# Patient Record
Sex: MALE | Race: WHITE | Employment: UNEMPLOYED | URBAN - NONMETROPOLITAN AREA
[De-identification: names, ages, dates, MRNs, and addresses within clinical notes are randomized per-mention and may not be internally consistent; named-entity substitution may affect disease eponyms.]

---

## 2020-09-15 ENCOUNTER — APPOINTMENT (OUTPATIENT)
Dept: CT IMAGING | Age: 25
End: 2020-09-15
Attending: EMERGENCY MEDICINE
Payer: MEDICAID

## 2020-09-15 ENCOUNTER — HOSPITAL ENCOUNTER (EMERGENCY)
Age: 25
Discharge: HOME | End: 2020-09-15
Attending: EMERGENCY MEDICINE | Admitting: EMERGENCY MEDICINE
Payer: MEDICAID

## 2020-09-15 VITALS
HEART RATE: 74 BPM | OXYGEN SATURATION: 97 % | WEIGHT: 187.1 LBS | RESPIRATION RATE: 22 BRPM | DIASTOLIC BLOOD PRESSURE: 89 MMHG | BODY MASS INDEX: 26.79 KG/M2 | HEIGHT: 70 IN | TEMPERATURE: 97.7 F | SYSTOLIC BLOOD PRESSURE: 133 MMHG

## 2020-09-15 DIAGNOSIS — K04.7 DENTAL INFECTION: Primary | ICD-10-CM

## 2020-09-15 LAB
ANION GAP SERPL CALC-SCNC: 10 MMOL/L (ref 5–15)
BASOPHILS # BLD: 0 K/UL (ref 0–0.2)
BASOPHILS NFR BLD: 0 % (ref 0–2.5)
BUN SERPL-MCNC: 10 MG/DL (ref 6–20)
BUN/CREAT SERPL: 11 (ref 12–20)
CA-I BLD-MCNC: 9.3 MG/DL (ref 8.5–10.1)
CHLORIDE SERPL-SCNC: 100 MMOL/L (ref 97–108)
CO2 SERPL-SCNC: 26 MMOL/L (ref 21–32)
CREAT SERPL-MCNC: 0.89 MG/DL (ref 0.7–1.3)
EOSINOPHIL # BLD: 0.2 K/UL (ref 0–0.7)
EOSINOPHIL NFR BLD: 2 % (ref 0.9–2.9)
ERYTHROCYTE [DISTWIDTH] IN BLOOD BY AUTOMATED COUNT: 15.4 % (ref 11.5–14.5)
GLUCOSE SERPL-MCNC: 87 MG/DL (ref 65–100)
HCT VFR BLD AUTO: 40 % (ref 41–53)
HGB BLD-MCNC: 13.7 % (ref 13.5–17.5)
LYMPHOCYTES # BLD: 1.9 K/UL (ref 1–4.8)
LYMPHOCYTES NFR BLD: 20 % (ref 20.5–51.1)
MCH RBC QN AUTO: 29.1 PG (ref 31–34)
MCHC RBC AUTO-ENTMCNC: 34.2 G/DL (ref 31–36)
MCV RBC AUTO: 85.2 FL (ref 80–100)
MONOCYTES # BLD: 1.3 K/UL (ref 0.2–2.4)
MONOCYTES NFR BLD: 13 % (ref 1.7–9.3)
NEUTS SEG # BLD: 6.2 K/UL (ref 1.8–7.7)
NEUTS SEG NFR BLD: 65 % (ref 42–75)
NRBC # BLD: 0 K/UL
NRBC BLD-RTO: 0 PER 100 WBC
PLATELET # BLD AUTO: 290 K/UL
PMV BLD AUTO: 7.3 FL (ref 6.5–11.5)
POTASSIUM SERPL-SCNC: 3.6 MMOL/L (ref 3.5–5.1)
RBC # BLD AUTO: 4.69 M/UL (ref 4.5–5.9)
SODIUM SERPL-SCNC: 136 MMOL/L (ref 136–145)
WBC # BLD AUTO: 9.6 K/UL (ref 4.4–11.3)

## 2020-09-15 PROCEDURE — 99283 EMERGENCY DEPT VISIT LOW MDM: CPT

## 2020-09-15 PROCEDURE — 85025 COMPLETE CBC W/AUTO DIFF WBC: CPT

## 2020-09-15 PROCEDURE — 70487 CT MAXILLOFACIAL W/DYE: CPT

## 2020-09-15 PROCEDURE — 96375 TX/PRO/DX INJ NEW DRUG ADDON: CPT

## 2020-09-15 PROCEDURE — 80048 BASIC METABOLIC PNL TOTAL CA: CPT

## 2020-09-15 PROCEDURE — 74011250636 HC RX REV CODE- 250/636: Performed by: EMERGENCY MEDICINE

## 2020-09-15 PROCEDURE — 74011000258 HC RX REV CODE- 258: Performed by: EMERGENCY MEDICINE

## 2020-09-15 PROCEDURE — 74011000636 HC RX REV CODE- 636: Performed by: EMERGENCY MEDICINE

## 2020-09-15 PROCEDURE — 36415 COLL VENOUS BLD VENIPUNCTURE: CPT

## 2020-09-15 PROCEDURE — 74011000258 HC RX REV CODE- 258

## 2020-09-15 PROCEDURE — 96374 THER/PROPH/DIAG INJ IV PUSH: CPT

## 2020-09-15 RX ORDER — SODIUM CHLORIDE 900 MG/100ML
INJECTION INTRAVENOUS
Status: COMPLETED
Start: 2020-09-15 | End: 2020-09-15

## 2020-09-15 RX ORDER — HYDROXYZINE 25 MG/1
25 TABLET, FILM COATED ORAL 2 TIMES DAILY
COMMUNITY
End: 2020-11-16

## 2020-09-15 RX ORDER — AMOXICILLIN AND CLAVULANATE POTASSIUM 500; 125 MG/1; MG/1
1 TABLET, FILM COATED ORAL 3 TIMES DAILY
Qty: 30 TAB | Refills: 0 | Status: SHIPPED | OUTPATIENT
Start: 2020-09-15 | End: 2020-09-25

## 2020-09-15 RX ORDER — HYDROMORPHONE HYDROCHLORIDE 1 MG/ML
1 INJECTION, SOLUTION INTRAMUSCULAR; INTRAVENOUS; SUBCUTANEOUS
Status: COMPLETED | OUTPATIENT
Start: 2020-09-15 | End: 2020-09-15

## 2020-09-15 RX ORDER — OXYCODONE AND ACETAMINOPHEN 5; 325 MG/1; MG/1
1 TABLET ORAL
Qty: 12 TAB | Refills: 0 | Status: SHIPPED | OUTPATIENT
Start: 2020-09-15 | End: 2020-09-18

## 2020-09-15 RX ORDER — FLUOXETINE HYDROCHLORIDE 20 MG/1
20 CAPSULE ORAL DAILY
Status: ON HOLD | COMMUNITY
End: 2020-11-16 | Stop reason: SDUPTHER

## 2020-09-15 RX ORDER — ONDANSETRON 2 MG/ML
4 INJECTION INTRAMUSCULAR; INTRAVENOUS
Status: COMPLETED | OUTPATIENT
Start: 2020-09-15 | End: 2020-09-15

## 2020-09-15 RX ORDER — AMOXICILLIN 500 MG/1
500 CAPSULE ORAL 3 TIMES DAILY
COMMUNITY
End: 2020-09-15

## 2020-09-15 RX ADMIN — SODIUM CHLORIDE 3 G: 0.9 INJECTION INTRAVENOUS at 11:57

## 2020-09-15 RX ADMIN — HYDROMORPHONE HYDROCHLORIDE 1 MG: 1 INJECTION, SOLUTION INTRAMUSCULAR; INTRAVENOUS; SUBCUTANEOUS at 11:57

## 2020-09-15 RX ADMIN — IOPAMIDOL 100 ML: 755 INJECTION, SOLUTION INTRAVENOUS at 12:27

## 2020-09-15 RX ADMIN — SODIUM CHLORIDE: 900 INJECTION INTRAVENOUS at 13:09

## 2020-09-15 RX ADMIN — ONDANSETRON 4 MG: 2 INJECTION INTRAMUSCULAR; INTRAVENOUS at 13:25

## 2020-09-15 NOTE — ED TRIAGE NOTES
pt report having broken tooth; pt reports being seen yesterday for same and prescribed Amoxicillin 500mg PO daily; pt reports starting today; pt c/o pain; note edema to R cheek, R area below eye; no resp.  distress noted; pt ambulatory to ER9; AOx4

## 2020-09-15 NOTE — ED PROVIDER NOTES
HPI   Patient reports a toothache to a right upper incisor for the past 3 days. Patient the pain as dull aching and stabbing. He reports that he went to a local emergency department yesterday and was prescribed amoxicillin. He reports that over the last 24 hours the pain is become severe and he has had swelling to the entire right side of his face. He denies trauma, fevers, constitutional symptoms, loss of taste, difficulty handling secretions or coughing or breathing. Patient has experienced dental pain like this in the past, but it is never become this severe. Past Medical History:   Diagnosis Date    Anxiety     Depression        Past Surgical History:   Procedure Laterality Date    HX ORTHOPAEDIC           No family history on file.     Social History     Socioeconomic History    Marital status: Not on file     Spouse name: Not on file    Number of children: Not on file    Years of education: Not on file    Highest education level: Not on file   Occupational History    Not on file   Social Needs    Financial resource strain: Not on file    Food insecurity     Worry: Not on file     Inability: Not on file    Transportation needs     Medical: Not on file     Non-medical: Not on file   Tobacco Use    Smoking status: Current Every Day Smoker     Packs/day: 0.50    Smokeless tobacco: Former User   Substance and Sexual Activity    Alcohol use: Not Currently    Drug use: Not on file    Sexual activity: Not Currently   Lifestyle    Physical activity     Days per week: Not on file     Minutes per session: Not on file    Stress: Not on file   Relationships    Social connections     Talks on phone: Not on file     Gets together: Not on file     Attends [employer] service: Not on file     Active member of club or organization: Not on file     Attends meetings of clubs or organizations: Not on file     Relationship status: Not on file    Intimate partner violence     Fear of current or ex partner: Not on file     Emotionally abused: Not on file     Physically abused: Not on file     Forced sexual activity: Not on file   Other Topics Concern    Not on file   Social History Narrative    Not on file         ALLERGIES: Tramadol    Review of Systems   Constitutional: Negative. HENT:        As in HPI   Eyes: Negative. Respiratory: Negative. Cardiovascular: Negative. Endocrine: Negative. Genitourinary: Negative. Skin: Negative. Allergic/Immunologic: Negative. Neurological: Negative. Hematological: Negative. Vitals:    09/15/20 1114   BP: 134/82   Pulse: 91   Resp: 22   Temp: 97.7 °F (36.5 °C)   SpO2: 98%   Weight: 84.9 kg (187 lb 1.6 oz)   Height: 5' 10\" (1.778 m)            Physical Exam  Vitals signs and nursing note reviewed. Constitutional:       General: He is in acute distress. Appearance: Normal appearance. HENT:      Head: Normocephalic and atraumatic. Comments: Moderate edema to the entire right side of the face with periorbital edema resulting in eye closure. Tooth in question without obvious fracture, caries, or abscess. It is tender to palpation. Nose: Nose normal.      Mouth/Throat:      Mouth: Mucous membranes are moist.   Eyes:      Extraocular Movements: Extraocular movements intact. Pupils: Pupils are equal, round, and reactive to light. Neck:      Musculoskeletal: Normal range of motion and neck supple. Pulmonary:      Effort: Pulmonary effort is normal. No respiratory distress. Musculoskeletal: Normal range of motion. General: No swelling or tenderness. Lymphadenopathy:      Cervical: No cervical adenopathy. Skin:     General: Skin is dry. Neurological:      Mental Status: He is alert and oriented to person, place, and time. Mental status is at baseline. MDM  Number of Diagnoses or Management Options  Diagnosis management comments: History and physical are concerning with a dental abscess that is expanding. Patient's face is involved. He is afebrile and not septic. There are no airway issues and patient is able to handle secretions. However, given exam will need labs, CT, and likely admission for IV antibiotics versus transfer for abscess. Reviewed CT scan and labs with patient. Symptoms are improved. Specifically edema has improved and patient reports less pain. He remains afebrile and labs do not reveal leukocytosis. CT scan does not show discrete abscess. Offered patient admission for IV antibiotics and pain control and he declined. Given the above, it is reasonable to attempt outpatient management.     Procedures

## 2020-09-15 NOTE — DISCHARGE INSTRUCTIONS
Patient Education        Abscessed Tooth: Care Instructions  Your Care Instructions     An abscessed tooth is a tooth that has a pocket of pus in the tissues around it. Pus forms when the body tries to fight an infection caused by bacteria. If the pus cannot drain, it forms an abscess. An abscessed tooth can cause red, swollen gums and throbbing pain, especially when you chew. You may have a bad taste in your mouth and a fever, and your jaw may swell. Damage to the tooth, untreated tooth decay, or gum disease can cause an abscessed tooth. An abscessed tooth needs to be treated by a dental professional right away. If it is not treated, the infection could spread to other parts of your body. Your dentist will give you antibiotics to stop the infection. He or she may make a hole in the tooth or cut open (jo ann) the abscess inside your mouth so that the infection can drain, which should relieve your pain. You may need to have a root canal treatment, which tries to save your tooth by taking out the infected pulp and replacing it with a healing medicine and/or a filling. If these treatments do not work, your tooth may have to be removed. Follow-up care is a key part of your treatment and safety. Be sure to make and go to all appointments, and call your doctor if you are having problems. It's also a good idea to know your test results and keep a list of the medicines you take. How can you care for yourself at home? · Reduce pain and swelling in your face and jaw by putting ice or a cold pack on the outside of your cheek. Do this for 10 to 20 minutes at a time. Put a thin cloth between the ice and your skin. · Take pain medicines exactly as directed. ? If the doctor gave you a prescription medicine for pain, take it as prescribed. ? If you are not taking a prescription pain medicine, ask your doctor if you can take an over-the-counter medicine. · Take antibiotics as directed.  Do not stop taking them just because you feel better. You need to take the full course of antibiotics. To prevent tooth abscess  · Brush and floss every day. Have regular dental checkups. · Eat a healthy diet. Avoid sugary foods and drinks. · Do not smoke or vape with nicotine. And don't use spit tobacco. Tobacco and nicotine slow your ability to heal. They increase your risk for gum disease and cancer of the mouth and throat. If you need help quitting, talk to your doctor about stop-smoking programs and medicines. These can increase your chances of quitting for good. When should you call for help? Call 911 anytime you think you may need emergency care. For example, call if:    · You have trouble breathing. Call your doctor now or seek immediate medical care if:    · You have new or worse symptoms of infection, such as:  ? Increased pain, swelling, warmth, or redness. ? Red streaks leading from the area. ? Pus draining from the area. ? A fever. Watch closely for changes in your health, and be sure to contact your doctor if:    · You do not get better as expected. Where can you learn more? Go to http://adelina-leslie.info/  Enter L466 in the search box to learn more about \"Abscessed Tooth: Care Instructions. \"  Current as of: March 25, 2020               Content Version: 12.6  © 6322-0454 [employer]. Care instructions adapted under license by [employer] (which disclaims liability or warranty for this information). If you have questions about a medical condition or this instruction, always ask your healthcare professional. [place] [address] any warranty or liability for your use of this information.

## 2020-10-17 ENCOUNTER — APPOINTMENT (OUTPATIENT)
Dept: GENERAL RADIOLOGY | Age: 25
End: 2020-10-17
Attending: EMERGENCY MEDICINE
Payer: MEDICAID

## 2020-10-17 ENCOUNTER — HOSPITAL ENCOUNTER (EMERGENCY)
Age: 25
Discharge: HOME | End: 2020-10-18
Attending: EMERGENCY MEDICINE
Payer: MEDICAID

## 2020-10-17 DIAGNOSIS — T40.601A OPIATE OVERDOSE, ACCIDENTAL OR UNINTENTIONAL, INITIAL ENCOUNTER (HCC): Primary | ICD-10-CM

## 2020-10-17 LAB
ALBUMIN SERPL-MCNC: 4.1 G/DL (ref 3.5–5)
ALBUMIN/GLOB SERPL: 1.2 {RATIO} (ref 1.1–2.2)
ALP SERPL-CCNC: 76 U/L (ref 45–117)
ALT SERPL-CCNC: 29 U/L (ref 12–78)
ANION GAP SERPL CALC-SCNC: 14 MMOL/L (ref 5–15)
AST SERPL W P-5'-P-CCNC: 40 U/L (ref 15–37)
BASOPHILS # BLD: 0 K/UL (ref 0–0.2)
BASOPHILS NFR BLD: 0 % (ref 0–2.5)
BILIRUB SERPL-MCNC: 0.3 MG/DL (ref 0.2–1)
BUN SERPL-MCNC: 11 MG/DL (ref 6–20)
BUN/CREAT SERPL: 9 (ref 12–20)
CA-I BLD-MCNC: 9.6 MG/DL (ref 8.5–10.1)
CHLORIDE SERPL-SCNC: 99 MMOL/L (ref 97–108)
CO2 SERPL-SCNC: 25 MMOL/L (ref 21–32)
CREAT SERPL-MCNC: 1.16 MG/DL (ref 0.7–1.3)
DEPRECATED S PYO AG THROAT QL EIA: NEGATIVE
EOSINOPHIL # BLD: 0.1 K/UL (ref 0–0.7)
EOSINOPHIL NFR BLD: 1 % (ref 0.9–2.9)
ERYTHROCYTE [DISTWIDTH] IN BLOOD BY AUTOMATED COUNT: 15.7 % (ref 11.5–14.5)
ETHANOL SERPL-MCNC: <4 MG/DL
FLUAV AG NPH QL IA: NEGATIVE
FLUBV AG NOSE QL IA: NEGATIVE
GLOBULIN SER CALC-MCNC: 3.3 G/DL (ref 2–4)
GLUCOSE SERPL-MCNC: 185 MG/DL (ref 65–100)
HCT VFR BLD AUTO: 39.7 % (ref 41–53)
HGB BLD-MCNC: 13.3 G/DL (ref 13.5–17.5)
LYMPHOCYTES # BLD: 2.1 K/UL (ref 1–4.8)
LYMPHOCYTES NFR BLD: 19 % (ref 20.5–51.1)
MCH RBC QN AUTO: 29.1 PG (ref 31–34)
MCHC RBC AUTO-ENTMCNC: 33.5 G/DL (ref 31–36)
MCV RBC AUTO: 86.9 FL (ref 80–100)
MONOCYTES # BLD: 1.1 K/UL (ref 0.2–2.4)
MONOCYTES NFR BLD: 10 % (ref 1.7–9.3)
NEUTS SEG # BLD: 7.5 K/UL (ref 1.8–7.7)
NEUTS SEG NFR BLD: 70 % (ref 42–75)
NRBC # BLD: 0.01 K/UL
NRBC BLD-RTO: 0 PER 100 WBC
PLATELET # BLD AUTO: 269 K/UL
PMV BLD AUTO: 7.8 FL (ref 6.5–11.5)
POTASSIUM SERPL-SCNC: 4.4 MMOL/L (ref 3.5–5.1)
PROT SERPL-MCNC: 7.4 G/DL (ref 6.4–8.2)
RBC # BLD AUTO: 4.57 M/UL (ref 4.5–5.9)
SODIUM SERPL-SCNC: 138 MMOL/L (ref 136–145)
TROPONIN I SERPL-MCNC: <0.05 NG/ML
WBC # BLD AUTO: 10.9 K/UL (ref 4.4–11.3)

## 2020-10-17 PROCEDURE — 99283 EMERGENCY DEPT VISIT LOW MDM: CPT

## 2020-10-17 PROCEDURE — 87070 CULTURE OTHR SPECIMN AEROBIC: CPT

## 2020-10-17 PROCEDURE — 84484 ASSAY OF TROPONIN QUANT: CPT

## 2020-10-17 PROCEDURE — 71045 X-RAY EXAM CHEST 1 VIEW: CPT

## 2020-10-17 PROCEDURE — 36415 COLL VENOUS BLD VENIPUNCTURE: CPT

## 2020-10-17 PROCEDURE — 80053 COMPREHEN METABOLIC PANEL: CPT

## 2020-10-17 PROCEDURE — 80307 DRUG TEST PRSMV CHEM ANLYZR: CPT

## 2020-10-17 PROCEDURE — 87147 CULTURE TYPE IMMUNOLOGIC: CPT

## 2020-10-17 PROCEDURE — 87804 INFLUENZA ASSAY W/OPTIC: CPT

## 2020-10-17 PROCEDURE — 85025 COMPLETE CBC W/AUTO DIFF WBC: CPT

## 2020-10-17 PROCEDURE — 87880 STREP A ASSAY W/OPTIC: CPT

## 2020-10-17 PROCEDURE — 93005 ELECTROCARDIOGRAM TRACING: CPT

## 2020-10-17 RX ORDER — NALOXONE HYDROCHLORIDE 0.4 MG/ML
INJECTION, SOLUTION INTRAMUSCULAR; INTRAVENOUS; SUBCUTANEOUS
Status: DISCONTINUED
Start: 2020-10-17 | End: 2020-10-18 | Stop reason: HOSPADM

## 2020-10-17 RX ORDER — SODIUM CHLORIDE 0.9 % (FLUSH) 0.9 %
5-40 SYRINGE (ML) INJECTION EVERY 8 HOURS
Status: DISCONTINUED | OUTPATIENT
Start: 2020-10-17 | End: 2020-10-18 | Stop reason: HOSPADM

## 2020-10-17 RX ORDER — SODIUM CHLORIDE 0.9 % (FLUSH) 0.9 %
5-40 SYRINGE (ML) INJECTION AS NEEDED
Status: DISCONTINUED | OUTPATIENT
Start: 2020-10-17 | End: 2020-10-18 | Stop reason: HOSPADM

## 2020-10-17 NOTE — ED TRIAGE NOTES
Pt was brought to the ED by his \"friends\" .  Male friend states that pt was dropped off at there house, after being on a binge  for 4 days on \"opiates\" pt admits to fentanyl abusive

## 2020-10-18 VITALS
OXYGEN SATURATION: 98 % | SYSTOLIC BLOOD PRESSURE: 118 MMHG | RESPIRATION RATE: 18 BRPM | DIASTOLIC BLOOD PRESSURE: 98 MMHG | HEART RATE: 92 BPM | TEMPERATURE: 98.8 F

## 2020-10-18 RX ORDER — NALOXONE HYDROCHLORIDE 4 MG/.1ML
SPRAY NASAL
Qty: 1 EACH | Refills: 0 | Status: ON HOLD | OUTPATIENT
Start: 2020-10-18 | End: 2020-11-16 | Stop reason: SDUPTHER

## 2020-10-18 NOTE — ED PROVIDER NOTES
EMERGENCY DEPARTMENT HISTORY AND PHYSICAL EXAM  ?    Date: 10/17/2020  Patient Name: Naya Shine    History of Presenting Illness    Patient presents with:  Altered mental status      History Provided By: Patient    HPI: Naya Shine, 26 y.o. male with a past medical history significant anxiety and substance abuse presents to the ED unresponsive, brought by his friends, who reports that he became unresponsive while using drugs, possibly fentanyl. He became more responsive after Narcan was administered. Patient is alert now and admits to using drugs. He will not admit specifically narcotics. He is not suicidal and says he is not interested in detox. He does report that recently he has not felt right, reports fever, sore throat, congestion and coughing. There are no other complaints, changes, or physical findings at this time. PCP: Mamta Oneill MD    Current Facility-Administered Medications:  naloxone (NARCAN) 0.4 mg/mL injection, , , ,   sodium chloride (NS) flush 5-40 mL, 5-40 mL, IntraVENous, Q8H, Immanuel Momin MD  sodium chloride (NS) flush 5-40 mL, 5-40 mL, IntraVENous, PRN, Immanuel Momin MD    Current Outpatient Medications:  FLUoxetine (PROzac) 20 mg capsule, Take 20 mg by mouth daily. , Disp: , Rfl:   hydrOXYzine HCL (ATARAX) 25 mg tablet, Take 25 mg by mouth two (2) times a day., Disp: , Rfl:         Past History    Past Medical History:  Past Medical History:  No date: Anxiety  No date: Depression    Past Surgical History:  Past Surgical History:  No date: HX ORTHOPAEDIC    Family History:  No family history on file.       Social History:  Social History   Tobacco Use     Smoking status: Current Every Day Smoker       Packs/day: 0.50     Smokeless tobacco: Former User   Alcohol use: Not Currently   Drug use: Not on file      Allergies:  -- Tramadol -- Nausea and Vomiting      Review of Systems  [unfilled]    Physical Exam  [unfilled]    Diagnostic Study Results    Labs -  Recent Results (from the past 12 hour(s))  -METABOLIC PANEL, COMPREHENSIVE  Collection Time: 10/17/20  7:23 PM      Result                      Value             Ref Range          Sodium                      138               136 - 145 mm*      Potassium                   4.4               3.5 - 5.1 mm*      Chloride                    99                97 - 108 mmo*      CO2                         25                21 - 32 mmol*      Anion gap                   14                5 - 15 mmol/L      Glucose                     185 (H)           65 - 100 mg/*      BUN                         11                6 - 20 mg/dL       Creatinine                  1.16              0.70 - 1.30 *      BUN/Creatinine ratio        9 (L)             12 - 20            GFR est AA                  >60               >60 ml/min/1*      GFR est non-AA              >60               >60 ml/min/1*      Calcium                     9.6               8.5 - 10.1 m*      Bilirubin, total            0.3               0.2 - 1.0 mg*      AST (SGOT)                  40 (H)            15 - 37 U/L        ALT (SGPT)                  29                12 - 78 U/L        Alk.  phosphatase            76                45 - 117 U/L       Protein, total              7.4               6.4 - 8.2 g/*      Albumin                     4.1               3.5 - 5.0 g/*      Globulin                    3.3               2.0 - 4.0 g/*      A-G Ratio                   1.2               1.1 - 2.2      -CBC WITH AUTOMATED DIFF  Collection Time: 10/17/20  7:23 PM      Result                      Value             Ref Range          WBC                         10.9              4.4 - 11.3 K*      RBC                         4.57              4.50 - 5.90 *      HGB                         13.3 (L)          13.5 - 17.5 *      HCT                         39.7 (L)          41 - 53 %          MCV                         86.9              80 - 100 FL        MCH 29.1 (L)          31 - 34 PG         MCHC                        33.5              31.0 - 36.0 *      RDW                         15.7 (H)          11.5 - 14.5 %      PLATELET                    269               K/uL               MPV                         7.8               6.5 - 11.5 FL      NRBC                        0.0                WBC        ABSOLUTE NRBC               0.01              K/uL               NEUTROPHILS                 70                42 - 75 %          LYMPHOCYTES                 19 (L)            20.5 - 51.1 %      MONOCYTES                   10 (H)            1.7 - 9.3 %        EOSINOPHILS                 1                 0.9 - 2.9 %        BASOPHILS                   0                 0.0 - 2.5 %        ABS. NEUTROPHILS            7.5               1.8 - 7.7 K/*      ABS. LYMPHOCYTES            2.1               1.0 - 4.8 K/*      ABS. MONOCYTES              1.1               0.2 - 2.4 K/*      ABS. EOSINOPHILS            0.1               0.0 - 0.7 K/*      ABS. BASOPHILS              0.0               0.0 - 0.2 K/*  -TROPONIN I  Collection Time: 10/17/20  7:23 PM      Result                      Value             Ref Range          Troponin-I, Qt.             <0.05 <0.05 ng/mL   -ETHYL ALCOHOL  Collection Time: 10/17/20  7:23 PM      Result                      Value             Ref Range          ALCOHOL(ETHYL),SERUM        <4 <10 mg/dL     Radiologic Studies -   XR CHEST PORT  Final Result   IMPRESSION: Negative. CT Results  (Last 48 hours)   None     CXR Results  (Last 48 hours)             10/17/20 1851  XR CHEST PORT Final result   Impression:  IMPRESSION: Negative. Narrative: Indication: Altered mental status. AP portable chest radiograph 17 October 2020 1848 hours. Comparison 21 July 2020. Clear lungs. Patient rotated to the left. Normal heart size exaggerated by     patient rotation. No pneumothorax or pleural effusion.    Normal bones.            Medical Decision Making and ED Course  I am the first provider for this patient. I reviewed the vital signs, available nursing notes, past medical history, past surgical history, family history and social history. Vital Signs-Reviewed the patient's vital signs. Empty flowsheet group. Provider Notes (Medical Decision Making):   Patient presents with accidental opiate overdose. His condition is improved and stable. He does not desire detox. The patient presents with opiate overdose, alcohol use disorder, per glycemia, seizure. ED Course:   Initial assessment performed. The patients presenting problems have been discussed, and they are in agreement with the care plan formulated and outlined with them. I have encouraged them to ask questions as they arise throughout their visit. CRITICAL CARE NOTE :  8:14 PM  Amount of Critical Care Time: ___30_(minutes)__    IMPENDING DETERIORATION -Airway  ASSOCIATED RISK FACTORS - Hypotension  MANAGEMENT- Bedside Assessment    INTERVENTIONS - hemodynamic mngmt    TREATMENT RESPONSE -Improved and Stable  PERFORMED BY - Self    NOTES   :  I have spent critical care time involved in lab review, consultations with specialist, family decision- making, bedside attention and documentation. This time excludes time spent in any separate billed procedures. During this entire length of time I was immediately available to the patient . Jay Green MD        Disposition    Discharged        DISCHARGE PLAN:  1. Current Discharge Medication List    CONTINUE these medications which have NOT CHANGED    FLUoxetine (PROzac) 20 mg capsule  Take 20 mg by mouth daily. hydrOXYzine HCL (ATARAX) 25 mg tablet  Take 25 mg by mouth two (2) times a day. 2. Follow-up Information    None    3. Return to ED if worse     Diagnosis    Clinical Impression: No diagnosis found.     Attestations:    Jay Green MD    Please note that this dictation was completed with Dragon, the [employer] voice recognition software. Quite often unanticipated grammatical, syntax, homophones, and other interpretive errors are inadvertently transcribed by the computer software. Please disregard these errors. Please excuse any errors that have escaped final proofreading. Thank you.    ? Past Medical History:   Diagnosis Date    Anxiety     Depression        Past Surgical History:   Procedure Laterality Date    HX ORTHOPAEDIC           No family history on file.     Social History     Socioeconomic History    Marital status: UNKNOWN     Spouse name: Not on file    Number of children: Not on file    Years of education: Not on file    Highest education level: Not on file   Occupational History    Not on file   Social Needs    Financial resource strain: Not on file    Food insecurity     Worry: Not on file     Inability: Not on file    Transportation needs     Medical: Not on file     Non-medical: Not on file   Tobacco Use    Smoking status: Current Every Day Smoker     Packs/day: 0.50    Smokeless tobacco: Former User   Substance and Sexual Activity    Alcohol use: Not Currently    Drug use: Not on file    Sexual activity: Not Currently   Lifestyle    Physical activity     Days per week: Not on file     Minutes per session: Not on file    Stress: Not on file   Relationships    Social connections     Talks on phone: Not on file     Gets together: Not on file     Attends Hinduism service: Not on file     Active member of club or organization: Not on file     Attends meetings of clubs or organizations: Not on file     Relationship status: Not on file    Intimate partner violence     Fear of current or ex partner: Not on file     Emotionally abused: Not on file     Physically abused: Not on file     Forced sexual activity: Not on file   Other Topics Concern    Not on file   Social History Narrative    Not on file         ALLERGIES: Tramadol    Review of Systems   Constitutional: Positive for fever. HENT: Positive for sore throat and voice change. Eyes: Negative. Respiratory: Positive for cough. Cardiovascular: Negative. Gastrointestinal: Negative. Endocrine: Negative. Genitourinary: Negative. Musculoskeletal: Positive for arthralgias and myalgias. Allergic/Immunologic: Negative. Neurological: Negative. Hematological: Negative. Psychiatric/Behavioral: Negative. Vitals:    10/17/20 1756 10/17/20 1809   BP: (!) 146/114 (!) 121/100   Pulse: (!) 111 (!) 123   Resp: 18 18   Temp:  98.8 °F (37.1 °C)            Physical Exam  Vitals signs and nursing note reviewed. Constitutional:       Appearance: Normal appearance. He is well-developed and normal weight. HENT:      Head: Normocephalic and atraumatic. Right Ear: Tympanic membrane, ear canal and external ear normal.      Left Ear: Tympanic membrane, ear canal and external ear normal.      Nose: Nose normal.      Mouth/Throat:      Mouth: Mucous membranes are moist.      Pharynx: Oropharynx is clear. Eyes:      Extraocular Movements: Extraocular movements intact. Conjunctiva/sclera: Conjunctivae normal.      Pupils: Pupils are equal, round, and reactive to light. Neck:      Musculoskeletal: Normal range of motion and neck supple. Cardiovascular:      Rate and Rhythm: Regular rhythm. Tachycardia present. Pulses: Normal pulses. Heart sounds: Normal heart sounds. Pulmonary:      Effort: Pulmonary effort is normal.      Breath sounds: Normal breath sounds. Abdominal:      General: Abdomen is flat. Bowel sounds are normal.      Palpations: Abdomen is soft. Musculoskeletal: Normal range of motion. Skin:     General: Skin is warm and dry. Neurological:      General: No focal deficit present. Mental Status: He is alert. He is confused. Psychiatric:         Mood and Affect: Mood is depressed.          Behavior: Behavior normal. MDM  Number of Diagnoses or Management Options  Opiate overdose, accidental or unintentional, initial encounter [facility]): established and improving     Amount and/or Complexity of Data Reviewed  Clinical lab tests: ordered and reviewed    Risk of Complications, Morbidity, and/or Mortality  Presenting problems: high  Diagnostic procedures: high  Management options: high    Patient Progress  Patient progress: improved         Procedures

## 2020-10-19 LAB
ATRIAL RATE: 114 BPM
CALCULATED P AXIS, ECG09: 64 DEGREES
CALCULATED R AXIS, ECG10: 88 DEGREES
CALCULATED T AXIS, ECG11: 53 DEGREES
DIAGNOSIS, 93000: NORMAL
P-R INTERVAL, ECG05: 143 MS
Q-T INTERVAL, ECG07: 327 MS
QRS DURATION, ECG06: 94 MS
QTC CALCULATION (BEZET), ECG08: 449 MS
VENTRICULAR RATE, ECG03: 113 BPM

## 2020-10-20 LAB
BACTERIA SPEC CULT: NORMAL
BACTERIA SPEC CULT: NORMAL
SPECIAL REQUESTS,SREQ: NORMAL

## 2020-10-21 ENCOUNTER — APPOINTMENT (OUTPATIENT)
Dept: CT IMAGING | Age: 25
End: 2020-10-21
Attending: EMERGENCY MEDICINE
Payer: MEDICAID

## 2020-10-21 ENCOUNTER — HOSPITAL ENCOUNTER (INPATIENT)
Age: 25
LOS: 20 days | Discharge: PSYCHIATRIC OR SUBSTANCE USE FACILITY | DRG: 812 | End: 2020-11-11
Attending: INTERNAL MEDICINE | Admitting: INTERNAL MEDICINE
Payer: MEDICAID

## 2020-10-21 ENCOUNTER — APPOINTMENT (OUTPATIENT)
Dept: GENERAL RADIOLOGY | Age: 25
End: 2020-10-21
Attending: EMERGENCY MEDICINE
Payer: MEDICAID

## 2020-10-21 ENCOUNTER — HOSPITAL ENCOUNTER (EMERGENCY)
Age: 25
Discharge: ANOTHER HOSPITAL | End: 2020-10-21
Attending: EMERGENCY MEDICINE
Payer: MEDICAID

## 2020-10-21 VITALS
TEMPERATURE: 100 F | DIASTOLIC BLOOD PRESSURE: 92 MMHG | HEART RATE: 114 BPM | HEIGHT: 71 IN | WEIGHT: 185 LBS | BODY MASS INDEX: 25.9 KG/M2 | RESPIRATION RATE: 44 BRPM | OXYGEN SATURATION: 100 % | SYSTOLIC BLOOD PRESSURE: 153 MMHG

## 2020-10-21 DIAGNOSIS — K72.01 ACUTE LIVER FAILURE WITH HEPATIC COMA (HCC): ICD-10-CM

## 2020-10-21 DIAGNOSIS — R77.8 TROPONIN LEVEL ELEVATED: ICD-10-CM

## 2020-10-21 DIAGNOSIS — R40.1 OBTUNDED: ICD-10-CM

## 2020-10-21 DIAGNOSIS — N17.0 ACUTE RENAL FAILURE WITH TUBULAR NECROSIS (HCC): ICD-10-CM

## 2020-10-21 DIAGNOSIS — N17.9 SEPSIS WITH ACUTE RENAL FAILURE WITHOUT SEPTIC SHOCK, DUE TO UNSPECIFIED ORGANISM, UNSPECIFIED ACUTE RENAL FAILURE TYPE (HCC): Primary | ICD-10-CM

## 2020-10-21 DIAGNOSIS — Q04.8 ABNORMALITY OF BASAL GANGLIA (HCC): ICD-10-CM

## 2020-10-21 DIAGNOSIS — G92.8 TOXIC METABOLIC ENCEPHALOPATHY: ICD-10-CM

## 2020-10-21 DIAGNOSIS — A41.9 SEPSIS WITH ACUTE RENAL FAILURE WITHOUT SEPTIC SHOCK, DUE TO UNSPECIFIED ORGANISM, UNSPECIFIED ACUTE RENAL FAILURE TYPE (HCC): Primary | ICD-10-CM

## 2020-10-21 DIAGNOSIS — I10 ESSENTIAL HYPERTENSION: ICD-10-CM

## 2020-10-21 DIAGNOSIS — F19.10 DRUG ABUSE (HCC): ICD-10-CM

## 2020-10-21 DIAGNOSIS — I42.0 DILATED CARDIOMYOPATHY (HCC): ICD-10-CM

## 2020-10-21 DIAGNOSIS — R65.20 SEPSIS WITH ACUTE RENAL FAILURE WITHOUT SEPTIC SHOCK, DUE TO UNSPECIFIED ORGANISM, UNSPECIFIED ACUTE RENAL FAILURE TYPE (HCC): Primary | ICD-10-CM

## 2020-10-21 LAB
ALBUMIN SERPL-MCNC: 3.1 G/DL (ref 3.5–5)
ALBUMIN/GLOB SERPL: 0.9 {RATIO} (ref 1.1–2.2)
ALP SERPL-CCNC: 96 U/L (ref 45–117)
ALT SERPL-CCNC: >3500 U/L (ref 12–78)
AMMONIA PLAS-SCNC: 14 UMOL/L
AMPHET UR QL SCN: POSITIVE
ANION GAP SERPL CALC-SCNC: 19 MMOL/L (ref 5–15)
APAP SERPL-MCNC: 10 UG/ML (ref 10–30)
APPEARANCE UR: CLEAR
ARTERIAL PATENCY WRIST A: ABNORMAL
AST SERPL W P-5'-P-CCNC: >2000 U/L (ref 15–37)
BACTERIA URNS QL MICRO: NEGATIVE /HPF
BARBITURATES UR QL SCN: NEGATIVE
BASE DEFICIT BLDA-SCNC: 5.2 MMOL/L (ref 0–2)
BASOPHILS # BLD: 0 K/UL (ref 0–0.2)
BASOPHILS NFR BLD: 0 % (ref 0–2.5)
BDY SITE: ABNORMAL
BENZODIAZ UR QL: POSITIVE
BILIRUB SERPL-MCNC: 0.7 MG/DL (ref 0.2–1)
BILIRUB UR QL CFM: NEGATIVE
BILIRUB UR QL CFM: NEGATIVE
BREATHS.SPONTANEOUS ON VENT: 28
BUN SERPL-MCNC: 49 MG/DL (ref 6–20)
BUN/CREAT SERPL: 9 (ref 12–20)
CA-I BLD-MCNC: 8.4 MG/DL (ref 8.5–10.1)
CANNABINOIDS UR QL SCN: POSITIVE
CHLORIDE SERPL-SCNC: 95 MMOL/L (ref 97–108)
CK SERPL-CCNC: ABNORMAL U/L (ref 39–308)
CO2 SERPL-SCNC: 20 MMOL/L (ref 21–32)
COCAINE UR QL SCN: POSITIVE
COHGB MFR BLD: 0.2 % (ref 0–5)
COLOR UR: ABNORMAL
CREAT SERPL-MCNC: 5.29 MG/DL (ref 0.7–1.3)
DATE LAST DOSE: NO GROWTH
DATE LAST DOSE: NO GROWTH
DRUG SCRN COMMENT,DRGCM: ABNORMAL
ECSTASY, ECST: POSITIVE
EOSINOPHIL # BLD: 0 K/UL (ref 0–0.7)
EOSINOPHIL NFR BLD: 0 % (ref 0.9–2.9)
ERYTHROCYTE [DISTWIDTH] IN BLOOD BY AUTOMATED COUNT: 17 % (ref 11.5–14.5)
ETHANOL SERPL-MCNC: <4 MG/DL
FIO2 ON VENT: 28 %
GAS FLOW.O2 O2 DELIVERY SYS: 2 L/MIN
GLOBULIN SER CALC-MCNC: 3.6 G/DL (ref 2–4)
GLUCOSE SERPL-MCNC: 140 MG/DL (ref 65–100)
GLUCOSE UR STRIP.AUTO-MCNC: NEGATIVE MG/DL
HCO3 BLDA-SCNC: 18 MMOL/L (ref 22–26)
HCT VFR BLD AUTO: 44.9 % (ref 41–53)
HGB BLD OXIMETRY-MCNC: 13.9 G/DL (ref 13.5–17.5)
HGB BLD-MCNC: 15.2 G/DL (ref 13.5–17.5)
HGB UR QL STRIP: ABNORMAL
KETONES UR QL STRIP.AUTO: NEGATIVE MG/DL
LACTATE SERPL-SCNC: 7.5 MMOL/L (ref 0.4–2)
LEUKOCYTE ESTERASE UR QL STRIP.AUTO: NEGATIVE
LYMPHOCYTES # BLD: 0.7 K/UL (ref 1–4.8)
LYMPHOCYTES NFR BLD: 5 % (ref 20.5–51.1)
MCH RBC QN AUTO: 29.1 PG (ref 31–34)
MCHC RBC AUTO-ENTMCNC: 33.9 G/DL (ref 31–36)
MCV RBC AUTO: 85.9 FL (ref 80–100)
METHADONE UR QL: NEGATIVE
METHGB MFR BLD: 0.3 % (ref 0–5)
MONOCYTES # BLD: 0.6 K/UL (ref 0.2–2.4)
MONOCYTES NFR BLD: 5 % (ref 1.7–9.3)
NEUTS SEG # BLD: 12.2 K/UL (ref 1.8–7.7)
NEUTS SEG NFR BLD: 90 % (ref 42–75)
NITRITE UR QL STRIP.AUTO: NEGATIVE
NRBC # BLD: 0.01 K/UL
NRBC BLD-RTO: 0 PER 100 WBC
OPIATES UR QL: NEGATIVE
OXYHGB MFR BLD: 96 % (ref 95–100)
PCO2 BLDA: 28 MMHG (ref 35–45)
PCP UR QL: NEGATIVE
PH BLDA: 7.42 [PH] (ref 7.35–7.45)
PH UR STRIP: 5.5 [PH] (ref 5–8)
PLATELET # BLD AUTO: 268 K/UL
PMV BLD AUTO: 7.7 FL (ref 6.5–11.5)
PO2 BLDA: 95 MMHG (ref 70–100)
POTASSIUM SERPL-SCNC: 4 MMOL/L (ref 3.5–5.1)
PROT SERPL-MCNC: 6.7 G/DL (ref 6.4–8.2)
PROT UR STRIP-MCNC: 30 MG/DL
RBC # BLD AUTO: 5.22 M/UL (ref 4.5–5.9)
RBC #/AREA URNS HPF: NORMAL /HPF (ref 0–3)
REPORTED DOSE,DOSE: NO GROWTH UNITS
REPORTED DOSE,DOSE: NO GROWTH UNITS
SALICYLATES SERPL-MCNC: 4.4 MG/DL (ref 2.8–20)
SAO2% DEVICE SAO2% SENSOR NAME: ABNORMAL
SODIUM SERPL-SCNC: 134 MMOL/L (ref 136–145)
SP GR UR REFRACTOMETRY: 1.02 (ref 1–1.03)
SPECIMEN SITE: ABNORMAL
TROPONIN I SERPL-MCNC: 55.89 NG/ML
UROBILINOGEN UR QL STRIP.AUTO: 1 EU/DL (ref 0.2–1)
WBC # BLD AUTO: 13.5 K/UL (ref 4.4–11.3)
WBC URNS QL MICRO: NORMAL /HPF (ref 0–5)

## 2020-10-21 PROCEDURE — 99285 EMERGENCY DEPT VISIT HI MDM: CPT

## 2020-10-21 PROCEDURE — 84484 ASSAY OF TROPONIN QUANT: CPT

## 2020-10-21 PROCEDURE — 51702 INSERT TEMP BLADDER CATH: CPT

## 2020-10-21 PROCEDURE — 82550 ASSAY OF CK (CPK): CPT

## 2020-10-21 PROCEDURE — 71250 CT THORAX DX C-: CPT

## 2020-10-21 PROCEDURE — 93005 ELECTROCARDIOGRAM TRACING: CPT

## 2020-10-21 PROCEDURE — 82009 KETONE BODYS QUAL: CPT

## 2020-10-21 PROCEDURE — 80053 COMPREHEN METABOLIC PANEL: CPT

## 2020-10-21 PROCEDURE — 80307 DRUG TEST PRSMV CHEM ANLYZR: CPT

## 2020-10-21 PROCEDURE — 87077 CULTURE AEROBIC IDENTIFY: CPT

## 2020-10-21 PROCEDURE — 83605 ASSAY OF LACTIC ACID: CPT

## 2020-10-21 PROCEDURE — 74011250636 HC RX REV CODE- 250/636: Performed by: EMERGENCY MEDICINE

## 2020-10-21 PROCEDURE — 82140 ASSAY OF AMMONIA: CPT

## 2020-10-21 PROCEDURE — 87040 BLOOD CULTURE FOR BACTERIA: CPT

## 2020-10-21 PROCEDURE — 96375 TX/PRO/DX INJ NEW DRUG ADDON: CPT

## 2020-10-21 PROCEDURE — 70450 CT HEAD/BRAIN W/O DYE: CPT

## 2020-10-21 PROCEDURE — 82803 BLOOD GASES ANY COMBINATION: CPT

## 2020-10-21 PROCEDURE — 74011000258 HC RX REV CODE- 258: Performed by: EMERGENCY MEDICINE

## 2020-10-21 PROCEDURE — 36600 WITHDRAWAL OF ARTERIAL BLOOD: CPT

## 2020-10-21 PROCEDURE — 87186 SC STD MICRODIL/AGAR DIL: CPT

## 2020-10-21 PROCEDURE — 85025 COMPLETE CBC W/AUTO DIFF WBC: CPT

## 2020-10-21 PROCEDURE — 81001 URINALYSIS AUTO W/SCOPE: CPT

## 2020-10-21 PROCEDURE — 96365 THER/PROPH/DIAG IV INF INIT: CPT

## 2020-10-21 RX ORDER — NALOXONE HYDROCHLORIDE 1 MG/ML
1 INJECTION INTRAMUSCULAR; INTRAVENOUS; SUBCUTANEOUS ONCE
Status: COMPLETED | OUTPATIENT
Start: 2020-10-21 | End: 2020-10-21

## 2020-10-21 RX ORDER — LEVOFLOXACIN 5 MG/ML
750 INJECTION, SOLUTION INTRAVENOUS
Status: COMPLETED | OUTPATIENT
Start: 2020-10-21 | End: 2020-10-21

## 2020-10-21 RX ADMIN — SODIUM CHLORIDE 1000 ML: 9 INJECTION, SOLUTION INTRAVENOUS at 20:14

## 2020-10-21 RX ADMIN — PIPERACILLIN AND TAZOBACTAM 3.38 G: 3; .375 INJECTION, POWDER, LYOPHILIZED, FOR SOLUTION INTRAVENOUS at 22:15

## 2020-10-21 RX ADMIN — NALOXONE HYDROCHLORIDE 1 MG: 1 INJECTION PARENTERAL at 18:35

## 2020-10-21 RX ADMIN — SODIUM CHLORIDE 1000 ML: 9 INJECTION, SOLUTION INTRAVENOUS at 18:37

## 2020-10-21 RX ADMIN — SODIUM CHLORIDE 1000 ML: 9 INJECTION, SOLUTION INTRAVENOUS at 21:29

## 2020-10-21 RX ADMIN — LEVOFLOXACIN 750 MG: 5 INJECTION, SOLUTION INTRAVENOUS at 20:17

## 2020-10-21 NOTE — ED PROVIDER NOTES
EMERGENCY DEPARTMENT HISTORY AND PHYSICAL EXAM      Date: 10/21/2020  Patient Name: Sallie Ortega    History of Presenting Illness     Chief Complaint   Patient presents with    Drug Overdose     pt took OD of fentanyl per friend; per friend pt given Narcan intranasally and Narcan IM approx. 30 min to 1 hr PTA; pt here for same recently; pt having trouble keeping eyes open in triage       History Provided By: Patient's Brother    HPI: Sallie Ortega, 26 y.o. male with a past medical history significant Opiate abuse presents to the ED with cc of family member states patient has been asleep since yesterday evening    There are no other complaints, changes, or physical findings at this time. PCP: Lex Hunter MD    No current facility-administered medications on file prior to encounter. Current Outpatient Medications on File Prior to Encounter   Medication Sig Dispense Refill    naloxone (Narcan) 4 mg/actuation nasal spray Use 1 spray intranasally, then discard. Repeat with new spray every 2 min as needed for opioid overdose symptoms, alternating nostrils. 1 Each 0    FLUoxetine (PROzac) 20 mg capsule Take 20 mg by mouth daily.  hydrOXYzine HCL (ATARAX) 25 mg tablet Take 25 mg by mouth two (2) times a day. Past History     Past Medical History:  Past Medical History:   Diagnosis Date    Anxiety     Depression        Past Surgical History:  Past Surgical History:   Procedure Laterality Date    HX ORTHOPAEDIC         Family History:  History reviewed. No pertinent family history. Social History:  Social History     Tobacco Use    Smoking status: Current Every Day Smoker     Packs/day: 0.50    Smokeless tobacco: Former User   Substance Use Topics    Alcohol use: Not Currently    Drug use: Yes     Comment: Fentanyl       Allergies:   Allergies   Allergen Reactions    Tramadol Nausea and Vomiting         Review of Systems     Review of Systems   Constitutional: Positive for activity change. Negative for fever. HENT: Negative for rhinorrhea and sore throat. Eyes: Negative for pain and visual disturbance. Respiratory: Negative for chest tightness and shortness of breath. Cardiovascular: Negative for chest pain and leg swelling. Gastrointestinal: Negative for abdominal pain and nausea. Endocrine: Negative for polydipsia and polyuria. Genitourinary: Negative for dysuria and urgency. Musculoskeletal: Negative for back pain and neck pain. Neurological: Negative for weakness and numbness. Physical Exam     Physical Exam  Vitals signs and nursing note reviewed. Constitutional:       General: He is in acute distress. Appearance: He is ill-appearing. HENT:      Head: Normocephalic and atraumatic. Mouth/Throat:      Mouth: Mucous membranes are dry. Eyes:      Extraocular Movements: Extraocular movements intact. Conjunctiva/sclera: Conjunctivae normal.      Pupils: Pupils are equal, round, and reactive to light. Comments: Pupils 4mm   Neck:      Musculoskeletal: Normal range of motion and neck supple. Cardiovascular:      Rate and Rhythm: Normal rate and regular rhythm. Pulses: Normal pulses. Heart sounds: Normal heart sounds. Pulmonary:      Effort: Pulmonary effort is normal.      Breath sounds: Normal breath sounds. Abdominal:      General: Bowel sounds are normal.      Palpations: Abdomen is soft. Skin:     General: Skin is warm. Capillary Refill: Capillary refill takes less than 2 seconds. Findings: Abrasion, erythema, signs of injury and lesion present. Comments: ERYTHEMA TO SACRUM, POSTERIOR EXTREMITIES; SOLE OF FEET WITH IMPRESSIONS IN THE SKIN FROM SOCKS         Diagnostic Study Results     Labs -   No results found for this or any previous visit (from the past 12 hour(s)).     Radiologic Studies -   @lastxrresult@  CT Results  (Last 48 hours)    None        CXR Results  (Last 48 hours)    None Medical Decision Making   I am the first provider for this patient. I reviewed the vital signs, available nursing notes, past medical history, past surgical history, family history and social history. Vital Signs-Reviewed the patient's vital signs. Patient Vitals for the past 12 hrs:   Temp Pulse Resp SpO2   10/21/20 1723 97.3 °F (36.3 °C) (!) 123 30 92 %       Records Reviewed: Nursing Notes    The patient presents with altered mental status with a differential diagnosis of  ETOH intoxication, cerebral hemorrhage, hypoxia and opiate overdose      Provider Notes (Medical Decision Making):     [facility]         ED Course:   Initial assessment performed. The patients presenting problems have been discussed, and they are in agreement with the care plan formulated and outlined with them. I have encouraged them to ask questions as they arise throughout their visit. ED Course as of Oct 22 1011   Wed Oct 21, 2020   2641 Call being placed to transfer center    [SB]   1936 Patient report given transfer center awaiting callback from transfer center    [SB]   1950 Received call back from transfer center of [facility] is not available for ICU beds and [facility] is not available for ICU you beds;  Apparently [facility] that we will try [address] [place] [address]    [SB]   2017 Transfer center called back no bed availability at [facility] we will seek transfer to [address] [place] [address] for ICU    [SB]   2024 EKG sinus tach rate 112 [place] interval 133 QT interval 470    [SB]   2025 Awaiting callback from transfer center to discuss case with [address] [place] [address] ICU    [SB]   2034 Family reported patient was asleep for more than 24 hours hard to arouse; no response to Narcan given here in ED;    [SB]   2045 [address] [place] [address] not able to take patient; intensivist sTATED patient also needs a hepatologist which THEY do not have so we will now try [facility] ED to ED transfer and consult with intensivist as needed    [SB] 2055 [facility] has no beds available no ICU beds available    [SB]   2107 CRITICAL CARE TIME: 70MINS    [SB]   2119 BICARBONATE(!): 18 [SB]   2119 Patient accepted by Dr. Yesica Teran ICU but we will do an ED ED transfer    [SB]   2124 Patient accepted by Dr. Khurram Whitaker in the ED and ICU bed will be signed on arrival    [SB]      ED Course User Index  [SB] Mike Llanes MD         PROCEDURES  Procedures         PLAN:  1. Current Discharge Medication List        2. Follow-up Information    None       Return to ED if worse     Diagnosis     Clinical Impression: No diagnosis found.

## 2020-10-22 ENCOUNTER — APPOINTMENT (OUTPATIENT)
Dept: VASCULAR SURGERY | Age: 25
DRG: 812 | End: 2020-10-22
Attending: NURSE PRACTITIONER
Payer: MEDICAID

## 2020-10-22 ENCOUNTER — APPOINTMENT (OUTPATIENT)
Dept: NON INVASIVE DIAGNOSTICS | Age: 25
DRG: 812 | End: 2020-10-22
Attending: PHYSICIAN ASSISTANT
Payer: MEDICAID

## 2020-10-22 ENCOUNTER — APPOINTMENT (OUTPATIENT)
Dept: ULTRASOUND IMAGING | Age: 25
DRG: 812 | End: 2020-10-22
Attending: INTERNAL MEDICINE
Payer: MEDICAID

## 2020-10-22 PROBLEM — D72.820 LYMPHOCYTOSIS: Status: ACTIVE | Noted: 2020-10-22

## 2020-10-22 PROBLEM — E87.20 METABOLIC ACIDOSIS: Status: ACTIVE | Noted: 2020-10-22

## 2020-10-22 PROBLEM — N17.0 ACUTE RENAL FAILURE WITH TUBULAR NECROSIS (HCC): Status: ACTIVE | Noted: 2020-10-22

## 2020-10-22 PROBLEM — E87.8 ELECTROLYTE ABNORMALITY: Status: ACTIVE | Noted: 2020-10-22

## 2020-10-22 PROBLEM — F19.10 DRUG ABUSE (HCC): Status: ACTIVE | Noted: 2020-10-22

## 2020-10-22 PROBLEM — R77.8 TROPONIN LEVEL ELEVATED: Status: ACTIVE | Noted: 2020-10-22

## 2020-10-22 PROBLEM — J18.9 COMMUNITY ACQUIRED PNEUMONIA OF LEFT LOWER LOBE OF LUNG: Status: ACTIVE | Noted: 2020-10-22

## 2020-10-22 PROBLEM — A41.9 SEPSIS (HCC): Status: ACTIVE | Noted: 2020-10-22

## 2020-10-22 PROBLEM — N17.9 ARF (ACUTE RENAL FAILURE) (HCC): Status: ACTIVE | Noted: 2020-10-22

## 2020-10-22 LAB
ALBUMIN SERPL-MCNC: 2.5 G/DL (ref 3.5–5)
ALBUMIN/GLOB SERPL: 0.9 {RATIO} (ref 1.1–2.2)
ALP SERPL-CCNC: 77 U/L (ref 45–117)
ALT SERPL-CCNC: 3496 U/L (ref 12–78)
AMYLASE SERPL-CCNC: 29 U/L (ref 25–115)
ANION GAP SERPL CALC-SCNC: 10 MMOL/L (ref 5–15)
ANION GAP SERPL CALC-SCNC: 12 MMOL/L (ref 5–15)
ANION GAP SERPL CALC-SCNC: 13 MMOL/L (ref 5–15)
ANION GAP SERPL CALC-SCNC: 7 MMOL/L (ref 5–15)
ARTERIAL PATENCY WRIST A: ABNORMAL
AST SERPL-CCNC: >2000 U/L (ref 15–37)
ATRIAL RATE: 100 BPM
ATRIAL RATE: 112 BPM
BASE DEFICIT BLD-SCNC: 7 MMOL/L
BASOPHILS # BLD: 0 K/UL (ref 0–0.1)
BASOPHILS # BLD: 0 K/UL (ref 0–0.1)
BASOPHILS NFR BLD: 0 % (ref 0–1)
BASOPHILS NFR BLD: 0 % (ref 0–1)
BDY SITE: ABNORMAL
BILIRUB SERPL-MCNC: 0.7 MG/DL (ref 0.2–1)
BNP SERPL-MCNC: 3484 PG/ML
BUN SERPL-MCNC: 26 MG/DL (ref 6–20)
BUN SERPL-MCNC: 53 MG/DL (ref 6–20)
BUN SERPL-MCNC: 54 MG/DL (ref 6–20)
BUN SERPL-MCNC: 56 MG/DL (ref 6–20)
BUN/CREAT SERPL: 11 (ref 12–20)
BUN/CREAT SERPL: 8 (ref 12–20)
CA-I BLD-SCNC: 1.05 MMOL/L (ref 1.12–1.32)
CALCIUM SERPL-MCNC: 7 MG/DL (ref 8.5–10.1)
CALCIUM SERPL-MCNC: 7.3 MG/DL (ref 8.5–10.1)
CALCIUM SERPL-MCNC: 7.3 MG/DL (ref 8.5–10.1)
CALCIUM SERPL-MCNC: 7.5 MG/DL (ref 8.5–10.1)
CALCULATED P AXIS, ECG09: 37 DEGREES
CALCULATED P AXIS, ECG09: 57 DEGREES
CALCULATED R AXIS, ECG10: 22 DEGREES
CALCULATED R AXIS, ECG10: 69 DEGREES
CALCULATED T AXIS, ECG11: 21 DEGREES
CALCULATED T AXIS, ECG11: 40 DEGREES
CHLORIDE SERPL-SCNC: 101 MMOL/L (ref 97–108)
CHLORIDE SERPL-SCNC: 104 MMOL/L (ref 97–108)
CHLORIDE SERPL-SCNC: 105 MMOL/L (ref 97–108)
CHLORIDE SERPL-SCNC: 105 MMOL/L (ref 97–108)
CHLORIDE UR-SCNC: 21 MMOL/L
CK MB CFR SERPL CALC: 0.2 % (ref 0–2.5)
CK MB SERPL-MCNC: 117.2 NG/ML (ref 5–25)
CK MB SERPL-MCNC: 132.8 NG/ML (ref 5–25)
CK MB SERPL-MCNC: 68.5 NG/ML (ref 5–25)
CK SERPL-CCNC: ABNORMAL U/L (ref 39–308)
CO2 SERPL-SCNC: 18 MMOL/L (ref 21–32)
CO2 SERPL-SCNC: 20 MMOL/L (ref 21–32)
CO2 SERPL-SCNC: 24 MMOL/L (ref 21–32)
CO2 SERPL-SCNC: 27 MMOL/L (ref 21–32)
COMMENT, HOLDF: NORMAL
CREAT SERPL-MCNC: 3.23 MG/DL (ref 0.7–1.3)
CREAT SERPL-MCNC: 4.91 MG/DL (ref 0.7–1.3)
CREAT SERPL-MCNC: 4.93 MG/DL (ref 0.7–1.3)
CREAT SERPL-MCNC: 5.18 MG/DL (ref 0.7–1.3)
CREAT UR-MCNC: 251 MG/DL
D DIMER PPP FEU-MCNC: 28.76 MG/L FEU (ref 0–0.65)
DIAGNOSIS, 93000: NORMAL
DIAGNOSIS, 93000: NORMAL
DIFFERENTIAL METHOD BLD: ABNORMAL
DIFFERENTIAL METHOD BLD: ABNORMAL
ECHO AO ROOT DIAM: 3.5 CM
ECHO AV AREA PEAK VELOCITY: 3.63 CM2
ECHO AV AREA/BSA PEAK VELOCITY: 1.8 CM2/M2
ECHO AV PEAK GRADIENT: 3.51 MMHG
ECHO AV PEAK VELOCITY: 93.63 CM/S
ECHO EST RA PRESSURE: 10 MMHG
ECHO LA AREA 4C: 20.62 CM2
ECHO LA MAJOR AXIS: 3.13 CM
ECHO LA MINOR AXIS: 1.52 CM
ECHO LA VOL 2C: 68.17 ML (ref 18–58)
ECHO LA VOL 4C: 50.84 ML (ref 18–58)
ECHO LA VOL BP: 68.28 ML (ref 18–58)
ECHO LA VOL/BSA BIPLANE: 33.07 ML/M2 (ref 16–28)
ECHO LA VOLUME INDEX A2C: 33.01 ML/M2 (ref 16–28)
ECHO LA VOLUME INDEX A4C: 24.62 ML/M2 (ref 16–28)
ECHO LV E' LATERAL VELOCITY: 3.96 CM/S
ECHO LV E' SEPTAL VELOCITY: 4.58 CM/S
ECHO LV EDV A2C: 211.22 ML
ECHO LV EDV A4C: 184.56 ML
ECHO LV EDV BP: 198.79 ML (ref 67–155)
ECHO LV EDV INDEX A4C: 89.4 ML/M2
ECHO LV EDV INDEX BP: 96.3 ML/M2
ECHO LV EDV NDEX A2C: 102.3 ML/M2
ECHO LV EJECTION FRACTION A2C: 27 PERCENT
ECHO LV EJECTION FRACTION A4C: 25 PERCENT
ECHO LV EJECTION FRACTION BIPLANE: 23.8 PERCENT (ref 55–100)
ECHO LV ESV A2C: 153.43 ML
ECHO LV ESV A4C: 139.18 ML
ECHO LV ESV BP: 151.49 ML (ref 22–58)
ECHO LV ESV INDEX A2C: 74.3 ML/M2
ECHO LV ESV INDEX A4C: 67.4 ML/M2
ECHO LV ESV INDEX BP: 73.4 ML/M2
ECHO LV INTERNAL DIMENSION DIASTOLIC: 5.39 CM (ref 4.2–5.9)
ECHO LV INTERNAL DIMENSION DIASTOLIC: 5.8 CM (ref 4.2–5.9)
ECHO LV INTERNAL DIMENSION SYSTOLIC: 4.9 CM
ECHO LV INTERNAL DIMENSION SYSTOLIC: 5.48 CM
ECHO LV IVSD: 0.88 CM (ref 0.6–1)
ECHO LV POSTERIOR WALL DIASTOLIC: 0.99 CM (ref 0.6–1)
ECHO LVOT DIAM: 2.55 CM
ECHO LVOT PEAK GRADIENT: 1.78 MMHG
ECHO LVOT PEAK VELOCITY: 66.76 CM/S
ECHO MV A VELOCITY: 0.29 CM/S
ECHO MV AREA PHT: 7.41 CM2
ECHO MV E DECELERATION TIME (DT): 102.42 MS
ECHO MV E VELOCITY: 87.53 CM/S
ECHO MV E/A RATIO: 301.83
ECHO MV E/E' LATERAL: 22.1
ECHO MV E/E' RATIO (AVERAGED): 20.61
ECHO MV E/E' SEPTAL: 19.11
ECHO MV PRESSURE HALF TIME (PHT): 29.7 MS
ECHO PV MAX VELOCITY: 78 CM/S
ECHO PV PEAK INSTANTANEOUS GRADIENT SYSTOLIC: 2.43 MMHG
ECHO RIGHT VENTRICULAR SYSTOLIC PRESSURE (RVSP): 29.91 MMHG
ECHO RV INTERNAL DIMENSION: 4.92 CM
ECHO RV TAPSE: 1.91 CM (ref 1.5–2)
ECHO TV REGURGITANT MAX VELOCITY: 146.86 CM/S
ECHO TV REGURGITANT MAX VELOCITY: 223.08 CM/S
ECHO TV REGURGITANT PEAK GRADIENT: 19.91 MMHG
EOSINOPHIL # BLD: 0 K/UL (ref 0–0.4)
EOSINOPHIL # BLD: 0 K/UL (ref 0–0.4)
EOSINOPHIL NFR BLD: 0 % (ref 0–7)
EOSINOPHIL NFR BLD: 0 % (ref 0–7)
ERYTHROCYTE [DISTWIDTH] IN BLOOD BY AUTOMATED COUNT: 15.7 % (ref 11.5–14.5)
ERYTHROCYTE [DISTWIDTH] IN BLOOD BY AUTOMATED COUNT: 15.9 % (ref 11.5–14.5)
ERYTHROCYTE [SEDIMENTATION RATE] IN BLOOD: 30 MM/HR (ref 0–15)
GAS FLOW.O2 O2 DELIVERY SYS: ABNORMAL L/MIN
GAS FLOW.O2 SETTING OXYMISER: 3 L/M
GLOBULIN SER CALC-MCNC: 2.8 G/DL (ref 2–4)
GLUCOSE SERPL-MCNC: 108 MG/DL (ref 65–100)
GLUCOSE SERPL-MCNC: 122 MG/DL (ref 65–100)
GLUCOSE SERPL-MCNC: 145 MG/DL (ref 65–100)
GLUCOSE SERPL-MCNC: 147 MG/DL (ref 65–100)
HBV SURFACE AB SER QL: NONREACTIVE
HBV SURFACE AB SER-ACNC: <3.1 MIU/ML
HBV SURFACE AG SER QL: <0.1 INDEX
HBV SURFACE AG SER QL: NEGATIVE
HCO3 BLD-SCNC: 19.7 MMOL/L (ref 22–26)
HCT VFR BLD AUTO: 33.1 % (ref 36.6–50.3)
HCT VFR BLD AUTO: 36 % (ref 36.6–50.3)
HGB BLD-MCNC: 11.4 G/DL (ref 12.1–17)
HGB BLD-MCNC: 12.3 G/DL (ref 12.1–17)
IMM GRANULOCYTES # BLD AUTO: 0 K/UL
IMM GRANULOCYTES # BLD AUTO: 0.1 K/UL (ref 0–0.04)
IMM GRANULOCYTES NFR BLD AUTO: 0 %
IMM GRANULOCYTES NFR BLD AUTO: 1 % (ref 0–0.5)
INR PPP: 1.4 (ref 0.9–1.1)
INR PPP: 1.5 (ref 0.9–1.1)
KETONES SERPL QL: NEGATIVE
LACTATE SERPL-SCNC: 1.7 MMOL/L (ref 0.4–2)
LIPASE SERPL-CCNC: 46 U/L (ref 73–393)
LYMPHOCYTES # BLD: 1.1 K/UL (ref 0.8–3.5)
LYMPHOCYTES # BLD: 1.6 K/UL (ref 0.8–3.5)
LYMPHOCYTES NFR BLD: 12 % (ref 12–49)
LYMPHOCYTES NFR BLD: 7 % (ref 12–49)
M PNEUMO IGM SER IA-ACNC: REACTIVE
MAGNESIUM SERPL-MCNC: 1.7 MG/DL (ref 1.6–2.4)
MAGNESIUM SERPL-MCNC: 1.8 MG/DL (ref 1.6–2.4)
MAGNESIUM SERPL-MCNC: 1.9 MG/DL (ref 1.6–2.4)
MAGNESIUM SERPL-MCNC: 2.4 MG/DL (ref 1.6–2.4)
MCH RBC QN AUTO: 28.5 PG (ref 26–34)
MCH RBC QN AUTO: 28.8 PG (ref 26–34)
MCHC RBC AUTO-ENTMCNC: 34.2 G/DL (ref 30–36.5)
MCHC RBC AUTO-ENTMCNC: 34.4 G/DL (ref 30–36.5)
MCV RBC AUTO: 83.5 FL (ref 80–99)
MCV RBC AUTO: 83.6 FL (ref 80–99)
MONOCYTES # BLD: 0.8 K/UL (ref 0–1)
MONOCYTES # BLD: 1 K/UL (ref 0–1)
MONOCYTES NFR BLD: 6 % (ref 5–13)
MONOCYTES NFR BLD: 7 % (ref 5–13)
NEUTS SEG # BLD: 11 K/UL (ref 1.8–8)
NEUTS SEG # BLD: 13.6 K/UL (ref 1.8–8)
NEUTS SEG NFR BLD: 82 % (ref 32–75)
NEUTS SEG NFR BLD: 85 % (ref 32–75)
NRBC # BLD: 0 K/UL (ref 0–0.01)
NRBC # BLD: 0 K/UL (ref 0–0.01)
NRBC BLD-RTO: 0 PER 100 WBC
NRBC BLD-RTO: 0 PER 100 WBC
OSMOLALITY UR: 332 MOSM/KG H2O
P-R INTERVAL, ECG05: 133 MS
P-R INTERVAL, ECG05: 144 MS
PCO2 BLD: 38.9 MMHG (ref 35–45)
PH BLD: 7.31 [PH] (ref 7.35–7.45)
PHOSPHATE SERPL-MCNC: 2.6 MG/DL (ref 2.6–4.7)
PHOSPHATE SERPL-MCNC: 3.3 MG/DL (ref 2.6–4.7)
PHOSPHATE SERPL-MCNC: 3.5 MG/DL (ref 2.6–4.7)
PHOSPHATE SERPL-MCNC: 3.7 MG/DL (ref 2.6–4.7)
PLATELET # BLD AUTO: 201 K/UL (ref 150–400)
PLATELET # BLD AUTO: 232 K/UL (ref 150–400)
PMV BLD AUTO: 9.8 FL (ref 8.9–12.9)
PMV BLD AUTO: 9.9 FL (ref 8.9–12.9)
PO2 BLD: 47 MMHG (ref 80–100)
POTASSIUM SERPL-SCNC: 3.8 MMOL/L (ref 3.5–5.1)
POTASSIUM SERPL-SCNC: 4.1 MMOL/L (ref 3.5–5.1)
POTASSIUM SERPL-SCNC: 4.2 MMOL/L (ref 3.5–5.1)
POTASSIUM SERPL-SCNC: 4.4 MMOL/L (ref 3.5–5.1)
POTASSIUM UR-SCNC: 78 MMOL/L
PROCALCITONIN SERPL-MCNC: 28.36 NG/ML
PROT SERPL-MCNC: 5.3 G/DL (ref 6.4–8.2)
PROTHROMBIN TIME: 14.9 SEC (ref 9–11.1)
PROTHROMBIN TIME: 15.8 SEC (ref 9–11.1)
Q-T INTERVAL, ECG07: 344 MS
Q-T INTERVAL, ECG07: 386 MS
QRS DURATION, ECG06: 87 MS
QRS DURATION, ECG06: 92 MS
QTC CALCULATION (BEZET), ECG08: 470 MS
QTC CALCULATION (BEZET), ECG08: 497 MS
RBC # BLD AUTO: 3.96 M/UL (ref 4.1–5.7)
RBC # BLD AUTO: 4.31 M/UL (ref 4.1–5.7)
RBC MORPH BLD: ABNORMAL
RBC MORPH BLD: ABNORMAL
SAMPLES BEING HELD,HOLD: NORMAL
SAO2 % BLD: 79 % (ref 92–97)
SODIUM SERPL-SCNC: 135 MMOL/L (ref 136–145)
SODIUM SERPL-SCNC: 136 MMOL/L (ref 136–145)
SODIUM SERPL-SCNC: 137 MMOL/L (ref 136–145)
SODIUM SERPL-SCNC: 138 MMOL/L (ref 136–145)
SODIUM UR-SCNC: 22 MMOL/L
SPECIMEN TYPE: ABNORMAL
TOTAL RESP. RATE, ITRR: 31
TROPONIN I SERPL-MCNC: 28.6 NG/ML
TROPONIN I SERPL-MCNC: 38.1 NG/ML
TROPONIN I SERPL-MCNC: 55.3 NG/ML
TROPONIN I SERPL-MCNC: 57.9 NG/ML
VENTRICULAR RATE, ECG03: 100 BPM
VENTRICULAR RATE, ECG03: 112 BPM
WBC # BLD AUTO: 13.4 K/UL (ref 4.1–11.1)
WBC # BLD AUTO: 15.8 K/UL (ref 4.1–11.1)

## 2020-10-22 PROCEDURE — 82436 ASSAY OF URINE CHLORIDE: CPT

## 2020-10-22 PROCEDURE — 36415 COLL VENOUS BLD VENIPUNCTURE: CPT

## 2020-10-22 PROCEDURE — 80053 COMPREHEN METABOLIC PANEL: CPT

## 2020-10-22 PROCEDURE — C1751 CATH, INF, PER/CENT/MIDLINE: HCPCS

## 2020-10-22 PROCEDURE — 85610 PROTHROMBIN TIME: CPT

## 2020-10-22 PROCEDURE — 83735 ASSAY OF MAGNESIUM: CPT

## 2020-10-22 PROCEDURE — 93005 ELECTROCARDIOGRAM TRACING: CPT

## 2020-10-22 PROCEDURE — 83690 ASSAY OF LIPASE: CPT

## 2020-10-22 PROCEDURE — 74011250636 HC RX REV CODE- 250/636: Performed by: NURSE PRACTITIONER

## 2020-10-22 PROCEDURE — 87449 NOS EACH ORGANISM AG IA: CPT

## 2020-10-22 PROCEDURE — 93306 TTE W/DOPPLER COMPLETE: CPT | Performed by: SPECIALIST

## 2020-10-22 PROCEDURE — 85652 RBC SED RATE AUTOMATED: CPT

## 2020-10-22 PROCEDURE — 82150 ASSAY OF AMYLASE: CPT

## 2020-10-22 PROCEDURE — 93970 EXTREMITY STUDY: CPT

## 2020-10-22 PROCEDURE — 82803 BLOOD GASES ANY COMBINATION: CPT

## 2020-10-22 PROCEDURE — 83935 ASSAY OF URINE OSMOLALITY: CPT

## 2020-10-22 PROCEDURE — 84484 ASSAY OF TROPONIN QUANT: CPT

## 2020-10-22 PROCEDURE — 74011250636 HC RX REV CODE- 250/636: Performed by: EMERGENCY MEDICINE

## 2020-10-22 PROCEDURE — 93306 TTE W/DOPPLER COMPLETE: CPT

## 2020-10-22 PROCEDURE — 83605 ASSAY OF LACTIC ACID: CPT

## 2020-10-22 PROCEDURE — 74011000258 HC RX REV CODE- 258: Performed by: EMERGENCY MEDICINE

## 2020-10-22 PROCEDURE — 74011000258 HC RX REV CODE- 258: Performed by: NURSE PRACTITIONER

## 2020-10-22 PROCEDURE — 84100 ASSAY OF PHOSPHORUS: CPT

## 2020-10-22 PROCEDURE — 02HV33Z INSERTION OF INFUSION DEVICE INTO SUPERIOR VENA CAVA, PERCUTANEOUS APPROACH: ICD-10-PCS | Performed by: NURSE PRACTITIONER

## 2020-10-22 PROCEDURE — 36556 INSERT NON-TUNNEL CV CATH: CPT

## 2020-10-22 PROCEDURE — 74011000250 HC RX REV CODE- 250: Performed by: NURSE PRACTITIONER

## 2020-10-22 PROCEDURE — 90935 HEMODIALYSIS ONE EVALUATION: CPT

## 2020-10-22 PROCEDURE — 76700 US EXAM ABDOM COMPLETE: CPT

## 2020-10-22 PROCEDURE — 74011250637 HC RX REV CODE- 250/637: Performed by: NURSE PRACTITIONER

## 2020-10-22 PROCEDURE — 84300 ASSAY OF URINE SODIUM: CPT

## 2020-10-22 PROCEDURE — 99223 1ST HOSP IP/OBS HIGH 75: CPT | Performed by: PSYCHIATRY & NEUROLOGY

## 2020-10-22 PROCEDURE — 74011250636 HC RX REV CODE- 250/636: Performed by: INTERNAL MEDICINE

## 2020-10-22 PROCEDURE — 99222 1ST HOSP IP/OBS MODERATE 55: CPT | Performed by: SPECIALIST

## 2020-10-22 PROCEDURE — 86706 HEP B SURFACE ANTIBODY: CPT

## 2020-10-22 PROCEDURE — 84145 PROCALCITONIN (PCT): CPT

## 2020-10-22 PROCEDURE — 85379 FIBRIN DEGRADATION QUANT: CPT

## 2020-10-22 PROCEDURE — 65610000006 HC RM INTENSIVE CARE

## 2020-10-22 PROCEDURE — 87040 BLOOD CULTURE FOR BACTERIA: CPT

## 2020-10-22 PROCEDURE — 80048 BASIC METABOLIC PNL TOTAL CA: CPT

## 2020-10-22 PROCEDURE — 85025 COMPLETE CBC W/AUTO DIFF WBC: CPT

## 2020-10-22 PROCEDURE — 83880 ASSAY OF NATRIURETIC PEPTIDE: CPT

## 2020-10-22 PROCEDURE — 5A1D90Z PERFORMANCE OF URINARY FILTRATION, CONTINUOUS, GREATER THAN 18 HOURS PER DAY: ICD-10-PCS | Performed by: INTERNAL MEDICINE

## 2020-10-22 PROCEDURE — 82570 ASSAY OF URINE CREATININE: CPT

## 2020-10-22 PROCEDURE — 86738 MYCOPLASMA ANTIBODY: CPT

## 2020-10-22 PROCEDURE — 82550 ASSAY OF CK (CPK): CPT

## 2020-10-22 PROCEDURE — 87340 HEPATITIS B SURFACE AG IA: CPT

## 2020-10-22 PROCEDURE — 84133 ASSAY OF URINE POTASSIUM: CPT

## 2020-10-22 RX ORDER — HEPARIN SODIUM 5000 [USP'U]/ML
5000 INJECTION, SOLUTION INTRAVENOUS; SUBCUTANEOUS EVERY 8 HOURS
Status: DISCONTINUED | OUTPATIENT
Start: 2020-10-22 | End: 2020-10-23

## 2020-10-22 RX ORDER — FAMOTIDINE 20 MG/1
20 TABLET, FILM COATED ORAL DAILY
Status: DISCONTINUED | OUTPATIENT
Start: 2020-10-22 | End: 2020-10-26

## 2020-10-22 RX ORDER — POLYETHYLENE GLYCOL 3350 17 G/17G
17 POWDER, FOR SOLUTION ORAL DAILY PRN
Status: DISCONTINUED | OUTPATIENT
Start: 2020-10-22 | End: 2020-11-11 | Stop reason: HOSPADM

## 2020-10-22 RX ORDER — MAGNESIUM SULFATE HEPTAHYDRATE 40 MG/ML
2 INJECTION, SOLUTION INTRAVENOUS ONCE
Status: COMPLETED | OUTPATIENT
Start: 2020-10-22 | End: 2020-10-22

## 2020-10-22 RX ORDER — SODIUM CHLORIDE 0.9 % (FLUSH) 0.9 %
5-40 SYRINGE (ML) INJECTION AS NEEDED
Status: DISCONTINUED | OUTPATIENT
Start: 2020-10-22 | End: 2020-11-11 | Stop reason: HOSPADM

## 2020-10-22 RX ORDER — SODIUM BICARBONATE 84 MG/ML
200 INJECTION, SOLUTION INTRAVENOUS
Status: COMPLETED | OUTPATIENT
Start: 2020-10-22 | End: 2020-10-22

## 2020-10-22 RX ORDER — FUROSEMIDE 10 MG/ML
100 INJECTION INTRAMUSCULAR; INTRAVENOUS ONCE
Status: COMPLETED | OUTPATIENT
Start: 2020-10-22 | End: 2020-10-22

## 2020-10-22 RX ORDER — FENTANYL CITRATE 50 UG/ML
100 INJECTION, SOLUTION INTRAMUSCULAR; INTRAVENOUS ONCE
Status: COMPLETED | OUTPATIENT
Start: 2020-10-22 | End: 2020-10-22

## 2020-10-22 RX ORDER — MORPHINE SULFATE 2 MG/ML
2 INJECTION, SOLUTION INTRAMUSCULAR; INTRAVENOUS
Status: DISCONTINUED | OUTPATIENT
Start: 2020-10-22 | End: 2020-10-23

## 2020-10-22 RX ORDER — IPRATROPIUM BROMIDE AND ALBUTEROL SULFATE 2.5; .5 MG/3ML; MG/3ML
3 SOLUTION RESPIRATORY (INHALATION)
Status: DISCONTINUED | OUTPATIENT
Start: 2020-10-22 | End: 2020-11-05 | Stop reason: SDUPTHER

## 2020-10-22 RX ORDER — DOCUSATE SODIUM 100 MG/1
100 CAPSULE, LIQUID FILLED ORAL DAILY
Status: DISCONTINUED | OUTPATIENT
Start: 2020-10-22 | End: 2020-11-11 | Stop reason: HOSPADM

## 2020-10-22 RX ORDER — SODIUM BICARBONATE 84 MG/ML
100 INJECTION, SOLUTION INTRAVENOUS
Status: COMPLETED | OUTPATIENT
Start: 2020-10-22 | End: 2020-10-22

## 2020-10-22 RX ORDER — ONDANSETRON 2 MG/ML
4 INJECTION INTRAMUSCULAR; INTRAVENOUS
Status: DISCONTINUED | OUTPATIENT
Start: 2020-10-22 | End: 2020-11-10

## 2020-10-22 RX ORDER — ACETAMINOPHEN 325 MG/1
650 TABLET ORAL
Status: DISCONTINUED | OUTPATIENT
Start: 2020-10-22 | End: 2020-11-10

## 2020-10-22 RX ORDER — SODIUM CHLORIDE 9 MG/ML
150 INJECTION, SOLUTION INTRAVENOUS CONTINUOUS
Status: DISCONTINUED | OUTPATIENT
Start: 2020-10-22 | End: 2020-10-22

## 2020-10-22 RX ORDER — VANCOMYCIN 2 GRAM/500 ML IN 0.9 % SODIUM CHLORIDE INTRAVENOUS
2000 ONCE
Status: COMPLETED | OUTPATIENT
Start: 2020-10-22 | End: 2020-10-22

## 2020-10-22 RX ORDER — LABETALOL HYDROCHLORIDE 5 MG/ML
20 INJECTION, SOLUTION INTRAVENOUS
Status: DISCONTINUED | OUTPATIENT
Start: 2020-10-22 | End: 2020-11-11 | Stop reason: HOSPADM

## 2020-10-22 RX ORDER — SODIUM CHLORIDE 0.9 % (FLUSH) 0.9 %
5-40 SYRINGE (ML) INJECTION EVERY 8 HOURS
Status: DISCONTINUED | OUTPATIENT
Start: 2020-10-22 | End: 2020-11-11 | Stop reason: HOSPADM

## 2020-10-22 RX ORDER — SODIUM CHLORIDE 9 MG/ML
50 INJECTION, SOLUTION INTRAVENOUS CONTINUOUS
Status: DISCONTINUED | OUTPATIENT
Start: 2020-10-22 | End: 2020-10-27

## 2020-10-22 RX ORDER — MIDAZOLAM HYDROCHLORIDE 1 MG/ML
5 INJECTION, SOLUTION INTRAMUSCULAR; INTRAVENOUS ONCE
Status: COMPLETED | OUTPATIENT
Start: 2020-10-22 | End: 2020-10-22

## 2020-10-22 RX ORDER — ACETAMINOPHEN 650 MG/1
650 SUPPOSITORY RECTAL
Status: DISCONTINUED | OUTPATIENT
Start: 2020-10-22 | End: 2020-11-10

## 2020-10-22 RX ORDER — SODIUM BICARBONATE 84 MG/ML
INJECTION, SOLUTION INTRAVENOUS
Status: DISPENSED
Start: 2020-10-22 | End: 2020-10-22

## 2020-10-22 RX ORDER — SODIUM CHLORIDE, SODIUM LACTATE, POTASSIUM CHLORIDE, CALCIUM CHLORIDE 600; 310; 30; 20 MG/100ML; MG/100ML; MG/100ML; MG/100ML
200 INJECTION, SOLUTION INTRAVENOUS CONTINUOUS
Status: DISCONTINUED | OUTPATIENT
Start: 2020-10-22 | End: 2020-10-22

## 2020-10-22 RX ORDER — VANCOMYCIN HYDROCHLORIDE
1250 EVERY 24 HOURS
Status: DISCONTINUED | OUTPATIENT
Start: 2020-10-23 | End: 2020-10-22

## 2020-10-22 RX ADMIN — PIPERACILLIN AND TAZOBACTAM 3.38 G: 3; .375 INJECTION, POWDER, LYOPHILIZED, FOR SOLUTION INTRAVENOUS at 17:45

## 2020-10-22 RX ADMIN — SODIUM CHLORIDE 200 ML/HR: 900 INJECTION, SOLUTION INTRAVENOUS at 20:54

## 2020-10-22 RX ADMIN — HEPARIN SODIUM 1400 UNITS: 1000 INJECTION INTRAVENOUS; SUBCUTANEOUS at 17:06

## 2020-10-22 RX ADMIN — VANCOMYCIN HYDROCHLORIDE 2000 MG: 10 INJECTION, POWDER, LYOPHILIZED, FOR SOLUTION INTRAVENOUS at 02:32

## 2020-10-22 RX ADMIN — MORPHINE SULFATE 2 MG: 2 INJECTION, SOLUTION INTRAMUSCULAR; INTRAVENOUS at 18:35

## 2020-10-22 RX ADMIN — MIDAZOLAM 5 MG: 1 INJECTION INTRAMUSCULAR; INTRAVENOUS at 00:40

## 2020-10-22 RX ADMIN — MORPHINE SULFATE 2 MG: 2 INJECTION, SOLUTION INTRAMUSCULAR; INTRAVENOUS at 13:50

## 2020-10-22 RX ADMIN — CALCIUM GLUCONATE 1 G: 98 INJECTION, SOLUTION INTRAVENOUS at 02:32

## 2020-10-22 RX ADMIN — CALCIUM GLUCONATE 1 G: 94 INJECTION, SOLUTION INTRAVENOUS at 04:44

## 2020-10-22 RX ADMIN — SODIUM CHLORIDE, SODIUM LACTATE, POTASSIUM CHLORIDE, AND CALCIUM CHLORIDE 200 ML/HR: 600; 310; 30; 20 INJECTION, SOLUTION INTRAVENOUS at 03:35

## 2020-10-22 RX ADMIN — FAMOTIDINE 20 MG: 20 TABLET ORAL at 08:28

## 2020-10-22 RX ADMIN — SODIUM CHLORIDE 1000 ML: 900 INJECTION, SOLUTION INTRAVENOUS at 01:10

## 2020-10-22 RX ADMIN — HEPARIN SODIUM 5000 UNITS: 5000 INJECTION INTRAVENOUS; SUBCUTANEOUS at 13:32

## 2020-10-22 RX ADMIN — SODIUM BICARBONATE 150 MEQ: 84 INJECTION, SOLUTION INTRAVENOUS at 01:07

## 2020-10-22 RX ADMIN — SODIUM CHLORIDE 200 ML/HR: 900 INJECTION, SOLUTION INTRAVENOUS at 10:52

## 2020-10-22 RX ADMIN — SODIUM CHLORIDE 1000 ML: 900 INJECTION, SOLUTION INTRAVENOUS at 01:09

## 2020-10-22 RX ADMIN — MAGNESIUM SULFATE IN WATER 2 G: 40 INJECTION, SOLUTION INTRAVENOUS at 03:44

## 2020-10-22 RX ADMIN — SODIUM BICARBONATE 100 MEQ: 84 INJECTION, SOLUTION INTRAVENOUS at 05:02

## 2020-10-22 RX ADMIN — FENTANYL CITRATE 100 MCG: 50 INJECTION, SOLUTION INTRAMUSCULAR; INTRAVENOUS at 00:43

## 2020-10-22 RX ADMIN — PIPERACILLIN AND TAZOBACTAM 3.38 G: 3; .375 INJECTION, POWDER, LYOPHILIZED, FOR SOLUTION INTRAVENOUS at 05:01

## 2020-10-22 RX ADMIN — DOXYCYCLINE 100 MG: 100 INJECTION, POWDER, LYOPHILIZED, FOR SOLUTION INTRAVENOUS at 10:49

## 2020-10-22 RX ADMIN — HEPARIN SODIUM 5000 UNITS: 5000 INJECTION INTRAVENOUS; SUBCUTANEOUS at 20:54

## 2020-10-22 RX ADMIN — HEPARIN SODIUM 5000 UNITS: 5000 INJECTION INTRAVENOUS; SUBCUTANEOUS at 04:58

## 2020-10-22 RX ADMIN — HEPARIN SODIUM 1100 UNITS: 1000 INJECTION INTRAVENOUS; SUBCUTANEOUS at 17:05

## 2020-10-22 RX ADMIN — Medication 10 ML: at 05:01

## 2020-10-22 RX ADMIN — SODIUM CHLORIDE 200 ML/HR: 900 INJECTION, SOLUTION INTRAVENOUS at 04:59

## 2020-10-22 RX ADMIN — FUROSEMIDE 100 MG: 10 INJECTION, SOLUTION INTRAMUSCULAR; INTRAVENOUS at 03:44

## 2020-10-22 RX ADMIN — DOXYCYCLINE 100 MG: 100 INJECTION, POWDER, LYOPHILIZED, FOR SOLUTION INTRAVENOUS at 22:06

## 2020-10-22 RX ADMIN — MORPHINE SULFATE 2 MG: 2 INJECTION, SOLUTION INTRAMUSCULAR; INTRAVENOUS at 22:06

## 2020-10-22 RX ADMIN — MORPHINE SULFATE 2 MG: 2 INJECTION, SOLUTION INTRAMUSCULAR; INTRAVENOUS at 06:07

## 2020-10-22 RX ADMIN — DOCUSATE SODIUM 100 MG: 100 CAPSULE, LIQUID FILLED ORAL at 08:28

## 2020-10-22 NOTE — CONSULTS
Cardiology Consult Note      Patient Name: Georgette Luna  : 1995 MRN: [id number]  Date: 10/22/2020  Time: 8:46 AM    Admit Diagnosis: Sepsis ([facility] [A41.9]  ARF (acute renal failure) ([facility] [N17.9]  Drug abuse ([facility] [F19.10]    Primary Cardiologist: none    Consulting Cardiologist: Emanuel Michaels MD    Reason for Consult: Troponin elevation    Requesting MD: Kb Butcher MD    HPI:  Georgette Luna is a 26 y.o. male admitted on 10/21/2020  for Sepsis ([facility] [A41.9]  ARF (acute renal failure) ([facility] [N17.9]  Drug abuse ([facility] [F19.10]. has a past medical history of Anxiety and Depression. This is a 44-year-old male who was transferred from [facility] with possible drug overdose. The patient currently is not intubated however he is very lethargic and can provide no useful history. Chart reviewed and discussed with his nurse, apparently the patient's family found him down possibly over 24 hours. Down in [address] [place] [address] he received 3 rounds of Narcan was started on Levaquin, Zosyn and given a total of 2 L of fluid. Transferred to [facility] for higher level of care. Toxicology screen is positive for cocaine, THC, ecstasy, amphetamines, and benzos. Lab studies are significantly skewed showing shock liver as well as acute kidney injury and elevated troponin, CK, and CK-MB. Troponin was noted to be elevated to 55 and flat. It is reported that the patient has no complaints for shortness of breath or chest pain. Subjective:  Patient does arouse. Falls asleep quickly. Mumbles little when does speak. VSS. Tachycardia on telemetry. In NAD      Assessment and Plan     1. Troponin elevation   - 55.89 --> 55.30 --> 57.9   - Related to Rhabdo, most likely. CK 69,000+, CKMB 132   - Echo ordered  2.  AMS   - Metabolic encephalopathy   - UDS + for cocaine, THC, Amphetamines, Ectasy, benzos   - Head CT with indication for toxic edema in basal ganglia   - Neuro consulted  3. Septic shock   - L sided PNA   - IV Abx   - IVF  4. CAP   - Left sided   - IV Abx  5. Transaminitis   - significantly elevated LFTs - Acute liver injury   - INR elevation to 1.5   - consider GI consult  6. JEAN PIERRE   - Nephrology following   - Cr 4.9   - Plan for dialysis today  7. Poly substance abuse   - [facility] protocol    Trop elevation as related to Rhabdo and myocardial injury. Continue troponin trend, serial EKGs per CC team.  Echo ordered and will follow up results. IF any ischemic work up to be considered, for now is placed on back burner. Patient seen on the day of progress note and examined  and agree with Advance Practice Provider (GREGG, NP,PA)  assessment and plans    Proceed with echocardiogram as above    Rhabdomyolysis probably the cause of his troponin elevation but given the use of cocaine as well myocardial injury directly by coronary vasospasm cannot be entirely ruled out, CPK-MB be index on the other hand was relatively low. We will try to avoid beta-blocker for now. Consider calcium channel blocker for hypertension and tachycardia but hold off until echocardiogram available. Patient Active Problem List   Diagnosis Code    Drug abuse ([facility] F19.10    Acute renal failure with tubular necrosis (HCC) N17.0    Sepsis ([facility] A41.9    Community acquired pneumonia of left lower lobe of lung R85.1    Metabolic acidosis X25.4    Lymphocytosis D72.820    Electrolyte abnormality E87.8    Troponin level elevated R77.8     No specialty comments available. Review of Systems:    [x] Patient unable to provide secondary to condition    [] All systems negative, except as checked below.   Constitutional:    []Weight Change  []Fever   []Chills   []Night Sweats  []Fatigue  []Malaise  []____  ENT/Mouth:     []Hearing Changes  []Ear Pain  []Nasal Congestion   []Sinus Pain  []Hoarseness   []Sore throat  []Rhinorrhea  []Swallowing Difficulty  []____  Eyes:    []Eye Pain  []Swelling  []Redness  []Foreign Body  []Discharge  []Vision Changes  []____  Cardiovascular:    []Chest Pain  []SOB  []PND  []BOYER  []Orthopnea  []Claudication  []Edema   []Palpitations  []____  Respiratory:    []Cough  []Sputum  []Wheezing,  []SOB  []Hemoptysis  []____  Gastrointestinal:    []Nausea  []Vomiting  []Diarrhea  []Constipation  []Pain  []Heartburn  []Anorexia  []Dysphagia  []Hematochezia  []Melena,  []Jaundice  []____  Genitourinary:    []Dysuria  []Urinary Frequency  []Hematuria  []Urinary Incontinence  []Urgency  []Flank Pain  []Hesitancy  []____  Musculoskeletal:    []Arthralgias  []Myalgias  []Joint Swelling  []Joint Stiffness  []Back Pain  []Neck Pain  []____  Skin:    []Skin Lesions  []Pruritis  []Hair Changes  []Skin rashes  []____  Neuro:    []Weakness  []Numbness  []Paresthesias  []Loss of Consciousness  []Syncope   []Dizziness  []Headache  []Coordination Changes  []Recent Falls  []____  Psych:    []Anxiety/Depression  []Insomnia  []Memory Changes  []Violence/Abuse Hx.  []____  Heme/Lymph:    []Bruising  []Bleeding  []Lymphadenopathy  []____  Endocrine:    []Polyuria  []Polydipsia  []Temperature Intolerance  []____         Previous treatment/evaluation includes   none  Cardiac risk factors:   male gender.     Past Medical History:   Diagnosis Date    Anxiety     Depression      Past Surgical History:   Procedure Laterality Date    HX ORTHOPAEDIC       Current Facility-Administered Medications   Medication Dose Route Frequency    sodium chloride (NS) flush 5-40 mL  5-40 mL IntraVENous Q8H    sodium chloride (NS) flush 5-40 mL  5-40 mL IntraVENous PRN    acetaminophen (TYLENOL) tablet 650 mg  650 mg Oral Q6H PRN    Or    acetaminophen (TYLENOL) suppository 650 mg  650 mg Rectal Q6H PRN    polyethylene glycol (MIRALAX) packet 17 g  17 g Oral DAILY PRN    ondansetron (ZOFRAN) injection 4 mg  4 mg IntraVENous Q6H PRN    famotidine (PEPCID) tablet 20 mg  20 mg Oral DAILY    albuterol-ipratropium (DUO-NEB) 2.5 MG-0.5 MG/3 ML  3 mL Nebulization Q4H PRN    morphine injection 2 mg  2 mg IntraVENous Q4H PRN    docusate sodium (COLACE) capsule 100 mg  100 mg Oral DAILY    piperacillin-tazobactam (ZOSYN) 3.375 g in 0.9% sodium chloride (MBP/ADV) 100 mL  3.375 g IntraVENous Q8H    labetaloL (NORMODYNE;TRANDATE) injection 20 mg  20 mg IntraVENous Q4H PRN    sodium bicarbonate (8.4%) 1 mEq/mL (8.4 %) injection        Vancomycin Pharmacy to dose   Other Rx Dosing/Monitoring    [START ON 10/23/2020] vancomycin (VANCOCIN) 1250 mg in  ml infusion  1,250 mg IntraVENous Q24H    heparin (porcine) injection 5,000 Units  5,000 Units IntraVENous Q8H    0.9% sodium chloride infusion  200 mL/hr IntraVENous CONTINUOUS       Allergies   Allergen Reactions    Tramadol Nausea and Vomiting      No family history on file. Social History     Socioeconomic History    Marital status: UNKNOWN     Spouse name: Not on file    Number of children: Not on file    Years of education: Not on file    Highest education level: Not on file   Tobacco Use    Smoking status: Current Every Day Smoker     Packs/day: 0.50    Smokeless tobacco: Former User   Substance and Sexual Activity    Alcohol use: Not Currently    Drug use: Yes     Comment: Fentanyl    Sexual activity: Not Currently       Objective:    Physical Exam    Vitals:   Vitals:    10/22/20 0500 10/22/20 0600 10/22/20 0700 10/22/20 0800   BP: (!) 141/90 (!) 148/103 (!) 157/91 (!) 158/81   Pulse: (!) 107 (!) 107 (!) 105 (!) 109   Resp: (!) 36 30 (!) 37 29   Temp:       SpO2: 93% 94% 91% 95%   Weight:       Height:           General:    Lethargic, minimally responsive, no distress, appears stated age. Neck:   Supple,  no JVD. Back:     Not assessed   Lungs:     Clear anteriorly to auscultation bilaterally. Heart[de-identified]    Regular rate and rhythm, S1, S2 normal, no murmur, click, rub or gallop.  tachy   Abdomen:     Soft, non-tender. Bowel sounds hypoactive. Extremities:   Extremities normal, atraumatic, no cyanosis. + pedal edema. Vascular:   Pulses - 2+ radials   Skin:   Skin color normal. No rashes or lesions   Neurologic:   Unable to assess. Patient does not follow commands       Telemetry: normal sinus rhythm    ECG:   EKG Results     Procedure 720 Value Units Date/Time    EKG, 12 LEAD, INITIAL [[id number]] Collected:  10/22/20 0121    Order Status:  Completed Updated:  10/22/20 0739     Ventricular Rate 100 BPM      Atrial Rate 100 BPM      P-R Interval 144 ms      QRS Duration 92 ms      Q-T Interval 386 ms      QTC Calculation (Bezet) 497 ms      Calculated P Axis 37 degrees      Calculated R Axis 22 degrees      Calculated T Axis 21 degrees      Diagnosis --     Normal sinus rhythm  T wave abnormality, consider anterior ischemia    No previous ECGs available  Confirmed by Yonathan Nation M.D., Rina Moreno ([id number]) on 10/22/2020 7:39:01 AM              Data Review:     Radiology:   XR Results (most recent):  Results from [place] encounter on 10/17/20   XR CHEST PORT    Narrative Indication: Altered mental status. AP portable chest radiograph 17 October 2020 1848 hours. Comparison 21 July 2020. Clear lungs. Patient rotated to the left. Normal heart size exaggerated by  patient rotation. No pneumothorax or pleural effusion. Normal bones. Impression IMPRESSION: Negative.          Recent Labs     10/22/20  0405 10/22/20  0055 10/21/20  2010 10/21/20  1805   CPK PENDING  --   --  95,400*   TROIQ 57.90* 55.30* 55.89*  --      Recent Labs     10/22/20  0402 10/22/20  0055    136   K 4.2 4.4    105   CO2 20* 18*   BUN 54* 53*   CREA 4.91* 4.93*   * 122*   PHOS 3.7 3.3   CA 7.0* 7.3*     Recent Labs     10/22/20  0405 10/22/20  0055   WBC 13.4* 15.8*   HGB 11.4* 12.3   HCT 33.1* 36.0*    232     Recent Labs     10/22/20  0405 10/22/20  0056 10/22/20  0055 10/21/20  1805   PTP 14.9* 15.8* --   --    INR 1.4* 1.5*  --   --    AP  --   --  77 96     No results for input(s): CHOL, LDLC in the last 72 hours. No lab exists for component: TGL, HDLC,  HBA1C  No results for input(s): CRP, TSH, TSHEXT in the last 72 hours. No lab exists for component: ESR    Sebastienven Yared.  Shmuel Celeste MD         [facility] [address] [place] [address],[address]     [facility]     [phone]    Tabitha Callejas MD

## 2020-10-22 NOTE — H&P
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, large blood on UA but very minimal RBCs, will check urine myoglobin, follow-up nephrology recommendations-RRT to be started today White count better, CT chest showed possible pneumonia, continue vancomycin and Zosyn, add doxycycline to the regimen, follow-up cultures, send Legionella (urine) and mycoplasma IgM, impressively elevated pro calcitonin level but in the setting of acute kidney injury probably falsely elevated to some extent Impressively elevated LFTs, both AST and ALT-some of it likely related to rhabdomyolysis but will also work-up for other causes, at risk of going into fulminant hepatic failure, will trend LFTs, ammonia and coags Impressively elevated troponin-in the 50s with some T wave changes on EKG, some of it related to rhabdomyolysis but with numbers in the 50s one must consider ACS versus myocarditis, follow-up cardiology recommendations, to get an echocardiogram 
 
Impressive UDS-positive for cocaine, ecstasy, benzodiazepines, amphetamines and THC-at risk for withdrawal-we will monitor CT showed possible facial cellulitis and basal ganglia edema-serial neuro checks Additional critical care time-30 minutes Patient Vitals for the past 24 hrs: 
 Temp Pulse Resp BP SpO2  
10/22/20 1114    (!) 148/92   
10/22/20 1100  (!) 108 (!) 36 (!) 148/92 98 % 10/22/20 1058    (!) 148/103   
10/22/20 1000  (!) 107 30 (!) 152/96 96 % 10/22/20 0900  (!) 110 (!) 31 (!) 157/104 96 % 10/22/20 0800 98.6 °F (37 °C) (!) 109 29 (!) 158/81 95 % 10/22/20 0700  (!) 105 (!) 37 (!) 157/91 91 % 10/22/20 0600  (!) 107 30 (!) 148/103 94 % 10/22/20 0500  (!) 107 (!) 36 (!) 141/90 93 % 10/22/20 0400  (!) 105 (!) 37 (!) 138/93 95 % 10/22/20 0333 98.7 °F (37.1 °C) (!) 105 (!) 34 (!) 138/106 97 % 10/22/20 0245    (!) 155/94   
10/22/20 0232  (!) 101 (!) 34 (!) 160/94 98 % 10/22/20 0030  (!) 103 (!) 35 (!) 134/92 98 % 10/22/20 0015 98.3 °F (36.8 °C) (!) 106 29 (!) 155/107 96 % Recent Results (from the past 24 hour(s)) DRUG SCREEN, URINE Collection Time: 10/21/20  6:05 PM  
Result Value Ref Range AMPHETAMINES Positive (A) Negative BARBITURATES Negative Negative BENZODIAZEPINES Positive (A) Negative COCAINE Positive (A) Negative ECSTASY, MDMA Positive (A) Negative METHADONE Negative Negative OPIATES Negative Negative PCP(PHENCYCLIDINE) Negative Negative THC (TH-CANNABINOL) Positive (A) Negative Drug screen comment This test is a screen for drugs of abuse in a medical setting only (i.e., they are unconfirmed results and as such must not be used for non-medical purposes, e.g.,employment testing, legal testing). Due to its inherent nature, false positive (FP) and false negative (FN) results may be obtained. Therefore, if necessary for medical care, recommend confirmation of positive findings by GC/MS. METABOLIC PANEL, COMPREHENSIVE Collection Time: 10/21/20  6:05 PM  
Result Value Ref Range Sodium 134 (L) 136 - 145 mmol/L Potassium 4.0 3.5 - 5.1 mmol/L Chloride 95 (L) 97 - 108 mmol/L  
 CO2 20 (L) 21 - 32 mmol/L Anion gap 19 (H) 5 - 15 mmol/L Glucose 140 (H) 65 - 100 mg/dL BUN 49 (H) 6 - 20 mg/dL Creatinine 5.29 (H) 0.70 - 1.30 mg/dL BUN/Creatinine ratio 9 (L) 12 - 20 GFR est AA 16 (L) >60 ml/min/1.73m2 GFR est non-AA 13 (L) >60 ml/min/1.73m2 Calcium 8.4 (L) 8.5 - 10.1 mg/dL Bilirubin, total 0.7 0.2 - 1.0 mg/dL AST (SGOT) >2,000 (H) 15 - 37 U/L  
 ALT (SGPT) >3,500 (H) 12 - 78 U/L Alk. phosphatase 96 45 - 117 U/L Protein, total 6.7 6.4 - 8.2 g/dL Albumin 3.1 (L) 3.5 - 5.0 g/dL Globulin 3.6 2.0 - 4.0 g/dL A-G Ratio 0.9 (L) 1.1 - 2.2    
CBC WITH AUTOMATED DIFF Collection Time: 10/21/20  6:05 PM  
Result Value Ref Range WBC 13.5 (H) 4.4 - 11.3 K/uL RBC 5.22 4.50 - 5.90 M/uL  
 HGB 15.2 13.5 - 17.5 g/dL HCT 44.9 41 - 53 % MCV 85.9 80 - 100 FL  
 MCH 29.1 (L) 31 - 34 PG  
 MCHC 33.9 31.0 - 36.0 g/dL  
 RDW 17.0 (H) 11.5 - 14.5 % PLATELET 853 K/uL MPV 7.7 6.5 - 11.5 FL  
 NRBC 0.0  WBC ABSOLUTE NRBC 0.01 K/uL NEUTROPHILS 90 (H) 42 - 75 % LYMPHOCYTES 5 (L) 20.5 - 51.1 % MONOCYTES 5 1.7 - 9.3 % EOSINOPHILS 0 (L) 0.9 - 2.9 % BASOPHILS 0 0.0 - 2.5 % ABS. NEUTROPHILS 12.2 (H) 1.8 - 7.7 K/UL  
 ABS. LYMPHOCYTES 0.7 (L) 1.0 - 4.8 K/UL  
 ABS. MONOCYTES 0.6 0.2 - 2.4 K/UL  
 ABS. EOSINOPHILS 0.0 0.0 - 0.7 K/UL  
 ABS. BASOPHILS 0.0 0.0 - 0.2 K/UL  
ETHYL ALCOHOL Collection Time: 10/21/20  6:05 PM  
Result Value Ref Range ALCOHOL(ETHYL),SERUM <4 <10 mg/dL LACTIC ACID Collection Time: 10/21/20  6:05 PM  
Result Value Ref Range Lactic acid 7.5 (HH) 0.4 - 2.0 mmol/L  
URINALYSIS W/ RFLX MICROSCOPIC Collection Time: 10/21/20  6:05 PM  
Result Value Ref Range Color Yellow/Straw Appearance Clear Clear Specific gravity 1.025 1.003 - 1.030    
 pH (UA) 5.5 5.0 - 8.0 Protein 30 (A) Negative mg/dL Glucose Negative Negative mg/dL Ketone Negative Negative mg/dL Blood Large (A) Negative Urobilinogen 1.0 0.2 - 1.0 EU/dL Nitrites Negative Negative Leukocyte Esterase Negative Negative Bilirubin UA, confirm Negative Negative SALICYLATE Collection Time: 10/21/20  6:05 PM  
Result Value Ref Range Salicylate level 4.4 2.8 - 20.0 mg/dL Reported dose date No growth Reported dose: No growth Units ACETAMINOPHEN Collection Time: 10/21/20  6:05 PM  
Result Value Ref Range Acetaminophen level 10 10 - 30 ug/mL Reported dose date No growth Reported dose: No growth Units CK Collection Time: 10/21/20  6:05 PM  
Result Value Ref Range CK 69,719 (HH) 39 - 308 U/L  
BILIRUBIN, CONFIRM Collection Time: 10/21/20  6:05 PM  
Result Value Ref Range Bilirubin UA, confirm Negative Negative URINE MICROSCOPIC Collection Time: 10/21/20  6:05 PM  
Result Value Ref Range WBC 0-4 0 - 5 /hpf  
 RBC 0-5 0 - 3 /hpf Bacteria Negative Negative /hpf  
EKG, 12 LEAD, INITIAL Collection Time: 10/21/20  7:43 PM  
Result Value Ref Range Ventricular Rate 112 BPM  
 Atrial Rate 112 BPM  
 P-R Interval 133 ms QRS Duration 87 ms  
 Q-T Interval 344 ms QTC Calculation (Bezet) 470 ms Calculated P Axis 57 degrees Calculated R Axis 69 degrees Calculated T Axis 40 degrees Diagnosis Sinus tachycardia Abnormal T, consider ischemia, anterior leads Confirmed by Sapna Oliver [address] [phone]) on 10/22/2020 11:12:18 AM 
  
TROPONIN I Collection Time: 10/21/20  8:10 PM  
Result Value Ref Range Troponin-I, Qt. 55.89 (H) <0.05 ng/mL AMMONIA Collection Time: 10/21/20  8:10 PM  
Result Value Ref Range Ammonia 14 <32 umol/L  
ACETONE/KETONE, QL Collection Time: 10/21/20  8:10 PM  
Result Value Ref Range Acetone/Ketone serum, QL. Negative BLOOD GAS, ARTERIAL Collection Time: 10/21/20  8:30 PM  
Result Value Ref Range pH 7.421 7.35 - 7.45    
 PCO2 28 (L) 35 - 45 mmHg PO2 95 70 - 100 mmHg BICARBONATE 18 (L) 22 - 26 mmol/L  
 BASE DEFICIT 5.2 (H) 0 - 2 mmol/L  
 O2 METHOD Nasal Cannula O2 FLOW RATE 2 L/min FIO2 28.0 % SPONTANEOUS RATE 28 Sample source Arterial    
 SITE Left Radial    
 MARGARET'S TEST PASS Carboxy-Hgb 0.2 0 - 5 % Methemoglobin 0.3 0 - 5 %  
 tHb 13.9 13.5 - 17.5 g/dL Oxyhemoglobin 96.0 95 - 100 % NT-PRO BNP Collection Time: 10/22/20 12:55 AM  
Result Value Ref Range NT pro-BNP 3,484 (H) <125 PG/ML  
AMYLASE Collection Time: 10/22/20 12:55 AM  
Result Value Ref Range Amylase 29 25 - 115 U/L  
LIPASE Collection Time: 10/22/20 12:55 AM  
Result Value Ref Range Lipase 46 (L) 73 - 393 U/L MAGNESIUM Collection Time: 10/22/20 12:55 AM  
Result Value Ref Range Magnesium 1.8 1.6 - 2.4 mg/dL CBC WITH AUTOMATED DIFF Collection Time: 10/22/20 12:55 AM  
Result Value Ref Range WBC 15.8 (H) 4.1 - 11.1 K/uL  
 RBC 4.31 4.10 - 5.70 M/uL  
 HGB 12.3 12.1 - 17.0 g/dL HCT 36.0 (L) 36.6 - 50.3 % MCV 83.5 80.0 - 99.0 FL  
 MCH 28.5 26.0 - 34.0 PG  
 MCHC 34.2 30.0 - 36.5 g/dL  
 RDW 15.9 (H) 11.5 - 14.5 % PLATELET 755 036 - 148 K/uL MPV 9.8 8.9 - 12.9 FL  
 NRBC 0.0 0  WBC ABSOLUTE NRBC 0.00 0.00 - 0.01 K/uL NEUTROPHILS 85 (H) 32 - 75 % LYMPHOCYTES 7 (L) 12 - 49 % MONOCYTES 7 5 - 13 % EOSINOPHILS 0 0 - 7 % BASOPHILS 0 0 - 1 % IMMATURE GRANULOCYTES 1 (H) 0.0 - 0.5 % ABS. NEUTROPHILS 13.6 (H) 1.8 - 8.0 K/UL  
 ABS. LYMPHOCYTES 1.1 0.8 - 3.5 K/UL  
 ABS. MONOCYTES 1.0 0.0 - 1.0 K/UL  
 ABS. EOSINOPHILS 0.0 0.0 - 0.4 K/UL  
 ABS. BASOPHILS 0.0 0.0 - 0.1 K/UL  
 ABS. IMM. GRANS. 0.1 (H) 0.00 - 0.04 K/UL  
 DF AUTOMATED    
SAMPLES BEING HELD Collection Time: 10/22/20 12:55 AM  
Result Value Ref Range SAMPLES BEING HELD  1 RED COMMENT Add-on orders for these samples will be processed based on acceptable specimen integrity and analyte stability, which may vary by analyte. D DIMER Collection Time: 10/22/20 12:55 AM  
Result Value Ref Range D-dimer 28.76 (H) 0.00 - 0.65 mg/L FEU METABOLIC PANEL, COMPREHENSIVE Collection Time: 10/22/20 12:55 AM  
Result Value Ref Range Sodium 136 136 - 145 mmol/L Potassium 4.4 3.5 - 5.1 mmol/L Chloride 105 97 - 108 mmol/L  
 CO2 18 (L) 21 - 32 mmol/L Anion gap 13 5 - 15 mmol/L Glucose 122 (H) 65 - 100 mg/dL BUN 53 (H) 6 - 20 MG/DL Creatinine 4.93 (H) 0.70 - 1.30 MG/DL  
 BUN/Creatinine ratio 11 (L) 12 - 20 GFR est AA 18 (L) >60 ml/min/1.73m2 GFR est non-AA 15 (L) >60 ml/min/1.73m2 Calcium 7.3 (L) 8.5 - 10.1 MG/DL  Bilirubin, total 0.7 0.2 - 1.0 MG/DL  
 ALT (SGPT) 3,496 (H) 12 - 78 U/L  
 AST (SGOT) >2,000 (H) 15 - 37 U/L  
 Alk. phosphatase 77 45 - 117 U/L Protein, total 5.3 (L) 6.4 - 8.2 g/dL Albumin 2.5 (L) 3.5 - 5.0 g/dL Globulin 2.8 2.0 - 4.0 g/dL A-G Ratio 0.9 (L) 1.1 - 2.2 PHOSPHORUS Collection Time: 10/22/20 12:55 AM  
Result Value Ref Range Phosphorus 3.3 2.6 - 4.7 MG/DL PROCALCITONIN Collection Time: 10/22/20 12:55 AM  
Result Value Ref Range Procalcitonin 28.36 ng/mL TROPONIN I Collection Time: 10/22/20 12:55 AM  
Result Value Ref Range Troponin-I, Qt. 55.30 (H) <0.05 ng/mL PROTHROMBIN TIME + INR Collection Time: 10/22/20 12:56 AM  
Result Value Ref Range INR 1.5 (H) 0.9 - 1.1 Prothrombin time 15.8 (H) 9.0 - 11.1 sec LACTIC ACID Collection Time: 10/22/20 12:59 AM  
Result Value Ref Range Lactic acid 1.7 0.4 - 2.0 MMOL/L  
POC EG7 Collection Time: 10/22/20  1:06 AM  
Result Value Ref Range Calcium, ionized (POC) 1.05 (L) 1.12 - 1.32 mmol/L  
 pH (POC) 7.31 (L) 7.35 - 7.45    
 pCO2 (POC) 38.9 35.0 - 45.0 MMHG  
 pO2 (POC) 47 (LL) 80 - 100 MMHG  
 HCO3 (POC) 19.7 (L) 22 - 26 MMOL/L Base deficit (POC) 7 mmol/L  
 sO2 (POC) 79 (L) 92 - 97 % Site OTHER Device: NASAL CANNULA Flow rate (POC) 3 L/M Allens test (POC) N/A Specimen type (POC) MIXED VENOUS Total resp. rate 31 EKG, 12 LEAD, INITIAL Collection Time: 10/22/20  1:21 AM  
Result Value Ref Range Ventricular Rate 100 BPM  
 Atrial Rate 100 BPM  
 P-R Interval 144 ms QRS Duration 92 ms Q-T Interval 386 ms QTC Calculation (Bezet) 497 ms Calculated P Axis 37 degrees Calculated R Axis 22 degrees Calculated T Axis 21 degrees Diagnosis Normal sinus rhythm T wave abnormality, consider anterior ischemia No previous ECGs available Confirmed by Yoandy Thomas M.D., [address] [place] [address] ([id number]) on 10/22/2020 7:39:01 AM 
  
CULTURE, BLOOD, PAIRED Collection Time: 10/22/20  2:37 AM  
 Specimen: Blood Result Value Ref Range Special Requests: NO SPECIAL REQUESTS Culture result: NO GROWTH AFTER 1 HOUR    
METABOLIC PANEL, BASIC Collection Time: 10/22/20  4:02 AM  
Result Value Ref Range Sodium 137 136 - 145 mmol/L Potassium 4.2 3.5 - 5.1 mmol/L Chloride 105 97 - 108 mmol/L  
 CO2 20 (L) 21 - 32 mmol/L Anion gap 12 5 - 15 mmol/L Glucose 147 (H) 65 - 100 mg/dL BUN 54 (H) 6 - 20 MG/DL Creatinine 4.91 (H) 0.70 - 1.30 MG/DL  
 BUN/Creatinine ratio 11 (L) 12 - 20 GFR est AA 18 (L) >60 ml/min/1.73m2 GFR est non-AA 15 (L) >60 ml/min/1.73m2 Calcium 7.0 (L) 8.5 - 10.1 MG/DL MAGNESIUM Collection Time: 10/22/20  4:02 AM  
Result Value Ref Range Magnesium 1.7 1.6 - 2.4 mg/dL PHOSPHORUS Collection Time: 10/22/20  4:02 AM  
Result Value Ref Range Phosphorus 3.7 2.6 - 4.7 MG/DL  
SODIUM, UR, RANDOM Collection Time: 10/22/20  4:05 AM  
Result Value Ref Range Sodium,urine random 22 MMOL/L  
POTASSIUM, UR, RANDOM Collection Time: 10/22/20  4:05 AM  
Result Value Ref Range Potassium urine, random 78 MMOL/L  
CHLORIDE, URINE RANDOM Collection Time: 10/22/20  4:05 AM  
Result Value Ref Range Chloride,urine random 21 MMOL/L  
CREATININE, UR, RANDOM Collection Time: 10/22/20  4:05 AM  
Result Value Ref Range Creatinine, urine 251.00 mg/dL PROTHROMBIN TIME + INR Collection Time: 10/22/20  4:05 AM  
Result Value Ref Range INR 1.4 (H) 0.9 - 1.1 Prothrombin time 14.9 (H) 9.0 - 11.1 sec SED RATE (ESR) Collection Time: 10/22/20  4:05 AM  
Result Value Ref Range Sed rate, automated 30 (H) 0 - 15 mm/hr CBC WITH AUTOMATED DIFF Collection Time: 10/22/20  4:05 AM  
Result Value Ref Range WBC 13.4 (H) 4.1 - 11.1 K/uL  
 RBC 3.96 (L) 4.10 - 5.70 M/uL  
 HGB 11.4 (L) 12.1 - 17.0 g/dL HCT 33.1 (L) 36.6 - 50.3 % MCV 83.6 80.0 - 99.0 FL  
 MCH 28.8 26.0 - 34.0 PG  
 MCHC 34.4 30.0 - 36.5 g/dL  
 RDW 15.7 (H) 11.5 - 14.5 % PLATELET 359 684 - 357 K/uL MPV 9.9 8.9 - 12.9 FL  
 NRBC 0.0 0  WBC ABSOLUTE NRBC 0.00 0.00 - 0.01 K/uL NEUTROPHILS 82 (H) 32 - 75 % LYMPHOCYTES 12 12 - 49 % MONOCYTES 6 5 - 13 % EOSINOPHILS 0 0 - 7 % BASOPHILS 0 0 - 1 % IMMATURE GRANULOCYTES 0 %  
 ABS. NEUTROPHILS 11.0 (H) 1.8 - 8.0 K/UL  
 ABS. LYMPHOCYTES 1.6 0.8 - 3.5 K/UL  
 ABS. MONOCYTES 0.8 0.0 - 1.0 K/UL  
 ABS. EOSINOPHILS 0.0 0.0 - 0.4 K/UL  
 ABS. BASOPHILS 0.0 0.0 - 0.1 K/UL  
 ABS. IMM. GRANS. 0.0 K/UL  
 DF MANUAL    
 RBC COMMENTS ANISOCYTOSIS 1+ 
    
 RBC COMMENTS MICROCYTOSIS 
1+ 
    
CK W/ CKMB & INDEX Collection Time: 10/22/20  4:05 AM  
Result Value Ref Range CK - .8 (H) <3.6 NG/ML  
 CK-MB Index 0.2 0.0 - 2.5 CK 74,522 (HH) 39 - 308 U/L  
TROPONIN I Collection Time: 10/22/20  4:05 AM  
Result Value Ref Range Troponin-I, Qt. 57.90 (H) <0.05 ng/mL CULTURE, BLOOD Collection Time: 10/22/20  6:19 AM  
 Specimen: Blood Result Value Ref Range Special Requests: NO SPECIAL REQUESTS Culture result: NO GROWTH AFTER 1 HOUR    
CK W/ CKMB & INDEX Collection Time: 10/22/20  8:22 AM  
Result Value Ref Range CK - .2 (H) <3.6 NG/ML  
 CK-MB Index 0.2 0.0 - 2.5 CK 74,103 (HH) 39 - 756 U/L  
METABOLIC PANEL, BASIC Collection Time: 10/22/20  8:22 AM  
Result Value Ref Range Sodium 138 136 - 145 mmol/L Potassium 4.1 3.5 - 5.1 mmol/L Chloride 104 97 - 108 mmol/L  
 CO2 24 21 - 32 mmol/L Anion gap 10 5 - 15 mmol/L Glucose 145 (H) 65 - 100 mg/dL BUN 56 (H) 6 - 20 MG/DL Creatinine 5.18 (H) 0.70 - 1.30 MG/DL  
 BUN/Creatinine ratio 11 (L) 12 - 20 GFR est AA 17 (L) >60 ml/min/1.73m2 GFR est non-AA 14 (L) >60 ml/min/1.73m2 Calcium 7.3 (L) 8.5 - 10.1 MG/DL MAGNESIUM Collection Time: 10/22/20  8:22 AM  
Result Value Ref Range Magnesium 2.4 1.6 - 2.4 mg/dL PHOSPHORUS Collection Time: 10/22/20  8:22 AM  
Result Value Ref Range  Phosphorus 3.5 2.6 - 4.7 MG/DL

## 2020-10-22 NOTE — ED NOTES
TRANSFER - OUT REPORT:    Verbal report given to [facility], RN (name) on Hola Espitia  being transferred to 7S (unit) for routine progression of care       Report consisted of patients Situation, Background, Assessment and   Recommendations(SBAR). Information from the following report(s) SBAR, ED Summary, MAR and Med Rec Status was reviewed with the receiving nurse. Lines:   Quad Lumen 10/22/20 (Active)        Opportunity for questions and clarification was provided.       Patient transported with:   Monitor  Registered Nurse

## 2020-10-22 NOTE — PROGRESS NOTES
Pharmacy consult note: Vancomycin dosing  Day #2 of Vancomycin  Indication:  PNA s/p drug OD  -Toxic metabolic encephalopathy in patient positive for cocaine, marijuana, amphetamines, ecstasy and benzodiazepines.    - acute liver injury  - Severe ATN from Rhabdomyolysis, begin emergent HD   - transfer from [facility]   Current regimen:  1250 mg IV q24h  Abx regimen:  Vanc, Zosyn  ID Following ?: NO  Inpatient dialysis schedule:  qd    Recent Labs     10/22/20  0405 10/22/20  0402 10/22/20  0055 10/21/20  1805   WBC 13.4*  --  15.8* 13.5*   CREA  --  4.91* 4.93* 5.29*   BUN  --  54* 53* 49*     Est CrCl: ARF on HD starting 10/22/20  Temp (24hrs), Av.6 °F (37 °C), Min:97.3 °F (36.3 °C), Max:100 °F (37.8 °C)    Cultures:   10/21 blood x2: pend  10/22 blood x2: pend    Goal trough = 20 - 25 mcg/mL for therapeutic goal of 15 - 20 mcg/mL (assuming ~35% removal by dialysis)    Date                Dialysis (Yes/No)       Pre-HD Level              Dose  10/22  Y   --   2g (0232) then 750mg post HD     Reminder staff message entered (if needed): YES    Plan: Change to 750 mg IV post hemodialysis

## 2020-10-22 NOTE — CONSULTS
[facility]   [address] [facility], [address] [place] [address]  Phone: [phone]   [phone] NOTE     Patient: Lynn Rosario MRN: [id number]  PCP: Alcira Cortes MD   :     1995  Age:   26 y.o. Sex:  male      Referring physician: Domitila Palma MD  Reason for consultation: 26 y.o. male with Sepsis ([facility] [address] [A41.9]  ARF (acute renal failure) ([facility] [address] [N17.9]  Drug abuse ([facility] [address] [J82.64] complicated by JEAN PIERRE   Admission Date: 10/21/2020 11:53 PM  LOS: 0 days      ASSESSMENT and PLAN :   JEAN PIERRE/ Severe Rhabdo :  - Severe ATN from Rhabdomyolysis   - Cr slightly better after IVF boluses   - he has acute Liver injury   - Anuric despite multiple fluid boluses   - needs emergent dialysis. D/w pt and he consented. Will inform sister when she is available   - Place Adebayo and initiated daily HD  - continue NS at 173 cc/ hr. Metabolic acidosis will be tackled with HD  - close monitoring of LFTs for fulminant Liver failure     Left Lung PNA w/ sepsis   Non Gap Metabolic acidosis   Transaminitis    Elevated Troponins   Polysubstance abuse   Shock     Care Plan discussed with:  ICU team         Thank you for consulting [facility] in the care of your patient. Subjective:   HPI: Lynn Rosario is a 26 y.o.  male with PMH significant for Polysubstance abuse, recurrent ER visits after drug over dose presented to ER yesterday with AMS. He was brought in EMS from [facility] after he became unresponsive and hypoxic at home. He was hypotensive on arrival and received fluid boluses. He was noted to be in severe Rhabdomyolysis with CPK ~ 70,000 with ARF. He had UA that showed large Blood and No RBC. He has transaminitis and has been getting NS at 200 cc/ hr with no UOP. Creatinine remains elevated at 4.9. his troponin was > 55.    His UDS was +ve for amphetamines, Benzos, Cocaine, Ecstasy, THC  He has an ER visit 4 days earlier with AMS when his Creatinine was 1.17   Prior to that he had normal renal function   He is lethargic but answering questions appropriately   He has skin rash on upper back, neck, both legs that concerning for endocarditis   He has blistering of rash   He denies IV DRUG use   His imaging showed Left sided PNA    Past Medical Hx:   Past Medical History:   Diagnosis Date    Anxiety     Depression         Past Surgical Hx:     Past Surgical History:   Procedure Laterality Date    HX ORTHOPAEDIC           Allergies   Allergen Reactions    Tramadol Nausea and Vomiting       Social Hx:  reports that he has been smoking. He has been smoking about 0.50 packs per day. He has quit using smokeless tobacco. He reports previous alcohol use. He reports current drug use. No family history on file. Review of Systems:  A thorough twelve point review of system was performed today. Pertinent positives and negatives are mentioned in the HPI. The reminder of the ROS is negative and noncontributory. Objective:    Vitals:    Vitals:    10/22/20 0114 10/22/20 0232 10/22/20 0245 10/22/20 0333   BP:  (!) 160/94 (!) 155/94 (!) 138/106   Pulse:  (!) 101  (!) 105   Resp:  (!) 34  (!) 34   Temp:    98.7 °F (37.1 °C)   SpO2:  98%  97%   Weight: 90.2 kg (198 lb 13.7 oz)   88.8 kg (195 lb 12.3 oz)   Height: 5' 10\" (1.778 m)        I&O's:  10/21 0701 - 10/22 0700  In: -   Out: 50 [Urine:50]  Visit Vitals  BP (!) 138/106 (BP 1 Location: Left arm, BP Patient Position: At rest)   Pulse (!) 105   Temp 98.7 °F (37.1 °C)   Resp (!) 34   Ht 5' 10\" (1.778 m)   Wt 88.8 kg (195 lb 12.3 oz)   SpO2 97%   BMI 28.09 kg/m²       Physical Exam:  General:  SOB at rest   HEENT: PERRL,  No Pallor , No Icterus  Neck: Supple,no mass palpable  Lungs :diminished Left side   CVS: RRR, S1 S2 normal, No murmur   Abdomen: Soft, NT, BS +  Extremities: no Edema  Skin: +ve macular rash on uppper back, neck and Legs.  Blistering +  MS: No joint swelling, erythema, warmth  Neurologic: non focal, oriented x 2 when woken up, lethargic   Psych:  Unable to assess    Laboratory Results:    Recent Labs     10/22/20  0405 10/22/20  0056 10/22/20  0055 10/21/20  1805   NA  --   --  136 134*   K  --   --  4.4 4.0   CL  --   --  105 95*   CO2  --   --  18* 20*   GLU  --   --  122* 140*   BUN  --   --  53* 49*   CREA  --   --  4.93* 5.29*   CA  --   --  7.3* 8.4*   MG  --   --  1.8  --    PHOS  --   --  3.3  --    ALB  --   --  2.5* 3.1*   ALT  --   --  3,496* >3,500*   INR 1.4* 1.5*  --   --      Recent Labs     10/22/20  0405 10/22/20  0055 10/21/20  1805   WBC 13.4* 15.8* 13.5*   HGB 11.4* 12.3 15.2   HCT 33.1* 36.0* 44.9    232 268     No results found for: SDES  Lab Results   Component Value Date/Time    Culture result:  10/17/2020 08:21 PM     Scant Streptococci, beta hemolyticgroup F not generally considered a pathogen    Culture result: Heavy  Normal respiratory sam   10/17/2020 08:21 PM     Recent Results (from the past 24 hour(s))   DRUG SCREEN, URINE    Collection Time: 10/21/20  6:05 PM   Result Value Ref Range    AMPHETAMINES Positive (A) Negative      BARBITURATES Negative Negative      BENZODIAZEPINES Positive (A) Negative      COCAINE Positive (A) Negative      ECSTASY, MDMA Positive (A) Negative      METHADONE Negative Negative      OPIATES Negative Negative      PCP(PHENCYCLIDINE) Negative Negative      THC (TH-CANNABINOL) Positive (A) Negative      Drug screen comment        This test is a screen for drugs of abuse in a medical setting only (i.e., they are unconfirmed results and as such must not be used for non-medical purposes, e.g.,employment testing, legal testing). Due to its inherent nature, false positive (FP) and false negative (FN) results may be obtained. Therefore, if necessary for medical care, recommend confirmation of positive findings by GC/MS.    METABOLIC PANEL, COMPREHENSIVE    Collection Time: 10/21/20  6:05 PM   Result Value Ref Range    Sodium 134 (L) 136 - 145 mmol/L    Potassium 4.0 3.5 - 5.1 mmol/L    Chloride 95 (L) 97 - 108 mmol/L    CO2 20 (L) 21 - 32 mmol/L    Anion gap 19 (H) 5 - 15 mmol/L    Glucose 140 (H) 65 - 100 mg/dL    BUN 49 (H) 6 - 20 mg/dL    Creatinine 5.29 (H) 0.70 - 1.30 mg/dL    BUN/Creatinine ratio 9 (L) 12 - 20      GFR est AA 16 (L) >60 ml/min/1.73m2    GFR est non-AA 13 (L) >60 ml/min/1.73m2    Calcium 8.4 (L) 8.5 - 10.1 mg/dL    Bilirubin, total 0.7 0.2 - 1.0 mg/dL    AST (SGOT) >2,000 (H) 15 - 37 U/L    ALT (SGPT) >3,500 (H) 12 - 78 U/L    Alk. phosphatase 96 45 - 117 U/L    Protein, total 6.7 6.4 - 8.2 g/dL    Albumin 3.1 (L) 3.5 - 5.0 g/dL    Globulin 3.6 2.0 - 4.0 g/dL    A-G Ratio 0.9 (L) 1.1 - 2.2     CBC WITH AUTOMATED DIFF    Collection Time: 10/21/20  6:05 PM   Result Value Ref Range    WBC 13.5 (H) 4.4 - 11.3 K/uL    RBC 5.22 4.50 - 5.90 M/uL    HGB 15.2 13.5 - 17.5 g/dL    HCT 44.9 41 - 53 %    MCV 85.9 80 - 100 FL    MCH 29.1 (L) 31 - 34 PG    MCHC 33.9 31.0 - 36.0 g/dL    RDW 17.0 (H) 11.5 - 14.5 %    PLATELET 399 K/uL    MPV 7.7 6.5 - 11.5 FL    NRBC 0.0  WBC    ABSOLUTE NRBC 0.01 K/uL    NEUTROPHILS 90 (H) 42 - 75 %    LYMPHOCYTES 5 (L) 20.5 - 51.1 %    MONOCYTES 5 1.7 - 9.3 %    EOSINOPHILS 0 (L) 0.9 - 2.9 %    BASOPHILS 0 0.0 - 2.5 %    ABS. NEUTROPHILS 12.2 (H) 1.8 - 7.7 K/UL    ABS. LYMPHOCYTES 0.7 (L) 1.0 - 4.8 K/UL    ABS. MONOCYTES 0.6 0.2 - 2.4 K/UL    ABS. EOSINOPHILS 0.0 0.0 - 0.7 K/UL    ABS.  BASOPHILS 0.0 0.0 - 0.2 K/UL   ETHYL ALCOHOL    Collection Time: 10/21/20  6:05 PM   Result Value Ref Range    ALCOHOL(ETHYL),SERUM <4 <10 mg/dL   LACTIC ACID    Collection Time: 10/21/20  6:05 PM   Result Value Ref Range    Lactic acid 7.5 (HH) 0.4 - 2.0 mmol/L   URINALYSIS W/ RFLX MICROSCOPIC    Collection Time: 10/21/20  6:05 PM   Result Value Ref Range    Color Yellow/Straw      Appearance Clear Clear      Specific gravity 1.025 1.003 - 1.030      pH (UA) 5.5 5.0 - 8.0      Protein 30 (A) Negative mg/dL    Glucose Negative Negative mg/dL    Ketone Negative Negative mg/dL    Blood Large (A) Negative      Urobilinogen 1.0 0.2 - 1.0 EU/dL    Nitrites Negative Negative      Leukocyte Esterase Negative Negative      Bilirubin UA, confirm Negative Negative     SALICYLATE    Collection Time: 10/21/20  6:05 PM   Result Value Ref Range    Salicylate level 4.4 2.8 - 20.0 mg/dL    Reported dose date No growth      Reported dose: No growth Units   ACETAMINOPHEN    Collection Time: 10/21/20  6:05 PM   Result Value Ref Range    Acetaminophen level 10 10 - 30 ug/mL    Reported dose date No growth      Reported dose: No growth Units   CK    Collection Time: 10/21/20  6:05 PM   Result Value Ref Range    CK 69,719 (HH) 39 - 308 U/L   BILIRUBIN, CONFIRM    Collection Time: 10/21/20  6:05 PM   Result Value Ref Range    Bilirubin UA, confirm Negative Negative     URINE MICROSCOPIC    Collection Time: 10/21/20  6:05 PM   Result Value Ref Range    WBC 0-4 0 - 5 /hpf    RBC 0-5 0 - 3 /hpf    Bacteria Negative Negative /hpf   TROPONIN I    Collection Time: 10/21/20  8:10 PM   Result Value Ref Range    Troponin-I, Qt. 55.89 (H) <0.05 ng/mL   AMMONIA    Collection Time: 10/21/20  8:10 PM   Result Value Ref Range    Ammonia 14 <32 umol/L   ACETONE/KETONE, QL    Collection Time: 10/21/20  8:10 PM   Result Value Ref Range    Acetone/Ketone serum, QL.  Negative     BLOOD GAS, ARTERIAL    Collection Time: 10/21/20  8:30 PM   Result Value Ref Range    pH 7.421 7.35 - 7.45      PCO2 28 (L) 35 - 45 mmHg    PO2 95 70 - 100 mmHg    BICARBONATE 18 (L) 22 - 26 mmol/L    BASE DEFICIT 5.2 (H) 0 - 2 mmol/L    O2 METHOD Nasal Cannula      O2 FLOW RATE 2 L/min    FIO2 28.0 %    SPONTANEOUS RATE 28      Sample source Arterial      SITE Left Radial      MARGARET'S TEST PASS      Carboxy-Hgb 0.2 0 - 5 %    Methemoglobin 0.3 0 - 5 %    tHb 13.9 13.5 - 17.5 g/dL    Oxyhemoglobin 96.0 95 - 100 %   NT-PRO BNP    Collection Time: 10/22/20 12:55 AM   Result Value Ref Range    NT pro-BNP 3,484 (H) <125 PG/ML   AMYLASE    Collection Time: 10/22/20 12:55 AM   Result Value Ref Range    Amylase 29 25 - 115 U/L   LIPASE    Collection Time: 10/22/20 12:55 AM   Result Value Ref Range    Lipase 46 (L) 73 - 393 U/L   MAGNESIUM    Collection Time: 10/22/20 12:55 AM   Result Value Ref Range    Magnesium 1.8 1.6 - 2.4 mg/dL   CBC WITH AUTOMATED DIFF    Collection Time: 10/22/20 12:55 AM   Result Value Ref Range    WBC 15.8 (H) 4.1 - 11.1 K/uL    RBC 4.31 4.10 - 5.70 M/uL    HGB 12.3 12.1 - 17.0 g/dL    HCT 36.0 (L) 36.6 - 50.3 %    MCV 83.5 80.0 - 99.0 FL    MCH 28.5 26.0 - 34.0 PG    MCHC 34.2 30.0 - 36.5 g/dL    RDW 15.9 (H) 11.5 - 14.5 %    PLATELET 361 281 - 146 K/uL    MPV 9.8 8.9 - 12.9 FL    NRBC 0.0 0  WBC    ABSOLUTE NRBC 0.00 0.00 - 0.01 K/uL    NEUTROPHILS 85 (H) 32 - 75 %    LYMPHOCYTES 7 (L) 12 - 49 %    MONOCYTES 7 5 - 13 %    EOSINOPHILS 0 0 - 7 %    BASOPHILS 0 0 - 1 %    IMMATURE GRANULOCYTES 1 (H) 0.0 - 0.5 %    ABS. NEUTROPHILS 13.6 (H) 1.8 - 8.0 K/UL    ABS. LYMPHOCYTES 1.1 0.8 - 3.5 K/UL    ABS. MONOCYTES 1.0 0.0 - 1.0 K/UL    ABS. EOSINOPHILS 0.0 0.0 - 0.4 K/UL    ABS. BASOPHILS 0.0 0.0 - 0.1 K/UL    ABS. IMM. GRANS. 0.1 (H) 0.00 - 0.04 K/UL    DF AUTOMATED     SAMPLES BEING HELD    Collection Time: 10/22/20 12:55 AM   Result Value Ref Range    SAMPLES BEING HELD  1 RED     COMMENT        Add-on orders for these samples will be processed based on acceptable specimen integrity and analyte stability, which may vary by analyte.    D DIMER    Collection Time: 10/22/20 12:55 AM   Result Value Ref Range    D-dimer 28.76 (H) 0.00 - 0.65 mg/L [facility]   METABOLIC PANEL, COMPREHENSIVE    Collection Time: 10/22/20 12:55 AM   Result Value Ref Range    Sodium 136 136 - 145 mmol/L    Potassium 4.4 3.5 - 5.1 mmol/L    Chloride 105 97 - 108 mmol/L    CO2 18 (L) 21 - 32 mmol/L    Anion gap 13 5 - 15 mmol/L    Glucose 122 (H) 65 - 100 mg/dL    BUN 53 (H) 6 - 20 MG/DL    Creatinine 4.93 (H) 0.70 - 1.30 MG/DL    BUN/Creatinine ratio 11 (L) 12 - 20      GFR est AA 18 (L) >60 ml/min/1.73m2    GFR est non-AA 15 (L) >60 ml/min/1.73m2    Calcium 7.3 (L) 8.5 - 10.1 MG/DL    Bilirubin, total 0.7 0.2 - 1.0 MG/DL    ALT (SGPT) 3,496 (H) 12 - 78 U/L    AST (SGOT) >2,000 (H) 15 - 37 U/L    Alk. phosphatase 77 45 - 117 U/L    Protein, total 5.3 (L) 6.4 - 8.2 g/dL    Albumin 2.5 (L) 3.5 - 5.0 g/dL    Globulin 2.8 2.0 - 4.0 g/dL    A-G Ratio 0.9 (L) 1.1 - 2.2     PHOSPHORUS    Collection Time: 10/22/20 12:55 AM   Result Value Ref Range    Phosphorus 3.3 2.6 - 4.7 MG/DL   PROCALCITONIN    Collection Time: 10/22/20 12:55 AM   Result Value Ref Range    Procalcitonin 28.36 ng/mL   TROPONIN I    Collection Time: 10/22/20 12:55 AM   Result Value Ref Range    Troponin-I, Qt. 55.30 (H) <0.05 ng/mL   PROTHROMBIN TIME + INR    Collection Time: 10/22/20 12:56 AM   Result Value Ref Range    INR 1.5 (H) 0.9 - 1.1      Prothrombin time 15.8 (H) 9.0 - 11.1 sec   LACTIC ACID    Collection Time: 10/22/20 12:59 AM   Result Value Ref Range    Lactic acid 1.7 0.4 - 2.0 MMOL/L   POC EG7    Collection Time: 10/22/20  1:06 AM   Result Value Ref Range    Calcium, ionized (POC) 1.05 (L) 1.12 - 1.32 mmol/L    pH (POC) 7.31 (L) 7.35 - 7.45      pCO2 (POC) 38.9 35.0 - 45.0 MMHG    pO2 (POC) 47 (LL) 80 - 100 MMHG    HCO3 (POC) 19.7 (L) 22 - 26 MMOL/L    Base deficit (POC) 7 mmol/L    sO2 (POC) 79 (L) 92 - 97 %    Site OTHER      Device: NASAL CANNULA      Flow rate (POC) 3 L/M    Allens test (POC) N/A      Specimen type (POC) MIXED VENOUS      Total resp.  rate 31     EKG, 12 LEAD, INITIAL    Collection Time: 10/22/20  1:21 AM   Result Value Ref Range    Ventricular Rate 100 BPM    Atrial Rate 100 BPM    P-R Interval 144 ms    QRS Duration 92 ms    Q-T Interval 386 ms    QTC Calculation (Bezet) 497 ms    Calculated P Axis 37 degrees    Calculated R Axis 22 degrees    Calculated T Axis 21 degrees    Diagnosis       Normal sinus rhythm  T wave abnormality, consider anterior ischemia  Prolonged QT  No previous ECGs available     PROTHROMBIN TIME + INR    Collection Time: 10/22/20  4:05 AM   Result Value Ref Range    INR 1.4 (H) 0.9 - 1.1      Prothrombin time 14.9 (H) 9.0 - 11.1 sec   CBC WITH AUTOMATED DIFF    Collection Time: 10/22/20  4:05 AM   Result Value Ref Range    WBC 13.4 (H) 4.1 - 11.1 K/uL    RBC 3.96 (L) 4.10 - 5.70 M/uL    HGB 11.4 (L) 12.1 - 17.0 g/dL    HCT 33.1 (L) 36.6 - 50.3 %    MCV 83.6 80.0 - 99.0 FL    MCH 28.8 26.0 - 34.0 PG    MCHC 34.4 30.0 - 36.5 g/dL    RDW 15.7 (H) 11.5 - 14.5 %    PLATELET 280 229 - 163 K/uL    MPV 9.9 8.9 - 12.9 FL    NRBC 0.0 0  WBC    ABSOLUTE NRBC 0.00 0.00 - 0.01 K/uL    NEUTROPHILS PENDING %    LYMPHOCYTES PENDING %    MONOCYTES PENDING %    EOSINOPHILS PENDING %    BASOPHILS PENDING %    IMMATURE GRANULOCYTES PENDING %    ABS. NEUTROPHILS PENDING K/UL    ABS. LYMPHOCYTES PENDING K/UL    ABS. MONOCYTES PENDING K/UL    ABS. EOSINOPHILS PENDING K/UL    ABS. BASOPHILS PENDING K/UL    ABS. IMM. GRANS. PENDING K/UL    DF PENDING          Urine dipstick:   Lab Results   Component Value Date/Time    Color Yellow/Straw 10/21/2020 06:05 PM    Appearance Clear 10/21/2020 06:05 PM    Specific gravity 1.025 10/21/2020 06:05 PM    pH (UA) 5.5 10/21/2020 06:05 PM    Protein 30 (A) 10/21/2020 06:05 PM    Glucose Negative 10/21/2020 06:05 PM    Ketone Negative 10/21/2020 06:05 PM    Urobilinogen 1.0 10/21/2020 06:05 PM    Nitrites Negative 10/21/2020 06:05 PM    Leukocyte Esterase Negative 10/21/2020 06:05 PM    Bacteria Negative 10/21/2020 06:05 PM    WBC 0-4 10/21/2020 06:05 PM    RBC 0-5 10/21/2020 06:05 PM       I have reviewed the following: All pertinent labs, microbiology data, radiology imaging for my assessment     Medications list Personally Reviewed   [x]      Yes     []               No       Medications:  Prior to Admission medications    Medication Sig Start Date End Date Taking?  Authorizing Provider   naloxone (Narcan) 4 mg/actuation nasal spray Use 1 spray intranasally, then discard. Repeat with new spray every 2 min as needed for opioid overdose symptoms, alternating nostrils. 10/18/20   Alexi Tate MD   FLUoxetine (PROzac) 20 mg capsule Take 20 mg by mouth daily. Kaleb Moncada MD   hydrOXYzine HCL (ATARAX) 25 mg tablet Take 25 mg by mouth two (2) times a day. OtherKaleb MD        Thank you for allowing us to participate in the care of this patient. We will follow patient. Please dont hesitate to call with any questions    Valentina Haney MD  [facility] Nephrology [facility]ney [facility]   [address] [facility] [place] Manuel [address] [place] [address]  Phone - [phone]   Fax - [phone]  [email or web address]

## 2020-10-22 NOTE — H&P
[facility]    ICU Intensivist- Critical Care Progress Note    Name: Laure Berrios   : 1995   MRN: [id number]   Admit: 10/21/2020 11:53 PM      Diagnosis:     Principal Problem:    Sepsis ([facility]    Problem List:  2020-10: Drug abuse ([facility] [address]  2020-10: AR[facility] (acute [facility] failure) [facility]  2020-10: Sepsis ([facility]        ICU Comprehensive Plan of Care:     Plans for this Shift:   1. Neurological -     Altered mental status  Metabolic encephalopathy    -Patient GCS currently 15, will answer appropriately however patient is lethargic  -Patient has metabolic encephalopathy secondary to renal failure  -Continue as needed morphine for pain  -Patient has urine drug screen positive for cocaine, marijuana, amphetamines, ecstasy, benzodiazepine  -Patient was found to be less responsive and sleeping all day according to his parents. Patient was brought in by EMS due to unresponsiveness  -CT scan shows hypoxic or toxic edema in the basal ganglia centered on the globus Pallidus left slightly larger than right. No acute intracranial pathology  -Neurology consult appreciated  -Continue neurochecks    2. Cardiovascular -    Septic shock  Elevated troponin  Metabolic acidosis    -Cardiology consult appreciated for elevated troponin. Troponin I level 55. Patient has no complaints chest pain. No nausea or vomiting. EKG shows sinus tachycardia. Cardiology notified. No intervention at this time. Spoke with Dr. Dhaval Shrestha. No heparin drip indicated. We will continue to trend troponin. Patient also has acute renal failure. -EKG in a.m.  -Echocardiogram ordered  -DVT prophylaxis subcutaneous heparin  -Doppler ultrasound ordered for upper extremity and lower extremity due to multiple skin wounds and extremity edema  -Continue to monitor vital signs hourly  -Patient had life-threatening hypotension with systolic blood pressure in the 70s. Patient was given 4 L IV fluid bolus.   Patient currently on maintenance fluids normal saline at 200 cc/h.  -IV central line placed left subclavian no complications.  -We will start IV pressors if needed. 3. Pulmonary -     Community acquired pneumonia    -CT scan shows left-sided pneumonia  -Chest x-ray shows small right-sided infiltrate otherwise lungs clear  -Patient is tachypneic secondary to metabolic acidosis  -Currently on nasal cannula  -Initial ABG shows pH 7.42, CO2 28, PO2 95, bicarbonate 18    4. GI -    Transaminitis    -Patient started on clear liquid diet  -LFTs elevated we will continue to monitor  -We will consult GI if needed  -Continue PPI  -Continue IV Zofran for any nausea  -Continue bowel regimen  -AST greater than 2000, ALT 83,496, alkaline phosphatase 77    5.  -    Acute renal failure  Elevated CPK  Electrolyte imbalance    -Patient has acute renal failure and is oliguric  -More than likely secondary to severe dehydration  -Continue every 4 hours BMP/CPK  -Continue to monitor electrolytes and replace as needed  -Patient calcium 7.3, magnesium 1.8, IV replacement given  -Creatinine 5.29, BUN 49  -Patient given Lasix 100 mg x 1 IV  -Nephrology consult appreciated  -Maintain Iniguez catheter  -Continue to monitor intake and output  -Currently on normal saline at 200 cc/h    6.  Endocrine -    -Unknown if patient is diabetic  -Continue to monitor for hypoglycemia  -Initial blood glucose 185      7. Hematology/oncology -     -Hemoglobin 15.2/hematocrit 44.9  -Currently no signs of bleeding  -Platelets 690    8. ID -     Septic shock  Leukocytosis    -Patient had life-threatening septic shock requiring multiple liters of IV fluid bolus. Patient has leukocytosis WBCs 15.8. Patient started on Zosyn and vancomycin for IV antibiotic therapy.  -Blood cultures are pending  -Procalcitonin 28.36  -Currently afebrile  -CBC in a.m.     Subjective:     Progress Note:   10/22/2020   1:31 AM     Reason for ICU Admission:   Sepsis ([address] with associated hypotension  Acute renal failure  Elevated troponin  Leukocytosis  Metabolic acidosis  Transaminitis  History of drug abuse  Electrolyte abnormality    HPI: Patient Melany Michel is a 36-year-old male seen and examined today by critical care services due to initial complaints of altered mental status. According to ER physician the patient's parents stated that he had been sleeping for the past 24 hours and had not woken up. EMS was called and patient was found to be unresponsive and hypoxic. Patient was hypotensive when arriving to the emergency room and received 2 L IV fluid bolus. When examined patient received an additional 2 L IV fluid crystalloids and started on normal saline at 200 cc/h. Patient's LFTs elevated. Patient has life-threatening acute renal failure which is oliguric. Nephrology consult in place. Neurology consult in place for metabolic encephalopathy and abnormal head CT with no acute intracranial pathology. Patient was stated to have hypoxic or toxic edema in the basal ganglia. Chest CT showed left-sided pneumonia. Patient started on IV antibiotic therapy for septic shock with WBCs 15.8. Patient currently on Zosyn and vancomycin for IV antibiotic therapy. Patient has multiple skin wounds. Extremity edema noted. Has Dopplers ordered for upper and lower extremity. Echocardiogram ordered in a.m. for elevated troponin. Cardiology was notified Dr. Isa Gould for troponin 55.8 9. He stated that the patient did not need an acute intervention at this time. No heparin drip. EKG shows sinus tachycardia. No depression/elevation. Patient has elevated LFTs, ALT 3496, AST 2000, alkaline phosphatase 77. Patient had life-threatening metabolic acidosis initial lactic acid 7.5. Procalcitonin 28.36. Patient had electrolyte abnormality which IV replacement was given. Plan is to continue every 4 hours BMP and continue to monitor CPK. Continue neurochecks. Patient given 100 mg IV Lasix for diuresis.   Wound care consult in place. Continue ICU support. Past medical history: Anxiety and depression    Past surgical history: Orthopedic    Past family history: Noncontributory    Past social history: Unknown if patient uses alcohol, unknown if patient is a smoker, patient has history of substance abuse  Past Medical History:      has a past medical history of Anxiety and Depression. Past Surgical History:      has a past surgical history that includes hx orthopaedic.     Current Medications:     Current Facility-Administered Medications   Medication Dose Route Frequency    sodium bicarbonate (8.4%) 1 mEq/mL (8.4 %) injection        sodium chloride (NS) flush 5-40 mL  5-40 mL IntraVENous Q8H    sodium chloride (NS) flush 5-40 mL  5-40 mL IntraVENous PRN    acetaminophen (TYLENOL) tablet 650 mg  650 mg Oral Q6H PRN    Or    acetaminophen (TYLENOL) suppository 650 mg  650 mg Rectal Q6H PRN    polyethylene glycol (MIRALAX) packet 17 g  17 g Oral DAILY PRN    0.9% sodium chloride infusion  150 mL/hr IntraVENous CONTINUOUS    ondansetron (ZOFRAN) injection 4 mg  4 mg IntraVENous Q6H PRN    famotidine (PEPCID) tablet 20 mg  20 mg Oral DAILY    albuterol-ipratropium (DUO-NEB) 2.5 MG-0.5 MG/3 ML  3 mL Nebulization Q4H PRN    morphine injection 2 mg  2 mg IntraVENous Q4H PRN    docusate sodium (COLACE) capsule 100 mg  100 mg Oral DAILY    sodium chloride 0.9 % bolus infusion 1,000 mL  1,000 mL IntraVENous ONCE    sodium chloride 0.9 % bolus infusion 1,000 mL  1,000 mL IntraVENous ONCE    vancomycin (VANCOCIN) 2000 mg in  ml infusion  2,000 mg IntraVENous ONCE    piperacillin-tazobactam (ZOSYN) 3.375 g in 0.9% sodium chloride (MBP/ADV) 100 mL  3.375 g IntraVENous Q8H    labetaloL (NORMODYNE;TRANDATE) injection 20 mg  20 mg IntraVENous Q4H PRN    lactated Ringers infusion  150 mL/hr IntraVENous CONTINUOUS    calcium gluconate 1 g in 0.9% sodium chloride 100 mL IVPB  1 g IntraVENous ONCE    calcium gluconate 1 g in 0.9% sodium chloride 100 mL IVPB  1 g IntraVENous ONCE    VANCOMYCIN INFORMATION NOTE   Other Rx Dosing/Monitoring     Current Outpatient Medications   Medication Sig    naloxone (Narcan) 4 mg/actuation nasal spray Use 1 spray intranasally, then discard. Repeat with new spray every 2 min as needed for opioid overdose symptoms, alternating nostrils.  FLUoxetine (PROzac) 20 mg capsule Take 20 mg by mouth daily.  hydrOXYzine HCL (ATARAX) 25 mg tablet Take 25 mg by mouth two (2) times a day. Allergies/Social/Family History: Allergies   Allergen Reactions    Tramadol Nausea and Vomiting      Social History     Tobacco Use    Smoking status: Current Every Day Smoker     Packs/day: 0.50    Smokeless tobacco: Former User   Substance Use Topics    Alcohol use: Not Currently      No family history on file. Review of Systems:     Review of systems not obtained due to patient factors. Patient lethargic    Objective:   Vital Signs:  Visit Vitals  BP (!) 134/92   Pulse (!) 103   Temp 98.3 °F (36.8 °C)   Resp (!) 35   Ht 5' 10\" (1.778 m)   Wt 90.2 kg (198 lb 13.7 oz)   SpO2 98%   BMI 28.53 kg/m²    O2 Flow Rate (L/min): 3 l/min O2 Device: Nasal cannula Temp (24hrs), Av.5 °F (36.9 °C), Min:97.3 °F (36.3 °C), Max:100 °F (37.8 °C)           Intake/Output:   No intake or output data in the 24 hours ending 10/22/20 0131    Physical Exam:    General:   Not alert, cooperative, severe distress, does not appear stated age. Eyes:   Conjunctivae/corneas clear. PERRL, EOMs intact. Ears:   Normal external ear canals bilaterally. Nose:   No drainage or sinus tenderness. Mouth/Throat:  Dry mucous membranes. Poor dentition. Neck:  Symmetrical, trachea midline, no JVD or carotid bruit. Back:    No CVA tenderness to percussion. Lungs:    Clear to auscultation bilaterally. Diminished bases   Heart:   Regular rate and rhythm. S1, S2 normal, without murmur, click, rub or gallop.    Abdomen:    Normoactive bowel sounds. Soft, non-tender, no masses or organomegaly. Extremities:  Atraumatic, no cyanosis or edema. Vascular:  Pulses 2+ and symmetric UE/LE bilat   Skin:  Normal color, non-diaphoretic, positive capillary refill (less than 4 seconds). No rashes or lesions. Lymph nodes:  No Lymphadenopathy. Neurologic:  CN II-XII intact. Normal strength.         LABS AND  DATA: Personally reviewed  Recent Labs     10/22/20  0055 10/21/20  1805   WBC 15.8* 13.5*   HGB 12.3 15.2   HCT 36.0* 44.9    268     Recent Labs     10/21/20  1805   *   K 4.0   CL 95*   CO2 20*   BUN 49*   CREA 5.29*   *   CA 8.4*     Recent Labs     10/21/20  1805   AP 96   TP 6.7   ALB 3.1*   GLOB 3.6     Recent Labs     10/22/20  0056   INR 1.5*   PTP 15.8*      Recent Labs     10/22/20  0106   PHI 7.31*   PCO2I 38.9   PO2I 47*     Recent Labs     10/21/20  2010 10/21/20  1805   CPK  --  69,719*   TROIQ 55.89*  --          Ventilator Settings:  Mode Rate Tidal Volume Pressure FiO2 PEEP                    Peak airway pressure:      Minute ventilation:          MEDS: Reviewed    RADIOLOGY:  Ct Head Wo Cont    Result Date: 10/21/2020  IMPRESSION: Findings suggesting hypoxic or toxic edema in the basal ganglia    Ct Chest Wo Cont    Result Date: 10/21/2020  IMPRESSION: Moderate severity pneumonia throughout the left lung         Assessment:     Hospital Problems  Never Reviewed          Codes Class Noted POA    Drug abuse ([facility] [address] ICD-10-CM: F19.10  ICD-9-CM: 305.90  10/22/2020 Unknown        ARF (acute renal failure) ([facility] [address] ICD-10-CM: N17.9  ICD-9-CM: 584.9  10/22/2020 Unknown        Sepsis ([facility] [address] ICD-10-CM: A41.9  ICD-9-CM: 038.9, 995.91  10/22/2020 Unknown                  Multidisciplinary Rounds Completed:  N/A    ABCDEF Bundle/Checklist  Pain Medications: None  Target RASS: N/A  Sedation Medications: None  CAM-ICU:  Negative  Mobility: Bedrest  PT/OT: N/AA   Restraints: None needed at this time  Discussed Plan of Care (goals of care): Yes  Addressed Code Status: Full Code    CARDIOVASCULAR  Cardiac Gtts: None  SBP Goal of: > 90 mmHg  MAP Goal of: > 65 mmHg  Transfusion Trigger (Hgb): <7 g/dL    RESPIRATORY  Vent Goals:   Head of bed > 30 degrees  DVT Prophylaxis (if no, list reason): SCD's or Sequential Compression Device and Heparin   SPO2 Goal: > 92%  Pulmonary toilet: Duo-Nebs     GI/  Iniguez Catheter Present: Yes  GI Prophylaxis: Not at this time   Nutrition: Pending   IVFs: Saline at 200 cc/h  Bowel Movement: No  Bowel Regimen: Senna and Docusate (Colace)  Insulin: No history of diabetes    ANTIBIOTICS  Antibiotics:  Vancomycin  Zosyn    T/L/D  Tubes: None  Lines: Peripheral IV and Central Line  Drains: Iniguez Catheter    SPECIAL EQUIPMENT  None    DISPOSITION  Stay in ICU    CRITICAL CARE CONSULTANT NOTE  I provided a face-to-face bedside physician/patient encounter, greater than the usual and customary amount normally needed, due to the high complexity of medical decision-making required. I reviewed and interpreted patient data including clinical events, labs, images, vital signs, I/O's, and examined patient. I have actively participated in multi-disciplinary discussions (nursing staff, radiology, laboratory) regarding the case in formulating an optimal therapeutic plan, and effecting a management strategy for this patient. NOTE OF PERSONAL INVOLVEMENT IN CARE   This patient has a high probability of imminent, clinically significant deterioration, which requires the highest level of preparedness to intervene urgently. I participated in the decision-making and personally managed, or directed the management of, a myriad of life and organ supporting interventions which required my frequent-interval clinical reassessments, in order to treat or prevent imminent deterioration. I personally spent 50 minutes of critical care time.   This is time spent at this critically ill patient's bedside actively involved in patient care as well as the coordination of care and discussions with the patient's family. This does not include any procedural time which has been billed separately.     Arnold Care MSN, BSN, BS, FNP-BC, NP-C, CCRN-CMC   Staff Intensivist Nurse Practitioner  [facility]  10/22/2020

## 2020-10-22 NOTE — PROGRESS NOTES
0220: TRANSFER - IN REPORT:    Verbal report received from [facility] RN(name) on Hola Espitia  being received from ED(unit) for routine progression of care      Report consisted of patients Situation, Background, Assessment and   Recommendations(SBAR). Information from the following report(s) SBAR, Kardex, ED Summary, Intake/Output, MAR, Accordion, Recent Results, Med Rec Status and Cardiac Rhythm ST was reviewed with the receiving nurse. Opportunity for questions and clarification was provided. Assessment completed upon patients arrival to unit and care assumed. 0330: Patient arrived on unit, patient lethargic but oriented and appropriate    0500: Patient urine output <5mL for last 2 hours, ralf NP notified, nephrology consulted    0600: Neurology consulted for head CT results, Bertin Chavez Neuro NP at bedside, patient placed on q1 hour neuro checks and MRI ordered. 6152: Dr. Leila Case, nephrology, on case, order for HD dialysis    0645: Sister, Thiago Carvalho called for consent and update of patient condition, consent given for Adebayo catheter and renal replacement therapies    0700: Birgit Crook NP at bedside for [facility] placement    0730: Bedside shift change report given to Cinthia Camargo (oncoming nurse) by Sofy Browning (offgoing nurse). Report included the following information SBAR, Kardex, ED Summary, Procedure Summary, Intake/Output, MAR, Accordion, Recent Results, Med Rec Status, Cardiac Rhythm ST and Dual Neuro Assessment.

## 2020-10-22 NOTE — DIALYSIS
[facility]  [phone]    Vitals   Pre   Post   Assessment   Pre   Post     Temp  Temp: 97.8 °F (36.6 °C) (10/22/20 1330)  98.0 LOC  Sleeping but able to wake and answer questions Sleeping, wakes to touch   HR   Pulse (Heart Rate): (!) 111 (10/22/20 1330) 110 Lungs   Clear  Clear   B/P   BP: (!) 141/97 (10/22/20 1330) 142/92 Cardiac   Sinus tach Sinus tach   Resp   Resp Rate: 22 (10/22/20 1330) 22 Skin   Warm/dry/visibly intact Warm/dry/visibly intact   Pain level  Pain Intensity 1: 0 (10/22/20 0800) 0/10 Edema  Moderate non-pitting generalized edema Edema remains unchanged - no UF   Orders:    Duration:   Start:    1330 End:    1700 Total:   3.5 hours   Dialyzer:   Dialyzer/Set Up Inspection: Revaclear (10/22/20 1330)   K Bath:   Dialysate K (mEq/L): 3 (10/22/20 1330)   Ca Bath:   Dialysate CA (mEq/L): 2.5 (10/22/20 1330)   Na/Bicarb:   Dialysate NA (mEq/L): 138 (10/22/20 1330)   Target Fluid Removal:   Goal/Amount of Fluid to Remove (mL): 0 mL (10/22/20 1330)   Access     Type & Location:   RIJ, non-tunneled. Each catheter limb disinfected per p&p, caps removed, hubs disinfected per p&p.    Labs     Obtained/Reviewed   Critical Results Called   Date when labs were drawn-  Hgb-    HGB   Date Value Ref Range Status   10/22/2020 11.4 (L) 12.1 - 17.0 g/dL Final     K-    Potassium   Date Value Ref Range Status   10/22/2020 4.1 3.5 - 5.1 mmol/L Final     Ca-   Calcium   Date Value Ref Range Status   10/22/2020 7.3 (L) 8.5 - 10.1 MG/DL Final     Bun-   BUN   Date Value Ref Range Status   10/22/2020 56 (H) 6 - 20 MG/DL Final     Creat-   Creatinine   Date Value Ref Range Status   10/22/2020 5.18 (H) 0.70 - 1.30 MG/DL Final        Medications/ Blood Products Given     Name   Dose   Route and Time     Heparin 2500 units  1.1 art dwell, 1.4 sean dwell @1705             Blood Volume Processed (BVP):    74 Liters Net Fluid   Removed:  0 Net removal per orders   Comments   Time Out Done: 1250  Primary Nurse Rpt Pre: Michael Dawson, RN  Primary Nurse Rpt Post: Michael Dawson RN  Pt Education: Pt and mother educated with [employer] - 1st run, procedure, and run time. Care Plan: Continue HD per MD orders  Tx Summary: Pt tolerated tx well. Each dialysis catheter limb disinfected per p&p, blood returned per p&p, each dialysis hub disinfected per p&p, post dialysis catheter dwell instilled per order, and caps applied. Heparin locked. Admiting Diagnosis: Unresponsive/Rhabdo  Pt's previous clinic- NA  Consent signed - Informed Consent Verified: Yes (10/22/20 1330)  [facility] Consent - Obtained, verbally obtained by Dr. Marsha Hinton prior to arrival  Hepatitis Status- Neg/Susc 10/22/20  Machine #- Machine Number: [id number] (10/22/20 1330)  Telemetry status- Bedside  Pre-dialysis wt. -  NA

## 2020-10-22 NOTE — CONSULTS
Neurology Consult  Patrice Wright NP    Patient: Cassius Beltran MRN: [id number]  SSN: [id number]    YOB: 1995  Age: 26 y.o. Sex: male      Chief Complaint: Altered mental status    Subjective:      Cassius Beltran is a 26 y.o. male with a history of anxiety, depression, left humerus fx, and illicit drug use. Patient was transferred to [facility] from [facility] due to probable drug overdose. Patient had received 3 rounds of narcan, 2 L IV fluid and IV abx in the ER there. Patient was brought to the ER due to AMS. He had been sleeping for 24 hours and could not be awakened. His UDS was positive for cocaine, marijuana, amphetamines, ecstasy, and benzodiazepines. Patient states that he remembers taking IV fentanyl. He was taken for a CTH which showed symmetric hypodensity in bilateral basal ganglia centered on the globus pallidus, the left slightly larger than the right. This was not present in [facility] that was performed 3 months ago. Each region measures between 10 and 12 mm. No mass effect or hemorrhage. Findings are suggestive of hypoxic or toxic edema of the basal ganglia. Past Medical History:   Diagnosis Date    Anxiety     Depression      No family history on file. Social History     Tobacco Use    Smoking status: Current Every Day Smoker     Packs/day: 0.50    Smokeless tobacco: Former User   Substance Use Topics    Alcohol use: Not Currently      Prior to Admission Medications   Prescriptions Last Dose Informant Patient Reported? Taking? FLUoxetine (PROzac) 20 mg capsule   Yes No   Sig: Take 20 mg by mouth daily. hydrOXYzine HCL (ATARAX) 25 mg tablet   Yes No   Sig: Take 25 mg by mouth two (2) times a day.   naloxone (Narcan) 4 mg/actuation nasal spray   No No   Sig: Use 1 spray intranasally, then discard. Repeat with new spray every 2 min as needed for opioid overdose symptoms, alternating nostrils.       Facility-Administered Medications: None Allergies   Allergen Reactions    Tramadol Nausea and Vomiting       Review of Systems:  Positive for lethargy, generalized pain. Denies numbness, tingling, chest pain,  nausea, vomiting, difficulty swallowing,  and dyspnea. Objective:     Vitals:    10/22/20 0114 10/22/20 0232 10/22/20 0245 10/22/20 0333   BP:  (!) 160/94 (!) 155/94 (!) 138/106   Pulse:  (!) 101  (!) 105   Resp:  (!) 34  (!) 34   Temp:    98.7 °F (37.1 °C)   SpO2:  98%  97%   Weight: 90.2 kg (198 lb 13.7 oz)   88.8 kg (195 lb 12.3 oz)   Height: 5' 10\" (1.778 m)         Physical Exam:  GENERAL: Calm, cooperative, ill appearing,  in pain, tachypnea. SKIN: Warm, dry, color appropriate for ethnicity. Large blisters noted on left foot. Neurologic Exam:  Mental Status:  Lethargic and oriented x 3. Knows the year but does not know the month. Appropriate affect, mood and behavior. Can follow simple commands. Language:    Normal fluency, slow repetition, comprehension and naming. Cranial Nerves:   Pupils 3 mm, equal, round and reactive to light. Visual fields full to confrontation. Extraocular movements intact. Facial sensation intact. Full facial strength, no asymmetry. Hearing grossly intact bilaterally. Mild dysarthria     Shoulder shrug 5/5 bilaterally. Motor:    No pronator drift. Bulk and tone normal.      Generalized weakness, left arm somewhat weaker but had recent humerus sx due to    fracture, approximately a month ago. States he has difficulty moving it due to pain. No involuntary movements. Sensation:    Sensation intact throughout to light touch  Coordination & Gait: Unable to assess due to difficulty following commands.      Labs:  Lab Results   Component Value Date/Time    WBC 15.8 (H) 10/22/2020 12:55 AM    HGB 12.3 10/22/2020 12:55 AM    HCT 36.0 (L) 10/22/2020 12:55 AM    PLATELET 359 29/99/9986 12:55 AM    MCV 83.5 10/22/2020 12:55 AM      Lab Results   Component Value Date/Time    Sodium 136 10/22/2020 12:55 AM    Potassium 4.4 10/22/2020 12:55 AM    Chloride 105 10/22/2020 12:55 AM    CO2 18 (L) 10/22/2020 12:55 AM    Anion gap 13 10/22/2020 12:55 AM    Glucose 122 (H) 10/22/2020 12:55 AM    BUN 53 (H) 10/22/2020 12:55 AM    Creatinine 4.93 (H) 10/22/2020 12:55 AM    BUN/Creatinine ratio 11 (L) 10/22/2020 12:55 AM    GFR est AA 18 (L) 10/22/2020 12:55 AM    GFR est non-AA 15 (L) 10/22/2020 12:55 AM    Calcium 7.3 (L) 10/22/2020 12:55 AM     Lab Results   Component Value Date/Time    CK 69,719 (HH) 10/21/2020 06:05 PM    Troponin-I, Qt. 55.30 (H) 10/22/2020 12:55 AM       Imaging:    CT Results (maximum last 3): Results from [place] encounter on 10/21/20   CT CHEST WO CONT    Narrative CT dose reduction was achieved through use of a standardized protocol tailored  for this examination and automatic exposure control for dose modulation. Noncontrast study shows moderate severity patchy consolidation and lesser degree  of edema throughout much of the left lung, sparing the apex, central and  peripheral. There is no large region of dense consolidation with the greatest  severity at the elevated left base. Right lung is clear and central airways are  open. No mediastinal or hilar adenopathy. Normal caliber aorta. No pleural pericardial  effusion. No significant upper abdominal findings      Impression IMPRESSION: Moderate severity pneumonia throughout the left lung   CT HEAD WO CONT    Narrative CT dose reduction was achieved through use of a standardized protocol tailored  for this examination and automatic exposure control for dose modulation. Noncontrast study shows symmetric hypodensity in each basal ganglia, centered on  the globus pallidus, left slightly larger than right, not present on the study  of 3 months ago. Each region measures between 10 and 12 mm. No similar finding  anywhere else. No other abnormality in gray or white matter.  No mass effect or  hemorrhage. Ventricles are symmetric and normal in size. No significant bone  finding      Impression IMPRESSION: Findings suggesting hypoxic or toxic edema in the basal ganglia   Results from Hospital Encounter encounter on 09/15/20   CT MAXILLOFACIAL W CONT    Narrative Contrast study shows extensive subcutaneous fat edema throughout the right  maxillary and perimandibular region, with free fluid infiltrating throughout the  fat in the preseptal periorbital right-sided location. Fluid infiltration  continues back, along the outer margin of the masseter muscle, to the margin of  the parotid gland. Salivary glands are normal and symmetric. There is no  stranding or edema of the postseptal fat. Extraocular muscles and optic nerves  are normal symmetric. No intraocular abnormality. Very mild membrane thickening  of the maxillary sinus and slightly greater membrane thickening in the anterior  ethmoids. Thickening of the contiguous facial fashion. No bone destruction or periosteal  reaction. Dental caries noted in a right mandibular molar and right maxillary  incisor. Numerous small lymph nodes scattered throughout the field      Impression IMPRESSION: Extensive right-sided facial edema/cellulitis, without intraorbital  extension or abscess. If cause is unknown, possibilities would include both  sinusitis and dental infection.      Assessment:     Hospital Problems  Never Reviewed          Codes Class Noted POA    Drug abuse ([facility] ICD-10-CM: F19.10  ICD-9-CM: 305.90  10/22/2020 Unknown        Acute renal failure with tubular necrosis ([facility] ICD-10-CM: N17.0  ICD-9-CM: 584.5  10/22/2020 Unknown        * (Principal) Sepsis ([facility] ICD-10-CM: A41.9  ICD-9-CM: 038.9, 995.91  10/22/2020 Unknown        Community acquired pneumonia of left lower lobe of lung ICD-10-CM: J18.9  ICD-9-CM: 493  10/22/2020 Yes        Metabolic acidosis FOA-78-TF: E87.2  ICD-9-CM: 276.2  10/22/2020 Yes        Lymphocytosis ICD-10-CM: C77.878  ICD-9-CM: 288.61  10/22/2020 Unknown        Electrolyte abnormality ICD-10-CM: E87.8  ICD-9-CM: 276.9  10/22/2020 Yes        Troponin level elevated ICD-10-CM: R77.8  ICD-9-CM: 790.6  10/22/2020 Yes            Plan:     1.) Toxic metabolic encephalopathy in patient positive for cocaine, marijuana, amphetamines, ecstasy and benzodiazepines. Patient had been sleeping all day and was less responsive according to family. Possibly became hypoxic during that time. CT scan shows symmetric hypodensity in bilateral basal ganglia centered on the globus pallidus, the left slightly larger than the right. This was not present in [facility] that was performed 3 months ago. Each region measures between 10 and 12 mm. No mass effect or hemorrhage. Findings are suggestive of hypoxic or toxic edema of the basal ganglia. - CK 69,719, troponin 55.30, BNP 3,484, lactic acid 7.5, procalcitonin 28.36, ALT 3,496, AST >2,000,  Ammonia 14, creatinine 4.93.    -MRI due to symmetric hypodensity in bilateral basal ganglia suggestive of hypoxic or toxic edema   -consider EEG to rule out nonconvulsive status epilepticus   -Supportive care per Intensivist   -Neuro checks q hourl y   -Stat CTH for acute neuro changes   -Further recommendations to follow      I have discussed the diagnosis and the intended plan as seen in the above orders with Dr. Blas Chaves, the patient and the primary RN. Patient updated on current plan of care and is in agreement. All questions were answered. Thank you for this consult and participating in the care of this patient.   Signed By: Stephanie Nicole NP     October 22, 2020

## 2020-10-22 NOTE — PROGRESS NOTES
Pharmacist Note - Vancomycin Dosing    Consult provided for this 26 y.o. male for indication of pneumonia. Antibiotic regimen(s): vancomycin and zosyn  Patient on vancomycin PTA? NO     Recent Labs     10/22/20  0055 10/21/20  1805   WBC 15.8* 13.5*   CREA 4.93* 5.29*   BUN 53* 49*     Frequency of BMP: daily  Height: 177.8 cm  Weight: 90.2 kg  Est CrCl: ~26 ml/min  Temp (24hrs), Av.5 °F (36.9 °C), Min:97.3 °F (36.3 °C), Max:100 °F (37.8 °C)    Cultures:  10/21 blood  10/22 blood    Goal trough = 15 - 20 mcg/mL    Therapy will be initiated with a loading dose of 2000 mg IV x 1 to be followed by a maintenance dose of 1250 mg IV every 24 hours. Pharmacy to follow patient daily and order levels / make dose adjustments as appropriate.

## 2020-10-22 NOTE — H&P
[facility]      Procedure Note - Central Venous Access:   Performed by Dhiraj Reddy NP    Obtained informed Consent. Immediately prior to the procedure, the patient was reevaluated and found suitable for the planned procedure and any planned medications. Immediately prior to the procedure a time out was called to verify the correct patient, procedure, equipment, staff, and marking as appropriate. The site was prepped with ChloraPrep. Using Seldinger technique a Hemodialysis Catheter was placed in the Right, Internal Jugular Vein via direct cannulation with 1 number of attempts for Dialysis. Ultrasound Guidance was utilized. There was good blood return. The following complications were encountered: None. A follow-up chest x-ray was ordered post procedure. The procedure was tolerated well.

## 2020-10-22 NOTE — PROGRESS NOTES
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, large blood on UA but very minimal RBCs, will check urine myoglobin, follow-up nephrology recommendations-RRT to be started today    White count better, CT chest showed possible pneumonia, continue vancomycin and Zosyn, add doxycycline to the regimen, follow-up cultures, send Legionella (urine) and mycoplasma IgM, impressively elevated pro calcitonin level but in the setting of acute kidney injury probably falsely elevated to some extent    Impressively elevated LFTs, both AST and ALT-some of it likely related to rhabdomyolysis but will also work-up for other causes, at risk of going into fulminant hepatic failure, will trend LFTs, ammonia and coags    Impressively elevated troponin-in the 50s with some T wave changes on EKG, some of it related to rhabdomyolysis but with numbers in the 50s one must consider ACS versus myocarditis, follow-up cardiology recommendations, to get an echocardiogram    Impressive UDS-positive for cocaine, ecstasy, benzodiazepines, amphetamines and THC-at risk for withdrawal-we will monitor    CT showed possible facial cellulitis and basal ganglia edema-serial neuro checks    Per mother considering his mood the past few days she will not be surprised if this was a suicide attempt, she also thinks he is manic depressive- has a strong family hx of depression.  Will get psychiatry involved, needs a sitter    Additional critical care time-30 minutes    Patient Vitals for the past 24 hrs:   Temp Pulse Resp BP SpO2   10/22/20 1114    (!) 148/92    10/22/20 1100  (!) 108 (!) 36 (!) 148/92 98 %   10/22/20 1058    (!) 148/103    10/22/20 1000  (!) 107 30 (!) 152/96 96 %   10/22/20 0900  (!) 110 (!) 31 (!) 157/104 96 %   10/22/20 0800 98.6 °F (37 °C) (!) 109 29 (!) 158/81 95 %   10/22/20 0700  (!) 105 (!) 37 (!) 157/91 91 %   10/22/20 0600  (!) 107 30 (!) 148/103 94 %   10/22/20 0500  (!) 107 (!) 36 (!) 141/90 93 %   10/22/20 0400  (!) 105 (!) 37 (!) 138/93 95 %   10/22/20 0333 98.7 °F (37.1 °C) (!) 105 (!) 34 (!) 138/106 97 %   10/22/20 0245    (!) 155/94    10/22/20 0232  (!) 101 (!) 34 (!) 160/94 98 %   10/22/20 0030  (!) 103 (!) 35 (!) 134/92 98 %   10/22/20 0015 98.3 °F (36.8 °C) (!) 106 29 (!) 155/107 96 %       Recent Results (from the past 24 hour(s))   DRUG SCREEN, URINE    Collection Time: 10/21/20  6:05 PM   Result Value Ref Range    AMPHETAMINES Positive (A) Negative      BARBITURATES Negative Negative      BENZODIAZEPINES Positive (A) Negative      COCAINE Positive (A) Negative      ECSTASY, MDMA Positive (A) Negative      METHADONE Negative Negative      OPIATES Negative Negative      PCP(PHENCYCLIDINE) Negative Negative      THC (TH-CANNABINOL) Positive (A) Negative      Drug screen comment        This test is a screen for drugs of abuse in a medical setting only (i.e., they are unconfirmed results and as such must not be used for non-medical purposes, e.g.,employment testing, legal testing). Due to its inherent nature, false positive (FP) and false negative (FN) results may be obtained. Therefore, if necessary for medical care, recommend confirmation of positive findings by GC/MS. METABOLIC PANEL, COMPREHENSIVE    Collection Time: 10/21/20  6:05 PM   Result Value Ref Range    Sodium 134 (L) 136 - 145 mmol/L    Potassium 4.0 3.5 - 5.1 mmol/L    Chloride 95 (L) 97 - 108 mmol/L    CO2 20 (L) 21 - 32 mmol/L    Anion gap 19 (H) 5 - 15 mmol/L    Glucose 140 (H) 65 - 100 mg/dL    BUN 49 (H) 6 - 20 mg/dL    Creatinine 5.29 (H) 0.70 - 1.30 mg/dL    BUN/Creatinine ratio 9 (L) 12 - 20      GFR est AA 16 (L) >60 ml/min/1.73m2    GFR est non-AA 13 (L) >60 ml/min/1.73m2    Calcium 8.4 (L) 8.5 - 10.1 mg/dL    Bilirubin, total 0.7 0.2 - 1.0 mg/dL    AST (SGOT) >2,000 (H) 15 - 37 U/L    ALT (SGPT) >3,500 (H) 12 - 78 U/L    Alk.  phosphatase 96 45 - 117 U/L    Protein, total 6.7 6.4 - 8.2 g/dL    Albumin 3.1 (L) 3.5 - 5.0 g/dL    Globulin 3.6 2.0 - 4.0 g/dL    A-G Ratio 0.9 (L) 1.1 - 2.2     CBC WITH AUTOMATED DIFF    Collection Time: 10/21/20  6:05 PM   Result Value Ref Range    WBC 13.5 (H) 4.4 - 11.3 K/uL    RBC 5.22 4.50 - 5.90 M/uL    HGB 15.2 13.5 - 17.5 g/dL    HCT 44.9 41 - 53 %    MCV 85.9 80 - 100 FL    MCH 29.1 (L) 31 - 34 PG    MCHC 33.9 31.0 - 36.0 g/dL    RDW 17.0 (H) 11.5 - 14.5 %    PLATELET 719 K/uL    MPV 7.7 6.5 - 11.5 FL    NRBC 0.0  WBC    ABSOLUTE NRBC 0.01 K/uL    NEUTROPHILS 90 (H) 42 - 75 %    LYMPHOCYTES 5 (L) 20.5 - 51.1 %    MONOCYTES 5 1.7 - 9.3 %    EOSINOPHILS 0 (L) 0.9 - 2.9 %    BASOPHILS 0 0.0 - 2.5 %    ABS. NEUTROPHILS 12.2 (H) 1.8 - 7.7 K/UL    ABS. LYMPHOCYTES 0.7 (L) 1.0 - 4.8 K/UL    ABS. MONOCYTES 0.6 0.2 - 2.4 K/UL    ABS. EOSINOPHILS 0.0 0.0 - 0.7 K/UL    ABS.  BASOPHILS 0.0 0.0 - 0.2 K/UL   ETHYL ALCOHOL    Collection Time: 10/21/20  6:05 PM   Result Value Ref Range    ALCOHOL(ETHYL),SERUM <4 <10 mg/dL   LACTIC ACID    Collection Time: 10/21/20  6:05 PM   Result Value Ref Range    Lactic acid 7.5 (HH) 0.4 - 2.0 mmol/L   URINALYSIS W/ RFLX MICROSCOPIC    Collection Time: 10/21/20  6:05 PM   Result Value Ref Range    Color Yellow/Straw      Appearance Clear Clear      Specific gravity 1.025 1.003 - 1.030      pH (UA) 5.5 5.0 - 8.0      Protein 30 (A) Negative mg/dL    Glucose Negative Negative mg/dL    Ketone Negative Negative mg/dL    Blood Large (A) Negative      Urobilinogen 1.0 0.2 - 1.0 EU/dL    Nitrites Negative Negative      Leukocyte Esterase Negative Negative      Bilirubin UA, confirm Negative Negative     SALICYLATE    Collection Time: 10/21/20  6:05 PM   Result Value Ref Range    Salicylate level 4.4 2.8 - 20.0 mg/dL    Reported dose date No growth      Reported dose: No growth Units   ACETAMINOPHEN    Collection Time: 10/21/20  6:05 PM   Result Value Ref Range    Acetaminophen level 10 10 - 30 ug/mL    Reported dose date No growth      Reported dose: No growth Units CK    Collection Time: 10/21/20  6:05 PM   Result Value Ref Range    CK 69,719 (HH) 39 - 308 U/L   BILIRUBIN, CONFIRM    Collection Time: 10/21/20  6:05 PM   Result Value Ref Range    Bilirubin UA, confirm Negative Negative     URINE MICROSCOPIC    Collection Time: 10/21/20  6:05 PM   Result Value Ref Range    WBC 0-4 0 - 5 /hpf    RBC 0-5 0 - 3 /hpf    Bacteria Negative Negative /hpf   EKG, 12 LEAD, INITIAL    Collection Time: 10/21/20  7:43 PM   Result Value Ref Range    Ventricular Rate 112 BPM    Atrial Rate 112 BPM    P-R Interval 133 ms    QRS Duration 87 ms    Q-T Interval 344 ms    QTC Calculation (Bezet) 470 ms    Calculated P Axis 57 degrees    Calculated R Axis 69 degrees    Calculated T Axis 40 degrees    Diagnosis       Sinus tachycardia  Abnormal T, consider ischemia, anterior leads    Confirmed by Sapna HILL [address] ([id number]) on 10/22/2020 11:12:18 AM     TROPONIN I    Collection Time: 10/21/20  8:10 PM   Result Value Ref Range    Troponin-I, Qt. 55.89 (H) <0.05 ng/mL   AMMONIA    Collection Time: 10/21/20  8:10 PM   Result Value Ref Range    Ammonia 14 <32 umol/L   ACETONE/KETONE, QL    Collection Time: 10/21/20  8:10 PM   Result Value Ref Range    Acetone/Ketone serum, QL.  Negative     BLOOD GAS, ARTERIAL    Collection Time: 10/21/20  8:30 PM   Result Value Ref Range    pH 7.421 7.35 - 7.45      PCO2 28 (L) 35 - 45 mmHg    PO2 95 70 - 100 mmHg    BICARBONATE 18 (L) 22 - 26 mmol/L    BASE DEFICIT 5.2 (H) 0 - 2 mmol/L    O2 METHOD Nasal Cannula      O2 FLOW RATE 2 L/min    FIO2 28.0 %    SPONTANEOUS RATE 28      Sample source Arterial      SITE Left Radial      MARGARET'S TEST PASS      Carboxy-Hgb 0.2 0 - 5 %    Methemoglobin 0.3 0 - 5 %    tHb 13.9 13.5 - 17.5 g/dL    Oxyhemoglobin 96.0 95 - 100 %   NT-PRO BNP    Collection Time: 10/22/20 12:55 AM   Result Value Ref Range    NT pro-BNP 3,484 (H) <125 PG/ML   AMYLASE    Collection Time: 10/22/20 12:55 AM   Result Value Ref Range    Amylase 29 25 - 115 U/L LIPASE    Collection Time: 10/22/20 12:55 AM   Result Value Ref Range    Lipase 46 (L) 73 - 393 U/L   MAGNESIUM    Collection Time: 10/22/20 12:55 AM   Result Value Ref Range    Magnesium 1.8 1.6 - 2.4 mg/dL   CBC WITH AUTOMATED DIFF    Collection Time: 10/22/20 12:55 AM   Result Value Ref Range    WBC 15.8 (H) 4.1 - 11.1 K/uL    RBC 4.31 4.10 - 5.70 M/uL    HGB 12.3 12.1 - 17.0 g/dL    HCT 36.0 (L) 36.6 - 50.3 %    MCV 83.5 80.0 - 99.0 FL    MCH 28.5 26.0 - 34.0 PG    MCHC 34.2 30.0 - 36.5 g/dL    RDW 15.9 (H) 11.5 - 14.5 %    PLATELET 427 034 - 785 K/uL    MPV 9.8 8.9 - 12.9 FL    NRBC 0.0 0  WBC    ABSOLUTE NRBC 0.00 0.00 - 0.01 K/uL    NEUTROPHILS 85 (H) 32 - 75 %    LYMPHOCYTES 7 (L) 12 - 49 %    MONOCYTES 7 5 - 13 %    EOSINOPHILS 0 0 - 7 %    BASOPHILS 0 0 - 1 %    IMMATURE GRANULOCYTES 1 (H) 0.0 - 0.5 %    ABS. NEUTROPHILS 13.6 (H) 1.8 - 8.0 K/UL    ABS. LYMPHOCYTES 1.1 0.8 - 3.5 K/UL    ABS. MONOCYTES 1.0 0.0 - 1.0 K/UL    ABS. EOSINOPHILS 0.0 0.0 - 0.4 K/UL    ABS. BASOPHILS 0.0 0.0 - 0.1 K/UL    ABS. IMM. GRANS. 0.1 (H) 0.00 - 0.04 K/UL    DF AUTOMATED     SAMPLES BEING HELD    Collection Time: 10/22/20 12:55 AM   Result Value Ref Range    SAMPLES BEING HELD  1 RED     COMMENT        Add-on orders for these samples will be processed based on acceptable specimen integrity and analyte stability, which may vary by analyte.    D DIMER    Collection Time: 10/22/20 12:55 AM   Result Value Ref Range    D-dimer 28.76 (H) 0.00 - 0.65 mg/L FEU   METABOLIC PANEL, COMPREHENSIVE    Collection Time: 10/22/20 12:55 AM   Result Value Ref Range    Sodium 136 136 - 145 mmol/L    Potassium 4.4 3.5 - 5.1 mmol/L    Chloride 105 97 - 108 mmol/L    CO2 18 (L) 21 - 32 mmol/L    Anion gap 13 5 - 15 mmol/L    Glucose 122 (H) 65 - 100 mg/dL    BUN 53 (H) 6 - 20 MG/DL    Creatinine 4.93 (H) 0.70 - 1.30 MG/DL    BUN/Creatinine ratio 11 (L) 12 - 20      GFR est AA 18 (L) >60 ml/min/1.73m2    GFR est non-AA 15 (L) >60 ml/min/1.73m2    Calcium 7.3 (L) 8.5 - 10.1 MG/DL    Bilirubin, total 0.7 0.2 - 1.0 MG/DL    ALT (SGPT) 3,496 (H) 12 - 78 U/L    AST (SGOT) >2,000 (H) 15 - 37 U/L    Alk. phosphatase 77 45 - 117 U/L    Protein, total 5.3 (L) 6.4 - 8.2 g/dL    Albumin 2.5 (L) 3.5 - 5.0 g/dL    Globulin 2.8 2.0 - 4.0 g/dL    A-G Ratio 0.9 (L) 1.1 - 2.2     PHOSPHORUS    Collection Time: 10/22/20 12:55 AM   Result Value Ref Range    Phosphorus 3.3 2.6 - 4.7 MG/DL   PROCALCITONIN    Collection Time: 10/22/20 12:55 AM   Result Value Ref Range    Procalcitonin 28.36 ng/mL   TROPONIN I    Collection Time: 10/22/20 12:55 AM   Result Value Ref Range    Troponin-I, Qt. 55.30 (H) <0.05 ng/mL   PROTHROMBIN TIME + INR    Collection Time: 10/22/20 12:56 AM   Result Value Ref Range    INR 1.5 (H) 0.9 - 1.1      Prothrombin time 15.8 (H) 9.0 - 11.1 sec   LACTIC ACID    Collection Time: 10/22/20 12:59 AM   Result Value Ref Range    Lactic acid 1.7 0.4 - 2.0 MMOL/L   POC EG7    Collection Time: 10/22/20  1:06 AM   Result Value Ref Range    Calcium, ionized (POC) 1.05 (L) 1.12 - 1.32 mmol/L    pH (POC) 7.31 (L) 7.35 - 7.45      pCO2 (POC) 38.9 35.0 - 45.0 MMHG    pO2 (POC) 47 (LL) 80 - 100 MMHG    HCO3 (POC) 19.7 (L) 22 - 26 MMOL/L    Base deficit (POC) 7 mmol/L    sO2 (POC) 79 (L) 92 - 97 %    Site OTHER      Device: NASAL CANNULA      Flow rate (POC) 3 L/M    Allens test (POC) N/A      Specimen type (POC) MIXED VENOUS      Total resp.  rate 31     EKG, 12 LEAD, INITIAL    Collection Time: 10/22/20  1:21 AM   Result Value Ref Range    Ventricular Rate 100 BPM    Atrial Rate 100 BPM    P-R Interval 144 ms    QRS Duration 92 ms    Q-T Interval 386 ms    QTC Calculation (Bezet) 497 ms    Calculated P Axis 37 degrees    Calculated R Axis 22 degrees    Calculated T Axis 21 degrees    Diagnosis       Normal sinus rhythm  T wave abnormality, consider anterior ischemia    No previous ECGs available  Confirmed by Tonya Ramos M.D., Isidro Rey ([id number]) on 10/22/2020 7:39:01 AM     CULTURE, BLOOD, PAIRED    Collection Time: 10/22/20  2:37 AM    Specimen: Blood   Result Value Ref Range    Special Requests: NO SPECIAL REQUESTS      Culture result: NO GROWTH AFTER 1 HOUR     METABOLIC PANEL, BASIC    Collection Time: 10/22/20  4:02 AM   Result Value Ref Range    Sodium 137 136 - 145 mmol/L    Potassium 4.2 3.5 - 5.1 mmol/L    Chloride 105 97 - 108 mmol/L    CO2 20 (L) 21 - 32 mmol/L    Anion gap 12 5 - 15 mmol/L    Glucose 147 (H) 65 - 100 mg/dL    BUN 54 (H) 6 - 20 MG/DL    Creatinine 4.91 (H) 0.70 - 1.30 MG/DL    BUN/Creatinine ratio 11 (L) 12 - 20      GFR est AA 18 (L) >60 ml/min/1.73m2    GFR est non-AA 15 (L) >60 ml/min/1.73m2    Calcium 7.0 (L) 8.5 - 10.1 MG/DL   MAGNESIUM    Collection Time: 10/22/20  4:02 AM   Result Value Ref Range    Magnesium 1.7 1.6 - 2.4 mg/dL   PHOSPHORUS    Collection Time: 10/22/20  4:02 AM   Result Value Ref Range    Phosphorus 3.7 2.6 - 4.7 MG/DL   SODIUM, UR, RANDOM    Collection Time: 10/22/20  4:05 AM   Result Value Ref Range    Sodium,urine random 22 MMOL/L   POTASSIUM, UR, RANDOM    Collection Time: 10/22/20  4:05 AM   Result Value Ref Range    Potassium urine, random 78 MMOL/L   CHLORIDE, URINE RANDOM    Collection Time: 10/22/20  4:05 AM   Result Value Ref Range    Chloride,urine random 21 MMOL/L   CREATININE, UR, RANDOM    Collection Time: 10/22/20  4:05 AM   Result Value Ref Range    Creatinine, urine 251.00 mg/dL   PROTHROMBIN TIME + INR    Collection Time: 10/22/20  4:05 AM   Result Value Ref Range    INR 1.4 (H) 0.9 - 1.1      Prothrombin time 14.9 (H) 9.0 - 11.1 sec   SED RATE (ESR)    Collection Time: 10/22/20  4:05 AM   Result Value Ref Range    Sed rate, automated 30 (H) 0 - 15 mm/hr   CBC WITH AUTOMATED DIFF    Collection Time: 10/22/20  4:05 AM   Result Value Ref Range    WBC 13.4 (H) 4.1 - 11.1 K/uL    RBC 3.96 (L) 4.10 - 5.70 M/uL    HGB 11.4 (L) 12.1 - 17.0 g/dL    HCT 33.1 (L) 36.6 - 50.3 %    MCV 83.6 80.0 - 99.0 FL    MCH 28.8 26.0 - 34.0 PG    MCHC 34.4 30.0 - 36.5 g/dL    RDW 15.7 (H) 11.5 - 14.5 %    PLATELET 769 586 - 743 K/uL    MPV 9.9 8.9 - 12.9 FL    NRBC 0.0 0  WBC    ABSOLUTE NRBC 0.00 0.00 - 0.01 K/uL    NEUTROPHILS 82 (H) 32 - 75 %    LYMPHOCYTES 12 12 - 49 %    MONOCYTES 6 5 - 13 %    EOSINOPHILS 0 0 - 7 %    BASOPHILS 0 0 - 1 %    IMMATURE GRANULOCYTES 0 %    ABS. NEUTROPHILS 11.0 (H) 1.8 - 8.0 K/UL    ABS. LYMPHOCYTES 1.6 0.8 - 3.5 K/UL    ABS. MONOCYTES 0.8 0.0 - 1.0 K/UL    ABS. EOSINOPHILS 0.0 0.0 - 0.4 K/UL    ABS. BASOPHILS 0.0 0.0 - 0.1 K/UL    ABS. IMM.  GRANS. 0.0 K/UL    DF MANUAL      RBC COMMENTS ANISOCYTOSIS  1+        RBC COMMENTS MICROCYTOSIS  1+       CK W/ CKMB & INDEX    Collection Time: 10/22/20  4:05 AM   Result Value Ref Range    CK - .8 (H) <3.6 NG/ML    CK-MB Index 0.2 0.0 - 2.5      CK 74,522 (HH) 39 - 308 U/L   TROPONIN I    Collection Time: 10/22/20  4:05 AM   Result Value Ref Range    Troponin-I, Qt. 57.90 (H) <0.05 ng/mL   CULTURE, BLOOD    Collection Time: 10/22/20  6:19 AM    Specimen: Blood   Result Value Ref Range    Special Requests: NO SPECIAL REQUESTS      Culture result: NO GROWTH AFTER 1 HOUR     CK W/ CKMB & INDEX    Collection Time: 10/22/20  8:22 AM   Result Value Ref Range    CK - .2 (H) <3.6 NG/ML    CK-MB Index 0.2 0.0 - 2.5      CK 74,103 (HH) 39 - 447 U/L   METABOLIC PANEL, BASIC    Collection Time: 10/22/20  8:22 AM   Result Value Ref Range    Sodium 138 136 - 145 mmol/L    Potassium 4.1 3.5 - 5.1 mmol/L    Chloride 104 97 - 108 mmol/L    CO2 24 21 - 32 mmol/L    Anion gap 10 5 - 15 mmol/L    Glucose 145 (H) 65 - 100 mg/dL    BUN 56 (H) 6 - 20 MG/DL    Creatinine 5.18 (H) 0.70 - 1.30 MG/DL    BUN/Creatinine ratio 11 (L) 12 - 20      GFR est AA 17 (L) >60 ml/min/1.73m2    GFR est non-AA 14 (L) >60 ml/min/1.73m2    Calcium 7.3 (L) 8.5 - 10.1 MG/DL   MAGNESIUM    Collection Time: 10/22/20  8:22 AM   Result Value Ref Range    Magnesium 2.4 1.6 - 2.4 mg/dL PHOSPHORUS    Collection Time: 10/22/20  8:22 AM   Result Value Ref Range    Phosphorus 3.5 2.6 - 4.7 MG/DL

## 2020-10-22 NOTE — ED NOTES
X2 Officers in to verify Pts information, Officers directed to ED Supervisor for any Information needed.

## 2020-10-22 NOTE — PROGRESS NOTES
Pharmacy Renal Dosing Protocol  Medication: Zosyn   Current regimen:  3.375 g IV  every 8 hr x7 total treatment days  Recent Labs     10/22/20  0822 10/22/20  0402 10/22/20  0055   CREA 5.18* 4.91* 4.93*   BUN 56* 54* 53*     Estimated CrCl:  ARF start iHD today   Plan: Change to 3.375 g IV q12h x7 total treatment days

## 2020-10-22 NOTE — ED TRIAGE NOTES
Pt Tx from [facility]. Pt family found him down possibly over 24hrs, possible drug overdose. Pt received x3 round of narcan, levaquin, zoysn, and a total of 2L fluid. Pt arrived with chan only 75cc in bag.

## 2020-10-22 NOTE — ED NOTES
Pt transferred to [address] [place] [address] via [facility]. Pt on stretcher. Pt and family updated on plan of care. NS bolus infusing, Zosyn also infusing via Lft. Hand.

## 2020-10-22 NOTE — ROUTINE PROCESS
TRANSFER - OUT REPORT: 
 
Verbal report given to Anh Lopez RN on Sincere Dai  being transferred to [facility] ED for urgent transfer Report consisted of patients Situation, Background, Assessment and  
Recommendations(SBAR). Information from the following report(s) SBAR, ED Summary, Procedure Summary, MAR, Recent Results and Cardiac Rhythm ST was reviewed with the receiving nurse. Lines:  
Peripheral IV 10/21/20 Right Antecubital (Active) Peripheral IV 10/21/20 Left Hand (Active) Opportunity for questions and clarification was provided. Patient transported with: 
 Monitor O2 @ 4 liters

## 2020-10-22 NOTE — PROGRESS NOTES
Primary Nurse Will Castellon RN and Meenakshi Moore RN performed a dual skin assessment on this patient Impairment noted- see wound doc flow sheet  Abdirashid score is 12

## 2020-10-23 ENCOUNTER — APPOINTMENT (OUTPATIENT)
Dept: MRI IMAGING | Age: 25
DRG: 812 | End: 2020-10-23
Attending: PSYCHIATRY & NEUROLOGY
Payer: MEDICAID

## 2020-10-23 ENCOUNTER — APPOINTMENT (OUTPATIENT)
Dept: MRI IMAGING | Age: 25
DRG: 812 | End: 2020-10-23
Attending: NURSE PRACTITIONER
Payer: MEDICAID

## 2020-10-23 LAB
ALBUMIN SERPL-MCNC: 2 G/DL (ref 3.5–5)
ALBUMIN/GLOB SERPL: 0.9 {RATIO} (ref 1.1–2.2)
ALP SERPL-CCNC: 70 U/L (ref 45–117)
ALT SERPL-CCNC: 2295 U/L (ref 12–78)
AMMONIA PLAS-SCNC: 27 UMOL/L
ANION GAP SERPL CALC-SCNC: 10 MMOL/L (ref 5–15)
ANION GAP SERPL CALC-SCNC: 10 MMOL/L (ref 5–15)
ANION GAP SERPL CALC-SCNC: 6 MMOL/L (ref 5–15)
ANION GAP SERPL CALC-SCNC: 8 MMOL/L (ref 5–15)
ANION GAP SERPL CALC-SCNC: 9 MMOL/L (ref 5–15)
APTT PPP: 36.8 SEC (ref 22.1–32)
APTT PPP: 52.7 SEC (ref 22.1–32)
ARTERIAL PATENCY WRIST A: ABNORMAL
AST SERPL-CCNC: 1948 U/L (ref 15–37)
BASE DEFICIT BLDA-SCNC: 5.7 MMOL/L
BASOPHILS # BLD: 0 K/UL (ref 0–0.1)
BASOPHILS # BLD: 0 K/UL (ref 0–0.1)
BASOPHILS NFR BLD: 0 % (ref 0–1)
BASOPHILS NFR BLD: 0 % (ref 0–1)
BDY SITE: ABNORMAL
BILIRUB SERPL-MCNC: 0.7 MG/DL (ref 0.2–1)
BUN SERPL-MCNC: 17 MG/DL (ref 6–20)
BUN SERPL-MCNC: 21 MG/DL (ref 6–20)
BUN SERPL-MCNC: 29 MG/DL (ref 6–20)
BUN SERPL-MCNC: 31 MG/DL (ref 6–20)
BUN SERPL-MCNC: 34 MG/DL (ref 6–20)
BUN/CREAT SERPL: 7 (ref 12–20)
BUN/CREAT SERPL: 8 (ref 12–20)
CALCIUM SERPL-MCNC: 6.8 MG/DL (ref 8.5–10.1)
CALCIUM SERPL-MCNC: 7 MG/DL (ref 8.5–10.1)
CALCIUM SERPL-MCNC: 7.1 MG/DL (ref 8.5–10.1)
CALCIUM SERPL-MCNC: 7.3 MG/DL (ref 8.5–10.1)
CALCIUM SERPL-MCNC: 7.3 MG/DL (ref 8.5–10.1)
CHLORIDE SERPL-SCNC: 101 MMOL/L (ref 97–108)
CHLORIDE SERPL-SCNC: 101 MMOL/L (ref 97–108)
CHLORIDE SERPL-SCNC: 102 MMOL/L (ref 97–108)
CHLORIDE SERPL-SCNC: 102 MMOL/L (ref 97–108)
CHLORIDE SERPL-SCNC: 103 MMOL/L (ref 97–108)
CK MB CFR SERPL CALC: 0.1 % (ref 0–2.5)
CK MB SERPL-MCNC: 25 NG/ML (ref 5–25)
CK MB SERPL-MCNC: 38.9 NG/ML (ref 5–25)
CK MB SERPL-MCNC: 42.9 NG/ML (ref 5–25)
CK SERPL-CCNC: ABNORMAL U/L (ref 39–308)
CO2 SERPL-SCNC: 23 MMOL/L (ref 21–32)
CO2 SERPL-SCNC: 24 MMOL/L (ref 21–32)
CO2 SERPL-SCNC: 24 MMOL/L (ref 21–32)
CO2 SERPL-SCNC: 29 MMOL/L (ref 21–32)
CO2 SERPL-SCNC: 30 MMOL/L (ref 21–32)
CREAT SERPL-MCNC: 2.6 MG/DL (ref 0.7–1.3)
CREAT SERPL-MCNC: 2.85 MG/DL (ref 0.7–1.3)
CREAT SERPL-MCNC: 3.97 MG/DL (ref 0.7–1.3)
CREAT SERPL-MCNC: 4.22 MG/DL (ref 0.7–1.3)
CREAT SERPL-MCNC: 4.29 MG/DL (ref 0.7–1.3)
DIFFERENTIAL METHOD BLD: ABNORMAL
DIFFERENTIAL METHOD BLD: ABNORMAL
EOSINOPHIL # BLD: 0.1 K/UL (ref 0–0.4)
EOSINOPHIL # BLD: 0.1 K/UL (ref 0–0.4)
EOSINOPHIL NFR BLD: 1 % (ref 0–7)
EOSINOPHIL NFR BLD: 1 % (ref 0–7)
ERYTHROCYTE [DISTWIDTH] IN BLOOD BY AUTOMATED COUNT: 15.4 % (ref 11.5–14.5)
ERYTHROCYTE [DISTWIDTH] IN BLOOD BY AUTOMATED COUNT: 15.6 % (ref 11.5–14.5)
GLOBULIN SER CALC-MCNC: 2.2 G/DL (ref 2–4)
GLUCOSE BLD STRIP.AUTO-MCNC: 129 MG/DL (ref 65–100)
GLUCOSE SERPL-MCNC: 101 MG/DL (ref 65–100)
GLUCOSE SERPL-MCNC: 104 MG/DL (ref 65–100)
GLUCOSE SERPL-MCNC: 107 MG/DL (ref 65–100)
GLUCOSE SERPL-MCNC: 131 MG/DL (ref 65–100)
GLUCOSE SERPL-MCNC: 97 MG/DL (ref 65–100)
HCO3 BLDA-SCNC: 19 MMOL/L (ref 22–26)
HCT VFR BLD AUTO: 29.7 % (ref 36.6–50.3)
HCT VFR BLD AUTO: 30.4 % (ref 36.6–50.3)
HGB BLD-MCNC: 10.2 G/DL (ref 12.1–17)
HGB BLD-MCNC: 10.4 G/DL (ref 12.1–17)
IMM GRANULOCYTES # BLD AUTO: 0.1 K/UL (ref 0–0.04)
IMM GRANULOCYTES # BLD AUTO: 0.1 K/UL (ref 0–0.04)
IMM GRANULOCYTES NFR BLD AUTO: 1 % (ref 0–0.5)
IMM GRANULOCYTES NFR BLD AUTO: 1 % (ref 0–0.5)
INR PPP: 1.2 (ref 0.9–1.1)
LYMPHOCYTES # BLD: 1.2 K/UL (ref 0.8–3.5)
LYMPHOCYTES # BLD: 1.4 K/UL (ref 0.8–3.5)
LYMPHOCYTES NFR BLD: 11 % (ref 12–49)
LYMPHOCYTES NFR BLD: 11 % (ref 12–49)
MAGNESIUM SERPL-MCNC: 1.7 MG/DL (ref 1.6–2.4)
MAGNESIUM SERPL-MCNC: 1.8 MG/DL (ref 1.6–2.4)
MAGNESIUM SERPL-MCNC: 1.9 MG/DL (ref 1.6–2.4)
MCH RBC QN AUTO: 28.5 PG (ref 26–34)
MCH RBC QN AUTO: 28.5 PG (ref 26–34)
MCHC RBC AUTO-ENTMCNC: 34.2 G/DL (ref 30–36.5)
MCHC RBC AUTO-ENTMCNC: 34.3 G/DL (ref 30–36.5)
MCV RBC AUTO: 83 FL (ref 80–99)
MCV RBC AUTO: 83.3 FL (ref 80–99)
MONOCYTES # BLD: 1 K/UL (ref 0–1)
MONOCYTES # BLD: 1.1 K/UL (ref 0–1)
MONOCYTES NFR BLD: 8 % (ref 5–13)
MONOCYTES NFR BLD: 9 % (ref 5–13)
NEUTS SEG # BLD: 10.1 K/UL (ref 1.8–8)
NEUTS SEG # BLD: 9.1 K/UL (ref 1.8–8)
NEUTS SEG NFR BLD: 78 % (ref 32–75)
NEUTS SEG NFR BLD: 79 % (ref 32–75)
NRBC # BLD: 0 K/UL (ref 0–0.01)
NRBC # BLD: 0 K/UL (ref 0–0.01)
NRBC BLD-RTO: 0 PER 100 WBC
NRBC BLD-RTO: 0 PER 100 WBC
PCO2 BLDA: 37 MMHG (ref 35–45)
PH BLDA: 7.34 [PH] (ref 7.35–7.45)
PHOSPHATE SERPL-MCNC: 2.1 MG/DL (ref 2.6–4.7)
PHOSPHATE SERPL-MCNC: 2.6 MG/DL (ref 2.6–4.7)
PHOSPHATE SERPL-MCNC: 2.8 MG/DL (ref 2.6–4.7)
PHOSPHATE SERPL-MCNC: 3.3 MG/DL (ref 2.6–4.7)
PHOSPHATE SERPL-MCNC: 3.3 MG/DL (ref 2.6–4.7)
PLATELET # BLD AUTO: 161 K/UL (ref 150–400)
PLATELET # BLD AUTO: 166 K/UL (ref 150–400)
PMV BLD AUTO: 10.3 FL (ref 8.9–12.9)
PMV BLD AUTO: 10.4 FL (ref 8.9–12.9)
PO2 BLDA: 47 MMHG (ref 80–100)
POTASSIUM SERPL-SCNC: 3.9 MMOL/L (ref 3.5–5.1)
POTASSIUM SERPL-SCNC: 4 MMOL/L (ref 3.5–5.1)
POTASSIUM SERPL-SCNC: 4.4 MMOL/L (ref 3.5–5.1)
PROT SERPL-MCNC: 4.2 G/DL (ref 6.4–8.2)
PROTHROMBIN TIME: 12.9 SEC (ref 9–11.1)
RBC # BLD AUTO: 3.58 M/UL (ref 4.1–5.7)
RBC # BLD AUTO: 3.65 M/UL (ref 4.1–5.7)
SAO2 % BLD: 76 % (ref 92–97)
SAO2% DEVICE SAO2% SENSOR NAME: ABNORMAL
SERVICE CMNT-IMP: ABNORMAL
SODIUM SERPL-SCNC: 135 MMOL/L (ref 136–145)
SODIUM SERPL-SCNC: 138 MMOL/L (ref 136–145)
SODIUM SERPL-SCNC: 139 MMOL/L (ref 136–145)
SPECIMEN SITE: ABNORMAL
THERAPEUTIC RANGE,PTTT: ABNORMAL SECS (ref 58–77)
THERAPEUTIC RANGE,PTTT: ABNORMAL SECS (ref 58–77)
TROPONIN I SERPL-MCNC: 19.8 NG/ML
TROPONIN I SERPL-MCNC: 25.1 NG/ML
WBC # BLD AUTO: 11.6 K/UL (ref 4.1–11.1)
WBC # BLD AUTO: 12.8 K/UL (ref 4.1–11.1)

## 2020-10-23 PROCEDURE — 74011250637 HC RX REV CODE- 250/637: Performed by: SPECIALIST

## 2020-10-23 PROCEDURE — 80048 BASIC METABOLIC PNL TOTAL CA: CPT

## 2020-10-23 PROCEDURE — 74011250637 HC RX REV CODE- 250/637: Performed by: NURSE PRACTITIONER

## 2020-10-23 PROCEDURE — 85025 COMPLETE CBC W/AUTO DIFF WBC: CPT

## 2020-10-23 PROCEDURE — 74011000250 HC RX REV CODE- 250: Performed by: NURSE PRACTITIONER

## 2020-10-23 PROCEDURE — 82140 ASSAY OF AMMONIA: CPT

## 2020-10-23 PROCEDURE — 82550 ASSAY OF CK (CPK): CPT

## 2020-10-23 PROCEDURE — 36591 DRAW BLOOD OFF VENOUS DEVICE: CPT

## 2020-10-23 PROCEDURE — 74011250636 HC RX REV CODE- 250/636: Performed by: NURSE PRACTITIONER

## 2020-10-23 PROCEDURE — 74011000258 HC RX REV CODE- 258: Performed by: EMERGENCY MEDICINE

## 2020-10-23 PROCEDURE — 65660000001 HC RM ICU INTERMED STEPDOWN

## 2020-10-23 PROCEDURE — 99233 SBSQ HOSP IP/OBS HIGH 50: CPT | Performed by: SPECIALIST

## 2020-10-23 PROCEDURE — 83735 ASSAY OF MAGNESIUM: CPT

## 2020-10-23 PROCEDURE — 74011250636 HC RX REV CODE- 250/636: Performed by: EMERGENCY MEDICINE

## 2020-10-23 PROCEDURE — 80053 COMPREHEN METABOLIC PANEL: CPT

## 2020-10-23 PROCEDURE — 85610 PROTHROMBIN TIME: CPT

## 2020-10-23 PROCEDURE — 85730 THROMBOPLASTIN TIME PARTIAL: CPT

## 2020-10-23 PROCEDURE — 74011250636 HC RX REV CODE- 250/636: Performed by: INTERNAL MEDICINE

## 2020-10-23 PROCEDURE — 99232 SBSQ HOSP IP/OBS MODERATE 35: CPT | Performed by: PSYCHIATRY & NEUROLOGY

## 2020-10-23 PROCEDURE — 94664 DEMO&/EVAL PT USE INHALER: CPT

## 2020-10-23 PROCEDURE — 84100 ASSAY OF PHOSPHORUS: CPT

## 2020-10-23 PROCEDURE — 70551 MRI BRAIN STEM W/O DYE: CPT

## 2020-10-23 PROCEDURE — 82962 GLUCOSE BLOOD TEST: CPT

## 2020-10-23 PROCEDURE — 74011000250 HC RX REV CODE- 250: Performed by: INTERNAL MEDICINE

## 2020-10-23 PROCEDURE — 94760 N-INVAS EAR/PLS OXIMETRY 1: CPT

## 2020-10-23 PROCEDURE — 90935 HEMODIALYSIS ONE EVALUATION: CPT

## 2020-10-23 PROCEDURE — 74011250637 HC RX REV CODE- 250/637: Performed by: EMERGENCY MEDICINE

## 2020-10-23 PROCEDURE — 70544 MR ANGIOGRAPHY HEAD W/O DYE: CPT

## 2020-10-23 PROCEDURE — 77010033678 HC OXYGEN DAILY

## 2020-10-23 PROCEDURE — 94640 AIRWAY INHALATION TREATMENT: CPT

## 2020-10-23 RX ORDER — BALSAM PERU/CASTOR OIL
OINTMENT (GRAM) TOPICAL EVERY 8 HOURS
Status: DISCONTINUED | OUTPATIENT
Start: 2020-10-23 | End: 2020-11-11 | Stop reason: HOSPADM

## 2020-10-23 RX ORDER — CARVEDILOL 3.12 MG/1
3.12 TABLET ORAL 2 TIMES DAILY WITH MEALS
Status: DISCONTINUED | OUTPATIENT
Start: 2020-10-23 | End: 2020-10-24

## 2020-10-23 RX ORDER — LORAZEPAM 2 MG/ML
1 INJECTION INTRAMUSCULAR
Status: DISCONTINUED | OUTPATIENT
Start: 2020-10-23 | End: 2020-10-28

## 2020-10-23 RX ORDER — HEPARIN SODIUM 10000 [USP'U]/100ML
17-36 INJECTION, SOLUTION INTRAVENOUS
Status: DISCONTINUED | OUTPATIENT
Start: 2020-10-23 | End: 2020-11-06

## 2020-10-23 RX ORDER — BUMETANIDE 0.25 MG/ML
4 INJECTION INTRAMUSCULAR; INTRAVENOUS ONCE
Status: COMPLETED | OUTPATIENT
Start: 2020-10-23 | End: 2020-10-23

## 2020-10-23 RX ORDER — HYDROMORPHONE HYDROCHLORIDE 2 MG/ML
2 INJECTION, SOLUTION INTRAMUSCULAR; INTRAVENOUS; SUBCUTANEOUS
Status: DISCONTINUED | OUTPATIENT
Start: 2020-10-23 | End: 2020-11-07

## 2020-10-23 RX ADMIN — MORPHINE SULFATE 2 MG: 2 INJECTION, SOLUTION INTRAMUSCULAR; INTRAVENOUS at 06:45

## 2020-10-23 RX ADMIN — DOXYCYCLINE 100 MG: 100 INJECTION, POWDER, LYOPHILIZED, FOR SOLUTION INTRAVENOUS at 22:20

## 2020-10-23 RX ADMIN — DOCUSATE SODIUM 100 MG: 100 CAPSULE, LIQUID FILLED ORAL at 09:54

## 2020-10-23 RX ADMIN — SODIUM CHLORIDE 200 ML/HR: 900 INJECTION, SOLUTION INTRAVENOUS at 09:17

## 2020-10-23 RX ADMIN — BUMETANIDE 4 MG: 0.25 INJECTION INTRAMUSCULAR; INTRAVENOUS at 10:31

## 2020-10-23 RX ADMIN — HEPARIN SODIUM 1400 UNITS: 1000 INJECTION INTRAVENOUS; SUBCUTANEOUS at 11:49

## 2020-10-23 RX ADMIN — SODIUM CHLORIDE 200 ML/HR: 900 INJECTION, SOLUTION INTRAVENOUS at 03:32

## 2020-10-23 RX ADMIN — Medication 10 ML: at 22:29

## 2020-10-23 RX ADMIN — PIPERACILLIN AND TAZOBACTAM 3.38 G: 3; .375 INJECTION, POWDER, LYOPHILIZED, FOR SOLUTION INTRAVENOUS at 05:09

## 2020-10-23 RX ADMIN — HYDROMORPHONE HYDROCHLORIDE 2 MG: 2 INJECTION, SOLUTION INTRAMUSCULAR; INTRAVENOUS; SUBCUTANEOUS at 22:20

## 2020-10-23 RX ADMIN — Medication: at 22:29

## 2020-10-23 RX ADMIN — CARVEDILOL 3.12 MG: 3.12 TABLET, FILM COATED ORAL at 16:24

## 2020-10-23 RX ADMIN — MORPHINE SULFATE 2 MG: 2 INJECTION, SOLUTION INTRAMUSCULAR; INTRAVENOUS at 02:15

## 2020-10-23 RX ADMIN — Medication: at 14:41

## 2020-10-23 RX ADMIN — HEPARIN SODIUM 17 UNITS/KG/HR: 10000 INJECTION, SOLUTION INTRAVENOUS at 13:02

## 2020-10-23 RX ADMIN — DOXYCYCLINE 100 MG: 100 INJECTION, POWDER, LYOPHILIZED, FOR SOLUTION INTRAVENOUS at 09:17

## 2020-10-23 RX ADMIN — HEPARIN SODIUM 1100 UNITS: 1000 INJECTION INTRAVENOUS; SUBCUTANEOUS at 11:49

## 2020-10-23 RX ADMIN — HEPARIN SODIUM 5000 UNITS: 5000 INJECTION INTRAVENOUS; SUBCUTANEOUS at 05:09

## 2020-10-23 RX ADMIN — Medication 40 ML: at 14:46

## 2020-10-23 RX ADMIN — HYDROMORPHONE HYDROCHLORIDE 2 MG: 2 INJECTION, SOLUTION INTRAMUSCULAR; INTRAVENOUS; SUBCUTANEOUS at 12:26

## 2020-10-23 RX ADMIN — IPRATROPIUM BROMIDE AND ALBUTEROL SULFATE 3 ML: .5; 3 SOLUTION RESPIRATORY (INHALATION) at 22:23

## 2020-10-23 RX ADMIN — CEFTRIAXONE SODIUM 1 G: 1 INJECTION, POWDER, FOR SOLUTION INTRAMUSCULAR; INTRAVENOUS at 16:24

## 2020-10-23 RX ADMIN — FAMOTIDINE 20 MG: 20 TABLET ORAL at 09:54

## 2020-10-23 RX ADMIN — HYDROMORPHONE HYDROCHLORIDE 2 MG: 2 INJECTION, SOLUTION INTRAMUSCULAR; INTRAVENOUS; SUBCUTANEOUS at 16:35

## 2020-10-23 NOTE — PROGRESS NOTES
Problem: Impaired Skin Integrity/Pressure Injury Treatment  Goal: *Improvement of Existing Pressure Injury  Outcome: Progressing Towards Goal  Note: Pt has multiple blisters, WC following pt, Q2 turns, heels elevated LUE propped on pillows r/t DVT     Problem: Pressure Injury - Risk of  Goal: *Prevention of pressure injury  Outcome: Progressing Towards Goal  Pt turned every two hours, moisture barrier applied, heels elevated, pillows and blankets used, pressure redistributing mattress, linens dry. 1930 Bedside shift change report given to Jannie (oncoming nurse) by Qing Vail (offgoing nurse). Report included the following information SBAR, Kardex, MAR, Accordion, and Recent Results.
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
## 2020-10-23 NOTE — PROGRESS NOTES
[facility]   [address] [address] [facility] [address], [facility] [address], [facility]  Phone: [phone]   [phone]       Nephrology Progress Note  Elsa Denney     1995     [id number]  Date of Admission : 10/21/2020  10/23/20    CC: Follow up for ARF, Rhabdomyolysis        Assessment and Plan   JEAN PIERRE  - Severe ATN from Rhabdomyolysis. Oligoanuric   - started emergent HD on 10/22  - daily HD through the weekend     Severe Rhabdomyolysis   - 2/2 Substance use  - LFTs are trending down   - CPK still at [address]  - has depressed EF and elevated trops  ? Myocarditis from [address]. If CPK does not improve, we could consider PLEX. Echo pending    - at risk for compartment syndrome. Has severe edema of all muscle compartments --> reduced IVF to 50 cc/ hr   - continue w/ serial labs     Left Lung PNA w/ sepsis     Metabolic acidosis ; better    Hypocalcemia : replete PRN     Polysubstance abuse     D/w ICU team and [facility]       Interval History:  Seen and examined   Remains very lethargic but opens eyes to commands   CPK down to 38 K   UOP 40 cc in 4 hrs   More edematous       Review of Systems: Review of systems not obtained due to patient factors.     Current Medications:   Current Facility-Administered Medications   Medication Dose Route Frequency    HYDROmorphone (PF) (DILAUDID) injection 2 mg  2 mg IntraVENous Q4H PRN    LORazepam (ATIVAN) injection 1 mg  1 mg IntraVENous Q4H PRN    bumetanide (BUMEX) injection 4 mg  4 mg IntraVENous ONCE    sodium chloride (NS) flush 5-40 mL  5-40 mL IntraVENous Q8H    sodium chloride (NS) flush 5-40 mL  5-40 mL IntraVENous PRN    acetaminophen (TYLENOL) tablet 650 mg  650 mg Oral Q6H PRN    Or    acetaminophen (TYLENOL) suppository 650 mg  650 mg Rectal Q6H PRN    polyethylene glycol (MIRALAX) packet 17 g  17 g Oral DAILY PRN    ondansetron (ZOFRAN) injection 4 mg  4 mg IntraVENous Q6H PRN    famotidine (PEPCID) tablet 20 mg  20 mg Oral DAILY    albuterol-ipratropium (DUO-NEB) 2.5 MG-0.5 MG/3 ML  3 mL Nebulization Q4H PRN    docusate sodium (COLACE) capsule 100 mg  100 mg Oral DAILY    labetaloL (NORMODYNE;TRANDATE) injection 20 mg  20 mg IntraVENous Q4H PRN    Vancomycin Pharmacy to dose   Other Rx Dosing/Monitoring    heparin (porcine) injection 5,000 Units  5,000 Units IntraVENous Q8H    0.9% sodium chloride infusion  50 mL/hr IntraVENous CONTINUOUS    doxycycline (VIBRAMYCIN) 100 mg in 0.9% sodium chloride (MBP/ADV) 100 mL  100 mg IntraVENous Q12H    piperacillin-tazobactam (ZOSYN) 3.375 g in 0.9% sodium chloride (MBP/ADV) 100 mL  3.375 g IntraVENous Q12H    heparin (porcine) 1,000 unit/mL injection 1,400 Units  1,400 Units InterCATH[facility] DIALYSIS PRN    And    heparin (porcine) 1,000 unit/mL injection 1,100 Units  1,100 Units InterCATHeter DIALYSIS PRN      Allergies   Allergen Reactions    Tramadol Nausea and Vomiting       Objective:  Vitals:    Vitals:    10/23/20 0930 10/23/20 0945 10/23/20 1000 10/23/20 1015   BP: (!) 129/106 (!) 133/107 (!) 139/115 (!) 144/115   Pulse: (!) 101 (!) 102 (!) 102 100   Resp: (!) 37 30 (!) 34 (!) 31   Temp:       SpO2: 99% 98% 100% 99%   Weight:       Height:         Intake and Output:  10/23 0701 - 10/23 1900  In: -   Out: 60 [Urine:60]  10/21 1901 - 10/23 0700  In: 79812.7 [P.O.:5720; I.V.:50895.7]  Out: 525 [Urine:525]    Physical Examination:    General: Lethargic   Neck:  Supple, no mass  Resp:  Lungs CTA B/L,  CV:  RRR,  no murmur or rub, no LE edema  GI:  Soft, NT, + Bowel sounds, no hepatosplenomegaly  Neurologic:  Lethargic   Psych:             Unable to assess  Skin:  + blistering  Rash  :  Iniguez     []    High complexity decision making was performed  []    Patient is at high-risk of decompensation with multiple organ involvement    Lab Data Personally Reviewed: I have reviewed all the pertinent labs, microbiology data and radiology studies during assessment.     Recent Labs     10/23/20  0916 10/23/20  5430 10/22/20  2339 10/22/20  1750 10/22/20  0822 10/22/20  0405  10/22/20  0056 10/22/20  0055 10/21/20  1805    135* 135* 135*  135* 138  --    < >  --  136 134*   K 4.0 4.4 4.4 4.4  3.8 4.1  --    < >  --  4.4 4.0    101 102 103  101 104  --    < >  --  105 95*   CO2 30 24 23 24  27 24  --    < >  --  18* 20*   * 101* 104* 131*  108* 145*  --    < >  --  122* 140*   BUN 21* 31* 34* 29*  26* 56*  --    < >  --  53* 49*   CREA 2.85* 4.22* 4.29* 3.97*  3.23* 5.18*  --    < >  --  4.93* 5.29*   CA 6.8* 7.0* 7.3* 7.1*  7.5* 7.3*  --    < >  --  7.3* 8.4*   MG 1.7 1.8 1.9 1.8  1.9 2.4  --    < >  --  1.8  --    PHOS 2.1* 3.3 3.3 2.8  2.6 3.5  --    < >  --  3.3  --    ALB  --  2.0*  --   --   --   --   --   --  2.5* 3.1*   ALT  --  2,295*  --   --   --   --   --   --  3,496* >3,500*   INR  --  1.2*  --   --   --  1.4*  --  1.5*  --   --     < > = values in this interval not displayed. Recent Labs     10/23/20  0415 10/22/20  0405 10/22/20  0055 10/21/20  1805   WBC 12.8* 13.4* 15.8* 13.5*   HGB 10.4* 11.4* 12.3 15.2   HCT 30.4* 33.1* 36.0* 44.9    201 232 268     No results found for: SDES  Lab Results   Component Value Date/Time    Culture result: NO GROWTH AFTER 21 HOURS 10/22/2020 06:19 AM    Culture result: NO GROWTH AFTER 21 HOURS 10/22/2020 02:37 AM    Culture result:  10/21/2020 08:15 PM     Gram Positive Cocci Heavy called Brian Cardenas RN @3346 BY [facility]    Culture result:  10/21/2020 08:15 PM     One of four bottles has been flagged positive by instrument. Bottle has been sent to [facility] laboratory to assess for possible growth.     Culture result:  10/17/2020 08:21 PM     Scant Streptococci, beta hemolyticgroup F not generally considered a pathogen    Culture result: Heavy  Normal respiratory sam   10/17/2020 08:21 PM     Recent Results (from the past 24 hour(s))   ECHO ADULT COMPLETE    Collection Time: 10/22/20 11:15 AM   Result Value Ref Range    IVSd 0.88 0.6 - 1.0 cm LVIDd 5.80 4.2 - 5.9 cm    LVIDd 5.39 4.2 - 5.9 cm    LVIDs 5.48 cm    LVIDs 4.90 cm    LVOT d 2.55 cm    LVPWd 0.99 0.6 - 1.0 cm    BP EF 23.8 (A) 55 - 100 percent    LV Ejection Fraction MOD 2C 27 percent    LV Ejection Fraction MOD 4C 25 percent    LV ED Vol A2C 211.22 mL    LV ED Vol A4C 184.56 mL    LV ED Vol .79 (A) 67 - 155 mL    LV ES Vol A2C 153.43 mL    LV ES Vol A4C 139.18 mL    LV ES Vol .49 (A) 22 - 58 mL    LVOT Peak Gradient 1.78 mmHg    LVOT Peak Velocity 66.76 cm/s    RVIDd 4.92 cm    RVSP 29.91 mmHg    Left Atrium Major Axis 3.13 cm    LA Volume 68.28 18 - 58 mL    LA Area 4C 20.62 cm2    LA Vol 2C 68.17 (A) 18 - 58 mL    LA Vol 4C 50.84 18 - 58 mL    Est. RA Pressure 10.00 mmHg    Aortic Valve Area by Continuity of Peak Velocity 3.63 cm2    AoV PG 3.51 mmHg    Aortic Valve Systolic Peak Velocity 31.98 cm/s    MV A Dashawn 0.29 cm/s    Mitral Valve E Wave Deceleration Time 102.42 ms    MV E Dashawn 87.53 cm/s    E/E' ratio (averaged) 20.61     E/E' lateral 22.10     E/E' septal 19.11     LV E' Lateral Velocity 3.96 cm/s    LV E' Septal Velocity 4.58 cm/s    Mitral Valve Pressure Half-time 29.70 ms    MVA (PHT) 7.41 cm2    TR Max Velocity 146.86 cm/s    Pulmonic Valve Systolic Peak Instantaneous Gradient 2.43 mmHg    Pulmonic Valve Max Velocity 78.00 cm/s    Tapse 1.91 1.5 - 2.0 cm    Triscuspid Valve Regurgitation Peak Gradient 19.91 mmHg    TR Max Velocity 223.08 cm/s    Ao Root D 3.50 cm    MV E/A 301.83     LVES Vol Index BP 73.4 mL/m2    LVED Vol Index BP 96.3 mL/m2    Left Atrium Minor Axis 1.52 cm    LA Vol Index 33.07 16 - 28 ml/m2    LA Vol Index 33.01 16 - 28 ml/m2    LA Vol Index 24.62 16 - 28 ml/m2    LVED Vol Index A4C 89.4 mL/m2    LVED Vol Index A2C 102.3 mL/m2    LVES Vol Index A4C 67.4 mL/m2    LVES Vol Index A2C 74.3 mL/m2    YOLANDE/BSA Pk Dashawn 1.8 cm2/m2   HEP B SURFACE AG    Collection Time: 10/22/20  1:33 PM   Result Value Ref Range    Hepatitis B surface Ag <0.10 Index    Hep B surface Ag Interp. Negative NEG     HEP B SURFACE AB    Collection Time: 10/22/20  1:33 PM   Result Value Ref Range    Hepatitis B surface Ab <3.10 mIU/mL    Hep B surface Ab Interp.  NONREACTIVE NR     TROPONIN I    Collection Time: 10/22/20  1:46 PM   Result Value Ref Range    Troponin-I, Qt. 38.10 (H) <0.05 ng/mL   CK W/ CKMB & INDEX    Collection Time: 10/22/20  5:48 PM   Result Value Ref Range    CK - MB 68.5 (H) <3.6 NG/ML    CK-MB Index 0.2 0.0 - 2.5      CK 45,037 (HH) 39 - 581 U/L   METABOLIC PANEL, BASIC    Collection Time: 10/22/20  5:50 PM   Result Value Ref Range    Sodium 135 (L) 136 - 145 mmol/L    Potassium 3.8 3.5 - 5.1 mmol/L    Chloride 101 97 - 108 mmol/L    CO2 27 21 - 32 mmol/L    Anion gap 7 5 - 15 mmol/L    Glucose 108 (H) 65 - 100 mg/dL    BUN 26 (H) 6 - 20 MG/DL    Creatinine 3.23 (H) 0.70 - 1.30 MG/DL    BUN/Creatinine ratio 8 (L) 12 - 20      GFR est AA 29 (L) >60 ml/min/1.73m2    GFR est non-AA 24 (L) >60 ml/min/1.73m2    Calcium 7.5 (L) 8.5 - 10.1 MG/DL   MAGNESIUM    Collection Time: 10/22/20  5:50 PM   Result Value Ref Range    Magnesium 1.9 1.6 - 2.4 mg/dL   PHOSPHORUS    Collection Time: 10/22/20  5:50 PM   Result Value Ref Range    Phosphorus 2.6 2.6 - 4.7 MG/DL   TROPONIN I    Collection Time: 10/22/20  5:50 PM   Result Value Ref Range    Troponin-I, Qt. 28.60 (H) <3.24 ng/mL   METABOLIC PANEL, BASIC    Collection Time: 10/22/20  5:50 PM   Result Value Ref Range    Sodium 135 (L) 136 - 145 mmol/L    Potassium 4.4 3.5 - 5.1 mmol/L    Chloride 103 97 - 108 mmol/L    CO2 24 21 - 32 mmol/L    Anion gap 8 5 - 15 mmol/L    Glucose 131 (H) 65 - 100 mg/dL    BUN 29 (H) 6 - 20 MG/DL    Creatinine 3.97 (H) 0.70 - 1.30 MG/DL    BUN/Creatinine ratio 7 (L) 12 - 20      GFR est AA 23 (L) >60 ml/min/1.73m2    GFR est non-AA 19 (L) >60 ml/min/1.73m2    Calcium 7.1 (L) 8.5 - 10.1 MG/DL   MAGNESIUM    Collection Time: 10/22/20  5:50 PM   Result Value Ref Range    Magnesium 1.8 1.6 - 2.4 mg/dL PHOSPHORUS    Collection Time: 10/22/20  5:50 PM   Result Value Ref Range    Phosphorus 2.8 2.6 - 4.7 MG/DL   TROPONIN I    Collection Time: 10/22/20  5:50 PM   Result Value Ref Range    Troponin-I, Qt. 25.10 (H) <0.05 ng/mL   CK W/ CKMB & INDEX    Collection Time: 10/22/20 11:39 PM   Result Value Ref Range    CK - MB 42.9 (H) <3.6 NG/ML    CK-MB Index 0.1 0.0 - 2.5      CK 38,928 (HH) 39 - 891 U/L   METABOLIC PANEL, BASIC    Collection Time: 10/22/20 11:39 PM   Result Value Ref Range    Sodium 135 (L) 136 - 145 mmol/L    Potassium 4.4 3.5 - 5.1 mmol/L    Chloride 102 97 - 108 mmol/L    CO2 23 21 - 32 mmol/L    Anion gap 10 5 - 15 mmol/L    Glucose 104 (H) 65 - 100 mg/dL    BUN 34 (H) 6 - 20 MG/DL    Creatinine 4.29 (H) 0.70 - 1.30 MG/DL    BUN/Creatinine ratio 8 (L) 12 - 20      GFR est AA 21 (L) >60 ml/min/1.73m2    GFR est non-AA 17 (L) >60 ml/min/1.73m2    Calcium 7.3 (L) 8.5 - 10.1 MG/DL   MAGNESIUM    Collection Time: 10/22/20 11:39 PM   Result Value Ref Range    Magnesium 1.9 1.6 - 2.4 mg/dL   PHOSPHORUS    Collection Time: 10/22/20 11:39 PM   Result Value Ref Range    Phosphorus 3.3 2.6 - 4.7 MG/DL   MAGNESIUM    Collection Time: 10/23/20  4:15 AM   Result Value Ref Range    Magnesium 1.8 1.6 - 2.4 mg/dL   PHOSPHORUS    Collection Time: 10/23/20  4:15 AM   Result Value Ref Range    Phosphorus 3.3 2.6 - 4.7 MG/DL   TROPONIN I    Collection Time: 10/23/20  4:15 AM   Result Value Ref Range    Troponin-I, Qt. 19.80 (H) <0.12 ng/mL   METABOLIC PANEL, COMPREHENSIVE    Collection Time: 10/23/20  4:15 AM   Result Value Ref Range    Sodium 135 (L) 136 - 145 mmol/L    Potassium 4.4 3.5 - 5.1 mmol/L    Chloride 101 97 - 108 mmol/L    CO2 24 21 - 32 mmol/L    Anion gap 10 5 - 15 mmol/L    Glucose 101 (H) 65 - 100 mg/dL    BUN 31 (H) 6 - 20 MG/DL    Creatinine 4.22 (H) 0.70 - 1.30 MG/DL    BUN/Creatinine ratio 7 (L) 12 - 20      GFR est AA 21 (L) >60 ml/min/1.73m2    GFR est non-AA 17 (L) >60 ml/min/1.73m2    Calcium 7.0 (L) 8.5 - 10.1 MG/DL    Bilirubin, total 0.7 0.2 - 1.0 MG/DL    ALT (SGPT) 2,295 (H) 12 - 78 U/L    AST (SGOT) 1,948 (H) 15 - 37 U/L    Alk. phosphatase 70 45 - 117 U/L    Protein, total 4.2 (L) 6.4 - 8.2 g/dL    Albumin 2.0 (L) 3.5 - 5.0 g/dL    Globulin 2.2 2.0 - 4.0 g/dL    A-G Ratio 0.9 (L) 1.1 - 2.2     PROTHROMBIN TIME + INR    Collection Time: 10/23/20  4:15 AM   Result Value Ref Range    INR 1.2 (H) 0.9 - 1.1      Prothrombin time 12.9 (H) 9.0 - 11.1 sec   CBC WITH AUTOMATED DIFF    Collection Time: 10/23/20  4:15 AM   Result Value Ref Range    WBC 12.8 (H) 4.1 - 11.1 K/uL    RBC 3.65 (L) 4.10 - 5.70 M/uL    HGB 10.4 (L) 12.1 - 17.0 g/dL    HCT 30.4 (L) 36.6 - 50.3 %    MCV 83.3 80.0 - 99.0 FL    MCH 28.5 26.0 - 34.0 PG    MCHC 34.2 30.0 - 36.5 g/dL    RDW 15.4 (H) 11.5 - 14.5 %    PLATELET 923 278 - 464 K/uL    MPV 10.4 8.9 - 12.9 FL    NRBC 0.0 0  WBC    ABSOLUTE NRBC 0.00 0.00 - 0.01 K/uL    NEUTROPHILS 79 (H) 32 - 75 %    LYMPHOCYTES 11 (L) 12 - 49 %    MONOCYTES 8 5 - 13 %    EOSINOPHILS 1 0 - 7 %    BASOPHILS 0 0 - 1 %    IMMATURE GRANULOCYTES 1 (H) 0.0 - 0.5 %    ABS. NEUTROPHILS 10.1 (H) 1.8 - 8.0 K/UL    ABS. LYMPHOCYTES 1.4 0.8 - 3.5 K/UL    ABS. MONOCYTES 1.1 (H) 0.0 - 1.0 K/UL    ABS. EOSINOPHILS 0.1 0.0 - 0.4 K/UL    ABS. BASOPHILS 0.0 0.0 - 0.1 K/UL    ABS. IMM.  GRANS. 0.1 (H) 0.00 - 0.04 K/UL    DF AUTOMATED     CK W/ CKMB & INDEX    Collection Time: 10/23/20  4:15 AM   Result Value Ref Range    CK - MB 38.9 (H) <3.6 NG/ML    CK-MB Index 0.1 0.0 - 2.5      CK 38,660 (HH) 39 - 308 U/L   AMMONIA    Collection Time: 10/23/20  4:15 AM   Result Value Ref Range    Ammonia 27 <76 UMOL/L   METABOLIC PANEL, BASIC    Collection Time: 10/23/20  9:16 AM   Result Value Ref Range    Sodium 138 136 - 145 mmol/L    Potassium 4.0 3.5 - 5.1 mmol/L    Chloride 102 97 - 108 mmol/L    CO2 30 21 - 32 mmol/L    Anion gap 6 5 - 15 mmol/L    Glucose 107 (H) 65 - 100 mg/dL    BUN 21 (H) 6 - 20 MG/DL Creatinine 2.85 (H) 0.70 - 1.30 MG/DL    BUN/Creatinine ratio 7 (L) 12 - 20      GFR est AA 33 (L) >60 ml/min/1.73m2    GFR est non-AA 27 (L) >60 ml/min/1.73m2    Calcium 6.8 (L) 8.5 - 10.1 MG/DL   MAGNESIUM    Collection Time: 10/23/20  9:16 AM   Result Value Ref Range    Magnesium 1.7 1.6 - 2.4 mg/dL   PHOSPHORUS    Collection Time: 10/23/20  9:16 AM   Result Value Ref Range    Phosphorus 2.1 (L) 2.6 - 4.7 MG/DL           Total time spent with patient:  xxx   min. Care Plan discussed with:  Patient     Family      RN      Consulting Physician [facility]        I have reviewed the flowsheets. Chart and Pertinent Notes have been reviewed. No change in PMH ,family and social history from Consult note.       Ester Locke MD

## 2020-10-23 NOTE — PROGRESS NOTES
ZACKARY  Patient presented to the ED at [facility] after being found unresponsive. Transferred to [facility] for a higher level of care. RUR 29%  Disposition: TBD    Patient remains in the ICU, following commands. Chest CT: possible PNA on vanc and zosyn. Psych to evaluate. Care management will continue to follow for transitions of care.    Salina Taylor RN,Care Management

## 2020-10-23 NOTE — PROGRESS NOTES
Spiritual Care Assessment/Progress Note  [facility]      NAME: Julianna Stockton      MRN: [id number]  AGE: 26 y.o. SEX: male  Adventism Affiliation: No preference   Language: English     10/23/2020           Spiritual Assessment begun in [facility] [place] 7S2 INTENSIVE CARE through conversation with:         []Patient        [] Family    [] Friend(s)        Reason for Consult: Initial/Spiritual assessment, critical care     Spiritual beliefs: (Please include comment if needed)     [] Identifies with a andrea tradition:         [] Supported by a andrea community:            [] Claims no spiritual orientation:           [] Seeking spiritual identity:                [] Adheres to an individual form of spirituality:           [x] Not able to assess:                           Identified resources for coping:      [] Prayer                               [] Music                  [] Guided Imagery     [] Family/friends                 [] Pet visits     [] Devotional reading                         [] Unknown     [] Other:                                               Interventions offered during this visit: (See comments for more details)    Patient Interventions: Initial visit, Other (comment)(Note left at bedside)     Family/Friend(s): Other (comment)(Note left at bedside)     Plan of Care:     [] Support spiritual and/or cultural needs    [] Support AMD and/or advance care planning process      [] Support grieving process   [] Coordinate Rites and/or Rituals    [] Coordination with community clergy   [] No spiritual needs identified at this time   [] Detailed Plan of Care below (See Comments)  [] Make referral to Music Therapy  [] Make referral to Pet Therapy     [] Make referral to Addiction services  [] Make referral to [facility]  [] Make referral to Spiritual Care Partner  [] No future visits requested        [] Follow up visits as needed     Comments: Visited Mr Azar Last in ICU-10 for initial spiritual assessment. Patient appeared to be sleeping and no family was present. Sitter was with patient. Left note at bedside assuring patient and family of ongoing  availability for support. : Rev. Roderick Brown.  Shima Medley; [facility], to contact [phone] [address] call: [phone]

## 2020-10-23 NOTE — PROCEDURES
[facility] Acutes  [phone]    Vitals   Pre   Post   Assessment   Pre   Post     Temp  Temp: 99.3 °F (37.4 °C) (10/23/20 0755)  98 LOC  Drowsy but awakes to verbal stimuli same   HR   Pulse (Heart Rate): (!) 105 (10/23/20 0755) 98 Lungs   Clear,Tachypnea on 2 liters nasal cannula  same   B/P   BP: (!) 134/104 (10/23/20 0755) 150/115 Cardiac   Sinus Tachy  same   Resp   Resp Rate: (!) 31 (10/23/20 0755) 38 Skin   Blisters on extremties  same   Pain level  Pain Intensity 1: 10 (10/23/20 0630) 10 Edema  Edema generalized non pitting     same   Orders:    Duration:   Start:    5705 End:    1125 Total:   3.5   Dialyzer:   Dialyzer/Set Up Inspection: Revaclear (10/23/20 0755)   Constance Rooney Bath:   Dialysate K (mEq/L): 3 (10/23/20 0755)   Ca Bath:   Dialysate CA (mEq/L): 2.5 (10/23/20 0755)   Na/Bicarb:   Dialysate NA (mEq/L): 140 (10/23/20 0755)   Target Fluid Removal:   Goal/Amount of Fluid to Remove (mL): 0 mL (10/23/20 0755)   Access     Type & Location:   RIJ non tunneled CVC, dressing dry, clean an intact, Each catheter limb disinfected per p&p, caps removed, hubs disinfected per p&p.         Labs     Obtained/Reviewed   Critical Results Called   Date when labs were drawn-  Hgb-    HGB   Date Value Ref Range Status   10/23/2020 10.4 (L) 12.1 - 17.0 g/dL Final     K-    Potassium   Date Value Ref Range Status   10/23/2020 4.4 3.5 - 5.1 mmol/L Final     Ca-   Calcium   Date Value Ref Range Status   10/23/2020 7.0 (L) 8.5 - 10.1 MG/DL Final     Bun-   BUN   Date Value Ref Range Status   10/23/2020 31 (H) 6 - 20 MG/DL Final     Creat-   Creatinine   Date Value Ref Range Status   10/23/2020 4.22 (H) 0.70 - 1.30 MG/DL Final        Medications/ Blood Products Given     Name   Dose   Route and Time           Heparn 2500 units Intracatheter        Blood Volume Processed (BVP):    80.3 Net Fluid   Removed:  0 ml   Comments   Time Out Done: 5555  Primary Nurse Rpt Pre:SASHA Grijalva  Primary Nurse Rpt Post:SASHA Weiner  Pt Education: Procedural  Care Plan:Continue Nephrology plan of care  Tx Summary:Labs, orders and medications were reviewed. SBAR was given by primary nurse. 65- HD was started using RI cvc without any issues. 1125- HD was completedd and all possible blood was returned without issues. Pt remains hypertensive and tachypnea which primary nurse is made aware of condition pre/during and post treatment. Primary nurse given a report. Admiting Diagnosis:Rhabdomyolysis  Pt's previous clinic- TBD  Consent signed - Informed Consent Verified: Yes (10/23/20 1494)  Consent - Verified  Hepatitis Status- Negative Hep B AG /Susceptible Hep B AB 10/22/2020  Machine #- Machine Number: [id number] (10/23/20 7405)  Telemetry status- Monitored at bedside  Pre-dialysis wt. -   n/a
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
## 2020-10-23 NOTE — PROGRESS NOTES
[facility]    ICU TEAM Progress Note    Name: Radha Diaz   : 1995   MRN: [id number]   Date: 10/23/2020      Assessment:     Drug abuse  Possible suicide attempt  Myocarditis  Pneumonia  Transaminitis  Acute kidney injury, rhabdomyolysis  Left upper extremity DVT      ICU Comprehensive Plan of Care:   Serial neuro checks, follow-up neurology recommendations, awaiting psychiatry recommendations, continue one-to-one sitter in case this was a suicide attempt-when I had a conversation with him he did say it was not a suicide attempt but at the time was too sleepy for me to take his word for it so to speak, pain control as needed    EF of about 20%-likely secondary to myocarditis-?   Related to rhabdomyolysis, follow-up cardiology recommendations, troponins coming down    Supplemental oxygen as needed, keep saturation above 90%, hyperventilation likely response to metabolic acidosis    Diet as tolerated, ensure bowel movements, transaminitis-some of those numbers likely related to muscle release-getting better, right upper quadrant ultrasound unremarkable, hepatitis panel negative    Now on dialysis-to get daily sessions per [id number]-pe other recommendations, monitor urine output closely, dose medication renally, avoid nephrotoxic agents, correct electrolyte derangements as needed    Now on a heparin drip for left upper extremity DVT-monitor for bleeding    Signs of pneumonia on CAT scan on admission-for now continue antibiotic coverage with ceftriaxone and doxycycline, follow-up micro studies    Keep euglycemic      Subjective:     Overnight events    Getting another session of HD today, left upper extremity DVT-now on a heparin drip      Active Problem List:     Problem List  Never Reviewed          Codes Class    [facility] ICD-10-CM: F19.10  ICD-9-CM: 305.90         Acute renal failure with tubular necrosis ([facility] ICD-10-CM: N17.0  ICD-9-CM: 584.5         * (Principal) Sepsis ([facility] ICD-10-CM: A41.9  ICD-9-CM: 038.9, 995.91         Community acquired pneumonia of left lower lobe of lung ICD-10-CM: J18.9  ICD-9-CM: 870         Metabolic acidosis CSV-34-BZ: E87.2  ICD-9-CM: 276.2         Lymphocytosis ICD-10-CM: D72.820  ICD-9-CM: 288.61         Electrolyte abnormality ICD-10-CM: E87.8  ICD-9-CM: 276.9         Troponin level elevated ICD-10-CM: R77.8  ICD-9-CM: 790.6               Past Medical History:      has a past medical history of Anxiety and Depression. Past Surgical History:      has a past surgical history that includes hx orthopaedic. Home Medications:     Prior to Admission medications    Medication Sig Start Date End Date Taking? Authorizing Provider   naloxone (Narcan) 4 mg/actuation nasal spray Use 1 spray intranasally, then discard. Repeat with new spray every 2 min as needed for opioid overdose symptoms, alternating nostrils. 10/18/20   Maci Zee MD   FLUoxetine (PROzac) 20 mg capsule Take 20 mg by mouth daily. Kaleb Moncada MD   hydrOXYzine HCL (ATARAX) 25 mg tablet Take 25 mg by mouth two (2) times a day. Kaleb Moncada MD       Allergies/Social/Family History: Allergies   Allergen Reactions    Tramadol Nausea and Vomiting      Social History     Tobacco Use    Smoking status: Current Every Day Smoker     Packs/day: 0.50    Smokeless tobacco: Former User   Substance Use Topics    Alcohol use: Not Currently      No family history on file.       Objective:   Vital Signs:  Visit Vitals  BP (!) 128/94   Pulse (!) 104   Temp 98.3 °F (36.8 °C)   Resp 29   Ht 5' 10\" (1.778 m)   Wt 98.4 kg (216 lb 14.9 oz)   SpO2 96%   BMI 31.13 kg/m²    O2 Flow Rate (L/min): 2 l/min O2 Device: Nasal cannula Temp (24hrs), Av.5 °F (36.9 °C), Min:97.8 °F (36.6 °C), Max:99.3 °F (37.4 °C)           Intake/Output:     Intake/Output Summary (Last 24 hours) at 10/23/2020 1505  Last data filed at 10/23/2020 1200  Gross per 24 hour   Intake 4945 ml   Output 325 ml   Net 4620 ml Physical Exam:  General-seems to be in moderate respiratory distress  Neuro-sleepy, arousable, AAOx3, grossly nonfocal  Cardiac-tachycardic, regular  Lungs-clear, use of accessory muscles  Abdomen-soft, nontender, nondistended  Extremities-warm, relatively swollen left upper extremity    LABS AND  DATA: Personally reviewed  Recent Labs     10/23/20  1220 10/23/20  0415   WBC 11.6* 12.8*   HGB 10.2* 10.4*   HCT 29.7* 30.4*    161     Recent Labs     10/23/20  0916 10/23/20  0415    135*   K 4.0 4.4    101   CO2 30 24   BUN 21* 31*   CREA 2.85* 4.22*   * 101*   CA 6.8* 7.0*   MG 1.7 1.8   PHOS 2.1* 3.3     Recent Labs     10/23/20  0415 10/22/20  0055   AP 70 77   TP 4.2* 5.3*   ALB 2.0* 2.5*   GLOB 2.2 2.8   AML  --  29   LPSE  --  46*     Recent Labs     10/23/20  1135 10/23/20  0415 10/22/20  0405   INR  --  1.2* 1.4*   PTP  --  12.9* 14.9*   APTT 36.8*  --   --       Recent Labs     10/22/20  0106   PHI 7.31*   PCO2I 38.9   PO2I 47*     Recent Labs     10/23/20  1136 10/23/20  0415  10/22/20  1750   CPK 36,106* 38,660*   < >  --    CKMB 25.0* 38.9*   < >  --    TROIQ  --  19.80*  --  25.10*  28.60*    < > = values in this interval not displayed.        Hemodynamics:   PAP:   CO:     Wedge:   CI:     CVP:    SVR:       PVR:       Ventilator Settings:  Mode Rate Tidal Volume Pressure FiO2 PEEP                    Peak airway pressure:      Minute ventilation:          MEDS: Reviewed    Chest X-Ray:  CXR Results  (Last 48 hours)    None        Time-35 minutes possible suicide attempt

## 2020-10-23 NOTE — PROGRESS NOTES
[address] [place] [address]               Division of Cardiology  [phone]                       Progress note              Idris Rivas M.D., F.A.CWaliC. NAME:  Julianna Stockton   :   1995   MRN:   [id number]         ICD-10-CM ICD-9-CM    1. Troponin level elevated  R77.8 790.6    2. Drug abuse ([facility]  F19.10 305.90    3. Acute renal failure with tubular necrosis (HCC)  N17.0 584.5    4. Obtunded  R40.1 780.09    5. Abnormality of basal ganglia (HCC)  Q04.8 742.8    6. Toxic metabolic encephalopathy  A10 349.82                  HPI    He is currently undergoing dialysis. He is extremely drowsy but wakes up when his name is called. Unable to elicit any complaints at this time due to the significant drowsiness. Assessment/Plan:    1. Troponin elevation: Significant most likely related to rhabdomyolysis with significant increase in CPKs although seek a MDM DEXA was normal.    Significant reduction in ejection fraction by echocardiogram with EF estimated 15 to 20%. Unclear the mechanism of injury at this time. Given increased heart rate and normal blood pressure I feel that we should try to use small dose of carvedilol even if the patient has had a recent cocaine use. I feel the benefits of carvedilol will outweigh the risks at this time. [facility] [place] also will need to be started but clearly renal dysfunction is of concern therefore this will be put on hold for now. Hydralazine and nitroglycerin may be started if blood pressure allows. 2.  Altered mental status: Metabolic encephalopathy. Urine drug screen positive for cocaine, THC, amphetamine, ecstasy, and benzodiazepine. SVT with indication for toxicity edema in the basal ganglia. 3.  Sepsis: Left-sided PNA on antibiotics. 4.  Transaminitis: LFTs improving. 5.  Acute renal failure: Likely related to rhabdo. Currently undergoing dialysis.     6. Polysubstance abuse: CIWA protocol. CARDIAC STUDIES      10/21/20   ECHO ADULT COMPLETE 10/22/2020 10/22/2020    Narrative · LV: Estimated LVEF is 15 - 20%. Visually measured ejection fraction. Normal wall thickness. Dilated left ventricle. Severely reduced systolic   function. Left ventricular diastolic dysfunction. · LA: Mildly dilated left atrium. · RV: Mildly reduced systolic function. · RA: Mildly dilated right atrium. · MV: Mild mitral valve regurgitation is present. · TV: Mild tricuspid valve regurgitation is present. Signed by: Benton Parsons MD                  EKG Results     Procedure 720 Value Units Date/Time    EKG, 12 LEAD, INITIAL [[id number]]     Order Status:  Sent     EKG, 12 LEAD, INITIAL [[id number]] Collected:  10/22/20 0121    Order Status:  Completed Updated:  10/22/20 0739     Ventricular Rate 100 BPM      Atrial Rate 100 BPM      P-R Interval 144 ms      QRS Duration 92 ms      Q-T Interval 386 ms      QTC Calculation (Bezet) 497 ms      Calculated P Axis 37 degrees      Calculated R Axis 22 degrees      Calculated T Axis 21 degrees      Diagnosis --     Normal sinus rhythm  T wave abnormality, consider anterior ischemia    No previous ECGs available  Confirmed by Forest Petite, M.D., [place] ([id number]) on 10/22/2020 7:39:01 AM              IMAGING      CT Results (most recent):  Results from [place] encounter on 10/21/20   CT CHEST WO CONT    Narrative CT dose reduction was achieved through use of a standardized protocol tailored  for this examination and automatic exposure control for dose modulation. Noncontrast study shows moderate severity patchy consolidation and lesser degree  of edema throughout much of the left lung, sparing the apex, central and  peripheral. There is no large region of dense consolidation with the greatest  severity at the elevated left base. Right lung is clear and central airways are  open. No mediastinal or hilar adenopathy. Normal caliber aorta. No pleural pericardial  effusion. No significant upper abdominal findings      Impression IMPRESSION: Moderate severity pneumonia throughout the left lung       XR Results (most recent):  Results from Hospital Encounter encounter on 10/17/20   XR CHEST PORT    Narrative Indication: Altered mental status. AP portable chest radiograph 17 October 2020 1848 hours. Comparison 21 July 2020. Clear lungs. Patient rotated to the left. Normal heart size exaggerated by  patient rotation. No pneumothorax or pleural effusion. Normal bones. Impression IMPRESSION: Negative. MRI Results (most recent):  Results from [place] encounter on 10/21/20   MRA BRAIN WO CONT    Narrative INDICATION: hypoxia    COMPARISON:  None    TECHNIQUE:  3-D time-of-flight MRA of the brain was performed. Multiplanar  reconstructions were obtained. FINDINGS:    Posterior Circulation:  No flow limiting stenosis or occlusion. Anterior Circulation:  No flow limiting stenosis or occlusion. Additional Comments:  No evidence of aneurysm or vascular malformation. Impression IMPRESSION:  No flow limiting stenosis or intracranial aneurysm. Past Medical History:   Diagnosis Date    Anxiety     Depression            Past Surgical History:   Procedure Laterality Date    HX ORTHOPAEDIC                 Visit Vitals  BP (!) 145/110   Pulse (!) 101   Temp 98.7 °F (37.1 °C)   Resp (!) 36   Ht 5' 10\" (1.778 m)   Wt 195 lb (88.5 kg)   SpO2 99%   BMI 27.98 kg/m²         Wt Readings from Last 3 Encounters:   10/22/20 195 lb (88.5 kg)   10/22/20 195 lb (88.5 kg)   10/21/20 185 lb (83.9 kg)         Review of Systems:   Review of systems not obtained due to patient factors. 10/23 0701 - 10/23 1900  In: -   Out: 60 [Urine:60]    10/21 1901 - 10/23 0700  In: 32928.5 [P.O.:5720;  I.V.:37208.7]  Out: 525 [Urine:525]      Telemetry: normal sinus rhythm    Physical Exam:    Neck: no JVD  Heart: regular rate and rhythm  Lungs: diminished breath sounds R anterior, L anterior  Abdomen: soft, non-tender.  Bowel sounds normal. No masses,  no organomegaly  Extremities: no edema    Current Facility-Administered Medications   Medication Dose Route Frequency    HYDROmorphone (PF) (DILAUDID) injection 2 mg  2 mg IntraVENous Q4H PRN    LORazepam (ATIVAN) injection 1 mg  1 mg IntraVENous Q4H PRN    cefTRIAXone (ROCEPHIN) 1 g in 0.9% sodium chloride (MBP/ADV) 50 mL  1 g IntraVENous Q24H    carvediloL (COREG) tablet 3.125 mg  3.125 mg Oral BID WITH MEALS    sodium chloride (NS) flush 5-40 mL  5-40 mL IntraVENous Q8H    sodium chloride (NS) flush 5-40 mL  5-40 mL IntraVENous PRN    acetaminophen (TYLENOL) tablet 650 mg  650 mg Oral Q6H PRN    Or    acetaminophen (TYLENOL) suppository 650 mg  650 mg Rectal Q6H PRN    polyethylene glycol (MIRALAX) packet 17 g  17 g Oral DAILY PRN    ondansetron (ZOFRAN) injection 4 mg  4 mg IntraVENous Q6H PRN    famotidine (PEPCID) tablet 20 mg  20 mg Oral DAILY    albuterol-ipratropium (DUO-NEB) 2.5 MG-0.5 MG/3 ML  3 mL Nebulization Q4H PRN    docusate sodium (COLACE) capsule 100 mg  100 mg Oral DAILY    labetaloL (NORMODYNE;TRANDATE) injection 20 mg  20 mg IntraVENous Q4H PRN    heparin (porcine) injection 5,000 Units  5,000 Units IntraVENous Q8H    0.9% sodium chloride infusion  50 mL/hr IntraVENous CONTINUOUS    doxycycline (VIBRAMYCIN) 100 mg in 0.9% sodium chloride (MBP/ADV) 100 mL  100 mg IntraVENous Q12H    heparin (porcine) 1,000 unit/mL injection 1,400 Units  1,400 Units InterCATHeter DIALYSIS PRN    And    heparin (porcine) 1,000 unit/mL injection 1,100 Units  1,100 Units InterCATHeter DIALYSIS PRN         All Cardiac Markers in the last 24 hours:    Lab Results   Component Value Date/Time    CPK 38,660 () 10/23/2020 04:15 AM    CPK 38,928 () 10/22/2020 11:39 PM    CPK 45,037 () 10/22/2020 05:48 PM    CKMB 38.9 (H) 10/23/2020 04:15 AM    CKMB 42.9 (H) 10/22/2020 11:39 PM    CKMB 68.5 (H) 10/22/2020 05:48 PM    CKNDX 0.1 10/23/2020 04:15 AM    CKNDX 0.1 10/22/2020 11:39 PM    CKNDX 0.2 10/22/2020 05:48 PM    TROIQ 19.80 (H) 10/23/2020 04:15 AM    TROIQ 28.60 (H) 10/22/2020 05:50 PM    TROIQ 25.10 (H) 10/22/2020 05:50 PM    TROIQ 38.10 (H) 10/22/2020 01:46 PM        Lab Results   Component Value Date/Time    Creatinine 2.85 (H) 10/23/2020 09:16 AM          Lab Results   Component Value Date/Time    CK 38,660 (HH) 10/23/2020 04:15 AM    CK - MB 38.9 (H) 10/23/2020 04:15 AM    CK-MB Index 0.1 10/23/2020 04:15 AM    Troponin-I, Qt. 19.80 (H) 10/23/2020 04:15 AM         Lab Results   Component Value Date/Time    WBC 12.8 (H) 10/23/2020 04:15 AM    HGB 10.4 (L) 10/23/2020 04:15 AM    HCT 30.4 (L) 10/23/2020 04:15 AM    PLATELET 802 91/40/2171 04:15 AM    MCV 83.3 10/23/2020 04:15 AM         Lab Results   Component Value Date/Time    Sodium 138 10/23/2020 09:16 AM    Potassium 4.0 10/23/2020 09:16 AM    Chloride 102 10/23/2020 09:16 AM    CO2 30 10/23/2020 09:16 AM    Anion gap 6 10/23/2020 09:16 AM    Glucose 107 (H) 10/23/2020 09:16 AM    BUN 21 (H) 10/23/2020 09:16 AM    Creatinine 2.85 (H) 10/23/2020 09:16 AM    BUN/Creatinine ratio 7 (L) 10/23/2020 09:16 AM    GFR est AA 33 (L) 10/23/2020 09:16 AM    GFR est non-AA 27 (L) 10/23/2020 09:16 AM    Calcium 6.8 (L) 10/23/2020 09:16 AM         Lab Results   Component Value Date/Time    NT pro-BNP 3,484 (H) 10/22/2020 12:55 AM         No results found for: CHOL, CHOLPOCT, CHOLX, CHLST, CHOLV, HDL, HDLPOC, HDLP, LDL, LDLCPOC, LDLC, DLDLP, VLDLC, VLDL, TGLX, TRIGL, TRIGP, TGLPOCT, CHHD, CHHDX      Lab Results   Component Value Date/Time    ALT (SGPT) 2,295 (H) 10/23/2020 04:15 AM    Alk.  phosphatase 70 10/23/2020 04:15 AM    Bilirubin, total 0.7 10/23/2020 04:15 AM

## 2020-10-23 NOTE — PROGRESS NOTES
Neurocritical Care Progress Note  Zaynab Almonte PA-C  Neurocritical Care Physician Assistant    Admit Date: 10/21/2020  HD #2    Daily Progress Note: 10/23/20    Overnight Events: patient started on dialysis, no neurological events, patient more awake and following commands; psych evaluation pending as well    HPI:  Laure Berrios is a 26 y.o. male with a history of anxiety, depression, left humerus fx, and illicit drug use. Patient was transferred to [facility] ICU from [facility] due to probable drug overdose. Patient had received 3 rounds of narcan, 2 L IV fluid and IV abx in the ER there. Patient was brought to the ER due to AMS. He had been sleeping for 24 hours and could not be awakened. His UDS was positive for cocaine, marijuana, amphetamines, ecstasy, and benzodiazepines. Patient states that he remembers taking IV fentanyl. He was taken for a CTH which showed symmetric hypodensity in bilateral basal ganglia centered on the globus pallidus, the left slightly larger than the right. This was not present in [facility] that was performed 3 months ago. Each region measures between 10 and 12 mm. No mass effect or hemorrhage. Findings are suggestive of hypoxic or toxic edema of the basal ganglia. No Known Allergies    Objective:   Vital signs  Temp (24hrs), Av.3 °F (36.8 °C), Min:97.9 °F (36.6 °C), Max:99 °F (37.2 °C)   No intake/output data recorded.    0701 - 1900  In: 1770.3 [I.V.:1770.3]  Out: 125 [Urine:125]  Visit Vitals  /60   Pulse 73   Temp 98 °F (36.7 °C)   Resp 12   Wt 68.4 kg (150 lb 12.7 oz)   SpO2 92%   BMI 22.27 kg/m²    O2 Flow Rate (L/min): 2 l/min O2 Device: Room air   Vitals:    20 1815 20 1830 20 1845 20 1900   BP: 142/61 136/65 130/64 133/60   Pulse: 78 80 71 73   Resp: 9 12 11 12   Temp:       SpO2: (!) 88% 91% 90% 92%   Weight:         General: adult male, sleeping, woken to voice  Lungs: CTA bilaterally  Heart: RRR, no m/r/g  Skin warm and dry, cap refill: <2 seconds  Abdomen: soft, not distended  No bleeding or hematoma     Neuro Exam:   Sleeping, woken to voice, oriented x3  Following commands  PERRL 4 mm EOMI, face symmterical  Motor exam, strength:   RUE: 5/5 LUE: 5/5  RLE: 5/5 LLE: 5/5  Marlo's: negative  Pronator Drift: absent  Clonus: negative  Babinski: downward bilaterally       Labs:  Recent Results (from the past 24 hour(s))   METABOLIC PANEL, BASIC    Collection Time: 10/22/20  4:02 AM   Result Value Ref Range    Sodium 137 136 - 145 mmol/L    Potassium 4.2 3.5 - 5.1 mmol/L    Chloride 105 97 - 108 mmol/L    CO2 20 (L) 21 - 32 mmol/L    Anion gap 12 5 - 15 mmol/L    Glucose 147 (H) 65 - 100 mg/dL    BUN 54 (H) 6 - 20 MG/DL    Creatinine 4.91 (H) 0.70 - 1.30 MG/DL    BUN/Creatinine ratio 11 (L) 12 - 20      GFR est AA 18 (L) >60 ml/min/1.73m2    GFR est non-AA 15 (L) >60 ml/min/1.73m2    Calcium 7.0 (L) 8.5 - 10.1 MG/DL   MAGNESIUM    Collection Time: 10/22/20  4:02 AM   Result Value Ref Range    Magnesium 1.7 1.6 - 2.4 mg/dL   PHOSPHORUS    Collection Time: 10/22/20  4:02 AM   Result Value Ref Range    Phosphorus 3.7 2.6 - 4.7 MG/DL   SODIUM, UR, RANDOM    Collection Time: 10/22/20  4:05 AM   Result Value Ref Range    Sodium,urine random 22 MMOL/L   POTASSIUM, UR, RANDOM    Collection Time: 10/22/20  4:05 AM   Result Value Ref Range    Potassium urine, random 78 MMOL/L   CHLORIDE, URINE RANDOM    Collection Time: 10/22/20  4:05 AM   Result Value Ref Range    Chloride,urine random 21 MMOL/L   CREATININE, UR, RANDOM    Collection Time: 10/22/20  4:05 AM   Result Value Ref Range    Creatinine, urine 251.00 mg/dL   OSMOLALITY, UR    Collection Time: 10/22/20  4:05 AM   Result Value Ref Range    Osmolality,urine 332 MOSM/kg H2O   PROTHROMBIN TIME + INR    Collection Time: 10/22/20  4:05 AM   Result Value Ref Range    INR 1.4 (H) 0.9 - 1.1      Prothrombin time 14.9 (H) 9.0 - 11.1 sec   SED RATE (ESR)    Collection Time: 10/22/20  4:05 AM   Result Value Ref Range    Sed rate, automated 30 (H) 0 - 15 mm/hr   CBC WITH AUTOMATED DIFF    Collection Time: 10/22/20  4:05 AM   Result Value Ref Range    WBC 13.4 (H) 4.1 - 11.1 K/uL    RBC 3.96 (L) 4.10 - 5.70 M/uL    HGB 11.4 (L) 12.1 - 17.0 g/dL    HCT 33.1 (L) 36.6 - 50.3 %    MCV 83.6 80.0 - 99.0 FL    MCH 28.8 26.0 - 34.0 PG    MCHC 34.4 30.0 - 36.5 g/dL    RDW 15.7 (H) 11.5 - 14.5 %    PLATELET 681 702 - 053 K/uL    MPV 9.9 8.9 - 12.9 FL    NRBC 0.0 0  WBC    ABSOLUTE NRBC 0.00 0.00 - 0.01 K/uL    NEUTROPHILS 82 (H) 32 - 75 %    LYMPHOCYTES 12 12 - 49 %    MONOCYTES 6 5 - 13 %    EOSINOPHILS 0 0 - 7 %    BASOPHILS 0 0 - 1 %    IMMATURE GRANULOCYTES 0 %    ABS. NEUTROPHILS 11.0 (H) 1.8 - 8.0 K/UL    ABS. LYMPHOCYTES 1.6 0.8 - 3.5 K/UL    ABS. MONOCYTES 0.8 0.0 - 1.0 K/UL    ABS. EOSINOPHILS 0.0 0.0 - 0.4 K/UL    ABS. BASOPHILS 0.0 0.0 - 0.1 K/UL    ABS. IMM.  GRANS. 0.0 K/UL    DF MANUAL      RBC COMMENTS ANISOCYTOSIS  1+        RBC COMMENTS MICROCYTOSIS  1+       CK W/ CKMB & INDEX    Collection Time: 10/22/20  4:05 AM   Result Value Ref Range    CK - .8 (H) <3.6 NG/ML    CK-MB Index 0.2 0.0 - 2.5      CK 74,522 (HH) 39 - 308 U/L   TROPONIN I    Collection Time: 10/22/20  4:05 AM   Result Value Ref Range    Troponin-I, Qt. 57.90 (H) <0.05 ng/mL   CULTURE, BLOOD    Collection Time: 10/22/20  6:19 AM    Specimen: Blood   Result Value Ref Range    Special Requests: NO SPECIAL REQUESTS      Culture result: NO GROWTH AFTER 1 HOUR     CK W/ CKMB & INDEX    Collection Time: 10/22/20  8:22 AM   Result Value Ref Range    CK - .2 (H) <3.6 NG/ML    CK-MB Index 0.2 0.0 - 2.5      CK 74,103 (HH) 39 - 866 U/L   METABOLIC PANEL, BASIC    Collection Time: 10/22/20  8:22 AM   Result Value Ref Range    Sodium 138 136 - 145 mmol/L    Potassium 4.1 3.5 - 5.1 mmol/L    Chloride 104 97 - 108 mmol/L    CO2 24 21 - 32 mmol/L    Anion gap 10 5 - 15 mmol/L    Glucose 145 (H) 65 - 100 mg/dL BUN 56 (H) 6 - 20 MG/DL    Creatinine 5.18 (H) 0.70 - 1.30 MG/DL    BUN/Creatinine ratio 11 (L) 12 - 20      GFR est AA 17 (L) >60 ml/min/1.73m2    GFR est non-AA 14 (L) >60 ml/min/1.73m2    Calcium 7.3 (L) 8.5 - 10.1 MG/DL   MAGNESIUM    Collection Time: 10/22/20  8:22 AM   Result Value Ref Range    Magnesium 2.4 1.6 - 2.4 mg/dL   PHOSPHORUS    Collection Time: 10/22/20  8:22 AM   Result Value Ref Range    Phosphorus 3.5 2.6 - 4.7 MG/DL   ECHO ADULT COMPLETE    Collection Time: 10/22/20 11:15 AM   Result Value Ref Range    IVSd 0.88 0.6 - 1.0 cm    LVIDd 5.80 4.2 - 5.9 cm    LVIDd 5.39 4.2 - 5.9 cm    LVIDs 5.48 cm    LVIDs 4.90 cm    LVOT d 2.55 cm    LVPWd 0.99 0.6 - 1.0 cm    BP EF 23.8 (A) 55 - 100 percent    LV Ejection Fraction MOD 2C 27 percent    LV Ejection Fraction MOD 4C 25 percent    LV ED Vol A2C 211.22 mL    LV ED Vol A4C 184.56 mL    LV ED Vol .79 (A) 67 - 155 mL    LV ES Vol A2C 153.43 mL    LV ES Vol A4C 139.18 mL    LV ES Vol .49 (A) 22 - 58 mL    LVOT Peak Gradient 1.78 mmHg    LVOT Peak Velocity 66.76 cm/s    RVIDd 4.92 cm    RVSP 29.91 mmHg    Left Atrium Major Axis 3.13 cm    LA Volume 68.28 18 - 58 mL    LA Area 4C 20.62 cm2    LA Vol 2C 68.17 (A) 18 - 58 mL    LA Vol 4C 50.84 18 - 58 mL    Est. RA Pressure 10.00 mmHg    Aortic Valve Area by Continuity of Peak Velocity 3.63 cm2    AoV PG 3.51 mmHg    Aortic Valve Systolic Peak Velocity 81.71 cm/s    MV A Dashawn 0.29 cm/s    Mitral Valve E Wave Deceleration Time 102.42 ms    MV E Dashawn 87.53 cm/s    E/E' ratio (averaged) 20.61     E/E' lateral 22.10     E/E' septal 19.11     LV E' Lateral Velocity 3.96 cm/s    LV E' Septal Velocity 4.58 cm/s    Mitral Valve Pressure Half-time 29.70 ms    MVA (PHT) 7.41 cm2    TR Max Velocity 146.86 cm/s    Pulmonic Valve Systolic Peak Instantaneous Gradient 2.43 mmHg    Pulmonic Valve Max Velocity 78.00 cm/s    Tapse 1.91 1.5 - 2.0 cm    Triscuspid Valve Regurgitation Peak Gradient 19.91 mmHg    TR Max Velocity 223.08 cm/s    Ao Root D 3.50 cm    MV E/A 301.83     LVES Vol Index BP 73.4 mL/m2    LVED Vol Index BP 96.3 mL/m2    Left Atrium Minor Axis 1.52 cm    LA Vol Index 33.07 16 - 28 ml/m2    LA Vol Index 33.01 16 - 28 ml/m2    LA Vol Index 24.62 16 - 28 ml/m2    LVED Vol Index A4C 89.4 mL/m2    LVED Vol Index A2C 102.3 mL/m2    LVES Vol Index A4C 67.4 mL/m2    LVES Vol Index A2C 74.3 mL/m2    YOLANDE/BSA Pk Dashawn 1.8 cm2/m2   HEP B SURFACE AG    Collection Time: 10/22/20  1:33 PM   Result Value Ref Range    Hepatitis B surface Ag <0.10 Index    Hep B surface Ag Interp. Negative NEG     HEP B SURFACE AB    Collection Time: 10/22/20  1:33 PM   Result Value Ref Range    Hepatitis B surface Ab <3.10 mIU/mL    Hep B surface Ab Interp.  NONREACTIVE NR     TROPONIN I    Collection Time: 10/22/20  1:46 PM   Result Value Ref Range    Troponin-I, Qt. 38.10 (H) <0.05 ng/mL   CK W/ CKMB & INDEX    Collection Time: 10/22/20  5:48 PM   Result Value Ref Range    CK - MB 68.5 (H) <3.6 NG/ML    CK-MB Index 0.2 0.0 - 2.5      CK 45,037 (HH) 39 - 455 U/L   METABOLIC PANEL, BASIC    Collection Time: 10/22/20  5:50 PM   Result Value Ref Range    Sodium 135 (L) 136 - 145 mmol/L    Potassium 3.8 3.5 - 5.1 mmol/L    Chloride 101 97 - 108 mmol/L    CO2 27 21 - 32 mmol/L    Anion gap 7 5 - 15 mmol/L    Glucose 108 (H) 65 - 100 mg/dL    BUN 26 (H) 6 - 20 MG/DL    Creatinine 3.23 (H) 0.70 - 1.30 MG/DL    BUN/Creatinine ratio 8 (L) 12 - 20      GFR est AA 29 (L) >60 ml/min/1.73m2    GFR est non-AA 24 (L) >60 ml/min/1.73m2    Calcium 7.5 (L) 8.5 - 10.1 MG/DL   MAGNESIUM    Collection Time: 10/22/20  5:50 PM   Result Value Ref Range    Magnesium 1.9 1.6 - 2.4 mg/dL   PHOSPHORUS    Collection Time: 10/22/20  5:50 PM   Result Value Ref Range    Phosphorus 2.6 2.6 - 4.7 MG/DL   TROPONIN I    Collection Time: 10/22/20  5:50 PM   Result Value Ref Range    Troponin-I, Qt. 28.60 (H) <8.96 ng/mL   METABOLIC PANEL, BASIC    Collection Time: 10/22/20  5:50 PM   Result Value Ref Range    Sodium 135 (L) 136 - 145 mmol/L    Potassium 4.4 3.5 - 5.1 mmol/L    Chloride 103 97 - 108 mmol/L    CO2 24 21 - 32 mmol/L    Anion gap 8 5 - 15 mmol/L    Glucose 131 (H) 65 - 100 mg/dL    BUN 29 (H) 6 - 20 MG/DL    Creatinine 3.97 (H) 0.70 - 1.30 MG/DL    BUN/Creatinine ratio 7 (L) 12 - 20      GFR est AA 23 (L) >60 ml/min/1.73m2    GFR est non-AA 19 (L) >60 ml/min/1.73m2    Calcium 7.1 (L) 8.5 - 10.1 MG/DL   MAGNESIUM    Collection Time: 10/22/20  5:50 PM   Result Value Ref Range    Magnesium 1.8 1.6 - 2.4 mg/dL   PHOSPHORUS    Collection Time: 10/22/20  5:50 PM   Result Value Ref Range    Phosphorus 2.8 2.6 - 4.7 MG/DL   TROPONIN I    Collection Time: 10/22/20  5:50 PM   Result Value Ref Range    Troponin-I, Qt. 25.10 (H) <0.05 ng/mL   CK W/ CKMB & INDEX    Collection Time: 10/22/20 11:39 PM   Result Value Ref Range    CK - MB 42.9 (H) <3.6 NG/ML    CK-MB Index 0.1 0.0 - 2.5      CK 38,928 (HH) 39 - 308 U/L       Imaging:  Mra Brain Wo Cont    Result Date: 10/23/2020  IMPRESSION: No flow limiting stenosis or intracranial aneurysm. Us Abd Comp    Result Date: 10/22/2020  IMPRESSION: Normal renal size and cortical echogenicity. Bilateral perinephric edema. Biliary sludge in the gallbladder.     Assessment:   Principal Problem:   altered mental status due to toxic metabolic derangement, likely due to renal failure      Plan:   Correction of metabolic issues per Primary Team  Psychiatric evaluation for multi-substance abuse  Continue supportive care    Further recommendations pending full evaluation by attending      Champ Sandifer, PA-C  Neurointerventional Surgery/Neurology Team  10/23/20 3:36 AM

## 2020-10-23 NOTE — PROGRESS NOTES
0730- Bedside shift change report given to Jacqui Rashid RN (oncoming nurse) by Lin Kamara RN (offgoing nurse). Report included the following information SBAR, Kardex, ED Summary, Intake/Output, MAR, Accordion, Recent Results, Med Rec Status, Cardiac Rhythm ST, Alarm Parameters  and Dual Neuro Assessment. 1148- Spoke with lab, they did not run CK/CK-MB at 0916. About time for scheduled labs again, will draw early. ICU multidisciplinary rounds lead by Dr. Bessie Myrick (Intensivist): The following were reviewed and discussed: current labs,  recent imaging, lines/drains, review of body systems, nutrition, cultures, mobility, length of ICU stay. The plan of care for the day is as follows: start heparin infusion for LUE DVT, stop SQ heparin, labs and HD per nephrology, plan for HD tomorrow and Sunday, psychiatry evaluation today (written note by RN)     [id number] labs not resulted, lab called. Was told they will check on them. 56- Spoke with lab again, BMP not resulted, was told they will run it now. 200- Spoke with psychiatry NP, order to continue sitter and to re-consult psychiatry when patient is less confused and more medically stable. 0- Left message with mother and sister regarding patient transfer to [facility].

## 2020-10-23 NOTE — PROGRESS NOTES
1930: Bedside shift change report given to Krissy Flanagan (oncoming nurse) by Kendal Womack (offgoing nurse). Report included the following information SBAR, Intake/Output, MAR and Recent Results. .  Shift Summary: No acute issues overnight. Patient remains drowsy but easily arousable to voice. Generally A/O x4 but can have issues naming the hospital (but knows he's in the hospital). PRN Morphine for pain. Hemodynamically stable. RR remains 20s-30s but no hypoxia and stable C02 on BMPs. CKs clearing. MRI completed. 65: Called lab regarding pending Troponin, Mag, Phos values from AM labs. On hold for 3mins before the call went to voicemail. Called 2nd time and  stated she would [employer]. \"    0730: Bedside shift change report given to Destini Zuniga RN (oncoming nurse) by Krissy Flanagan (offgoing nurse). Report included the following information SBAR, Intake/Output, MAR and Recent Results.

## 2020-10-24 PROBLEM — G92.9 TOXIC ENCEPHALOPATHY: Status: ACTIVE | Noted: 2020-10-24

## 2020-10-24 LAB
ALBUMIN SERPL-MCNC: 1.9 G/DL (ref 3.5–5)
ALBUMIN/GLOB SERPL: 0.7 {RATIO} (ref 1.1–2.2)
ALP SERPL-CCNC: 65 U/L (ref 45–117)
ALT SERPL-CCNC: 1594 U/L (ref 12–78)
AMMONIA PLAS-SCNC: 28 UMOL/L
ANION GAP SERPL CALC-SCNC: 7 MMOL/L (ref 5–15)
APTT PPP: 47.9 SEC (ref 22.1–32)
APTT PPP: 55.6 SEC (ref 22.1–32)
APTT PPP: 62.7 SEC (ref 22.1–32)
AST SERPL-CCNC: 872 U/L (ref 15–37)
BASOPHILS # BLD: 0 K/UL (ref 0–0.1)
BASOPHILS # BLD: 0 K/UL (ref 0–0.1)
BASOPHILS NFR BLD: 0 % (ref 0–1)
BASOPHILS NFR BLD: 0 % (ref 0–1)
BILIRUB DIRECT SERPL-MCNC: 0.3 MG/DL (ref 0–0.2)
BILIRUB SERPL-MCNC: 0.6 MG/DL (ref 0.2–1)
BUN SERPL-MCNC: 28 MG/DL (ref 6–20)
BUN/CREAT SERPL: 6 (ref 12–20)
CALCIUM SERPL-MCNC: 7.7 MG/DL (ref 8.5–10.1)
CHLORIDE SERPL-SCNC: 99 MMOL/L (ref 97–108)
CK MB CFR SERPL CALC: 0.1 % (ref 0–2.5)
CK MB SERPL-MCNC: 13 NG/ML (ref 5–25)
CK SERPL-CCNC: ABNORMAL U/L (ref 39–308)
CO2 SERPL-SCNC: 27 MMOL/L (ref 21–32)
CREAT SERPL-MCNC: 4.59 MG/DL (ref 0.7–1.3)
DIFFERENTIAL METHOD BLD: ABNORMAL
DIFFERENTIAL METHOD BLD: ABNORMAL
EOSINOPHIL # BLD: 0.2 K/UL (ref 0–0.4)
EOSINOPHIL # BLD: 0.2 K/UL (ref 0–0.4)
EOSINOPHIL NFR BLD: 2 % (ref 0–7)
EOSINOPHIL NFR BLD: 2 % (ref 0–7)
ERYTHROCYTE [DISTWIDTH] IN BLOOD BY AUTOMATED COUNT: 15.6 % (ref 11.5–14.5)
ERYTHROCYTE [DISTWIDTH] IN BLOOD BY AUTOMATED COUNT: 15.9 % (ref 11.5–14.5)
GLOBULIN SER CALC-MCNC: 2.8 G/DL (ref 2–4)
GLUCOSE SERPL-MCNC: 88 MG/DL (ref 65–100)
HCT VFR BLD AUTO: 26.4 % (ref 36.6–50.3)
HCT VFR BLD AUTO: 28.5 % (ref 36.6–50.3)
HGB BLD-MCNC: 8.8 G/DL (ref 12.1–17)
HGB BLD-MCNC: 9.7 G/DL (ref 12.1–17)
IMM GRANULOCYTES # BLD AUTO: 0.1 K/UL (ref 0–0.04)
IMM GRANULOCYTES # BLD AUTO: 0.1 K/UL (ref 0–0.04)
IMM GRANULOCYTES NFR BLD AUTO: 1 % (ref 0–0.5)
IMM GRANULOCYTES NFR BLD AUTO: 1 % (ref 0–0.5)
LYMPHOCYTES # BLD: 1.5 K/UL (ref 0.8–3.5)
LYMPHOCYTES # BLD: 1.5 K/UL (ref 0.8–3.5)
LYMPHOCYTES NFR BLD: 13 % (ref 12–49)
LYMPHOCYTES NFR BLD: 16 % (ref 12–49)
MAGNESIUM SERPL-MCNC: 1.9 MG/DL (ref 1.6–2.4)
MAGNESIUM SERPL-MCNC: 1.9 MG/DL (ref 1.6–2.4)
MCH RBC QN AUTO: 28.2 PG (ref 26–34)
MCH RBC QN AUTO: 28.5 PG (ref 26–34)
MCHC RBC AUTO-ENTMCNC: 33.3 G/DL (ref 30–36.5)
MCHC RBC AUTO-ENTMCNC: 34 G/DL (ref 30–36.5)
MCV RBC AUTO: 83.8 FL (ref 80–99)
MCV RBC AUTO: 84.6 FL (ref 80–99)
MONOCYTES # BLD: 0.9 K/UL (ref 0–1)
MONOCYTES # BLD: 1.2 K/UL (ref 0–1)
MONOCYTES NFR BLD: 11 % (ref 5–13)
MONOCYTES NFR BLD: 9 % (ref 5–13)
NEUTS SEG # BLD: 7 K/UL (ref 1.8–8)
NEUTS SEG # BLD: 8.6 K/UL (ref 1.8–8)
NEUTS SEG NFR BLD: 72 % (ref 32–75)
NEUTS SEG NFR BLD: 73 % (ref 32–75)
NRBC # BLD: 0 K/UL (ref 0–0.01)
NRBC # BLD: 0 K/UL (ref 0–0.01)
NRBC BLD-RTO: 0 PER 100 WBC
NRBC BLD-RTO: 0 PER 100 WBC
PHOSPHATE SERPL-MCNC: 3.6 MG/DL (ref 2.6–4.7)
PHOSPHATE SERPL-MCNC: 4.4 MG/DL (ref 2.6–4.7)
PLATELET # BLD AUTO: 182 K/UL (ref 150–400)
PLATELET # BLD AUTO: 190 K/UL (ref 150–400)
PMV BLD AUTO: 10.4 FL (ref 8.9–12.9)
PMV BLD AUTO: 9.8 FL (ref 8.9–12.9)
POTASSIUM SERPL-SCNC: 4.2 MMOL/L (ref 3.5–5.1)
PROT SERPL-MCNC: 4.7 G/DL (ref 6.4–8.2)
RBC # BLD AUTO: 3.12 M/UL (ref 4.1–5.7)
RBC # BLD AUTO: 3.4 M/UL (ref 4.1–5.7)
SODIUM SERPL-SCNC: 133 MMOL/L (ref 136–145)
THERAPEUTIC RANGE,PTTT: ABNORMAL SECS (ref 58–77)
WBC # BLD AUTO: 11.7 K/UL (ref 4.1–11.1)
WBC # BLD AUTO: 9.6 K/UL (ref 4.1–11.1)

## 2020-10-24 PROCEDURE — 90935 HEMODIALYSIS ONE EVALUATION: CPT

## 2020-10-24 PROCEDURE — 80076 HEPATIC FUNCTION PANEL: CPT

## 2020-10-24 PROCEDURE — 85730 THROMBOPLASTIN TIME PARTIAL: CPT

## 2020-10-24 PROCEDURE — 99233 SBSQ HOSP IP/OBS HIGH 50: CPT | Performed by: PSYCHIATRY & NEUROLOGY

## 2020-10-24 PROCEDURE — 99232 SBSQ HOSP IP/OBS MODERATE 35: CPT | Performed by: SPECIALIST

## 2020-10-24 PROCEDURE — 83735 ASSAY OF MAGNESIUM: CPT

## 2020-10-24 PROCEDURE — 74011250636 HC RX REV CODE- 250/636: Performed by: EMERGENCY MEDICINE

## 2020-10-24 PROCEDURE — 80048 BASIC METABOLIC PNL TOTAL CA: CPT

## 2020-10-24 PROCEDURE — 84100 ASSAY OF PHOSPHORUS: CPT

## 2020-10-24 PROCEDURE — 82550 ASSAY OF CK (CPK): CPT

## 2020-10-24 PROCEDURE — 85025 COMPLETE CBC W/AUTO DIFF WBC: CPT

## 2020-10-24 PROCEDURE — 74011250636 HC RX REV CODE- 250/636: Performed by: HOSPITALIST

## 2020-10-24 PROCEDURE — 74011250636 HC RX REV CODE- 250/636: Performed by: INTERNAL MEDICINE

## 2020-10-24 PROCEDURE — 82140 ASSAY OF AMMONIA: CPT

## 2020-10-24 PROCEDURE — 36415 COLL VENOUS BLD VENIPUNCTURE: CPT

## 2020-10-24 PROCEDURE — 74011250637 HC RX REV CODE- 250/637: Performed by: NURSE PRACTITIONER

## 2020-10-24 PROCEDURE — 74011250637 HC RX REV CODE- 250/637: Performed by: SPECIALIST

## 2020-10-24 PROCEDURE — 74011000258 HC RX REV CODE- 258: Performed by: EMERGENCY MEDICINE

## 2020-10-24 PROCEDURE — 65660000001 HC RM ICU INTERMED STEPDOWN

## 2020-10-24 RX ORDER — CARVEDILOL 6.25 MG/1
6.25 TABLET ORAL 2 TIMES DAILY WITH MEALS
Status: DISCONTINUED | OUTPATIENT
Start: 2020-10-24 | End: 2020-10-30

## 2020-10-24 RX ORDER — HEPARIN SODIUM 5000 [USP'U]/ML
40 INJECTION, SOLUTION INTRAVENOUS; SUBCUTANEOUS ONCE
Status: COMPLETED | OUTPATIENT
Start: 2020-10-24 | End: 2020-10-24

## 2020-10-24 RX ORDER — ISOSORBIDE DINITRATE 5 MG/1
5 TABLET ORAL 3 TIMES DAILY
Status: DISCONTINUED | OUTPATIENT
Start: 2020-10-24 | End: 2020-10-25

## 2020-10-24 RX ADMIN — CARVEDILOL 3.12 MG: 3.12 TABLET, FILM COATED ORAL at 08:14

## 2020-10-24 RX ADMIN — SODIUM CHLORIDE 50 ML/HR: 900 INJECTION, SOLUTION INTRAVENOUS at 05:39

## 2020-10-24 RX ADMIN — Medication 10 ML: at 06:43

## 2020-10-24 RX ADMIN — Medication 10 ML: at 16:49

## 2020-10-24 RX ADMIN — HYDROMORPHONE HYDROCHLORIDE 2 MG: 2 INJECTION, SOLUTION INTRAMUSCULAR; INTRAVENOUS; SUBCUTANEOUS at 16:49

## 2020-10-24 RX ADMIN — HYDROMORPHONE HYDROCHLORIDE 2 MG: 2 INJECTION, SOLUTION INTRAMUSCULAR; INTRAVENOUS; SUBCUTANEOUS at 22:17

## 2020-10-24 RX ADMIN — Medication 10 ML: at 22:18

## 2020-10-24 RX ADMIN — CARVEDILOL 6.25 MG: 6.25 TABLET, FILM COATED ORAL at 18:54

## 2020-10-24 RX ADMIN — CEFTRIAXONE SODIUM 1 G: 1 INJECTION, POWDER, FOR SOLUTION INTRAMUSCULAR; INTRAVENOUS at 18:54

## 2020-10-24 RX ADMIN — DOXYCYCLINE 100 MG: 100 INJECTION, POWDER, LYOPHILIZED, FOR SOLUTION INTRAVENOUS at 09:53

## 2020-10-24 RX ADMIN — Medication: at 16:49

## 2020-10-24 RX ADMIN — Medication: at 06:43

## 2020-10-24 RX ADMIN — Medication: at 22:18

## 2020-10-24 RX ADMIN — HYDROMORPHONE HYDROCHLORIDE 2 MG: 2 INJECTION, SOLUTION INTRAMUSCULAR; INTRAVENOUS; SUBCUTANEOUS at 06:43

## 2020-10-24 RX ADMIN — FAMOTIDINE 20 MG: 20 TABLET ORAL at 08:13

## 2020-10-24 RX ADMIN — HYDROMORPHONE HYDROCHLORIDE 2 MG: 2 INJECTION, SOLUTION INTRAMUSCULAR; INTRAVENOUS; SUBCUTANEOUS at 02:31

## 2020-10-24 RX ADMIN — DOCUSATE SODIUM 100 MG: 100 CAPSULE, LIQUID FILLED ORAL at 08:14

## 2020-10-24 RX ADMIN — DOXYCYCLINE 100 MG: 100 INJECTION, POWDER, LYOPHILIZED, FOR SOLUTION INTRAVENOUS at 22:18

## 2020-10-24 RX ADMIN — HYDROMORPHONE HYDROCHLORIDE 2 MG: 2 INJECTION, SOLUTION INTRAMUSCULAR; INTRAVENOUS; SUBCUTANEOUS at 12:23

## 2020-10-24 RX ADMIN — ISOSORBIDE DINITRATE 5 MG: 5 TABLET ORAL at 18:54

## 2020-10-24 RX ADMIN — HEPARIN SODIUM 21 UNITS/KG/HR: 10000 INJECTION, SOLUTION INTRAVENOUS at 17:22

## 2020-10-24 RX ADMIN — HEPARIN SODIUM 20 UNITS/KG/HR: 10000 INJECTION, SOLUTION INTRAVENOUS at 02:32

## 2020-10-24 RX ADMIN — ISOSORBIDE DINITRATE 5 MG: 5 TABLET ORAL at 22:17

## 2020-10-24 RX ADMIN — HEPARIN SODIUM 1400 UNITS: 1000 INJECTION INTRAVENOUS; SUBCUTANEOUS at 17:42

## 2020-10-24 RX ADMIN — HEPARIN SODIUM 1100 UNITS: 1000 INJECTION INTRAVENOUS; SUBCUTANEOUS at 17:42

## 2020-10-24 RX ADMIN — HEPARIN SODIUM 3550 UNITS: 5000 INJECTION INTRAVENOUS; SUBCUTANEOUS at 02:31

## 2020-10-24 NOTE — PROCEDURES
[facility]  [phone]    Vitals   Pre   Post   Assessment   Pre   Post     Temp  Temp: 97.7 °F (36.5 °C) (10/24/20 1411)  97.8 LOC  A&OX4, lethargic A&OX4, lethargic   HR   Pulse (Heart Rate): 91 (10/24/20 1411) 89 Lungs   Clear diminished Clear diminished   B/P   BP: (!) 140/97 (10/24/20 1411) 155/101 Cardiac   Bedside telemetry NSR, HRR S1 S2 present Bedside telemetry NSR, HRR S1 S2 present   Resp   Resp Rate: 20 (10/24/20 1411) 14 Skin   Blisters present on extremities Blisters present on extremities   Pain level  0 0 Edema  3+ generalized     3+ generalized   Orders:    Duration:   Start:    14:11 End:    17:42 Total:   3.5 hrs   Dialyzer:   Dialyzer/Set Up Inspection: Breann De Souza (10/24/20 1411)   K Bath:   Dialysate K (mEq/L): 3 (10/24/20 1411)   Ca Bath:   Dialysate CA (mEq/L): 2.5 (10/24/20 1411)   Na/Bicarb:   Dialysate NA (mEq/L): 140 (10/24/20 1411)   Target Fluid Removal:   Goal/Amount of Fluid to Remove (mL): 1000 mL (10/24/20 1411)   Access     Type & Location:   RIJ CVC: Dressing clean and dry, partially adhering to skin, last change dated 10/22/20. No s/s of infection. Both lumens aspirate & flush well. Running well at -400. Dressing changed today using sterile technique by HD RN per policy.    Labs     Obtained/Reviewed   Critical Results Called   Date when labs were drawn-  Hgb-    HGB   Date Value Ref Range Status   10/24/2020 9.7 (L) 12.1 - 17.0 g/dL Final     K-    Potassium   Date Value Ref Range Status   10/24/2020 4.2 3.5 - 5.1 mmol/L Final     Ca-   Calcium   Date Value Ref Range Status   10/24/2020 7.7 (L) 8.5 - 10.1 MG/DL Final     Bun-   BUN   Date Value Ref Range Status   10/24/2020 28 (H) 6 - 20 MG/DL Final     Creat-   Creatinine   Date Value Ref Range Status   10/24/2020 4.59 (H) 0.70 - 1.30 MG/DL Final     Comment:     INVESTIGATED PER DELTA CHECK PROTOCOL        Medications/ Blood Products Given     Name   Dose   Route and Time     Heparin 1000 units/ 1 ml  2500 units 1.1 ml/ 1100 units to dwell in arterial lumen of HD CVC at 17:42  1.4 ml/ 1400 units to dwell in arterial lumen of HD CVC at 17:42             Blood Volume Processed (BVP):   76.4 L Net Fluid   Removed:  1000 ml   Comments   Time Out Done: 14:05  Primary Nurse Rpt Pre: Ammon Healy RN  Primary Nurse Rpt Post: Ammon Healy RN  Pt Education: procedural, CVC care  Care Plan: ongoing  Tx Summary:   SBAR received from Primary RN Ammon Healy. Arrived to patient room set up and tested machine and water per policy. Patient is A&Ox4, drowsy but easily arousable. Consent signed & on file. Dressing changed using sterile technique. 14:11- Each catheter limb disinfected per p&p, caps removed, hubs disinfected per p&p. Each lumen aspirated for blood return and flushed with Normal Saline per policy. VSS. Dialysis Tx initiated. Dialysis access visualized and lines intact. 14:30- Patient resting quietly, VSS. Patient denies SOB. Dialysis access visualized and lines intact. 15:00- Patient resting quietly, VSS. Patient denies SOB. Dialysis access visualized and lines intact. 15:30- Patient resting quietly, VSS. Patient denies SOB. Dialysis access visualized and lines intact. 16:00- Patient resting quietly, VSS. Patient denies SOB. Dialysis access visualized and lines intact. 16:30- Patient resting quietly, VSS. Patient denies SOB. Dialysis access visualized and lines intact. 17:00- Flushed patient lines with 100 ml of NS to prevent clotting. Patient BFR set to 350 to prevent arterial pressure from fluctuating below -260. Patient resting quietly, VSS. Patient denies SOB. Dialysis access visualized and lines intact. 17:30- Patient resting quietly, VSS. Patient denies SOB. Dialysis access visualized and lines intact. 17:42- Tx ended. VSS. Each dialysis catheter limb disinfected per p&p, all possible blood returned per p&p, and each dialysis hub disinfected per p&p.  Each lumen flushed, post dialysis catheter Heparin dwell instilled per order, and caps applied. Bed locked and in the lowest position, call bell and belongings in reach. SBAR given to Primary, RN Courtney Gao. Patient is stable at time of my departure. All Dialysis related medications have been reviewed. Admiting Diagnosis: Sepsis/ ARF  Consent signed - Informed Consent Verified: Yes (10/24/20 1411)  Alisita Consent - on file  Hepatitis Status- HbAg negative 10/22/2020, HbAB susceptible  Machine #- Machine Number: [id number] (10/24/20 1411)  Telemetry status- bedside telemetry, NSR per primary RN  Pre-dialysis wt. -

## 2020-10-24 NOTE — CONSULTS
[address]    Name:  Gifty Russ  MR#:  [id number]  :  1995  ACCOUNT #:  [de-identified]  DATE OF SERVICE:  10/23/2020      BEHAVIORAL HEALTH CONSULTATION    REASON FOR CONSULTATION:  Possible suicide attempt. HISTORY OF PRESENTING ILLNESS:  The patient is a 78-year-old male who is currently being seen in the ICU for psychiatric consultation for complaints mentioned above. He is noted to be confused during the interview, but was able to provide some history. He tells me that he was diagnosed with depression and anxiety, and a doctor was prescribing him Prozac 20 mg and Atarax 25 mg. He states that he was incarcerated, but does not provide further history about that. He also states that his girlfriend broke up with him about a week ago after being together for two-and-a-half years. He is aware that he is in the hospital, and he knows the date and the year and that the current president is Eduardo and the one before him was Obama, and that the current season is fall. He also shared that he was using fentanyl when he came to the hospital, but during drug screen, did not show positive for opiates. It showed positive; however, for amphetamines, benzodiazepine, cocaine, ecstasy, MDMA, and THC. When I asked him about this, he said that he did not use any of these substances, only fentanyl. He fell asleep in the middle of the interview. He also was not able to answer if he was having any suicidal thoughts. He is in the ICU but not intubated. He was actually a transfer from [facility] for possible drug overdose. Reportedly, when he was at [facility], he received 3 g of Narcan. He was started on Levaquin dosing and given a total of 2 L of IVF. He was transferred here at [facility] for higher level of care. His labs showed a shock liver as well as JEAN PIERRE, elevated troponin, CK, and CK-MB.     PAST MEDICAL HISTORY:  See H and XIMENA    Past Medical History:   Diagnosis Date    Anxiety     Depression        Labs: (reviewed/updated 10/26/2020)  Patient Vitals for the past 8 hrs:   BP Temp Pulse Resp SpO2 Weight   10/26/20 0853 (!) 151/108  93      10/26/20 0700 (!) 145/84 97.7 °F (36.5 °C) 93 27 96 %    10/26/20 0300 (!) 157/93 98 °F (36.7 °C) 97 25 94 % 118.9 kg (262 lb 1.6 oz)     Labs Reviewed   MYCOPLASMA AB, IGM - Abnormal; Notable for the following components:       Result Value    Mycoplasma Ab, IgM REACTIVE (*)     All other components within normal limits   NT-PRO BNP - Abnormal; Notable for the following components:    NT pro-BNP 3,484 (*)     All other components within normal limits   LIPASE - Abnormal; Notable for the following components:    Lipase 46 (*)     All other components within normal limits   PROTHROMBIN TIME + INR - Abnormal; Notable for the following components:    INR 1.5 (*)     Prothrombin time 15.8 (*)     All other components within normal limits   PROTHROMBIN TIME + INR - Abnormal; Notable for the following components:    INR 1.4 (*)     Prothrombin time 14.9 (*)     All other components within normal limits   CBC WITH AUTOMATED DIFF - Abnormal; Notable for the following components:    WBC 15.8 (*)     HCT 36.0 (*)     RDW 15.9 (*)     NEUTROPHILS 85 (*)     LYMPHOCYTES 7 (*)     IMMATURE GRANULOCYTES 1 (*)     ABS. NEUTROPHILS 13.6 (*)     ABS. IMM.  GRANS. 0.1 (*)     All other components within normal limits   SED RATE (ESR) - Abnormal; Notable for the following components:    Sed rate, automated 30 (*)     All other components within normal limits   POC EG7 - Abnormal; Notable for the following components:    Calcium, ionized (POC) 1.05 (*)     pH (POC) 7.31 (*)     pO2 (POC) 47 (*)     HCO3 (POC) 19.7 (*)     sO2 (POC) 79 (*)     All other components within normal limits   CBC WITH AUTOMATED DIFF - Abnormal; Notable for the following components:    WBC 13.4 (*)     RBC 3.96 (*)     HGB 11.4 (*) HCT 33.1 (*)     RDW 15.7 (*)     NEUTROPHILS 82 (*)     ABS.  NEUTROPHILS 11.0 (*)     All other components within normal limits   D DIMER - Abnormal; Notable for the following components:    D-dimer 28.76 (*)     All other components within normal limits   METABOLIC PANEL, COMPREHENSIVE - Abnormal; Notable for the following components:    CO2 18 (*)     Glucose 122 (*)     BUN 53 (*)     Creatinine 4.93 (*)     BUN/Creatinine ratio 11 (*)     GFR est AA 18 (*)     GFR est non-AA 15 (*)     Calcium 7.3 (*)     ALT (SGPT) 3,496 (*)     AST (SGOT) >2,000 (*)     Protein, total 5.3 (*)     Albumin 2.5 (*)     A-G Ratio 0.9 (*)     All other components within normal limits   TROPONIN I - Abnormal; Notable for the following components:    Troponin-I, Qt. 55.30 (*)     All other components within normal limits   CK W/ CKMB & INDEX - Abnormal; Notable for the following components:    CK - .8 (*)     CK 74,522 (*)     All other components within normal limits   TROPONIN I - Abnormal; Notable for the following components:    Troponin-I, Qt. 57.90 (*)     All other components within normal limits   METABOLIC PANEL, BASIC - Abnormal; Notable for the following components:    CO2 20 (*)     Glucose 147 (*)     BUN 54 (*)     Creatinine 4.91 (*)     BUN/Creatinine ratio 11 (*)     GFR est AA 18 (*)     GFR est non-AA 15 (*)     Calcium 7.0 (*)     All other components within normal limits   CK W/ CKMB & INDEX - Abnormal; Notable for the following components:    CK - .2 (*)     CK 74,103 (*)     All other components within normal limits   TROPONIN I - Abnormal; Notable for the following components:    Troponin-I, Qt. 38.10 (*)     All other components within normal limits   METABOLIC PANEL, BASIC - Abnormal; Notable for the following components:    Glucose 145 (*)     BUN 56 (*)     Creatinine 5.18 (*)     BUN/Creatinine ratio 11 (*)     GFR est AA 17 (*)     GFR est non-AA 14 (*)     Calcium 7.3 (*)     All other components within normal limits   CK W/ CKMB & INDEX - Abnormal; Notable for the following components:    CK - MB 68.5 (*)     CK 45,037 (*)     All other components within normal limits   METABOLIC PANEL, BASIC - Abnormal; Notable for the following components:    Sodium 135 (*)     Glucose 108 (*)     BUN 26 (*)     Creatinine 3.23 (*)     BUN/Creatinine ratio 8 (*)     GFR est AA 29 (*)     GFR est non-AA 24 (*)     Calcium 7.5 (*)     All other components within normal limits   TROPONIN I - Abnormal; Notable for the following components:    Troponin-I, Qt. 28.60 (*)     All other components within normal limits   TROPONIN I - Abnormal; Notable for the following components:    Troponin-I, Qt. 19.80 (*)     All other components within normal limits   METABOLIC PANEL, COMPREHENSIVE - Abnormal; Notable for the following components:    Sodium 135 (*)     Glucose 101 (*)     BUN 31 (*)     Creatinine 4.22 (*)     BUN/Creatinine ratio 7 (*)     GFR est AA 21 (*)     GFR est non-AA 17 (*)     Calcium 7.0 (*)     ALT (SGPT) 2,295 (*)     AST (SGOT) 1,948 (*)     Protein, total 4.2 (*)     Albumin 2.0 (*)     A-G Ratio 0.9 (*)     All other components within normal limits   PROTHROMBIN TIME + INR - Abnormal; Notable for the following components:    INR 1.2 (*)     Prothrombin time 12.9 (*)     All other components within normal limits   CBC WITH AUTOMATED DIFF - Abnormal; Notable for the following components:    WBC 12.8 (*)     RBC 3.65 (*)     HGB 10.4 (*)     HCT 30.4 (*)     RDW 15.4 (*)     NEUTROPHILS 79 (*)     LYMPHOCYTES 11 (*)     IMMATURE GRANULOCYTES 1 (*)     ABS. NEUTROPHILS 10.1 (*)     ABS. MONOCYTES 1.1 (*)     ABS. IMM.  GRANS. 0.1 (*)     All other components within normal limits   CK W/ CKMB & INDEX - Abnormal; Notable for the following components:    CK - MB 42.9 (*)     CK 38,928 (*)     All other components within normal limits   CK W/ CKMB & INDEX - Abnormal; Notable for the following components: CK - MB 38.9 (*)     CK 38,660 (*)     All other components within normal limits   METABOLIC PANEL, BASIC - Abnormal; Notable for the following components:    Sodium 135 (*)     Glucose 104 (*)     BUN 34 (*)     Creatinine 4.29 (*)     BUN/Creatinine ratio 8 (*)     GFR est AA 21 (*)     GFR est non-AA 17 (*)     Calcium 7.3 (*)     All other components within normal limits   METABOLIC PANEL, BASIC - Abnormal; Notable for the following components:    Sodium 135 (*)     Glucose 131 (*)     BUN 29 (*)     Creatinine 3.97 (*)     BUN/Creatinine ratio 7 (*)     GFR est AA 23 (*)     GFR est non-AA 19 (*)     Calcium 7.1 (*)     All other components within normal limits   TROPONIN I - Abnormal; Notable for the following components:    Troponin-I, Qt. 25.10 (*)     All other components within normal limits   CK W/ CKMB & INDEX - Abnormal; Notable for the following components:    CK - MB 25.0 (*)     CK 36,106 (*)     All other components within normal limits   METABOLIC PANEL, BASIC - Abnormal; Notable for the following components:    Glucose 107 (*)     BUN 21 (*)     Creatinine 2.85 (*)     BUN/Creatinine ratio 7 (*)     GFR est AA 33 (*)     GFR est non-AA 27 (*)     Calcium 6.8 (*)     All other components within normal limits   PHOSPHORUS - Abnormal; Notable for the following components:    Phosphorus 2.1 (*)     All other components within normal limits   BLOOD GAS, ARTERIAL - Abnormal; Notable for the following components:    pH 7.34 (*)     PO2 47 (*)     O2 SAT 76 (*)     BICARBONATE 19 (*)     All other components within normal limits   PTT - Abnormal; Notable for the following components:    aPTT 36.8 (*)     All other components within normal limits   CBC WITH AUTOMATED DIFF - Abnormal; Notable for the following components:    WBC 11.6 (*)     RBC 3.58 (*)     HGB 10.2 (*)     HCT 29.7 (*)     RDW 15.6 (*)     NEUTROPHILS 78 (*)     LYMPHOCYTES 11 (*)     IMMATURE GRANULOCYTES 1 (*)     ABS.  NEUTROPHILS 9.1 (*)     ABS. IMM. GRANS. 0.1 (*)     All other components within normal limits   PTT - Abnormal; Notable for the following components:    aPTT 52.7 (*)     All other components within normal limits   METABOLIC PANEL, BASIC - Abnormal; Notable for the following components:    Creatinine 2.60 (*)     BUN/Creatinine ratio 7 (*)     GFR est AA 37 (*)     GFR est non-AA 30 (*)     Calcium 7.3 (*)     All other components within normal limits   HEPATIC FUNCTION PANEL - Abnormal; Notable for the following components:    Protein, total 4.7 (*)     Albumin 1.9 (*)     A-G Ratio 0.7 (*)     Bilirubin, direct 0.3 (*)     AST (SGOT) 872 (*)     ALT (SGPT) 1,594 (*)     All other components within normal limits   CBC WITH AUTOMATED DIFF - Abnormal; Notable for the following components:    WBC 11.7 (*)     RBC 3.40 (*)     HGB 9.7 (*)     HCT 28.5 (*)     RDW 15.6 (*)     IMMATURE GRANULOCYTES 1 (*)     ABS. NEUTROPHILS 8.6 (*)     ABS. MONOCYTES 1.2 (*)     ABS. IMM.  GRANS. 0.1 (*)     All other components within normal limits   PTT - Abnormal; Notable for the following components:    aPTT 47.9 (*)     All other components within normal limits   PTT - Abnormal; Notable for the following components:    aPTT 62.7 (*)     All other components within normal limits   CK W/ CKMB & INDEX - Abnormal; Notable for the following components:    CK - MB 13.0 (*)     CK 16,705 (*)     All other components within normal limits   PTT - Abnormal; Notable for the following components:    aPTT 55.6 (*)     All other components within normal limits   METABOLIC PANEL, BASIC - Abnormal; Notable for the following components:    Sodium 133 (*)     BUN 28 (*)     Creatinine 4.59 (*)     BUN/Creatinine ratio 6 (*)     GFR est AA 19 (*)     GFR est non-AA 16 (*)     Calcium 7.7 (*)     All other components within normal limits   CBC WITH AUTOMATED DIFF - Abnormal; Notable for the following components:    RBC 3.12 (*)     HGB 8.8 (*)     HCT 26.4 (*) RDW 15.9 (*)     IMMATURE GRANULOCYTES 1 (*)     ABS. IMM.  GRANS. 0.1 (*)     All other components within normal limits   PTT - Abnormal; Notable for the following components:    aPTT 55.2 (*)     All other components within normal limits   PTT - Abnormal; Notable for the following components:    aPTT 50.9 (*)     All other components within normal limits   CK W/ CKMB & INDEX - Abnormal; Notable for the following components:    CK - MB 7.1 (*)     CK 8,098 (*)     All other components within normal limits   PTT - Abnormal; Notable for the following components:    aPTT 71.1 (*)     All other components within normal limits   PTT - Abnormal; Notable for the following components:    aPTT 56.2 (*)     All other components within normal limits   METABOLIC PANEL, BASIC - Abnormal; Notable for the following components:    Sodium 130 (*)     Chloride 94 (*)     Glucose 116 (*)     BUN 35 (*)     Creatinine 5.03 (*)     BUN/Creatinine ratio 7 (*)     GFR est AA 17 (*)     GFR est non-AA 14 (*)     Calcium 8.1 (*)     All other components within normal limits   PROTHROMBIN TIME + INR - Abnormal; Notable for the following components:    INR 1.5 (*)     Prothrombin time 15.0 (*)     All other components within normal limits   CK W/ CKMB & INDEX - Abnormal; Notable for the following components:    CK - MB 5.0 (*)     CK 6,688 (*)     All other components within normal limits   METABOLIC PANEL, COMPREHENSIVE - Abnormal; Notable for the following components:    Sodium 130 (*)     Chloride 95 (*)     BUN 35 (*)     Creatinine 5.24 (*)     BUN/Creatinine ratio 7 (*)     GFR est AA 16 (*)     GFR est non-AA 14 (*)     Calcium 8.1 (*)     ALT (SGPT) 769 (*)     AST (SGOT) 262 (*)     Protein, total 5.1 (*)     Albumin 2.0 (*)     A-G Ratio 0.6 (*)     All other components within normal limits   CBC WITH AUTOMATED DIFF - Abnormal; Notable for the following components:    WBC 13.3 (*)     RBC 3.21 (*)     HGB 8.9 (*)     HCT 27.2 (*) RDW 16.1 (*)     NRBC 0.2 (*)     ABSOLUTE NRBC 0.03 (*)     IMMATURE GRANULOCYTES 2 (*)     ABS. NEUTROPHILS 9.5 (*)     ABS. MONOCYTES 1.7 (*)     ABS. IMM.  GRANS. 0.3 (*)     All other components within normal limits   PTT - Abnormal; Notable for the following components:    aPTT 43.2 (*)     All other components within normal limits   CK - Abnormal; Notable for the following components:    CK 6,527 (*)     All other components within normal limits   GLUCOSE, POC - Abnormal; Notable for the following components:    Glucose (POC) 129 (*)     All other components within normal limits   CULTURE, BLOOD   CULTURE, BLOOD, PAIRED   LEGIONELLA PNEUMOPHILA AG, URINE   AMYLASE   MAGNESIUM   SODIUM, UR, RANDOM   POTASSIUM, UR, RANDOM   CHLORIDE, URINE RANDOM   CREATININE, UR, RANDOM   OSMOLALITY, UR   LACTIC ACID   SAMPLES BEING HELD   PHOSPHORUS   PROCALCITONIN   MAGNESIUM   PHOSPHORUS   MAGNESIUM   PHOSPHORUS   HEP B SURFACE AG   HEP B SURFACE AB   MAGNESIUM   PHOSPHORUS   MAGNESIUM   PHOSPHORUS   AMMONIA   MAGNESIUM   PHOSPHORUS   MAGNESIUM   PHOSPHORUS   MAGNESIUM   MAGNESIUM   PHOSPHORUS   MAGNESIUM   PHOSPHORUS   AMMONIA   MAGNESIUM   PHOSPHORUS   AMMONIA   SAMPLES BEING HELD   PROTHROMBIN TIME + INR   PROTHROMBIN TIME + INR   MAGNESIUM   METABOLIC PANEL, BASIC   PHOSPHORUS   PTT   CK W/ CKMB & INDEX   PROTHROMBIN TIME + INR   AMMONIA   PROTHROMBIN TIME + INR   MAGNESIUM   METABOLIC PANEL, BASIC   PHOSPHORUS   PTT   CK W/ CKMB & INDEX   PTT   METABOLIC PANEL, BASIC   PROTHROMBIN TIME + INR   RENAL FUNCTION PANEL   PTT   CK W/ CKMB & INDEX     Lab Results   Component Value Date/Time    Sodium 130 (L) 10/26/2020 04:19 AM    Potassium 4.0 10/26/2020 04:19 AM    Chloride 95 (L) 10/26/2020 04:19 AM    CO2 27 10/26/2020 04:19 AM    Anion gap 8 10/26/2020 04:19 AM    Glucose 98 10/26/2020 04:19 AM    BUN 35 (H) 10/26/2020 04:19 AM    Creatinine 5.24 (H) 10/26/2020 04:19 AM    BUN/Creatinine ratio 7 (L) 10/26/2020 04:19 AM GFR est AA 16 (L) 10/26/2020 04:19 AM    GFR est non-AA 14 (L) 10/26/2020 04:19 AM    Calcium 8.1 (L) 10/26/2020 04:19 AM    Bilirubin, total 0.6 10/26/2020 04:19 AM    Alk. phosphatase 78 10/26/2020 04:19 AM    Protein, total 5.1 (L) 10/26/2020 04:19 AM    Albumin 2.0 (L) 10/26/2020 04:19 AM    Globulin 3.1 10/26/2020 04:19 AM    A-G Ratio 0.6 (L) 10/26/2020 04:19 AM    ALT (SGPT) 769 (H) 10/26/2020 04:19 AM     No results displayed because visit has over 200 results.         Vitals:    10/25/20 2313 10/26/20 0300 10/26/20 0700 10/26/20 0853   BP: (!) 149/97 (!) 157/93 (!) 145/84 (!) 151/108   Pulse: 97 97 93 93   Resp: (!) 32 25 27    Temp: 98.2 °F (36.8 °C) 98 °F (36.7 °C) 97.7 °F (36.5 °C)    TempSrc:       SpO2: 92% 94% 96%    Weight:  118.9 kg (262 lb 1.6 oz)     Height:         Recent Results (from the past 24 hour(s))   PTT    Collection Time: 10/25/20  2:30 PM   Result Value Ref Range    aPTT 43.2 (H) 22.1 - 32.0 sec    aPTT, therapeutic range     58.0 - 77.0 SECS   PTT    Collection Time: 10/25/20  9:08 PM   Result Value Ref Range    aPTT 71.1 (H) 22.1 - 32.0 sec    aPTT, therapeutic range     58.0 - 35.9 SECS   METABOLIC PANEL, BASIC    Collection Time: 10/26/20  1:35 AM   Result Value Ref Range    Sodium 130 (L) 136 - 145 mmol/L    Potassium 4.1 3.5 - 5.1 mmol/L    Chloride 94 (L) 97 - 108 mmol/L    CO2 24 21 - 32 mmol/L    Anion gap 12 5 - 15 mmol/L    Glucose 116 (H) 65 - 100 mg/dL    BUN 35 (H) 6 - 20 MG/DL    Creatinine 5.03 (H) 0.70 - 1.30 MG/DL    BUN/Creatinine ratio 7 (L) 12 - 20      GFR est AA 17 (L) >60 ml/min/1.73m2    GFR est non-AA 14 (L) >60 ml/min/1.73m2    Calcium 8.1 (L) 8.5 - 10.1 MG/DL   CK W/ CKMB & INDEX    Collection Time: 10/26/20  1:35 AM   Result Value Ref Range    CK - MB 5.0 (H) <3.6 NG/ML    CK-MB Index 0.1 0.0 - 2.5      CK 6,688 (H) 39 - 308 U/L   PTT    Collection Time: 10/26/20  4:19 AM   Result Value Ref Range    aPTT 56.2 (H) 22.1 - 32.0 sec    aPTT, therapeutic range 58.0 - 77.0 SECS   PROTHROMBIN TIME + INR    Collection Time: 10/26/20  4:19 AM   Result Value Ref Range    INR 1.5 (H) 0.9 - 1.1      Prothrombin time 15.0 (H) 9.0 - 11.1 sec   AMMONIA    Collection Time: 10/26/20  4:19 AM   Result Value Ref Range    Ammonia 26 <42 UMOL/L   METABOLIC PANEL, COMPREHENSIVE    Collection Time: 10/26/20  4:19 AM   Result Value Ref Range    Sodium 130 (L) 136 - 145 mmol/L    Potassium 4.0 3.5 - 5.1 mmol/L    Chloride 95 (L) 97 - 108 mmol/L    CO2 27 21 - 32 mmol/L    Anion gap 8 5 - 15 mmol/L    Glucose 98 65 - 100 mg/dL    BUN 35 (H) 6 - 20 MG/DL    Creatinine 5.24 (H) 0.70 - 1.30 MG/DL    BUN/Creatinine ratio 7 (L) 12 - 20      GFR est AA 16 (L) >60 ml/min/1.73m2    GFR est non-AA 14 (L) >60 ml/min/1.73m2    Calcium 8.1 (L) 8.5 - 10.1 MG/DL    Bilirubin, total 0.6 0.2 - 1.0 MG/DL    ALT (SGPT) 769 (H) 12 - 78 U/L    AST (SGOT) 262 (H) 15 - 37 U/L    Alk. phosphatase 78 45 - 117 U/L    Protein, total 5.1 (L) 6.4 - 8.2 g/dL    Albumin 2.0 (L) 3.5 - 5.0 g/dL    Globulin 3.1 2.0 - 4.0 g/dL    A-G Ratio 0.6 (L) 1.1 - 2.2     CBC WITH AUTOMATED DIFF    Collection Time: 10/26/20  4:19 AM   Result Value Ref Range    WBC 13.3 (H) 4.1 - 11.1 K/uL    RBC 3.21 (L) 4.10 - 5.70 M/uL    HGB 8.9 (L) 12.1 - 17.0 g/dL    HCT 27.2 (L) 36.6 - 50.3 %    MCV 84.7 80.0 - 99.0 FL    MCH 27.7 26.0 - 34.0 PG    MCHC 32.7 30.0 - 36.5 g/dL    RDW 16.1 (H) 11.5 - 14.5 %    PLATELET 169 079 - 143 K/uL    MPV 9.8 8.9 - 12.9 FL    NRBC 0.2 (H) 0  WBC    ABSOLUTE NRBC 0.03 (H) 0.00 - 0.01 K/uL    NEUTROPHILS 71 32 - 75 %    LYMPHOCYTES 12 12 - 49 %    MONOCYTES 13 5 - 13 %    EOSINOPHILS 1 0 - 7 %    BASOPHILS 1 0 - 1 %    IMMATURE GRANULOCYTES 2 (H) 0.0 - 0.5 %    ABS. NEUTROPHILS 9.5 (H) 1.8 - 8.0 K/UL    ABS. LYMPHOCYTES 1.6 0.8 - 3.5 K/UL    ABS. MONOCYTES 1.7 (H) 0.0 - 1.0 K/UL    ABS. EOSINOPHILS 0.1 0.0 - 0.4 K/UL    ABS. BASOPHILS 0.1 0.0 - 0.1 K/UL    ABS. IMM.  GRANS. 0.3 (H) 0.00 - 0.04 K/UL DF SMEAR SCANNED      RBC COMMENTS ANISOCYTOSIS  1+       CK    Collection Time: 10/26/20  4:19 AM   Result Value Ref Range    CK 6,527 (H) 39 - 308 U/L   SAMPLES BEING HELD    Collection Time: 10/26/20  4:19 AM   Result Value Ref Range    SAMPLES BEING HELD 1pst     COMMENT        Add-on orders for these samples will be processed based on acceptable specimen integrity and analyte stability, which may vary by analyte. RADIOLOGY REPORTS:  Results from Hospital Encounter encounter on 10/21/20   XR CHEST PORT    Narrative EXAM:  XR CHEST PORT    INDICATION:  sob    COMPARISON:  10/17/2020    FINDINGS: A portable AP radiograph of the chest was obtained at 1003 hours. Left  subclavian venous catheter tip overlies SVC. Right IJ catheter tip overlies SVC  right atrial junction. .  There is increasing opacity in the mid to lower lung  zones bilaterally left greater than right. .  Cardiomegaly is stable. Bony  structures are unchanged. Impression IMPRESSION: There is increasing opacity in the lungs bilaterally left greater  than right which may represent edema although superimposed pneumonia is not  excluded. Mra Brain Wo Cont    Result Date: 10/23/2020  INDICATION: hypoxia COMPARISON:  None TECHNIQUE:  3-D time-of-flight MRA of the brain was performed. Multiplanar reconstructions were obtained. FINDINGS: Posterior Circulation:  No flow limiting stenosis or occlusion. Anterior Circulation:  No flow limiting stenosis or occlusion. Additional Comments:  No evidence of aneurysm or vascular malformation. IMPRESSION: No flow limiting stenosis or intracranial aneurysm. Mri Brain Wo Cont    Result Date: 10/23/2020  INDICATION:   Anxiety, depression, probable drug overdose, abnormal head CT AMS EXAMINATION:  MRI BRAIN WO CONTRAST COMPARISON:  None TECHNIQUE:  Multiplanar multisequence acquisition without contrast of the brain. FINDINGS:  Ventricles:  Midline, no hydrocephalus.   Brain Parenchyma/Brainstem:  Small symmetric areas of diffusion restriction bilateral globus pallidus. Otherwise no parenchymal signal abnormality. Intracranial Hemorrhage:  None. Basal Cisterns:  Normal. Flow Voids:  Normal. Additional Comments:  N/A. IMPRESSION: Bilateral globus pallidus diffusion restriction, may be seen in heroin leukoencephalopathy, carbon monoxide poisoning, acute infarctions, and toxic/metabolic etiologies. Ct Head Wo Cont    Result Date: 10/21/2020  CT dose reduction was achieved through use of a standardized protocol tailored for this examination and automatic exposure control for dose modulation. Noncontrast study shows symmetric hypodensity in each basal ganglia, centered on the globus pallidus, left slightly larger than right, not present on the study of 3 months ago. Each region measures between 10 and 12 mm. No similar finding anywhere else. No other abnormality in gray or white matter. No mass effect or hemorrhage. Ventricles are symmetric and normal in size. No significant bone finding     IMPRESSION: Findings suggesting hypoxic or toxic edema in the basal ganglia    Ct Maxillofacial W Cont    Result Date: 9/15/2020  Contrast study shows extensive subcutaneous fat edema throughout the right maxillary and perimandibular region, with free fluid infiltrating throughout the fat in the preseptal periorbital right-sided location. Fluid infiltration continues back, along the outer margin of the masseter muscle, to the margin of the parotid gland. Salivary glands are normal and symmetric. There is no stranding or edema of the postseptal fat. Extraocular muscles and optic nerves are normal symmetric. No intraocular abnormality. Very mild membrane thickening of the maxillary sinus and slightly greater membrane thickening in the anterior ethmoids. Thickening of the contiguous facial fashion. No bone destruction or periosteal reaction. Dental caries noted in a right mandibular molar and right maxillary incisor.  Numerous small lymph nodes scattered throughout the field     IMPRESSION: Extensive right-sided facial edema/cellulitis, without intraorbital extension or abscess. If cause is unknown, possibilities would include both sinusitis and dental infection. Ct Chest Wo Cont    Result Date: 10/21/2020  CT dose reduction was achieved through use of a standardized protocol tailored for this examination and automatic exposure control for dose modulation. Noncontrast study shows moderate severity patchy consolidation and lesser degree of edema throughout much of the left lung, sparing the apex, central and peripheral. There is no large region of dense consolidation with the greatest severity at the elevated left base. Right lung is clear and central airways are open. No mediastinal or hilar adenopathy. Normal caliber aorta. No pleural pericardial effusion. No significant upper abdominal findings     IMPRESSION: Moderate severity pneumonia throughout the left lung    Us Abd Comp    Result Date: 10/22/2020  INDICATION: Acute renal failure. Rhabdomyolysis. COMPARISON: None TECHNIQUE: Routine ultrasound images of the abdomen were obtained. FINDINGS: GALLBLADDER: Contains biliary sludge. No cholecystolithiasis, gallbladder wall thickening, or pericholecystic fluid. COMMON BILE DUCT: 6 mm in diameter. No evidence of choledocholithiasis. LIVER: Normal in size. Normal echogenicity. No focal hepatic mass or intrahepatic biliary dilatation. MAIN PORTAL VEIN: Patent. Hepatopetal flow direction. PANCREAS: No evidence of mass or ductal dilatation. RIGHT KIDNEY: 10.4 cm in length. No hydronephrosis. No evidence of mass or calculus. Perinephric edema is noted. LEFT KIDNEY: 11.9 cm in length. No hydronephrosis. No evidence of mass or calculus. Perinephric edema is noted. SPLEEN: 10.1 cm in length. No mass. AORTA: Visualized portions are normal in caliber. Distal abdominal aorta is obscured by overlying bowel gas. INFERIOR VENA CAVA: Patent. OTHER: Urinary bladder is decompressed with a Iniguez catheter. IMPRESSION: Normal renal size and cortical echogenicity. Bilateral perinephric edema. Biliary sludge in the gallbladder. Xr Chest Port    Result Date: 10/25/2020  EXAM:  XR CHEST PORT INDICATION:  sob COMPARISON:  10/17/2020 FINDINGS: A portable AP radiograph of the chest was obtained at 1003 hours. Left subclavian venous catheter tip overlies SVC. Right IJ catheter tip overlies SVC right atrial junction. .  There is increasing opacity in the mid to lower lung zones bilaterally left greater than right. .  Cardiomegaly is stable. Bony structures are unchanged. IMPRESSION: There is increasing opacity in the lungs bilaterally left greater than right which may represent edema although superimposed pneumonia is not excluded. Xr Chest Port    Result Date: 10/17/2020  Indication: Altered mental status. AP portable chest radiograph 17 October 2020 1848 hours. Comparison 21 July 2020. Clear lungs. Patient rotated to the left. Normal heart size exaggerated by patient rotation. No pneumothorax or pleural effusion. Normal bones. IMPRESSION: Negative. PAST PSYCHIATRIC HISTORY:  He states that he is supposed to be on Prozac 20 mg and Atarax, can't recall the doctor prescribing it. PSYCHOSOCIAL HISTORY:  He is single and has no children. MENTAL STATUS EXAMINATION:  The patient is currently lying in bed, alert and oriented x3 during the initial part of the interview, he fell asleep in the middle of the interview. A full mental status exam could not be completed. ASSESSMENT AND PLAN:  The patient meets the criteria for delirium due to general medical condition. It is unclear to me at this time whether he has suicidal ideations or not or if it were a suicide attempt from the overdose or he was trying to get a euphoric feeling. We have no option at this time but to maintain a sitter.   If his mentation clears up, I highly recommend to re-consult Psychiatry to get further history. We will not start him on any medications at this time. I thank you for this kind consult. Please call with questions.       LALIT ALBA NP      [id number]  D:  10/23/2020 16:48  T:  10/23/2020 20:13  JOB #:  [id number]

## 2020-10-24 NOTE — PROGRESS NOTES
Patient Vitals for the past 12 hrs:   Temp Pulse Resp BP SpO2   10/24/20 0704 97.9 °F (36.6 °C) 98 19 (!) 153/98 94 %   10/24/20 0303 98.5 °F (36.9 °C) 98 19 (!) 145/99 95 %   10/23/20 2311 97.2 °F (36.2 °C) 98 21 (!) 136/91 97 %   10/23/20 2223 -- -- -- -- 100 %     Last 3 Recorded Weights in this Encounter    10/22/20 1058 10/22/20 1114 10/23/20 1326   Weight: 88.5 kg (195 lb) 88.5 kg (195 lb) 98.4 kg (216 lb 14.9 oz)     Unclear if SCD machine removed from bed. Pt receiving daily hemodialysis. Bedside and Verbal shift change report given to Gideon Burgos RN (oncoming nurse) by Hiram Priest RN (offgoing nurse). Report included the following information SBAR, Kardex, ED Summary, Intake/Output, MAR, Recent Results, Cardiac Rhythm NSR, and Alarm Parameters . Problem: Falls - Risk of  Goal: *Absence of Falls  Description: Document Jacqui Alexandreann Fall Risk and appropriate interventions in the flowsheet.   Outcome: Progressing Towards Goal  Note: Fall Risk Interventions:  Mobility Interventions: PT Consult for mobility concerns, Strengthening exercises (ROM-active/passive), Communicate number of staff needed for ambulation/transfer    Mentation Interventions: Adequate sleep, hydration, pain control, Door open when patient unattended, Bed/chair exit alarm, Increase mobility, Toileting rounds, Update white board    Medication Interventions: Assess postural VS orthostatic hypotension, Evaluate medications/consider consulting pharmacy, Patient to call before getting OOB, Teach patient to arise slowly    Elimination Interventions: Bed/chair exit alarm, Call light in reach, Patient to call for help with toileting needs, Toileting schedule/hourly rounds    History of Falls Interventions: Bed/chair exit alarm, Door open when patient unattended, Investigate reason for fall, Room close to nurse's station, Utilize gait belt for transfer/ambulation, Vital signs minimum Q4HRs X 24 hrs (comment for end date)         Problem: Patient Education: Go to Patient Education Activity  Goal: Patient/Family Education  Outcome: Progressing Towards Goal     Problem: Pressure Injury - Risk of  Goal: *Prevention of pressure injury  Description: Document Abdirashid Scale and appropriate interventions in the flowsheet.   Outcome: Progressing Towards Goal  Note: Pressure Injury Interventions:  Sensory Interventions: Assess changes in LOC, Float heels, Keep linens dry and wrinkle-free, Minimize linen layers    Moisture Interventions: Absorbent underpads    Activity Interventions: Pressure redistribution bed/mattress(bed type), PT/OT evaluation    Mobility Interventions: HOB 30 degrees or less, Float heels, Pressure redistribution bed/mattress (bed type), PT/OT evaluation    Nutrition Interventions: Document food/fluid/supplement intake, Offer support with meals,snacks and hydration    Friction and Shear Interventions: Apply protective barrier, creams and emollients, HOB 30 degrees or less, Minimize layers                Problem: Breathing Pattern - Ineffective  Goal: *Absence of hypoxia  Outcome: Progressing Towards Goal  Note: Pt not hypoxic throughout shift on 2L O@

## 2020-10-24 NOTE — CONSULTS
Stacy Cid is a 26 y.o. male admitted on 10/21 after he was found to be hypoxic and unresponsive. Urine drug screen positive for multiple substances and also found to have shock liver, acute renal insufficiency and cardiomyopathy. Clinically he has improved and is now responsive, able to follow commands but still drowsy. MRI brain was completed yesterday and it shows hyperintensity in the globus pallidus bilaterally      EXAM  Exam:  Visit Vitals  BP (!) 155/100   Pulse 90   Temp 97.7 °F (36.5 °C) (Oral)   Resp 25   Ht 5' 10\" (1.778 m)   Wt 98.4 kg (216 lb 14.9 oz)   SpO2 100%   BMI 31.13 kg/m²     Gen: Drowsy  CV: RRR  Lungs: non labored breathing  Abd: non distending  Neuro: Easily aroused. Knows he is in the hospital, answer simple questions and follows commands   CN II-XII: PERRL, EOMI, face symmetric, tongue/palate midline  Motor: strength 5/5 right upper extremity, 4/5 left upper extremity, 4/5 right lower extremity, 5/5 left lower extremity   Sensory: intact to LT  Gait: Deferred  LABS/ IMAGING    MRI Results (most recent):  MRI brain imaging was independently reviewed and it showed restricted diffusion in the globus pallidus bilaterally  MRA was negative      Lab Results   Component Value Date/Time    Sodium 133 (L) 10/24/2020 12:16 PM    Potassium 4.2 10/24/2020 12:16 PM    Chloride 99 10/24/2020 12:16 PM    CO2 27 10/24/2020 12:16 PM    Anion gap 7 10/24/2020 12:16 PM    Glucose 88 10/24/2020 12:16 PM    BUN 28 (H) 10/24/2020 12:16 PM    Creatinine 4.59 (H) 10/24/2020 12:16 PM    BUN/Creatinine ratio 6 (L) 10/24/2020 12:16 PM    GFR est AA 19 (L) 10/24/2020 12:16 PM    GFR est non-AA 16 (L) 10/24/2020 12:16 PM    Calcium 7.7 (L) 10/24/2020 12:16 PM    Bilirubin, total 0.6 10/24/2020 01:27 AM    Alk.  phosphatase 65 10/24/2020 01:27 AM    Protein, total 4.7 (L) 10/24/2020 01:27 AM    Albumin 1.9 (L) 10/24/2020 01:27 AM    Globulin 2.8 10/24/2020 01:27 AM    A-G Ratio 0.7 (L) 10/24/2020 01:27 AM    ALT (SGPT) 1,594 (H) 10/24/2020 01:27 AM    AST (SGOT) 872 (H) 10/24/2020 01:27 AM     Lab Results   Component Value Date/Time    WBC 11.7 (H) 10/24/2020 01:27 AM    HGB 9.7 (L) 10/24/2020 01:27 AM    HCT 28.5 (L) 10/24/2020 01:27 AM    PLATELET 351 43/36/9778 01:27 AM    MCV 83.8 10/24/2020 01:27 AM     Urine drug screen positive for benzodiazepines, amphetamines, ecstasy, THC and cocaine    ASSESSMENT  Hospital Problems  Never Reviewed          Codes Class Noted POA    Toxic encephalopathy ICD-10-CM: G92  ICD-9-CM: 349.82  10/24/2020 Unknown        Drug abuse ([facility] ICD-10-CM: F19.10  ICD-9-CM: 305.90  10/22/2020 Unknown        Acute renal failure with tubular necrosis ([facility] ICD-10-CM: N17.0  ICD-9-CM: 584.5  10/22/2020 Unknown        * (Principal) Sepsis ([facility] ICD-10-CM: A41.9  ICD-9-CM: 038.9, 995.91  10/22/2020 Unknown        Community acquired pneumonia of left lower lobe of lung ICD-10-CM: J18.9  ICD-9-CM: 208  10/22/2020 Yes        Electrolyte abnormality ICD-10-CM: E87.8  ICD-9-CM: 276.9  10/22/2020 Yes               PLAN  The MRI findings are likely due to toxic/metabolic abnormality.   Globus pallidus is a metabolically very active structure in any metabolic disturbances such as hypoxemia, toxic exposure, hepatic insufficiency etc. will cause edema or MRI changes in this region  Continue with supportive care  He does have mild weakness in the left upper extremity and right lower extremity and will need PT/OT evaluation  Continue with supportive care and treatment of underlying issues  Repeat MRI in 3 to 4 weeks  Deferring any additional work-up for now    Anson Gilliam MD

## 2020-10-24 NOTE — PROGRESS NOTES
[address] [place] [address]               Division of Cardiology  [phone]                       Progress note              Piotr Jennings M.D., F.A.C.C. NAME:  Melany Michel   :   1995   MRN:   [id number]         ICD-10-CM ICD-9-CM    1. Troponin level elevated  R77.8 790.6    2. Drug abuse ([address]  F19.10 305.90    3. Acute renal failure with tubular necrosis (HCC)  N17.0 584.5    4. Obtunded  R40.1 780.09    5. Abnormality of basal ganglia (HCC)  Q04.8 742.8    6. Toxic metabolic encephalopathy  Q14 349.82                  HPI  More awake today. Denies any chest pain or shortness of breath. He remains quite drowsy on the and. Status post polysubstance overdose. Status post rhabdomyolysis with acute renal failure and severe cardiomyopathy probably entering from the rhabdomyolysis. Coronary vasospasm with cocaine and subsequent ischemia cannot be entirely ruled out. Assessment/Plan: 1. Cardiomyopathy: Severe. Mechanism and etiology of cardiomyopathy unclear at this time. Possibly related to rhabdomyolysis but coronary ischemia due to cocaine use cannot be entirely ruled out given also a significant increase in troponin. Clinically seems compensated at rest.  Increase Coreg slightly and add Isordil. Entresto on hold on account of the renal dysfunction for now. We will recheck limited echo on this coming week. 2.  JEAN PIERRE: Secondary to rhabdomyolysis undergoing hemodialysis. 3.  Polysubstance abuse: His UDS was positive for cocaine and marijuana amphetamines exorcism benzodiazepines. 4.  Left upper extremity DVT: On IV heparin. 5.  Left PNA: On IV antibiotics as per primary team.               CARDIAC STUDIES      10/21/20   ECHO ADULT COMPLETE 10/22/2020 10/22/2020    Narrative · LV: Estimated LVEF is 15 - 20%. Visually measured ejection fraction. Normal wall thickness.  Dilated left ventricle. Severely reduced systolic   function. Left ventricular diastolic dysfunction. · LA: Mildly dilated left atrium. · RV: Mildly reduced systolic function. · RA: Mildly dilated right atrium. · MV: Mild mitral valve regurgitation is present. · TV: Mild tricuspid valve regurgitation is present. Signed by: Neha Kitchen MD                  EKG Results     Procedure 720 Value Units Date/Time    EKG, 12 LEAD, INITIAL [[id number]]     Order Status:  Sent     EKG, 12 LEAD, INITIAL [[id number]]     Order Status:  Sent     EKG, 12 LEAD, INITIAL [[id number]] Collected:  10/22/20 0121    Order Status:  Completed Updated:  10/22/20 0739     Ventricular Rate 100 BPM      Atrial Rate 100 BPM      P-R Interval 144 ms      QRS Duration 92 ms      Q-T Interval 386 ms      QTC Calculation (Bezet) 497 ms      Calculated P Axis 37 degrees      Calculated R Axis 22 degrees      Calculated T Axis 21 degrees      Diagnosis --     Normal sinus rhythm  T wave abnormality, consider anterior ischemia    No previous ECGs available  Confirmed by Amarilis Wilburn M.D., Lorena Oneal ([id number]) on 10/22/2020 7:39:01 AM              IMAGING      CT Results (most recent):  Results from [place] encounter on 10/21/20   CT CHEST WO CONT    Narrative CT dose reduction was achieved through use of a standardized protocol tailored  for this examination and automatic exposure control for dose modulation. Noncontrast study shows moderate severity patchy consolidation and lesser degree  of edema throughout much of the left lung, sparing the apex, central and  peripheral. There is no large region of dense consolidation with the greatest  severity at the elevated left base. Right lung is clear and central airways are  open. No mediastinal or hilar adenopathy. Normal caliber aorta. No pleural pericardial  effusion.  No significant upper abdominal findings      Impression IMPRESSION: Moderate severity pneumonia throughout the left lung XR Results (most recent):  Results from Hospital Encounter encounter on 10/17/20   XR CHEST PORT    Narrative Indication: Altered mental status. AP portable chest radiograph 17 October 2020 1848 hours. Comparison 21 July 2020. Clear lungs. Patient rotated to the left. Normal heart size exaggerated by  patient rotation. No pneumothorax or pleural effusion. Normal bones. Impression IMPRESSION: Negative. MRI Results (most recent):  Results from [place] encounter on 10/21/20   MRA BRAIN WO CONT    Narrative INDICATION: hypoxia    COMPARISON:  None    TECHNIQUE:  3-D time-of-flight MRA of the brain was performed. Multiplanar  reconstructions were obtained. FINDINGS:    Posterior Circulation:  No flow limiting stenosis or occlusion. Anterior Circulation:  No flow limiting stenosis or occlusion. Additional Comments:  No evidence of aneurysm or vascular malformation. Impression IMPRESSION:  No flow limiting stenosis or intracranial aneurysm. Past Medical History:   Diagnosis Date    Anxiety     Depression            Past Surgical History:   Procedure Laterality Date    HX ORTHOPAEDIC                 Visit Vitals  /65 (BP 1 Location: Right arm, BP Patient Position: At rest)   Pulse 94   Temp 97.8 °F (36.6 °C)   Resp 24   Ht 5' 10\" (1.778 m)   Wt 216 lb 14.9 oz (98.4 kg)   SpO2 95%   BMI 31.13 kg/m²         Wt Readings from Last 3 Encounters:   10/23/20 216 lb 14.9 oz (98.4 kg)   10/22/20 195 lb (88.5 kg)   10/21/20 185 lb (83.9 kg)         Review of Systems:   Pertinent items are noted in the History of Present Illness. No intake/output data recorded. 10/22 1901 - 10/24 0700  In: 4424.5 [P.O.:720; I.V.:3704.5]  Out: 275 [Urine:275]      Telemetry: normal sinus rhythm    Physical Exam:    Neck: no JVD  Heart: regular rate and rhythm  Lungs: diminished breath sounds R anterior, L anterior  Abdomen: soft, non-tender.  Bowel sounds normal. No masses, no organomegaly  Extremities: no edema    Current Facility-Administered Medications   Medication Dose Route Frequency    HYDROmorphone (PF) (DILAUDID) injection 2 mg  2 mg IntraVENous Q4H PRN    LORazepam (ATIVAN) injection 1 mg  1 mg IntraVENous Q4H PRN    cefTRIAXone (ROCEPHIN) 1 g in 0.9% sodium chloride (MBP/ADV) 50 mL  1 g IntraVENous Q24H    carvediloL (COREG) tablet 3.125 mg  3.125 mg Oral BID WITH MEALS    heparin 25,000 units in D5W 250 ml infusion  17-36 Units/kg/hr IntraVENous TITRATE    balsam peru-castor oiL (VENELEX) ointment   Topical Q8H    sodium chloride (NS) flush 5-40 mL  5-40 mL IntraVENous Q8H    sodium chloride (NS) flush 5-40 mL  5-40 mL IntraVENous PRN    acetaminophen (TYLENOL) tablet 650 mg  650 mg Oral Q6H PRN    Or    acetaminophen (TYLENOL) suppository 650 mg  650 mg Rectal Q6H PRN    polyethylene glycol (MIRALAX) packet 17 g  17 g Oral DAILY PRN    ondansetron (ZOFRAN) injection 4 mg  4 mg IntraVENous Q6H PRN    famotidine (PEPCID) tablet 20 mg  20 mg Oral DAILY    albuterol-ipratropium (DUO-NEB) 2.5 MG-0.5 MG/3 ML  3 mL Nebulization Q4H PRN    docusate sodium (COLACE) capsule 100 mg  100 mg Oral DAILY    labetaloL (NORMODYNE;TRANDATE) injection 20 mg  20 mg IntraVENous Q4H PRN    0.9% sodium chloride infusion  50 mL/hr IntraVENous CONTINUOUS    doxycycline (VIBRAMYCIN) 100 mg in 0.9% sodium chloride (MBP/ADV) 100 mL  100 mg IntraVENous Q12H    heparin (porcine) 1,000 unit/mL injection 1,400 Units  1,400 Units [facility] DIALYSIS PRN    And    heparin (porcine) 1,000 unit/mL injection 1,100 Units  1,100 Units InterCATH[facility] DIALYSIS PRN         All Cardiac Markers in the last 24 hours:    Lab Results   Component Value Date/Time    CPK 16,705 () 10/24/2020 01:27 AM    CPK 36,106 () 10/23/2020 11:36 AM    CKMB 13.0 (H) 10/24/2020 01:27 AM    CKMB 25.0 (H) 10/23/2020 11:36 AM    CKNDX 0.1 10/24/2020 01:27 AM    CKNDX 0.1 10/23/2020 11:36 AM        Lab Results Component Value Date/Time    Creatinine 2.60 (H) 10/23/2020 11:36 AM          Lab Results   Component Value Date/Time    CK 16,705 (HH) 10/24/2020 01:27 AM    CK - MB 13.0 (H) 10/24/2020 01:27 AM    CK-MB Index 0.1 10/24/2020 01:27 AM    Troponin-I, Qt. 19.80 (H) 10/23/2020 04:15 AM         Lab Results   Component Value Date/Time    WBC 11.7 (H) 10/24/2020 01:27 AM    HGB 9.7 (L) 10/24/2020 01:27 AM    HCT 28.5 (L) 10/24/2020 01:27 AM    PLATELET 511 74/65/5296 01:27 AM    MCV 83.8 10/24/2020 01:27 AM         Lab Results   Component Value Date/Time    Sodium 139 10/23/2020 11:36 AM    Potassium 3.9 10/23/2020 11:36 AM    Chloride 101 10/23/2020 11:36 AM    CO2 29 10/23/2020 11:36 AM    Anion gap 9 10/23/2020 11:36 AM    Glucose 97 10/23/2020 11:36 AM    BUN 17 10/23/2020 11:36 AM    Creatinine 2.60 (H) 10/23/2020 11:36 AM    BUN/Creatinine ratio 7 (L) 10/23/2020 11:36 AM    GFR est AA 37 (L) 10/23/2020 11:36 AM    GFR est non-AA 30 (L) 10/23/2020 11:36 AM    Calcium 7.3 (L) 10/23/2020 11:36 AM         Lab Results   Component Value Date/Time    NT pro-BNP 3,484 (H) 10/22/2020 12:55 AM         No results found for: CHOL, CHOLPOCT, CHOLX, CHLST, CHOLV, HDL, HDLPOC, HDLP, LDL, LDLCPOC, LDLC, DLDLP, VLDLC, VLDL, TGLX, TRIGL, TRIGP, TGLPOCT, CHHD, CHHDX      Lab Results   Component Value Date/Time    ALT (SGPT) 1,594 (H) 10/24/2020 01:27 AM    Alk.  phosphatase 65 10/24/2020 01:27 AM    Bilirubin, direct 0.3 (H) 10/24/2020 01:27 AM    Bilirubin, total 0.6 10/24/2020 01:27 AM

## 2020-10-24 NOTE — PROGRESS NOTES
Problem: Impaired Skin Integrity/Pressure Injury Treatment  Goal: *Improvement of Existing Pressure Injury  Outcome: Progressing Towards Goal  Note: Venelex applied to blisters, Q2 turns, pillows used for repositioning     Problem: Pain  Goal: *Control of Pain  Outcome: Progressing Towards Goal  Note: Assessed pain charecteristics Q4 using numeric scale, monitored for change in patient's condition, pain relief reassessment for patient's stated pain goal.        Problem: Pressure Injury - Risk of  Goal: *Prevention of pressure injury  Description: Document Abdirashid Scale and appropriate interventions in the flowsheet. Outcome: Progressing Towards Goal  Note: Pt turned every two hours, moisture barrier applied, heels elevated, pillows and blankets used, pressure redistributing mattress, linens dry. 1930 Bedside shift change report given to Jannie (oncoming nurse) by Willy Curran (offgoing nurse). Report included the following information SBAR, Kardex, MAR, Accordion, and Recent Results.

## 2020-10-24 NOTE — PROGRESS NOTES
[address]  FOW:[id number]   :1995     Pt out of ICU  Creatinine and CK coming down  Continue IVF and monitoring renal profile and CK until CPK is <5,000                        Adenike Akhtar [facility]  [facility], [address], [address]  Phone - [phone]         Fax - [phone] [address]  Phone - [phone]        Fax - [phone]     [email or web address]

## 2020-10-25 ENCOUNTER — APPOINTMENT (OUTPATIENT)
Dept: GENERAL RADIOLOGY | Age: 25
DRG: 812 | End: 2020-10-25
Attending: HOSPITALIST
Payer: MEDICAID

## 2020-10-25 LAB
APTT PPP: 43.2 SEC (ref 22.1–32)
APTT PPP: 50.9 SEC (ref 22.1–32)
APTT PPP: 55.2 SEC (ref 22.1–32)
APTT PPP: 71.1 SEC (ref 22.1–32)
CK MB CFR SERPL CALC: 0.1 % (ref 0–2.5)
CK MB SERPL-MCNC: 7.1 NG/ML (ref 5–25)
CK SERPL-CCNC: 8098 U/L (ref 39–308)
L PNEUMO1 AG UR QL IA: NEGATIVE
SPECIMEN SOURCE: NORMAL
THERAPEUTIC RANGE,PTTT: ABNORMAL SECS (ref 58–77)

## 2020-10-25 PROCEDURE — 85730 THROMBOPLASTIN TIME PARTIAL: CPT

## 2020-10-25 PROCEDURE — 36415 COLL VENOUS BLD VENIPUNCTURE: CPT

## 2020-10-25 PROCEDURE — 99232 SBSQ HOSP IP/OBS MODERATE 35: CPT | Performed by: SPECIALIST

## 2020-10-25 PROCEDURE — 74011250636 HC RX REV CODE- 250/636: Performed by: HOSPITALIST

## 2020-10-25 PROCEDURE — 65660000001 HC RM ICU INTERMED STEPDOWN

## 2020-10-25 PROCEDURE — 74011250636 HC RX REV CODE- 250/636: Performed by: EMERGENCY MEDICINE

## 2020-10-25 PROCEDURE — 74011000258 HC RX REV CODE- 258: Performed by: EMERGENCY MEDICINE

## 2020-10-25 PROCEDURE — 74011250637 HC RX REV CODE- 250/637: Performed by: NURSE PRACTITIONER

## 2020-10-25 PROCEDURE — 74011250637 HC RX REV CODE- 250/637: Performed by: SPECIALIST

## 2020-10-25 PROCEDURE — 74011250636 HC RX REV CODE- 250/636: Performed by: INTERNAL MEDICINE

## 2020-10-25 PROCEDURE — 71045 X-RAY EXAM CHEST 1 VIEW: CPT

## 2020-10-25 RX ORDER — HYDRALAZINE HYDROCHLORIDE 10 MG/1
10 TABLET, FILM COATED ORAL 3 TIMES DAILY
Status: DISCONTINUED | OUTPATIENT
Start: 2020-10-25 | End: 2020-10-27

## 2020-10-25 RX ORDER — HEPARIN SODIUM 1000 [USP'U]/ML
4480 INJECTION, SOLUTION INTRAVENOUS; SUBCUTANEOUS ONCE
Status: COMPLETED | OUTPATIENT
Start: 2020-10-25 | End: 2020-10-25

## 2020-10-25 RX ORDER — ISOSORBIDE DINITRATE 10 MG/1
10 TABLET ORAL 3 TIMES DAILY
Status: DISCONTINUED | OUTPATIENT
Start: 2020-10-25 | End: 2020-11-05

## 2020-10-25 RX ADMIN — ISOSORBIDE DINITRATE 10 MG: 10 TABLET ORAL at 21:03

## 2020-10-25 RX ADMIN — SODIUM CHLORIDE 50 ML/HR: 900 INJECTION, SOLUTION INTRAVENOUS at 00:59

## 2020-10-25 RX ADMIN — Medication: at 21:24

## 2020-10-25 RX ADMIN — HYDROMORPHONE HYDROCHLORIDE 2 MG: 2 INJECTION, SOLUTION INTRAMUSCULAR; INTRAVENOUS; SUBCUTANEOUS at 15:18

## 2020-10-25 RX ADMIN — HEPARIN SODIUM 4480 UNITS: 1000 INJECTION INTRAVENOUS; SUBCUTANEOUS at 16:41

## 2020-10-25 RX ADMIN — HYDROMORPHONE HYDROCHLORIDE 2 MG: 2 INJECTION, SOLUTION INTRAMUSCULAR; INTRAVENOUS; SUBCUTANEOUS at 19:31

## 2020-10-25 RX ADMIN — HYDROMORPHONE HYDROCHLORIDE 2 MG: 2 INJECTION, SOLUTION INTRAMUSCULAR; INTRAVENOUS; SUBCUTANEOUS at 06:21

## 2020-10-25 RX ADMIN — DOXYCYCLINE 100 MG: 100 INJECTION, POWDER, LYOPHILIZED, FOR SOLUTION INTRAVENOUS at 21:03

## 2020-10-25 RX ADMIN — ISOSORBIDE DINITRATE 5 MG: 5 TABLET ORAL at 03:00

## 2020-10-25 RX ADMIN — Medication 10 ML: at 21:25

## 2020-10-25 RX ADMIN — HEPARIN SODIUM 22 UNITS/KG/HR: 10000 INJECTION, SOLUTION INTRAVENOUS at 06:19

## 2020-10-25 RX ADMIN — FAMOTIDINE 20 MG: 20 TABLET ORAL at 08:28

## 2020-10-25 RX ADMIN — HEPARIN SODIUM 25 UNITS/KG/HR: 10000 INJECTION, SOLUTION INTRAVENOUS at 19:32

## 2020-10-25 RX ADMIN — CARVEDILOL 6.25 MG: 6.25 TABLET, FILM COATED ORAL at 08:28

## 2020-10-25 RX ADMIN — HYDRALAZINE HYDROCHLORIDE 10 MG: 10 TABLET, FILM COATED ORAL at 21:03

## 2020-10-25 RX ADMIN — SODIUM CHLORIDE 50 ML/HR: 900 INJECTION, SOLUTION INTRAVENOUS at 19:31

## 2020-10-25 RX ADMIN — HYDROMORPHONE HYDROCHLORIDE 2 MG: 2 INJECTION, SOLUTION INTRAMUSCULAR; INTRAVENOUS; SUBCUTANEOUS at 10:47

## 2020-10-25 RX ADMIN — DOXYCYCLINE 100 MG: 100 INJECTION, POWDER, LYOPHILIZED, FOR SOLUTION INTRAVENOUS at 08:28

## 2020-10-25 RX ADMIN — Medication 10 ML: at 15:19

## 2020-10-25 RX ADMIN — DOCUSATE SODIUM 100 MG: 100 CAPSULE, LIQUID FILLED ORAL at 08:28

## 2020-10-25 RX ADMIN — Medication 10 ML: at 06:22

## 2020-10-25 RX ADMIN — Medication: at 15:19

## 2020-10-25 RX ADMIN — CARVEDILOL 6.25 MG: 6.25 TABLET, FILM COATED ORAL at 16:41

## 2020-10-25 RX ADMIN — Medication: at 06:33

## 2020-10-25 RX ADMIN — CEFTRIAXONE SODIUM 1 G: 1 INJECTION, POWDER, FOR SOLUTION INTRAMUSCULAR; INTRAVENOUS at 16:41

## 2020-10-25 RX ADMIN — HYDRALAZINE HYDROCHLORIDE 10 MG: 10 TABLET, FILM COATED ORAL at 15:18

## 2020-10-25 RX ADMIN — ISOSORBIDE DINITRATE 10 MG: 10 TABLET ORAL at 15:18
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
## 2020-10-25 NOTE — PROGRESS NOTES
Patient Vitals for the past 12 hrs:   Temp Pulse Resp BP SpO2   10/25/20 0319 98.1 °F (36.7 °C) 92 26 (!) 149/101 97 %   10/24/20 2300 97.9 °F (36.6 °C) 93 22 130/89 97 %   10/24/20 2217  92  (!) 136/104    10/24/20 1912 97.6 °F (36.4 °C) 94 20 (!) 133/90 97 %           Bedside and Verbal shift change report given to Clint Hadley RN  (oncoming nurse) by Selvin Iqbal RN (offgoing nurse). Report included the following information SBAR, Kardex, ED Summary, Intake/Output, MAR, Recent Results, Cardiac Rhythm NSR and Alarm Parameters . Problem: Falls - Risk of  Goal: *Absence of Falls  Description: Document Joana Mercado Fall Risk and appropriate interventions in the flowsheet. Outcome: Progressing Towards Goal  Note: Fall Risk Interventions:  Mobility Interventions: Bed/chair exit alarm, Communicate number of staff needed for ambulation/transfer, Patient to call before getting OOB, Utilize walker, cane, or other assistive device    Mentation Interventions: Adequate sleep, hydration, pain control, Bed/chair exit alarm, Family/sitter at bedside, Increase mobility, More frequent rounding, Room close to nurse's station, Update white board    Medication Interventions: Assess postural VS orthostatic hypotension, Bed/chair exit alarm, Patient to call before getting OOB, Teach patient to arise slowly    Elimination Interventions: Bed/chair exit alarm, Call light in reach, Toileting schedule/hourly rounds, Patient to call for help with toileting needs    History of Falls Interventions: Bed/chair exit alarm, Door open when patient unattended, Vital signs minimum Q4HRs X 24 hrs (comment for end date), Investigate reason for fall, Room close to nurse's station         Problem: Pressure Injury - Risk of  Goal: *Prevention of pressure injury  Description: Document Abdirashid Scale and appropriate interventions in the flowsheet.   Outcome: Progressing Towards Goal  Note: Pressure Injury Interventions:  Sensory Interventions: Assess changes in LOC, Float heels, Keep linens dry and wrinkle-free, Minimize linen layers    Moisture Interventions: Internal/External urinary devices, Limit adult briefs, Maintain skin hydration (lotion/cream), Minimize layers, Absorbent underpads, Apply protective barrier, creams and emollients, Check for incontinence Q2 hours and as needed    Activity Interventions: Pressure redistribution bed/mattress(bed type), Increase time out of bed    Mobility Interventions: Float heels, HOB 30 degrees or less, PT/OT evaluation, Turn and reposition approx.  every two hours(pillow and wedges)    Nutrition Interventions: Document food/fluid/supplement intake    Friction and Shear Interventions: Apply protective barrier, creams and emollients, HOB 30 degrees or less, Transferring/repositioning devices                Problem: Impaired Skin Integrity/Pressure Injury Treatment  Goal: *Improvement of Existing Pressure Injury  Outcome: Progressing Towards Goal     Problem: Breathing Pattern - Ineffective  Goal: *Absence of hypoxia  Outcome: Progressing Towards Goal  Note: No hypoxia on 2L o2
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
## 2020-10-25 NOTE — CONSULTS
[address]    Name:  Sindhu Khalil  MR#:  [id number]  :  1995  ACCOUNT #:  [de-identified]  DATE OF SERVICE:  10/25/2020      REASON FOR CONSULTATION:  Left upper extremity deep vein thrombosis. HISTORY OF PRESENT ILLNESS:  The patient is a 67-year-old male who was admitted on 10/21/2020 with a drug overdose. He has recovered significantly from that episode, but has left arm swelling. He was found to have a deep vein thrombosis involving the left distal brachial vein. We are asked to see him because of continued arm swelling. He has no specific complaints at this time. PAST MEDICAL HISTORY:  Anxiety and depression. ADMISSION MEDICATIONS :  1.  Prozac. 2.  Atarax. ALLERGIES:  TRAMADOL. SOCIAL HISTORY:  The patient smokes daily. FAMILY HISTORY:  Unremarkable. REVIEW OF SYSTEMS:  He has had no recent cardiac, GI, , neurologic, hematologic, or psychiatric complaints. PHYSICAL EXAMINATION:  GENERAL:  He is a young male in no distress. He is awake, alert, and responsive. LUNGS:  Bilateral breath sounds. HEART:  Regular rate and rhythm. ABDOMEN:  Soft and nontender. EXTREMITIES:  He has moderate swelling of the left upper arm, forearm, and hand. He has a palpable radial artery pulse. He has no wounds or blistering of the skin of the left arm. NEUROLOGIC:  Grossly normal with intact motor and sensory function. IMPRESSION:  The patient has a left upper extremity deep vein thrombosis limited to the distal left brachial vein. He is on intravenous heparin. I would not recommend any different treatment than he is currently receiving. He could be converted to an oral anticoagulant whenever his overall situation wants this. He would benefit from elevation of the hand above the elbow. There is not a particular intervention that would be feasible or helpful at this point. His arm swelling will improve with time and with arm elevation. Please call if questions arise.       MD ELIE [email or web address]  D:  10/25/2020 15:13  T:  10/25/2020 18:10  JOB #:  [id number]

## 2020-10-25 NOTE — PROGRESS NOTES
Vascular:    Left brachial vein DVT with arm swelling. On IV heparin. Arm somewhat swollen. Would continue heparin and convert to oral anticoagulant when ready. No intervention needed or helpful. Needs to keep hand elevated above elbow. Swelling will improve with time. Call if questions.

## 2020-10-25 NOTE — PROGRESS NOTES
[address] [place] [address]               Division of Cardiology  [phone]                       Progress note              Xiomara Mcmahon M.D., F.A.CWaliC. NAME:  Thor Roberson   :   1995   MRN:   [id number]         ICD-10-CM ICD-9-CM    1. Troponin level elevated  R77.8 790.6    2. Drug abuse ([address]  F19.10 305.90    3. Acute renal failure with tubular necrosis (HCC)  N17.0 584.5    4. Obtunded  R40.1 780.09    5. Abnormality of basal ganglia (HCC)  Q04.8 742.8    6. Toxic metabolic encephalopathy  H75 349.82                  HPI  27 yo male Status post polysubstance overdose. Status post rhabdomyolysis with acute renal failure and severe cardiomyopathy probably entering from the rhabdomyolysis. Coronary vasospasm with cocaine and subsequent ischemia cannot be entirely ruled out. Aching all over   some sob trying to move unable to walk at this time   no cp reported      Assessment/Plan:  1. Cardiomyopathy: Severe. Mechanism and etiology of cardiomyopathy unclear at this time. Possibly related to rhabdomyolysis but coronary ischemia due to cocaine use cannot be entirely ruled out given also a significant increase in troponin(albeit normal ck mb index)     Clinically seems compensated at rest.    Continue coreg start hydralazine and increase isordil     Entresto on hold on account of the renal dysfunction for now.     We will recheck limited echo on this coming week.     2. JEAN PIERRE: Secondary to rhabdomyolysis undergoing hemodialysis.     3.  Polysubstance abuse: His UDS was positive for cocaine and marijuana amphetamines exorcism benzodiazepines.     4.  Left upper extremity DVT: On IV heparin.     5.  Left PNA: On IV antibiotics as per primary team         CARDIAC STUDIES      10/21/20   ECHO ADULT COMPLETE 10/22/2020 10/22/2020    Narrative · LV: Estimated LVEF is 15 - 20%. Visually measured ejection fraction. Normal wall thickness. Dilated left ventricle. Severely reduced systolic   function. Left ventricular diastolic dysfunction. · LA: Mildly dilated left atrium. · RV: Mildly reduced systolic function. · RA: Mildly dilated right atrium. · MV: Mild mitral valve regurgitation is present. · TV: Mild tricuspid valve regurgitation is present. Signed by: Terri Gilliam MD                  EKG Results     Procedure 720 Value Units Date/Time    EKG, 12 LEAD, INITIAL [[id number]]     Order Status:  Sent     EKG, 12 LEAD, INITIAL [[id number]]     Order Status:  Sent     EKG, 12 LEAD, INITIAL [[id number]]     Order Status:  Sent     EKG, 12 LEAD, INITIAL [[id number]] Collected:  10/22/20 0121    Order Status:  Completed Updated:  10/22/20 0739     Ventricular Rate 100 BPM      Atrial Rate 100 BPM      P-R Interval 144 ms      QRS Duration 92 ms      Q-T Interval 386 ms      QTC Calculation (Bezet) 497 ms      Calculated P Axis 37 degrees      Calculated R Axis 22 degrees      Calculated T Axis 21 degrees      Diagnosis --     Normal sinus rhythm  T wave abnormality, consider anterior ischemia    No previous ECGs available  Confirmed by Edie Cedeño M.D., Phi Fu ([id number]) on 10/22/2020 7:39:01 AM              IMAGING      CT Results (most recent):  Results from [place] encounter on 10/21/20   CT CHEST WO CONT    Narrative CT dose reduction was achieved through use of a standardized protocol tailored  for this examination and automatic exposure control for dose modulation. Noncontrast study shows moderate severity patchy consolidation and lesser degree  of edema throughout much of the left lung, sparing the apex, central and  peripheral. There is no large region of dense consolidation with the greatest  severity at the elevated left base. Right lung is clear and central airways are  open. No mediastinal or hilar adenopathy. Normal caliber aorta. No pleural pericardial  effusion.  No significant upper abdominal findings      Impression IMPRESSION: Moderate severity pneumonia throughout the left lung       XR Results (most recent):  Results from Hospital Encounter encounter on 10/21/20   XR CHEST PORT    Narrative EXAM:  XR CHEST PORT    INDICATION:  sob    COMPARISON:  10/17/2020    FINDINGS: A portable AP radiograph of the chest was obtained at 1003 hours. Left  subclavian venous catheter tip overlies SVC. Right IJ catheter tip overlies SVC  right atrial junction. .  There is increasing opacity in the mid to lower lung  zones bilaterally left greater than right. .  Cardiomegaly is stable. Bony  structures are unchanged. Impression IMPRESSION: There is increasing opacity in the lungs bilaterally left greater  than right which may represent edema although superimposed pneumonia is not  excluded. MRI Results (most recent):  Results from [place] encounter on 10/21/20   MRA BRAIN WO CONT    Narrative INDICATION: hypoxia    COMPARISON:  None    TECHNIQUE:  3-D time-of-flight MRA of the brain was performed. Multiplanar  reconstructions were obtained. FINDINGS:    Posterior Circulation:  No flow limiting stenosis or occlusion. Anterior Circulation:  No flow limiting stenosis or occlusion. Additional Comments:  No evidence of aneurysm or vascular malformation. Impression IMPRESSION:  No flow limiting stenosis or intracranial aneurysm. Past Medical History:   Diagnosis Date    Anxiety     Depression            Past Surgical History:   Procedure Laterality Date    HX ORTHOPAEDIC                 Visit Vitals  BP (!) 147/94   Pulse 92   Temp 98.1 °F (36.7 °C)   Resp 27   Ht 5' 10\" (1.778 m)   Wt 247 lb 8 oz (112.3 kg)   SpO2 98%   BMI 35.51 kg/m²         Wt Readings from Last 3 Encounters:   10/25/20 247 lb 8 oz (112.3 kg)   10/22/20 195 lb (88.5 kg)   10/21/20 185 lb (83.9 kg)         Review of Systems:   Pertinent items are noted in the History of Present Illness. 10/25 0701 - 10/25 1900  In: -   Out: 50 [Urine:50]    10/23 1901 - 10/25 0700  In: -   Out: 4628 [Urine:150]      Telemetry: normal sinus rhythm    Physical Exam:    Neck: no JVD  Heart: regular rate and rhythm  Lungs: diminished breath sounds R anterior, L anterior  Abdomen: soft, non-tender.  Bowel sounds normal. No masses,  no organomegaly  Extremities: edema 1+    Current Facility-Administered Medications   Medication Dose Route Frequency    carvediloL (COREG) tablet 6.25 mg  6.25 mg Oral BID WITH MEALS    isosorbide dinitrate (ISORDIL) tablet 5 mg  5 mg Oral TID    HYDROmorphone (PF) (DILAUDID) injection 2 mg  2 mg IntraVENous Q4H PRN    LORazepam (ATIVAN) injection 1 mg  1 mg IntraVENous Q4H PRN    cefTRIAXone (ROCEPHIN) 1 g in 0.9% sodium chloride (MBP/ADV) 50 mL  1 g IntraVENous Q24H    heparin 25,000 units in D5W 250 ml infusion  17-36 Units/kg/hr IntraVENous TITRATE    balsam peru-castor oiL (VENELEX) ointment   Topical Q8H    sodium chloride (NS) flush 5-40 mL  5-40 mL IntraVENous Q8H    sodium chloride (NS) flush 5-40 mL  5-40 mL IntraVENous PRN    acetaminophen (TYLENOL) tablet 650 mg  650 mg Oral Q6H PRN    Or    acetaminophen (TYLENOL) suppository 650 mg  650 mg Rectal Q6H PRN    polyethylene glycol (MIRALAX) packet 17 g  17 g Oral DAILY PRN    ondansetron (ZOFRAN) injection 4 mg  4 mg IntraVENous Q6H PRN    famotidine (PEPCID) tablet 20 mg  20 mg Oral DAILY    albuterol-ipratropium (DUO-NEB) 2.5 MG-0.5 MG/3 ML  3 mL Nebulization Q4H PRN    docusate sodium (COLACE) capsule 100 mg  100 mg Oral DAILY    labetaloL (NORMODYNE;TRANDATE) injection 20 mg  20 mg IntraVENous Q4H PRN    0.9% sodium chloride infusion  50 mL/hr IntraVENous CONTINUOUS    doxycycline (VIBRAMYCIN) 100 mg in 0.9% sodium chloride (MBP/ADV) 100 mL  100 mg IntraVENous Q12H    heparin (porcine) 1,000 unit/mL injection 1,400 Units  1,400 Units [facility] PRN    And    heparin (porcine) 1,000 unit/mL injection 1,100 Units  1,100 Units [facility] DIALYSIS PRN         All Cardiac Markers in the last 24 hours:    Lab Results   Component Value Date/Time    CPK 8,098 (H) 10/24/2020 11:48 PM    CKMB 7.1 (H) 10/24/2020 11:48 PM    CKNDX 0.1 10/24/2020 11:48 PM        Lab Results   Component Value Date/Time    Creatinine 4.59 (H) 10/24/2020 12:16 PM          Lab Results   Component Value Date/Time    CK 8,098 (H) 10/24/2020 11:48 PM    CK - MB 7.1 (H) 10/24/2020 11:48 PM    CK-MB Index 0.1 10/24/2020 11:48 PM    Troponin-I, Qt. 19.80 (H) 10/23/2020 04:15 AM         Lab Results   Component Value Date/Time    WBC 9.6 10/24/2020 11:48 PM    HGB 8.8 (L) 10/24/2020 11:48 PM    HCT 26.4 (L) 10/24/2020 11:48 PM    PLATELET 369 73/57/4488 11:48 PM    MCV 84.6 10/24/2020 11:48 PM         Lab Results   Component Value Date/Time    Sodium 133 (L) 10/24/2020 12:16 PM    Potassium 4.2 10/24/2020 12:16 PM    Chloride 99 10/24/2020 12:16 PM    CO2 27 10/24/2020 12:16 PM    Anion gap 7 10/24/2020 12:16 PM    Glucose 88 10/24/2020 12:16 PM    BUN 28 (H) 10/24/2020 12:16 PM    Creatinine 4.59 (H) 10/24/2020 12:16 PM    BUN/Creatinine ratio 6 (L) 10/24/2020 12:16 PM    GFR est AA 19 (L) 10/24/2020 12:16 PM    GFR est non-AA 16 (L) 10/24/2020 12:16 PM    Calcium 7.7 (L) 10/24/2020 12:16 PM         Lab Results   Component Value Date/Time    NT pro-BNP 3,484 (H) 10/22/2020 12:55 AM         No results found for: CHOL, CHOLPOCT, CHOLX, CHLST, CHOLV, HDL, HDLPOC, HDLP, LDL, LDLCPOC, LDLC, DLDLP, VLDLC, VLDL, TGLX, TRIGL, TRIGP, TGLPOCT, CHHD, CHHDX      Lab Results   Component Value Date/Time    ALT (SGPT) 1,594 (H) 10/24/2020 01:27 AM    Alk.  phosphatase 65 10/24/2020 01:27 AM    Bilirubin, direct 0.3 (H) 10/24/2020 01:27 AM    Bilirubin, total 0.6 10/24/2020 01:27 AM

## 2020-10-26 LAB
ALBUMIN SERPL-MCNC: 2 G/DL (ref 3.5–5)
ALBUMIN/GLOB SERPL: 0.6 {RATIO} (ref 1.1–2.2)
ALP SERPL-CCNC: 78 U/L (ref 45–117)
ALT SERPL-CCNC: 769 U/L (ref 12–78)
AMMONIA PLAS-SCNC: 26 UMOL/L
ANION GAP SERPL CALC-SCNC: 12 MMOL/L (ref 5–15)
ANION GAP SERPL CALC-SCNC: 8 MMOL/L (ref 5–15)
APTT PPP: 44.6 SEC (ref 22.1–32)
APTT PPP: 56.2 SEC (ref 22.1–32)
APTT PPP: 66.6 SEC (ref 22.1–32)
AST SERPL-CCNC: 262 U/L (ref 15–37)
BACTERIA SPEC CULT: ABNORMAL
BASOPHILS # BLD: 0.1 K/UL (ref 0–0.1)
BASOPHILS NFR BLD: 1 % (ref 0–1)
BILIRUB SERPL-MCNC: 0.6 MG/DL (ref 0.2–1)
BUN SERPL-MCNC: 35 MG/DL (ref 6–20)
BUN SERPL-MCNC: 35 MG/DL (ref 6–20)
BUN/CREAT SERPL: 7 (ref 12–20)
BUN/CREAT SERPL: 7 (ref 12–20)
CALCIUM SERPL-MCNC: 8.1 MG/DL (ref 8.5–10.1)
CALCIUM SERPL-MCNC: 8.1 MG/DL (ref 8.5–10.1)
CHLORIDE SERPL-SCNC: 94 MMOL/L (ref 97–108)
CHLORIDE SERPL-SCNC: 95 MMOL/L (ref 97–108)
CK MB CFR SERPL CALC: 0.1 % (ref 0–2.5)
CK MB CFR SERPL CALC: 0.1 % (ref 0–2.5)
CK MB SERPL-MCNC: 4.5 NG/ML (ref 5–25)
CK MB SERPL-MCNC: 5 NG/ML (ref 5–25)
CK SERPL-CCNC: 5780 U/L (ref 39–308)
CK SERPL-CCNC: 6527 U/L (ref 39–308)
CK SERPL-CCNC: 6688 U/L (ref 39–308)
CO2 SERPL-SCNC: 24 MMOL/L (ref 21–32)
CO2 SERPL-SCNC: 27 MMOL/L (ref 21–32)
COMMENT, HOLDF: NORMAL
CREAT SERPL-MCNC: 5.03 MG/DL (ref 0.7–1.3)
CREAT SERPL-MCNC: 5.24 MG/DL (ref 0.7–1.3)
DIFFERENTIAL METHOD BLD: ABNORMAL
EOSINOPHIL # BLD: 0.1 K/UL (ref 0–0.4)
EOSINOPHIL NFR BLD: 1 % (ref 0–7)
ERYTHROCYTE [DISTWIDTH] IN BLOOD BY AUTOMATED COUNT: 16.1 % (ref 11.5–14.5)
GLOBULIN SER CALC-MCNC: 3.1 G/DL (ref 2–4)
GLUCOSE SERPL-MCNC: 116 MG/DL (ref 65–100)
GLUCOSE SERPL-MCNC: 98 MG/DL (ref 65–100)
HCT VFR BLD AUTO: 27.2 % (ref 36.6–50.3)
HGB BLD-MCNC: 8.9 G/DL (ref 12.1–17)
IMM GRANULOCYTES # BLD AUTO: 0.3 K/UL (ref 0–0.04)
IMM GRANULOCYTES NFR BLD AUTO: 2 % (ref 0–0.5)
INR PPP: 1.5 (ref 0.9–1.1)
LYMPHOCYTES # BLD: 1.6 K/UL (ref 0.8–3.5)
LYMPHOCYTES NFR BLD: 12 % (ref 12–49)
MCH RBC QN AUTO: 27.7 PG (ref 26–34)
MCHC RBC AUTO-ENTMCNC: 32.7 G/DL (ref 30–36.5)
MCV RBC AUTO: 84.7 FL (ref 80–99)
MONOCYTES # BLD: 1.7 K/UL (ref 0–1)
MONOCYTES NFR BLD: 13 % (ref 5–13)
NEUTS SEG # BLD: 9.5 K/UL (ref 1.8–8)
NEUTS SEG NFR BLD: 71 % (ref 32–75)
NRBC # BLD: 0.03 K/UL (ref 0–0.01)
NRBC BLD-RTO: 0.2 PER 100 WBC
PLATELET # BLD AUTO: 250 K/UL (ref 150–400)
PMV BLD AUTO: 9.8 FL (ref 8.9–12.9)
POTASSIUM SERPL-SCNC: 4 MMOL/L (ref 3.5–5.1)
POTASSIUM SERPL-SCNC: 4.1 MMOL/L (ref 3.5–5.1)
PROT SERPL-MCNC: 5.1 G/DL (ref 6.4–8.2)
PROTHROMBIN TIME: 15 SEC (ref 9–11.1)
RBC # BLD AUTO: 3.21 M/UL (ref 4.1–5.7)
RBC MORPH BLD: ABNORMAL
SAMPLES BEING HELD,HOLD: NORMAL
SODIUM SERPL-SCNC: 130 MMOL/L (ref 136–145)
SODIUM SERPL-SCNC: 130 MMOL/L (ref 136–145)
SPECIAL REQUESTS,SREQ: ABNORMAL
SPECIAL REQUESTS,SREQ: ABNORMAL
THERAPEUTIC RANGE,PTTT: ABNORMAL SECS (ref 58–77)
WBC # BLD AUTO: 13.3 K/UL (ref 4.1–11.1)

## 2020-10-26 PROCEDURE — 74011000250 HC RX REV CODE- 250: Performed by: HOSPITALIST

## 2020-10-26 PROCEDURE — 36415 COLL VENOUS BLD VENIPUNCTURE: CPT

## 2020-10-26 PROCEDURE — 85025 COMPLETE CBC W/AUTO DIFF WBC: CPT

## 2020-10-26 PROCEDURE — 65660000001 HC RM ICU INTERMED STEPDOWN

## 2020-10-26 PROCEDURE — 80053 COMPREHEN METABOLIC PANEL: CPT

## 2020-10-26 PROCEDURE — 74011250636 HC RX REV CODE- 250/636: Performed by: HOSPITALIST

## 2020-10-26 PROCEDURE — 90935 HEMODIALYSIS ONE EVALUATION: CPT

## 2020-10-26 PROCEDURE — 82553 CREATINE MB FRACTION: CPT

## 2020-10-26 PROCEDURE — 74011250636 HC RX REV CODE- 250/636: Performed by: INTERNAL MEDICINE

## 2020-10-26 PROCEDURE — 99232 SBSQ HOSP IP/OBS MODERATE 35: CPT | Performed by: SPECIALIST

## 2020-10-26 PROCEDURE — 74011250636 HC RX REV CODE- 250/636: Performed by: EMERGENCY MEDICINE

## 2020-10-26 PROCEDURE — 82550 ASSAY OF CK (CPK): CPT

## 2020-10-26 PROCEDURE — 85730 THROMBOPLASTIN TIME PARTIAL: CPT

## 2020-10-26 PROCEDURE — 85610 PROTHROMBIN TIME: CPT

## 2020-10-26 PROCEDURE — 74011000258 HC RX REV CODE- 258: Performed by: EMERGENCY MEDICINE

## 2020-10-26 PROCEDURE — 74011250637 HC RX REV CODE- 250/637: Performed by: SPECIALIST

## 2020-10-26 PROCEDURE — 82140 ASSAY OF AMMONIA: CPT

## 2020-10-26 PROCEDURE — 74011250637 HC RX REV CODE- 250/637: Performed by: NURSE PRACTITIONER

## 2020-10-26 RX ORDER — HEPARIN SODIUM 5000 [USP'U]/ML
40 INJECTION, SOLUTION INTRAVENOUS; SUBCUTANEOUS ONCE
Status: COMPLETED | OUTPATIENT
Start: 2020-10-26 | End: 2020-10-26

## 2020-10-26 RX ADMIN — HYDRALAZINE HYDROCHLORIDE 10 MG: 10 TABLET, FILM COATED ORAL at 16:01

## 2020-10-26 RX ADMIN — CARVEDILOL 6.25 MG: 6.25 TABLET, FILM COATED ORAL at 17:31

## 2020-10-26 RX ADMIN — ISOSORBIDE DINITRATE 10 MG: 10 TABLET ORAL at 22:43

## 2020-10-26 RX ADMIN — DOXYCYCLINE 100 MG: 100 INJECTION, POWDER, LYOPHILIZED, FOR SOLUTION INTRAVENOUS at 09:18

## 2020-10-26 RX ADMIN — HEPARIN SODIUM 3550 UNITS: 5000 INJECTION INTRAVENOUS; SUBCUTANEOUS at 19:01

## 2020-10-26 RX ADMIN — CEFTRIAXONE SODIUM 1 G: 1 INJECTION, POWDER, FOR SOLUTION INTRAMUSCULAR; INTRAVENOUS at 17:31

## 2020-10-26 RX ADMIN — Medication: at 06:01

## 2020-10-26 RX ADMIN — SODIUM CHLORIDE 50 ML/HR: 900 INJECTION, SOLUTION INTRAVENOUS at 05:15

## 2020-10-26 RX ADMIN — Medication: at 13:21

## 2020-10-26 RX ADMIN — HEPARIN SODIUM 1400 UNITS: 1000 INJECTION INTRAVENOUS; SUBCUTANEOUS at 23:01

## 2020-10-26 RX ADMIN — Medication 10 ML: at 05:20

## 2020-10-26 RX ADMIN — HYDROMORPHONE HYDROCHLORIDE 2 MG: 2 INJECTION, SOLUTION INTRAMUSCULAR; INTRAVENOUS; SUBCUTANEOUS at 14:41

## 2020-10-26 RX ADMIN — HYDROMORPHONE HYDROCHLORIDE 2 MG: 2 INJECTION, SOLUTION INTRAMUSCULAR; INTRAVENOUS; SUBCUTANEOUS at 19:01

## 2020-10-26 RX ADMIN — DOXYCYCLINE 100 MG: 100 INJECTION, POWDER, LYOPHILIZED, FOR SOLUTION INTRAVENOUS at 23:31

## 2020-10-26 RX ADMIN — HEPARIN SODIUM 1100 UNITS: 1000 INJECTION INTRAVENOUS; SUBCUTANEOUS at 23:00

## 2020-10-26 RX ADMIN — HYDROMORPHONE HYDROCHLORIDE 2 MG: 2 INJECTION, SOLUTION INTRAMUSCULAR; INTRAVENOUS; SUBCUTANEOUS at 01:32

## 2020-10-26 RX ADMIN — HYDRALAZINE HYDROCHLORIDE 10 MG: 10 TABLET, FILM COATED ORAL at 08:53

## 2020-10-26 RX ADMIN — HYDROMORPHONE HYDROCHLORIDE 2 MG: 2 INJECTION, SOLUTION INTRAMUSCULAR; INTRAVENOUS; SUBCUTANEOUS at 09:18

## 2020-10-26 RX ADMIN — DOCUSATE SODIUM 100 MG: 100 CAPSULE, LIQUID FILLED ORAL at 08:53

## 2020-10-26 RX ADMIN — Medication 10 ML: at 13:21

## 2020-10-26 RX ADMIN — ISOSORBIDE DINITRATE 10 MG: 10 TABLET ORAL at 08:53

## 2020-10-26 RX ADMIN — CARVEDILOL 6.25 MG: 6.25 TABLET, FILM COATED ORAL at 08:53

## 2020-10-26 RX ADMIN — FAMOTIDINE 20 MG: 20 TABLET ORAL at 08:53

## 2020-10-26 RX ADMIN — HYDROMORPHONE HYDROCHLORIDE 2 MG: 2 INJECTION, SOLUTION INTRAMUSCULAR; INTRAVENOUS; SUBCUTANEOUS at 22:46

## 2020-10-26 RX ADMIN — HEPARIN SODIUM 28 UNITS/KG/HR: 10000 INJECTION, SOLUTION INTRAVENOUS at 19:05

## 2020-10-26 RX ADMIN — HYDRALAZINE HYDROCHLORIDE 10 MG: 10 TABLET, FILM COATED ORAL at 22:43

## 2020-10-26 RX ADMIN — HEPARIN SODIUM 26 UNITS/KG/HR: 10000 INJECTION, SOLUTION INTRAVENOUS at 07:39

## 2020-10-26 RX ADMIN — ISOSORBIDE DINITRATE 10 MG: 10 TABLET ORAL at 16:02

## 2020-10-26 RX ADMIN — ALTEPLASE 1 MG: 2.2 INJECTION, POWDER, LYOPHILIZED, FOR SOLUTION INTRAVENOUS at 14:41

## 2020-10-26 RX ADMIN — HYDROMORPHONE HYDROCHLORIDE 2 MG: 2 INJECTION, SOLUTION INTRAMUSCULAR; INTRAVENOUS; SUBCUTANEOUS at 05:19

## 2020-10-26 RX ADMIN — Medication 10 ML: at 22:47
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
## 2020-10-26 NOTE — PROGRESS NOTES
Bedside shift change report given to Bhargav Duff RN by Jakob Riggs RN . Report included the following information SBAR, Kardex, ED Summary, Intake/Output, MAR, and Recent Results. Pt oriented to time, place, person and situation, Normal Sinus Rhythm , 3 liters/min via nasal prongs, Pain present - adequately treated. No acute events overnight. Last 3 Recorded Weights in this Encounter    10/23/20 1326 10/25/20 1058 10/26/20 0300   Weight: 98.4 kg (216 lb 14.9 oz) 112.3 kg (247 lb 8 oz) 118.9 kg (262 lb 1.6 oz)   weight variance noted      Problem: Pressure Injury - Risk of  Goal: *Prevention of pressure injury  Description: Document Abdirashid Scale and appropriate interventions in the flowsheet.   Outcome: Progressing Towards Goal  Note: Pressure Injury Interventions:  Sensory Interventions: Check visual cues for pain, Float heels, Keep linens dry and wrinkle-free, Minimize linen layers    Moisture Interventions: Absorbent underpads, Apply protective barrier, creams and emollients, Internal/External urinary devices    Activity Interventions: Pressure redistribution bed/mattress(bed type)    Mobility Interventions: Float heels, HOB 30 degrees or less    Nutrition Interventions: Document food/fluid/supplement intake    Friction and Shear Interventions: Apply protective barrier, creams and emollients, HOB 30 degrees or less                Problem: Breathing Pattern - Ineffective  Goal: *Absence of hypoxia  Outcome: Progressing Towards Goal  Note: Pt on 3L o2, sats remain above 90%     Problem: Pain  Goal: *Control of Pain  Outcome: Progressing Towards Goal  Note: Pt receiving IV pain medicine Q4 as needed

## 2020-10-26 NOTE — PROGRESS NOTES
Problem: Falls - Risk of  Goal: *Absence of Falls  Description: Document Corrine Kendrick Fall Risk and appropriate interventions in the flowsheet. Outcome: Progressing Towards Goal  Pt remains free of falls during admission. Call bell and frequently used items within reach. Bedside table within reach. Pt provided nonskid socks and instructed to call out for nurse when needing assistance. Problem: Pressure Injury - Risk of  Goal: *Prevention of pressure injury  Description: Document Abdirashid Scale and appropriate interventions in the flowsheet. Outcome: Progressing Towards Goal  Pt turned ever two hours, moisture barrier applied, heels elevated, pillows and blankets used, pressure redistribution mattress, linens dry. Problem: Breathing Pattern - Ineffective  Goal: *Absence of hypoxia  Outcome: Progressing Towards Goal   Pt on 3LNC with O2 saturation at 95% or greater. Problem: Pain  Goal: *Control of Pain  Outcome: Progressing Towards Goal   Pt receiving IV dilaudid PRN for pain control. Bedside shift change report given to Mani Ji RN (oncoming nurse) by Jessica Oconnor RN (offgoing nurse). Report included the following information SBAR, Kardex, Intake/Output and Cardiac Rhythm NSR.

## 2020-10-26 NOTE — PROGRESS NOTES
[facility]   [address] [facility], [address] [place] [address], [facility]  Phone: [phone]   [phone]       Nephrology Progress Note  Thor Roberson     1995     [id number]  Date of Admission : 10/21/2020  10/26/20    CC: Follow up for ARF, Rhabdomyolysis        Assessment and Plan   JEAN PIERRE  - Severe ATN from Rhabdomyolysis. Oligoanuric   - started emergent HD on 10/22, last HD Saturday  - HD today and MWF this week    Severe Rhabdomyolysis   - 2/2 Substance use  - LFTs and CPK trending down    Left Lung PNA w/ sepsis     LUE DVT:  - on heparin drip    Metabolic acidosis:  - improving    Hypocalcemia:  - stable  - replete PRN    Polysubstance abuse     D/w ICU team and [facility]       Interval History:  Seen and examined. Lethargic. BP stable. Cr up, remains oligoanuric. Unable to obtain ROS at this time. Review of Systems: Review of systems not obtained due to patient factors.     Current Medications:   Current Facility-Administered Medications   Medication Dose Route Frequency    isosorbide dinitrate (ISORDIL) tablet 10 mg  10 mg Oral TID    hydrALAZINE (APRESOLINE) tablet 10 mg  10 mg Oral TID    carvediloL (COREG) tablet 6.25 mg  6.25 mg Oral BID WITH MEALS    HYDROmorphone (PF) (DILAUDID) injection 2 mg  2 mg IntraVENous Q4H PRN    LORazepam (ATIVAN) injection 1 mg  1 mg IntraVENous Q4H PRN    cefTRIAXone (ROCEPHIN) 1 g in 0.9% sodium chloride (MBP/ADV) 50 mL  1 g IntraVENous Q24H    heparin 25,000 units in D5W 250 ml infusion  17-36 Units/kg/hr IntraVENous TITRATE    balsam peru-castor oiL (VENELEX) ointment   Topical Q8H    sodium chloride (NS) flush 5-40 mL  5-40 mL IntraVENous Q8H    sodium chloride (NS) flush 5-40 mL  5-40 mL IntraVENous PRN    acetaminophen (TYLENOL) tablet 650 mg  650 mg Oral Q6H PRN    Or    acetaminophen (TYLENOL) suppository 650 mg  650 mg Rectal Q6H PRN    polyethylene glycol (MIRALAX) packet 17 g  17 g Oral DAILY PRN    ondansetron (ZOFRAN) injection 4 mg  4 mg IntraVENous Q6H PRN    albuterol-ipratropium (DUO-NEB) 2.5 MG-0.5 MG/3 ML  3 mL Nebulization Q4H PRN    docusate sodium (COLACE) capsule 100 mg  100 mg Oral DAILY    labetaloL (NORMODYNE;TRANDATE) injection 20 mg  20 mg IntraVENous Q4H PRN    0.9% sodium chloride infusion  50 mL/hr IntraVENous CONTINUOUS    doxycycline (VIBRAMYCIN) 100 mg in 0.9% sodium chloride (MBP/ADV) 100 mL  100 mg IntraVENous Q12H    heparin (porcine) 1,000 unit/mL injection 1,400 Units  1,400 Units InterCATHeter DIALYSIS PRN    And    heparin (porcine) 1,000 unit/mL injection 1,100 Units  1,100 Units InterCATHeter DIALYSIS PRN      Allergies   Allergen Reactions    Tramadol Nausea and Vomiting       Objective:  Vitals:    Vitals:    10/25/20 2313 10/26/20 0300 10/26/20 0700 10/26/20 0853   BP: (!) 149/97 (!) 157/93 (!) 145/84 (!) 151/108   Pulse: 97 97 93 93   Resp: (!) 32 25 27    Temp: 98.2 °F (36.8 °C) 98 °F (36.7 °C) 97.7 °F (36.5 °C)    TempSrc:       SpO2: 92% 94% 96%    Weight:  118.9 kg (262 lb 1.6 oz)     Height:         Intake and Output:  No intake/output data recorded. 10/24 1901 - 10/26 0700  In: -   Out: 12 [Urine:335]    Physical Examination:    General: Lethargic   Neck:  Supple, no mass  Resp:  Lungs CTA B/L,  CV:  RRR,  no murmur or rub, no LE edema  GI:  Soft, NT, + Bowel sounds, no hepatosplenomegaly  Neurologic:  Lethargic   Psych:             Unable to assess  Skin:  + blistering  Rash  :  Iinguez     []    High complexity decision making was performed  []    Patient is at high-risk of decompensation with multiple organ involvement    Lab Data Personally Reviewed: I have reviewed all the pertinent labs, microbiology data and radiology studies during assessment.     Recent Labs     10/26/20  0419 10/26/20  0135 10/24/20  1216 10/24/20  0127 10/23/20  1136   * 130* 133*  --  139   K 4.0 4.1 4.2  --  3.9   CL 95* 94* 99  --  101   CO2 27 24 27  --  29   GLU 98 116* 88  --  97   BUN 35* 35* 28*  --  17   CREA 5.24* 5.03* 4.59*  --  2.60*   CA 8.1* 8.1* 7.7*  --  7.3*   MG  --   --  1.9 1.9 1.8   PHOS  --   --  4.4 3.6 2.6   ALB 2.0*  --   --  1.9*  --    *  --   --  1,594*  --    INR 1.5*  --   --   --   --      Recent Labs     10/26/20  0419 10/24/20  2348 10/24/20  0127 10/23/20  1220   WBC 13.3* 9.6 11.7* 11.6*   HGB 8.9* 8.8* 9.7* 10.2*   HCT 27.2* 26.4* 28.5* 29.7*    190 182 166     No results found for: SDES  Lab Results   Component Value Date/Time    Culture result: NO GROWTH 3 DAYS 10/22/2020 06:19 AM    Culture result: NO GROWTH 3 DAYS 10/22/2020 02:37 AM    Culture result: (A) 10/21/2020 08:15 PM     Staphylococcus hominis (Oxacillin resistant) GROWING IN THE AEROBIC BOTTLE (SITE = R HAND)    Culture result:  10/21/2020 08:15 PM     Gram Positive Cocci CALLED TO AND READ BACK BY Av Sultana RN AT 2012 BY D    Culture result: (A) 10/21/2020 08:10 PM     Staphylococcus hominis (Oxacillin resistant) GROWING IN THE AEROBIC BOTTLE (SITE = L HAND)    Culture result:  10/21/2020 08:10 PM     preliminary report of Gram Positive Cocci in clusters growing in 1 of 2 bottles drawn [facility] RN SCAV AT  [facility] ON 10/23/20.  Bettye 1850     Recent Results (from the past 24 hour(s))   PTT    Collection Time: 10/25/20  2:30 PM   Result Value Ref Range    aPTT 43.2 (H) 22.1 - 32.0 sec    aPTT, therapeutic range     58.0 - 77.0 SECS   PTT    Collection Time: 10/25/20  9:08 PM   Result Value Ref Range    aPTT 71.1 (H) 22.1 - 32.0 sec    aPTT, therapeutic range     58.0 - 58.4 SECS   METABOLIC PANEL, BASIC    Collection Time: 10/26/20  1:35 AM   Result Value Ref Range    Sodium 130 (L) 136 - 145 mmol/L    Potassium 4.1 3.5 - 5.1 mmol/L    Chloride 94 (L) 97 - 108 mmol/L    CO2 24 21 - 32 mmol/L    Anion gap 12 5 - 15 mmol/L    Glucose 116 (H) 65 - 100 mg/dL    BUN 35 (H) 6 - 20 MG/DL    Creatinine 5.03 (H) 0.70 - 1.30 MG/DL    BUN/Creatinine ratio 7 (L) 12 - 20      GFR est AA 17 (L) >60 ml/min/1.73m2    GFR est non-AA 14 (L) >60 ml/min/1.73m2    Calcium 8.1 (L) 8.5 - 10.1 MG/DL   CK W/ CKMB & INDEX    Collection Time: 10/26/20  1:35 AM   Result Value Ref Range    CK - MB 5.0 (H) <3.6 NG/ML    CK-MB Index 0.1 0.0 - 2.5      CK 6,688 (H) 39 - 308 U/L   PTT    Collection Time: 10/26/20  4:19 AM   Result Value Ref Range    aPTT 56.2 (H) 22.1 - 32.0 sec    aPTT, therapeutic range     58.0 - 77.0 SECS   PROTHROMBIN TIME + INR    Collection Time: 10/26/20  4:19 AM   Result Value Ref Range    INR 1.5 (H) 0.9 - 1.1      Prothrombin time 15.0 (H) 9.0 - 11.1 sec   AMMONIA    Collection Time: 10/26/20  4:19 AM   Result Value Ref Range    Ammonia 26 <76 UMOL/L   METABOLIC PANEL, COMPREHENSIVE    Collection Time: 10/26/20  4:19 AM   Result Value Ref Range    Sodium 130 (L) 136 - 145 mmol/L    Potassium 4.0 3.5 - 5.1 mmol/L    Chloride 95 (L) 97 - 108 mmol/L    CO2 27 21 - 32 mmol/L    Anion gap 8 5 - 15 mmol/L    Glucose 98 65 - 100 mg/dL    BUN 35 (H) 6 - 20 MG/DL    Creatinine 5.24 (H) 0.70 - 1.30 MG/DL    BUN/Creatinine ratio 7 (L) 12 - 20      GFR est AA 16 (L) >60 ml/min/1.73m2    GFR est non-AA 14 (L) >60 ml/min/1.73m2    Calcium 8.1 (L) 8.5 - 10.1 MG/DL    Bilirubin, total 0.6 0.2 - 1.0 MG/DL    ALT (SGPT) 769 (H) 12 - 78 U/L    AST (SGOT) 262 (H) 15 - 37 U/L    Alk.  phosphatase 78 45 - 117 U/L    Protein, total 5.1 (L) 6.4 - 8.2 g/dL    Albumin 2.0 (L) 3.5 - 5.0 g/dL    Globulin 3.1 2.0 - 4.0 g/dL    A-G Ratio 0.6 (L) 1.1 - 2.2     CBC WITH AUTOMATED DIFF    Collection Time: 10/26/20  4:19 AM   Result Value Ref Range    WBC 13.3 (H) 4.1 - 11.1 K/uL    RBC 3.21 (L) 4.10 - 5.70 M/uL    HGB 8.9 (L) 12.1 - 17.0 g/dL    HCT 27.2 (L) 36.6 - 50.3 %    MCV 84.7 80.0 - 99.0 FL    MCH 27.7 26.0 - 34.0 PG    MCHC 32.7 30.0 - 36.5 g/dL    RDW 16.1 (H) 11.5 - 14.5 %    PLATELET 309 399 - 152 K/uL    MPV 9.8 8.9 - 12.9 FL    NRBC 0.2 (H) 0  WBC    ABSOLUTE NRBC 0.03 (H) 0.00 - 0.01 K/uL NEUTROPHILS 71 32 - 75 %    LYMPHOCYTES 12 12 - 49 %    MONOCYTES 13 5 - 13 %    EOSINOPHILS 1 0 - 7 %    BASOPHILS 1 0 - 1 %    IMMATURE GRANULOCYTES 2 (H) 0.0 - 0.5 %    ABS. NEUTROPHILS 9.5 (H) 1.8 - 8.0 K/UL    ABS. LYMPHOCYTES 1.6 0.8 - 3.5 K/UL    ABS. MONOCYTES 1.7 (H) 0.0 - 1.0 K/UL    ABS. EOSINOPHILS 0.1 0.0 - 0.4 K/UL    ABS. BASOPHILS 0.1 0.0 - 0.1 K/UL    ABS. IMM. GRANS. 0.3 (H) 0.00 - 0.04 K/UL    DF SMEAR SCANNED      RBC COMMENTS ANISOCYTOSIS  1+       CK    Collection Time: 10/26/20  4:19 AM   Result Value Ref Range    CK 6,527 (H) 39 - 308 U/L   SAMPLES BEING HELD    Collection Time: 10/26/20  4:19 AM   Result Value Ref Range    SAMPLES BEING HELD 1pst     COMMENT        Add-on orders for these samples will be processed based on acceptable specimen integrity and analyte stability, which may vary by analyte. Total time spent with patient:  xxx   min. Care Plan discussed with:  Patient     Family      RN      Consulting Physician [address] [facility],         I have reviewed the flowsheets. Chart and Pertinent Notes have been reviewed. No change in PMH ,family and social history from Consult note.       Duffy Holstein, MD

## 2020-10-26 NOTE — PROGRESS NOTES
Problem: Impaired Skin Integrity/Pressure Injury Treatment  Goal: *Improvement of Existing Pressure Injury  Outcome: Progressing Towards Goal  Note: Venelex applied to blisters, WC following pt, turned and repositioned throughout shift     Problem: Breathing Pattern - Ineffective  Goal: *Absence of hypoxia  Outcome: Progressing Towards Goal  Note: PT saturating above 95% on 2L NC     Problem: Pain  Goal: *Control of Pain  Outcome: Progressing Towards Goal  Note: Assessed pain charecteristics Q4 using numeric scale, monitored for change in patient's condition, pain relief reassessment for patient's stated pain goal.        Problem: Pressure Injury - Risk of  Goal: *Prevention of pressure injury  Description: Document Abdirashid Scale and appropriate interventions in the flowsheet. Outcome: Progressing Towards Goal  Note: Pt turned every two hours, moisture barrier applied, heels elevated, pillows and blankets used, pressure redistributing mattress, linens dry. 2330 Bedside shift change report given to Faviola (oncoming nurse) by Laura Kohler (offgoing nurse). Report included the following information SBAR, Kardex, Intake/Output, and MAR.

## 2020-10-26 NOTE — PROGRESS NOTES
[facility]            [facility] and [facility] [place]               Division of Cardiology  [phone]                       Progress note    NAME:  Georgette Luna   :   1995   MRN:   [id number]         ICD-10-CM ICD-9-CM    1. Troponin level elevated  R77.8 790.6    2. Drug abuse ([address]  F19.10 305.90    3. Acute renal failure with tubular necrosis (HCC)  N17.0 584.5    4. Obtunded  R40.1 780.09    5. Abnormality of basal ganglia (HCC)  Q04.8 742.8    6. Toxic metabolic encephalopathy  C04 349.82          Subjective:  Aching all over. A bit more alert. No c/o CP or SOB      Assessment/Plan:  1. Cardiomyopathy:        Severe. Mechanism and etiology of cardiomyopathy unclear at this time. Possibly       related to rhabdomyolysis but coronary ischemia due to cocaine use cannot be       entirely ruled out given also a significant increase in troponin       (albeit normal ck mb index)     Clinically seems compensated at rest.    Continue coreg, hydralazine and isordil     Entresto on hold on account of the renal dysfunction for now.     We will recheck limited echo on this coming week.     2. JEAN PIERRE:        Secondary to rhabdomyolysis:  undergoing hemodialysis.     3.  Polysubstance abuse:        His UDS was positive for cocaine and marijuana amphetamines ecstacy        benzodiazepines.     4.  Left upper extremity DVT: On IV heparin.     5.  Left PNA: On IV antibiotics as per primary team    CM with EF 15-20%. Continues on Alt GDT meds. Will recheck limited echo later in week for any EF recovery. Cardiology attending: seen and examined. Agree with assess and plan  Somewhat conversant, no distress. Chest clear, cv rrr, ext pos edema. Cardiomyopathy eitiology unknown. Adjust/add meds as clinical course will allow. Repeat echo. CARDIAC STUDIES      10/21/20   ECHO ADULT COMPLETE 10/22/2020 10/22/2020    Narrative · LV: Estimated LVEF is 15 - 20%.  Visually measured ejection fraction. Normal wall thickness. Dilated left ventricle. Severely reduced systolic   function. Left ventricular diastolic dysfunction. · LA: Mildly dilated left atrium. · RV: Mildly reduced systolic function. · RA: Mildly dilated right atrium. · MV: Mild mitral valve regurgitation is present. · TV: Mild tricuspid valve regurgitation is present. Signed by: Cyndi Guerrero MD                  EKG Results     Procedure 720 Value Units Date/Time    EKG, 12 LEAD, INITIAL [[id number]]     Order Status:  Sent     EKG, 12 LEAD, INITIAL [[id number]]     Order Status:  Sent     EKG, 12 LEAD, INITIAL [[id number]]     Order Status:  Sent     EKG, 12 LEAD, INITIAL [[id number]]     Order Status:  Sent     EKG, 12 LEAD, INITIAL [[id number]] Collected:  10/22/20 0121    Order Status:  Completed Updated:  10/22/20 0739     Ventricular Rate 100 BPM      Atrial Rate 100 BPM      P-R Interval 144 ms      QRS Duration 92 ms      Q-T Interval 386 ms      QTC Calculation (Bezet) 497 ms      Calculated P Axis 37 degrees      Calculated R Axis 22 degrees      Calculated T Axis 21 degrees      Diagnosis --     Normal sinus rhythm  T wave abnormality, consider anterior ischemia    No previous ECGs available  Confirmed by Tiana Bernal M.D., Josué Parra ([id number]) on 10/22/2020 7:39:01 AM              IMAGING      CT Results (most recent):  Results from [place] encounter on 10/21/20   CT CHEST WO CONT    Narrative CT dose reduction was achieved through use of a standardized protocol tailored  for this examination and automatic exposure control for dose modulation. Noncontrast study shows moderate severity patchy consolidation and lesser degree  of edema throughout much of the left lung, sparing the apex, central and  peripheral. There is no large region of dense consolidation with the greatest  severity at the elevated left base. Right lung is clear and central airways are  open. No mediastinal or hilar adenopathy.  Normal caliber aorta. No pleural pericardial  effusion. No significant upper abdominal findings      Impression IMPRESSION: Moderate severity pneumonia throughout the left lung       XR Results (most recent):  Results from Hospital Encounter encounter on 10/21/20   XR CHEST PORT    Narrative EXAM:  XR CHEST PORT    INDICATION:  sob    COMPARISON:  10/17/2020    FINDINGS: A portable AP radiograph of the chest was obtained at 1003 hours. Left  subclavian venous catheter tip overlies SVC. Right IJ catheter tip overlies SVC  right atrial junction. .  There is increasing opacity in the mid to lower lung  zones bilaterally left greater than right. .  Cardiomegaly is stable. Bony  structures are unchanged. Impression IMPRESSION: There is increasing opacity in the lungs bilaterally left greater  than right which may represent edema although superimposed pneumonia is not  excluded. MRI Results (most recent):  Results from [place] encounter on 10/21/20   MRA BRAIN WO CONT    Narrative INDICATION: hypoxia    COMPARISON:  None    TECHNIQUE:  3-D time-of-flight MRA of the brain was performed. Multiplanar  reconstructions were obtained. FINDINGS:    Posterior Circulation:  No flow limiting stenosis or occlusion. Anterior Circulation:  No flow limiting stenosis or occlusion. Additional Comments:  No evidence of aneurysm or vascular malformation. Impression IMPRESSION:  No flow limiting stenosis or intracranial aneurysm.              Past Medical History:   Diagnosis Date    Anxiety     Depression            Past Surgical History:   Procedure Laterality Date    HX ORTHOPAEDIC                 Visit Vitals  BP (!) 161/103 (BP 1 Location: Right arm, BP Patient Position: At rest)   Pulse 93   Temp 98.1 °F (36.7 °C)   Resp 24   Ht 5' 10\" (1.778 m)   Wt 262 lb 1.6 oz (118.9 kg)   SpO2 91%   BMI 37.61 kg/m²         Wt Readings from Last 3 Encounters:   10/26/20 262 lb 1.6 oz (118.9 kg)   10/22/20 195 lb (88.5 kg)   10/21/20 185 lb (83.9 kg)         Review of Systems:   Pertinent items are noted in the History of Present Illness. No intake/output data recorded. 10/24 1901 - 10/26 0700  In: -   Out: 336 [Urine:335]      Telemetry: normal sinus rhythm    Physical Exam:    Neck: no JVD  Heart: regular rate and rhythm  Lungs: diminished breath sounds R anterior, L anterior  Abdomen: soft, non-tender.  Bowel sounds normal. No masses,  no organomegaly  Extremities: edema 1+    Current Facility-Administered Medications   Medication Dose Route Frequency    alteplase (CATHFLO) 1 mg in sterile water (preservative free) 1 mL injection  1 mg InterCATHeter PRN    isosorbide dinitrate (ISORDIL) tablet 10 mg  10 mg Oral TID    hydrALAZINE (APRESOLINE) tablet 10 mg  10 mg Oral TID    carvediloL (COREG) tablet 6.25 mg  6.25 mg Oral BID WITH MEALS    HYDROmorphone (PF) (DILAUDID) injection 2 mg  2 mg IntraVENous Q4H PRN    LORazepam (ATIVAN) injection 1 mg  1 mg IntraVENous Q4H PRN    cefTRIAXone (ROCEPHIN) 1 g in 0.9% sodium chloride (MBP/ADV) 50 mL  1 g IntraVENous Q24H    heparin 25,000 units in D5W 250 ml infusion  17-36 Units/kg/hr IntraVENous TITRATE    balsam peru-castor oiL (VENELEX) ointment   Topical Q8H    sodium chloride (NS) flush 5-40 mL  5-40 mL IntraVENous Q8H    sodium chloride (NS) flush 5-40 mL  5-40 mL IntraVENous PRN    acetaminophen (TYLENOL) tablet 650 mg  650 mg Oral Q6H PRN    Or    acetaminophen (TYLENOL) suppository 650 mg  650 mg Rectal Q6H PRN    polyethylene glycol (MIRALAX) packet 17 g  17 g Oral DAILY PRN    ondansetron (ZOFRAN) injection 4 mg  4 mg IntraVENous Q6H PRN    albuterol-ipratropium (DUO-NEB) 2.5 MG-0.5 MG/3 ML  3 mL Nebulization Q4H PRN    docusate sodium (COLACE) capsule 100 mg  100 mg Oral DAILY    labetaloL (NORMODYNE;TRANDATE) injection 20 mg  20 mg IntraVENous Q4H PRN    0.9% sodium chloride infusion  50 mL/hr IntraVENous CONTINUOUS    doxycycline (VIBRAMYCIN) 100 mg in 0.9% sodium chloride (MBP/ADV) 100 mL  100 mg IntraVENous Q12H    heparin (porcine) 1,000 unit/mL injection 1,400 Units  1,400 Units InterCATHeter DIALYSIS PRN    And    heparin (porcine) 1,000 unit/mL injection 1,100 Units  1,100 Units InterCATHeter DIALYSIS PRN         All Cardiac Markers in the last 24 hours:    Lab Results   Component Value Date/Time    CPK 5,780 (H) 10/26/2020 11:03 AM    CPK 6,527 (H) 10/26/2020 04:19 AM    CPK 6,688 (H) 10/26/2020 01:35 AM    CKMB 4.5 (H) 10/26/2020 11:03 AM    CKMB 5.0 (H) 10/26/2020 01:35 AM    CKNDX 0.1 10/26/2020 11:03 AM    CKNDX 0.1 10/26/2020 01:35 AM        Lab Results   Component Value Date/Time    Creatinine 5.24 (H) 10/26/2020 04:19 AM          Lab Results   Component Value Date/Time    CK 5,780 (H) 10/26/2020 11:03 AM    CK - MB 4.5 (H) 10/26/2020 11:03 AM    CK-MB Index 0.1 10/26/2020 11:03 AM    Troponin-I, Qt. 19.80 (H) 10/23/2020 04:15 AM         Lab Results   Component Value Date/Time    WBC 13.3 (H) 10/26/2020 04:19 AM    HGB 8.9 (L) 10/26/2020 04:19 AM    HCT 27.2 (L) 10/26/2020 04:19 AM    PLATELET 841 62/21/6415 04:19 AM    MCV 84.7 10/26/2020 04:19 AM         Lab Results   Component Value Date/Time    Sodium 130 (L) 10/26/2020 04:19 AM    Potassium 4.0 10/26/2020 04:19 AM    Chloride 95 (L) 10/26/2020 04:19 AM    CO2 27 10/26/2020 04:19 AM    Anion gap 8 10/26/2020 04:19 AM    Glucose 98 10/26/2020 04:19 AM    BUN 35 (H) 10/26/2020 04:19 AM    Creatinine 5.24 (H) 10/26/2020 04:19 AM    BUN/Creatinine ratio 7 (L) 10/26/2020 04:19 AM    GFR est AA 16 (L) 10/26/2020 04:19 AM    GFR est non-AA 14 (L) 10/26/2020 04:19 AM    Calcium 8.1 (L) 10/26/2020 04:19 AM         Lab Results   Component Value Date/Time    NT pro-BNP 3,484 (H) 10/22/2020 12:55 AM         No results found for: CHOL, CHOLPOCT, CHOLX, CHLST, CHOLV, HDL, HDLPOC, HDLP, LDL, LDLCPOC, LDLC, DLDLP, VLDLC, VLDL, TGLX, TRIGL, TRIGP, TGLPOCT, CHHD, CHHDX      Lab Results   Component Value Date/Time    ALT (SGPT) 769 (H) 10/26/2020 04:19 AM    Alk.  phosphatase 78 10/26/2020 04:19 AM    Bilirubin, direct 0.3 (H) 10/24/2020 01:27 AM    Bilirubin, total 0.6 10/26/2020 04:19 AM       VERNA Beal MD
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
## 2020-10-26 NOTE — PROGRESS NOTES
Clinical Pharmacy Note: Stress Ulcer Prophylaxis Protocol (Non-ICU patients)    Please note that stress ulcer prophylaxis is not indicated for patients outside of the ICU. Fernando Nicol has an order for famotidine that has been ordered with an indication of stress ulcer prophylaxis.   No other indication for famotidine has been noted in the patients chart and therefore it has been discontinued per the [facility] Stress Ulcer Prophylaxis Protocol for eligible patients. Please call with any questions.

## 2020-10-26 NOTE — PROGRESS NOTES
Comprehensive Nutrition Assessment    Reason for Visit: NPO/clear liquid, Initial    Nutrition Recommendations/Plan:    1. Start ONS Ensure Clear with meals  2. Advance diet as medically feasible to regular to promote PO intake      Nutrition Assessment:     26 y.o. male who has a past medical history of Anxiety and Depression. Admitted with Sepsis ([facility] [A41.9]  ARF (acute renal failure) ([facility] [N17.9]  Drug abuse ([facility] [F19.10]. Noted acute hypoxic respiratory failure, initially admitted to ICU with unresponsiveness. Now improving and currently on room air. Severe rhabdomyolysis 2° substance abuse. LFTs and CPK trending down. At risk for compartment syndrome, has severe edema of all muscle compartments. JEAN PIERRE d/t ATN from rhabdomyolysis. S/p emergent HD on 10/22 and daily HD through this past Saturday. Plan for HD again today and again on Wed/Fri this week. Acute metabolic encephalopathy/with toxicity edema in the basal ganglia. A&OX4 since 10/24; Acute cardiomyopathy/myocarditis due to toxic/metabolic causes; Left Lung PNA w/ sepsis; LUE DVT. Psych following. Pt screened d/t NPO/clear liquid status x 4 days. Spoke with RN who reports pt without any desire for even clears. Took a lot of encouragement for him to just drink cranberry juice this AM.  Noted evolving pressure injury on sacrum + multiple others areas on erythema. WOCN following.  2-3+ edema of all extremities, wt up 64 lb since admission. Attempted with speak with pt in room, but he was sleeping and did not arouse to name call. However, per attending this AM, pt alert and awake today. Unclear reason still only on clear liquid diet. Will add Ensure Clear. Reach out to attending and see if can advance diet. Malnutrition Assessment:  Malnutrition Status:   At risk for malnutrition (specify)(npo/CL x 4 days)    Context:  Acute illness         Nutritionally Significant Medications:   NS @ 50 mL/hr, Rocephin, Colace, Vibramycin, heparin drip, dilaudid      Estimated Daily Nutrient Needs:  Energy (kcal):  2300-(MSJ 1900 x 1.2)  Protein (g):  110-135(1.2-1.5 gm/kg)       Fluid (ml/day):  1 mL/kcal    Nutrition Related Findings:    Edema:   Generalized: trace  LUE: 3+; non-pitting  LLE: 3+ NP  RUE: 2+ NP  RLE: 3+ NP    Last BM: 10/25 - loose    Wounds:    Multiple(sacrum with evolving pressure injury)       Current Nutrition Therapies:   DIET CLEAR LIQUID  Meal intake: No data found. Anthropometric Measures:  · Height:  5' 10\" (177.8 cm)  · Current Body Wt:  118.9 kg (262 lb 2 oz)   · Admission Body Wt:  198 lb 13.7 oz(90.2 kg - bed)    · Usual Body Wt:     unknown  · Ideal Body Wt:    lb:  157.9 %   · BMI Categories:  Overweight (BMI 25.0-29.9)(using admission/ dry weight)       Wt Readings from Last 20 Encounters:   10/26/20 118.9 kg (262 lb 1.6 oz)   10/22/20 88.5 kg (195 lb)   10/21/20 83.9 kg (185 lb)   09/15/20 84.9 kg (187 lb 1.6 oz)       Nutrition Diagnosis:   · Inadequate oral intake related to altered GI function as evidenced by NPO or clear liquid status due to medical condition      Nutrition Interventions:   Food and/or Nutrient Delivery: Start oral nutrition supplement  Nutrition Education and Counseling: No recommendations at this time  Coordination of Nutrition Care: Continued inpatient monitoring    Goals:  diet advancement/tolerance beyond clear liquids within 24-48 hours       Nutrition Monitoring and Evaluation:   Behavioral-Environmental Outcomes: (-)  Food/Nutrient Intake Outcomes: Diet advancement/tolerance, Food and nutrient intake, Supplement intake  Physical Signs/Symptoms Outcomes: Biochemical data, Chewing or swallowing, GI status, Fluid status or edema, Hemodynamic status, Meal time behavior, Weight, Skin    Discharge Planning:     Too soon to determine       Recent Labs     10/26/20  0419 10/26/20  0135 10/24/20  1216 10/24/20  0127   GLU 98 116* 88  --    BUN 35* 35* 28*  --    CREA 5.24* 5.03* 4.59*  --    * 130* 133*  --    K 4.0 4.1 4.2  --    CL 95* 94* 99  --    CO2 27 24 27  --    CA 8.1* 8.1* 7.7*  --    PHOS  --   --  4.4 3.6   MG  --   --  1.9 1.9       Recent Labs     10/26/20  0419 10/24/20  0127   * 1,594*   AP 78 65   TBILI 0.6 0.6   TP 5.1* 4.7*   ALB 2.0* 1.9*   GLOB 3.1 2.8       No results for input(s): LAC in the last 72 hours.     Recent Labs     10/26/20  0419 10/24/20  2348   WBC 13.3* 9.6   HGB 8.9* 8.8*   HCT 27.2* 26.4*    190                 Tha Lockwood RD  Available via [employer]  Or Pager# [phone]

## 2020-10-27 ENCOUNTER — APPOINTMENT (OUTPATIENT)
Dept: NON INVASIVE DIAGNOSTICS | Age: 25
DRG: 812 | End: 2020-10-27
Attending: PHYSICIAN ASSISTANT
Payer: MEDICAID

## 2020-10-27 ENCOUNTER — APPOINTMENT (OUTPATIENT)
Dept: GENERAL RADIOLOGY | Age: 25
DRG: 812 | End: 2020-10-27
Attending: HOSPITALIST
Payer: MEDICAID

## 2020-10-27 LAB
ALBUMIN SERPL-MCNC: 2.1 G/DL (ref 3.5–5)
ALBUMIN/GLOB SERPL: 0.6 {RATIO} (ref 1.1–2.2)
ALP SERPL-CCNC: 79 U/L (ref 45–117)
ALT SERPL-CCNC: 578 U/L (ref 12–78)
AMMONIA PLAS-SCNC: 29 UMOL/L
ANION GAP SERPL CALC-SCNC: 10 MMOL/L (ref 5–15)
ANION GAP SERPL CALC-SCNC: 9 MMOL/L (ref 5–15)
ANION GAP SERPL CALC-SCNC: 9 MMOL/L (ref 5–15)
APTT PPP: 34.5 SEC (ref 22.1–32)
APTT PPP: 57.5 SEC (ref 22.1–32)
APTT PPP: 64.1 SEC (ref 22.1–32)
ARTERIAL PATENCY WRIST A: YES
AST SERPL-CCNC: 179 U/L (ref 15–37)
BACTERIA SPEC CULT: NORMAL
BACTERIA SPEC CULT: NORMAL
BASE EXCESS BLD CALC-SCNC: 0 MMOL/L
BASOPHILS # BLD: 0 K/UL (ref 0–0.1)
BASOPHILS NFR BLD: 0 % (ref 0–1)
BDY SITE: ABNORMAL
BILIRUB SERPL-MCNC: 0.5 MG/DL (ref 0.2–1)
BUN SERPL-MCNC: 30 MG/DL (ref 6–20)
BUN SERPL-MCNC: 34 MG/DL (ref 6–20)
BUN SERPL-MCNC: 37 MG/DL (ref 6–20)
BUN/CREAT SERPL: 7 (ref 12–20)
BUN/CREAT SERPL: 7 (ref 12–20)
BUN/CREAT SERPL: 8 (ref 12–20)
CA-I BLD-SCNC: 1.16 MMOL/L (ref 1.12–1.32)
CALCIUM SERPL-MCNC: 8.2 MG/DL (ref 8.5–10.1)
CALCIUM SERPL-MCNC: 8.2 MG/DL (ref 8.5–10.1)
CALCIUM SERPL-MCNC: 8.4 MG/DL (ref 8.5–10.1)
CHLORIDE SERPL-SCNC: 94 MMOL/L (ref 97–108)
CHLORIDE SERPL-SCNC: 95 MMOL/L (ref 97–108)
CHLORIDE SERPL-SCNC: 96 MMOL/L (ref 97–108)
CK MB CFR SERPL CALC: 0.1 % (ref 0–2.5)
CK MB SERPL-MCNC: 4.1 NG/ML (ref 5–25)
CK SERPL-CCNC: 4641 U/L (ref 39–308)
CK SERPL-CCNC: 5090 U/L (ref 39–308)
CO2 SERPL-SCNC: 27 MMOL/L (ref 21–32)
COMMENT, HOLDF: NORMAL
CREAT SERPL-MCNC: 4.34 MG/DL (ref 0.7–1.3)
CREAT SERPL-MCNC: 4.55 MG/DL (ref 0.7–1.3)
CREAT SERPL-MCNC: 4.81 MG/DL (ref 0.7–1.3)
DIFFERENTIAL METHOD BLD: ABNORMAL
EOSINOPHIL # BLD: 0 K/UL (ref 0–0.4)
EOSINOPHIL NFR BLD: 0 % (ref 0–7)
ERYTHROCYTE [DISTWIDTH] IN BLOOD BY AUTOMATED COUNT: 16.3 % (ref 11.5–14.5)
GAS FLOW.O2 O2 DELIVERY SYS: ABNORMAL L/MIN
GAS FLOW.O2 SETTING OXYMISER: 12 L/M
GLOBULIN SER CALC-MCNC: 3.3 G/DL (ref 2–4)
GLUCOSE SERPL-MCNC: 109 MG/DL (ref 65–100)
GLUCOSE SERPL-MCNC: 110 MG/DL (ref 65–100)
GLUCOSE SERPL-MCNC: 99 MG/DL (ref 65–100)
HCO3 BLD-SCNC: 23.5 MMOL/L (ref 22–26)
HCT VFR BLD AUTO: 26.2 % (ref 36.6–50.3)
HGB BLD-MCNC: 8.9 G/DL (ref 12.1–17)
IMM GRANULOCYTES # BLD AUTO: 0 K/UL
IMM GRANULOCYTES NFR BLD AUTO: 0 %
INR PPP: 1.2 (ref 0.9–1.1)
LYMPHOCYTES # BLD: 1.9 K/UL (ref 0.8–3.5)
LYMPHOCYTES NFR BLD: 14 % (ref 12–49)
MCH RBC QN AUTO: 28.5 PG (ref 26–34)
MCHC RBC AUTO-ENTMCNC: 34 G/DL (ref 30–36.5)
MCV RBC AUTO: 84 FL (ref 80–99)
MONOCYTES # BLD: 2.2 K/UL (ref 0–1)
MONOCYTES NFR BLD: 16 % (ref 5–13)
NEUTS BAND NFR BLD MANUAL: 2 % (ref 0–6)
NEUTS SEG # BLD: 9.8 K/UL (ref 1.8–8)
NEUTS SEG NFR BLD: 68 % (ref 32–75)
NRBC # BLD: 0.02 K/UL (ref 0–0.01)
NRBC BLD-RTO: 0.1 PER 100 WBC
PCO2 BLD: 33.2 MMHG (ref 35–45)
PH BLD: 7.46 [PH] (ref 7.35–7.45)
PLATELET # BLD AUTO: 307 K/UL (ref 150–400)
PLATELET COMMENTS,PCOM: ABNORMAL
PMV BLD AUTO: 10 FL (ref 8.9–12.9)
PO2 BLD: 93 MMHG (ref 80–100)
POTASSIUM SERPL-SCNC: 3.7 MMOL/L (ref 3.5–5.1)
POTASSIUM SERPL-SCNC: 3.8 MMOL/L (ref 3.5–5.1)
POTASSIUM SERPL-SCNC: 3.8 MMOL/L (ref 3.5–5.1)
PROCALCITONIN SERPL-MCNC: 3.15 NG/ML
PROT SERPL-MCNC: 5.4 G/DL (ref 6.4–8.2)
PROTHROMBIN TIME: 12.4 SEC (ref 9–11.1)
RBC # BLD AUTO: 3.12 M/UL (ref 4.1–5.7)
RBC MORPH BLD: ABNORMAL
SAMPLES BEING HELD,HOLD: NORMAL
SAO2 % BLD: 98 % (ref 92–97)
SERVICE CMNT-IMP: NORMAL
SERVICE CMNT-IMP: NORMAL
SODIUM SERPL-SCNC: 130 MMOL/L (ref 136–145)
SODIUM SERPL-SCNC: 131 MMOL/L (ref 136–145)
SODIUM SERPL-SCNC: 133 MMOL/L (ref 136–145)
SPECIMEN TYPE: ABNORMAL
THERAPEUTIC RANGE,PTTT: ABNORMAL SECS (ref 58–77)
TOTAL RESP. RATE, ITRR: 98
WBC # BLD AUTO: 13.9 K/UL (ref 4.1–11.1)

## 2020-10-27 PROCEDURE — 71045 X-RAY EXAM CHEST 1 VIEW: CPT

## 2020-10-27 PROCEDURE — 82550 ASSAY OF CK (CPK): CPT

## 2020-10-27 PROCEDURE — 80048 BASIC METABOLIC PNL TOTAL CA: CPT

## 2020-10-27 PROCEDURE — 74011250637 HC RX REV CODE- 250/637: Performed by: SPECIALIST

## 2020-10-27 PROCEDURE — 85730 THROMBOPLASTIN TIME PARTIAL: CPT

## 2020-10-27 PROCEDURE — 74011250636 HC RX REV CODE- 250/636: Performed by: INTERNAL MEDICINE

## 2020-10-27 PROCEDURE — 74011250637 HC RX REV CODE- 250/637: Performed by: NURSE PRACTITIONER

## 2020-10-27 PROCEDURE — 97530 THERAPEUTIC ACTIVITIES: CPT

## 2020-10-27 PROCEDURE — 82140 ASSAY OF AMMONIA: CPT

## 2020-10-27 PROCEDURE — 36600 WITHDRAWAL OF ARTERIAL BLOOD: CPT

## 2020-10-27 PROCEDURE — 99232 SBSQ HOSP IP/OBS MODERATE 35: CPT | Performed by: SPECIALIST

## 2020-10-27 PROCEDURE — 5A09357 ASSISTANCE WITH RESPIRATORY VENTILATION, LESS THAN 24 CONSECUTIVE HOURS, CONTINUOUS POSITIVE AIRWAY PRESSURE: ICD-10-PCS | Performed by: INTERNAL MEDICINE

## 2020-10-27 PROCEDURE — 74011250637 HC RX REV CODE- 250/637: Performed by: PHYSICIAN ASSISTANT

## 2020-10-27 PROCEDURE — 74011250636 HC RX REV CODE- 250/636: Performed by: EMERGENCY MEDICINE

## 2020-10-27 PROCEDURE — 74011000258 HC RX REV CODE- 258: Performed by: EMERGENCY MEDICINE

## 2020-10-27 PROCEDURE — 85025 COMPLETE CBC W/AUTO DIFF WBC: CPT

## 2020-10-27 PROCEDURE — 82803 BLOOD GASES ANY COMBINATION: CPT

## 2020-10-27 PROCEDURE — 97162 PT EVAL MOD COMPLEX 30 MIN: CPT

## 2020-10-27 PROCEDURE — 85610 PROTHROMBIN TIME: CPT

## 2020-10-27 PROCEDURE — 90935 HEMODIALYSIS ONE EVALUATION: CPT

## 2020-10-27 PROCEDURE — 97165 OT EVAL LOW COMPLEX 30 MIN: CPT

## 2020-10-27 PROCEDURE — 80053 COMPREHEN METABOLIC PANEL: CPT

## 2020-10-27 PROCEDURE — 84145 PROCALCITONIN (PCT): CPT

## 2020-10-27 PROCEDURE — 65660000001 HC RM ICU INTERMED STEPDOWN

## 2020-10-27 PROCEDURE — 36415 COLL VENOUS BLD VENIPUNCTURE: CPT

## 2020-10-27 RX ORDER — FLUTICASONE PROPIONATE 50 MCG
2 SPRAY, SUSPENSION (ML) NASAL DAILY
Status: DISPENSED | OUTPATIENT
Start: 2020-10-27 | End: 2020-10-30

## 2020-10-27 RX ORDER — VANCOMYCIN 2 GRAM/500 ML IN 0.9 % SODIUM CHLORIDE INTRAVENOUS
2000 ONCE
Status: COMPLETED | OUTPATIENT
Start: 2020-10-28 | End: 2020-10-28

## 2020-10-27 RX ORDER — HYDRALAZINE HYDROCHLORIDE 25 MG/1
25 TABLET, FILM COATED ORAL 3 TIMES DAILY
Status: DISCONTINUED | OUTPATIENT
Start: 2020-10-27 | End: 2020-10-30

## 2020-10-27 RX ADMIN — HEPARIN SODIUM 1100 UNITS: 1000 INJECTION INTRAVENOUS; SUBCUTANEOUS at 23:55

## 2020-10-27 RX ADMIN — CARVEDILOL 6.25 MG: 6.25 TABLET, FILM COATED ORAL at 09:38

## 2020-10-27 RX ADMIN — HYDRALAZINE HYDROCHLORIDE 25 MG: 25 TABLET, FILM COATED ORAL at 16:36

## 2020-10-27 RX ADMIN — HYDROMORPHONE HYDROCHLORIDE 2 MG: 2 INJECTION, SOLUTION INTRAMUSCULAR; INTRAVENOUS; SUBCUTANEOUS at 08:02

## 2020-10-27 RX ADMIN — HYDRALAZINE HYDROCHLORIDE 10 MG: 10 TABLET, FILM COATED ORAL at 09:38

## 2020-10-27 RX ADMIN — Medication 10 ML: at 14:31

## 2020-10-27 RX ADMIN — DOCUSATE SODIUM 100 MG: 100 CAPSULE, LIQUID FILLED ORAL at 09:38

## 2020-10-27 RX ADMIN — SODIUM CHLORIDE 50 ML/HR: 900 INJECTION, SOLUTION INTRAVENOUS at 08:04

## 2020-10-27 RX ADMIN — HEPARIN SODIUM 28 UNITS/KG/HR: 10000 INJECTION, SOLUTION INTRAVENOUS at 07:37

## 2020-10-27 RX ADMIN — CEFTRIAXONE SODIUM 1 G: 1 INJECTION, POWDER, FOR SOLUTION INTRAMUSCULAR; INTRAVENOUS at 17:37

## 2020-10-27 RX ADMIN — ISOSORBIDE DINITRATE 10 MG: 10 TABLET ORAL at 09:38

## 2020-10-27 RX ADMIN — LORAZEPAM 1 MG: 2 INJECTION INTRAMUSCULAR; INTRAVENOUS at 20:21

## 2020-10-27 RX ADMIN — HYDROMORPHONE HYDROCHLORIDE 2 MG: 2 INJECTION, SOLUTION INTRAMUSCULAR; INTRAVENOUS; SUBCUTANEOUS at 16:42

## 2020-10-27 RX ADMIN — HYDROMORPHONE HYDROCHLORIDE 2 MG: 2 INJECTION, SOLUTION INTRAMUSCULAR; INTRAVENOUS; SUBCUTANEOUS at 11:16

## 2020-10-27 RX ADMIN — ISOSORBIDE DINITRATE 10 MG: 10 TABLET ORAL at 16:36

## 2020-10-27 RX ADMIN — CARVEDILOL 6.25 MG: 6.25 TABLET, FILM COATED ORAL at 17:37

## 2020-10-27 RX ADMIN — Medication 10 ML: at 05:08

## 2020-10-27 RX ADMIN — HYDROMORPHONE HYDROCHLORIDE 2 MG: 2 INJECTION, SOLUTION INTRAMUSCULAR; INTRAVENOUS; SUBCUTANEOUS at 02:50

## 2020-10-27 RX ADMIN — HEPARIN SODIUM 29 UNITS/KG/HR: 10000 INJECTION, SOLUTION INTRAVENOUS at 16:42

## 2020-10-27 RX ADMIN — Medication: at 14:31

## 2020-10-27 RX ADMIN — HEPARIN SODIUM 1400 UNITS: 1000 INJECTION INTRAVENOUS; SUBCUTANEOUS at 23:55

## 2020-10-27 NOTE — PROGRESS NOTES
0300: Pt c/o being congested and having no relief when blowing his nose. Notified Kori Dean NP and received orders for Flonase. Once the medication was available pt refused, stating that he no longer felt congested. 0730: Bedside and Verbal shift change report given to [address] (oncoming nurse) by Alejandra Mccloud RN (offgoing nurse). Report included the following information SBAR, Kardex, MAR and Cardiac Rhythm NS/ST. Last 3 Recorded Weights in this Encounter    10/25/20 1058 10/26/20 0300 10/27/20 0335   Weight: 112.3 kg (247 lb 8 oz) 118.9 kg (262 lb 1.6 oz) 117.2 kg (258 lb 6.4 oz)     Problem: Pressure Injury - Risk of  Goal: *Prevention of pressure injury  Description: Document Abdirashid Scale and appropriate interventions in the flowsheet. Outcome: Progressing Towards Goal  Note: Pressure Injury Interventions:  Sensory Interventions: Assess changes in LOC, Check visual cues for pain, Float heels, Minimize linen layers, Pressure redistribution bed/mattress (bed type)    Moisture Interventions: Absorbent underpads, Internal/External urinary devices, Check for incontinence Q2 hours and as needed, Minimize layers    Activity Interventions: Pressure redistribution bed/mattress(bed type)    Mobility Interventions: Float heels, HOB 30 degrees or less, Pressure redistribution bed/mattress (bed type), Turn and reposition approx.  every two hours(pillow and wedges)    Nutrition Interventions: Document food/fluid/supplement intake, Offer support with meals,snacks and hydration    Friction and Shear Interventions: Apply protective barrier, creams and emollients, Minimize layers, Lift sheet, HOB 30 degrees or less       Problem: Breathing Pattern - Ineffective  Goal: *Use of effective breathing techniques  Outcome: Progressing Towards Goal     Problem: Pain  Goal: *Control of Pain  Outcome: Progressing Towards Goal
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
## 2020-10-27 NOTE — PROGRESS NOTES
Problem: Falls - Risk of  Goal: *Absence of Falls  Description: Document Thor Betancur Fall Risk and appropriate interventions in the flowsheet. Outcome: Progressing Towards Goal  Pt remains free of falls during admission. Call bell and frequently used items within reach. Bedside table within reach. Pt provided nonskid socks and instructed to call out for nurse when needing assistance. Problem: Pressure Injury - Risk of  Goal: *Prevention of pressure injury  Description: Document Abdirashid Scale and appropriate interventions in the flowsheet. Outcome: Progressing Towards Goal  Pt turned ever two hours, moisture barrier applied, heels elevated, pillows and blankets used, pressure redistribution mattress, linens dry. Problem: Impaired Skin Integrity/Pressure Injury Treatment  Goal: *Improvement of Existing Pressure Injury  Outcome: Progressing Towards Goal     Problem: Breathing Pattern - Ineffective  Goal: *Absence of hypoxia  Outcome: Progressing Towards Goal         1700: Dr. Mavis Buchanan paged for pt's increasing respiratory rate and O2 needs. Pt complaining of feeling short of breath. STAT CXR ordered and ABG. 1950: Pt placed on NRB mask while waiting for BiPAP per his request. Respiratory therapist paged at 1900 for BiPAP machine. Pt updated on plan of care. Bedside shift change report given to Moreno Huang RN (oncoming nurse) by Adam Sinclair RN (offgoing nurse). Report included the following information SBAR, Kardex, Intake/Output and Cardiac Rhythm sinus tach.

## 2020-10-27 NOTE — CONSULTS
Intensivist group was called to evaluate the patient for tachypnea. He was admitted for polysubstance overdose and also has cardiomyopathy and renal failure. He was noted to be on 5L nasal cannula oxygen with RR in the low 30s. He states he is short of breath and has mild increased work of breathing. His oxygen saturation remains 100%, he does not appear to be acutely distressed, he states he has been short of breath for the last three days. Upon exam he is noted to have bilateral lower extremity edema 2+ pitting, and crackled to bilateral lower and id lung fields. He is also hypertensive. He appears to be quite fluid overloaded. He has only had output of 125mL today. Discussed with Dr. Luli Burdick, nephrology has been consulted and will order diuresis and patient will receive HD tonight. At this time, he does not have any immediate ICU needs, however we will continue to monitor and would be happy to reassess, particularly if the patient does not see any response from HD and diuretic therapy. He is alert and oriented and able to talk in full sentences. Thank you for the consult, and please call if there are any further questions or concerns regarding this patient.        Pauline Villar [facility]     Critical Care Medicine  [facility]        CC time 30 minutes
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
## 2020-10-27 NOTE — PROGRESS NOTES
[facility]   [address] [facility], [address] [place] [address], [facility]  Phone: [phone]   [phone]       Nephrology Progress Note  Radha Diaz     1995     [id number]  Date of Admission : 10/21/2020  10/27/20    CC: Follow up for ARF, Rhabdomyolysis        Assessment and Plan   JEAN PIERRE  - Severe ATN from Rhabdomyolysis. Oligoanuric   - started emergent HD on 10/22  - HD MWF for now  - HD tomorrow    Severe Rhabdomyolysis   - 2/2 Substance use  - LFTs and CPK trending down    Left Lung PNA w/ sepsis     LUE DVT:  - on heparin drip    Metabolic acidosis:  - improving    Hypocalcemia:  - stable  - replete PRN    Polysubstance abuse      Interval History:  Seen and examined. Awake, feeling better. Dialyzed yesterday. No cp or sob. O2 stable on NC O2. Review of Systems: Review of systems not obtained due to patient factors.     Current Medications:   Current Facility-Administered Medications   Medication Dose Route Frequency    fluticasone propionate (FLONASE) 50 mcg/actuation nasal spray 2 Spray  2 Spray Both Nostrils DAILY    hydrALAZINE (APRESOLINE) tablet 25 mg  25 mg Oral TID    alteplase (CATHFLO) 1 mg in sterile water (preservative free) 1 mL injection  1 mg InterCATHeter PRN    isosorbide dinitrate (ISORDIL) tablet 10 mg  10 mg Oral TID    carvediloL (COREG) tablet 6.25 mg  6.25 mg Oral BID WITH MEALS    HYDROmorphone (PF) (DILAUDID) injection 2 mg  2 mg IntraVENous Q4H PRN    LORazepam (ATIVAN) injection 1 mg  1 mg IntraVENous Q4H PRN    cefTRIAXone (ROCEPHIN) 1 g in 0.9% sodium chloride (MBP/ADV) 50 mL  1 g IntraVENous Q24H    heparin 25,000 units in D5W 250 ml infusion  17-36 Units/kg/hr IntraVENous TITRATE    balsam peru-castor oiL (VENELEX) ointment   Topical Q8H    sodium chloride (NS) flush 5-40 mL  5-40 mL IntraVENous Q8H    sodium chloride (NS) flush 5-40 mL  5-40 mL IntraVENous PRN    acetaminophen (TYLENOL) tablet 650 mg  650 mg Oral Q6H PRN    Or    acetaminophen (TYLENOL) suppository 650 mg  650 mg Rectal Q6H PRN    polyethylene glycol (MIRALAX) packet 17 g  17 g Oral DAILY PRN    ondansetron (ZOFRAN) injection 4 mg  4 mg IntraVENous Q6H PRN    albuterol-ipratropium (DUO-NEB) 2.5 MG-0.5 MG/3 ML  3 mL Nebulization Q4H PRN    docusate sodium (COLACE) capsule 100 mg  100 mg Oral DAILY    labetaloL (NORMODYNE;TRANDATE) injection 20 mg  20 mg IntraVENous Q4H PRN    0.9% sodium chloride infusion  50 mL/hr IntraVENous CONTINUOUS    heparin (porcine) 1,000 unit/mL injection 1,400 Units  1,400 Units InterCATHeter DIALYSIS PRN    And    heparin (porcine) 1,000 unit/mL injection 1,100 Units  1,100 Units InterCATHeter DIALYSIS PRN      Allergies   Allergen Reactions    Tramadol Nausea and Vomiting       Objective:  Vitals:    Vitals:    10/27/20 0909 10/27/20 0920 10/27/20 0938 10/27/20 1115   BP: (!) 166/112 (!) 168/110 (!) 152/120 (!) 170/110   Pulse: 96 (!) 105 95 94   Resp:    18   Temp:    98.4 °F (36.9 °C)   TempSrc:       SpO2:    93%   Weight:       Height:         Intake and Output:  10/27 0701 - 10/27 1900  In: -   Out: 250 [Urine:250]  10/25 1901 - 10/27 0700  In: 240 [P.O.:240]  Out: 2135 [Urine:135]    Physical Examination:    General: Lethargic   Neck:  Supple, no mass  Resp:  Lungs CTA B/L,  CV:  RRR,  no murmur or rub, no LE edema  GI:  Soft, NT, + Bowel sounds, no hepatosplenomegaly  Neurologic:  Lethargic   Psych:             Unable to assess  Skin:  + blistering  Rash  :  Iniguez     []    High complexity decision making was performed  []    Patient is at high-risk of decompensation with multiple organ involvement    Lab Data Personally Reviewed: I have reviewed all the pertinent labs, microbiology data and radiology studies during assessment.     Recent Labs     10/27/20  0511 10/27/20  0323 10/26/20  0419 10/26/20  0135   * 133* 130* 130*   K 3.8 3.7 4.0 4.1   CL 94* 96* 95* 94*   CO2 27 27 27 24   * 109* 98 116*   BUN 34* 30* 35* 35*   CREA 4.55* 4.34* 5.24* 5.03*   CA 8.4* 8.2* 8.1* 8.1*   ALB 2.1*  --  2.0*  --    *  --  769*  --    INR  --  1.2* 1.5*  --      Recent Labs     10/27/20  0519 10/26/20  0419 10/24/20  2348   WBC 13.9* 13.3* 9.6   HGB 8.9* 8.9* 8.8*   HCT 26.2* 27.2* 26.4*    250 190     No results found for: SDES  Lab Results   Component Value Date/Time    Culture result: NO GROWTH 5 DAYS 10/22/2020 06:19 AM    Culture result: NO GROWTH 5 DAYS 10/22/2020 02:37 AM    Culture result: (A) 10/21/2020 08:15 PM     Staphylococcus hominis (Oxacillin resistant) GROWING IN THE AEROBIC BOTTLE (SITE = R HAND)    Culture result:  10/21/2020 08:15 PM     Gram Positive Cocci CALLED TO AND READ BACK BY Eva Underwood RN AT 2012 BY WSHAWA    Culture result: (A) 10/21/2020 08:10 PM     Staphylococcus hominis (Oxacillin resistant) GROWING IN THE AEROBIC BOTTLE (SITE = L HAND)    Culture result:  10/21/2020 08:10 PM     preliminary report of Gram Positive Cocci in clusters growing in 1 of 2 bottles drawn [facility] RN SCAV AT [phone] [place] [address] ON 10/23/20.  Bettye 1850     Recent Results (from the past 24 hour(s))   PTT    Collection Time: 10/26/20  5:39 PM   Result Value Ref Range    aPTT 44.6 (H) 22.1 - 32.0 sec    aPTT, therapeutic range     58.0 - 77.0 SECS   CK W/ CKMB & INDEX    Collection Time: 10/26/20 11:50 PM   Result Value Ref Range    CK - MB 4.1 (H) <3.6 NG/ML    CK-MB Index 0.1 0.0 - 2.5      CK 5,090 (H) 39 - 308 U/L   PROTHROMBIN TIME + INR    Collection Time: 10/27/20  3:23 AM   Result Value Ref Range    INR 1.2 (H) 0.9 - 1.1      Prothrombin time 12.4 (H) 9.0 - 11.1 sec   AMMONIA    Collection Time: 10/27/20  3:23 AM   Result Value Ref Range    Ammonia 29 <14 UMOL/L   METABOLIC PANEL, BASIC    Collection Time: 10/27/20  3:23 AM   Result Value Ref Range    Sodium 133 (L) 136 - 145 mmol/L    Potassium 3.7 3.5 - 5.1 mmol/L    Chloride 96 (L) 97 - 108 mmol/L    CO2 27 21 - 32 mmol/L    Anion gap 10 5 - 15 mmol/L Glucose 109 (H) 65 - 100 mg/dL    BUN 30 (H) 6 - 20 MG/DL    Creatinine 4.34 (H) 0.70 - 1.30 MG/DL    BUN/Creatinine ratio 7 (L) 12 - 20      GFR est AA 20 (L) >60 ml/min/1.73m2    GFR est non-AA 17 (L) >60 ml/min/1.73m2    Calcium 8.2 (L) 8.5 - 10.1 MG/DL   PTT    Collection Time: 10/27/20  5:11 AM   Result Value Ref Range    aPTT 64.1 (H) 22.1 - 32.0 sec    aPTT, therapeutic range     58.0 - 29.7 SECS   METABOLIC PANEL, COMPREHENSIVE    Collection Time: 10/27/20  5:11 AM   Result Value Ref Range    Sodium 130 (L) 136 - 145 mmol/L    Potassium 3.8 3.5 - 5.1 mmol/L    Chloride 94 (L) 97 - 108 mmol/L    CO2 27 21 - 32 mmol/L    Anion gap 9 5 - 15 mmol/L    Glucose 110 (H) 65 - 100 mg/dL    BUN 34 (H) 6 - 20 MG/DL    Creatinine 4.55 (H) 0.70 - 1.30 MG/DL    BUN/Creatinine ratio 7 (L) 12 - 20      GFR est AA 19 (L) >60 ml/min/1.73m2    GFR est non-AA 16 (L) >60 ml/min/1.73m2    Calcium 8.4 (L) 8.5 - 10.1 MG/DL    Bilirubin, total 0.5 0.2 - 1.0 MG/DL    ALT (SGPT) 578 (H) 12 - 78 U/L    AST (SGOT) 179 (H) 15 - 37 U/L    Alk.  phosphatase 79 45 - 117 U/L    Protein, total 5.4 (L) 6.4 - 8.2 g/dL    Albumin 2.1 (L) 3.5 - 5.0 g/dL    Globulin 3.3 2.0 - 4.0 g/dL    A-G Ratio 0.6 (L) 1.1 - 2.2     CK    Collection Time: 10/27/20  5:11 AM   Result Value Ref Range    CK 4,641 (H) 39 - 308 U/L   CBC WITH AUTOMATED DIFF    Collection Time: 10/27/20  5:19 AM   Result Value Ref Range    WBC 13.9 (H) 4.1 - 11.1 K/uL    RBC 3.12 (L) 4.10 - 5.70 M/uL    HGB 8.9 (L) 12.1 - 17.0 g/dL    HCT 26.2 (L) 36.6 - 50.3 %    MCV 84.0 80.0 - 99.0 FL    MCH 28.5 26.0 - 34.0 PG    MCHC 34.0 30.0 - 36.5 g/dL    RDW 16.3 (H) 11.5 - 14.5 %    PLATELET 485 191 - 222 K/uL    MPV 10.0 8.9 - 12.9 FL    NRBC 0.1 (H) 0  WBC    ABSOLUTE NRBC 0.02 (H) 0.00 - 0.01 K/uL    NEUTROPHILS 68 32 - 75 %    BAND NEUTROPHILS 2 0 - 6 %    LYMPHOCYTES 14 12 - 49 %    MONOCYTES 16 (H) 5 - 13 %    EOSINOPHILS 0 0 - 7 %    BASOPHILS 0 0 - 1 %    IMMATURE GRANULOCYTES 0 %    ABS. NEUTROPHILS 9.8 (H) 1.8 - 8.0 K/UL    ABS. LYMPHOCYTES 1.9 0.8 - 3.5 K/UL    ABS. MONOCYTES 2.2 (H) 0.0 - 1.0 K/UL    ABS. EOSINOPHILS 0.0 0.0 - 0.4 K/UL    ABS. BASOPHILS 0.0 0.0 - 0.1 K/UL    ABS. IMM. GRANS. 0.0 K/UL    DF MANUAL      PLATELET COMMENTS Large Platelets      RBC COMMENTS ANISOCYTOSIS  1+        RBC COMMENTS MICROCYTOSIS  1+        RBC COMMENTS POLYCHROMASIA  1+        RBC COMMENTS OVALOCYTES  PRESENT               Total time spent with patient:  xxx   min. Care Plan discussed with:  Patient     Family      RN      Consulting Physician [id number] [facility] [place],         I have reviewed the flowsheets. Chart and Pertinent Notes have been reviewed. No change in PMH ,family and social history from Consult note.       Duffy Holstein, MD

## 2020-10-27 NOTE — CONSULTS
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 Comments:  N/A. IMPRESSION: Bilateral globus pallidus diffusion restriction, may be seen in heroin leukoencephalopathy, carbon monoxide poisoning, acute infarctions, and toxic/metabolic etiologies. Ct Head Wo Cont    Result Date: 10/21/2020  CT dose reduction was achieved through use of a standardized protocol tailored for this examination and automatic exposure control for dose modulation. Noncontrast study shows symmetric hypodensity in each basal ganglia, centered on the globus pallidus, left slightly larger than right, not present on the study of 3 months ago. Each region measures between 10 and 12 mm. No similar finding anywhere else. No other abnormality in gray or white matter. No mass effect or hemorrhage. Ventricles are symmetric and normal in size. No significant bone finding     IMPRESSION: Findings suggesting hypoxic or toxic edema in the basal ganglia    Ct Maxillofacial W Cont    Result Date: 9/15/2020  Contrast study shows extensive subcutaneous fat edema throughout the right maxillary and perimandibular region, with free fluid infiltrating throughout the fat in the preseptal periorbital right-sided location.  Fluid infiltration continues back, along the outer margin of the masseter muscle, to the margin of the parotid gland. Salivary glands are normal and symmetric. There is no stranding or edema of the postseptal fat. Extraocular muscles and optic nerves are normal symmetric. No intraocular abnormality. Very mild membrane thickening of the maxillary sinus and slightly greater membrane thickening in the anterior ethmoids. Thickening of the contiguous facial fashion. No bone destruction or periosteal reaction. Dental caries noted in a right mandibular molar and right maxillary incisor. Numerous small lymph nodes scattered throughout the field     IMPRESSION: Extensive right-sided facial edema/cellulitis, without intraorbital extension or abscess. If cause is unknown, possibilities would include both sinusitis and dental infection. Ct Chest Wo Cont    Result Date: 10/21/2020  CT dose reduction was achieved through use of a standardized protocol tailored for this examination and automatic exposure control for dose modulation. Noncontrast study shows moderate severity patchy consolidation and lesser degree of edema throughout much of the left lung, sparing the apex, central and peripheral. There is no large region of dense consolidation with the greatest severity at the elevated left base. Right lung is clear and central airways are open. No mediastinal or hilar adenopathy. Normal caliber aorta. No pleural pericardial effusion. No significant upper abdominal findings     IMPRESSION: Moderate severity pneumonia throughout the left lung    Us Abd Comp    Result Date: 10/22/2020  INDICATION: Acute renal failure. Rhabdomyolysis. COMPARISON: None TECHNIQUE: Routine ultrasound images of the abdomen were obtained. FINDINGS: GALLBLADDER: Contains biliary sludge. No cholecystolithiasis, gallbladder wall thickening, or pericholecystic fluid. COMMON BILE DUCT: 6 mm in diameter. No evidence of choledocholithiasis. LIVER: Normal in size. Normal echogenicity.  No focal hepatic mass or intrahepatic biliary dilatation. MAIN PORTAL VEIN: Patent. Hepatopetal flow direction. PANCREAS: No evidence of mass or ductal dilatation. RIGHT KIDNEY: 10.4 cm in length. No hydronephrosis. No evidence of mass or calculus. Perinephric edema is noted. LEFT KIDNEY: 11.9 cm in length. No hydronephrosis. No evidence of mass or calculus. Perinephric edema is noted. SPLEEN: 10.1 cm in length. No mass. AORTA: Visualized portions are normal in caliber. Distal abdominal aorta is obscured by overlying bowel gas. INFERIOR VENA CAVA: Patent. OTHER: Urinary bladder is decompressed with a Iniguez catheter. IMPRESSION: Normal renal size and cortical echogenicity. Bilateral perinephric edema. Biliary sludge in the gallbladder. Xr Chest Port    Result Date: 10/25/2020  EXAM:  XR CHEST PORT INDICATION:  sob COMPARISON:  10/17/2020 FINDINGS: A portable AP radiograph of the chest was obtained at 1003 hours. Left subclavian venous catheter tip overlies SVC. Right IJ catheter tip overlies SVC right atrial junction. .  There is increasing opacity in the mid to lower lung zones bilaterally left greater than right. .  Cardiomegaly is stable. Bony structures are unchanged. IMPRESSION: There is increasing opacity in the lungs bilaterally left greater than right which may represent edema although superimposed pneumonia is not excluded. Xr Chest Port    Result Date: 10/17/2020  Indication: Altered mental status. AP portable chest radiograph 17 October 2020 1848 hours. Comparison 21 July 2020. Clear lungs. Patient rotated to the left. Normal heart size exaggerated by patient rotation. No pneumothorax or pleural effusion. Normal bones. IMPRESSION: Negative. PAST PSYCHIATRIC HISTORY:  He states that he is supposed to be on Prozac 20 mg and Atarax, can't recall the doctor prescribing it. PSYCHOSOCIAL HISTORY:  He is single and has no children.     MENTAL STATUS EXAMINATION:  The patient is currently lying in bed, alert and oriented x3 during the initial part of the interview, he fell asleep in the middle of the interview. A full mental status exam could not be completed. ASSESSMENT AND PLAN:  The patient meets the criteria for unspecified mood disorder. It is still unclear to me up to this point  whether he has suicidal ideations or not or if it were a suicide attempt from the overdose or if he was trying to get a euphoric feeling. We have no option at this time but to maintain a sitter. Since he is refusing to disclose what happened, I would be inclined to recommend admission to inpatient psychiatry once he is medically cleared. When I informed him about this, he said ok. We will not start him on any medications at this time. Thank you for this kind consult. Please call with questions.       LALIT ALBA NP      [id number]  D:  10/23/2020 16:48  T:  10/23/2020 20:13  JOB #:  [id number]

## 2020-10-27 NOTE — PROGRESS NOTES
Nutrition Note    Brief check in with diet advancement. Remains on clear liquids. Dislikes ONS Ensure Clear per RN. Currently working with OT. Discussed with Dr. Vika amaro with diet advancement if no concern for dysphagia. Spoke with nursing - no swallowing concerns. Will advance to regular diet. Monitor PO intake. Will attempt to follow-up with pt later today to discuss preferences and alternative ONS given his increased nutritional needs.     Fannie Bailon RD  Available via [employer]  Or Pager# [phone]

## 2020-10-27 NOTE — PROGRESS NOTES
Received calll from Primary attending that pt is SOB and requesting HD   Will order Bumex 4 mg X 1   Plan HD 2 hr avtar Sylvester team notified

## 2020-10-27 NOTE — PROGRESS NOTES
Problem: Self Care Deficits Care Plan (Adult)  Goal: *Acute Goals and Plan of Care (Insert Text)  Description: Description: FUNCTIONAL STATUS PRIOR TO ADMISSION: Patient was independent and active without use of DME. Working in [address]: The patient lived with family but did not require assist.    Occupational Therapy Goals  Initiated 10/27/2020  1. Patient will perform grooming seated EOB with supervision/set-up within 7 day(s). 2.  Patient will perform bathing with minimal assistance within 7 day(s). 3.  Patient will perform lower body dressing with moderate assistance  within 7 day(s). 4.  Patient will perform toilet transfers with moderate assistance  within 7 day(s). 5.  Patient will perform all aspects of toileting with moderate assistance  within 7 day(s). 6.  Patient will cylindrical grasp to hold cup in L hand with supervision within 7 day(s). 7.  Patient will participate in upper extremity therapeutic exercise/activities with supervision/set-up for 10 minutes within 7 day(s). Outcome: Not Met    OCCUPATIONAL THERAPY EVALUATION  Patient: Edmar Fitzpatrick (26 y.o. male)  Date: 10/27/2020  Primary Diagnosis: Sepsis ([facility] [address] [A41.9]  ARF (acute renal failure) ([facility] [address] [N17.9]  Drug abuse ([facility] [address] [F19.10]        Precautions: Fall    ASSESSMENT:  Patient presents with global weakness + focal LUE weakness (LUE overall 1 to 3+/5, currently non-functional), edema (diffuse + focal LUE), diffuse pain, and impaired functional endurance. Noted LUE positive for DVT (on heparin, last aPTT 64.1/ in therapeutic range for mobilization), LUE elevated at conclusion of session. Patient tolerated bed-level OT evaluation, citing pain and fatigue, but expressed motivation to mobilize OOB next session. He is significantly below independent functional baseline. Recommend inpatient rehab pending progress/ improved activity tolerance.     Current Level of Function Impacting Discharge (ADLs/self-care): minimum to moderate assistance UB ADLs, total assistance LB ADLs    Functional Outcome Measure: The patient scored 5/100 on the Barthel Index outcome measure which is indicative of ~95% impairment in functional performance. Patient will benefit from skilled therapy intervention to address the above noted impairments. PLAN :  Recommendations and Planned Interventions: self care training, functional mobility training, therapeutic exercise, balance training, therapeutic activities, cognitive retraining, endurance activities, patient education, home safety training, and family training/education    Frequency/Duration: Patient will be followed by occupational therapy 5 times a week to address goals. Recommendation for discharge: (in order for the patient to meet his/her long term goals)  Inpatient rehab pending progress/ improved activity tolerance    This discharge recommendation:  Has not yet been discussed the attending provider and/or case management       SUBJECTIVE:   Patient stated I'm really tired. I didn't sleep much last night.     OBJECTIVE DATA SUMMARY:   HISTORY:   Past Medical History:   Diagnosis Date    Anxiety     Depression      Past Surgical History:   Procedure Laterality Date    HX ORTHOPAEDIC         Expanded or extensive additional review of patient history:     Home Situation  Home Environment: Private residence  One/Two Story Residence: One story  Living Alone: No  Support Systems: Family member(s), Friends \ neighbors  Patient Expects to be Discharged to[de-identified] Private residence  Current DME Used/Available at Home: None    Hand dominance: Right    EXAMINATION OF PERFORMANCE DEFICITS:  Cognitive/Behavioral Status:  Neurologic State: Alert  Orientation Level: Oriented X4  Cognition: Follows commands  Perception: Appears intact        Skin: see wound care    Edema: diffuse + moderate LUE    Hearing:   Auditory  Auditory Impairment: None    Vision/Perceptual: Tracking: Able to track stimulus in all quadrants w/o difficulty              Visual Fields: (able to detect stimuli in all fields)  Diplopia: No    Acuity: Within Defined Limits;Able to read clock/calendar on wall without difficulty; Able to read employee name badge without difficulty         Range of Motion:    AROM: Generally decreased, functional                         Strength:    Strength: Generally decreased, functional           LUE Strength  L Scapular Elevation: 3+  L Shoulder Flexion: 1  L Elbow Flexion: 3-  L : 2-, 1/3 range AROM, unable to form composite fist    Coordination:  Coordination: Generally decreased, functional  Fine Motor Skills-Upper: Right Intact; Left Impaired    Gross Motor Skills-Upper: Right Intact; Left Impaired    Tone & Sensation:    Tone: Normal  Sensation: Intact                      Balance:  Sitting: Impaired; Without support  Sitting - Static: Fair (occasional)  Sitting - Dynamic: Poor (constant support)  Standing: (unable to assess)    Functional Mobility and Transfers for ADLs:  Bed Mobility:  Rolling: Maximum assistance; Additional time(using bedrails to assist)  Supine to Sit: Moderate assistance;Maximum assistance;Bed Modified(HOB partially elevated, using bedrails for assist)  Sit to Supine: Maximum assistance; Additional time        ADL Assessment:  Feeding: Minimum assistance(inferred for bimanual tasks)    Oral Facial Hygiene/Grooming: Minimum assistance(inferred for bimanual tasks)    Bathing: Maximum assistance(inferred for AROM, activity tolerance)    Upper Body Dressing: Moderate assistance(inferred for LUE impairment, activity tolerance)    Lower Body Dressing: Total assistance(inferred)    Toileting:  Total assistance(inferred)        Functional Measure:  Barthel Index:    Bathin  Bladder: 0(chan)  Bowels: 0  Groomin  Dressin  Feedin  Mobility: 0  Stairs: 0  Toilet Use: 0  Transfer (Bed to Chair and Back): 0  Total: 5/100        The Barthel ADL Index: Guidelines  1. The index should be used as a record of what a patient does, not as a record of what a patient could do. 2. The main aim is to establish degree of independence from any help, physical or verbal, however minor and for whatever reason. 3. The need for supervision renders the patient not independent. 4. A patient's performance should be established using the best available evidence. Asking the patient, friends/relatives and nurses are the usual sources, but direct observation and common sense are also important. However direct testing is not needed. 5. Usually the patient's performance over the preceding 24-48 hours is important, but occasionally longer periods will be relevant. 6. Middle categories imply that the patient supplies over 50 per cent of the effort. 7. Use of aids to be independent is allowed. Kyle Knight., Barthel, D.W. (8591). Functional evaluation: the Barthel Index. [facility] (14)2. PAM Mosher, Erinn Jama, Ori Lam., Corrinne Rudder, [address] [place] [address] (1999). Measuring the change indisability after inpatient rehabilitation; comparison of the responsiveness of the Barthel Index and Functional [place] Measure. Journal of Neurology, Neurosurgery, and Psychiatry, 66(4), 950-280. Wilmer Ma, N.J.A, AIMEE Asencio, & Jono Lawrence M.A. (2004.) Assessment of post-stroke quality of life in cost-effectiveness studies: The usefulness of the Barthel Index and the EuroQoL-5D. Quality of Life Research, 15, 421-35         Occupational Therapy Evaluation Charge Determination   History Examination Decision-Making   LOW Complexity : Brief history review  MEDIUM Complexity : 3-5 performance deficits relating to physical, cognitive , or psychosocial skils that result in activity limitations and / or participation restrictions MEDIUM Complexity : Patient may present with comorbidities that affect occupational performnce.  Miniml to moderate modification of tasks or assistance (eg, physical or verbal ) with assesment(s) is necessary to enable patient to complete evaluation       Based on the above components, the patient evaluation is determined to be of the following complexity level: LOW   Pain Rating:  Patient reported BUE, back, and BLE pain 8/10 and RN administered pain medication within session    Activity Tolerance:   Fair, limited by pain and fatigue    After treatment patient left in no apparent distress:    Supine in bed and Call bell within reach, sitter present, LUE elevated    COMMUNICATION/EDUCATION:   The patients plan of care was discussed with: Physical therapist and Registered nurse. Home safety education was provided and the patient/caregiver indicated understanding., Patient/family have participated as able in goal setting and plan of care. , and Patient/family agree to work toward stated goals and plan of care. This patients plan of care is appropriate for delegation to [facility].     Thank you for this referral.  Isadora Brannon OT  Time Calculation: 29 mins

## 2020-10-27 NOTE — PROGRESS NOTES
Bedside and Verbal shift change report given to Stephanie Pfeiffer (oncoming nurse) by Matthew Gomez (offgoing nurse). Report included the following information SBAR, Kardex, Intake/Output, MAR, Recent Results and Cardiac Rhythm NSR.

## 2020-10-27 NOTE — PROGRESS NOTES
Pharmacist Note - Vancomycin Dosing (Hemodialysis Patient)    Consult provided for this 26 y.o. male for indication of PNA. Lab Results   Component Value Date/Time    Creatinine 4.81 (H) 10/27/2020 12:21 PM    WBC 13.9 (H) 10/27/2020 05:19 AM    BUN 37 (H) 10/27/2020 12:21 PM   Temp (24hrs), Av.2 °F (36.8 °C), Min:97.9 °F (36.6 °C), Max:98.7 °F (37.1 °C)      Estimated CrCl:  ESRD on HD     For this HD patient, will initiate therapy with a loading dose of Vancomycin 2000 mg, to be followed with a dose of 1000 mg after each HD session. Dose will be adjusted to maintain a pre-HD trough of approximately 20 - 25 mcg/mL for therapeutic goal of 15 - 20 mcg/mL as approximately 35% of drug will be removed by HD filtration. Pharmacy to follow patient daily and order levels / make dose adjustments as appropriate.

## 2020-10-27 NOTE — PROGRESS NOTES
[facility]  [phone]    Vitals   Pre   Post   Assessment   Pre   Post     Temp  Temp: 98.7 °F (37.1 °C) (10/26/20 2015)  98 LOC  A&Ox4, cooperative, follows commands A&Ox4, cooperative, follows commands   HR   Pulse (Heart Rate): 93 (10/26/20 2015) 100 Lungs   Diminished LLL Diminished LLL   B/P   BP: (!) 156/112 (10/26/20 2015) 155/98 Cardiac   NSR, Regular, S1, S2 NSR, Regular, S1, S2   Resp   Resp Rate: 27 (10/26/20 2015) 24 Skin   R IJ Adebayo  Blisters/Abrasions R IJ Adebayo   Blisters/Abrasions   Pain level  Pain Intensity 1: 0 (10/26/20 1504) 0 Edema  2-3+ pitting     2-3+ pitting   Orders:    Duration:   Start:    2014 End:    2315 Total:   3 hours   Dialyzer:   Dialyzer/Set Up Inspection: Rosey Babinski (10/26/20 2015)   K Bath:   Dialysate K (mEq/L): 2 (10/26/20 2015)   Ca Bath:   Dialysate CA (mEq/L): 2.5 (10/26/20 2015)   Na/Bicarb:   Dialysate NA (mEq/L): 138 (10/26/20 2015)   Target Fluid Removal:   Goal/Amount of Fluid to Remove (mL): 2000 mL (10/26/20 2015)   Access     Type & Location:   RIJ CVC: Dressing soiled with old serosanguinous drainage, dry, loose. No s/s of infection. New dressing applied today, 10/26/20. Both lumens aspirate, arterial lumen sluggish, venous flushes well. Running well at . Lines reversed after starting treatment due to elevated arterial pressure.       Labs     Obtained/Reviewed   Critical Results Called   Date when labs were drawn-  Hgb-    HGB   Date Value Ref Range Status   10/26/2020 8.9 (L) 12.1 - 17.0 g/dL Final     K-    Potassium   Date Value Ref Range Status   10/26/2020 4.0 3.5 - 5.1 mmol/L Final     Ca-   Calcium   Date Value Ref Range Status   10/26/2020 8.1 (L) 8.5 - 10.1 MG/DL Final     Bun-   BUN   Date Value Ref Range Status   10/26/2020 35 (H) 6 - 20 MG/DL Final     Creat-   Creatinine   Date Value Ref Range Status   10/26/2020 5.24 (H) 0.70 - 1.30 MG/DL Final        Medications/ Blood Products Given     Name   Dose Route and Time     Heparin 1:1000 2500 Units/1.4 mL Venous, 1.1 mL Arterial  R IJ CVC dwell at end of treatment             Blood Volume Processed (BVP):    67.7 L Net Fluid   Removed:  2000 mL   Comments   Time Out Done: Yes  Primary Nurse Rpt Pre: Booker Justice RN  Primary Nurse Rpt Post: Booker Justice RN  Pt Education: Ordered Dialysis Treatment  Care Plan: JEAN PIERRE  Tx Summary:     SBAR received from Primary RN. Dialysis RN arrived to patient room, pt A&Ox4, reviewed ordered dialysis treatment with patient, pt in agreement, physical assessment, machine preparation and safety testing performed. Consent signed & on file. 2014: Each catheter limb disinfected per p&p, caps removed, hubs disinfected per p&p. Each lumen aspirated for blood return and flushed with Normal Saline per policy. VSS. Dialysis Tx initiated. 2130: Pt resting quietly, VSS, lines patent and intact. 2230: Pt resting quietly, lines patent and intact. Reviewed with primary RN pt hypertension, recommended primary RN to give PO blood pressure medications. 2315: Tx ended. VSS. Each dialysis catheter limb disinfected per p&p, all possible blood returned per p&p, and each dialysis hub disinfected per p&p. Each lumen flushed, post dialysis catheter Heparin dwell instilled per order, and caps applied. Bed locked and in the lowest position, call bell and belongings in reach. SBAR given to Primary, RN. Patient is stable at time of my departure. All Dialysis related medications have been reviewed. Admiting Diagnosis: Acute Hypoxic Respiratory Failure, JEAN PIERRE, Rhabdomyolysis   Pt's previous clinic- N/A  Consent signed - Informed Consent Verified: Yes (10/26/20 2015)  [facility] Consent - Signed and on file  Hepatitis Status- 10/22/20 HBsAG Negative, 10/22/20 HBsAB <3.10 Susceptible   Machine #- Machine Number: [id number] (10/26/20 2015)  Telemetry status- NSR  Pre-dialysis wt. -  N/A

## 2020-10-27 NOTE — PROGRESS NOTES
[facility]            Heart and Vascular [place]               Division of Cardiology  [phone]                       Progress note    NAME:  Charity uD   :   1995   MRN:   [id number]         ICD-10-CM ICD-9-CM    1. Troponin level elevated  R77.8 790.6    2. Drug abuse ([address]  F19.10 305.90    3. Acute renal failure with tubular necrosis (HCC)  N17.0 584.5    4. Obtunded  R40.1 780.09    5. Abnormality of basal ganglia (HCC)  Q04.8 742.8    6. Toxic metabolic encephalopathy  J80 349.82    7. Dilated cardiomyopathy (HCC)  I42.0 425.4          Subjective:  Awake and alert this am.  Still achy all over. Sitter in room      Assessment/Plan:  1. Cardiomyopathy:        Severe. Mechanism and etiology of cardiomyopathy unclear at this time. Possibly       related to rhabdomyolysis but coronary ischemia due to cocaine use cannot be       entirely ruled out given also a significant increase in troponin       (albeit normal ck mb index)     Clinically seems compensated at rest.    Continue coreg, hydralazine and isordil     Entresto on hold on account of the renal dysfunction for now.     We will recheck limited echo later this week.     2. JEAN PIERRE:        Secondary to rhabdomyolysis:  undergoing hemodialysis.     3.  Polysubstance abuse:        His UDS was positive for cocaine and marijuana amphetamines ecstacy        benzodiazepines.     4.  Left upper extremity DVT: On IV heparin.     5.  Left PNA: On IV antibiotics as per primary team    CM with EF 15-20%. Continues on Alt GDT meds. Will recheck limited echo on Thursday. Ordered. Hydralazine increased to 25 mg TID, BP up. Cardiology attending: seen and examined. Agree with assess and plan  No cardiac complaints. Exam unchanged. Will increase bp meds. CARDIAC STUDIES      10/21/20   ECHO ADULT COMPLETE 10/22/2020 10/22/2020    Narrative · LV: Estimated LVEF is 15 - 20%.  Visually measured ejection fraction. Normal wall thickness. Dilated left ventricle. Severely reduced systolic   function. Left ventricular diastolic dysfunction. · LA: Mildly dilated left atrium. · RV: Mildly reduced systolic function. · RA: Mildly dilated right atrium. · MV: Mild mitral valve regurgitation is present. · TV: Mild tricuspid valve regurgitation is present. Signed by: Christopher Bauer MD                  EKG Results     Procedure 720 Value Units Date/Time    EKG, 12 LEAD, INITIAL [[id number]]     Order Status:  Sent     EKG, 12 LEAD, INITIAL [[id number]]     Order Status:  Sent     EKG, 12 LEAD, INITIAL [[id number]]     Order Status:  Sent     EKG, 12 LEAD, INITIAL [[id number]]     Order Status:  Sent     EKG, 12 LEAD, INITIAL [[id number]] Collected:  10/22/20 0121    Order Status:  Completed Updated:  10/22/20 0739     Ventricular Rate 100 BPM      Atrial Rate 100 BPM      P-R Interval 144 ms      QRS Duration 92 ms      Q-T Interval 386 ms      QTC Calculation (Bezet) 497 ms      Calculated P Axis 37 degrees      Calculated R Axis 22 degrees      Calculated T Axis 21 degrees      Diagnosis --     Normal sinus rhythm  T wave abnormality, consider anterior ischemia    No previous ECGs available  Confirmed by Tania Guajardo M.D., Ghislaine Robles ([id number]) on 10/22/2020 7:39:01 AM              IMAGING      CT Results (most recent):  Results from [place] encounter on 10/21/20   CT CHEST WO CONT    Narrative CT dose reduction was achieved through use of a standardized protocol tailored  for this examination and automatic exposure control for dose modulation. Noncontrast study shows moderate severity patchy consolidation and lesser degree  of edema throughout much of the left lung, sparing the apex, central and  peripheral. There is no large region of dense consolidation with the greatest  severity at the elevated left base. Right lung is clear and central airways are  open. No mediastinal or hilar adenopathy.  Normal caliber aorta. No pleural pericardial  effusion. No significant upper abdominal findings      Impression IMPRESSION: Moderate severity pneumonia throughout the left lung       XR Results (most recent):  Results from Hospital Encounter encounter on 10/21/20   XR CHEST PORT    Narrative EXAM:  XR CHEST PORT    INDICATION:  sob    COMPARISON:  10/17/2020    FINDINGS: A portable AP radiograph of the chest was obtained at 1003 hours. Left  subclavian venous catheter tip overlies SVC. Right IJ catheter tip overlies SVC  right atrial junction. .  There is increasing opacity in the mid to lower lung  zones bilaterally left greater than right. .  Cardiomegaly is stable. Bony  structures are unchanged. Impression IMPRESSION: There is increasing opacity in the lungs bilaterally left greater  than right which may represent edema although superimposed pneumonia is not  excluded. MRI Results (most recent):  Results from [place] encounter on 10/21/20   MRA BRAIN WO CONT    Narrative INDICATION: hypoxia    COMPARISON:  None    TECHNIQUE:  3-D time-of-flight MRA of the brain was performed. Multiplanar  reconstructions were obtained. FINDINGS:    Posterior Circulation:  No flow limiting stenosis or occlusion. Anterior Circulation:  No flow limiting stenosis or occlusion. Additional Comments:  No evidence of aneurysm or vascular malformation. Impression IMPRESSION:  No flow limiting stenosis or intracranial aneurysm.              Past Medical History:   Diagnosis Date    Anxiety     Depression            Past Surgical History:   Procedure Laterality Date    HX ORTHOPAEDIC                 Visit Vitals  BP (!) 170/110 (BP 1 Location: Right arm, BP Patient Position: At rest)   Pulse 94   Temp 98.4 °F (36.9 °C)   Resp 18   Ht 5' 10\" (1.778 m)   Wt 258 lb 6.4 oz (117.2 kg)   SpO2 93%   BMI 37.08 kg/m²         Wt Readings from Last 3 Encounters:   10/27/20 258 lb 6.4 oz (117.2 kg)   10/22/20 195 lb (88.5 kg)   10/21/20 185 lb (83.9 kg)         Review of Systems:   Pertinent items are noted in the History of Present Illness. 10/27 0701 - 10/27 1900  In: -   Out: 250 [Urine:250]    10/25 1901 - 10/27 0700  In: 240 [P.O.:240]  Out: 2135 [Urine:135]      Telemetry: normal sinus rhythm    Physical Exam:    Neck: no JVD  Heart: regular rate and rhythm  Lungs: diminished breath sounds R anterior, L anterior  Abdomen: soft, non-tender.  Bowel sounds normal. No masses,  no organomegaly  Extremities: edema 1+    Current Facility-Administered Medications   Medication Dose Route Frequency    fluticasone propionate (FLONASE) 50 mcg/actuation nasal spray 2 Spray  2 Spray Both Nostrils DAILY    alteplase (CATHFLO) 1 mg in sterile water (preservative free) 1 mL injection  1 mg InterCATHeter PRN    isosorbide dinitrate (ISORDIL) tablet 10 mg  10 mg Oral TID    hydrALAZINE (APRESOLINE) tablet 10 mg  10 mg Oral TID    carvediloL (COREG) tablet 6.25 mg  6.25 mg Oral BID WITH MEALS    HYDROmorphone (PF) (DILAUDID) injection 2 mg  2 mg IntraVENous Q4H PRN    LORazepam (ATIVAN) injection 1 mg  1 mg IntraVENous Q4H PRN    cefTRIAXone (ROCEPHIN) 1 g in 0.9% sodium chloride (MBP/ADV) 50 mL  1 g IntraVENous Q24H    heparin 25,000 units in D5W 250 ml infusion  17-36 Units/kg/hr IntraVENous TITRATE    balsam peru-castor oiL (VENELEX) ointment   Topical Q8H    sodium chloride (NS) flush 5-40 mL  5-40 mL IntraVENous Q8H    sodium chloride (NS) flush 5-40 mL  5-40 mL IntraVENous PRN    acetaminophen (TYLENOL) tablet 650 mg  650 mg Oral Q6H PRN    Or    acetaminophen (TYLENOL) suppository 650 mg  650 mg Rectal Q6H PRN    polyethylene glycol (MIRALAX) packet 17 g  17 g Oral DAILY PRN    ondansetron (ZOFRAN) injection 4 mg  4 mg IntraVENous Q6H PRN    albuterol-ipratropium (DUO-NEB) 2.5 MG-0.5 MG/3 ML  3 mL Nebulization Q4H PRN    docusate sodium (COLACE) capsule 100 mg  100 mg Oral DAILY    labetaloL (NORMODYNE;TRANDATE) injection 20 mg  20 mg IntraVENous Q4H PRN    0.9% sodium chloride infusion  50 mL/hr IntraVENous CONTINUOUS    heparin (porcine) 1,000 unit/mL injection 1,400 Units  1,400 Units InterCATHeter DIALYSIS PRN    And    heparin (porcine) 1,000 unit/mL injection 1,100 Units  1,100 Units InterCATHeter DIALYSIS PRN         All Cardiac Markers in the last 24 hours:    Lab Results   Component Value Date/Time    CPK 4,641 (H) 10/27/2020 05:11 AM    CPK 5,090 (H) 10/26/2020 11:50 PM    CKMB 4.1 (H) 10/26/2020 11:50 PM    CKNDX 0.1 10/26/2020 11:50 PM        Lab Results   Component Value Date/Time    Creatinine 4.55 (H) 10/27/2020 05:11 AM          Lab Results   Component Value Date/Time    CK 4,641 (H) 10/27/2020 05:11 AM    CK - MB 4.1 (H) 10/26/2020 11:50 PM    CK-MB Index 0.1 10/26/2020 11:50 PM    Troponin-I, Qt. 19.80 (H) 10/23/2020 04:15 AM         Lab Results   Component Value Date/Time    WBC 13.9 (H) 10/27/2020 05:19 AM    HGB 8.9 (L) 10/27/2020 05:19 AM    HCT 26.2 (L) 10/27/2020 05:19 AM    PLATELET 691 05/00/4043 05:19 AM    MCV 84.0 10/27/2020 05:19 AM         Lab Results   Component Value Date/Time    Sodium 130 (L) 10/27/2020 05:11 AM    Potassium 3.8 10/27/2020 05:11 AM    Chloride 94 (L) 10/27/2020 05:11 AM    CO2 27 10/27/2020 05:11 AM    Anion gap 9 10/27/2020 05:11 AM    Glucose 110 (H) 10/27/2020 05:11 AM    BUN 34 (H) 10/27/2020 05:11 AM    Creatinine 4.55 (H) 10/27/2020 05:11 AM    BUN/Creatinine ratio 7 (L) 10/27/2020 05:11 AM    GFR est AA 19 (L) 10/27/2020 05:11 AM    GFR est non-AA 16 (L) 10/27/2020 05:11 AM    Calcium 8.4 (L) 10/27/2020 05:11 AM         Lab Results   Component Value Date/Time    NT pro-BNP 3,484 (H) 10/22/2020 12:55 AM         No results found for: CHOL, CHOLPOCT, CHOLX, CHLST, CHOLV, HDL, HDLPOC, HDLP, LDL, LDLCPOC, LDLC, DLDLP, VLDLC, VLDL, TGLX, TRIGL, TRIGP, TGLPOCT, CHHD, CHHDX      Lab Results   Component Value Date/Time    ALT (SGPT) 578 (H) 10/27/2020 05:11 AM    Alk. phosphatase 79 10/27/2020 05:11 AM    Bilirubin, direct 0.3 (H) 10/24/2020 01:27 AM    Bilirubin, total 0.5 10/27/2020 05:11 AM       VENRA Morales MD

## 2020-10-28 ENCOUNTER — APPOINTMENT (OUTPATIENT)
Dept: NON INVASIVE DIAGNOSTICS | Age: 25
DRG: 812 | End: 2020-10-28
Attending: PHYSICIAN ASSISTANT
Payer: MEDICAID

## 2020-10-28 ENCOUNTER — APPOINTMENT (OUTPATIENT)
Dept: GENERAL RADIOLOGY | Age: 25
DRG: 812 | End: 2020-10-28
Attending: NURSE PRACTITIONER
Payer: MEDICAID

## 2020-10-28 LAB
ALBUMIN SERPL-MCNC: 2.2 G/DL (ref 3.5–5)
ALBUMIN/GLOB SERPL: 0.8 {RATIO} (ref 1.1–2.2)
ALP SERPL-CCNC: 66 U/L (ref 45–117)
ALT SERPL-CCNC: 430 U/L (ref 12–78)
AMMONIA PLAS-SCNC: 38 UMOL/L
ANION GAP SERPL CALC-SCNC: 11 MMOL/L (ref 5–15)
ANION GAP SERPL CALC-SCNC: 8 MMOL/L (ref 5–15)
APTT IMM NP PPP: ABNORMAL SEC
APTT PPP: 34.7 SEC (ref 22.1–32)
APTT PPP: 59.2 SEC (ref 22.1–32)
APTT PPP: >130 SEC (ref 22.1–32)
ARTERIAL PATENCY WRIST A: YES
AST SERPL-CCNC: 136 U/L (ref 15–37)
BASE DEFICIT BLDA-SCNC: 1.6 MMOL/L
BASOPHILS # BLD: 0 K/UL (ref 0–0.1)
BASOPHILS NFR BLD: 0 % (ref 0–1)
BDY SITE: ABNORMAL
BILIRUB SERPL-MCNC: 0.5 MG/DL (ref 0.2–1)
BUN SERPL-MCNC: 27 MG/DL (ref 6–20)
BUN SERPL-MCNC: 39 MG/DL (ref 6–20)
BUN/CREAT SERPL: 9 (ref 12–20)
BUN/CREAT SERPL: 9 (ref 12–20)
CALCIUM SERPL-MCNC: 8.1 MG/DL (ref 8.5–10.1)
CALCIUM SERPL-MCNC: 8.1 MG/DL (ref 8.5–10.1)
CHLORIDE SERPL-SCNC: 97 MMOL/L (ref 97–108)
CHLORIDE SERPL-SCNC: 98 MMOL/L (ref 97–108)
CK SERPL-CCNC: 3620 U/L (ref 39–308)
CO2 SERPL-SCNC: 23 MMOL/L (ref 21–32)
CO2 SERPL-SCNC: 28 MMOL/L (ref 21–32)
CREAT SERPL-MCNC: 3.08 MG/DL (ref 0.7–1.3)
CREAT SERPL-MCNC: 4.5 MG/DL (ref 0.7–1.3)
DIFFERENTIAL METHOD BLD: ABNORMAL
EOSINOPHIL # BLD: 0 K/UL (ref 0–0.4)
EOSINOPHIL NFR BLD: 0 % (ref 0–7)
EPAP/CPAP/PEEP, PAPEEP: 8
ERYTHROCYTE [DISTWIDTH] IN BLOOD BY AUTOMATED COUNT: 16.5 % (ref 11.5–14.5)
FIO2 ON VENT: 60 %
GAS FLOW.O2 SETTING OXYMISER: 4 L/MIN
GLOBULIN SER CALC-MCNC: 2.9 G/DL (ref 2–4)
GLUCOSE SERPL-MCNC: 100 MG/DL (ref 65–100)
GLUCOSE SERPL-MCNC: 87 MG/DL (ref 65–100)
HCO3 BLDA-SCNC: 22 MMOL/L (ref 22–26)
HCT VFR BLD AUTO: 25.2 % (ref 36.6–50.3)
HGB BLD-MCNC: 8.4 G/DL (ref 12.1–17)
IMM GRANULOCYTES # BLD AUTO: 0 K/UL
IMM GRANULOCYTES NFR BLD AUTO: 0 %
INR PPP: 1.3 (ref 0.9–1.1)
INR PPP: 1.4 (ref 0.9–1.1)
IPAP/PIP, IPAPIP: 14
LACTATE SERPL-SCNC: 1.5 MMOL/L (ref 0.4–2)
LYMPHOCYTES # BLD: 1.8 K/UL (ref 0.8–3.5)
LYMPHOCYTES NFR BLD: 12 % (ref 12–49)
MCH RBC QN AUTO: 28.1 PG (ref 26–34)
MCHC RBC AUTO-ENTMCNC: 33.3 G/DL (ref 30–36.5)
MCV RBC AUTO: 84.3 FL (ref 80–99)
MONOCYTES # BLD: 1.6 K/UL (ref 0–1)
MONOCYTES NFR BLD: 11 % (ref 5–13)
MYELOCYTES NFR BLD MANUAL: 2 %
NEUTS BAND NFR BLD MANUAL: 1 % (ref 0–6)
NEUTS SEG # BLD: 11.2 K/UL (ref 1.8–8)
NEUTS SEG NFR BLD: 74 % (ref 32–75)
NRBC # BLD: 0.03 K/UL (ref 0–0.01)
NRBC BLD-RTO: 0.2 PER 100 WBC
PCO2 BLDA: 34 MMHG (ref 35–45)
PH BLDA: 7.43 [PH] (ref 7.35–7.45)
PLATELET # BLD AUTO: 324 K/UL (ref 150–400)
PMV BLD AUTO: 9.6 FL (ref 8.9–12.9)
PO2 BLDA: 92 MMHG (ref 80–100)
POTASSIUM SERPL-SCNC: 3.4 MMOL/L (ref 3.5–5.1)
POTASSIUM SERPL-SCNC: 4 MMOL/L (ref 3.5–5.1)
PROT SERPL-MCNC: 5.1 G/DL (ref 6.4–8.2)
PROTHROMBIN TIME: 13.4 SEC (ref 9–11.1)
PROTHROMBIN TIME: 14 SEC (ref 9–11.1)
PTT MIXING STUDY,CMS2: ABNORMAL
RBC # BLD AUTO: 2.99 M/UL (ref 4.1–5.7)
RBC MORPH BLD: ABNORMAL
SAO2 % BLD: 97 % (ref 92–97)
SAO2% DEVICE SAO2% SENSOR NAME: ABNORMAL
SODIUM SERPL-SCNC: 132 MMOL/L (ref 136–145)
SODIUM SERPL-SCNC: 133 MMOL/L (ref 136–145)
SPECIMEN SITE: ABNORMAL
THERAPEUTIC RANGE,PTTT: ABNORMAL SECS (ref 58–77)
THERAPEUTIC RANGE,PTTT: ABNORMAL SECS (ref 58–77)
WBC # BLD AUTO: 14.9 K/UL (ref 4.1–11.1)

## 2020-10-28 PROCEDURE — 85730 THROMBOPLASTIN TIME PARTIAL: CPT

## 2020-10-28 PROCEDURE — 90935 HEMODIALYSIS ONE EVALUATION: CPT

## 2020-10-28 PROCEDURE — 74011250637 HC RX REV CODE- 250/637: Performed by: PHYSICIAN ASSISTANT

## 2020-10-28 PROCEDURE — 74011250637 HC RX REV CODE- 250/637: Performed by: HOSPITALIST

## 2020-10-28 PROCEDURE — 74011250636 HC RX REV CODE- 250/636: Performed by: NURSE PRACTITIONER

## 2020-10-28 PROCEDURE — 74011250636 HC RX REV CODE- 250/636: Performed by: EMERGENCY MEDICINE

## 2020-10-28 PROCEDURE — 74011250637 HC RX REV CODE- 250/637: Performed by: NURSE PRACTITIONER

## 2020-10-28 PROCEDURE — 80053 COMPREHEN METABOLIC PANEL: CPT

## 2020-10-28 PROCEDURE — 94640 AIRWAY INHALATION TREATMENT: CPT

## 2020-10-28 PROCEDURE — 85610 PROTHROMBIN TIME: CPT

## 2020-10-28 PROCEDURE — 36600 WITHDRAWAL OF ARTERIAL BLOOD: CPT

## 2020-10-28 PROCEDURE — 74011250636 HC RX REV CODE- 250/636: Performed by: ANESTHESIOLOGY

## 2020-10-28 PROCEDURE — 87899 AGENT NOS ASSAY W/OPTIC: CPT

## 2020-10-28 PROCEDURE — 74011000258 HC RX REV CODE- 258: Performed by: ANESTHESIOLOGY

## 2020-10-28 PROCEDURE — 74011250636 HC RX REV CODE- 250/636: Performed by: HOSPITALIST

## 2020-10-28 PROCEDURE — 74011000250 HC RX REV CODE- 250: Performed by: ANESTHESIOLOGY

## 2020-10-28 PROCEDURE — 99232 SBSQ HOSP IP/OBS MODERATE 35: CPT | Performed by: SPECIALIST

## 2020-10-28 PROCEDURE — 74011250637 HC RX REV CODE- 250/637: Performed by: SPECIALIST

## 2020-10-28 PROCEDURE — 83605 ASSAY OF LACTIC ACID: CPT

## 2020-10-28 PROCEDURE — 82140 ASSAY OF AMMONIA: CPT

## 2020-10-28 PROCEDURE — 65610000006 HC RM INTENSIVE CARE

## 2020-10-28 PROCEDURE — 82803 BLOOD GASES ANY COMBINATION: CPT

## 2020-10-28 PROCEDURE — 94660 CPAP INITIATION&MGMT: CPT

## 2020-10-28 PROCEDURE — 85025 COMPLETE CBC W/AUTO DIFF WBC: CPT

## 2020-10-28 PROCEDURE — 71045 X-RAY EXAM CHEST 1 VIEW: CPT

## 2020-10-28 PROCEDURE — 74011000250 HC RX REV CODE- 250: Performed by: NURSE PRACTITIONER

## 2020-10-28 PROCEDURE — 82550 ASSAY OF CK (CPK): CPT

## 2020-10-28 PROCEDURE — 74011250636 HC RX REV CODE- 250/636: Performed by: INTERNAL MEDICINE

## 2020-10-28 PROCEDURE — 74011250637 HC RX REV CODE- 250/637: Performed by: ANESTHESIOLOGY

## 2020-10-28 PROCEDURE — 94760 N-INVAS EAR/PLS OXIMETRY 1: CPT

## 2020-10-28 PROCEDURE — 36415 COLL VENOUS BLD VENIPUNCTURE: CPT

## 2020-10-28 RX ORDER — LORAZEPAM 2 MG/ML
1 INJECTION INTRAMUSCULAR ONCE
Status: COMPLETED | OUTPATIENT
Start: 2020-10-28 | End: 2020-10-28

## 2020-10-28 RX ORDER — HEPARIN SODIUM 5000 [USP'U]/ML
80 INJECTION, SOLUTION INTRAVENOUS; SUBCUTANEOUS ONCE
Status: COMPLETED | OUTPATIENT
Start: 2020-10-28 | End: 2020-10-28

## 2020-10-28 RX ORDER — IPRATROPIUM BROMIDE AND ALBUTEROL SULFATE 2.5; .5 MG/3ML; MG/3ML
3 SOLUTION RESPIRATORY (INHALATION)
Status: DISCONTINUED | OUTPATIENT
Start: 2020-10-28 | End: 2020-10-31

## 2020-10-28 RX ORDER — QUETIAPINE FUMARATE 25 MG/1
50 TABLET, FILM COATED ORAL ONCE
Status: COMPLETED | OUTPATIENT
Start: 2020-10-28 | End: 2020-10-28

## 2020-10-28 RX ORDER — DOXYCYCLINE HYCLATE 100 MG
100 TABLET ORAL EVERY 12 HOURS
Status: DISCONTINUED | OUTPATIENT
Start: 2020-10-28 | End: 2020-10-28

## 2020-10-28 RX ORDER — DEXMEDETOMIDINE HYDROCHLORIDE 4 UG/ML
.1-1.5 INJECTION, SOLUTION INTRAVENOUS
Status: DISCONTINUED | OUTPATIENT
Start: 2020-10-28 | End: 2020-10-29

## 2020-10-28 RX ORDER — LANOLIN ALCOHOL/MO/W.PET/CERES
3 CREAM (GRAM) TOPICAL
Status: DISCONTINUED | OUTPATIENT
Start: 2020-10-28 | End: 2020-11-11 | Stop reason: HOSPADM

## 2020-10-28 RX ORDER — FENTANYL CITRATE 50 UG/ML
100 INJECTION, SOLUTION INTRAMUSCULAR; INTRAVENOUS ONCE
Status: COMPLETED | OUTPATIENT
Start: 2020-10-28 | End: 2020-10-28

## 2020-10-28 RX ORDER — LORAZEPAM 2 MG/ML
0.5 INJECTION INTRAMUSCULAR ONCE
Status: DISCONTINUED | OUTPATIENT
Start: 2020-10-28 | End: 2020-10-28

## 2020-10-28 RX ORDER — MIDAZOLAM HYDROCHLORIDE 1 MG/ML
2 INJECTION, SOLUTION INTRAMUSCULAR; INTRAVENOUS ONCE
Status: DISCONTINUED | OUTPATIENT
Start: 2020-10-28 | End: 2020-10-28

## 2020-10-28 RX ORDER — LORAZEPAM 2 MG/ML
2 INJECTION INTRAMUSCULAR
Status: DISCONTINUED | OUTPATIENT
Start: 2020-10-28 | End: 2020-11-07

## 2020-10-28 RX ORDER — QUETIAPINE FUMARATE 25 MG/1
50 TABLET, FILM COATED ORAL
Status: DISCONTINUED | OUTPATIENT
Start: 2020-10-28 | End: 2020-10-29

## 2020-10-28 RX ADMIN — LORAZEPAM 1 MG: 2 INJECTION INTRAMUSCULAR; INTRAVENOUS at 01:00

## 2020-10-28 RX ADMIN — HYDRALAZINE HYDROCHLORIDE 25 MG: 25 TABLET, FILM COATED ORAL at 15:16

## 2020-10-28 RX ADMIN — HEPARIN SODIUM 7100 UNITS: 5000 INJECTION INTRAVENOUS; SUBCUTANEOUS at 01:41

## 2020-10-28 RX ADMIN — VANCOMYCIN HYDROCHLORIDE 750 MG: 750 INJECTION, POWDER, LYOPHILIZED, FOR SOLUTION INTRAVENOUS at 22:10

## 2020-10-28 RX ADMIN — LORAZEPAM 1 MG: 2 INJECTION INTRAMUSCULAR; INTRAVENOUS at 00:19

## 2020-10-28 RX ADMIN — Medication 10 ML: at 22:16

## 2020-10-28 RX ADMIN — ISOSORBIDE DINITRATE 10 MG: 10 TABLET ORAL at 21:40

## 2020-10-28 RX ADMIN — ACETAMINOPHEN 650 MG: 325 TABLET ORAL at 23:56

## 2020-10-28 RX ADMIN — FENTANYL CITRATE 100 MCG: 50 INJECTION, SOLUTION INTRAMUSCULAR; INTRAVENOUS at 01:09

## 2020-10-28 RX ADMIN — HEPARIN SODIUM 33 UNITS/KG/HR: 10000 INJECTION, SOLUTION INTRAVENOUS at 02:39

## 2020-10-28 RX ADMIN — Medication: at 14:37

## 2020-10-28 RX ADMIN — DEXMEDETOMIDINE HYDROCHLORIDE 0.7 MCG/KG/HR: 400 INJECTION INTRAVENOUS at 08:04

## 2020-10-28 RX ADMIN — IPRATROPIUM BROMIDE AND ALBUTEROL SULFATE 3 ML: .5; 3 SOLUTION RESPIRATORY (INHALATION) at 16:15

## 2020-10-28 RX ADMIN — QUETIAPINE FUMARATE 50 MG: 25 TABLET ORAL at 20:15

## 2020-10-28 RX ADMIN — Medication 10 ML: at 08:59

## 2020-10-28 RX ADMIN — Medication: at 01:41

## 2020-10-28 RX ADMIN — Medication: at 22:00

## 2020-10-28 RX ADMIN — DEXMEDETOMIDINE HYDROCHLORIDE 0.4 MCG/KG/HR: 400 INJECTION INTRAVENOUS at 03:20

## 2020-10-28 RX ADMIN — SODIUM CHLORIDE 2000 MG: 900 INJECTION, SOLUTION INTRAVENOUS at 02:39

## 2020-10-28 RX ADMIN — HEPARIN SODIUM 30 UNITS/KG/HR: 10000 INJECTION, SOLUTION INTRAVENOUS at 16:37

## 2020-10-28 RX ADMIN — HEPARIN SODIUM 1400 UNITS: 1000 INJECTION INTRAVENOUS; SUBCUTANEOUS at 18:14

## 2020-10-28 RX ADMIN — IPRATROPIUM BROMIDE AND ALBUTEROL SULFATE 3 ML: .5; 3 SOLUTION RESPIRATORY (INHALATION) at 19:06

## 2020-10-28 RX ADMIN — Medication 10 ML: at 01:40

## 2020-10-28 RX ADMIN — QUETIAPINE FUMARATE 50 MG: 25 TABLET ORAL at 15:16

## 2020-10-28 RX ADMIN — DOXYCYCLINE 100 MG: 100 INJECTION, POWDER, LYOPHILIZED, FOR SOLUTION INTRAVENOUS at 14:34

## 2020-10-28 RX ADMIN — HEPARIN SODIUM 1100 UNITS: 1000 INJECTION INTRAVENOUS; SUBCUTANEOUS at 18:13

## 2020-10-28 RX ADMIN — DOXYCYCLINE 100 MG: 100 INJECTION, POWDER, LYOPHILIZED, FOR SOLUTION INTRAVENOUS at 21:50

## 2020-10-28 RX ADMIN — Medication 3 MG: at 20:15

## 2020-10-28 RX ADMIN — HYDRALAZINE HYDROCHLORIDE 25 MG: 25 TABLET, FILM COATED ORAL at 21:40

## 2020-10-28 NOTE — PROGRESS NOTES
[facility]    ICU TEAM Progress Note    Name: Pheobe Lundborg   : 1995   MRN: [id number]   Date: 10/28/2020      **Follow up note from this AM      Assessment     ICU Problems:    1. Acute Hypoxic Respiratory Failure  2. Pneumonia CAP  3. Acute Metabolic Encephalopathy  4. Acute Kidney Injury  5. DVT - LUE  6. Cardiomyopathy - EF 15-20%  7. Polysubstance abuse       ICU Comprehensive Plan of Care:     Plans for this Shift:     1. BiPAP as needed for SpO2 > 90%  2. Change Doxy PO to IV  3. Continue Vancomycin  4. Low threshold to resume Gr NEG/Anearobe coverage  5. Continue Heparin infusion  6. Low dose seroquel now x 1  7. MAP Goal of: > 65 mmHg  8. Transfusion Trigger (Hgb): <7 g/dL  9. Respiratory Goals:  a. Head of bed > 30 degrees  b. Aggressive bronchopulmonary hygiene  c. Incentive spirometry  10. Pulmonary toilet: Duo-Nebs   11. SpO2 Goal: > 92%  12. Keep K>4; Mg>2   13. PT/OT: PT consulted and on board and OT consulted and on board   14. Goals of Care Discussion with family Yes   13. Plan of Care/Code Status: Full Code  16. Discussed Care Plan with Bedside RN  17. Documentation of Current Medications  18.  Rest of Plan Below:    F - Feeding:  Pending   A - Analgesia: Dilaudid  S - Sedation: Precedex  T - DVT Prophylaxis: SCD's or Sequential Compression Device and Heparin   H - Head of Bed: > 30 Degrees  U - Ulcer Prophylaxis: Not at this time   G - Glycemic Control: Insulin  S - Spontaneous Breathing Trial: N/A  B - Bowel Regimen: Senna and Docusate (Colace)  I - Indwelling Catheter:   Tubes: None  Lines: Central Line and Adebayo  Drains: Iniguez Catheter  D - De-escalation of Antibiotics: Vancomycin  Doxy    Subjective:   Progress Note: 10/28/2020      Reason for ICU Admission: Acute Respiratory Failure from agitation/pneumonia     HPI:      Overnight Events:   10/28/2020  N/A    POD:  * No surgery found *    S/P:       Active Problem List:     Problem List  Never Reviewed          Codes Class    Toxic encephalopathy ICD-10-CM: G92  ICD-9-CM: 349.82         Drug abuse ([facility] ICD-10-CM: F19.10  ICD-9-CM: 305.90         Acute renal failure with tubular necrosis (HCC) ICD-10-CM: N17.0  ICD-9-CM: 584.5         * (Principal) Sepsis ([facility] ICD-10-CM: A41.9  ICD-9-CM: 038.9, 995.91         Community acquired pneumonia of left lower lobe of lung ICD-10-CM: J18.9  ICD-9-CM: 907         Metabolic acidosis G-07-GV: E87.2  ICD-9-CM: 276.2         Lymphocytosis ICD-10-CM: D72.820  ICD-9-CM: 288.61         Electrolyte abnormality ICD-10-CM: E87.8  ICD-9-CM: 276.9         Troponin level elevated ICD-10-CM: R77.8  ICD-9-CM: 790.6               Past Medical History:      has a past medical history of Anxiety and Depression. Past Surgical History:      has a past surgical history that includes hx orthopaedic. Home Medications:     Prior to Admission medications    Medication Sig Start Date End Date Taking? Authorizing Provider   naloxone (Narcan) 4 mg/actuation nasal spray Use 1 spray intranasally, then discard. Repeat with new spray every 2 min as needed for opioid overdose symptoms, alternating nostrils. 10/18/20   Francisco Muniz MD   FLUoxetine (PROzac) 20 mg capsule Take 20 mg by mouth daily. Kaleb Moncada MD   hydrOXYzine HCL (ATARAX) 25 mg tablet Take 25 mg by mouth two (2) times a day. Kaleb Moncada MD       Allergies/Social/Family History: Allergies   Allergen Reactions    Tramadol Nausea and Vomiting      Social History     Tobacco Use    Smoking status: Current Every Day Smoker     Packs/day: 0.50    Smokeless tobacco: Former User   Substance Use Topics    Alcohol use: Not Currently      No family history on file. Review of Systems:     A comprehensive review of systems was negative except for that written in the HPI.     Objective:   Vital Signs:  Visit Vitals  /71   Pulse 94   Temp 99 °F (37.2 °C)   Resp (!) 39   Ht 5' 10\" (1.778 m)   Wt 107.5 kg (237 lb)   SpO2 93%   BMI 34.01 kg/m²    O2 Flow Rate (L/min): 6 l/min O2 Device: Nasal cannula Temp (24hrs), Av.5 °F (36.9 °C), Min:98 °F (36.7 °C), Max:99 °F (37.2 °C)           Intake/Output:     Intake/Output Summary (Last 24 hours) at 10/28/2020 1541  Last data filed at 10/28/2020 1200  Gross per 24 hour   Intake 3076.44 ml   Output 1535 ml   Net 1541.44 ml       Physical Exam:    General:  Alert, cooperative, well noursished, well developed, appears stated age   Eyes:  Sclera anicteric. Pupils equally round and reactive to light. Mouth/Throat: Mucous membranes normal, oral pharynx clear   Neck: Supple   Lungs:   Clear to auscultation bilaterally, good effort   CV:  Regular rate and rhythm,no murmur, click, rub or gallop   Abdomen:   Soft, non-tender.  bowel sounds normal. non-distended   Extremities: No cyanosis or edema   Skin: Skin color, texture, turgor normal. no acute rash or lesions   Lymph nodes: Cervical and supraclavicular normal   Musculoskeletal: No swelling or deformity   Lines/Devices:  Intact, no erythema, drainage or tenderness   Psych: Alert and oriented, normal mood affect given the setting       LABS AND  DATA: Personally reviewed  Recent Labs     10/28/20  0615 10/27/20  0519   WBC 14.9* 13.9*   HGB 8.4* 8.9*   HCT 25.2* 26.2*    307     Recent Labs     10/28/20  0615 10/27/20  1221   * 131*   K 4.0 3.8   CL 98 95*   CO2 23 27   BUN 39* 37*   CREA 4.50* 4.81*    99   CA 8.1* 8.2*     Recent Labs     10/28/20  0615 10/27/20  0511   AP 66 79   TP 5.1* 5.4*   ALB 2.2* 2.1*   GLOB 2.9 3.3     Recent Labs     10/28/20  0905 10/28/20  0615   INR 1.3* 1.4*   PTP 13.4* 14.0*   APTT 34.7* >130.0*      Recent Labs     10/27/20  2011   PHI 7.46*   PCO2I 33.2*   PO2I 93     Recent Labs     10/28/20  0615 10/27/20  0511 10/26/20  2350 10/26/20  1103   CPK 3,620* 4,641* 5,090* 5,780*   CKMB  --   --  4.1* 4.5*       Hemodynamics:   PAP:   CO:     Wedge:   CI:     CVP:    SVR:       PVR:       Ventilator Settings:  Mode Rate Tidal Volume Pressure FiO2 PEEP            60 %       Peak airway pressure:      Minute ventilation: 14.5 l/min        MEDS: Reviewed    Chest X-Ray:  CXR Results  (Last 48 hours)               10/28/20 0419  XR CHEST PORT Final result    Impression:  IMPRESSION:   Stable bilateral parenchymal opacities. .                Narrative:  Clinical history: tachypnea, respiratory failure, pneumonia   INDICATION:   tachypnea, respiratory failure, pneumonia   COMPARISON: 10/27/2020       FINDINGS:   AP portable upright view of the chest demonstrates a stable  cardiopericardial   silhouette. Persistent interstitial and parenchymal opacities not significantly   changed. Dialysis catheter on the right. Central venous access catheter extends   from the left. No change. .Patient is on a cardiac monitor. 10/27/20 1748  XR CHEST PORT Final result    Impression:  IMPRESSION: No significant change in bilateral airspace opacification, left   greater than right. Narrative:  EXAM: XR CHEST PORT       INDICATION: Tachypnea       COMPARISON: October 25       FINDINGS: A portable AP radiograph of the chest was obtained at 1741 hours. The   lines are stable. The patient is on a cardiac monitor. There is persistent   opacification in the right lower lobe and throughout the lung, without   significant change. Cardia mediastinal contours are stable. The bones and soft   tissues are grossly within normal limits. ECHO 10/22/2020:  Result status: Final result    · LV: Estimated LVEF is 15 - 20%. Visually measured ejection fraction. Normal wall thickness. Dilated left ventricle. Severely reduced systolic function. Left ventricular diastolic dysfunction. · LA: Mildly dilated left atrium. · RV: Mildly reduced systolic function. · RA: Mildly dilated right atrium. · MV: Mild mitral valve regurgitation is present. · TV: Mild tricuspid valve regurgitation is present.            Multidisciplinary Rounds Completed: Yes    ABCDEF Bundle/Checklist Completed:  Yes    SPECIAL EQUIPMENT  IHD    DISPOSITION  Stay in ICU    CRITICAL CARE CONSULTANT NOTE  I had a face to face encounter with the patient, reviewed and interpreted patient data including clinical events, labs, images, vital signs, I/O's, and examined patient. I have discussed the case and the plan and management of the patient's care with the consulting services, the bedside nurses and the respiratory therapist.      NOTE OF PERSONAL INVOLVEMENT IN CARE   This patient has a high probability of imminent, clinically significant deterioration, which requires the highest level of preparedness to intervene urgently. I participated in the decision-making and personally managed or directed the management of the following life and organ supporting interventions that required my frequent assessment to treat or prevent imminent deterioration. I personally spent 30 minutes of critical care time. This is time spent at this critically ill patient's bedside actively involved in patient care as well as the coordination of care and discussions with the patient's family. This does not include any procedural time which has been billed separately.     Luis Rudolph DO, MS  Staff Intensivist/Anesthesiologist  [facility]  10/28/2020

## 2020-10-28 NOTE — PROGRESS NOTES
[facility]            Heart [facility] V[facility] [place]               Division of Cardiology  [phone]                       Progress note    NAME:  Fernando Camarena   :   1995   MRN:   [id number]         ICD-10-CM ICD-9-CM    1. Troponin level elevated  R77.8 790.6    2. Drug abuse ([address]  F19.10 305.90    3. Acute renal failure with tubular necrosis (HCC)  N17.0 584.5    4. Obtunded  R40.1 780.09    5. Abnormality of basal ganglia (HCC)  Q04.8 742.8    6. Toxic metabolic encephalopathy  S76 349.82    7. Dilated cardiomyopathy (HCC)  I42.0 425.4    8. Essential hypertension  I10 401.9          Subjective:  Overnight events noted. He is sedated on bipap. Assessment/Plan:  1. Cardiomyopathy:        Severe. Mechanism and etiology of cardiomyopathy unclear at this time. Possibly       related to rhabdomyolysis but coronary ischemia due to cocaine use cannot be       entirely ruled out given also a significant increase in troponin       (albeit normal ck mb index)         Continue coreg, hydralazine and isordil     Entresto on hold on account of the renal dysfunction for now.          2. JEAN PIERRE:        Secondary to rhabdomyolysis:  undergoing hemodialysis.     3.  Polysubstance abuse:        His UDS was positive for cocaine and marijuana amphetamines ecstacy        benzodiazepines.     4.  Left upper extremity DVT: On IV heparin.     5.  Left PNA: On IV antibiotics as per primary team        So far is fairly well compensated, low significantly volume overloaded. bp better with sedation and dialysis, plan for more hd per dr Michael Lamar. Plan for limited echo tomorrow. Continue current meds as able. CARDIAC STUDIES      10/21/20   ECHO ADULT COMPLETE 10/22/2020 10/22/2020    Narrative · LV: Estimated LVEF is 15 - 20%. Visually measured ejection fraction. Normal wall thickness. Dilated left ventricle. Severely reduced systolic   function.  Left ventricular diastolic dysfunction. · LA: Mildly dilated left atrium. · RV: Mildly reduced systolic function. · RA: Mildly dilated right atrium. · MV: Mild mitral valve regurgitation is present. · TV: Mild tricuspid valve regurgitation is present. Signed by: Theresa Quezada MD                  EKG Results     Procedure 720 Value Units Date/Time    EKG, 12 LEAD, INITIAL [[id number]]     Order Status:  Sent     EKG, 12 LEAD, INITIAL [[id number]]     Order Status:  Sent     EKG, 12 LEAD, INITIAL [[id number]]     Order Status:  Sent     EKG, 12 LEAD, INITIAL [[id number]]     Order Status:  Sent     EKG, 12 LEAD, INITIAL [[id number]] Collected:  10/22/20 0121    Order Status:  Completed Updated:  10/22/20 0739     Ventricular Rate 100 BPM      Atrial Rate 100 BPM      P-R Interval 144 ms      QRS Duration 92 ms      Q-T Interval 386 ms      QTC Calculation (Bezet) 497 ms      Calculated P Axis 37 degrees      Calculated R Axis 22 degrees      Calculated T Axis 21 degrees      Diagnosis --     Normal sinus rhythm  T wave abnormality, consider anterior ischemia    No previous ECGs available  Confirmed by Yonathan Nation M.D., Rina Moreno ([id number]) on 10/22/2020 7:39:01 AM              IMAGING      CT Results (most recent):  Results from [place] encounter on 10/21/20   CT CHEST WO CONT    Narrative CT dose reduction was achieved through use of a standardized protocol tailored  for this examination and automatic exposure control for dose modulation. Noncontrast study shows moderate severity patchy consolidation and lesser degree  of edema throughout much of the left lung, sparing the apex, central and  peripheral. There is no large region of dense consolidation with the greatest  severity at the elevated left base. Right lung is clear and central airways are  open. No mediastinal or hilar adenopathy. Normal caliber aorta. No pleural pericardial  effusion.  No significant upper abdominal findings      Impression IMPRESSION: Moderate severity pneumonia throughout the left lung       XR Results (most recent):  Results from Hospital Encounter encounter on 10/21/20   XR CHEST PORT    Narrative Clinical history: tachypnea, respiratory failure, pneumonia  INDICATION:   tachypnea, respiratory failure, pneumonia  COMPARISON: 10/27/2020    FINDINGS:  AP portable upright view of the chest demonstrates a stable  cardiopericardial  silhouette. Persistent interstitial and parenchymal opacities not significantly  changed. Dialysis catheter on the right. Central venous access catheter extends  from the left. No change. .Patient is on a cardiac monitor. Impression IMPRESSION:  Stable bilateral parenchymal opacities. [facility] MRI Results (most recent):  Results from [place] encounter on 10/21/20   MRA BRAIN WO CONT    Narrative INDICATION: hypoxia    COMPARISON:  None    TECHNIQUE:  3-D time-of-flight MRA of the brain was performed. Multiplanar  reconstructions were obtained. FINDINGS:    Posterior Circulation:  No flow limiting stenosis or occlusion. Anterior Circulation:  No flow limiting stenosis or occlusion. Additional Comments:  No evidence of aneurysm or vascular malformation. Impression IMPRESSION:  No flow limiting stenosis or intracranial aneurysm. Past Medical History:   Diagnosis Date    Anxiety     Depression            Past Surgical History:   Procedure Laterality Date    HX ORTHOPAEDIC                 Visit Vitals  /79   Pulse 77   Temp 98.7 °F (37.1 °C)   Resp 29   Ht 5' 10\" (1.778 m)   Wt 237 lb (107.5 kg)   SpO2 100%   BMI 34.01 kg/m²         Wt Readings from Last 3 Encounters:   10/28/20 237 lb (107.5 kg)   10/22/20 195 lb (88.5 kg)   10/21/20 185 lb (83.9 kg)         Review of Systems:   Pertinent items are noted in the History of Present Illness. 10/28 0701 - 10/28 1900  In: -   Out: 250 [Urine:250]    10/26 1901 - 10/28 0700  In: 3234.3 [P.O.:240;  I.V.:2994.3]  Out: 6466 [Urine:400]      Telemetry: normal sinus rhythm    Physical Exam:    Neck: no JVD  Heart: regular rate and rhythm  Lungs: diminished breath sounds R anterior, L anterior  Abdomen: soft, non-tender.  Bowel sounds normal. No masses,  no organomegaly  Extremities: edema 1+    Current Facility-Administered Medications   Medication Dose Route Frequency    dexmedeTOMidine in 0.9 % NaCl (PRECEDEX) 400 mcg/100 mL (4 mcg/mL) infusion soln  0.1-1.5 mcg/kg/hr IntraVENous TITRATE    LORazepam (ATIVAN) injection 2 mg  2 mg IntraVENous Q4H PRN    midazolam (VERSED) injection 2 mg  2 mg IntraVENous ONCE    doxycycline (VIBRA-TABS) tablet 100 mg  100 mg Oral Q12H    fluticasone propionate (FLONASE) 50 mcg/actuation nasal spray 2 Spray  2 Spray Both Nostrils DAILY    hydrALAZINE (APRESOLINE) tablet 25 mg  25 mg Oral TID    Vancomycin- pharmacy to dose   Other Rx Dosing/Monitoring    alteplase (CATHFLO) 1 mg in sterile water (preservative free) 1 mL injection  1 mg InterCATHeter PRN    isosorbide dinitrate (ISORDIL) tablet 10 mg  10 mg Oral TID    carvediloL (COREG) tablet 6.25 mg  6.25 mg Oral BID WITH MEALS    HYDROmorphone (PF) (DILAUDID) injection 2 mg  2 mg IntraVENous Q4H PRN    heparin 25,000 units in D5W 250 ml infusion  17-36 Units/kg/hr IntraVENous TITRATE    balsam peru-castor oiL (VENELEX) ointment   Topical Q8H    sodium chloride (NS) flush 5-40 mL  5-40 mL IntraVENous Q8H    sodium chloride (NS) flush 5-40 mL  5-40 mL IntraVENous PRN    acetaminophen (TYLENOL) tablet 650 mg  650 mg Oral Q6H PRN    Or    acetaminophen (TYLENOL) suppository 650 mg  650 mg Rectal Q6H PRN    polyethylene glycol (MIRALAX) packet 17 g  17 g Oral DAILY PRN    ondansetron (ZOFRAN) injection 4 mg  4 mg IntraVENous Q6H PRN    albuterol-ipratropium (DUO-NEB) 2.5 MG-0.5 MG/3 ML  3 mL Nebulization Q4H PRN    docusate sodium (COLACE) capsule 100 mg  100 mg Oral DAILY    labetaloL (NORMODYNE;TRANDATE) injection 20 mg  20 mg IntraVENous Q4H PRN    heparin (porcine) 1,000 unit/mL injection 1,400 Units  1,400 Units InterCATHeter DIALYSIS PRN    And    heparin (porcine) 1,000 unit/mL injection 1,100 Units  1,100 Units InterCATHeter DIALYSIS PRN         All Cardiac Markers in the last 24 hours:    Lab Results   Component Value Date/Time    CPK 3,620 (H) 10/28/2020 06:15 AM        Lab Results   Component Value Date/Time    Creatinine 4.50 (H) 10/28/2020 06:15 AM          Lab Results   Component Value Date/Time    CK 3,620 (H) 10/28/2020 06:15 AM    CK - MB 4.1 (H) 10/26/2020 11:50 PM    CK-MB Index 0.1 10/26/2020 11:50 PM    Troponin-I, Qt. 19.80 (H) 10/23/2020 04:15 AM         Lab Results   Component Value Date/Time    WBC 14.9 (H) 10/28/2020 06:15 AM    HGB 8.4 (L) 10/28/2020 06:15 AM    HCT 25.2 (L) 10/28/2020 06:15 AM    PLATELET 266 25/10/4053 06:15 AM    MCV 84.3 10/28/2020 06:15 AM         Lab Results   Component Value Date/Time    Sodium 132 (L) 10/28/2020 06:15 AM    Potassium 4.0 10/28/2020 06:15 AM    Chloride 98 10/28/2020 06:15 AM    CO2 23 10/28/2020 06:15 AM    Anion gap 11 10/28/2020 06:15 AM    Glucose 100 10/28/2020 06:15 AM    BUN 39 (H) 10/28/2020 06:15 AM    Creatinine 4.50 (H) 10/28/2020 06:15 AM    BUN/Creatinine ratio 9 (L) 10/28/2020 06:15 AM    GFR est AA 20 (L) 10/28/2020 06:15 AM    GFR est non-AA 16 (L) 10/28/2020 06:15 AM    Calcium 8.1 (L) 10/28/2020 06:15 AM         Lab Results   Component Value Date/Time    NT pro-BNP 3,484 (H) 10/22/2020 12:55 AM         No results found for: CHOL, CHOLPOCT, CHOLX, CHLST, CHOLV, HDL, HDLPOC, HDLP, LDL, LDLCPOC, LDLC, DLDLP, VLDLC, VLDL, TGLX, TRIGL, TRIGP, TGLPOCT, CHHD, CHHDX      Lab Results   Component Value Date/Time    ALT (SGPT) 430 (H) 10/28/2020 06:15 AM    Alk.  phosphatase 66 10/28/2020 06:15 AM    Bilirubin, direct 0.3 (H) 10/24/2020 01:27 AM    Bilirubin, total 0.5 10/28/2020 06:15 AM       VERNA Alberto MD

## 2020-10-28 NOTE — PROGRESS NOTES
Critical Care Documentation    Name: Melany Michel  YOB: 1995  MRN: [id number]  Admission Date: 10/21/2020 11:53 PM    Date of service: 10/28/2020    Active Diagnoses:    Hospital Problems  Never Reviewed          Codes Class Noted POA    Toxic encephalopathy ICD-10-CM: G92  ICD-9-CM: 349.82  10/24/2020 Unknown        Drug abuse ([facility] ICD-10-CM: F19.10  ICD-9-CM: 305.90  10/22/2020 Unknown        Acute renal failure with tubular necrosis ([facility] ICD-10-CM: N17.0  ICD-9-CM: 584.5  10/22/2020 Unknown        * (Principal) Sepsis ([facility] ICD-10-CM: A41.9  ICD-9-CM: 038.9, 995.91  10/22/2020 Unknown        Community acquired pneumonia of left lower lobe of lung ICD-10-CM: J18.9  ICD-9-CM: 543  10/22/2020 Yes        Electrolyte abnormality ICD-10-CM: E87.8  ICD-9-CM: 276.9  10/22/2020 Yes              Critical Illness Complaint:  Agitation, tachycardia    Clinical Presentation:  Rapid response called 0055 due to rapidly increasing agitation, aggressive behavior, tachycardia to 140s, increased RR to 40. Per RN, patient admitted with hx of polysubstance abuse, recent downgrade from ICU where he had been admitted due to unresponsiveness and hypoxia. On IMCU, patient has been using NIPPV with plans to wean as tolerated. This evening with volume overload, HD ordered. ICU team was consulted, agreed with plans for HD, remain in IMCU and reassess volume/respiratory status. Reading through HD, patient became acutely agitated, attempting to pull at HD catheter and NIPPV mask with increased RR and HR. HD was stopped, rapid response called. Patient has received IV ativan with minimal improvement. Physical Exam  Constitutional:       General: He is in acute distress. Appearance: He is diaphoretic. He is not ill-appearing. HENT:      Mouth/Throat:      Mouth: Mucous membranes are moist.   Cardiovascular:      Rate and Rhythm: Regular rhythm. Tachycardia present. Pulmonary:      Breath sounds: Wheezing present. Comments: Tachypnea, increased work of breathing, on NIPPV  Skin:     General: Skin is warm. Capillary Refill: Capillary refill takes less than 2 seconds. Neurological:      Mental Status: He is alert. He is disoriented. Psychiatric:      Comments: Agitated, nonsensical when speaking       Data Reviewed: All diagnostic labs and studies have been reviewed. CXR this evening with bilateral airspace opacification  PCO2 33.2, pH 7.46, HCO3 23.5  sCr 4.81  Procalcitonin 3.15    Medications/Therapies Administered:   Anxiolytics: [ X ] yes [  ] no   Antiarrhythmics: [  ] yes Re  ] no   Antihypertensives: [  ] yes Thonger  ] no   IVFs: [  ] yes Re  ] no   Blood Transfusion: [  ] yes [ X ] no    Imaging Ordered and Reviewed:   [  ] CXR    [  ] CT Scan    [  ] MRI    [  ] Ultrasound    Prognosis / Plan of Care Discussed With:  Re  ] Patient  [  ] Family Members / Surrogate Decision-makers (patient unable / incompetent to give history and/or make treatment decisions, and such discussion is medically necessary)  [ X ] Nursing Staff  [ X ] Specialist Physicians - ICU team        Assessment and Plan:  Agitation/delirium  - With AMS, tachycardia, tachypnea, hypertension  - ICU team evaluating patient, suspect withdrawal - recommended fentanyl 100mcg IV, symptoms improved but remains agitated  - Pulling at BiPap mask, HD catheter. Unable to be restrained due to NIPPV.  - ICU team accepts patient, plans for possible precedex gtt  - Sitter at bedside  - Continue NIPPV, may require further fentanyl pushes till transferred to ICU    Critical Care Attestation: This patient is unstable and critically ill.  Due to a high probability of clinically significant, life threatening deterioration, the patient required my highest level of preparedness to intervene emergently and I personally spent this critical care time directly and personally managing the patient, and was immediately available to the patient. This critical care time included obtaining a history; examining the patient; pulse oximetry; ordering and review of labs/studies; arranging urgent treatment with development of a management plan; evaluation of patient's response to treatment; frequent reassessment; and, discussions with other providers and/or family. This critical care time was performed to assess and manage the high probability of imminent, life-threatening deterioration that could result in multi-organ failure and death. Time Spent:     I personally spent 34 minutes in providing critical care time. It was exclusive of separately billable procedures, treating other patients, and teaching time.     Lynnette Delvalle NP  10/28/2020  1:29 AM

## 2020-10-28 NOTE — CONSULTS
[facility]    ICU TEAM Consult Note    Name: Gin Hansen   : 1995   MRN: [id number]   Date: 10/28/2020      Assessment:     ICU Problems:    1. Pneumonia CAP   2. Sepsis  3. Cardiomyopathy EF 15-20%  4. JEAN PIERRE secondary to rhabdomyolysis related to drug abuse  5. DVT on heparin infusion, left upper extremity  6. Polysubstance abuse  7. Toxic encephalopathy  8. Acute opiate withdraw  9. Acute hypoxic respiratory failure secondary to pneumonia and agitation      Imaging:  CT Results  (Last 48 hours)    None          ICU Comprehensive Plan of Care:     Plans for this Shift:     1. Transfer to ICU  2. Precedex infusion for acute delerium and withdraw  3. Maintain sittier at bedside  4. BiPap if needed to maintain saturations greater than 92%  5. Diuresis/HD for pulmonary edema  6. Continue abx for pneumonia, rocephin, doxycycline  7. Continue heparin for DVT  8. Coreg for cardiomyopathy  9. SBP Goal of: > 90 mmHg  10. MAP Goal of: > 65 mmHg  11. None - For above SBP/MAP goals  12. IVFs:   13. Transfusion Trigger (Hgb): <7 g/dL  14. Respiratory Goals:  a. Incentive spirometry  15. Pulmonary toilet: Incentive Spirometry   16. SpO2 Goal: > 92%  17. Keep K>4; Mg>2   18. PT/OT: PT consulted and on board and OT consulted and on board   19. Goals of Care Discussion with family Yes   21. Plan of Care/Code Status: Full Code  21. Appreciate Consultants Input   22. Discussed Care Plan with Bedside RN  23. Documentation of Current Medications  24.  Rest of Plan Below:    F - Feeding:  Yes   A - Analgesia: Dilaudid and Acetaminophen  S - Sedation: Precedex  T - DVT Prophylaxis: Heparin   H - Head of Bed: > 30 Degrees  U - Ulcer Prophylaxis: Pepcid (famotidine)   G - Glycemic Control: Insulin  S - Spontaneous Breathing Trial: N/A  B - Bowel Regimen: MiraLax  I - Indwelling Catheter:   Tubes: None  Lines: Peripheral IV  Drains: None  D - De-escalation of Antibiotics: Ceftriaxone  Vancomycin    Subjective: Progress Note: 10/28/2020      Reason for ICU Admission: This is a 26year old male who was initially admitted to [facility] ICU on 10/21/2020 with Pneumonia and polysubstance abuse. He was found by his parents after sleeping greater than 24 hours and EMS was called. Upon EMS arrival he was found to be hypoxic and unresponsive. He was also hypotensive requiring 2L IV fluid in the ED. He was stabilized and transferred to the [facility] where today a rapid response was called secondary to acute aggression and agitation and altered mental status. He appears to be in acute opiate withdraw. During these episodes he becomes hypoxic and is requiring intermittent Bipap. Will transfer back to ICU for precedex infusion. He was also noted earlier in the day to have fluid overload and was given diuresis and only able to complete about half of his dialysis before becoming aggressive, prompting this to be stopped. HPI:      POD:  * No surgery found *    S/P:       Active Problem List:     Problem List  Never Reviewed          Codes Class    Toxic encephalopathy ICD-10-CM: G92  ICD-9-CM: 349.82         Drug abuse ([facility] [address] ICD-10-CM: F19.10  ICD-9-CM: 305.90         Acute renal failure with tubular necrosis (HCC) ICD-10-CM: N17.0  ICD-9-CM: 584.5         * (Principal) Sepsis ([facility] [address] ICD-10-CM: A41.9  ICD-9-CM: 038.9, 995.91         Community acquired pneumonia of left lower lobe of lung ICD-10-CM: J18.9  ICD-9-CM: 702         Metabolic acidosis HEN-99-JL: E87.2  ICD-9-CM: 276.2         Lymphocytosis ICD-10-CM: D72.820  ICD-9-CM: 288.61         Electrolyte abnormality ICD-10-CM: E87.8  ICD-9-CM: 276.9         Troponin level elevated ICD-10-CM: R77.8  ICD-9-CM: 790.6               Past Medical History:      has a past medical history of Anxiety and Depression. Past Surgical History:      has a past surgical history that includes hx orthopaedic. Home Medications:     Prior to Admission medications    Medication Sig Start Date End Date Taking? Authorizing Provider   naloxone (Narcan) 4 mg/actuation nasal spray Use 1 spray intranasally, then discard. Repeat with new spray every 2 min as needed for opioid overdose symptoms, alternating nostrils. 10/18/20   Tyrel Cassidy MD   FLUoxetine (PROzac) 20 mg capsule Take 20 mg by mouth daily. Kaleb Moncada MD   hydrOXYzine HCL (ATARAX) 25 mg tablet Take 25 mg by mouth two (2) times a day. Kaleb Moncada MD       Allergies/Social/Family History: Allergies   Allergen Reactions    Tramadol Nausea and Vomiting      Social History     Tobacco Use    Smoking status: Current Every Day Smoker     Packs/day: 0.50    Smokeless tobacco: Former User   Substance Use Topics    Alcohol use: Not Currently      No family history on file. Review of Systems:     A comprehensive review of systems was negative except for that written in the HPI. Objective:   Vital Signs:  Visit Vitals  BP (!) 156/86 (BP 1 Location: Right arm, BP Patient Position: At rest)   Pulse (!) 103   Temp 98.7 °F (37.1 °C)   Resp (!) 45   Ht 5' 10\" (1.778 m)   Wt 117.2 kg (258 lb 6.4 oz)   SpO2 98%   BMI 37.08 kg/m²    O2 Flow Rate (L/min): 6 l/min O2 Device: BIPAP Temp (24hrs), Av.3 °F (36.8 °C), Min:97.9 °F (36.6 °C), Max:98.7 °F (37.1 °C)           Intake/Output:     Intake/Output Summary (Last 24 hours) at 10/28/2020 0157  Last data filed at 10/28/2020 0000  Gross per 24 hour   Intake 240 ml   Output 1435 ml   Net -1195 ml       Physical Exam:    General:  Agitated, aggressive, appears to be in distress   Eyes:  Sclera anicteric. Pupils equally round and reactive to light. Mouth/Throat: Mucous membranes normal, oral pharynx clear   Neck: Supple   Lungs:   Crackles to bilateral lung fields. CV:  Tachycardia with normal rhythm,no murmur, click, rub or gallop   Abdomen:   Soft, non-tender.  bowel sounds normal. non-distended   Extremities: No cyanosis or edema   Skin: Skin color, texture, turgor normal. Multiple blisters and areas of erythema   Lymph nodes: Cervical and supraclavicular normal   Musculoskeletal: No swelling or deformity   Lines/Devices:  Intact, no erythema, drainage or tenderness   Psych: Acute delerium       LABS AND  DATA: Personally reviewed  Recent Labs     10/27/20  0519 10/26/20  0419   WBC 13.9* 13.3*   HGB 8.9* 8.9*   HCT 26.2* 27.2*    250     Recent Labs     10/27/20  1221 10/27/20  0511   * 130*   K 3.8 3.8   CL 95* 94*   CO2 27 27   BUN 37* 34*   CREA 4.81* 4.55*   GLU 99 110*   CA 8.2* 8.4*     Recent Labs     10/27/20  0511 10/26/20  0419   AP 79 78   TP 5.4* 5.1*   ALB 2.1* 2.0*   GLOB 3.3 3.1     Recent Labs     10/27/20  2105 10/27/20  1312  10/27/20  0323  10/26/20  0419   INR  --   --   --  1.2*  --  1.5*   PTP  --   --   --  12.4*  --  15.0*   APTT 34.5* 57.5*   < >  --    < > 56.2*    < > = values in this interval not displayed. Recent Labs     10/27/20  2011   PHI 7.46*   PCO2I 33.2*   PO2I 93     Recent Labs     10/27/20  0511 10/26/20  2350 10/26/20  1103   CPK 4,641* 5,090* 5,780*   CKMB  --  4.1* 4.5*       Hemodynamics:   PAP:   CO:     Wedge:   CI:     CVP:    SVR:       PVR:       Ventilator Settings:  Mode Rate Tidal Volume Pressure FiO2 PEEP            50 %       Peak airway pressure:      Minute ventilation: 22.8 l/min        MEDS: Reviewed    Chest X-Ray:  CXR Results  (Last 48 hours)               10/27/20 1748  XR CHEST PORT Final result    Impression:  IMPRESSION: No significant change in bilateral airspace opacification, left   greater than right. Narrative:  EXAM: XR CHEST PORT       INDICATION: Tachypnea       COMPARISON: October 25       FINDINGS: A portable AP radiograph of the chest was obtained at 1741 hours. The   lines are stable. The patient is on a cardiac monitor. There is persistent   opacification in the right lower lobe and throughout the lung, without   significant change. Cardia mediastinal contours are stable.   The bones and soft   tissues are grossly within normal limits. ECHO:  10/22/2020  Result status: Final result    · LV: Estimated LVEF is 15 - 20%. Visually measured ejection fraction. Normal wall thickness. Dilated left ventricle. Severely reduced systolic function. Left ventricular diastolic dysfunction. · LA: Mildly dilated left atrium. · RV: Mildly reduced systolic function. · RA: Mildly dilated right atrium. · MV: Mild mitral valve regurgitation is present. · TV: Mild tricuspid valve regurgitation is present. Multidisciplinary Rounds Completed:  Pending    ABCDEF Bundle/Checklist Completed:  Yes    SPECIAL EQUIPMENT  IHD    DISPOSITION  Stay in ICU    CRITICAL CARE CONSULTANT NOTE  I had a face to face encounter with the patient, reviewed and interpreted patient data including clinical events, labs, images, vital signs, I/O's, and examined patient. I have discussed the case and the plan and management of the patient's care with the consulting services, the bedside nurses and the respiratory therapist.      NOTE OF PERSONAL INVOLVEMENT IN CARE   This patient has a high probability of imminent, clinically significant deterioration, which requires the highest level of preparedness to intervene urgently. I participated in the decision-making and personally managed or directed the management of the following life and organ supporting interventions that required my frequent assessment to treat or prevent imminent deterioration. I personally spent 40 minutes of critical care time. This is time spent at this critically ill patient's bedside actively involved in patient care as well as the coordination of care and discussions with the patient's family. This does not include any procedural time which has been billed separately.       Stephenie Nolasco [facility]     Critical Care Medicine  [facility]

## 2020-10-28 NOTE — PROGRESS NOTES
0050: Pt began to get very anxious while on dialysis. Repeatedly asking to take the his BIPAP and stop the dialysis. Pt was not listening to the nursing advise given regarding dialysis or BIPAP. Pt began to pull at lines as well as BIPAP. Gave PRN dose of 1 mg ativan which did nothing to calm him down. Pt HR went up into the 140s while BP remained elevated as previous 180s over 100s. Notified Angel Andres NP of pts recent behavior. Receieved orders for another dose of Ativan     0100: Called rapid response. Pt was given 2 mg Ativan and 100mcg of Fentanyl.      0300: TRANSFER - OUT REPORT:    Verbal report given to Analilia BAEZ(name) on Carlos Cabrera  being transferred to ICU (unit) for routine progression of care       Report consisted of patients Situation, Background, Assessment and   Recommendations(SBAR). Information from the following report(s) SBAR, Kardex, ED Summary, Intake/Output, MAR, Recent Results, and Cardiac Rhythm NS/ST  was reviewed with the receiving nurse. Lines:   Quad Lumen 10/22/20 (Active)   Central Line Being Utilized Yes 10/27/20 2315   Criteria for Appropriate Use Long term IV/antibiotic administration 10/27/20 2315   Site Assessment Clean, dry, & intact 10/27/20 2315   Infiltration Assessment 0 10/27/20 2315   Affected Extremity/Extremities Color distal to insertion site pink (or appropriate for race); Pulses palpable;Range of motion performed 10/27/20 2315   Date of Last Dressing Change 10/22/20 10/27/20 2315   Dressing Status Clean, dry, & intact 10/27/20 2315   Dressing Type Disk with Chlorhexadine gluconate (CHG); Tape;Transparent 10/27/20 2315   Action Taken Open ports on tubing capped 10/27/20 2315   Proximal Hub Color/Line Status White; Infusing 10/27/20 2315   Positive Blood Return (Medial Site) Yes 10/27/20 2315   Medial 1 Hub Color/Line Status Gray;Capped 10/27/20 2315   Positive Blood Return (Lateral Site) Yes 10/27/20 2315   Medial 2 Hub Color/Line Status Blue;Capped 10/27/20 2315 Positive Blood Return (Site #3) Yes 10/27/20 2315   Distal Hub Color/Line Status Brown;Capped 10/27/20 2315   Positive Blood Return (Site #4) Yes 10/27/20 2315   Alcohol Cap Used Yes 10/27/20 2315       Peripheral IV 10/21/20 Right Antecubital (Active)   Site Assessment Clean, dry, & intact 10/27/20 2315   Phlebitis Assessment 0 10/27/20 2315   Infiltration Assessment 0 10/27/20 2315   Dressing Status Clean, dry, & intact 10/27/20 2315   Dressing Type Tape;Transparent 10/27/20 2315   Hub Color/Line Status Green;Flushed;End cap changed 10/27/20 2315   Action Taken Open ports on tubing capped 10/27/20 2315   Alcohol Cap Used Yes 10/27/20 2315          Opportunity for questions and clarification was provided. Patient transported with:   Monitor  O2 @ non rebreather 15 liters  Patient-specific medications from Pharmacy  Registered Nurse  Patient chart    Problem: Falls - Risk of  Goal: *Absence of Falls  Description: Document Maurisio Le Fall Risk and appropriate interventions in the flowsheet.   Outcome: Progressing Towards Goal  Note: Fall Risk Interventions:  Mobility Interventions: Communicate number of staff needed for ambulation/transfer, Patient to call before getting OOB    Mentation Interventions: Adequate sleep, hydration, pain control, Door open when patient unattended, Family/sitter at bedside, More frequent rounding, Reorient patient, Update white board    Medication Interventions: Patient to call before getting OOB, Evaluate medications/consider consulting pharmacy    Elimination Interventions: Call light in reach, Patient to call for help with toileting needs, Toileting schedule/hourly rounds    History of Falls Interventions: Door open when patient unattended, Room close to nurse's station, Assess for delayed presentation/identification of injury for 48 hrs (comment for end date), Vital signs minimum Q4HRs X 24 hrs (comment for end date)    Problem: Impaired Skin Integrity/Pressure Injury Treatment  Goal: *Prevention of pressure injury  Description: Document Abdirashid Scale and appropriate interventions in the flowsheet. Outcome: Progressing Towards Goal  Note: Pressure Injury Interventions:  Sensory Interventions: Assess changes in LOC, Check visual cues for pain, Float heels, Minimize linen layers, Turn and reposition approx. every two hours (pillows and wedges if needed)    Moisture Interventions: Absorbent underpads, Internal/External urinary devices, Check for incontinence Q2 hours and as needed, Minimize layers    Activity Interventions: Pressure redistribution bed/mattress(bed type)    Mobility Interventions: Float heels, Pressure redistribution bed/mattress (bed type), HOB 30 degrees or less, Turn and reposition approx.  every two hours(pillow and wedges)    Nutrition Interventions: Document food/fluid/supplement intake, Offer support with meals,snacks and hydration    Friction and Shear Interventions: Minimize layers, HOB 30 degrees or less, Lift sheet    Problem: Breathing Pattern - Ineffective  Goal: *Use of effective breathing techniques  Outcome: Progressing Towards Goal     Problem: Pain  Goal: *Control of Pain  Outcome: Progressing Towards Goal

## 2020-10-28 NOTE — DIALYSIS
[facility]  [phone]    Vitals   Pre   Post   Assessment   Pre   Post     Temp  98*F 98.7*F  LOC  A&O x4; approp. With some anxiety Agitated; anxiety; thrashing around in bed   HR   100 136 Lungs   SOB; Cont. Bipap & rapid RR No change; worsening SOB & anxiety   B/P  159/111 173/119 Cardiac   Sinus tach on bedsdie monitor No change   Resp   36 49 Skin   Warm & dry Warm & diaphoretic   Pain level  0/10 0/10 Edema  Generalized pitting     No change   Orders:    Duration:   Start:   2230 End:   0000 Total:   2hrs   Dialyzer:   Dialyzer/Set Up Inspection: Ally Burciaga (10/27/20 2213)   K Bath:   Dialysate K (mEq/L): 3 (10/27/20 2230)   Ca Bath:   Dialysate CA (mEq/L): 2.5 (10/27/20 2230)   Na/Bicarb:   Bicarb 35 mEq/L   Target Fluid Removal:   Goal/Amount of Fluid to Remove (mL): 2000 mL (10/27/20 2213)   Access     Type & Location:   [facility] temp. CVC: prepped limbs & hubs per hospital P&P; +asp/+flush x2 ports. Dressing tegaderm with biopatch C/D/I. At end flushed x2 ports with NS & heparin dwells instilled followed by sterile cap application.     Labs     Obtained/Reviewed   Critical Results Called   Date when labs were drawn-  Hgb-    HGB   Date Value Ref Range Status   10/27/2020 8.9 (L) 12.1 - 17.0 g/dL Final     K-    Potassium   Date Value Ref Range Status   10/27/2020 3.8 3.5 - 5.1 mmol/L Final     Ca-   Calcium   Date Value Ref Range Status   10/27/2020 8.2 (L) 8.5 - 10.1 MG/DL Final     Bun-   BUN   Date Value Ref Range Status   10/27/2020 37 (H) 6 - 20 MG/DL Final     Creat-   Creatinine   Date Value Ref Range Status   10/27/2020 4.81 (H) 0.70 - 1.30 MG/DL Final        Medications/ Blood Products Given     Blood Volume Processed (BVP):   35 L  Net Fluid   Removed:  1000 ml   Comments   Time Out Done: Yes 10/27/20 @ 2215  Primary Nurse Rpt Pre: Baldemar Sher, primary RN  Primary Nurse Rpt PostClifm Néstor, primary RN  Pt Education: Given - procedural, fluid management, respiratory status, treatment plan   Care Plan: See Nephrology Note- STAT tx tonight due to bipap & SOB  Tx Summary:  8216: HD +; immediately pt became restless & anxious, lowered BFR/DFR & RN @ bedside to assist helping to calm pt. Pt calm, watching TV, VS stabilizing. BP started elevated. BP meds held for HD.     0000: Pt became agitated & irratic, having panic attack; primary RN at bedside called for help. Pt becoming dangerous and pulling on lines and trying to get up, removal of bipap & having severe SOB; tried to give education about stopping HD early with no success. All blood returned to pt with 300 mls NS & cvc flushed, hep locked & sterile caps applied. Pt medicated & mutliple staff at bedside to help calm and stabilize pt. Dr. Ngozi Harrison contacted & updated on pt status and treatment termination. Plan is to check on pt for HD again today. Did not tolerated tx well & UF goal not met. Ran 1.5 / 2.5 hr tx ordered.      Admiting Diagnosis: JEAN PIERRE  Consent signed - Informed Consent Verified: Yes (10/27/20 2213)  Hepatitis Status- Negative / Susceptible 10/22/20  Machine #- Machine Number: [id number] (10/27/20 2213)  Telemetry status- bedside monitoring

## 2020-10-28 NOTE — PROGRESS NOTES
ZACKARY  Patient presented to the ED at [facility] after being found unresponsive. Transferred to [facility] for a higher level of care. RUR 29%  Disposition: TBD    Patient transferred back to ICU for increased agitation , started on a Precedex gtt and Bipap. Care management continuing to follow.   Brad Kelly RN, Care Management

## 2020-10-28 NOTE — PROGRESS NOTES
[facility]   [address] [facility], [address] [place] [address], [facility]  Phone: [phone]   [phone]       Nephrology Progress Note  Kaley Swift     1995     [id number]  Date of Admission : 10/21/2020  10/28/20    CC: Follow up for ARF, Rhabdomyolysis        Assessment and Plan   JEAN PIERRE  - Severe ATN from Rhabdomyolysis. Oligoanuric   - started emergent HD on 10/22  - HD again today    Severe Rhabdomyolysis   - 2/2 Substance use  - LFTs and CPK trending down    Left Lung PNA w/ sepsis     LUE DVT:  - on heparin drip    Resp failure:  - from pulm edema   - HD again today    Metabolic acidosis:  - improving    Hypocalcemia:  - stable  - replete PRN    Polysubstance abuse      Interval History:  Seen and examined. Lethargic on BiPAP. On precedex now. Did not tolerated HD well due to agitation. Transferred to ICU overnight. Calm this AM.    Review of Systems: Review of systems not obtained due to patient factors.     Current Medications:   Current Facility-Administered Medications   Medication Dose Route Frequency    dexmedeTOMidine in 0.9 % NaCl (PRECEDEX) 400 mcg/100 mL (4 mcg/mL) infusion soln  0.1-1.5 mcg/kg/hr IntraVENous TITRATE    LORazepam (ATIVAN) injection 2 mg  2 mg IntraVENous Q4H PRN    midazolam (VERSED) injection 2 mg  2 mg IntraVENous ONCE    doxycycline (VIBRA-TABS) tablet 100 mg  100 mg Oral Q12H    fluticasone propionate (FLONASE) 50 mcg/actuation nasal spray 2 Spray  2 Spray Both Nostrils DAILY    hydrALAZINE (APRESOLINE) tablet 25 mg  25 mg Oral TID    Vancomycin- pharmacy to dose   Other Rx Dosing/Monitoring    alteplase (CATHFLO) 1 mg in sterile water (preservative free) 1 mL injection  1 mg InterCATHeter PRN    isosorbide dinitrate (ISORDIL) tablet 10 mg  10 mg Oral TID    carvediloL (COREG) tablet 6.25 mg  6.25 mg Oral BID WITH MEALS    HYDROmorphone (PF) (DILAUDID) injection 2 mg  2 mg IntraVENous Q4H PRN    heparin 25,000 units in D5W 250 ml infusion 17-36 Units/kg/hr IntraVENous TITRATE    balsam peru-castor oiL (VENELEX) ointment   Topical Q8H    sodium chloride (NS) flush 5-40 mL  5-40 mL IntraVENous Q8H    sodium chloride (NS) flush 5-40 mL  5-40 mL IntraVENous PRN    acetaminophen (TYLENOL) tablet 650 mg  650 mg Oral Q6H PRN    Or    acetaminophen (TYLENOL) suppository 650 mg  650 mg Rectal Q6H PRN    polyethylene glycol (MIRALAX) packet 17 g  17 g Oral DAILY PRN    ondansetron (ZOFRAN) injection 4 mg  4 mg IntraVENous Q6H PRN    albuterol-ipratropium (DUO-NEB) 2.5 MG-0.5 MG/3 ML  3 mL Nebulization Q4H PRN    docusate sodium (COLACE) capsule 100 mg  100 mg Oral DAILY    labetaloL (NORMODYNE;TRANDATE) injection 20 mg  20 mg IntraVENous Q4H PRN    heparin (porcine) 1,000 unit/mL injection 1,400 Units  1,400 Units InterCATHeter DIALYSIS PRN    And    heparin (porcine) 1,000 unit/mL injection 1,100 Units  1,100 Units InterCATHeter DIALYSIS PRN      Allergies   Allergen Reactions    Tramadol Nausea and Vomiting       Objective:  Vitals:    Vitals:    10/28/20 0400 10/28/20 0500 10/28/20 0600 10/28/20 0700   BP: (!) 132/92 133/66 123/71 124/75   Pulse: 97 90 81 78   Resp: (!) 47 30 28 (!) 33   Temp: 98.7 °F (37.1 °C)      TempSrc:       SpO2: 99% 99% 100% 100%   Weight:       Height:         Intake and Output:  No intake/output data recorded. 10/26 1901 - 10/28 0700  In: 3234.3 [P.O.:240;  I.V.:2994.3]  Out: 0931 [Urine:400]    Physical Examination:    General: Lethargic   Neck:  Supple, no mass  Resp:  Lungs CTA B/L,  CV:  RRR,  no murmur or rub, no LE edema  GI:  Soft, NT, + Bowel sounds, no hepatosplenomegaly  Neurologic:  Lethargic   Psych:             Unable to assess  Skin:  + blistering  Rash  :  Iniguez     []    High complexity decision making was performed  []    Patient is at high-risk of decompensation with multiple organ involvement    Lab Data Personally Reviewed: I have reviewed all the pertinent labs, microbiology data and radiology studies during assessment. Recent Labs     10/28/20  0615 10/27/20  1221 10/27/20  0511 10/27/20  0323 10/26/20  0419   * 131* 130* 133* 130*   K 4.0 3.8 3.8 3.7 4.0   CL 98 95* 94* 96* 95*   CO2 23 27 27 27 27    99 110* 109* 98   BUN 39* 37* 34* 30* 35*   CREA 4.50* 4.81* 4.55* 4.34* 5.24*   CA 8.1* 8.2* 8.4* 8.2* 8.1*   ALB 2.2*  --  2.1*  --  2.0*   *  --  578*  --  769*   INR 1.4*  --   --  1.2* 1.5*     Recent Labs     10/28/20  0615 10/27/20  0519 10/26/20  0419   WBC 14.9* 13.9* 13.3*   HGB 8.4* 8.9* 8.9*   HCT 25.2* 26.2* 27.2*    307 250     No results found for: SDES  Lab Results   Component Value Date/Time    Culture result: NO GROWTH 5 DAYS 10/22/2020 06:19 AM    Culture result: NO GROWTH 5 DAYS 10/22/2020 02:37 AM    Culture result: (A) 10/21/2020 08:15 PM     Staphylococcus hominis (Oxacillin resistant) GROWING IN THE AEROBIC BOTTLE (SITE = R HAND)    Culture result:  10/21/2020 08:15 PM     Gram Positive Cocci CALLED TO AND READ BACK BY NEGRA Nuñez RN AT 2012 BY [facility]    Culture result: (A) 10/21/2020 08:10 PM     Staphylococcus hominis (Oxacillin resistant) GROWING IN THE AEROBIC BOTTLE (SITE = L HAND)    Culture result:  10/21/2020 08:10 PM     preliminary report of Gram Positive Cocci in clusters growing in 1 of 2 bottles drawn CALLED TO AND READ BACK BY FERN DAMON RN SCA[facility] AT 0320 ON 10/23/20.  Nerd[place] 1850     Recent Results (from the past 24 hour(s))   METABOLIC PANEL, BASIC    Collection Time: 10/27/20 12:21 PM   Result Value Ref Range    Sodium 131 (L) 136 - 145 mmol/L    Potassium 3.8 3.5 - 5.1 mmol/L    Chloride 95 (L) 97 - 108 mmol/L    CO2 27 21 - 32 mmol/L    Anion gap 9 5 - 15 mmol/L    Glucose 99 65 - 100 mg/dL    BUN 37 (H) 6 - 20 MG/DL    Creatinine 4.81 (H) 0.70 - 1.30 MG/DL    BUN/Creatinine ratio 8 (L) 12 - 20      GFR est AA 18 (L) >60 ml/min/1.73m2    GFR est non-AA 15 (L) >60 ml/min/1.73m2    Calcium 8.2 (L) 8.5 - 10.1 MG/DL   PTT    Collection Time: 10/27/20 1:12 PM   Result Value Ref Range    aPTT 57.5 (H) 22.1 - 32.0 sec    aPTT, therapeutic range     58.0 - 77.0 SECS   PROCALCITONIN    Collection Time: 10/27/20  7:27 PM   Result Value Ref Range    Procalcitonin 3.15 ng/mL   POC EG7    Collection Time: 10/27/20  8:11 PM   Result Value Ref Range    Calcium, ionized (POC) 1.16 1.12 - 1.32 mmol/L    pH (POC) 7.46 (H) 7.35 - 7.45      pCO2 (POC) 33.2 (L) 35.0 - 45.0 MMHG    pO2 (POC) 93 80 - 100 MMHG    HCO3 (POC) 23.5 22 - 26 MMOL/L    Base excess (POC) 0 mmol/L    sO2 (POC) 98 (H) 92 - 97 %    Site RIGHT RADIAL      Device: Non rebreather      Flow rate (POC) 12 L/M    Allens test (POC) YES      Specimen type (POC) ARTERIAL      Total resp. rate 98     SAMPLES BEING HELD    Collection Time: 10/27/20  9:05 PM   Result Value Ref Range    SAMPLES BEING HELD 1BLU     COMMENT        Add-on orders for these samples will be processed based on acceptable specimen integrity and analyte stability, which may vary by analyte.    PTT    Collection Time: 10/27/20  9:05 PM   Result Value Ref Range    aPTT 34.5 (H) 22.1 - 32.0 sec    aPTT, therapeutic range     58.0 - 77.0 SECS   PROTHROMBIN TIME + INR    Collection Time: 10/28/20  6:15 AM   Result Value Ref Range    INR 1.4 (H) 0.9 - 1.1      Prothrombin time 14.0 (H) 9.0 - 11.1 sec   AMMONIA    Collection Time: 10/28/20  6:15 AM   Result Value Ref Range    Ammonia 38 (H) <92 UMOL/L   METABOLIC PANEL, COMPREHENSIVE    Collection Time: 10/28/20  6:15 AM   Result Value Ref Range    Sodium 132 (L) 136 - 145 mmol/L    Potassium 4.0 3.5 - 5.1 mmol/L    Chloride 98 97 - 108 mmol/L    CO2 23 21 - 32 mmol/L    Anion gap 11 5 - 15 mmol/L    Glucose 100 65 - 100 mg/dL    BUN 39 (H) 6 - 20 MG/DL    Creatinine 4.50 (H) 0.70 - 1.30 MG/DL    BUN/Creatinine ratio 9 (L) 12 - 20      GFR est AA 20 (L) >60 ml/min/1.73m2    GFR est non-AA 16 (L) >60 ml/min/1.73m2    Calcium 8.1 (L) 8.5 - 10.1 MG/DL    Bilirubin, total 0.5 0.2 - 1.0 MG/DL    ALT (SGPT) 430 (H) 12 - 78 U/L    AST (SGOT) 136 (H) 15 - 37 U/L    Alk. phosphatase 66 45 - 117 U/L    Protein, total 5.1 (L) 6.4 - 8.2 g/dL    Albumin 2.2 (L) 3.5 - 5.0 g/dL    Globulin 2.9 2.0 - 4.0 g/dL    A-G Ratio 0.8 (L) 1.1 - 2.2     CBC WITH AUTOMATED DIFF    Collection Time: 10/28/20  6:15 AM   Result Value Ref Range    WBC 14.9 (H) 4.1 - 11.1 K/uL    RBC 2.99 (L) 4.10 - 5.70 M/uL    HGB 8.4 (L) 12.1 - 17.0 g/dL    HCT 25.2 (L) 36.6 - 50.3 %    MCV 84.3 80.0 - 99.0 FL    MCH 28.1 26.0 - 34.0 PG    MCHC 33.3 30.0 - 36.5 g/dL    RDW 16.5 (H) 11.5 - 14.5 %    PLATELET 710 241 - 560 K/uL    MPV 9.6 8.9 - 12.9 FL    NRBC 0.2 (H) 0  WBC    ABSOLUTE NRBC 0.03 (H) 0.00 - 0.01 K/uL    NEUTROPHILS PENDING %    LYMPHOCYTES PENDING %    MONOCYTES PENDING %    EOSINOPHILS PENDING %    BASOPHILS PENDING %    IMMATURE GRANULOCYTES PENDING %    ABS. NEUTROPHILS PENDING K/UL    ABS. LYMPHOCYTES PENDING K/UL    ABS. MONOCYTES PENDING K/UL    ABS. EOSINOPHILS PENDING K/UL    ABS. BASOPHILS PENDING K/UL    ABS. IMM. GRANS. PENDING K/UL    DF PENDING    PTT    Collection Time: 10/28/20  6:15 AM   Result Value Ref Range    aPTT >130.0 (HH) 22.1 - 32.0 sec    aPTT, therapeutic range     58.0 - 77.0 SECS   LACTIC ACID    Collection Time: 10/28/20  6:15 AM   Result Value Ref Range    Lactic acid 1.5 0.4 - 2.0 MMOL/L           Total time spent with patient:  xxx   min. Care Plan discussed with:  Patient     Family      RN      Consulting Physician [facility],         I have reviewed the flowsheets. Chart and Pertinent Notes have been reviewed. No change in PMH ,family and social history from Consult note.       Margarito Grajeda MD

## 2020-10-28 NOTE — PROGRESS NOTES
Occupational Therapy    Chart reviewed, spoke with nursing who reports pt being managed with precedex and now on BiPAP. Pt not appropriate for therapy at this time; therefore, will hold and continue to follow.     Thank you,  Kevin Alexis, OT

## 2020-10-28 NOTE — CONSULTS
Rapid response was called in IMCU, patient now extremely agitated more tachypneic and hypertensive.  [place] Appears to be acute opiate withdraw. Will transfer to ICU, start on precedex infusion.        Juno Dsouza [facility]-BC     Critical Care Medicine  [employer]

## 2020-10-28 NOTE — PROGRESS NOTES
0730: Bedside shift change report given to Edy Sawant (oncoming nurse) by Jeannine Brown (offgoing nurse). Report included the following information SBAR, Kardex, Intake/Output and MAR.     1930: Bedside shift change report given to Yisel Parr (oncoming nurse) by Edy Sawant (offgoing nurse). Report included the following information SBAR, Kardex, Intake/Output, MAR and Cardiac Rhythm NSR.

## 2020-10-28 NOTE — PROGRESS NOTES
0947:  TRANSFER - IN REPORT:    Verbal report received from Lisa Rosenberg RN(name) on Sallie Ortega  being received from [facility]) for urgent transfer      Report consisted of patients Situation, Background, Assessment and   Recommendations(SBAR). Information from the following report(s) SBAR, Kardex, ED Summary, Procedure Summary, Intake/Output, MAR, Accordion, Recent Results, Med Rec Status and Cardiac Rhythm sinus tachycardia was reviewed with the receiving nurse. Opportunity for questions and clarification was provided. Assessment completed upon patients arrival to unit and care assumed. 4027:  Assumed care of patient. Patient very restless and agitated in bed, moving side to side. Bipap placed on patient. precedex started. Patient's RR 30-50. Will continue to titrate precedex to make patient comfortable. Primary Nurse David Delacruz RN and Ev Phillips RN performed a dual skin assessment on this patient impairment noted-see wound document  Abdirashid score is 14      0700:  PT elevated. Heparin stopped. 0730: Bedside and Verbal shift change report given to Joycelyn Palomo RN (oncoming nurse) by Ariel Harris RN (offgoing nurse). Report included the following information SBAR, Kardex, Procedure Summary, Intake/Output, MAR, Accordion, Recent Results, Med Rec Status, Cardiac Rhythm sinus rhythm  and Dual Neuro Assessment.

## 2020-10-28 NOTE — DIALYSIS
[facility]  [phone]    Vitals   Pre   Post   Assessment   Pre   Post     Temp  Temp: 97.6 °F (36.4 °C) (10/28/20 1615)  97.6 LOC  Oriented x4 Oriented x4   HR   Pulse (Heart Rate): 90 (10/28/20 1615) 96 Lungs   Diminished on bipap  diminished on bipap   B/P   BP: 121/87 (10/28/20 1615) 147/96 Cardiac   B/p wnl, NSR  b/p wnl   Resp   Resp Rate: (!) 37 (10/28/20 1615) 30 Skin   intact  intact   Pain level  0 0 Edema  +2 in arm and legs     +2 in arms and legs   Orders:    Duration:   Start:    1616 End:    1915 Total:   3hrs   Dialyzer:   Dialyzer/Set Up Inspection: Revaclear (10/28/20 1615)   K Bath:   Dialysate K (mEq/L): 2 (10/28/20 1615)   Ca Bath:   Dialysate CA (mEq/L): 2.5 (10/28/20 1615)   Na/Bicarb:   Dialysate NA (mEq/L): 140 (10/28/20 1615)   Target Fluid Removal:   Goal/Amount of Fluid to Remove (mL): 3000 mL (10/28/20 1615)   Access     Type & Location:   [facility]. Dressing c/d/i. Each catheter limb disinfected per p&p, caps removed, hubs disinfected per p&p. Blood aspirated and lines flushed without any issues. Good blood flow noted.  No s/s of infection present       Labs     Obtained/Reviewed   Critical Results Called   Date when labs were drawn-  Hgb-    HGB   Date Value Ref Range Status   10/28/2020 8.4 (L) 12.1 - 17.0 g/dL Final     K-    Potassium   Date Value Ref Range Status   10/28/2020 4.0 3.5 - 5.1 mmol/L Final     Ca-   Calcium   Date Value Ref Range Status   10/28/2020 8.1 (L) 8.5 - 10.1 MG/DL Final     Bun-   BUN   Date Value Ref Range Status   10/28/2020 39 (H) 6 - 20 MG/DL Final     Creat-   Creatinine   Date Value Ref Range Status   10/28/2020 4.50 (H) 0.70 - 1.30 MG/DL Final        Medications/ Blood Products Given     Name   Dose   Route and Time     herparin 2500units 1100 units in arterial, 1400 units in venous             Blood Volume Processed (BVP):    69.2 Net Fluid   Removed:  3kg   Comments   Time Out Done: 1530  Primary Nurse Rpt Pre:Valentine Javier RN  Primary Nurse Rpt Post:Toni Javier RN  Pt Education:ARF  Care Plan:ARF  Tx Summary:  1616Dialysis started  1915 Dialysis completed. Each dialysis catheter limb disinfected per p&p, blood returned per p&p, each dialysis hub disinfected per p&p, post dialysis catheter dwell instilled with heparin per order, and caps applied. Admiting Diagnosis:Sepsis  Pt's previous clinic-Not assigned yet  Consent signed - Informed Consent Verified: Yes (10/28/20 1615)  Hepatitis Status- 10/22/20  Machine #- Machine Number: [id number] (10/28/20 1615)  Telemetry status-  Pre-dialysis wt. -

## 2020-10-29 ENCOUNTER — APPOINTMENT (OUTPATIENT)
Dept: NON INVASIVE DIAGNOSTICS | Age: 25
DRG: 812 | End: 2020-10-29
Attending: PHYSICIAN ASSISTANT
Payer: MEDICAID

## 2020-10-29 LAB
ALBUMIN SERPL-MCNC: 2 G/DL (ref 3.5–5)
ALBUMIN/GLOB SERPL: 0.6 {RATIO} (ref 1.1–2.2)
ALP SERPL-CCNC: 70 U/L (ref 45–117)
ALT SERPL-CCNC: 312 U/L (ref 12–78)
AMMONIA PLAS-SCNC: 28 UMOL/L
ANION GAP SERPL CALC-SCNC: 10 MMOL/L (ref 5–15)
APTT PPP: 63.5 SEC (ref 22.1–32)
APTT PPP: 64.1 SEC (ref 22.1–32)
APTT PPP: 85.3 SEC (ref 22.1–32)
AST SERPL-CCNC: 90 U/L (ref 15–37)
BASOPHILS # BLD: 0 K/UL (ref 0–0.1)
BASOPHILS NFR BLD: 0 % (ref 0–1)
BILIRUB SERPL-MCNC: 0.5 MG/DL (ref 0.2–1)
BUN SERPL-MCNC: 38 MG/DL (ref 6–20)
BUN/CREAT SERPL: 9 (ref 12–20)
CALCIUM SERPL-MCNC: 7.8 MG/DL (ref 8.5–10.1)
CHLORIDE SERPL-SCNC: 99 MMOL/L (ref 97–108)
CK SERPL-CCNC: 2135 U/L (ref 39–308)
CO2 SERPL-SCNC: 25 MMOL/L (ref 21–32)
CREAT SERPL-MCNC: 4.09 MG/DL (ref 0.7–1.3)
DIFFERENTIAL METHOD BLD: ABNORMAL
ECHO EST RA PRESSURE: 5 MMHG
ECHO LV EDV A2C: 238.89 ML
ECHO LV EDV A4C: 215.69 ML
ECHO LV EDV BP: 233.18 ML (ref 67–155)
ECHO LV EDV INDEX A4C: 96.3 ML/M2
ECHO LV EDV INDEX BP: 104.1 ML/M2
ECHO LV EDV NDEX A2C: 106.7 ML/M2
ECHO LV EJECTION FRACTION A2C: 42 PERCENT
ECHO LV EJECTION FRACTION A4C: 42 PERCENT
ECHO LV EJECTION FRACTION BIPLANE: 42.6 PERCENT (ref 55–100)
ECHO LV ESV A2C: 139.69 ML
ECHO LV ESV A4C: 125.26 ML
ECHO LV ESV BP: 133.84 ML (ref 22–58)
ECHO LV ESV INDEX A2C: 62.4 ML/M2
ECHO LV ESV INDEX A4C: 55.9 ML/M2
ECHO LV ESV INDEX BP: 59.8 ML/M2
ECHO RIGHT VENTRICULAR SYSTOLIC PRESSURE (RVSP): 34.57 MMHG
ECHO RV TAPSE: 1.51 CM (ref 1.5–2)
ECHO TV REGURGITANT MAX VELOCITY: 271.89 CM/S
ECHO TV REGURGITANT PEAK GRADIENT: 29.57 MMHG
EOSINOPHIL # BLD: 0.1 K/UL (ref 0–0.4)
EOSINOPHIL NFR BLD: 1 % (ref 0–7)
ERYTHROCYTE [DISTWIDTH] IN BLOOD BY AUTOMATED COUNT: 16.8 % (ref 11.5–14.5)
GLOBULIN SER CALC-MCNC: 3.3 G/DL (ref 2–4)
GLUCOSE SERPL-MCNC: 92 MG/DL (ref 65–100)
HCT VFR BLD AUTO: 24.8 % (ref 36.6–50.3)
HGB BLD-MCNC: 8.3 G/DL (ref 12.1–17)
IMM GRANULOCYTES # BLD AUTO: 0.3 K/UL (ref 0–0.04)
IMM GRANULOCYTES NFR BLD AUTO: 2 % (ref 0–0.5)
INR PPP: 1.4 (ref 0.9–1.1)
LYMPHOCYTES # BLD: 2 K/UL (ref 0.8–3.5)
LYMPHOCYTES NFR BLD: 15 % (ref 12–49)
MCH RBC QN AUTO: 28.4 PG (ref 26–34)
MCHC RBC AUTO-ENTMCNC: 33.5 G/DL (ref 30–36.5)
MCV RBC AUTO: 84.9 FL (ref 80–99)
MONOCYTES # BLD: 2.1 K/UL (ref 0–1)
MONOCYTES NFR BLD: 16 % (ref 5–13)
NEUTS SEG # BLD: 8.6 K/UL (ref 1.8–8)
NEUTS SEG NFR BLD: 66 % (ref 32–75)
NRBC # BLD: 0 K/UL (ref 0–0.01)
NRBC BLD-RTO: 0 PER 100 WBC
PLATELET # BLD AUTO: 284 K/UL (ref 150–400)
PLATELET COMMENTS,PCOM: ABNORMAL
PMV BLD AUTO: 9.2 FL (ref 8.9–12.9)
POTASSIUM SERPL-SCNC: 3.6 MMOL/L (ref 3.5–5.1)
PROT SERPL-MCNC: 5.3 G/DL (ref 6.4–8.2)
PROTHROMBIN TIME: 14.1 SEC (ref 9–11.1)
RBC # BLD AUTO: 2.92 M/UL (ref 4.1–5.7)
RBC MORPH BLD: ABNORMAL
SODIUM SERPL-SCNC: 134 MMOL/L (ref 136–145)
THERAPEUTIC RANGE,PTTT: ABNORMAL SECS (ref 58–77)
WBC # BLD AUTO: 13.1 K/UL (ref 4.1–11.1)

## 2020-10-29 PROCEDURE — 90935 HEMODIALYSIS ONE EVALUATION: CPT

## 2020-10-29 PROCEDURE — 74011250636 HC RX REV CODE- 250/636: Performed by: INTERNAL MEDICINE

## 2020-10-29 PROCEDURE — 93308 TTE F-UP OR LMTD: CPT | Performed by: INTERNAL MEDICINE

## 2020-10-29 PROCEDURE — 74011250636 HC RX REV CODE- 250/636: Performed by: EMERGENCY MEDICINE

## 2020-10-29 PROCEDURE — 74011250636 HC RX REV CODE- 250/636: Performed by: ANESTHESIOLOGY

## 2020-10-29 PROCEDURE — 85025 COMPLETE CBC W/AUTO DIFF WBC: CPT

## 2020-10-29 PROCEDURE — 85730 THROMBOPLASTIN TIME PARTIAL: CPT

## 2020-10-29 PROCEDURE — 74011000258 HC RX REV CODE- 258: Performed by: ANESTHESIOLOGY

## 2020-10-29 PROCEDURE — 85610 PROTHROMBIN TIME: CPT

## 2020-10-29 PROCEDURE — 94660 CPAP INITIATION&MGMT: CPT

## 2020-10-29 PROCEDURE — 74011250637 HC RX REV CODE- 250/637: Performed by: ANESTHESIOLOGY

## 2020-10-29 PROCEDURE — 74011250637 HC RX REV CODE- 250/637: Performed by: PHYSICIAN ASSISTANT

## 2020-10-29 PROCEDURE — 82140 ASSAY OF AMMONIA: CPT

## 2020-10-29 PROCEDURE — 77010033678 HC OXYGEN DAILY

## 2020-10-29 PROCEDURE — 74011250636 HC RX REV CODE- 250/636: Performed by: NURSE PRACTITIONER

## 2020-10-29 PROCEDURE — 94640 AIRWAY INHALATION TREATMENT: CPT

## 2020-10-29 PROCEDURE — 74011250636 HC RX REV CODE- 250/636: Performed by: HOSPITALIST

## 2020-10-29 PROCEDURE — 36415 COLL VENOUS BLD VENIPUNCTURE: CPT

## 2020-10-29 PROCEDURE — 74011000250 HC RX REV CODE- 250: Performed by: ANESTHESIOLOGY

## 2020-10-29 PROCEDURE — 94664 DEMO&/EVAL PT USE INHALER: CPT

## 2020-10-29 PROCEDURE — 93308 TTE F-UP OR LMTD: CPT

## 2020-10-29 PROCEDURE — 82550 ASSAY OF CK (CPK): CPT

## 2020-10-29 PROCEDURE — 74011250637 HC RX REV CODE- 250/637: Performed by: SPECIALIST

## 2020-10-29 PROCEDURE — 65660000001 HC RM ICU INTERMED STEPDOWN

## 2020-10-29 PROCEDURE — 94760 N-INVAS EAR/PLS OXIMETRY 1: CPT

## 2020-10-29 PROCEDURE — 99232 SBSQ HOSP IP/OBS MODERATE 35: CPT | Performed by: SPECIALIST

## 2020-10-29 PROCEDURE — 80053 COMPREHEN METABOLIC PANEL: CPT

## 2020-10-29 RX ORDER — QUETIAPINE FUMARATE 25 MG/1
50 TABLET, FILM COATED ORAL ONCE
Status: COMPLETED | OUTPATIENT
Start: 2020-10-29 | End: 2020-10-29

## 2020-10-29 RX ORDER — OXYCODONE HYDROCHLORIDE 5 MG/1
5 TABLET ORAL
Status: DISCONTINUED | OUTPATIENT
Start: 2020-10-29 | End: 2020-11-10

## 2020-10-29 RX ORDER — QUETIAPINE FUMARATE 25 MG/1
50 TABLET, FILM COATED ORAL EVERY 8 HOURS
Status: DISCONTINUED | OUTPATIENT
Start: 2020-10-29 | End: 2020-11-04

## 2020-10-29 RX ADMIN — CARVEDILOL 6.25 MG: 6.25 TABLET, FILM COATED ORAL at 09:12

## 2020-10-29 RX ADMIN — HYDROMORPHONE HYDROCHLORIDE 2 MG: 2 INJECTION, SOLUTION INTRAMUSCULAR; INTRAVENOUS; SUBCUTANEOUS at 00:26

## 2020-10-29 RX ADMIN — Medication 10 ML: at 22:01

## 2020-10-29 RX ADMIN — HEPARIN SODIUM 1000 UNITS: 1000 INJECTION INTRAVENOUS; SUBCUTANEOUS at 21:41

## 2020-10-29 RX ADMIN — IPRATROPIUM BROMIDE AND ALBUTEROL SULFATE 3 ML: .5; 3 SOLUTION RESPIRATORY (INHALATION) at 08:59

## 2020-10-29 RX ADMIN — DOXYCYCLINE 100 MG: 100 INJECTION, POWDER, LYOPHILIZED, FOR SOLUTION INTRAVENOUS at 09:12

## 2020-10-29 RX ADMIN — IPRATROPIUM BROMIDE AND ALBUTEROL SULFATE 3 ML: .5; 3 SOLUTION RESPIRATORY (INHALATION) at 18:15

## 2020-10-29 RX ADMIN — IPRATROPIUM BROMIDE AND ALBUTEROL SULFATE 3 ML: .5; 3 SOLUTION RESPIRATORY (INHALATION) at 22:36

## 2020-10-29 RX ADMIN — HYDRALAZINE HYDROCHLORIDE 25 MG: 25 TABLET, FILM COATED ORAL at 15:03

## 2020-10-29 RX ADMIN — OXYCODONE HYDROCHLORIDE 5 MG: 5 TABLET ORAL at 20:20

## 2020-10-29 RX ADMIN — QUETIAPINE FUMARATE 50 MG: 25 TABLET ORAL at 14:21

## 2020-10-29 RX ADMIN — LORAZEPAM 2 MG: 2 INJECTION INTRAMUSCULAR; INTRAVENOUS at 00:04

## 2020-10-29 RX ADMIN — HEPARIN SODIUM 28 UNITS/KG/HR: 10000 INJECTION, SOLUTION INTRAVENOUS at 03:08

## 2020-10-29 RX ADMIN — Medication 3 MG: at 20:49

## 2020-10-29 RX ADMIN — DOXYCYCLINE 100 MG: 100 INJECTION, POWDER, LYOPHILIZED, FOR SOLUTION INTRAVENOUS at 21:58

## 2020-10-29 RX ADMIN — ISOSORBIDE DINITRATE 10 MG: 10 TABLET ORAL at 15:03

## 2020-10-29 RX ADMIN — HYDRALAZINE HYDROCHLORIDE 25 MG: 25 TABLET, FILM COATED ORAL at 21:58

## 2020-10-29 RX ADMIN — ISOSORBIDE DINITRATE 10 MG: 10 TABLET ORAL at 09:13

## 2020-10-29 RX ADMIN — OXYCODONE HYDROCHLORIDE 5 MG: 5 TABLET ORAL at 15:03

## 2020-10-29 RX ADMIN — OXYCODONE HYDROCHLORIDE 5 MG: 5 TABLET ORAL at 09:12

## 2020-10-29 RX ADMIN — Medication: at 21:59

## 2020-10-29 RX ADMIN — IPRATROPIUM BROMIDE AND ALBUTEROL SULFATE 3 ML: .5; 3 SOLUTION RESPIRATORY (INHALATION) at 11:52

## 2020-10-29 RX ADMIN — HEPARIN SODIUM 28 UNITS/KG/HR: 10000 INJECTION, SOLUTION INTRAVENOUS at 13:56

## 2020-10-29 RX ADMIN — Medication 10 ML: at 06:21

## 2020-10-29 RX ADMIN — HYDRALAZINE HYDROCHLORIDE 25 MG: 25 TABLET, FILM COATED ORAL at 09:12

## 2020-10-29 RX ADMIN — QUETIAPINE FUMARATE 50 MG: 25 TABLET ORAL at 09:12

## 2020-10-29 RX ADMIN — ISOSORBIDE DINITRATE 10 MG: 10 TABLET ORAL at 22:00

## 2020-10-29 RX ADMIN — Medication: at 14:32

## 2020-10-29 RX ADMIN — Medication 10 ML: at 14:22

## 2020-10-29 RX ADMIN — Medication: at 06:00

## 2020-10-29 RX ADMIN — SODIUM CHLORIDE 750 MG: 900 INJECTION, SOLUTION INTRAVENOUS at 22:27

## 2020-10-29 RX ADMIN — QUETIAPINE FUMARATE 50 MG: 25 TABLET ORAL at 20:49

## 2020-10-29 NOTE — ROUTINE PROCESS
Occupational therapy 0845 -  
[id number] Orders acknowledged and chart reviewed in prep for OT evaluation, noted patient evaluated by OT on 10/27/2020 - will continue to follow per OT POC. Thank you.   
 
Kriste Aase, OT

## 2020-10-29 NOTE — ROUTINE PROCESS
Occupational Therapy [phone] -  
[phone] Attempted OT session x2. Spoke with RN and requested therapy to attempt later as he had an eventful night and needed to rest and received medications. 2nd attempt, patient being moved KARI. Will defer and continue to follow. Thank you.  
 
Cristopher Serrano, OT

## 2020-10-29 NOTE — PROGRESS NOTES
[facility]    ICU TEAM Progress Note    Name: Lora Martínez   : 1995   MRN: [id number]   Date: 10/29/2020        Assessment     ICU Problems:    1. Acute Hypoxic Respiratory Failure (resolved)  2. Pneumonia CAP (resolving)  3. Acute Metabolic Encephalopathy  4. Acute Kidney Injury (IHD)  5. Rhabdomyolysis  6. DVT - LUE  7. Cardiomyopathy - EF 15-20%  8. Polysubstance abuse       ICU Comprehensive Plan of Care:     Plans for this Shift:     1. BiPAP as needed for SpO2 > 90%  2. Continue Doxy & Vanc  3. Low threshold to resume Gr NEG/Anearobe coverage  4. Continue Heparin infusion  5. Seroquel 50 mg q8 hours  6. Monitor qtc - would hold if > 525  7. Melatonin at night  8. MAP Goal of: > 65 mmHg  9. Transfusion Trigger (Hgb): <7 g/dL  10. Respiratory Goals:  a. Head of bed > 30 degrees  b. Aggressive bronchopulmonary hygiene  c. Incentive spirometry  11. Pulmonary toilet: Duo-Nebs   12. SpO2 Goal: > 92%  13. Keep K>4; Mg>2   14. PT/OT: PT consulted and on board and OT consulted and on board   15. Goals of Care Discussion with family Yes   12. Plan of Care/Code Status: Full Code  17. Discussed Care Plan with Bedside RN  18. Documentation of Current Medications  19.  Rest of Plan Below:    F - Feeding:  Yes   A - Analgesia: Oxycodone  S - Sedation: Melatonin  T - DVT Prophylaxis: SCD's or Sequential Compression Device and Heparin   H - Head of Bed: > 30 Degrees  U - Ulcer Prophylaxis: Not at this time   G - Glycemic Control: Insulin  S - Spontaneous Breathing Trial: N/A  B - Bowel Regimen: Senna and Docusate (Colace)  I - Indwelling Catheter:   Tubes: None  Lines: Central Line and Adebayo  Drains: Iniguez Catheter  D - De-escalation of Antibiotics:  Vancomycin  Doxy    Subjective:   Progress Note: 10/29/2020      Reason for ICU Admission: Acute Respiratory Failure from agitation/pneumonia     HPI:  This is a 26year old male who was initially admitted to [facility] ICU on 10/21/2020 with Pneumonia and polysubstance abuse. He was found by his parents after sleeping greater than 24 hours and EMS was called. Upon EMS arrival he was found to be hypoxic and unresponsive. He was also hypotensive requiring 2L IV fluid in the ED. He was stabilized and transferred to the [facility] where today a rapid response was called secondary to acute aggression and agitation and altered mental status. He appears to be in acute opiate withdraw. During these episodes he becomes hypoxic and is requiring intermittent Bipap. Was transfered back to ICU for precedex infusion. Overnight Events:   10/29/2020  On BIPAP; doing well    POD:  * No surgery found *    S/P:       Active Problem List:     Problem List  Never Reviewed          Codes Class    Toxic encephalopathy ICD-10-CM: G92  ICD-9-CM: 349.82         Drug abuse ([facility] [place] [facility] ICD-10-CM: F19.10  ICD-9-CM: 305.90         Acute renal failure with tubular necrosis (HCC) ICD-10-CM: N17.0  ICD-9-CM: 584.5         * (Principal) Sepsis ([facility] [place] [facility] ICD-10-CM: A41.9  ICD-9-CM: 038.9, 995.91         Community acquired pneumonia of left lower lobe of lung ICD-10-CM: J18.9  ICD-9-CM: 619         Metabolic acidosis SSE-05-LA: E87.2  ICD-9-CM: 276.2         Lymphocytosis ICD-10-CM: D72.820  ICD-9-CM: 288.61         Electrolyte abnormality ICD-10-CM: E87.8  ICD-9-CM: 276.9         Troponin level elevated ICD-10-CM: R77.8  ICD-9-CM: 790.6               Past Medical History:      has a past medical history of Anxiety and Depression. Past Surgical History:      has a past surgical history that includes hx orthopaedic. Home Medications:     Prior to Admission medications    Medication Sig Start Date End Date Taking? Authorizing Provider   naloxone (Narcan) 4 mg/actuation nasal spray Use 1 spray intranasally, then discard. Repeat with new spray every 2 min as needed for opioid overdose symptoms, alternating nostrils. 10/18/20   Diane Soriano MD   FLUoxetine (PROzac) 20 mg capsule Take 20 mg by mouth daily. Other, MD Kaleb   hydrOXYzine HCL (ATARAX) 25 mg tablet Take 25 mg by mouth two (2) times a day. Kaleb Moncada MD       Allergies/Social/Family History: Allergies   Allergen Reactions    Tramadol Nausea and Vomiting      Social History     Tobacco Use    Smoking status: Current Every Day Smoker     Packs/day: 0.50    Smokeless tobacco: Former User   Substance Use Topics    Alcohol use: Not Currently      No family history on file. Review of Systems:     A comprehensive review of systems was negative except for that written in the HPI. Objective:   Vital Signs:  Visit Vitals  BP (!) 140/87   Pulse 97   Temp 98.4 °F (36.9 °C)   Resp 28   Ht 5' 10\" (1.778 m)   Wt 107 kg (236 lb)   SpO2 93%   BMI 33.86 kg/m²    O2 Flow Rate (L/min): 6 l/min O2 Device: BIPAP Temp (24hrs), Av.3 °F (36.8 °C), Min:97.5 °F (36.4 °C), Max:100.1 °F (37.8 °C)           Intake/Output:     Intake/Output Summary (Last 24 hours) at 10/29/2020 1119  Last data filed at 10/29/2020 6551  Gross per 24 hour   Intake 3137.16 ml   Output 3485 ml   Net -347.84 ml       Physical Exam:    General:  Alert, cooperative, well noursished, well developed, appears stated age   Eyes:  Sclera anicteric. Pupils equally round and reactive to light. Mouth/Throat: Mucous membranes normal, oral pharynx clear   Neck: Supple   Lungs:   Clear to auscultation bilaterally, good effort   CV:  Regular rate and rhythm,no murmur, click, rub or gallop   Abdomen:   Soft, non-tender.  bowel sounds normal. non-distended   Extremities: No cyanosis or edema   Skin: Skin color, texture, turgor normal. no acute rash or lesions   Lymph nodes: Cervical and supraclavicular normal   Musculoskeletal: No swelling or deformity   Lines/Devices:  Intact, no erythema, drainage or tenderness   Psych: Alert and oriented, normal mood affect given the setting       LABS AND  DATA: Personally reviewed  Recent Labs     10/29/20  0533 10/28/20  0615   WBC 13.1* 14.9*   HGB 8.3* 8.4* HCT 24.8* 25.2*    324     Recent Labs     10/29/20  0533 10/28/20  1843   * 133*   K 3.6 3.4*   CL 99 97   CO2 25 28   BUN 38* 27*   CREA 4.09* 3.08*   GLU 92 87   CA 7.8* 8.1*     Recent Labs     10/29/20  0533 10/28/20  0615   AP 70 66   TP 5.3* 5.1*   ALB 2.0* 2.2*   GLOB 3.3 2.9     Recent Labs     10/29/20  0932 10/29/20  0533 10/29/20  0114  10/28/20  0905   INR  --  1.4*  --   --  1.3*   PTP  --  14.1*  --   --  13.4*   APTT 64.1*  --  85.3*   < > 34.7*    < > = values in this interval not displayed. Recent Labs     10/27/20  2011   PHI 7.46*   PCO2I 33.2*   PO2I 93     Recent Labs     10/29/20  0533 10/28/20  0615  10/26/20  2350   CPK 2,135* 3,620*   < > 5,090*   CKMB  --   --   --  4.1*    < > = values in this interval not displayed. Hemodynamics:   PAP:   CO:     Wedge:   CI:     CVP:    SVR:       PVR:       Ventilator Settings:  Mode Rate Tidal Volume Pressure FiO2 PEEP            35 %       Peak airway pressure:      Minute ventilation: 22.9 l/min        MEDS: Reviewed    Chest X-Ray:  CXR Results  (Last 48 hours)               10/28/20 0419  XR CHEST PORT Final result    Impression:  IMPRESSION:   Stable bilateral parenchymal opacities. .                Narrative:  Clinical history: tachypnea, respiratory failure, pneumonia   INDICATION:   tachypnea, respiratory failure, pneumonia   COMPARISON: 10/27/2020       FINDINGS:   AP portable upright view of the chest demonstrates a stable  cardiopericardial   silhouette. Persistent interstitial and parenchymal opacities not significantly   changed. Dialysis catheter on the right. Central venous access catheter extends   from the left. No change. .Patient is on a cardiac monitor. 10/27/20 4408  XR CHEST PORT Final result    Impression:  IMPRESSION: No significant change in bilateral airspace opacification, left   greater than right.        Narrative:  EXAM: XR CHEST PORT       INDICATION: Tachypnea       COMPARISON: October 25 FINDINGS: A portable AP radiograph of the chest was obtained at 1741 hours. The   lines are stable. The patient is on a cardiac monitor. There is persistent   opacification in the right lower lobe and throughout the lung, without   significant change. Cardia mediastinal contours are stable. The bones and soft   tissues are grossly within normal limits. ECHO 10/22/2020:  Result status: Final result    · LV: Estimated LVEF is 15 - 20%. Visually measured ejection fraction. Normal wall thickness. Dilated left ventricle. Severely reduced systolic function. Left ventricular diastolic dysfunction. · LA: Mildly dilated left atrium. · RV: Mildly reduced systolic function. · RA: Mildly dilated right atrium. · MV: Mild mitral valve regurgitation is present. · TV: Mild tricuspid valve regurgitation is present. Multidisciplinary Rounds Completed: Yes    ABCDEF Bundle/Checklist Completed:  Yes    SPECIAL EQUIPMENT  IHD    DISPOSITION  Stay in ICU    CRITICAL CARE CONSULTANT NOTE  I had a face to face encounter with the patient, reviewed and interpreted patient data including clinical events, labs, images, vital signs, I/O's, and examined patient.   I have discussed the case and the plan and management of the patient's care with the consulting services, the bedside nurses and the respiratory therapist.      NOTE OF PERSONAL INVOLVEMENT IN CARE   V3    Edson Roberts DO, MS  Staff Intensivist/Anesthesiologist  [facility]  10/29/2020
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
## 2020-10-29 NOTE — PROGRESS NOTES
[facility]   [address] [facility], [address] [place] [address], [facility]  Phone: [phone]   [phone]       Nephrology Progress Note  Long Myers     1995     [id number]  Date of Admission : 10/21/2020  10/29/20    CC: Follow up for ARF, Rhabdomyolysis        Assessment and Plan   JEAN PIERRE  - Severe ATN from Rhabdomyolysis. Oligoanuric   - started emergent HD on 10/22  - HD today and tomorrow     Severe Rhabdomyolysis   - 2/2 Substance use  - LFTs and CPK trending down    Left Lung PNA w/ sepsis     LUE DVT:  - on heparin drip    Resp failure:  - from pulm edema   - HD again today    Metabolic acidosis:  - improving    Hypocalcemia:  - stable  - replete PRN    Polysubstance abuse      Interval History:  Seen and examined. Needing BIPAP   BP elevated but stable   Oliguric and has penile edema   Denies any myalgias and CPK down to 2000. Review of Systems: Review of systems not obtained due to patient factors.     Current Medications:   Current Facility-Administered Medications   Medication Dose Route Frequency    QUEtiapine (SEROquel) tablet 50 mg  50 mg Oral Q8H    oxyCODONE IR (ROXICODONE) tablet 5 mg  5 mg Oral Q4H PRN    LORazepam (ATIVAN) injection 2 mg  2 mg IntraVENous Q4H PRN    doxycycline (VIBRAMYCIN) 100 mg in 0.9% sodium chloride (MBP/ADV) 100 mL  100 mg IntraVENous Q12H    albuterol-ipratropium (DUO-NEB) 2.5 MG-0.5 MG/3 ML  3 mL Nebulization QID RT    melatonin tablet 3 mg  3 mg Oral QHS    fluticasone propionate (FLONASE) 50 mcg/actuation nasal spray 2 Spray  2 Spray Both Nostrils DAILY    hydrALAZINE (APRESOLINE) tablet 25 mg  25 mg Oral TID    Vancomycin- pharmacy to dose   Other Rx Dosing/Monitoring    alteplase (CATHFLO) 1 mg in sterile water (preservative free) 1 mL injection  1 mg InterCATHeter PRN    isosorbide dinitrate (ISORDIL) tablet 10 mg  10 mg Oral TID    carvediloL (COREG) tablet 6.25 mg  6.25 mg Oral BID WITH MEALS    HYDROmorphone (PF) (DILAUDID) injection 2 mg  2 mg IntraVENous Q4H PRN    heparin 25,000 units in D5W 250 ml infusion  17-36 Units/kg/hr IntraVENous TITRATE    balsam peru-castor oiL (VENELEX) ointment   Topical Q8H    sodium chloride (NS) flush 5-40 mL  5-40 mL IntraVENous Q8H    sodium chloride (NS) flush 5-40 mL  5-40 mL IntraVENous PRN    acetaminophen (TYLENOL) tablet 650 mg  650 mg Oral Q6H PRN    Or    acetaminophen (TYLENOL) suppository 650 mg  650 mg Rectal Q6H PRN    polyethylene glycol (MIRALAX) packet 17 g  17 g Oral DAILY PRN    ondansetron (ZOFRAN) injection 4 mg  4 mg IntraVENous Q6H PRN    albuterol-ipratropium (DUO-NEB) 2.5 MG-0.5 MG/3 ML  3 mL Nebulization Q4H PRN    docusate sodium (COLACE) capsule 100 mg  100 mg Oral DAILY    labetaloL (NORMODYNE;TRANDATE) injection 20 mg  20 mg IntraVENous Q4H PRN    heparin (porcine) 1,000 unit/mL injection 1,400 Units  1,400 Units InterCATHeter DIALYSIS PRN    And    heparin (porcine) 1,000 unit/mL injection 1,100 Units  1,100 Units InterCATHeter DIALYSIS PRN      Allergies   Allergen Reactions    Tramadol Nausea and Vomiting       Objective:  Vitals:    Vitals:    10/29/20 0740 10/29/20 0859 10/29/20 0905 10/29/20 0914   BP:    (!) 144/95   Pulse:  (!) 105     Resp:  (!) 38     Temp:       TempSrc:       SpO2:  93% 95%    Weight: 107.2 kg (236 lb 5.3 oz)   107 kg (236 lb)   Height:    5' 10\" (1.778 m)     Intake and Output:  10/29 0701 - 10/29 1900  In: 480 [P.O.:480]  Out: 100 [Urine:100]  10/27 1901 - 10/29 0700  In: 8489 [P.O.:1660;  I.V.:4024]  Out: 4860 [Urine:825]    Physical Examination:    General: Lethargic   Neck:  Supple, no mass  Resp:  Lungs CTA B/L,  CV:  RRR,  no murmur or rub, no LE edema  GI:  Soft, NT, + Bowel sounds, no hepatosplenomegaly  Neurologic:  Lethargic   Psych:             Unable to assess  Skin:  Pressures rash on Left foot, R post Knee and neck   :  Iniguez     []    High complexity decision making was performed  []    Patient is at high-risk of decompensation with multiple organ involvement    Lab Data Personally Reviewed: I have reviewed all the pertinent labs, microbiology data and radiology studies during assessment. Recent Labs     10/29/20  0533 10/28/20  1843 10/28/20  0905 10/28/20  0615 10/27/20  1221 10/27/20  0511 10/27/20  0323   * 133*  --  132* 131* 130* 133*   K 3.6 3.4*  --  4.0 3.8 3.8 3.7   CL 99 97  --  98 95* 94* 96*   CO2 25 28  --  23 27 27 27   GLU 92 87  --  100 99 110* 109*   BUN 38* 27*  --  39* 37* 34* 30*   CREA 4.09* 3.08*  --  4.50* 4.81* 4.55* 4.34*   CA 7.8* 8.1*  --  8.1* 8.2* 8.4* 8.2*   ALB 2.0*  --   --  2.2*  --  2.1*  --    *  --   --  430*  --  578*  --    INR 1.4*  --  1.3* 1.4*  --   --  1.2*     Recent Labs     10/29/20  0533 10/28/20  0615 10/27/20  0519   WBC 13.1* 14.9* 13.9*   HGB 8.3* 8.4* 8.9*   HCT 24.8* 25.2* 26.2*    324 307     No results found for: SDES  Lab Results   Component Value Date/Time    Culture result: NO GROWTH 5 DAYS 10/22/2020 06:19 AM    Culture result: NO GROWTH 5 DAYS 10/22/2020 02:37 AM    Culture result: (A) 10/21/2020 08:15 PM     Staphylococcus hominis (Oxacillin resistant) GROWING IN THE AEROBIC BOTTLE (SITE = R HAND)    Culture result:  10/21/2020 08:15 PM     Gram Positive Cocci CALLED TO AND READ BACK BY RACHEL Corinda Krabbe, RN AT 2012 BY D    Culture result: (A) 10/21/2020 08:10 PM     Staphylococcus hominis (Oxacillin resistant) GROWING IN THE AEROBIC BOTTLE (SITE = L HAND)    Culture result:  10/21/2020 08:10 PM     preliminary report of Gram Positive Cocci in clusters growing in 1 of 2 bottles drawn CALLED TO AND READ BACK BY FERN SEALS AT 0320 ON 10/23/20.  Bettye 1850     Recent Results (from the past 24 hour(s))   METABOLIC PANEL, BASIC    Collection Time: 10/28/20  6:43 PM   Result Value Ref Range    Sodium 133 (L) 136 - 145 mmol/L    Potassium 3.4 (L) 3.5 - 5.1 mmol/L    Chloride 97 97 - 108 mmol/L    CO2 28 21 - 32 mmol/L    Anion gap 8 5 - 15 mmol/L Glucose 87 65 - 100 mg/dL    BUN 27 (H) 6 - 20 MG/DL    Creatinine 3.08 (H) 0.70 - 1.30 MG/DL    BUN/Creatinine ratio 9 (L) 12 - 20      GFR est AA 30 (L) >60 ml/min/1.73m2    GFR est non-AA 25 (L) >60 ml/min/1.73m2    Calcium 8.1 (L) 8.5 - 10.1 MG/DL   PTT    Collection Time: 10/28/20  6:43 PM   Result Value Ref Range    aPTT 59.2 (H) 22.1 - 32.0 sec    aPTT, therapeutic range     58.0 - 77.0 SECS   PTT    Collection Time: 10/29/20  1:14 AM   Result Value Ref Range    aPTT 85.3 (H) 22.1 - 32.0 sec    aPTT, therapeutic range     58.0 - 77.0 SECS   PROTHROMBIN TIME + INR    Collection Time: 10/29/20  5:33 AM   Result Value Ref Range    INR 1.4 (H) 0.9 - 1.1      Prothrombin time 14.1 (H) 9.0 - 11.1 sec   AMMONIA    Collection Time: 10/29/20  5:33 AM   Result Value Ref Range    Ammonia 28 <32 UMOL/L   CK    Collection Time: 10/29/20  5:33 AM   Result Value Ref Range    CK 2,135 (H) 39 - 519 U/L   METABOLIC PANEL, COMPREHENSIVE    Collection Time: 10/29/20  5:33 AM   Result Value Ref Range    Sodium 134 (L) 136 - 145 mmol/L    Potassium 3.6 3.5 - 5.1 mmol/L    Chloride 99 97 - 108 mmol/L    CO2 25 21 - 32 mmol/L    Anion gap 10 5 - 15 mmol/L    Glucose 92 65 - 100 mg/dL    BUN 38 (H) 6 - 20 MG/DL    Creatinine 4.09 (H) 0.70 - 1.30 MG/DL    BUN/Creatinine ratio 9 (L) 12 - 20      GFR est AA 22 (L) >60 ml/min/1.73m2    GFR est non-AA 18 (L) >60 ml/min/1.73m2    Calcium 7.8 (L) 8.5 - 10.1 MG/DL    Bilirubin, total 0.5 0.2 - 1.0 MG/DL    ALT (SGPT) 312 (H) 12 - 78 U/L    AST (SGOT) 90 (H) 15 - 37 U/L    Alk.  phosphatase 70 45 - 117 U/L    Protein, total 5.3 (L) 6.4 - 8.2 g/dL    Albumin 2.0 (L) 3.5 - 5.0 g/dL    Globulin 3.3 2.0 - 4.0 g/dL    A-G Ratio 0.6 (L) 1.1 - 2.2     CBC WITH AUTOMATED DIFF    Collection Time: 10/29/20  5:33 AM   Result Value Ref Range    WBC 13.1 (H) 4.1 - 11.1 K/uL    RBC 2.92 (L) 4.10 - 5.70 M/uL    HGB 8.3 (L) 12.1 - 17.0 g/dL    HCT 24.8 (L) 36.6 - 50.3 %    MCV 84.9 80.0 - 99.0 FL    MCH 28.4 26.0 - 34.0 PG    MCHC 33.5 30.0 - 36.5 g/dL    RDW 16.8 (H) 11.5 - 14.5 %    PLATELET 795 845 - 734 K/uL    MPV 9.2 8.9 - 12.9 FL    NRBC 0.0 0  WBC    ABSOLUTE NRBC 0.00 0.00 - 0.01 K/uL    NEUTROPHILS 66 32 - 75 %    LYMPHOCYTES 15 12 - 49 %    MONOCYTES 16 (H) 5 - 13 %    EOSINOPHILS 1 0 - 7 %    BASOPHILS 0 0 - 1 %    IMMATURE GRANULOCYTES 2 (H) 0.0 - 0.5 %    ABS. NEUTROPHILS 8.6 (H) 1.8 - 8.0 K/UL    ABS. LYMPHOCYTES 2.0 0.8 - 3.5 K/UL    ABS. MONOCYTES 2.1 (H) 0.0 - 1.0 K/UL    ABS. EOSINOPHILS 0.1 0.0 - 0.4 K/UL    ABS. BASOPHILS 0.0 0.0 - 0.1 K/UL    ABS. IMM. GRANS. 0.3 (H) 0.00 - 0.04 K/UL    DF SMEAR SCANNED      PLATELET COMMENTS Large Platelets      RBC COMMENTS ANISOCYTOSIS  1+       ECHO ADULT FOLLOW-UP OR LIMITED    Collection Time: 10/29/20  9:14 AM   Result Value Ref Range    BP EF 42.6 (A) 55 - 100 percent    LV Ejection Fraction MOD 2C 42 percent    LV Ejection Fraction MOD 4C 42 percent    LV ED Vol A2C 238. 89 mL    LV ED Vol A4C 215.69 mL    LV ED Vol .18 (A) 67 - 155 mL    LV ES Vol A2C 139.69 mL    LV ES Vol A4C 125.26 mL    LV ES Vol .84 (A) 22 - 58 mL    RVSP 34.57 mmHg    Est. RA Pressure 5.00 mmHg    Tapse 1.51 1.5 - 2.0 cm    Triscuspid Valve Regurgitation Peak Gradient 29.57 mmHg    TR Max Velocity 271.89 cm/s    LVES Vol Index BP 59.8 mL/m2    LVED Vol Index .1 mL/m2    LVED Vol Index A4C 96.3 mL/m2    LVED Vol Index A2C 106.7 mL/m2    LVES Vol Index A4C 55.9 mL/m2    LVES Vol Index A2C 62.4 mL/m2           Total time spent with patient:  xxx   min. Care Plan discussed with:  Patient     Family      RN      Consulting Physician [id number] [facility]        I have reviewed the flowsheets. Chart and Pertinent Notes have been reviewed. No change in PMH ,family and social history from Consult note.       Arvind Mejia MD

## 2020-10-29 NOTE — PROGRESS NOTES
1930: Bedside and Verbal shift change report given to MICHELLE Dc (oncoming nurse) by Liberty Oviedo, SASHA (offgoing nurse). Report included the following information SBAR, Kardex, Intake/Output, MAR, Accordion, Recent Results, Cardiac Rhythm SR/ST, Alarm Parameters  and Dual Neuro Assessment. 2110: Gave pt updates to parent, Fam Villagran, via telephone. 0000: Pt agitated and c/o of anxiety and pain. PRN Ativan and PRN Dilaudid given. 0100: Re-evaluation of pt after episode of agitation. Pt is resting comfortably and states pain is much better. 0730: Bedside and Verbal shift change report given to PILY Benites RN (oncoming nurse) by MICHELLE Espitia RN (offgoing nurse). Report included the following information SBAR, Kardex, Intake/Output, MAR, Accordion, Recent Results, Cardiac Rhythm SR/ST and Alarm Parameters .

## 2020-10-29 NOTE — PROGRESS NOTES
Problem: Falls - Risk of  Goal: *Absence of Falls  Description: Document Aj Reveles Fall Risk and appropriate interventions in the flowsheet. Outcome: Progressing Towards Goal  Note: Fall Risk Interventions:  Mobility Interventions: Communicate number of staff needed for ambulation/transfer    Mentation Interventions: Adequate sleep, hydration, pain control, Bed/chair exit alarm, Door open when patient unattended    Medication Interventions: Evaluate medications/consider consulting pharmacy    Elimination Interventions: Bed/chair exit alarm, Call light in reach, Toileting schedule/hourly rounds    History of Falls Interventions: Bed/chair exit alarm, Door open when patient unattended, Evaluate medications/consider consulting pharmacy, Room close to nurse's station         Problem: Patient Education: Go to Patient Education Activity  Goal: Patient/Family Education  Outcome: Progressing Towards Goal     Problem: Pressure Injury - Risk of  Goal: *Prevention of pressure injury  Description: Document Abdirashid Scale and appropriate interventions in the flowsheet.   Outcome: Progressing Towards Goal  Note: Pressure Injury Interventions:  Sensory Interventions: Assess changes in LOC, Assess need for specialty bed    Moisture Interventions: Apply protective barrier, creams and emollients, Internal/External urinary devices, Minimize layers    Activity Interventions: Assess need for specialty bed    Mobility Interventions: Assess need for specialty bed, Float heels    Nutrition Interventions: Discuss nutritional consult with provider    Friction and Shear Interventions: Apply protective barrier, creams and emollients, Lift sheet                Problem: Patient Education: Go to Patient Education Activity  Goal: Patient/Family Education  Outcome: Progressing Towards Goal     Problem: Impaired Skin Integrity/Pressure Injury Treatment  Goal: *Improvement of Existing Pressure Injury  Outcome: Progressing Towards Goal  Goal: *Prevention of pressure injury  Description: Document Abdirashid Scale and appropriate interventions in the flowsheet.   Outcome: Progressing Towards Goal  Note: Pressure Injury Interventions:  Sensory Interventions: Assess changes in LOC, Assess need for specialty bed    Moisture Interventions: Apply protective barrier, creams and emollients, Internal/External urinary devices, Minimize layers    Activity Interventions: Assess need for specialty bed    Mobility Interventions: Assess need for specialty bed, Float heels    Nutrition Interventions: Discuss nutritional consult with provider    Friction and Shear Interventions: Apply protective barrier, creams and emollients, Lift sheet                Problem: Patient Education: Go to Patient Education Activity  Goal: Patient/Family Education  Outcome: Progressing Towards Goal     Problem: Breathing Pattern - Ineffective  Goal: *Absence of hypoxia  Outcome: Progressing Towards Goal  Goal: *Use of effective breathing techniques  Outcome: Progressing Towards Goal  Goal: *PALLIATIVE CARE:  Alleviation of Dyspnea  Outcome: Progressing Towards Goal     Problem: Patient Education: Go to Patient Education Activity  Goal: Patient/Family Education  Outcome: Progressing Towards Goal     Problem: Pain  Goal: *Control of Pain  Outcome: Progressing Towards Goal  Goal: *PALLIATIVE CARE:  Alleviation of Pain  Outcome: Progressing Towards Goal     Problem: Patient Education: Go to Patient Education Activity  Goal: Patient/Family Education  Outcome: Progressing Towards Goal     Problem: Patient Education: Go to Patient Education Activity  Goal: Patient/Family Education  Outcome: Progressing Towards Goal     Problem: Patient Education: Go to Patient Education Activity  Goal: Patient/Family Education  Outcome: Progressing Towards Goal

## 2020-10-29 NOTE — PROCEDURES
[facility]  [phone]    Vitals   Pre   Post   Assessment   Pre   Post     Temp  Temp: 100.1 °F (37.8 °C) (10/29/20 1856)  99.3 LOC  ALERT AND ORIENTED X 4; RESTING BUT EASILY AROUSABLE ALERT AND ORIENTED X 4   HR   Pulse (Heart Rate): 99 (10/29/20 1856) RATE 94 Lungs   CLEAR/DIMINISHED NO CHANGE   B/P   BP: (!) 152/105 (10/29/20 1856) 178/120 Cardiac   TELE, NSR RATE 97  NSR RATE 98   Resp   Resp Rate: (!) 32 (10/29/20 1856) 30 Skin   WARM/DRY/INTACT  W/D/I   Pain level  Pain Intensity 1: 2 (10/29/20 1617) PAIN MEDS GIVEN LAST HOUR OF TX Edema  +1-+2 GENERALIZED   +1 GENERALIZED   Orders:    Duration:   Start:    5534 End:    2156 Total:   3 HRS   Dialyzer:   Dialyzer/Set Up Inspection: Rylan Mirela (10/29/20 1856)   K Bath:   Dialysate K (mEq/L): 3 (10/29/20 1856)   Ca Bath:   Dialysate CA (mEq/L): 2.5 (10/29/20 1856)   Na/Bicarb:   Dialysate NA (mEq/L): 140 (10/29/20 1856)   Target Fluid Removal:   Goal/Amount of Fluid to Remove (mL): 3000 mL (10/28/20 1615)   Access     Type & Location:   RIJ CVC: Dressing CDI. No s/s of infection. Both lumens aspirate & flush well. Running well at . Labs     Obtained/Reviewed   Critical Results Called   Date when labs were drawn-  Hgb-    HGB   Date Value Ref Range Status   10/29/2020 8.3 (L) 12.1 - 17.0 g/dL Final     K-    Potassium   Date Value Ref Range Status   10/29/2020 3.6 3.5 - 5.1 mmol/L Final     Ca-   Calcium   Date Value Ref Range Status   10/29/2020 7.8 (L) 8.5 - 10.1 MG/DL Final     Bun-   BUN   Date Value Ref Range Status   10/29/2020 38 (H) 6 - 20 MG/DL Final     Creat-   Creatinine   Date Value Ref Range Status   10/29/2020 4.09 (H) 0.70 - 1.30 MG/DL Final     Comment:     INVESTIGATED PER DELTA CHECK PROTOCOL        Medications/ Blood Products Given     Name   Dose   Route and Time     HEPARIN 1:1000  1. 1ML ARTERIAL; 1.4ML VENOUS 2200             Blood Volume Processed (BVP):    67.9 Net Fluid   Removed:  2000ml Comments   Time Out Done: Steve  Primary Nurse Rpt Pre: Александр Wood RN  Primary Nurse Rpt Post: TRINI HOPSON RN  Pt Education: [facility] SITE CARE  Care Plan: ONGOING  Tx Summary:     SBAR received from Primary RN. Pt resting but A&Ox4 when aroused. Consent signed & on file. 1856: Each catheter limb disinfected per p&p, caps removed, hubs disinfected per p&p. Each lumen aspirated for blood return and flushed with Normal Saline per policy. Labs drawn per request/ order. VSS. Dialysis Tx initiated. 2030: Patient pulling off bipap, repositioning and pulling at dialysis lines. Sitter and charge nurse assist getting patient comfortable and repositioned. Medications administered. Patient resting quietly. 2205: Tx ended. VSS. Each dialysis catheter limb disinfected per p&p, all possible blood returned per p&p, and each dialysis hub disinfected per p&p. Each lumen flushed, post dialysis catheter Heparin dwell instilled per order, and caps applied. Bed locked and in the lowest position, call bell and belongings in reach. SBAR given to Primary, RN. Patient is stable at time of my departure. All Dialysis related medications have been reviewed.       Admiting Diagnosis: SEPSIS/ARF  Consent signed - Informed Consent Verified: Yes (10/29/20 1856)  Hepatitis Status- NEG/SUSC 10/22/2020  Machine #- Machine Number: [id number] (10/29/20 1856)  Telemetry status-TELEMETRY, NSR

## 2020-10-29 NOTE — PROGRESS NOTES
Physical therapy:    Attempted PT session. Spoke with RN and requested therapy to attempt later as he had an eventful night and needed to rest and received medications. Will defer and continue to follow.  Thank you    Janie Ross, ROXT

## 2020-10-29 NOTE — PROGRESS NOTES
0730: Bedside shift change report given to Argelia Diaz (oncoming nurse) by Claudia Cannon (offgoing nurse). Report included the following information SBAR, Kardex, Intake/Output and MAR.     1200: Primary Nurse Howard Cantu and Gladys Garcia RN performed a dual skin assessment on this patient Impairment noted- see wound doc flow sheet  Abdirashid score is 14. WC following. Q8 Venelex. 1330: TRANSFER - OUT REPORT:    Verbal report given to DANYELL CAMPO [facility]) on Charity Du  being transferred to [facility]) for routine progression of care       Report consisted of patients Situation, Background, Assessment and   Recommendations(SBAR). Information from the following report(s) SBAR, Kardex, Intake/Output and MAR was reviewed with the receiving nurse. Lines:   Quad Lumen 10/22/20 (Active)   Central Line Being Utilized Yes 10/29/20 1200   Criteria for Appropriate Use Hemodynamically unstable, requiring monitoring lines, vasopressors, or volume resuscitation 10/29/20 1200   Site Assessment Clean, dry, & intact 10/29/20 1200   Infiltration Assessment 0 10/29/20 1200   Affected Extremity/Extremities Color distal to insertion site pink (or appropriate for race) 10/29/20 1200   Date of Last Dressing Change 10/22/20 10/29/20 1200   Dressing Status Clean, dry, & intact 10/29/20 1200   Dressing Type Transparent 10/29/20 1200   Action Taken Open ports on tubing capped 10/29/20 1200   Proximal Hub Color/Line Status White;Capped 10/29/20 1200   Positive Blood Return (Medial Site) Yes 10/29/20 1200   Medial 1 Hub Color/Line Status Deatra Simmer; Infusing 10/29/20 1200   Positive Blood Return (Lateral Site) Yes 10/29/20 1200   Medial 2 Hub Color/Line Status Blue;Capped 10/29/20 1200   Positive Blood Return (Site #3) Yes 10/29/20 1200   Distal Hub Color/Line Status Brown;Capped 10/29/20 1200   Positive Blood Return (Site #4) Yes 10/29/20 1200   Alcohol Cap Used Yes 10/29/20 1200        Opportunity for questions and clarification was provided. Patient transported with:   Monitor  O2 @ 6 liters

## 2020-10-29 NOTE — PROGRESS NOTES
[address] [facility],[address]            [facility] [place]               Division of Cardiology  [phone]                       Progress note    NAME:  Marty Obando   :   1995   MRN:   [id number]         ICD-10-CM ICD-9-CM    1. Troponin level elevated  R77.8 790.6    2. Drug abuse ([address]  F19.10 305.90    3. Acute renal failure with tubular necrosis (HCC)  N17.0 584.5    4. Obtunded  R40.1 780.09    5. Abnormality of basal ganglia (HCC)  Q04.8 742.8    6. Toxic metabolic encephalopathy  X53 349.82    7. Dilated cardiomyopathy (HCC)  I42.0 425.4    8. Essential hypertension  I10 401.9          Subjective:  Off precedex and requiring less oxygen. Still agitated and not making a lot of sense. Dialyzed yesterday       Assessment/Plan:  1. Cardiomyopathy:        Severe. Mechanism and etiology of cardiomyopathy unclear at this time. Possibly       related to rhabdomyolysis but coronary ischemia due to cocaine use cannot be       entirely ruled out given also a significant increase in troponin       (albeit normal ck mb index)         Continue coreg, hydralazine and isordil     Entresto on hold on account of the renal dysfunction for now.          2. JEAN PIERRE:        Secondary to rhabdomyolysis:  undergoing hemodialysis.     3.  Polysubstance abuse:        His UDS was positive for cocaine and marijuana amphetamines ecstacy        benzodiazepines.     4.  Left upper extremity DVT: On IV heparin.     5.  Left PNA: On IV antibiotics as per primary team        So far is fairly well compensated, though significantly volume overloaded. bp better with sedation and dialysis, plan for more hd per dr Micaela Lopez. Plan for limited echo today. Continue current meds as able. CARDIAC STUDIES      10/21/20   ECHO ADULT COMPLETE 10/22/2020 10/22/2020    Narrative · LV: Estimated LVEF is 15 - 20%. Visually measured ejection fraction. Normal wall thickness. Dilated left ventricle.  Severely reduced systolic   function. Left ventricular diastolic dysfunction. · LA: Mildly dilated left atrium. · RV: Mildly reduced systolic function. · RA: Mildly dilated right atrium. · MV: Mild mitral valve regurgitation is present. · TV: Mild tricuspid valve regurgitation is present. Signed by: Scooby Kennedy MD                  EKG Results     Procedure 720 Value Units Date/Time    EKG, 12 LEAD, INITIAL [[id number]]     Order Status:  Sent     EKG, 12 LEAD, INITIAL [[id number]]     Order Status:  Sent     EKG, 12 LEAD, INITIAL [[id number]]     Order Status:  Sent     EKG, 12 LEAD, INITIAL [[id number]]     Order Status:  Sent     EKG, 12 LEAD, INITIAL [[id number]] Collected:  10/22/20 0121    Order Status:  Completed Updated:  10/22/20 0739     Ventricular Rate 100 BPM      Atrial Rate 100 BPM      P-R Interval 144 ms      QRS Duration 92 ms      Q-T Interval 386 ms      QTC Calculation (Bezet) 497 ms      Calculated P Axis 37 degrees      Calculated R Axis 22 degrees      Calculated T Axis 21 degrees      Diagnosis --     Normal sinus rhythm  T wave abnormality, consider anterior ischemia    No previous ECGs available  Confirmed by Karen Burden M.D., Eben Favia ([id number]) on 10/22/2020 7:39:01 AM              IMAGING      CT Results (most recent):  Results from [place] encounter on 10/21/20   CT CHEST WO CONT    Narrative CT dose reduction was achieved through use of a standardized protocol tailored  for this examination and automatic exposure control for dose modulation. Noncontrast study shows moderate severity patchy consolidation and lesser degree  of edema throughout much of the left lung, sparing the apex, central and  peripheral. There is no large region of dense consolidation with the greatest  severity at the elevated left base. Right lung is clear and central airways are  open. No mediastinal or hilar adenopathy. Normal caliber aorta. No pleural pericardial  effusion.  No significant upper abdominal findings      Impression IMPRESSION: Moderate severity pneumonia throughout the left lung       XR Results (most recent):  Results from Hospital Encounter encounter on 10/21/20   XR CHEST PORT    Narrative Clinical history: tachypnea, respiratory failure, pneumonia  INDICATION:   tachypnea, respiratory failure, pneumonia  COMPARISON: 10/27/2020    FINDINGS:  AP portable upright view of the chest demonstrates a stable  cardiopericardial  silhouette. Persistent interstitial and parenchymal opacities not significantly  changed. Dialysis catheter on the right. Central venous access catheter extends  from the left. No change. .Patient is on a cardiac monitor. Impression IMPRESSION:  Stable bilateral parenchymal opacities. Reva Centeno MRI Results (most recent):  Results from [place] encounter on 10/21/20   MRA BRAIN WO CONT    Narrative INDICATION: hypoxia    COMPARISON:  None    TECHNIQUE:  3-D time-of-flight MRA of the brain was performed. Multiplanar  reconstructions were obtained. FINDINGS:    Posterior Circulation:  No flow limiting stenosis or occlusion. Anterior Circulation:  No flow limiting stenosis or occlusion. Additional Comments:  No evidence of aneurysm or vascular malformation. Impression IMPRESSION:  No flow limiting stenosis or intracranial aneurysm. Past Medical History:   Diagnosis Date    Anxiety     Depression            Past Surgical History:   Procedure Laterality Date    HX ORTHOPAEDIC                 Visit Vitals  BP (!) 144/95   Pulse (!) 105   Temp 98.3 °F (36.8 °C)   Resp (!) 38   Ht 5' 10\" (1.778 m)   Wt 236 lb 5.3 oz (107.2 kg)   SpO2 95%   BMI 33.91 kg/m²         Wt Readings from Last 3 Encounters:   10/29/20 236 lb 5.3 oz (107.2 kg)   10/22/20 195 lb (88.5 kg)   10/21/20 185 lb (83.9 kg)         Review of Systems:   Pertinent items are noted in the History of Present Illness. No intake/output data recorded.     10/27 1901 - 10/29 0700  In: 6875 [P.O.:1660; I.V.:4024]  Out: 4860 [Urine:825]      Telemetry: normal sinus rhythm    Physical Exam:    Neck: no JVD  Heart: regular rate and rhythm  Lungs: diminished breath sounds R anterior, L anterior  Abdomen: soft, non-tender.  Bowel sounds normal. No masses,  no organomegaly  Extremities: edema 1+    Current Facility-Administered Medications   Medication Dose Route Frequency    QUEtiapine (SEROquel) tablet 50 mg  50 mg Oral ONCE    QUEtiapine (SEROquel) tablet 50 mg  50 mg Oral Q8H    oxyCODONE IR (ROXICODONE) tablet 5 mg  5 mg Oral Q4H PRN    LORazepam (ATIVAN) injection 2 mg  2 mg IntraVENous Q4H PRN    doxycycline (VIBRAMYCIN) 100 mg in 0.9% sodium chloride (MBP/ADV) 100 mL  100 mg IntraVENous Q12H    albuterol-ipratropium (DUO-NEB) 2.5 MG-0.5 MG/3 ML  3 mL Nebulization QID RT    melatonin tablet 3 mg  3 mg Oral QHS    fluticasone propionate (FLONASE) 50 mcg/actuation nasal spray 2 Spray  2 Spray Both Nostrils DAILY    hydrALAZINE (APRESOLINE) tablet 25 mg  25 mg Oral TID    Vancomycin- pharmacy to dose   Other Rx Dosing/Monitoring    alteplase (CATHFLO) 1 mg in sterile water (preservative free) 1 mL injection  1 mg InterCATHeter PRN    isosorbide dinitrate (ISORDIL) tablet 10 mg  10 mg Oral TID    carvediloL (COREG) tablet 6.25 mg  6.25 mg Oral BID WITH MEALS    HYDROmorphone (PF) (DILAUDID) injection 2 mg  2 mg IntraVENous Q4H PRN    heparin 25,000 units in D5W 250 ml infusion  17-36 Units/kg/hr IntraVENous TITRATE    balsam peru-castor oiL (VENELEX) ointment   Topical Q8H    sodium chloride (NS) flush 5-40 mL  5-40 mL IntraVENous Q8H    sodium chloride (NS) flush 5-40 mL  5-40 mL IntraVENous PRN    acetaminophen (TYLENOL) tablet 650 mg  650 mg Oral Q6H PRN    Or    acetaminophen (TYLENOL) suppository 650 mg  650 mg Rectal Q6H PRN    polyethylene glycol (MIRALAX) packet 17 g  17 g Oral DAILY PRN    ondansetron (ZOFRAN) injection 4 mg  4 mg IntraVENous Q6H PRN    albuterol-ipratropium (DUO-NEB) 2.5 MG-0.5 MG/3 ML  3 mL Nebulization Q4H PRN    docusate sodium (COLACE) capsule 100 mg  100 mg Oral DAILY    labetaloL (NORMODYNE;TRANDATE) injection 20 mg  20 mg IntraVENous Q4H PRN    heparin (porcine) 1,000 unit/mL injection 1,400 Units  1,400 Units InterCATHeter DIALYSIS PRN    And    heparin (porcine) 1,000 unit/mL injection 1,100 Units  1,100 Units InterCATHeter DIALYSIS PRN         All Cardiac Markers in the last 24 hours:    Lab Results   Component Value Date/Time    CPK 2,135 (H) 10/29/2020 05:33 AM        Lab Results   Component Value Date/Time    Creatinine 4.09 (H) 10/29/2020 05:33 AM          Lab Results   Component Value Date/Time    CK 2,135 (H) 10/29/2020 05:33 AM    CK - MB 4.1 (H) 10/26/2020 11:50 PM    CK-MB Index 0.1 10/26/2020 11:50 PM    Troponin-I, Qt. 19.80 (H) 10/23/2020 04:15 AM         Lab Results   Component Value Date/Time    WBC 13.1 (H) 10/29/2020 05:33 AM    HGB 8.3 (L) 10/29/2020 05:33 AM    HCT 24.8 (L) 10/29/2020 05:33 AM    PLATELET 761 74/93/8669 05:33 AM    MCV 84.9 10/29/2020 05:33 AM         Lab Results   Component Value Date/Time    Sodium 134 (L) 10/29/2020 05:33 AM    Potassium 3.6 10/29/2020 05:33 AM    Chloride 99 10/29/2020 05:33 AM    CO2 25 10/29/2020 05:33 AM    Anion gap 10 10/29/2020 05:33 AM    Glucose 92 10/29/2020 05:33 AM    BUN 38 (H) 10/29/2020 05:33 AM    Creatinine 4.09 (H) 10/29/2020 05:33 AM    BUN/Creatinine ratio 9 (L) 10/29/2020 05:33 AM    GFR est AA 22 (L) 10/29/2020 05:33 AM    GFR est non-AA 18 (L) 10/29/2020 05:33 AM    Calcium 7.8 (L) 10/29/2020 05:33 AM         Lab Results   Component Value Date/Time    NT pro-BNP 3,484 (H) 10/22/2020 12:55 AM         No results found for: CHOL, CHOLPOCT, CHOLX, CHLST, CHOLV, HDL, HDLPOC, HDLP, LDL, LDLCPOC, LDLC, DLDLP, VLDLC, VLDL, TGLX, TRIGL, TRIGP, TGLPOCT, CHHD, CHHDX      Lab Results   Component Value Date/Time    ALT (SGPT) 312 (H) 10/29/2020 05:33 AM    Alk. phosphatase 70 10/29/2020 05:33 AM    Bilirubin, direct 0.3 (H) 10/24/2020 01:27 AM    Bilirubin, total 0.5 10/29/2020 05:33 AM       VERNA Watson MD

## 2020-10-29 NOTE — PROGRESS NOTES
*ATTENTION: This note has been created by a nurse practitioner student for educational purposes only. Please do not refer to the content of this note for clinical decision-making, billing, or other purposes. Please see attending providers note to obtain clinical information on this patient. *                [facility]    ICU TEAM Progress Note    Name: Violetta Madera   : 1995   MRN: [id number]   Date: 10/28/2020      Assessment:     ICU Problems:  1. Altered Mental Status  2. Pneumonia  3. Acute Hypoxic Respiratory Failure secondary to ^2  4. Sepsis  5. Cardiomyopathy   6. ATN from Rhabdomyolysis  7. Metabolic vs Hepatic Encephalopathy  8. LUE DVT  9. Substance Abuse/withdrawal    ICU Comprehensive Plan of Care:     Plans for this Shift:   1. Neuro/ Pain/ Sedation - Confusion and agitation. Continue Precedex gtt, tylenol PRN for mild pain, Seroquel 50 mg daily. History of anxiety and depression. 2. Resp - Pneumonia- Positive for Mycoplasma. Unable to get a sputum sample. Continue vancomycin. Nasal Cannula to keep sats >92%. Current everyday smoker. Duonebs QID. 3. CVS - Cardiomyopathy- EF 10-15%. Continue Coreg 6.25 mg twice daily. Normotensive. Continue hydralazine 25 mg TID. Continue Isosorbide dinitrate 10 mg TID. Cardiology following. DVT in LUE on heparin gtt. 4. Heme/ Onc- Mild leukocytosis. Continue to trend CBC. 5. GI - No acute GI issues, regular cardiac diet. Continue miralax PRN daily. 6. Renal/  - JEAN PIERRE- ATN from Rhabdomyolysis. Continue dialysis per nephrology, encourage oral fluid intake, continue to trend renal indices. 7. ID - Continue vancomycin for pneumonia. 8. Endocrine - No acute issues  9. Metabolic - No acute issues  10. Musc/Integ- Rhabdomyolysis  11. SBP Goal of: > 90 mmHg  12. MAP Goal of: > 65 mmHg  13. None - For above SBP/MAP goals  14. IVFs: None  15. Transfusion Trigger (Hgb): <7 g/dL  16. Respiratory Goals:  a. HOB >30  17.  Pulmonary toilet: Duo-Nebs 18. SpO2 Goal: > 92% on nasal cannula  19. Keep K>4; Mg>2   20. PT/OT: PT consulted and on board and OT consulted and on board   21. Discussed Plan of Care/Code Status: Full Code  22. Appreciate Consultants Input Nephrology and cardiology following  23. Discussed Care Plan with Bedside RN  24. Documentation of Current Medications  25. Rest of Plan Below:    F - Feeding:  Yes Regular Cardiac Diet  A - Analgesia: Dilaudid and Acetaminophen  S - Sedation: Precedex  T - DVT Prophylaxis: Heparin   H - Head of Bed: > 30 Degrees  U - Ulcer Prophylaxis: Not at this time   G - Glycemic Control: Insulin  S - Spontaneous Breathing Trial: N/A  B - Bowel Regimen: MiraLax  I - Indwelling Catheter:   Tubes: None  Lines: Peripheral IV, Central Line and Adebayo  Drains: Iniguez Catheter  D - De-escalation of Antibiotics: Vancomycin  Doxycycline    Subjective:   Progress Note: 10/28/2020      Reason for ICU Admission: Altered Mental Status/ Acute Renal Failure    HPI: Georgette Luna is a 26year old male who was initially admitted to [address] [place] [address] ICU on 10/21/2020 with Pneumonia and polysubstance abuse. He was found by his parents after sleeping greater than 24 hours and EMS was called. Upon EMS arrival he was found to be hypoxic and unresponsive. He was also hypotensive requiring 2L IV fluid in the ED. He was stabilized and transferred to the [facility] where 10/28/20 a rapid response was called secondary to acute aggression and agitation and altered mental status. He appeared to be in acute opiate withdrawal. During these episodes he became hypoxic and was requiring intermittent Bipap. Patient transfered back to ICU for precedex infusion. He was also noted earlier in the day to have fluid overload and was given diuresis and only able to complete about half of his dialysis before becoming aggressive, prompting this to be stopped.      Active Problem List:     Problem List  Never Reviewed          Codes Class    Toxic encephalopathy ICD-10-CM: G92  ICD-9-CM: 349.82         Drug abuse ([facility] ICD-10-CM: F19.10  ICD-9-CM: 305.90         Acute renal failure with tubular necrosis (HCC) ICD-10-CM: N17.0  ICD-9-CM: 584.5         * (Principal) Sepsis ([facility] ICD-10-CM: A41.9  ICD-9-CM: 038.9, 995.91         Community acquired pneumonia of left lower lobe of lung ICD-10-CM: J18.9  ICD-9-CM: 850         Metabolic acidosis CDN-37-GE: E87.2  ICD-9-CM: 276.2         Lymphocytosis ICD-10-CM: D72.820  ICD-9-CM: 288.61         Electrolyte abnormality ICD-10-CM: E87.8  ICD-9-CM: 276.9         Troponin level elevated ICD-10-CM: R77.8  ICD-9-CM: 790.6               Past Medical History:      has a past medical history of Anxiety and Depression. Past Surgical History:      has a past surgical history that includes hx orthopaedic. Home Medications:     Prior to Admission medications    Medication Sig Start Date End Date Taking? Authorizing Provider   naloxone (Narcan) 4 mg/actuation nasal spray Use 1 spray intranasally, then discard. Repeat with new spray every 2 min as needed for opioid overdose symptoms, alternating nostrils. 10/18/20   Marcelo Alberto MD   FLUoxetine (PROzac) 20 mg capsule Take 20 mg by mouth daily. Kaleb Moncada MD   hydrOXYzine HCL (ATARAX) 25 mg tablet Take 25 mg by mouth two (2) times a day. Kaleb Moncada MD       Allergies/Social/Family History: Allergies   Allergen Reactions    Tramadol Nausea and Vomiting      Social History     Tobacco Use    Smoking status: Current Every Day Smoker     Packs/day: 0.50    Smokeless tobacco: Former User   Substance Use Topics    Alcohol use: Not Currently      No family history on file.     Review of Systems:     Behavioral/Psych: positive for anxiety    Objective:   Vital Signs:  Visit Vitals  BP (!) 142/51   Pulse (!) 110   Temp 97.5 °F (36.4 °C)   Resp (!) 41   Ht 5' 10\" (1.778 m)   Wt 107.5 kg (237 lb)   SpO2 94%   BMI 34.01 kg/m²    O2 Flow Rate (L/min): 6 l/min O2 Device: Nasal cannula Temp (24hrs), Av.3 °F (36.8 °C), Min:97.5 °F (36.4 °C), Max:99 °F (37.2 °C)           Intake/Output:     Intake/Output Summary (Last 24 hours) at 10/28/2020 2351  Last data filed at 10/28/2020 2210  Gross per 24 hour   Intake 5478.19 ml   Output 4535 ml   Net 943.19 ml       Physical Exam:    General:  alert, mild distress, appears stated age, disoriented, restless  Eye:  conjunctivae/corneas clear. PERRL, EOM's intact. Fundi benign  Neurologic: disoriented, restless, moves all extremities, follows commands intermittently   Lymphatic:  Cervical, supraclavicular, and axillary nodes normal.   Neck:  normal and no erythema or exudates noted. Lungs:  clear to auscultation bilaterally  Heart:  regular rate and rhythm, S1, S2 normal, no murmur, click, rub or gallop  Abdomen:  soft, non-tender.  Bowel sounds normal. No masses,  no organomegaly  Cardiovascular:  Regular rate and rhythm, S1S2 present, without murmur or extra heart sounds, pedal pulses normal and no edema  Skin:  Normal.    LABS AND  DATA: Personally reviewed  Recent Labs     10/28/20  0615 10/27/20  0519   WBC 14.9* 13.9*   HGB 8.4* 8.9*   HCT 25.2* 26.2*    307     Recent Labs     10/28/20  1843 10/28/20  0615   * 132*   K 3.4* 4.0   CL 97 98   CO2 28 23   BUN 27* 39*   CREA 3.08* 4.50*   GLU 87 100   CA 8.1* 8.1*     Recent Labs     10/28/20  0615 10/27/20  0511   AP 66 79   TP 5.1* 5.4*   ALB 2.2* 2.1*   GLOB 2.9 3.3     Recent Labs     10/28/20  1843 10/28/20  0905 10/28/20  0615   INR  --  1.3* 1.4*   PTP  --  13.4* 14.0*   APTT 59.2* 34.7* >130.0*      Recent Labs     10/27/20  2011   PHI 7.46*   PCO2I 33.2*   PO2I 93     Recent Labs     10/28/20  0615 10/27/20  0511 10/26/20  2350 10/26/20  1103   CPK 3,620* 4,641* 5,090* 5,780*   CKMB  --   --  4.1* 4.5*       Hemodynamics:   PAP:   CO:     Wedge:   CI:     CVP:    SVR:       PVR:       Ventilator Settings:  Mode Rate Tidal Volume Pressure FiO2 PEEP            30 %       Peak airway pressure:      Minute ventilation: 14.5 l/min        MEDS: Reviewed    Chest X-Ray:  CXR Results  (Last 48 hours)               10/28/20 0419  XR CHEST PORT Final result    Impression:  IMPRESSION:   Stable bilateral parenchymal opacities. .                Narrative:  Clinical history: tachypnea, respiratory failure, pneumonia   INDICATION:   tachypnea, respiratory failure, pneumonia   COMPARISON: 10/27/2020       FINDINGS:   AP portable upright view of the chest demonstrates a stable  cardiopericardial   silhouette. Persistent interstitial and parenchymal opacities not significantly   changed. Dialysis catheter on the right. Central venous access catheter extends   from the left. No change. .Patient is on a cardiac monitor. 10/27/20 1748  XR CHEST PORT Final result    Impression:  IMPRESSION: No significant change in bilateral airspace opacification, left   greater than right. Narrative:  EXAM: XR CHEST PORT       INDICATION: Tachypnea       COMPARISON: October 25       FINDINGS: A portable AP radiograph of the chest was obtained at 1741 hours. The   lines are stable. The patient is on a cardiac monitor. There is persistent   opacification in the right lower lobe and throughout the lung, without   significant change. Cardia mediastinal contours are stable. The bones and soft   tissues are grossly within normal limits. Duplex upper extremities 10/22/2020  Interpretation Summary     · Evidence of an isolated acute deep vein thrombosis of the left distal brachial vein. · No evidence of right upper extremity vein thrombosis in the visualized vein segments. MRI Brain 10/23/2020  IMPRESSION:  Bilateral globus pallidus diffusion restriction, may be seen in heroin  leukoencephalopathy, carbon monoxide poisoning, acute infarctions, and  toxic/metabolic etiologies. ECHO: 10/22/2020  Interpretation Summary     Result status: Final result    · LV: Estimated LVEF is 15 - 20%.  Visually measured ejection fraction. Normal wall thickness. Dilated left ventricle. Severely reduced systolic function. Left ventricular diastolic dysfunction. · LA: Mildly dilated left atrium. · RV: Mildly reduced systolic function. · RA: Mildly dilated right atrium. · MV: Mild mitral valve regurgitation is present. · TV: Mild tricuspid valve regurgitation is present. Multidisciplinary Rounds Completed:  No    ABCDEF Bundle/Checklist Completed:  Yes    SPECIAL EQUIPMENT  IHD    DISPOSITION  Stay in ICU    CRITICAL CARE CONSULTANT NOTE  I had a face to face encounter with the patient, reviewed and interpreted patient data including clinical events, labs, images, vital signs, I/O's, and examined patient. I have discussed the case and the plan and management of the patient's care with the consulting services, the bedside nurses and the respiratory therapist.      NOTE OF PERSONAL INVOLVEMENT IN CARE   This patient has a high probability of imminent, clinically significant deterioration, which requires the highest level of preparedness to intervene urgently. I participated in the decision-making and personally managed or directed the management of the following life and organ supporting interventions that required my frequent assessment to treat or prevent imminent deterioration. EMR entered and chart reviewed in the course of carrying out educational functions and responsibilities as a Nurse Practitioner student following Ashely Childers NP.     Gaby Deutsch RN, BSN   Student Nurse Practitioner  [employer]

## 2020-10-29 NOTE — PROGRESS NOTES
TRANSFER - IN REPORT:    Verbal report received from [facility]) on Tushar Hair  being received from ICU (unit) for routine progression of care      Report consisted of patients Situation, Background, Assessment and   Recommendations(SBAR). Information from the following report(s) SBAR and Cardiac Rhythm NSR was reviewed with the receiving nurse. Opportunity for questions and clarification was provided. Assessment completed upon patients arrival to unit and care assumed. Primary Nurse Gloria Lr and Skye Pina RN performed a dual skin assessment on this patient Impairment noted- see wound doc flow sheet  Abdirashid score is 14. Wound care is following        1505: pTT therapeutic for the second time following algorithm recheck in 24 hours. Hourly rounds performed. Sitter at bedside. Q15 min documentation. Pt resting quietly on bipap. Bedside shift change report given to Angélica Kahn (oncoming nurse) by Dada Hernandez (offgoing nurse). Report included the following information SBAR.

## 2020-10-30 LAB
AMMONIA PLAS-SCNC: 30 UMOL/L
APTT PPP: 38.5 SEC (ref 22.1–32)
BASOPHILS # BLD: 0 K/UL (ref 0–0.1)
BASOPHILS NFR BLD: 0 % (ref 0–1)
CK SERPL-CCNC: 1656 U/L (ref 39–308)
DIFFERENTIAL METHOD BLD: ABNORMAL
EOSINOPHIL # BLD: 0.1 K/UL (ref 0–0.4)
EOSINOPHIL NFR BLD: 0 % (ref 0–7)
ERYTHROCYTE [DISTWIDTH] IN BLOOD BY AUTOMATED COUNT: 17.1 % (ref 11.5–14.5)
FLUID CULTURE, SPNG2: NORMAL
HCT VFR BLD AUTO: 24 % (ref 36.6–50.3)
HGB BLD-MCNC: 7.9 G/DL (ref 12.1–17)
IMM GRANULOCYTES # BLD AUTO: 0.2 K/UL (ref 0–0.04)
IMM GRANULOCYTES NFR BLD AUTO: 2 % (ref 0–0.5)
INR PPP: 1.3 (ref 0.9–1.1)
LYMPHOCYTES # BLD: 1.5 K/UL (ref 0.8–3.5)
LYMPHOCYTES NFR BLD: 11 % (ref 12–49)
MCH RBC QN AUTO: 27.9 PG (ref 26–34)
MCHC RBC AUTO-ENTMCNC: 32.9 G/DL (ref 30–36.5)
MCV RBC AUTO: 84.8 FL (ref 80–99)
MONOCYTES # BLD: 1.7 K/UL (ref 0–1)
MONOCYTES NFR BLD: 12 % (ref 5–13)
NEUTS SEG # BLD: 10.3 K/UL (ref 1.8–8)
NEUTS SEG NFR BLD: 75 % (ref 32–75)
NRBC # BLD: 0 K/UL (ref 0–0.01)
NRBC BLD-RTO: 0 PER 100 WBC
ORGANISM ID, SPNG3: NORMAL
PLATELET # BLD AUTO: 289 K/UL (ref 150–400)
PLEASE NOTE, SPNG4: NORMAL
PMV BLD AUTO: 9.4 FL (ref 8.9–12.9)
PROTHROMBIN TIME: 13 SEC (ref 9–11.1)
RBC # BLD AUTO: 2.83 M/UL (ref 4.1–5.7)
S PNEUM AG SPEC QL LA: NEGATIVE
SPECIMEN SOURCE: NORMAL
SPECIMEN, SPNG1: NORMAL
THERAPEUTIC RANGE,PTTT: ABNORMAL SECS (ref 58–77)
VANCOMYCIN SERPL-MCNC: 18.5 UG/ML
WBC # BLD AUTO: 13.8 K/UL (ref 4.1–11.1)

## 2020-10-30 PROCEDURE — 74011000250 HC RX REV CODE- 250: Performed by: NURSE PRACTITIONER

## 2020-10-30 PROCEDURE — 74011250637 HC RX REV CODE- 250/637: Performed by: NURSE PRACTITIONER

## 2020-10-30 PROCEDURE — 74011250637 HC RX REV CODE- 250/637: Performed by: SPECIALIST

## 2020-10-30 PROCEDURE — 82550 ASSAY OF CK (CPK): CPT

## 2020-10-30 PROCEDURE — 99232 SBSQ HOSP IP/OBS MODERATE 35: CPT | Performed by: SPECIALIST

## 2020-10-30 PROCEDURE — 74011250636 HC RX REV CODE- 250/636: Performed by: INTERNAL MEDICINE

## 2020-10-30 PROCEDURE — 74011000258 HC RX REV CODE- 258: Performed by: ANESTHESIOLOGY

## 2020-10-30 PROCEDURE — 74011250636 HC RX REV CODE- 250/636: Performed by: EMERGENCY MEDICINE

## 2020-10-30 PROCEDURE — 94640 AIRWAY INHALATION TREATMENT: CPT

## 2020-10-30 PROCEDURE — 94762 N-INVAS EAR/PLS OXIMTRY CONT: CPT

## 2020-10-30 PROCEDURE — 94660 CPAP INITIATION&MGMT: CPT

## 2020-10-30 PROCEDURE — 36415 COLL VENOUS BLD VENIPUNCTURE: CPT

## 2020-10-30 PROCEDURE — 74011000250 HC RX REV CODE- 250: Performed by: ANESTHESIOLOGY

## 2020-10-30 PROCEDURE — 97530 THERAPEUTIC ACTIVITIES: CPT

## 2020-10-30 PROCEDURE — 85025 COMPLETE CBC W/AUTO DIFF WBC: CPT

## 2020-10-30 PROCEDURE — 77010033678 HC OXYGEN DAILY

## 2020-10-30 PROCEDURE — 74011250636 HC RX REV CODE- 250/636: Performed by: NURSE PRACTITIONER

## 2020-10-30 PROCEDURE — 74011250636 HC RX REV CODE- 250/636: Performed by: ANESTHESIOLOGY

## 2020-10-30 PROCEDURE — 80202 ASSAY OF VANCOMYCIN: CPT

## 2020-10-30 PROCEDURE — 94664 DEMO&/EVAL PT USE INHALER: CPT

## 2020-10-30 PROCEDURE — 85610 PROTHROMBIN TIME: CPT

## 2020-10-30 PROCEDURE — 82140 ASSAY OF AMMONIA: CPT

## 2020-10-30 PROCEDURE — 65660000001 HC RM ICU INTERMED STEPDOWN

## 2020-10-30 PROCEDURE — 85730 THROMBOPLASTIN TIME PARTIAL: CPT

## 2020-10-30 PROCEDURE — 74011250637 HC RX REV CODE- 250/637: Performed by: ANESTHESIOLOGY

## 2020-10-30 RX ORDER — CARVEDILOL 12.5 MG/1
12.5 TABLET ORAL 2 TIMES DAILY WITH MEALS
Status: DISCONTINUED | OUTPATIENT
Start: 2020-10-30 | End: 2020-10-31

## 2020-10-30 RX ORDER — HYDRALAZINE HYDROCHLORIDE 50 MG/1
50 TABLET, FILM COATED ORAL 3 TIMES DAILY
Status: DISCONTINUED | OUTPATIENT
Start: 2020-10-30 | End: 2020-11-03

## 2020-10-30 RX ORDER — HEPARIN SODIUM 5000 [USP'U]/ML
80 INJECTION, SOLUTION INTRAVENOUS; SUBCUTANEOUS ONCE
Status: COMPLETED | OUTPATIENT
Start: 2020-10-30 | End: 2020-10-30

## 2020-10-30 RX ADMIN — QUETIAPINE FUMARATE 50 MG: 25 TABLET ORAL at 21:12

## 2020-10-30 RX ADMIN — HEPARIN SODIUM 1100 UNITS: 1000 INJECTION INTRAVENOUS; SUBCUTANEOUS at 16:07

## 2020-10-30 RX ADMIN — HEPARIN SODIUM 32 UNITS/KG/HR: 10000 INJECTION, SOLUTION INTRAVENOUS at 19:49

## 2020-10-30 RX ADMIN — QUETIAPINE FUMARATE 50 MG: 25 TABLET ORAL at 06:41

## 2020-10-30 RX ADMIN — HEPARIN SODIUM 32 UNITS/KG/HR: 10000 INJECTION, SOLUTION INTRAVENOUS at 17:46

## 2020-10-30 RX ADMIN — HEPARIN SODIUM 1400 UNITS: 1000 INJECTION INTRAVENOUS; SUBCUTANEOUS at 16:08

## 2020-10-30 RX ADMIN — CARVEDILOL 12.5 MG: 12.5 TABLET, FILM COATED ORAL at 16:07

## 2020-10-30 RX ADMIN — IPRATROPIUM BROMIDE AND ALBUTEROL SULFATE 3 ML: .5; 3 SOLUTION RESPIRATORY (INHALATION) at 16:13

## 2020-10-30 RX ADMIN — Medication: at 06:41

## 2020-10-30 RX ADMIN — OXYCODONE HYDROCHLORIDE 5 MG: 5 TABLET ORAL at 21:12

## 2020-10-30 RX ADMIN — QUETIAPINE FUMARATE 50 MG: 25 TABLET ORAL at 16:07

## 2020-10-30 RX ADMIN — HEPARIN SODIUM 28 UNITS/KG/HR: 10000 INJECTION, SOLUTION INTRAVENOUS at 11:44

## 2020-10-30 RX ADMIN — IPRATROPIUM BROMIDE AND ALBUTEROL SULFATE 3 ML: .5; 3 SOLUTION RESPIRATORY (INHALATION) at 11:28

## 2020-10-30 RX ADMIN — IPRATROPIUM BROMIDE AND ALBUTEROL SULFATE 3 ML: .5; 3 SOLUTION RESPIRATORY (INHALATION) at 21:59

## 2020-10-30 RX ADMIN — OXYCODONE HYDROCHLORIDE 5 MG: 5 TABLET ORAL at 04:30

## 2020-10-30 RX ADMIN — HEPARIN SODIUM 28 UNITS/KG/HR: 10000 INJECTION, SOLUTION INTRAVENOUS at 01:12

## 2020-10-30 RX ADMIN — HYDRALAZINE HYDROCHLORIDE 50 MG: 50 TABLET, FILM COATED ORAL at 09:27

## 2020-10-30 RX ADMIN — Medication 30 ML: at 14:00

## 2020-10-30 RX ADMIN — LABETALOL HYDROCHLORIDE 20 MG: 5 INJECTION INTRAVENOUS at 02:52

## 2020-10-30 RX ADMIN — OXYCODONE HYDROCHLORIDE 5 MG: 5 TABLET ORAL at 14:36

## 2020-10-30 RX ADMIN — HYDRALAZINE HYDROCHLORIDE 50 MG: 50 TABLET, FILM COATED ORAL at 22:41

## 2020-10-30 RX ADMIN — Medication 10 ML: at 06:42

## 2020-10-30 RX ADMIN — LABETALOL HYDROCHLORIDE 20 MG: 5 INJECTION INTRAVENOUS at 14:41

## 2020-10-30 RX ADMIN — DOXYCYCLINE 100 MG: 100 INJECTION, POWDER, LYOPHILIZED, FOR SOLUTION INTRAVENOUS at 22:41

## 2020-10-30 RX ADMIN — CARVEDILOL 12.5 MG: 12.5 TABLET, FILM COATED ORAL at 09:27

## 2020-10-30 RX ADMIN — DOXYCYCLINE 100 MG: 100 INJECTION, POWDER, LYOPHILIZED, FOR SOLUTION INTRAVENOUS at 09:27

## 2020-10-30 RX ADMIN — DOCUSATE SODIUM 100 MG: 100 CAPSULE, LIQUID FILLED ORAL at 09:00

## 2020-10-30 RX ADMIN — VANCOMYCIN HYDROCHLORIDE 1000 MG: 1 INJECTION, POWDER, LYOPHILIZED, FOR SOLUTION INTRAVENOUS at 17:42

## 2020-10-30 RX ADMIN — Medication: at 14:00

## 2020-10-30 RX ADMIN — ISOSORBIDE DINITRATE 10 MG: 10 TABLET ORAL at 09:00

## 2020-10-30 RX ADMIN — HEPARIN SODIUM 32 UNITS/KG/HR: 10000 INJECTION, SOLUTION INTRAVENOUS at 21:22

## 2020-10-30 RX ADMIN — ISOSORBIDE DINITRATE 10 MG: 10 TABLET ORAL at 22:41

## 2020-10-30 RX ADMIN — HEPARIN SODIUM 8850 UNITS: 5000 INJECTION INTRAVENOUS; SUBCUTANEOUS at 17:43

## 2020-10-30 RX ADMIN — Medication 3 MG: at 21:12

## 2020-10-30 RX ADMIN — HYDRALAZINE HYDROCHLORIDE 50 MG: 50 TABLET, FILM COATED ORAL at 16:07

## 2020-10-30 RX ADMIN — ISOSORBIDE DINITRATE 10 MG: 10 TABLET ORAL at 16:07

## 2020-10-30 RX ADMIN — LORAZEPAM 2 MG: 2 INJECTION INTRAMUSCULAR; INTRAVENOUS at 02:31

## 2020-10-30 RX ADMIN — OXYCODONE HYDROCHLORIDE 5 MG: 5 TABLET ORAL at 09:46

## 2020-10-30 RX ADMIN — IPRATROPIUM BROMIDE AND ALBUTEROL SULFATE 3 ML: .5; 3 SOLUTION RESPIRATORY (INHALATION) at 08:09

## 2020-10-30 RX ADMIN — ACETAMINOPHEN 650 MG: 325 TABLET ORAL at 04:30

## 2020-10-30 NOTE — PROGRESS NOTES
Problem: Breathing Pattern - Ineffective  Goal: *Absence of hypoxia  Outcome: Progressing Towards Goal  Pt on bipap. No s/s of respiratory distress. Will continue to monitor. Problem: Impaired Skin Integrity/Pressure Injury Treatment  Goal: *Improvement of Existing Pressure Injury  Outcome: Progressing Towards Goal  Venelex ordered for wounds     Problem: Pressure Injury - Risk of  Goal: *Prevention of pressure injury  Description: Document Abdirashid Scale and appropriate interventions in the flowsheet. Outcome: Progressing Towards Goal  Note: Pressure Injury Interventions:   Patient turned every two hours, moisture barrier applied, heels elevated, pillows and blankets used, pressure redistributing mattress , linens dry. Bedside shift change report given to Armando (oncoming nurse) by Nathanael Adam (offgoing nurse).  Report included the following information SBAR, Intake/Output, MAR, Recent Results, and Cardiac Rhythm ST .

## 2020-10-30 NOTE — PROGRESS NOTES
Problem: Mobility Impaired (Adult and Pediatric)  Goal: *Acute Goals and Plan of Care (Insert Text)  Description: FUNCTIONAL STATUS PRIOR TO ADMISSION: Patient was independent and active without use of DME.    HOME SUPPORT PRIOR TO ADMISSION: The patient lived with family but did not require assist.    Physical Therapy Goals  Initiated 10/27/2020  1. Patient will move from supine to sit and sit to supine  and roll side to side in bed with minimal assistance/contact guard assist within 7 day(s). 2.  Patient will transfer from bed to chair and chair to bed with moderate assistance  using the least restrictive device within 7 day(s). 3.  Patient will perform sit to stand with moderate assistance  within 7 day(s). Outcome: Progressing Towards Goal   PHYSICAL THERAPY TREATMENT  Patient: Carlos Cabrera (26 y.o. male)  Date: 10/30/2020  Diagnosis: Sepsis ([address] [A41.9]  ARF (acute renal failure) ([address] [N17.9]  Drug abuse ([address] [F19.10]   Sepsis ([address]       Precautions: Fall, no BP LUE (DVT), alarm if no sitter  Chart, physical therapy assessment, plan of care and goals were reviewed. ASSESSMENT  Patient continues with skilled PT services and is progressing towards goals. Today pt was able to get up to the chair for the first time. In sitting at the EOB note impaired balance with an initial tendency for RUE propping with that arm positioned behind him. With cues he demonstrated the ability to sit supporting self without propping but with a bit of a posterior lean. Note some BOYER but 02 sats stable on the 6 liters of 02. Pt educated about use of pursed lip breathing and proper technique and will benefit from additional education. He was able to stand (for the first time this admission with PT). Note in standing an initial tendency for a posterior lean of his upper body which he corrected with verbal cues.  Once in standing he stepped over to the chair with min assist X 2, bilateral hand held assist and remained up to the chair, VSS. For the weekend, recommend patient to complete as able in order to maintain strength, endurance and independence with nursing: OOB to chair 3x/day with assist X 2. PT to follow-up with patient after the weekend. Vitals:       10/30/20 1039 10/30/20 1053 10/30/20 1102   BP:   (!) 170/93 supine  (!) 170/106 sitting EOB (arm movement) (!) 149/84 sittin up to the chair   Pulse:   (!) 105 (!) 108 (!) 103   Resp:   (!) 35       Temp:           TempSrc:           SpO2: on six liter of 02 via nasal cannula   97%    95%  91%                                        Current Level of Function Impacting Discharge (mobility/balance): min to mod assist X 2 with impaired sitting and standing balance. Other factors to consider for discharge: Medically complex: Polysubstance abuse. Pneumonia. Acute metabolic Encephalopathy. Cardiomyopathy: EF 15%- Acute hypoxic respiratory failure. Rhabdomyolysis. ARF. Acute LUE DVT. Acute Anemia. Bacteremia. PLAN :  Patient continues to benefit from skilled intervention to address the above impairments. Continue treatment per established plan of care. to address goals. Recommendation for discharge: (in order for the patient to meet his/her long term goals)  Therapy 3 hours per day 5-7 days per week, (working towards).  Will continue to assess and make recommendations     This discharge recommendation:  A follow-up discussion with the attending provider and/or case management is planned    IF patient discharges home will need the following DME: to be determined (TBD)       SUBJECTIVE:   Patient stated If feels good, regarding up to the chair    OBJECTIVE DATA SUMMARY:   Chart checked, pt cleared by nursing  Critical Behavior:  Neurologic State: Alert, Confused  Orientation Level: Oriented to person, Oriented to place, Oriented to time, Disoriented to situation  Cognition: Decreased command following, Appropriate for age attention/concentration Functional Mobility Training:  Bed Mobility:     Supine to Sit: Minimum assistance;Assist x2              Transfers:  Sit to Stand: Moderate assistance;Assist x2  Stand to Sit: Minimum assistance;Assist x2                             Balance:  Sitting: Impaired  Sitting - Static: Fair (occasional)  Sitting - Dynamic: Fair (occasional)  Standing: Impaired; With support  Standing - Static: Constant support; Fair  Standing - Dynamic : Constant support; Fair  Ambulation/Gait Training:  Distance (ft): 2 Feet (ft)  Assistive Device: Gait belt(bilateral hand held assist)  Ambulation - Level of Assistance: Minimal assistance;Assist x2        Gait Abnormalities: Decreased step clearance;Trunk sway increased        Base of Support: Center of gravity altered; Widened     Speed/Tran: Slow  Step Length: Left shortened;Right shortened                    Stairs: Therapeutic Exercises:     Pain Rating:  None rated    Activity Tolerance:   Fair, requires rest breaks, and observed SOB with activity  Please refer to the flowsheet for vital signs taken during this treatment. After treatment patient left in no apparent distress:   Sitting in chair, Call bell within reach, and sitter present    COMMUNICATION/COLLABORATION:   The patients plan of care was discussed with: Occupational therapist, Registered nurse, and sitter .      Mamadou Fleming   Time Calculation: 39 mins

## 2020-10-30 NOTE — PROGRESS NOTES
[address] [facility],[address]            [facility] V[facility] [place]               Division of Cardiology  [phone]                       Progress note    NAME:  Sammy Guillaume   :   1995   MRN:   [id number]         ICD-10-CM ICD-9-CM    1. Troponin level elevated  R77.8 790.6    2. Drug abuse ([address]  F19.10 305.90    3. Acute renal failure with tubular necrosis (HCC)  N17.0 584.5    4. Obtunded  R40.1 780.09    5. Abnormality of basal ganglia (HCC)  Q04.8 742.8    6. Toxic metabolic encephalopathy  O28 349.82    7. Dilated cardiomyopathy (HCC)  I42.0 425.4    8. Essential hypertension  I10 401.9          Subjective:  Out of icu yesterday pm. Minimally arousable on bipap. bp still elevated. Echo still shows severe lv dysfunction      Assessment/Plan:  1. Cardiomyopathy:        Severe. Mechanism and etiology of cardiomyopathy unclear at this time. Possibly       related to rhabdomyolysis but coronary ischemia due to cocaine use cannot be       entirely ruled out given also a significant increase in troponin       (albeit normal ck mb index)         Continue coreg, hydralazine and isordil     Entresto on hold on account of the renal dysfunction for now.          2. JEAN PIERRE:        Secondary to rhabdomyolysis:  undergoing hemodialysis.     3.  Polysubstance abuse:        His UDS was positive for cocaine and marijuana amphetamines ecstacy        benzodiazepines.     4.  Left upper extremity DVT: On IV heparin.     5.  Left PNA: On IV antibiotics as per primary team        So far is fairly well compensated, though significantly volume overloaded. bp better with sedation and dialysis, plan for more hd per dr Jessica Levy. Plan for limited echo today. Will increase coreg and hydralazine. CARDIAC STUDIES      10/21/20   ECHO ADULT COMPLETE 10/22/2020 10/22/2020    Narrative · LV: Estimated LVEF is 15 - 20%. Visually measured ejection fraction. Normal wall thickness.  Dilated left ventricle. Severely reduced systolic   function. Left ventricular diastolic dysfunction. · LA: Mildly dilated left atrium. · RV: Mildly reduced systolic function. · RA: Mildly dilated right atrium. · MV: Mild mitral valve regurgitation is present. · TV: Mild tricuspid valve regurgitation is present. Signed by: Terri Gilliam MD                  EKG Results     Procedure 720 Value Units Date/Time    EKG, 12 LEAD, INITIAL [[id number]]     Order Status:  Sent     EKG, 12 LEAD, INITIAL [[id number]]     Order Status:  Sent     EKG, 12 LEAD, INITIAL [[id number]]     Order Status:  Sent     EKG, 12 LEAD, INITIAL [[id number]]     Order Status:  Sent     EKG, 12 LEAD, INITIAL [[id number]] Collected:  10/22/20 0121    Order Status:  Completed Updated:  10/22/20 0739     Ventricular Rate 100 BPM      Atrial Rate 100 BPM      P-R Interval 144 ms      QRS Duration 92 ms      Q-T Interval 386 ms      QTC Calculation (Bezet) 497 ms      Calculated P Axis 37 degrees      Calculated R Axis 22 degrees      Calculated T Axis 21 degrees      Diagnosis --     Normal sinus rhythm  T wave abnormality, consider anterior ischemia    No previous ECGs available  Confirmed by Edie Cedeño M.D., Phi Fu ([id number]) on 10/22/2020 7:39:01 AM              IMAGING      CT Results (most recent):  Results from [place] encounter on 10/21/20   CT CHEST WO CONT    Narrative CT dose reduction was achieved through use of a standardized protocol tailored  for this examination and automatic exposure control for dose modulation. Noncontrast study shows moderate severity patchy consolidation and lesser degree  of edema throughout much of the left lung, sparing the apex, central and  peripheral. There is no large region of dense consolidation with the greatest  severity at the elevated left base. Right lung is clear and central airways are  open. No mediastinal or hilar adenopathy. Normal caliber aorta. No pleural pericardial  effusion.  No significant upper abdominal findings      Impression IMPRESSION: Moderate severity pneumonia throughout the left lung       XR Results (most recent):  Results from Hospital Encounter encounter on 10/21/20   XR CHEST PORT    Narrative Clinical history: tachypnea, respiratory failure, pneumonia  INDICATION:   tachypnea, respiratory failure, pneumonia  COMPARISON: 10/27/2020    FINDINGS:  AP portable upright view of the chest demonstrates a stable  cardiopericardial  silhouette. Persistent interstitial and parenchymal opacities not significantly  changed. Dialysis catheter on the right. Central venous access catheter extends  from the left. No change. .Patient is on a cardiac monitor. Impression IMPRESSION:  Stable bilateral parenchymal opacities. Brenton Floresan MRI Results (most recent):  Results from [place] encounter on 10/21/20   MRA BRAIN WO CONT    Narrative INDICATION: hypoxia    COMPARISON:  None    TECHNIQUE:  3-D time-of-flight MRA of the brain was performed. Multiplanar  reconstructions were obtained. FINDINGS:    Posterior Circulation:  No flow limiting stenosis or occlusion. Anterior Circulation:  No flow limiting stenosis or occlusion. Additional Comments:  No evidence of aneurysm or vascular malformation. Impression IMPRESSION:  No flow limiting stenosis or intracranial aneurysm. Past Medical History:   Diagnosis Date    Anxiety     Depression            Past Surgical History:   Procedure Laterality Date    HX ORTHOPAEDIC                 Visit Vitals  BP (!) 157/98   Pulse 90   Temp 99.5 °F (37.5 °C)   Resp 29   Ht 5' 10\" (1.778 m)   Wt 236 lb (107 kg)   SpO2 100%   BMI 33.86 kg/m²         Wt Readings from Last 3 Encounters:   10/29/20 236 lb (107 kg)   10/22/20 195 lb (88.5 kg)   10/21/20 185 lb (83.9 kg)         Review of Systems:   Pertinent items are noted in the History of Present Illness. No intake/output data recorded.     10/28 1901 - 10/30 0700  In: 1784.4 [P.O.:900; I.V.:884.4]  Out: 5495 [Urine:495]      Telemetry: normal sinus rhythm    Physical Exam:    Neck: no JVD  Heart: regular rate and rhythm  Lungs: diminished breath sounds R anterior, L anterior  Abdomen: soft, non-tender.  Bowel sounds normal. No masses,  no organomegaly  Extremities: edema 1+    Current Facility-Administered Medications   Medication Dose Route Frequency    carvediloL (COREG) tablet 12.5 mg  12.5 mg Oral BID WITH MEALS    hydrALAZINE (APRESOLINE) tablet 50 mg  50 mg Oral TID    QUEtiapine (SEROquel) tablet 50 mg  50 mg Oral Q8H    oxyCODONE IR (ROXICODONE) tablet 5 mg  5 mg Oral Q4H PRN    LORazepam (ATIVAN) injection 2 mg  2 mg IntraVENous Q4H PRN    doxycycline (VIBRAMYCIN) 100 mg in 0.9% sodium chloride (MBP/ADV) 100 mL  100 mg IntraVENous Q12H    albuterol-ipratropium (DUO-NEB) 2.5 MG-0.5 MG/3 ML  3 mL Nebulization QID RT    melatonin tablet 3 mg  3 mg Oral QHS    Vancomycin- pharmacy to dose   Other Rx Dosing/Monitoring    alteplase (CATHFLO) 1 mg in sterile water (preservative free) 1 mL injection  1 mg InterCATHeter PRN    isosorbide dinitrate (ISORDIL) tablet 10 mg  10 mg Oral TID    HYDROmorphone (PF) (DILAUDID) injection 2 mg  2 mg IntraVENous Q4H PRN    heparin 25,000 units in D5W 250 ml infusion  17-36 Units/kg/hr IntraVENous TITRATE    balsam peru-castor oiL (VENELEX) ointment   Topical Q8H    sodium chloride (NS) flush 5-40 mL  5-40 mL IntraVENous Q8H    sodium chloride (NS) flush 5-40 mL  5-40 mL IntraVENous PRN    acetaminophen (TYLENOL) tablet 650 mg  650 mg Oral Q6H PRN    Or    acetaminophen (TYLENOL) suppository 650 mg  650 mg Rectal Q6H PRN    polyethylene glycol (MIRALAX) packet 17 g  17 g Oral DAILY PRN    ondansetron (ZOFRAN) injection 4 mg  4 mg IntraVENous Q6H PRN    albuterol-ipratropium (DUO-NEB) 2.5 MG-0.5 MG/3 ML  3 mL Nebulization Q4H PRN    docusate sodium (COLACE) capsule 100 mg  100 mg Oral DAILY    labetaloL (NORMODYNE;TRANDATE) injection 20 mg  20 mg IntraVENous Q4H PRN    heparin (porcine) 1,000 unit/mL injection 1,400 Units  1,400 Units InterCATHeter DIALYSIS PRN    And    heparin (porcine) 1,000 unit/mL injection 1,100 Units  1,100 Units InterCATHeter DIALYSIS PRN         All Cardiac Markers in the last 24 hours:    Lab Results   Component Value Date/Time    CPK 1,656 (H) 10/30/2020 03:10 AM        Lab Results   Component Value Date/Time    Creatinine 4.09 (H) 10/29/2020 05:33 AM          Lab Results   Component Value Date/Time    CK 1,656 (H) 10/30/2020 03:10 AM    CK - MB 4.1 (H) 10/26/2020 11:50 PM    CK-MB Index 0.1 10/26/2020 11:50 PM    Troponin-I, Qt. 19.80 (H) 10/23/2020 04:15 AM         Lab Results   Component Value Date/Time    WBC 13.8 (H) 10/30/2020 03:10 AM    HGB 7.9 (L) 10/30/2020 03:10 AM    HCT 24.0 (L) 10/30/2020 03:10 AM    PLATELET 047 68/50/9777 03:10 AM    MCV 84.8 10/30/2020 03:10 AM         Lab Results   Component Value Date/Time    Sodium 134 (L) 10/29/2020 05:33 AM    Potassium 3.6 10/29/2020 05:33 AM    Chloride 99 10/29/2020 05:33 AM    CO2 25 10/29/2020 05:33 AM    Anion gap 10 10/29/2020 05:33 AM    Glucose 92 10/29/2020 05:33 AM    BUN 38 (H) 10/29/2020 05:33 AM    Creatinine 4.09 (H) 10/29/2020 05:33 AM    BUN/Creatinine ratio 9 (L) 10/29/2020 05:33 AM    GFR est AA 22 (L) 10/29/2020 05:33 AM    GFR est non-AA 18 (L) 10/29/2020 05:33 AM    Calcium 7.8 (L) 10/29/2020 05:33 AM         Lab Results   Component Value Date/Time    NT pro-BNP 3,484 (H) 10/22/2020 12:55 AM         No results found for: CHOL, CHOLPOCT, CHOLX, CHLST, CHOLV, HDL, HDLPOC, HDLP, LDL, LDLCPOC, LDLC, DLDLP, VLDLC, VLDL, TGLX, TRIGL, TRIGP, TGLPOCT, CHHD, CHHDX      Lab Results   Component Value Date/Time    ALT (SGPT) 312 (H) 10/29/2020 05:33 AM    Alk.  phosphatase 70 10/29/2020 05:33 AM    Bilirubin, direct 0.3 (H) 10/24/2020 01:27 AM    Bilirubin, total 0.5 10/29/2020 05:33 AM       Corwin Ortiz Degree Wilbert Alford MD

## 2020-10-30 NOTE — PROGRESS NOTES
Problem: Self Care Deficits Care Plan (Adult)  Goal: *Acute Goals and Plan of Care (Insert Text)  Description: Description: FUNCTIONAL STATUS PRIOR TO ADMISSION: Patient was independent and active without use of DME. Working in [address]: The patient lived with family but did not require assist.    Occupational Therapy Goals  Initiated 10/27/2020  1. Patient will perform grooming seated EOB with supervision/set-up within 7 day(s). 2.  Patient will perform bathing with minimal assistance within 7 day(s). 3.  Patient will perform lower body dressing with moderate assistance  within 7 day(s). 4.  Patient will perform toilet transfers with moderate assistance  within 7 day(s). 5.  Patient will perform all aspects of toileting with moderate assistance  within 7 day(s). 6.  Patient will cylindrical grasp to hold cup in L hand with supervision within 7 day(s). 7.  Patient will participate in upper extremity therapeutic exercise/activities with supervision/set-up for 10 minutes within 7 day(s). Outcome: Progressing Towards Goal    OCCUPATIONAL THERAPY TREATMENT  Patient: Suzan Garcia (26 y.o. male)  Date: 10/30/2020  Diagnosis: Sepsis ([facility] [address] [A41.9]  ARF (acute renal failure) ([address] [N17.9]  Drug abuse ([address] [F19.10]   Sepsis ([address]       Precautions: Fall, Fluid restriction  Chart, occupational therapy assessment, plan of care, and goals were reviewed. ASSESSMENT  Patient continues with skilled OT services and is progressing towards goals but remains limited by global weakness + focal LUE weakness (LUE improving, now overall 2- to 3-/5 strength, also with improved gross > fine motor functionality, able to use as gross stabilizer), edema (diffuse + focal LUE, improving, noted LUE with DVT, on heparin with aPTT in therapeutic range for mobilization), impaired cardiopulmonary tolerance, impaired functional endurance, and impaired sitting + standing balance.   Patient was highly motivated to progress mobility OOB to chair and participated well. Despite progress, he remains significantly below independent functional baseline. Recommend inpatient rehab at d/c. Current Level of Function Impacting Discharge (ADLs/self-care): moderate assistance x2 sit-stand, minimum assistance x2 pivot to chair, minimum to moderate assistance UB ADLs, total assistance LB ADLs         PLAN :  Patient continues to benefit from skilled intervention to address the above impairments. Continue treatment per established plan of care. to address goals. Recommendation for discharge: (in order for the patient to meet his/her long term goals)  Therapy 3 hours per day 5-7 days per week    This discharge recommendation:  Has not yet been discussed the attending provider and/or case management         SUBJECTIVE:   Patient stated I want to walk.     OBJECTIVE DATA SUMMARY:   Cognitive/Behavioral Status:  Neurologic State: Alert;Confused  Orientation Level: Oriented to person;Oriented to place;Oriented to situation;Disoriented to time  Cognition: Follows commands        Safety/Judgement: Decreased awareness of need for safety;Decreased insight into deficits    Functional Mobility and Transfers for ADLs:  Bed Mobility:  Supine to Sit: Minimum assistance;Assist x2    Transfers:  Sit to Stand: Moderate assistance;Assist x2          Balance:  Sitting: Impaired  Sitting - Static: Fair (occasional)  Sitting - Dynamic: Fair (occasional)  Standing: Impaired; With support  Standing - Static: Constant support; Fair  Standing - Dynamic : Constant support; Fair    ADL Intervention:          Lower Body Dressing Assistance  Socks:  Total assistance (dependent)         Cognitive Retraining  Safety/Judgement: Decreased awareness of need for safety;Decreased insight into deficits    LUE shoulder flexion: 3-/5 strength with AROM to ~80 degrees  L elbow flexion: 3-/5 strength  L : 2-/5    Pain:  Patient did not report pain    Activity Tolerance:   Good  Please refer to the flowsheet for vital signs taken during this treatment. After treatment patient left in no apparent distress:   Sitting in chair and Call pyle within reach, Sitter present    COMMUNICATION/COLLABORATION:   The patients plan of care was discussed with: Physical therapist and Registered nurse.      Louie Ambriz OT  Time Calculation: 40 mins

## 2020-10-30 NOTE — PROCEDURES
[facility]  [phone]    Vitals   Pre   Post   Assessment   Pre   Post     Temp  Temp: 97.8 °F (36.6 °C) (10/30/20 1245)  99.4 LOC  Lethargic and oriented x 3 same   HR   Pulse (Heart Rate): 90 (10/30/20 1245) 87 Lungs   Diminished,  Tachypnea on Bipap    same   B/P   BP: (!) 148/99 (10/30/20 1245) 158/100 Cardiac   Normal Sinus rhythm  same   Resp   Resp Rate: 25 (10/30/20 1245) 35 Skin   wounds on extremties  same   Pain level  Pain Intensity 1: 9 (10/30/20 0928) 7 Edema  Edema generalized non pitting     same   Orders:    Duration:   Start:    4951 End:    1545 Total:   3 hours   Dialyzer:   Dialyzer/Set Up Inspection: Revaclear (10/30/20 1245)   K Bath:   Dialysate K (mEq/L): 2 (10/30/20 1245)   Ca Bath:   Dialysate CA (mEq/L): 2.5 (10/30/20 1245)   Na/Bicarb:   Dialysate NA (mEq/L): 140 (10/30/20 1245)   Target Fluid Removal:   Goal/Amount of Fluid to Remove (mL): 3000 mL (10/30/20 1245)   Access     Type & Location:   RIJ non tunneled CVC, dressing dry, clean an intact, Each catheter limb disinfected per p&p, caps removed, hubs disinfected per p&p.         Labs     Obtained/Reviewed   Critical Results Called   Date when labs were drawn-  Hgb-    HGB   Date Value Ref Range Status   10/30/2020 7.9 (L) 12.1 - 17.0 g/dL Final     K-    Potassium   Date Value Ref Range Status   10/29/2020 3.6 3.5 - 5.1 mmol/L Final     Ca-   Calcium   Date Value Ref Range Status   10/29/2020 7.8 (L) 8.5 - 10.1 MG/DL Final     Bun-   BUN   Date Value Ref Range Status   10/29/2020 38 (H) 6 - 20 MG/DL Final     Creat-   Creatinine   Date Value Ref Range Status   10/29/2020 4.09 (H) 0.70 - 1.30 MG/DL Final     Comment:     INVESTIGATED PER DELTA CHECK PROTOCOL        Medications/ Blood Products Given     Name   Dose   Route and Time           Heparin 2500 units Intracatheter        Blood Volume Processed (BVP):    69.3 Net Fluid   Removed:  3000 ml   Comments   Time Out Done: 1200  Primary Nurse Rpt Pre:SASHA Loving  Primary Nurse Rpt Post:SASHA Loving  Pt Education: Procedural  Care Plan:Continue Nephrology plan of care  Tx Summary:  1245-Labs, orders and medications were reviewed. SBAR was given by primary nurse. HD was started using RI cvc without any issues. 1300- Pt c/o having anxiety/panic attack and requesting medication to help relax him. Primary nurse was notified  1430-Pt hypertensive, primary nurse at bedside giving pain, blood pressure and anxiety medications. 1545-HD was completed and all possible blood was returned to the pt. Pt tolerated HD well without any issues. Each dialysis catheter limb disinfected per p&p, blood returned per p&p, each dialysis hub disinfected per p&p, post dialysis catheter dwell instilled per order, and caps applied. Pt remained hypertensive during treatment and remains on bipap. Primary nurse was given a report. Admiting Diagnosis:ARF  Pt's previous clinic- TBD  Consent signed - Informed Consent Verified: Yes (10/30/20 1245)  Consent - Verified  Hepatitis Status- Negative Hep B AG /Susceptible Hep B AB 10/22/2020  Machine #- Machine Number: [id number] (10/30/20 1245)  Telemetry status- Monitored at bedside  Pre-dialysis wt. -   n/a

## 2020-10-30 NOTE — PROGRESS NOTES
Problem: Falls - Risk of  Goal: *Absence of Falls  Description: Document Tammy Miller Fall Risk and appropriate interventions in the flowsheet. Outcome: Progressing Towards Goal  Note: Fall Risk Interventions:  Mobility Interventions: Communicate number of staff needed for ambulation/transfer, Patient to call before getting OOB, PT Consult for mobility concerns    Mentation Interventions: Adequate sleep, hydration, pain control, Door open when patient unattended, Evaluate medications/consider consulting pharmacy, More frequent rounding, Reorient patient, Room close to nurse's station, Toileting rounds, Update white board    Medication Interventions: Evaluate medications/consider consulting pharmacy, Patient to call before getting OOB, Teach patient to arise slowly    Elimination Interventions: Call light in reach    History of Falls Interventions: Door open when patient unattended, Room close to nurse's station, Evaluate medications/consider consulting pharmacy         Problem: Pressure Injury - Risk of  Goal: *Prevention of pressure injury  Description: Document Abdirashid Scale and appropriate interventions in the flowsheet. Outcome: Progressing Towards Goal  Note: Pressure Injury Interventions:  Sensory Interventions: Assess changes in LOC, Check visual cues for pain, Discuss PT/OT consult with provider, Float heels, Keep linens dry and wrinkle-free, Maintain/enhance activity level, Minimize linen layers, Monitor skin under medical devices, Pressure redistribution bed/mattress (bed type), Pad between skin to skin, Turn and reposition approx.  every two hours (pillows and wedges if needed)    Moisture Interventions: Absorbent underpads, Check for incontinence Q2 hours and as needed, Internal/External urinary devices, Limit adult briefs, Maintain skin hydration (lotion/cream), Minimize layers, Moisture barrier    Activity Interventions: Increase time out of bed, Pressure redistribution bed/mattress(bed type), PT/OT evaluation    Mobility Interventions: Float heels, HOB 30 degrees or less, Pressure redistribution bed/mattress (bed type), PT/OT evaluation, Turn and reposition approx. every two hours(pillow and wedges), Assess need for specialty bed    Nutrition Interventions: Document food/fluid/supplement intake    Friction and Shear Interventions: Apply protective barrier, creams and emollients, Feet elevated on foot rest, HOB 30 degrees or less, Lift sheet, Lift team/patient mobility team, Minimize layers                Problem: Impaired Skin Integrity/Pressure Injury Treatment  Goal: *Improvement of Existing Pressure Injury  Outcome: Progressing Towards Goal  Goal: *Prevention of pressure injury  Description: Document Abdirashid Scale and appropriate interventions in the flowsheet. Outcome: Progressing Towards Goal  Note: Pressure Injury Interventions:  Sensory Interventions: Assess changes in LOC, Check visual cues for pain, Discuss PT/OT consult with provider, Float heels, Keep linens dry and wrinkle-free, Maintain/enhance activity level, Minimize linen layers, Monitor skin under medical devices, Pressure redistribution bed/mattress (bed type), Pad between skin to skin, Turn and reposition approx. every two hours (pillows and wedges if needed)    Moisture Interventions: Absorbent underpads, Check for incontinence Q2 hours and as needed, Internal/External urinary devices, Limit adult briefs, Maintain skin hydration (lotion/cream), Minimize layers, Moisture barrier    Activity Interventions: Increase time out of bed, Pressure redistribution bed/mattress(bed type), PT/OT evaluation    Mobility Interventions: Float heels, HOB 30 degrees or less, Pressure redistribution bed/mattress (bed type), PT/OT evaluation, Turn and reposition approx.  every two hours(pillow and wedges), Assess need for specialty bed    Nutrition Interventions: Document food/fluid/supplement intake    Friction and Shear Interventions: Apply protective barrier, creams and emollients, Feet elevated on foot rest, HOB 30 degrees or less, Lift sheet, Lift team/patient mobility team, Minimize layers                Problem: Breathing Pattern - Ineffective  Goal: *Absence of hypoxia  Outcome: Progressing Towards Goal  Goal: *Use of effective breathing techniques  Outcome: Progressing Towards Goal     Problem: Breathing Pattern - Ineffective  Goal: *Absence of hypoxia  Outcome: Progressing Towards Goal     Problem: Pain  Goal: *Control of Pain  Outcome: Progressing Towards Goal

## 2020-10-30 NOTE — PROGRESS NOTES
Full note to follow.  Carl Diez, PT    Vitals:     10/30/20 1039 10/30/20 1053 10/30/20 1102   BP:  (!) 170/93 supine  (!) 170/106 sitting EOB (arm movement) (!) 149/84 sittin up to the chair   Pulse:  (!) 105 (!) 108 (!) 103   Resp:  (!) 35     Temp:       TempSrc:       SpO2: on six liter of 02 via nasal cannula  97%   95%  91%

## 2020-10-30 NOTE — PROGRESS NOTES
[facility]   [address] [facility], [address] [place] [address], [facility]  Phone: [phone]   [phone]       Nephrology Progress Note  Marty Obando     1995     [id number]  Date of Admission : 10/21/2020  10/30/20    CC: Follow up for ARF, Rhabdomyolysis        Assessment and Plan   JEAN PIERRE  - Severe ATN from Rhabdomyolysis. Oligoanuric   - now HD dependent  - for HD today    Severe Rhabdomyolysis   - 2/2 Substance use  - LFTs and CPK trending down    Left Lung PNA w/ sepsis     LUE DVT:  - on heparin drip    Resp failure:  - from pulm edema   - HD again today    Metabolic acidosis:  - improving    Hypocalcemia:  - stable  - replete PRN    Polysubstance abuse      Interval History:  Seen and examined. On BiPAP, lethargic. About to start HD. Dialyzed yesterday, 2 L UF. Unable to obtain any ROS. Review of Systems: Review of systems not obtained due to patient factors.     Current Medications:   Current Facility-Administered Medications   Medication Dose Route Frequency    carvediloL (COREG) tablet 12.5 mg  12.5 mg Oral BID WITH MEALS    hydrALAZINE (APRESOLINE) tablet 50 mg  50 mg Oral TID    QUEtiapine (SEROquel) tablet 50 mg  50 mg Oral Q8H    oxyCODONE IR (ROXICODONE) tablet 5 mg  5 mg Oral Q4H PRN    LORazepam (ATIVAN) injection 2 mg  2 mg IntraVENous Q4H PRN    doxycycline (VIBRAMYCIN) 100 mg in 0.9% sodium chloride (MBP/ADV) 100 mL  100 mg IntraVENous Q12H    albuterol-ipratropium (DUO-NEB) 2.5 MG-0.5 MG/3 ML  3 mL Nebulization QID RT    melatonin tablet 3 mg  3 mg Oral QHS    Vancomycin- pharmacy to dose   Other Rx Dosing/Monitoring    alteplase (CATHFLO) 1 mg in sterile water (preservative free) 1 mL injection  1 mg InterCATHeter PRN    isosorbide dinitrate (ISORDIL) tablet 10 mg  10 mg Oral TID    HYDROmorphone (PF) (DILAUDID) injection 2 mg  2 mg IntraVENous Q4H PRN    heparin 25,000 units in D5W 250 ml infusion  17-36 Units/kg/hr IntraVENous TITRATE    balsam peru-castor oiL (VENELEX) ointment   Topical Q8H    sodium chloride (NS) flush 5-40 mL  5-40 mL IntraVENous Q8H    sodium chloride (NS) flush 5-40 mL  5-40 mL IntraVENous PRN    acetaminophen (TYLENOL) tablet 650 mg  650 mg Oral Q6H PRN    Or    acetaminophen (TYLENOL) suppository 650 mg  650 mg Rectal Q6H PRN    polyethylene glycol (MIRALAX) packet 17 g  17 g Oral DAILY PRN    ondansetron (ZOFRAN) injection 4 mg  4 mg IntraVENous Q6H PRN    albuterol-ipratropium (DUO-NEB) 2.5 MG-0.5 MG/3 ML  3 mL Nebulization Q4H PRN    docusate sodium (COLACE) capsule 100 mg  100 mg Oral DAILY    labetaloL (NORMODYNE;TRANDATE) injection 20 mg  20 mg IntraVENous Q4H PRN    heparin (porcine) 1,000 unit/mL injection 1,400 Units  1,400 Units InterCATHeter DIALYSIS PRN    And    heparin (porcine) 1,000 unit/mL injection 1,100 Units  1,100 Units InterCATHeter DIALYSIS PRN      Allergies   Allergen Reactions    Tramadol Nausea and Vomiting       Objective:  Vitals:    Vitals:    10/30/20 1053 10/30/20 1102 10/30/20 1128 10/30/20 1156   BP: (!) 170/106 (!) 149/84  (!) 166/90   Pulse: (!) 108 (!) 103  100   Resp:    28   Temp:    98 °F (36.7 °C)   TempSrc:       SpO2: 95% 91% 97% 100%   Weight:       Height:         Intake and Output:  10/30 0701 - 10/30 1900  In: 240 [P.O.:240]  Out: -   10/28 1901 - 10/30 0700  In: 1784.4 [P.O.:900;  I.V.:884.4]  Out: 5495 [Urine:495]    Physical Examination:    General: Lethargic on BiPAP  Neck:  Supple, no mass  Resp:  Decreased breath sounds  CV:  RRR,  no murmur or rub, no LE edema  GI:  Soft, NT, + Bowel sounds, no hepatosplenomegaly  Neurologic:  Lethargic   Psych:             Unable to assess  Skin:  Pressures rash on Left foot, R post Knee and neck   :  Iniguez     []    High complexity decision making was performed  []    Patient is at high-risk of decompensation with multiple organ involvement    Lab Data Personally Reviewed: I have reviewed all the pertinent labs, microbiology data and radiology studies during assessment. Recent Labs     10/30/20  0310 10/29/20  0533 10/28/20  1843 10/28/20  0905 10/28/20  0615   NA  --  134* 133*  --  132*   K  --  3.6 3.4*  --  4.0   CL  --  99 97  --  98   CO2  --  25 28  --  23   GLU  --  92 87  --  100   BUN  --  38* 27*  --  39*   CREA  --  4.09* 3.08*  --  4.50*   CA  --  7.8* 8.1*  --  8.1*   ALB  --  2.0*  --   --  2.2*   ALT  --  312*  --   --  430*   INR 1.3* 1.4*  --  1.3* 1.4*     Recent Labs     10/30/20  0310 10/29/20  0533 10/28/20  0615   WBC 13.8* 13.1* 14.9*   HGB 7.9* 8.3* 8.4*   HCT 24.0* 24.8* 25.2*    284 324     No results found for: SDES  Lab Results   Component Value Date/Time    Culture result: NO GROWTH 5 DAYS 10/22/2020 06:19 AM    Culture result: NO GROWTH 5 DAYS 10/22/2020 02:37 AM    Culture result: (A) 10/21/2020 08:15 PM     Staphylococcus hominis (Oxacillin resistant) GROWING IN THE AEROBIC BOTTLE (SITE = R HAND)    Culture result:  10/21/2020 08:15 PM     Gram Positive Cocci CALLED TO AND READ BACK BY NEGRA Tomlinson RN AT 2012 BY [facility]    Culture result: (A) 10/21/2020 08:10 PM     Staphylococcus hominis (Oxacillin resistant) GROWING IN THE AEROBIC BOTTLE (SITE = L HAND)    Culture result:  10/21/2020 08:10 PM     preliminary report of Gram Positive Cocci in clusters growing in 1 of 2 bottles drawn CALLED TO AND READ BACK BY FERN DAMON RN SCAV AT  [facility] ON 10/23/20.  Bettye 3810     Recent Results (from the past 24 hour(s))   PTT    Collection Time: 10/29/20  3:05 PM   Result Value Ref Range    aPTT 63.5 (H) 22.1 - 32.0 sec    aPTT, therapeutic range     58.0 - 77.0 SECS   AMMONIA    Collection Time: 10/30/20  3:10 AM   Result Value Ref Range    Ammonia 30 <32 UMOL/L   PROTHROMBIN TIME + INR    Collection Time: 10/30/20  3:10 AM   Result Value Ref Range    INR 1.3 (H) 0.9 - 1.1      Prothrombin time 13.0 (H) 9.0 - 11.1 sec   CK    Collection Time: 10/30/20  3:10 AM   Result Value Ref Range    CK 1,656 (H) 39 - 308 U/L   CBC WITH AUTOMATED DIFF    Collection Time: 10/30/20  3:10 AM   Result Value Ref Range    WBC 13.8 (H) 4.1 - 11.1 K/uL    RBC 2.83 (L) 4.10 - 5.70 M/uL    HGB 7.9 (L) 12.1 - 17.0 g/dL    HCT 24.0 (L) 36.6 - 50.3 %    MCV 84.8 80.0 - 99.0 FL    MCH 27.9 26.0 - 34.0 PG    MCHC 32.9 30.0 - 36.5 g/dL    RDW 17.1 (H) 11.5 - 14.5 %    PLATELET 950 599 - 271 K/uL    MPV 9.4 8.9 - 12.9 FL    NRBC 0.0 0  WBC    ABSOLUTE NRBC 0.00 0.00 - 0.01 K/uL    NEUTROPHILS 75 32 - 75 %    LYMPHOCYTES 11 (L) 12 - 49 %    MONOCYTES 12 5 - 13 %    EOSINOPHILS 0 0 - 7 %    BASOPHILS 0 0 - 1 %    IMMATURE GRANULOCYTES 2 (H) 0.0 - 0.5 %    ABS. NEUTROPHILS 10.3 (H) 1.8 - 8.0 K/UL    ABS. LYMPHOCYTES 1.5 0.8 - 3.5 K/UL    ABS. MONOCYTES 1.7 (H) 0.0 - 1.0 K/UL    ABS. EOSINOPHILS 0.1 0.0 - 0.4 K/UL    ABS. BASOPHILS 0.0 0.0 - 0.1 K/UL    ABS. IMM. GRANS. 0.2 (H) 0.00 - 0.04 K/UL    DF AUTOMATED     VANCOMYCIN, RANDOM    Collection Time: 10/30/20  3:10 AM   Result Value Ref Range    Vancomycin, random 18.5 UG/ML           Total time spent with patient:  xxx   min. Care Plan discussed with:  Patient     Family      RN      Consulting Physician [facility],         I have reviewed the flowsheets. Chart and Pertinent Notes have been reviewed. No change in PMH ,family and social history from Consult note.       Roberto Carlos Kaur MD

## 2020-10-31 ENCOUNTER — APPOINTMENT (OUTPATIENT)
Dept: GENERAL RADIOLOGY | Age: 25
DRG: 812 | End: 2020-10-31
Attending: NURSE PRACTITIONER
Payer: MEDICAID

## 2020-10-31 LAB
ALBUMIN SERPL-MCNC: 2.2 G/DL (ref 3.5–5)
ALBUMIN SERPL-MCNC: 2.2 G/DL (ref 3.5–5)
ALBUMIN/GLOB SERPL: 0.7 {RATIO} (ref 1.1–2.2)
ALBUMIN/GLOB SERPL: 0.7 {RATIO} (ref 1.1–2.2)
ALP SERPL-CCNC: 66 U/L (ref 45–117)
ALP SERPL-CCNC: 67 U/L (ref 45–117)
ALT SERPL-CCNC: 205 U/L (ref 12–78)
ALT SERPL-CCNC: 206 U/L (ref 12–78)
ANION GAP SERPL CALC-SCNC: 9 MMOL/L (ref 5–15)
ANION GAP SERPL CALC-SCNC: 9 MMOL/L (ref 5–15)
APTT PPP: 53.4 SEC (ref 22.1–32)
APTT PPP: 63.5 SEC (ref 22.1–32)
APTT PPP: 83.4 SEC (ref 22.1–32)
APTT PPP: 83.7 SEC (ref 22.1–32)
AST SERPL-CCNC: 63 U/L (ref 15–37)
AST SERPL-CCNC: 63 U/L (ref 15–37)
BASOPHILS # BLD: 0.1 K/UL (ref 0–0.1)
BASOPHILS NFR BLD: 0 % (ref 0–1)
BILIRUB SERPL-MCNC: 0.6 MG/DL (ref 0.2–1)
BILIRUB SERPL-MCNC: 0.6 MG/DL (ref 0.2–1)
BUN SERPL-MCNC: 28 MG/DL (ref 6–20)
BUN SERPL-MCNC: 28 MG/DL (ref 6–20)
BUN/CREAT SERPL: 9 (ref 12–20)
BUN/CREAT SERPL: 9 (ref 12–20)
CALCIUM SERPL-MCNC: 7.9 MG/DL (ref 8.5–10.1)
CALCIUM SERPL-MCNC: 8 MG/DL (ref 8.5–10.1)
CHLORIDE SERPL-SCNC: 100 MMOL/L (ref 97–108)
CHLORIDE SERPL-SCNC: 101 MMOL/L (ref 97–108)
CK SERPL-CCNC: 1204 U/L (ref 39–308)
CO2 SERPL-SCNC: 26 MMOL/L (ref 21–32)
CO2 SERPL-SCNC: 27 MMOL/L (ref 21–32)
CREAT SERPL-MCNC: 3.16 MG/DL (ref 0.7–1.3)
CREAT SERPL-MCNC: 3.27 MG/DL (ref 0.7–1.3)
DIFFERENTIAL METHOD BLD: ABNORMAL
EOSINOPHIL # BLD: 0.1 K/UL (ref 0–0.4)
EOSINOPHIL NFR BLD: 1 % (ref 0–7)
ERYTHROCYTE [DISTWIDTH] IN BLOOD BY AUTOMATED COUNT: 17.3 % (ref 11.5–14.5)
FERRITIN SERPL-MCNC: 100 NG/ML (ref 26–388)
FOLATE SERPL-MCNC: 9.7 NG/ML (ref 5–21)
GLOBULIN SER CALC-MCNC: 3.2 G/DL (ref 2–4)
GLOBULIN SER CALC-MCNC: 3.2 G/DL (ref 2–4)
GLUCOSE SERPL-MCNC: 97 MG/DL (ref 65–100)
GLUCOSE SERPL-MCNC: 98 MG/DL (ref 65–100)
HCT VFR BLD AUTO: 25 % (ref 36.6–50.3)
HGB BLD-MCNC: 8.1 G/DL (ref 12.1–17)
IMM GRANULOCYTES # BLD AUTO: 0.2 K/UL (ref 0–0.04)
IMM GRANULOCYTES NFR BLD AUTO: 1 % (ref 0–0.5)
INR PPP: NORMAL (ref 0.9–1.2)
IRON SATN MFR SERPL: 8 % (ref 20–50)
IRON SERPL-MCNC: 20 UG/DL (ref 35–150)
LYMPHOCYTES # BLD: 2.1 K/UL (ref 0.8–3.5)
LYMPHOCYTES NFR BLD: 12 % (ref 12–49)
MCH RBC QN AUTO: 27.7 PG (ref 26–34)
MCHC RBC AUTO-ENTMCNC: 32.4 G/DL (ref 30–36.5)
MCV RBC AUTO: 85.6 FL (ref 80–99)
MONOCYTES # BLD: 2 K/UL (ref 0–1)
MONOCYTES NFR BLD: 12 % (ref 5–13)
NEUTS SEG # BLD: 12.6 K/UL (ref 1.8–8)
NEUTS SEG NFR BLD: 74 % (ref 32–75)
NRBC # BLD: 0 K/UL (ref 0–0.01)
NRBC BLD-RTO: 0 PER 100 WBC
PLATELET # BLD AUTO: 276 K/UL (ref 150–400)
PMV BLD AUTO: 9.5 FL (ref 8.9–12.9)
POTASSIUM SERPL-SCNC: 3.6 MMOL/L (ref 3.5–5.1)
POTASSIUM SERPL-SCNC: 3.6 MMOL/L (ref 3.5–5.1)
PROT SERPL-MCNC: 5.4 G/DL (ref 6.4–8.2)
PROT SERPL-MCNC: 5.4 G/DL (ref 6.4–8.2)
PROTHROMBIN TIME: NORMAL SEC (ref 9.5–13.1)
RBC # BLD AUTO: 2.92 M/UL (ref 4.1–5.7)
SODIUM SERPL-SCNC: 136 MMOL/L (ref 136–145)
SODIUM SERPL-SCNC: 136 MMOL/L (ref 136–145)
THERAPEUTIC RANGE,PTTT: ABNORMAL SECS (ref 58–77)
TIBC SERPL-MCNC: 238 UG/DL (ref 250–450)
VIT B12 SERPL-MCNC: 811 PG/ML (ref 193–986)
WBC # BLD AUTO: 17 K/UL (ref 4.1–11.1)

## 2020-10-31 PROCEDURE — 82746 ASSAY OF FOLIC ACID SERUM: CPT

## 2020-10-31 PROCEDURE — 94660 CPAP INITIATION&MGMT: CPT

## 2020-10-31 PROCEDURE — 90935 HEMODIALYSIS ONE EVALUATION: CPT

## 2020-10-31 PROCEDURE — 94640 AIRWAY INHALATION TREATMENT: CPT

## 2020-10-31 PROCEDURE — 85730 THROMBOPLASTIN TIME PARTIAL: CPT

## 2020-10-31 PROCEDURE — 74011250637 HC RX REV CODE- 250/637: Performed by: NURSE PRACTITIONER

## 2020-10-31 PROCEDURE — 80053 COMPREHEN METABOLIC PANEL: CPT

## 2020-10-31 PROCEDURE — 74011250637 HC RX REV CODE- 250/637: Performed by: ANESTHESIOLOGY

## 2020-10-31 PROCEDURE — 74011250637 HC RX REV CODE- 250/637: Performed by: INTERNAL MEDICINE

## 2020-10-31 PROCEDURE — 74011000250 HC RX REV CODE- 250: Performed by: HOSPITALIST

## 2020-10-31 PROCEDURE — 71045 X-RAY EXAM CHEST 1 VIEW: CPT

## 2020-10-31 PROCEDURE — 74011000250 HC RX REV CODE- 250: Performed by: ANESTHESIOLOGY

## 2020-10-31 PROCEDURE — 74011250636 HC RX REV CODE- 250/636: Performed by: HOSPITALIST

## 2020-10-31 PROCEDURE — 74011250637 HC RX REV CODE- 250/637: Performed by: SPECIALIST

## 2020-10-31 PROCEDURE — 74011250636 HC RX REV CODE- 250/636: Performed by: INTERNAL MEDICINE

## 2020-10-31 PROCEDURE — 65660000001 HC RM ICU INTERMED STEPDOWN

## 2020-10-31 PROCEDURE — 85025 COMPLETE CBC W/AUTO DIFF WBC: CPT

## 2020-10-31 PROCEDURE — 77010033678 HC OXYGEN DAILY

## 2020-10-31 PROCEDURE — 74011250636 HC RX REV CODE- 250/636: Performed by: EMERGENCY MEDICINE

## 2020-10-31 PROCEDURE — 82550 ASSAY OF CK (CPK): CPT

## 2020-10-31 PROCEDURE — 82728 ASSAY OF FERRITIN: CPT

## 2020-10-31 PROCEDURE — 74011250636 HC RX REV CODE- 250/636: Performed by: NURSE PRACTITIONER

## 2020-10-31 PROCEDURE — 36415 COLL VENOUS BLD VENIPUNCTURE: CPT

## 2020-10-31 PROCEDURE — 74011000250 HC RX REV CODE- 250: Performed by: INTERNAL MEDICINE

## 2020-10-31 PROCEDURE — 36593 DECLOT VASCULAR DEVICE: CPT

## 2020-10-31 PROCEDURE — 74011000258 HC RX REV CODE- 258: Performed by: ANESTHESIOLOGY

## 2020-10-31 PROCEDURE — 82607 VITAMIN B-12: CPT

## 2020-10-31 PROCEDURE — 99233 SBSQ HOSP IP/OBS HIGH 50: CPT | Performed by: INTERNAL MEDICINE

## 2020-10-31 PROCEDURE — 83540 ASSAY OF IRON: CPT

## 2020-10-31 PROCEDURE — 74011250636 HC RX REV CODE- 250/636: Performed by: ANESTHESIOLOGY

## 2020-10-31 RX ORDER — IPRATROPIUM BROMIDE AND ALBUTEROL SULFATE 2.5; .5 MG/3ML; MG/3ML
3 SOLUTION RESPIRATORY (INHALATION)
Status: DISCONTINUED | OUTPATIENT
Start: 2020-10-31 | End: 2020-11-05

## 2020-10-31 RX ORDER — CARVEDILOL 12.5 MG/1
25 TABLET ORAL 2 TIMES DAILY WITH MEALS
Status: DISCONTINUED | OUTPATIENT
Start: 2020-10-31 | End: 2020-11-03

## 2020-10-31 RX ADMIN — CARVEDILOL 12.5 MG: 12.5 TABLET, FILM COATED ORAL at 08:25

## 2020-10-31 RX ADMIN — ALTEPLASE 1 MG: 2.2 INJECTION, POWDER, LYOPHILIZED, FOR SOLUTION INTRAVENOUS at 01:30

## 2020-10-31 RX ADMIN — QUETIAPINE FUMARATE 50 MG: 25 TABLET ORAL at 06:47

## 2020-10-31 RX ADMIN — QUETIAPINE FUMARATE 50 MG: 25 TABLET ORAL at 22:43

## 2020-10-31 RX ADMIN — Medication: at 06:47

## 2020-10-31 RX ADMIN — HEPARIN SODIUM 30 UNITS/KG/HR: 10000 INJECTION, SOLUTION INTRAVENOUS at 02:20

## 2020-10-31 RX ADMIN — LORAZEPAM 2 MG: 2 INJECTION INTRAMUSCULAR; INTRAVENOUS at 15:53

## 2020-10-31 RX ADMIN — Medication 10 ML: at 23:03

## 2020-10-31 RX ADMIN — HYDROMORPHONE HYDROCHLORIDE 2 MG: 2 INJECTION, SOLUTION INTRAMUSCULAR; INTRAVENOUS; SUBCUTANEOUS at 08:25

## 2020-10-31 RX ADMIN — HYDRALAZINE HYDROCHLORIDE 50 MG: 50 TABLET, FILM COATED ORAL at 15:54

## 2020-10-31 RX ADMIN — CARVEDILOL 25 MG: 12.5 TABLET, FILM COATED ORAL at 16:33

## 2020-10-31 RX ADMIN — Medication 10 ML: at 01:56

## 2020-10-31 RX ADMIN — Medication 10 ML: at 15:13

## 2020-10-31 RX ADMIN — Medication 10 ML: at 23:02

## 2020-10-31 RX ADMIN — LORAZEPAM 2 MG: 2 INJECTION INTRAMUSCULAR; INTRAVENOUS at 01:46

## 2020-10-31 RX ADMIN — HEPARIN SODIUM 1100 UNITS: 1000 INJECTION INTRAVENOUS; SUBCUTANEOUS at 22:31

## 2020-10-31 RX ADMIN — HYDROMORPHONE HYDROCHLORIDE 2 MG: 2 INJECTION, SOLUTION INTRAMUSCULAR; INTRAVENOUS; SUBCUTANEOUS at 15:12

## 2020-10-31 RX ADMIN — ISOSORBIDE DINITRATE 10 MG: 10 TABLET ORAL at 08:26

## 2020-10-31 RX ADMIN — LORAZEPAM 2 MG: 2 INJECTION INTRAMUSCULAR; INTRAVENOUS at 23:00

## 2020-10-31 RX ADMIN — HYDRALAZINE HYDROCHLORIDE 50 MG: 50 TABLET, FILM COATED ORAL at 22:43

## 2020-10-31 RX ADMIN — DOXYCYCLINE 100 MG: 100 INJECTION, POWDER, LYOPHILIZED, FOR SOLUTION INTRAVENOUS at 08:35

## 2020-10-31 RX ADMIN — HYDROMORPHONE HYDROCHLORIDE 2 MG: 2 INJECTION, SOLUTION INTRAMUSCULAR; INTRAVENOUS; SUBCUTANEOUS at 02:04

## 2020-10-31 RX ADMIN — IPRATROPIUM BROMIDE AND ALBUTEROL SULFATE 3 ML: .5; 3 SOLUTION RESPIRATORY (INHALATION) at 08:08

## 2020-10-31 RX ADMIN — HYDROMORPHONE HYDROCHLORIDE 2 MG: 2 INJECTION, SOLUTION INTRAMUSCULAR; INTRAVENOUS; SUBCUTANEOUS at 19:18

## 2020-10-31 RX ADMIN — Medication 3 MG: at 22:43

## 2020-10-31 RX ADMIN — DOXYCYCLINE 100 MG: 100 INJECTION, POWDER, LYOPHILIZED, FOR SOLUTION INTRAVENOUS at 22:43

## 2020-10-31 RX ADMIN — ISOSORBIDE DINITRATE 10 MG: 10 TABLET ORAL at 15:53

## 2020-10-31 RX ADMIN — ISOSORBIDE DINITRATE 10 MG: 10 TABLET ORAL at 22:43

## 2020-10-31 RX ADMIN — HEPARIN SODIUM 1400 UNITS: 1000 INJECTION INTRAVENOUS; SUBCUTANEOUS at 22:31

## 2020-10-31 RX ADMIN — DOCUSATE SODIUM 100 MG: 100 CAPSULE, LIQUID FILLED ORAL at 08:26

## 2020-10-31 RX ADMIN — Medication: at 01:26

## 2020-10-31 RX ADMIN — HEPARIN SODIUM 30 UNITS/KG/HR: 10000 INJECTION, SOLUTION INTRAVENOUS at 06:19

## 2020-10-31 RX ADMIN — ALTEPLASE 1 MG: 2.2 INJECTION, POWDER, LYOPHILIZED, FOR SOLUTION INTRAVENOUS at 06:56

## 2020-10-31 RX ADMIN — HEPARIN SODIUM 28 UNITS/KG/HR: 10000 INJECTION, SOLUTION INTRAVENOUS at 16:42

## 2020-10-31 RX ADMIN — Medication: at 23:02

## 2020-10-31 RX ADMIN — QUETIAPINE FUMARATE 50 MG: 25 TABLET ORAL at 15:53

## 2020-10-31 RX ADMIN — Medication: at 15:56

## 2020-10-31 RX ADMIN — HEPARIN SODIUM 30 UNITS/KG/HR: 10000 INJECTION, SOLUTION INTRAVENOUS at 07:49

## 2020-10-31 RX ADMIN — IPRATROPIUM BROMIDE AND ALBUTEROL SULFATE 3 ML: .5; 3 SOLUTION RESPIRATORY (INHALATION) at 13:04

## 2020-10-31 RX ADMIN — HYDRALAZINE HYDROCHLORIDE 50 MG: 50 TABLET, FILM COATED ORAL at 08:25

## 2020-10-31 RX ADMIN — Medication 10 ML: at 06:48

## 2020-10-31 RX ADMIN — VANCOMYCIN HYDROCHLORIDE 1000 MG: 1 INJECTION, POWDER, LYOPHILIZED, FOR SOLUTION INTRAVENOUS at 23:07

## 2020-10-31 RX ADMIN — IPRATROPIUM BROMIDE AND ALBUTEROL SULFATE 3 ML: .5; 3 SOLUTION RESPIRATORY (INHALATION) at 19:37

## 2020-10-31 NOTE — CONSULTS
[address]    Name:  Sonido Merino  MR#:  [id number]  :  1995  ACCOUNT #:  [de-identified]  DATE OF SERVICE:  10/31/2020      PULMONARY CONSULTATION    REASON FOR CONSULTATION:  Kindly asked by Dr. Jhoan Finch to see the patient in consultation for hypoxia. HISTORY OF PRESENT ILLNESS:  The patient is a 61-year-old man with polysubstance abuse, who was admitted to the hospital with acute renal failure secondary to rhabdomyolysis, cardiomyopathy with an EF of 15%, encephalopathy, and hypoxia and pulmonary infiltrates consistent with pneumonia. The patient's blood cultures are growing Staph hominis for which he is receiving broad-spectrum antibiotics. The patient required noninvasive face mask ventilation last night but is currently resting fairly comfortably on supplemental oxygen via nasal cannula. He is unable to provide a significant amount of information to this visit. I spoke to his nurse who informs me that dialysis is expected today. PAST MEDICAL HISTORY:  Unobtainable from the patient. SOCIAL HISTORY:  Unobtainable from the patient. FAMILY HISTORY:  Unobtainable from the patient. REVIEW OF SYSTEMS:  Unobtainable from the patient. PHYSICAL EXAMINATION:  GENERAL:  Ill-appearing man, appearing older than stated age. VITAL SIGNS:  With heart rate of 93, blood pressure 150/90, temperature 99.5, and sats are 99% on nasal cannula. HEENT:  Pupils are round. Sclerae are anicteric. Oropharynx unremarkable. NECK:  Supple. No stridor, no JVD. CHEST:  Body symmetric. Inspiratory rales appreciated in the left anterior chest.  HEART:  Regular. I do not appreciate a murmur. ABDOMEN:  Soft, nondistended. EXTREMITIES:  With trace edema. There are palpable peripheral pulses. SKIN:  Without acute eruption.     LABORATORY DATA:  Recent repeat echocardiogram:  EF 15% to 20% with mildly dilated left ventricle, severe global hypokinesis, moderate MR, moderate TR, mild pulmonary hypertension. White count 17, hemoglobin 8.1, platelet count 056 with 74% neutrophils. Sodium 136, potassium 3.6, chloride 101, BUN 28, creatinine 3.16. Chest x-ray from 10/31 is notable for worsening pulmonary infiltrates, left greater than right, compared to 10/20. Left upper extremity DVT, on a heparin drip. IMPRESSION:  Significantly ill 28-year-old man admitted with acute renal failure related to rhabdomyolysis as well as cardiomyopathy with an ejection fraction of 10% on 10/22. Apparently, the patient has developed worsening hypoxic respiratory failure and more prominent pulmonary infiltrates on his recent chest x-ray compared to 10/20. His blood cultures are growing staph, and a followup echocardiogram did not demonstrate significant improvement in his left ventricular function. At this time, the patient is clinically stable without excessive work of breathing, and I agree that he would benefit from continuation of his antibiotics, supplemental oxygen as well as anticoagulation and dialysis today. Certainly if his respiratory status worsens, he will require transfer back to the intensive care unit.       Brenda Martinez MD      [id number]  D:  10/31/2020 13:14  T:  10/31/2020 17:01  JOB #:  [id number]

## 2020-10-31 NOTE — PROGRESS NOTES
[facility]   [address] [facility], [address] [place] [address], [facility]  Phone: [phone]   [phone]       Nephrology Progress Note  Georgette Luna     1995     [id number]  Date of Admission : 10/21/2020  10/31/20    CC: Follow up for ARF, Rhabdomyolysis        Assessment and Plan   JEAN PIERRE  - Severe ATN from Rhabdomyolysis. Oligoanuric   - HD today then MWF next week    Severe Rhabdomyolysis   - 2/2 Substance use  - LFTs and CPK trending down    Left Lung PNA w/ sepsis     LUE DVT:  - on heparin drip    Resp failure:  - from pulm edema   - HD today w/ UF    Metabolic acidosis:  - improving    Hypocalcemia:  - stable  - replete PRN    Polysubstance abuse      Interval History:  Seen and examined. Resting. Lethargic. On NC O2. Sitter at bedside. Plan for HD again today. Review of Systems: Review of systems not obtained due to patient factors.     Current Medications:   Current Facility-Administered Medications   Medication Dose Route Frequency    carvediloL (COREG) tablet 12.5 mg  12.5 mg Oral BID WITH MEALS    hydrALAZINE (APRESOLINE) tablet 50 mg  50 mg Oral TID    QUEtiapine (SEROquel) tablet 50 mg  50 mg Oral Q8H    oxyCODONE IR (ROXICODONE) tablet 5 mg  5 mg Oral Q4H PRN    LORazepam (ATIVAN) injection 2 mg  2 mg IntraVENous Q4H PRN    doxycycline (VIBRAMYCIN) 100 mg in 0.9% sodium chloride (MBP/ADV) 100 mL  100 mg IntraVENous Q12H    albuterol-ipratropium (DUO-NEB) 2.5 MG-0.5 MG/3 ML  3 mL Nebulization QID RT    melatonin tablet 3 mg  3 mg Oral QHS    Vancomycin- pharmacy to dose   Other Rx Dosing/Monitoring    alteplase (CATHFLO) 1 mg in sterile water (preservative free) 1 mL injection  1 mg InterCATHeter PRN    isosorbide dinitrate (ISORDIL) tablet 10 mg  10 mg Oral TID    HYDROmorphone (PF) (DILAUDID) injection 2 mg  2 mg IntraVENous Q4H PRN    heparin 25,000 units in D5W 250 ml infusion  17-36 Units/kg/hr IntraVENous TITRATE    balsam peru-castor oiL (VENELEX) ointment   Topical Q8H    sodium chloride (NS) flush 5-40 mL  5-40 mL IntraVENous Q8H    sodium chloride (NS) flush 5-40 mL  5-40 mL IntraVENous PRN    acetaminophen (TYLENOL) tablet 650 mg  650 mg Oral Q6H PRN    Or    acetaminophen (TYLENOL) suppository 650 mg  650 mg Rectal Q6H PRN    polyethylene glycol (MIRALAX) packet 17 g  17 g Oral DAILY PRN    ondansetron (ZOFRAN) injection 4 mg  4 mg IntraVENous Q6H PRN    albuterol-ipratropium (DUO-NEB) 2.5 MG-0.5 MG/3 ML  3 mL Nebulization Q4H PRN    docusate sodium (COLACE) capsule 100 mg  100 mg Oral DAILY    labetaloL (NORMODYNE;TRANDATE) injection 20 mg  20 mg IntraVENous Q4H PRN    heparin (porcine) 1,000 unit/mL injection 1,400 Units  1,400 Units InterCATHeter DIALYSIS PRN    And    heparin (porcine) 1,000 unit/mL injection 1,100 Units  1,100 Units InterCATHeter DIALYSIS PRN      Allergies   Allergen Reactions    Tramadol Nausea and Vomiting       Objective:  Vitals:    Vitals:    10/31/20 0419 10/31/20 0522 10/31/20 0755 10/31/20 1107   BP: (!) 157/92  (!) 151/95 (!) 146/91   Pulse: 93  (!) 108 92   Resp: 26  (!) 37 24   Temp: 99.5 °F (37.5 °C)  98.8 °F (37.1 °C) 98.2 °F (36.8 °C)   TempSrc:       SpO2: 99%  98% 98%   Weight:  107.7 kg (237 lb 6.4 oz)     Height:         Intake and Output:  10/31 0701 - 10/31 1900  In: -   Out: 475 [Urine:475]  10/29 1901 - 10/31 0700  In: 1470.9 [P.O.:460; I.V.:1010.9]  Out: 5275 [Urine:275]    Physical Examination:    General: Lethargic on BiPAP  Neck:  Supple, no mass  Resp:  Decreased breath sounds  CV:  RRR,  no murmur or rub, no LE edema  GI:  Soft, NT, + Bowel sounds, no hepatosplenomegaly  Neurologic:  Lethargic   Psych:             Unable to assess  Skin:  Pressures rash on Left foot, R post Knee and neck   :  Hira     []    High complexity decision making was performed  []    Patient is at high-risk of decompensation with multiple organ involvement    Lab Data Personally Reviewed: I have reviewed all the pertinent labs, microbiology data and radiology studies during assessment. Recent Labs     10/31/20  0045 10/31/20  0040 10/30/20  0310 10/29/20  0533 10/28/20  1843   NA  --  136  136  --  134* 133*   K  --  3.6  3.6  --  3.6 3.4*   CL  --  100  101  --  99 97   CO2  --  27  26  --  25 28   GLU  --  98  97  --  92 87   BUN  --  28*  28*  --  38* 27*   CREA  --  3.27*  3.16*  --  4.09* 3.08*   CA  --  8.0*  7.9*  --  7.8* 8.1*   ALB  --  2.2*  2.2*  --  2.0*  --    ALT  --  205*  206*  --  312*  --    INR NO SAMPLE RECEIVED  --  1.3* 1.4*  --      Recent Labs     10/31/20  0040 10/30/20  0310 10/29/20  0533   WBC 17.0* 13.8* 13.1*   HGB 8.1* 7.9* 8.3*   HCT 25.0* 24.0* 24.8*    289 284     No results found for: SDES  Lab Results   Component Value Date/Time    Culture result: NO GROWTH 5 DAYS 10/22/2020 06:19 AM    Culture result: NO GROWTH 5 DAYS 10/22/2020 02:37 AM    Culture result: (A) 10/21/2020 08:15 PM     Staphylococcus hominis (Oxacillin resistant) GROWING IN THE AEROBIC BOTTLE (SITE = R HAND)    Culture result:  10/21/2020 08:15 PM     Gram Positive Cocci CALLED TO AND READ BACK BY NEGRA Mahajan RN AT 2012 BY [facility]    Culture result: (A) 10/21/2020 08:10 PM     Staphylococcus hominis (Oxacillin resistant) GROWING IN THE AEROBIC BOTTLE (SITE = L HAND)    Culture result:  10/21/2020 08:10 PM     preliminary report of Gram Positive Cocci in clusters growing in 1 of 2 bottles drawn CALLED TO AND READ BACK BY FERN DAMON RN SCAV AT 0320 ON 10/23/20.  Bettye 1850     Recent Results (from the past 24 hour(s))   PTT    Collection Time: 10/30/20  3:00 PM   Result Value Ref Range    aPTT 38.5 (H) 22.1 - 32.0 sec    aPTT, therapeutic range     58.0 - 77.0 SECS   PTT    Collection Time: 10/31/20 12:35 AM   Result Value Ref Range    aPTT 83.4 (H) 22.1 - 32.0 sec    aPTT, therapeutic range     58.0 - 77.0 SECS   CK    Collection Time: 10/31/20 12:40 AM   Result Value Ref Range    CK 1,204 (H) 39 - 308 U/L METABOLIC PANEL, COMPREHENSIVE    Collection Time: 10/31/20 12:40 AM   Result Value Ref Range    Sodium 136 136 - 145 mmol/L    Potassium 3.6 3.5 - 5.1 mmol/L    Chloride 101 97 - 108 mmol/L    CO2 26 21 - 32 mmol/L    Anion gap 9 5 - 15 mmol/L    Glucose 97 65 - 100 mg/dL    BUN 28 (H) 6 - 20 MG/DL    Creatinine 3.16 (H) 0.70 - 1.30 MG/DL    BUN/Creatinine ratio 9 (L) 12 - 20      GFR est AA 30 (L) >60 ml/min/1.73m2    GFR est non-AA 24 (L) >60 ml/min/1.73m2    Calcium 7.9 (L) 8.5 - 10.1 MG/DL    Bilirubin, total 0.6 0.2 - 1.0 MG/DL    ALT (SGPT) 206 (H) 12 - 78 U/L    AST (SGOT) 63 (H) 15 - 37 U/L    Alk. phosphatase 66 45 - 117 U/L    Protein, total 5.4 (L) 6.4 - 8.2 g/dL    Albumin 2.2 (L) 3.5 - 5.0 g/dL    Globulin 3.2 2.0 - 4.0 g/dL    A-G Ratio 0.7 (L) 1.1 - 2.2     CBC WITH AUTOMATED DIFF    Collection Time: 10/31/20 12:40 AM   Result Value Ref Range    WBC 17.0 (H) 4.1 - 11.1 K/uL    RBC 2.92 (L) 4.10 - 5.70 M/uL    HGB 8.1 (L) 12.1 - 17.0 g/dL    HCT 25.0 (L) 36.6 - 50.3 %    MCV 85.6 80.0 - 99.0 FL    MCH 27.7 26.0 - 34.0 PG    MCHC 32.4 30.0 - 36.5 g/dL    RDW 17.3 (H) 11.5 - 14.5 %    PLATELET 096 194 - 467 K/uL    MPV 9.5 8.9 - 12.9 FL    NRBC 0.0 0  WBC    ABSOLUTE NRBC 0.00 0.00 - 0.01 K/uL    NEUTROPHILS 74 32 - 75 %    LYMPHOCYTES 12 12 - 49 %    MONOCYTES 12 5 - 13 %    EOSINOPHILS 1 0 - 7 %    BASOPHILS 0 0 - 1 %    IMMATURE GRANULOCYTES 1 (H) 0.0 - 0.5 %    ABS. NEUTROPHILS 12.6 (H) 1.8 - 8.0 K/UL    ABS. LYMPHOCYTES 2.1 0.8 - 3.5 K/UL    ABS. MONOCYTES 2.0 (H) 0.0 - 1.0 K/UL    ABS. EOSINOPHILS 0.1 0.0 - 0.4 K/UL    ABS. BASOPHILS 0.1 0.0 - 0.1 K/UL    ABS. IMM.  GRANS. 0.2 (H) 0.00 - 0.04 K/UL    DF AUTOMATED     IRON PROFILE    Collection Time: 10/31/20 12:40 AM   Result Value Ref Range    Iron 20 (L) 35 - 150 ug/dL    TIBC 238 (L) 250 - 450 ug/dL    Iron % saturation 8 (L) 20 - 50 %   FERRITIN    Collection Time: 10/31/20 12:40 AM   Result Value Ref Range    Ferritin 100 26 - 388 NG/ML   VITAMIN B12    Collection Time: 10/31/20 12:40 AM   Result Value Ref Range    Vitamin B12 811 193 - 986 pg/mL   FOLATE    Collection Time: 10/31/20 12:40 AM   Result Value Ref Range    Folate 9.7 5.0 - 02.8 ng/mL   METABOLIC PANEL, COMPREHENSIVE    Collection Time: 10/31/20 12:40 AM   Result Value Ref Range    Sodium 136 136 - 145 mmol/L    Potassium 3.6 3.5 - 5.1 mmol/L    Chloride 100 97 - 108 mmol/L    CO2 27 21 - 32 mmol/L    Anion gap 9 5 - 15 mmol/L    Glucose 98 65 - 100 mg/dL    BUN 28 (H) 6 - 20 MG/DL    Creatinine 3.27 (H) 0.70 - 1.30 MG/DL    BUN/Creatinine ratio 9 (L) 12 - 20      GFR est AA 28 (L) >60 ml/min/1.73m2    GFR est non-AA 23 (L) >60 ml/min/1.73m2    Calcium 8.0 (L) 8.5 - 10.1 MG/DL    Bilirubin, total 0.6 0.2 - 1.0 MG/DL    ALT (SGPT) 205 (H) 12 - 78 U/L    AST (SGOT) 63 (H) 15 - 37 U/L    Alk. phosphatase 67 45 - 117 U/L    Protein, total 5.4 (L) 6.4 - 8.2 g/dL    Albumin 2.2 (L) 3.5 - 5.0 g/dL    Globulin 3.2 2.0 - 4.0 g/dL    A-G Ratio 0.7 (L) 1.1 - 2.2     PROTHROMBIN TIME + INR    Collection Time: 10/31/20 12:45 AM   Result Value Ref Range    INR NO SAMPLE RECEIVED 0.9 - 1.2      Prothrombin time NO SAMPLE RECEIVED 9.5 - 13.1 sec   PTT    Collection Time: 10/31/20  8:56 AM   Result Value Ref Range    aPTT 83.7 (H) 22.1 - 32.0 sec    aPTT, therapeutic range     58.0 - 77.0 SECS           Total time spent with patient:  xxx   min. Care Plan discussed with:  Patient     Family      RN      Consulting Physician [facility],         I have reviewed the flowsheets. Chart and Pertinent Notes have been reviewed. No change in PMH ,family and social history from Consult note.       Bela Hidalgo MD

## 2020-10-31 NOTE — PROGRESS NOTES
Hospitalist Progress Note  Ly He MD  Answering service: [phone] from in house phone      Date of Service:  10/31/2020  NAME:  Fernando Camarena  :  1995  MRN:  [id number]    Admission Summary:   25M p/w multiple drug use, resp failure/ARF  Interval history / Subjective:   Patient seen and examined at bedside, more alert, conversant this morning, just taken off bipap, sats 90%, not coming up higher, will place him back on bipap. Will consult pulm as not improving much. Assessment & Plan:     #. Polysubstance abuse: UDS +ve for cocaine, THC, benzo, opioids, amphetamines  #. Pneumonia- likely aspiration- 2nd to above  #. Acute metabolic Encephalopathy: 2nd to above. Re-orient frequently,- seroquel prn. monitor. #. Cardiomyopathy: ef 15%- likely 2nd to drug use- Cardio following  #. Acute hypoxic respiratory failure: 2nd to above- Bipap to keep sats >90%  #. Rhabdomyolysis: improving with hydration- monitor ck daily  #. ARF: 2nd to above. Nephrology following- started on RRT  #. Acute LUE DVT: heparin gtt. Switch to NOAC prior to dc. #. Acute Anemia: check hemoccult, iron, B12, folate. Monitor CBC, transfuse if HB<7.  #. Bacteremia: Staph hominis on 10/21. On vanc/doxy- occasional low grade fevers. Monitor  - repeat TTE 10/29: still ef 15-20%.  Repeat CXR 10/31: slightly increased opacities b/l.  - respiratory status not improving much, will consult pulmonology     Code status: Full  DVT prophylaxis: Heparin  Care Plan discussed with: Patient/Family and Nurse  Disposition: TBD     Hospital Problems  Never Reviewed          Codes Class Noted POA    Toxic encephalopathy ICD-10-CM: G92  ICD-9-CM: 349.82  10/24/2020 Unknown        Drug abuse ([facility] ICD-10-CM: F19.10  ICD-9-CM: 305.90  10/22/2020 Unknown        Acute renal failure with tubular necrosis ([facility] ICD-10-CM: N17.0  ICD-9-CM: 584.5  10/22/2020 Unknown        * (Principal) Sepsis ([facility] ICD-10-CM: A41.9  ICD-9-CM: 038.9, 995.91  10/22/2020 Unknown        Community acquired pneumonia of left lower lobe of lung ICD-10-CM: J18.9  ICD-9-CM: 650  10/22/2020 Yes        Electrolyte abnormality ICD-10-CM: E87.8  ICD-9-CM: 276.9  10/22/2020 Yes            Review of Systems:   Pertinent items are mentioned in interval history. Vital Signs:    Last 24hrs VS reviewed since prior progress note. Most recent are:  Visit Vitals  BP (!) 151/95 (BP 1 Location: Right arm, BP Patient Position: Supine)   Pulse (!) 108   Temp 98.8 °F (37.1 °C)   Resp (!) 37   Ht 5' 10\" (1.778 m)   Wt 107.7 kg (237 lb 6.4 oz)   SpO2 98%   BMI 34.06 kg/m²         Intake/Output Summary (Last 24 hours) at 10/31/2020 0830  Last data filed at 10/31/2020 0710  Gross per 24 hour   Intake 1470.85 ml   Output 3750 ml   Net -2279.15 ml        Physical Examination:   General:  Alert, drowsy, No acute distress  Resp:  No accessory muscle use, no wheezes, few rhonchi  Abd:  Soft, non-tender, non-distended  Extremities:  No cyanosis or clubbing, no significant edema  Neuro:  drowsy, no focal neuro deficits, follows commands   Psych:  not agitated.     Data Review:    Review and/or order of clinical lab test  Review and/or order of tests in the radiology section of CPT  Review and/or order of tests in the medicine section of CPT  Labs:     Recent Labs     10/31/20  0040 10/30/20  0310   WBC 17.0* 13.8*   HGB 8.1* 7.9*   HCT 25.0* 24.0*    289     Recent Labs     10/31/20  0040 10/29/20  0533 10/28/20  1843     136 134* 133*   K 3.6  3.6 3.6 3.4*     101 99 97   CO2 27  26 25 28   BUN 28*  28* 38* 27*   CREA 3.27*  3.16* 4.09* 3.08*   GLU 98  97 92 87   CA 8.0*  7.9* 7.8* 8.1*     Recent Labs     10/31/20  0040 10/29/20  0533   *  206* 312*   AP 67  66 70   TBILI 0.6  0.6 0.5   TP 5.4*  5.4* 5.3*   ALB 2.2*  2.2* 2.0*   GLOB 3.2  3.2 3.3     Recent Labs     10/31/20  0045 10/31/20  0035 10/30/20  1500 10/30/20  0310 10/29/20  1505  10/29/20  0533   INR NO SAMPLE RECEIVED  --   --  1.3*  --   --  1.4*   PTP NO SAMPLE RECEIVED  --   --  13.0*  --   --  14.1*   APTT  --  83.4* 38.5*  --  63.5*   < >  --     < > = values in this interval not displayed. Recent Labs     10/31/20  0040   TIBC 238*   PSAT 8*   FERR 100      Lab Results   Component Value Date/Time    Folate 9.7 10/31/2020 12:40 AM      No results for input(s): PH, PCO2, PO2 in the last 72 hours.   Recent Labs     10/31/20  0040 10/30/20  0310 10/29/20  0533   CPK 1,204* 1,656* 2,135*     No results found for: CHOL, CHOLX, CHLST, CHOLV, HDL, HDLP, LDL, LDLC, DLDLP, TGLX, TRIGL, TRIGP, CHHD, CHHDX  Lab Results   Component Value Date/Time    Glucose (POC) 129 (H) 10/23/2020 04:27 PM     Lab Results   Component Value Date/Time    Color Yellow/Straw 10/21/2020 06:05 PM    Appearance Clear 10/21/2020 06:05 PM    Specific gravity 1.025 10/21/2020 06:05 PM    pH (UA) 5.5 10/21/2020 06:05 PM    Protein 30 (A) 10/21/2020 06:05 PM    Glucose Negative 10/21/2020 06:05 PM    Ketone Negative 10/21/2020 06:05 PM    Urobilinogen 1.0 10/21/2020 06:05 PM    Nitrites Negative 10/21/2020 06:05 PM    Leukocyte Esterase Negative 10/21/2020 06:05 PM    Bacteria Negative 10/21/2020 06:05 PM    WBC 0-4 10/21/2020 06:05 PM    RBC 0-5 10/21/2020 06:05 PM     Medications Reviewed:     Current Facility-Administered Medications   Medication Dose Route Frequency    carvediloL (COREG) tablet 12.5 mg  12.5 mg Oral BID WITH MEALS    hydrALAZINE (APRESOLINE) tablet 50 mg  50 mg Oral TID    QUEtiapine (SEROquel) tablet 50 mg  50 mg Oral Q8H    oxyCODONE IR (ROXICODONE) tablet 5 mg  5 mg Oral Q4H PRN    LORazepam (ATIVAN) injection 2 mg  2 mg IntraVENous Q4H PRN    doxycycline (VIBRAMYCIN) 100 mg in 0.9% sodium chloride (MBP/ADV) 100 mL  100 mg IntraVENous Q12H    albuterol-ipratropium (DUO-NEB) 2.5 MG-0.5 MG/3 ML  3 mL Nebulization QID RT    melatonin tablet 3 mg 3 mg Oral QHS    Vancomycin- pharmacy to dose   Other Rx Dosing/Monitoring    alteplase (CATHFLO) 1 mg in sterile water (preservative free) 1 mL injection  1 mg InterCATHeter PRN    isosorbide dinitrate (ISORDIL) tablet 10 mg  10 mg Oral TID    HYDROmorphone (PF) (DILAUDID) injection 2 mg  2 mg IntraVENous Q4H PRN    heparin 25,000 units in D5W 250 ml infusion  17-36 Units/kg/hr IntraVENous TITRATE    balsam peru-castor oiL (VENELEX) ointment   Topical Q8H    sodium chloride (NS) flush 5-40 mL  5-40 mL IntraVENous Q8H    sodium chloride (NS) flush 5-40 mL  5-40 mL IntraVENous PRN    acetaminophen (TYLENOL) tablet 650 mg  650 mg Oral Q6H PRN    Or    acetaminophen (TYLENOL) suppository 650 mg  650 mg Rectal Q6H PRN    polyethylene glycol (MIRALAX) packet 17 g  17 g Oral DAILY PRN    ondansetron (ZOFRAN) injection 4 mg  4 mg IntraVENous Q6H PRN    albuterol-ipratropium (DUO-NEB) 2.5 MG-0.5 MG/3 ML  3 mL Nebulization Q4H PRN    docusate sodium (COLACE) capsule 100 mg  100 mg Oral DAILY    labetaloL (NORMODYNE;TRANDATE) injection 20 mg  20 mg IntraVENous Q4H PRN    heparin (porcine) 1,000 unit/mL injection 1,400 Units  1,400 Units InterCATHeter DIALYSIS PRN    And    heparin (porcine) 1,000 unit/mL injection 1,100 Units  1,100 Units InterCATHeter DIALYSIS PRN   ______________________________________________________________________  EXPECTED LENGTH OF STAY: 4d 19h  ACTUAL LENGTH OF STAY:          9               Diane Malagon MD

## 2020-10-31 NOTE — PROGRESS NOTES
Cardiology Progress Note         10/31/2020 11:48 AM    Admit Date: 10/21/2020    Admit Diagnosis: Sepsis ([facility] [A41.9];ARF (acute renal failure) ([facility] [N17.9]; Drug abuse ([facility] [F19.10]      Subjective:     Sallie Ortega is seen for Dr Lacey Sandoval   He is drowsy and not engaged in coversation  He is on heparin drip   He has ESRD   10/21/20   ECHO ADULT FOLLOW-UP OR LIMITED 10/29/2020 10/29/2020    Narrative · LV: Estimated LVEF is 15 - 20%. Mildly dilated left ventricle. Severely   global hypokinesis; normal function at apex. · MV: Moderate mitral valve regurgitation is present. · TV: Moderate tricuspid valve regurgitation is present. · PA: Mild to moderate pulmonary hypertension. Pulmonary arterial systolic   pressure is 45 mmHg. · RV: Mildly dilated right ventricle. Borderline low systolic function.         Signed by: Hector Martinez MD       Past Medical History:   Diagnosis Date    Anxiety     Depression      Past Surgical History:   Procedure Laterality Date    HX ORTHOPAEDIC       Social History     Socioeconomic History    Marital status: UNKNOWN     Spouse name: Not on file    Number of children: Not on file    Years of education: Not on file    Highest education level: Not on file   Occupational History    Not on file   Social Needs    Financial resource strain: Not on file    Food insecurity     Worry: Not on file     Inability: Not on file    Transportation needs     Medical: Not on file     Non-medical: Not on file   Tobacco Use    Smoking status: Current Every Day Smoker     Packs/day: 0.50    Smokeless tobacco: Former User   Substance and Sexual Activity    Alcohol use: Not Currently    Drug use: Yes     Comment: Fentanyl    Sexual activity: Not Currently   Lifestyle    Physical activity     Days per week: Not on file     Minutes per session: Not on file    Stress: Not on file   Relationships    Social connections     Talks on phone: Not on file     Gets together: Not on file Attends Rest[employer] service: Not on file     Active member of club or organization: Not on file     Attends meetings of clubs or organizations: Not on file     Relationship status: Not on file    Intimate partner violence     Fear of current or ex partner: Not on file     Emotionally abused: Not on file     Physically abused: Not on file     Forced sexual activity: Not on file   Other Topics Concern    Not on file   Social History Narrative    Not on file         Current Facility-Administered Medications   Medication Dose Route Frequency    carvediloL (COREG) tablet 12.5 mg  12.5 mg Oral BID WITH MEALS    hydrALAZINE (APRESOLINE) tablet 50 mg  50 mg Oral TID    QUEtiapine (SEROquel) tablet 50 mg  50 mg Oral Q8H    oxyCODONE IR (ROXICODONE) tablet 5 mg  5 mg Oral Q4H PRN    LORazepam (ATIVAN) injection 2 mg  2 mg IntraVENous Q4H PRN    doxycycline (VIBRAMYCIN) 100 mg in 0.9% sodium chloride (MBP/ADV) 100 mL  100 mg IntraVENous Q12H    albuterol-ipratropium (DUO-NEB) 2.5 MG-0.5 MG/3 ML  3 mL Nebulization QID RT    melatonin tablet 3 mg  3 mg Oral QHS    Vancomycin- pharmacy to dose   Other Rx Dosing/Monitoring    alteplase (CATHFLO) 1 mg in sterile water (preservative free) 1 mL injection  1 mg InterCATHeter PRN    isosorbide dinitrate (ISORDIL) tablet 10 mg  10 mg Oral TID    HYDROmorphone (PF) (DILAUDID) injection 2 mg  2 mg IntraVENous Q4H PRN    heparin 25,000 units in D5W 250 ml infusion  17-36 Units/kg/hr IntraVENous TITRATE    balsam peru-castor oiL (VENELEX) ointment   Topical Q8H    sodium chloride (NS) flush 5-40 mL  5-40 mL IntraVENous Q8H    sodium chloride (NS) flush 5-40 mL  5-40 mL IntraVENous PRN    acetaminophen (TYLENOL) tablet 650 mg  650 mg Oral Q6H PRN    Or    acetaminophen (TYLENOL) suppository 650 mg  650 mg Rectal Q6H PRN    polyethylene glycol (MIRALAX) packet 17 g  17 g Oral DAILY PRN    ondansetron (ZOFRAN) injection 4 mg  4 mg IntraVENous Q6H PRN    albuterol-ipratropium (DUO-NEB) 2.5 MG-0.5 MG/3 ML  3 mL Nebulization Q4H PRN    docusate sodium (COLACE) capsule 100 mg  100 mg Oral DAILY    labetaloL (NORMODYNE;TRANDATE) injection 20 mg  20 mg IntraVENous Q4H PRN    heparin (porcine) 1,000 unit/mL injection 1,400 Units  1,400 Units InterCATHeter DIALYSIS PRN    And    heparin (porcine) 1,000 unit/mL injection 1,100 Units  1,100 Units InterCATHeter DIALYSIS PRN         Objective:      Physical Exam:  Visit Vitals  BP (!) 146/91   Pulse 92   Temp 98.2 °F (36.8 °C)   Resp 24   Ht 5' 10\" (1.778 m)   Wt 237 lb 6.4 oz (107.7 kg)   SpO2 98%   BMI 34.06 kg/m²     General Appearance:  Well developed, well nourished,drowsy, and individual in no acute distress. Ears/Nose/Mouth/Throat:   Sleeping with mouth closed         Neck: Supple. Chest:   Lungs clear to auscultation bilaterally. Cardiovascular:  Regular rate and rhythm, no murmur. Abdomen:   Soft, non-tender    Extremities: No edema bilaterally. Skin: Warm and dry.                Data Review:   Labs:    Recent Results (from the past 24 hour(s))   PTT    Collection Time: 10/30/20  3:00 PM   Result Value Ref Range    aPTT 38.5 (H) 22.1 - 32.0 sec    aPTT, therapeutic range     58.0 - 77.0 SECS   PTT    Collection Time: 10/31/20 12:35 AM   Result Value Ref Range    aPTT 83.4 (H) 22.1 - 32.0 sec    aPTT, therapeutic range     58.0 - 77.0 SECS   CK    Collection Time: 10/31/20 12:40 AM   Result Value Ref Range    CK 1,204 (H) 39 - 114 U/L   METABOLIC PANEL, COMPREHENSIVE    Collection Time: 10/31/20 12:40 AM   Result Value Ref Range    Sodium 136 136 - 145 mmol/L    Potassium 3.6 3.5 - 5.1 mmol/L    Chloride 101 97 - 108 mmol/L    CO2 26 21 - 32 mmol/L    Anion gap 9 5 - 15 mmol/L    Glucose 97 65 - 100 mg/dL    BUN 28 (H) 6 - 20 MG/DL    Creatinine 3.16 (H) 0.70 - 1.30 MG/DL    BUN/Creatinine ratio 9 (L) 12 - 20      GFR est AA 30 (L) >60 ml/min/1.73m2    GFR est non-AA 24 (L) >60 ml/min/1.73m2    Calcium 7.9 (L) 8.5 - 10.1 MG/DL    Bilirubin, total 0.6 0.2 - 1.0 MG/DL    ALT (SGPT) 206 (H) 12 - 78 U/L    AST (SGOT) 63 (H) 15 - 37 U/L    Alk. phosphatase 66 45 - 117 U/L    Protein, total 5.4 (L) 6.4 - 8.2 g/dL    Albumin 2.2 (L) 3.5 - 5.0 g/dL    Globulin 3.2 2.0 - 4.0 g/dL    A-G Ratio 0.7 (L) 1.1 - 2.2     CBC WITH AUTOMATED DIFF    Collection Time: 10/31/20 12:40 AM   Result Value Ref Range    WBC 17.0 (H) 4.1 - 11.1 K/uL    RBC 2.92 (L) 4.10 - 5.70 M/uL    HGB 8.1 (L) 12.1 - 17.0 g/dL    HCT 25.0 (L) 36.6 - 50.3 %    MCV 85.6 80.0 - 99.0 FL    MCH 27.7 26.0 - 34.0 PG    MCHC 32.4 30.0 - 36.5 g/dL    RDW 17.3 (H) 11.5 - 14.5 %    PLATELET 336 413 - 758 K/uL    MPV 9.5 8.9 - 12.9 FL    NRBC 0.0 0  WBC    ABSOLUTE NRBC 0.00 0.00 - 0.01 K/uL    NEUTROPHILS 74 32 - 75 %    LYMPHOCYTES 12 12 - 49 %    MONOCYTES 12 5 - 13 %    EOSINOPHILS 1 0 - 7 %    BASOPHILS 0 0 - 1 %    IMMATURE GRANULOCYTES 1 (H) 0.0 - 0.5 %    ABS. NEUTROPHILS 12.6 (H) 1.8 - 8.0 K/UL    ABS. LYMPHOCYTES 2.1 0.8 - 3.5 K/UL    ABS. MONOCYTES 2.0 (H) 0.0 - 1.0 K/UL    ABS. EOSINOPHILS 0.1 0.0 - 0.4 K/UL    ABS. BASOPHILS 0.1 0.0 - 0.1 K/UL    ABS. IMM.  GRANS. 0.2 (H) 0.00 - 0.04 K/UL    DF AUTOMATED     IRON PROFILE    Collection Time: 10/31/20 12:40 AM   Result Value Ref Range    Iron 20 (L) 35 - 150 ug/dL    TIBC 238 (L) 250 - 450 ug/dL    Iron % saturation 8 (L) 20 - 50 %   FERRITIN    Collection Time: 10/31/20 12:40 AM   Result Value Ref Range    Ferritin 100 26 - 388 NG/ML   VITAMIN B12    Collection Time: 10/31/20 12:40 AM   Result Value Ref Range    Vitamin B12 811 193 - 986 pg/mL   FOLATE    Collection Time: 10/31/20 12:40 AM   Result Value Ref Range    Folate 9.7 5.0 - 19.7 ng/mL   METABOLIC PANEL, COMPREHENSIVE    Collection Time: 10/31/20 12:40 AM   Result Value Ref Range    Sodium 136 136 - 145 mmol/L    Potassium 3.6 3.5 - 5.1 mmol/L    Chloride 100 97 - 108 mmol/L    CO2 27 21 - 32 mmol/L    Anion gap 9 5 - 15 mmol/L    Glucose 98 65 - 100 mg/dL    BUN 28 (H) 6 - 20 MG/DL    Creatinine 3.27 (H) 0.70 - 1.30 MG/DL    BUN/Creatinine ratio 9 (L) 12 - 20      GFR est AA 28 (L) >60 ml/min/1.73m2    GFR est non-AA 23 (L) >60 ml/min/1.73m2    Calcium 8.0 (L) 8.5 - 10.1 MG/DL    Bilirubin, total 0.6 0.2 - 1.0 MG/DL    ALT (SGPT) 205 (H) 12 - 78 U/L    AST (SGOT) 63 (H) 15 - 37 U/L    Alk. phosphatase 67 45 - 117 U/L    Protein, total 5.4 (L) 6.4 - 8.2 g/dL    Albumin 2.2 (L) 3.5 - 5.0 g/dL    Globulin 3.2 2.0 - 4.0 g/dL    A-G Ratio 0.7 (L) 1.1 - 2.2     PROTHROMBIN TIME + INR    Collection Time: 10/31/20 12:45 AM   Result Value Ref Range    INR NO SAMPLE RECEIVED 0.9 - 1.2      Prothrombin time NO SAMPLE RECEIVED 9.5 - 13.1 sec   PTT    Collection Time: 10/31/20  8:56 AM   Result Value Ref Range    aPTT 83.7 (H) 22.1 - 32.0 sec    aPTT, therapeutic range     58.0 - 77.0 SECS       Telemetry: normal sinus rhythm, sinus tachycardia      Assessment:     Principal Problem:    Sepsis ([facility] (10/22/2020)    Active Problems:    Drug abuse ([facility] (10/22/2020)      Acute renal failure with tubular necrosis ([facility] (10/22/2020)      Community acquired pneumonia of left lower lobe of lung (10/22/2020)      Electrolyte abnormality (10/22/2020)      Toxic encephalopathy (44/92/5481)    systolic CHF and cardiomyopathy    Plan:   Not on BIPAP now  Unclear cause of cardiomyopathy, ? Malignant hypertension   Continue with hydralazine/isordil coreg and dialysis  Increase coreg to 25 mg bid, BP still high    Alexandra Farfan M.D.  [facility] [place]  Electrophysiology/Cardiology  [facility] [place]  [address] [place] [address]                              [phone]

## 2020-10-31 NOTE — PROGRESS NOTES
Bedside shift change report given to Natividad Rawls (oncoming nurse) by Hyacinth Crawford (offgoing nurse). Report included the following information SBAR, Kardex, ED Summary, Procedure Summary, Intake/Output and Accordion. Problem: Falls - Risk of  Goal: *Absence of Falls  Description: Document Anya Paul Fall Risk and appropriate interventions in the flowsheet.   Outcome: Progressing Towards Goal  Note: Fall Risk Interventions:  Mobility Interventions: Communicate number of staff needed for ambulation/transfer, Patient to call before getting OOB, PT Consult for mobility concerns, PT Consult for assist device competence    Mentation Interventions: Door open when patient unattended, Evaluate medications/consider consulting pharmacy, Increase mobility, More frequent rounding, Reorient patient, Room close to nurse's station    Medication Interventions: Evaluate medications/consider consulting pharmacy, Teach patient to arise slowly, Patient to call before getting OOB    Elimination Interventions: Call light in reach, Patient to call for help with toileting needs, Toileting schedule/hourly rounds    History of Falls Interventions: Door open when patient unattended, Evaluate medications/consider consulting pharmacy, Investigate reason for fall, Room close to nurse's station         Problem: Patient Education: Go to Patient Education Activity  Goal: Patient/Family Education  Outcome: Progressing Towards Goal

## 2020-10-31 NOTE — PROGRESS NOTES
Bedside and Verbal shift change [facility] ([facility]) by Aron Joseph RN (offgoing nurse). Report included the following information SBAR, Kardex, ED Summary, OR Summary, Intake/Output, MAR, Accordion, Recent Results, Med Rec Status, and Cardiac Rhythm Sinus Tach . 0140- Pt's RR 48-50s. . O2 sats 100%. Pt on BIPAP, 35% FiO2. Breathing is labored. 6946- 2mg Ativan given IV.     0150- Charge RN notified. 0200- 2 mg Dilaudid given IV.     0210- Pt reassessed, no changes. Nafasat.Silke- NP, Maria D Hernandez, notified. Chest xray ordered. 0215- RT notified. 0220- RT came, discussed plan of care with RN. No changes made to pt's BIPAP settings. 0230- Pt reassessed. RR 32. HR in 90s. O2 sats 100%. Patient's resting comfortably in bed. Breathing no longer as labored. Patient Vitals for the past 12 hrs:   Temp Pulse Resp BP SpO2   10/31/20 0755 98.8 °F (37.1 °C) (!) 108 (!) 37 (!) 151/95 98 %   10/31/20 0419 99.5 °F (37.5 °C) 93 26 (!) 157/92 99 %   10/31/20 0209    (!) 159/95    10/31/20 0058     94 %   10/30/20 2345 99.4 °F (37.4 °C) (!) 108 (!) 35 (!) 144/102 100 %   10/30/20 2241  93  (!) 150/91    10/30/20 2200     94 %        Last 3 Recorded Weights in this Encounter    10/29/20 0914 10/30/20 0918 10/31/20 0522   Weight: 107 kg (236 lb) 110.4 kg (243 lb 6.4 oz) 107.7 kg (237 lb 6.4 oz)        Weight variance noted. Dayshift RN notified. Problem: Falls - Risk of  Goal: *Absence of Falls  Description: Document Mary Kim Fall Risk and appropriate interventions in the flowsheet.   Outcome: Progressing Towards Goal  Note: Fall Risk Interventions:  Mobility Interventions: Assess mobility with egress test, Patient to call before getting OOB    Mentation Interventions: Adequate sleep, hydration, pain control, More frequent rounding    Medication Interventions: Assess postural VS orthostatic hypotension, Evaluate medications/consider consulting pharmacy, Patient to call before getting OOB, Teach patient to arise slowly    Elimination Interventions: Bed/chair exit alarm, Call light in reach, Patient to call for help with toileting needs, Toileting schedule/hourly rounds    History of Falls Interventions: Bed/chair exit alarm, Room close to nurse's station, Evaluate medications/consider consulting pharmacy         Problem: Pressure Injury - Risk of  Goal: *Prevention of pressure injury  Description: Document Abdirashid Scale and appropriate interventions in the flowsheet. Outcome: Progressing Towards Goal  Note: Pressure Injury Interventions:  Sensory Interventions: Assess changes in LOC, Assess need for specialty bed, Float heels, Keep linens dry and wrinkle-free, Maintain/enhance activity level, Minimize linen layers, Pad between skin to skin, Pressure redistribution bed/mattress (bed type)    Moisture Interventions: Absorbent underpads, Apply protective barrier, creams and emollients, Internal/External urinary devices, Limit adult briefs, Maintain skin hydration (lotion/cream), Minimize layers    Activity Interventions: Assess need for specialty bed, Increase time out of bed, Pressure redistribution bed/mattress(bed type), PT/OT evaluation    Mobility Interventions: Assess need for specialty bed, Pressure redistribution bed/mattress (bed type)    Nutrition Interventions: Document food/fluid/supplement intake, Offer support with meals,snacks and hydration    Friction and Shear Interventions: Apply protective barrier, creams and emollients, Minimize layers                Problem: Breathing Pattern - Ineffective  Goal: *Absence of hypoxia  Outcome: Progressing Towards Goal  Note: Pt on continuous BIPAP. O2 sats maintained above 90%. RR increased to 46s. NP notified. Ativan and Diluadid given. Problem: Pain  Goal: *Control of Pain  Outcome: Progressing Towards Goal  Note: PRN oxycodone and dilaudid available for patient. Both given overnight.       Problem: Heart Failure: Day 5  Goal: Off Pathway (Use only if patient is Off Pathway)  Outcome: Progressing Towards Goal  Note: Pt receiving dialysis daily. Pt on 2000 mL fluid restriction. Cardiology has been consulted.

## 2020-11-01 LAB
ALBUMIN SERPL-MCNC: 2.1 G/DL (ref 3.5–5)
ALBUMIN/GLOB SERPL: 0.7 {RATIO} (ref 1.1–2.2)
ALP SERPL-CCNC: 62 U/L (ref 45–117)
ALT SERPL-CCNC: 152 U/L (ref 12–78)
ANION GAP SERPL CALC-SCNC: 9 MMOL/L (ref 5–15)
APTT PPP: 39.2 SEC (ref 22.1–32)
APTT PPP: 50.2 SEC (ref 22.1–32)
APTT PPP: 55.7 SEC (ref 22.1–32)
APTT PPP: 56.4 SEC (ref 22.1–32)
AST SERPL-CCNC: 46 U/L (ref 15–37)
BASOPHILS # BLD: 0 K/UL (ref 0–0.1)
BASOPHILS NFR BLD: 0 % (ref 0–1)
BILIRUB SERPL-MCNC: 0.5 MG/DL (ref 0.2–1)
BUN SERPL-MCNC: 24 MG/DL (ref 6–20)
BUN/CREAT SERPL: 8 (ref 12–20)
CALCIUM SERPL-MCNC: 8.5 MG/DL (ref 8.5–10.1)
CHLORIDE SERPL-SCNC: 99 MMOL/L (ref 97–108)
CK SERPL-CCNC: 667 U/L (ref 39–308)
CO2 SERPL-SCNC: 26 MMOL/L (ref 21–32)
CREAT SERPL-MCNC: 2.99 MG/DL (ref 0.7–1.3)
DIFFERENTIAL METHOD BLD: ABNORMAL
EOSINOPHIL # BLD: 0.2 K/UL (ref 0–0.4)
EOSINOPHIL NFR BLD: 1 % (ref 0–7)
ERYTHROCYTE [DISTWIDTH] IN BLOOD BY AUTOMATED COUNT: 17.2 % (ref 11.5–14.5)
GLOBULIN SER CALC-MCNC: 3.2 G/DL (ref 2–4)
GLUCOSE SERPL-MCNC: 128 MG/DL (ref 65–100)
HCT VFR BLD AUTO: 23.7 % (ref 36.6–50.3)
HGB BLD-MCNC: 7.6 G/DL (ref 12.1–17)
IMM GRANULOCYTES # BLD AUTO: 0.1 K/UL (ref 0–0.04)
IMM GRANULOCYTES NFR BLD AUTO: 1 % (ref 0–0.5)
INR PPP: 1.2 (ref 0.9–1.1)
LYMPHOCYTES # BLD: 1.8 K/UL (ref 0.8–3.5)
LYMPHOCYTES NFR BLD: 12 % (ref 12–49)
MCH RBC QN AUTO: 27.9 PG (ref 26–34)
MCHC RBC AUTO-ENTMCNC: 32.1 G/DL (ref 30–36.5)
MCV RBC AUTO: 87.1 FL (ref 80–99)
MONOCYTES # BLD: 1.7 K/UL (ref 0–1)
MONOCYTES NFR BLD: 12 % (ref 5–13)
NEUTS SEG # BLD: 10.7 K/UL (ref 1.8–8)
NEUTS SEG NFR BLD: 74 % (ref 32–75)
NRBC # BLD: 0 K/UL (ref 0–0.01)
NRBC BLD-RTO: 0 PER 100 WBC
PLATELET # BLD AUTO: 245 K/UL (ref 150–400)
PMV BLD AUTO: 9.3 FL (ref 8.9–12.9)
POTASSIUM SERPL-SCNC: 3.4 MMOL/L (ref 3.5–5.1)
PROT SERPL-MCNC: 5.3 G/DL (ref 6.4–8.2)
PROTHROMBIN TIME: 12.5 SEC (ref 9–11.1)
RBC # BLD AUTO: 2.72 M/UL (ref 4.1–5.7)
SODIUM SERPL-SCNC: 134 MMOL/L (ref 136–145)
THERAPEUTIC RANGE,PTTT: ABNORMAL SECS (ref 58–77)
WBC # BLD AUTO: 14.5 K/UL (ref 4.1–11.1)

## 2020-11-01 PROCEDURE — 74011250637 HC RX REV CODE- 250/637: Performed by: SPECIALIST

## 2020-11-01 PROCEDURE — 77010033678 HC OXYGEN DAILY

## 2020-11-01 PROCEDURE — 85730 THROMBOPLASTIN TIME PARTIAL: CPT

## 2020-11-01 PROCEDURE — 85610 PROTHROMBIN TIME: CPT

## 2020-11-01 PROCEDURE — 74011250637 HC RX REV CODE- 250/637: Performed by: NURSE PRACTITIONER

## 2020-11-01 PROCEDURE — 74011250636 HC RX REV CODE- 250/636: Performed by: NURSE PRACTITIONER

## 2020-11-01 PROCEDURE — 74011000258 HC RX REV CODE- 258: Performed by: ANESTHESIOLOGY

## 2020-11-01 PROCEDURE — 74011000258 HC RX REV CODE- 258: Performed by: INTERNAL MEDICINE

## 2020-11-01 PROCEDURE — 80053 COMPREHEN METABOLIC PANEL: CPT

## 2020-11-01 PROCEDURE — 86738 MYCOPLASMA ANTIBODY: CPT

## 2020-11-01 PROCEDURE — 74011250637 HC RX REV CODE- 250/637: Performed by: ANESTHESIOLOGY

## 2020-11-01 PROCEDURE — 74011250636 HC RX REV CODE- 250/636: Performed by: ANESTHESIOLOGY

## 2020-11-01 PROCEDURE — 99233 SBSQ HOSP IP/OBS HIGH 50: CPT | Performed by: INTERNAL MEDICINE

## 2020-11-01 PROCEDURE — 74011000250 HC RX REV CODE- 250: Performed by: INTERNAL MEDICINE

## 2020-11-01 PROCEDURE — 82550 ASSAY OF CK (CPK): CPT

## 2020-11-01 PROCEDURE — 74011250637 HC RX REV CODE- 250/637: Performed by: INTERNAL MEDICINE

## 2020-11-01 PROCEDURE — 85025 COMPLETE CBC W/AUTO DIFF WBC: CPT

## 2020-11-01 PROCEDURE — 74011250636 HC RX REV CODE- 250/636: Performed by: EMERGENCY MEDICINE

## 2020-11-01 PROCEDURE — 65660000001 HC RM ICU INTERMED STEPDOWN

## 2020-11-01 PROCEDURE — 74011250636 HC RX REV CODE- 250/636: Performed by: INTERNAL MEDICINE

## 2020-11-01 PROCEDURE — 94640 AIRWAY INHALATION TREATMENT: CPT

## 2020-11-01 PROCEDURE — 36415 COLL VENOUS BLD VENIPUNCTURE: CPT

## 2020-11-01 PROCEDURE — 74011000250 HC RX REV CODE- 250: Performed by: NURSE PRACTITIONER

## 2020-11-01 RX ORDER — HEPARIN SODIUM 1000 [USP'U]/ML
7080 INJECTION, SOLUTION INTRAVENOUS; SUBCUTANEOUS ONCE
Status: DISCONTINUED | OUTPATIENT
Start: 2020-11-01 | End: 2020-11-01

## 2020-11-01 RX ADMIN — ISOSORBIDE DINITRATE 10 MG: 10 TABLET ORAL at 08:30

## 2020-11-01 RX ADMIN — ISOSORBIDE DINITRATE 10 MG: 10 TABLET ORAL at 21:12

## 2020-11-01 RX ADMIN — QUETIAPINE FUMARATE 50 MG: 25 TABLET ORAL at 16:06

## 2020-11-01 RX ADMIN — LABETALOL HYDROCHLORIDE 20 MG: 5 INJECTION INTRAVENOUS at 04:31

## 2020-11-01 RX ADMIN — HEPARIN SODIUM 29 UNITS/KG/HR: 10000 INJECTION, SOLUTION INTRAVENOUS at 01:15

## 2020-11-01 RX ADMIN — CARVEDILOL 25 MG: 12.5 TABLET, FILM COATED ORAL at 16:11

## 2020-11-01 RX ADMIN — Medication: at 21:12

## 2020-11-01 RX ADMIN — HYDRALAZINE HYDROCHLORIDE 50 MG: 50 TABLET, FILM COATED ORAL at 21:12

## 2020-11-01 RX ADMIN — IPRATROPIUM BROMIDE AND ALBUTEROL SULFATE 3 ML: .5; 3 SOLUTION RESPIRATORY (INHALATION) at 20:00

## 2020-11-01 RX ADMIN — HYDRALAZINE HYDROCHLORIDE 50 MG: 50 TABLET, FILM COATED ORAL at 16:06

## 2020-11-01 RX ADMIN — Medication: at 16:09

## 2020-11-01 RX ADMIN — QUETIAPINE FUMARATE 50 MG: 25 TABLET ORAL at 21:12

## 2020-11-01 RX ADMIN — DOXYCYCLINE 100 MG: 100 INJECTION, POWDER, LYOPHILIZED, FOR SOLUTION INTRAVENOUS at 08:33

## 2020-11-01 RX ADMIN — Medication 10 ML: at 21:51

## 2020-11-01 RX ADMIN — DOXYCYCLINE 100 MG: 100 INJECTION, POWDER, LYOPHILIZED, FOR SOLUTION INTRAVENOUS at 21:51

## 2020-11-01 RX ADMIN — HEPARIN SODIUM 31 UNITS/KG/HR: 10000 INJECTION, SOLUTION INTRAVENOUS at 17:05

## 2020-11-01 RX ADMIN — Medication 3 MG: at 21:20

## 2020-11-01 RX ADMIN — HYDROMORPHONE HYDROCHLORIDE 2 MG: 2 INJECTION, SOLUTION INTRAMUSCULAR; INTRAVENOUS; SUBCUTANEOUS at 08:30

## 2020-11-01 RX ADMIN — Medication 20 ML: at 16:09

## 2020-11-01 RX ADMIN — ISOSORBIDE DINITRATE 10 MG: 10 TABLET ORAL at 16:06

## 2020-11-01 RX ADMIN — Medication 10 ML: at 06:10

## 2020-11-01 RX ADMIN — HYDROMORPHONE HYDROCHLORIDE 2 MG: 2 INJECTION, SOLUTION INTRAMUSCULAR; INTRAVENOUS; SUBCUTANEOUS at 17:06

## 2020-11-01 RX ADMIN — HYDRALAZINE HYDROCHLORIDE 50 MG: 50 TABLET, FILM COATED ORAL at 08:30

## 2020-11-01 RX ADMIN — DOCUSATE SODIUM 100 MG: 100 CAPSULE, LIQUID FILLED ORAL at 08:30

## 2020-11-01 RX ADMIN — HYDROMORPHONE HYDROCHLORIDE 2 MG: 2 INJECTION, SOLUTION INTRAMUSCULAR; INTRAVENOUS; SUBCUTANEOUS at 21:10

## 2020-11-01 RX ADMIN — LORAZEPAM 2 MG: 2 INJECTION INTRAMUSCULAR; INTRAVENOUS at 12:53

## 2020-11-01 RX ADMIN — Medication: at 06:10

## 2020-11-01 RX ADMIN — HEPARIN SODIUM 29 UNITS/KG/HR: 10000 INJECTION, SOLUTION INTRAVENOUS at 09:20

## 2020-11-01 RX ADMIN — QUETIAPINE FUMARATE 50 MG: 25 TABLET ORAL at 06:09

## 2020-11-01 RX ADMIN — HYDROMORPHONE HYDROCHLORIDE 2 MG: 2 INJECTION, SOLUTION INTRAMUSCULAR; INTRAVENOUS; SUBCUTANEOUS at 12:53

## 2020-11-01 RX ADMIN — IPRATROPIUM BROMIDE AND ALBUTEROL SULFATE 3 ML: .5; 3 SOLUTION RESPIRATORY (INHALATION) at 08:00

## 2020-11-01 RX ADMIN — HYDROMORPHONE HYDROCHLORIDE 2 MG: 2 INJECTION, SOLUTION INTRAMUSCULAR; INTRAVENOUS; SUBCUTANEOUS at 00:27

## 2020-11-01 RX ADMIN — CARVEDILOL 25 MG: 12.5 TABLET, FILM COATED ORAL at 08:28

## 2020-11-01 RX ADMIN — HYDROMORPHONE HYDROCHLORIDE 2 MG: 2 INJECTION, SOLUTION INTRAMUSCULAR; INTRAVENOUS; SUBCUTANEOUS at 04:21

## 2020-11-01 NOTE — PROGRESS NOTES
Verbal shift change report given to Damion Hoang RN (oncoming nurse) by Marilou Bhakta RN (offgoing nurse). Report included the following information SBAR, Kardex, ED Summary, OR Summary, Intake/Output, MAR, Accordion, Recent Results, Med Rec Status, and Cardiac Rhythm NSR/ST . Patient Vitals for the past 12 hrs:   Temp Pulse Resp BP SpO2   11/01/20 0828 98.3 °F (36.8 °C) 92 23 (!) 154/95 100 %   11/01/20 0800 -- -- -- -- 100 %   11/01/20 0545 -- -- -- 117/64 --   11/01/20 0431 -- (!) 102 -- (!) 172/98 --   11/01/20 0409 99.4 °F (37.4 °C) 99 17 (!) 186/99 97 %   11/01/20 0003 99.6 °F (37.6 °C) 100 17 (!) 151/69 95 %   10/31/20 2243 -- 93 -- (!) 162/105 --   10/31/20 2225 -- 86 25 (!) 150/101 --   10/31/20 2220 99.1 °F (37.3 °C) 86 25 (!) 155/88 --   10/31/20 2200 -- 83 23 (!) 151/86 --   10/31/20 2145 -- 86 26 (!) 143/90 --   10/31/20 2130 -- 93 28 (!) 144/92 --        Last 3 Recorded Weights in this Encounter    10/30/20 0918 10/31/20 0522 11/01/20 0545   Weight: 110.4 kg (243 lb 6.4 oz) 107.7 kg (237 lb 6.4 oz) 108.8 kg (239 lb 14.4 oz)        PTT drawn at approximately 0620. PTT resulted 0810. Lab called to verify that the PTT had been received, and RN was notified. Problem: Falls - Risk of  Goal: *Absence of Falls  Description: Document Albino Messing Fall Risk and appropriate interventions in the flowsheet.   Outcome: Progressing Towards Goal  Note: Fall Risk Interventions:  Mobility Interventions: Communicate number of staff needed for ambulation/transfer, Patient to call before getting OOB, PT Consult for mobility concerns, PT Consult for assist device competence    Mentation Interventions: Adequate sleep, hydration, pain control, Increase mobility, Reorient patient, Room close to nurse's station, Toileting rounds, Update white board    Medication Interventions: Bed/chair exit alarm, Evaluate medications/consider consulting pharmacy, Patient to call before getting OOB, Teach patient to arise slowly    Elimination Interventions: Call light in reach, Toileting schedule/hourly rounds, Patient to call for help with toileting needs    History of Falls Interventions: Consult care management for discharge planning, Evaluate medications/consider consulting pharmacy, Investigate reason for fall, Room close to nurse's station         Problem: Pressure Injury - Risk of  Goal: *Prevention of pressure injury  Description: Document Abdirashid Scale and appropriate interventions in the flowsheet. Outcome: Progressing Towards Goal  Note: Pressure Injury Interventions:  Sensory Interventions: Assess changes in LOC, Assess need for specialty bed, Check visual cues for pain, Discuss PT/OT consult with provider, Keep linens dry and wrinkle-free, Maintain/enhance activity level, Minimize linen layers, Monitor skin under medical devices, Pad between skin to skin, Pressure redistribution bed/mattress (bed type)    Moisture Interventions: Absorbent underpads, Apply protective barrier, creams and emollients, Assess need for specialty bed, Check for incontinence Q2 hours and as needed, Internal/External urinary devices, Limit adult briefs, Maintain skin hydration (lotion/cream), Minimize layers    Activity Interventions: Assess need for specialty bed, Increase time out of bed, Pressure redistribution bed/mattress(bed type)    Mobility Interventions: HOB 30 degrees or less, Assess need for specialty bed, Pressure redistribution bed/mattress (bed type), PT/OT evaluation    Nutrition Interventions: Document food/fluid/supplement intake, Offer support with meals,snacks and hydration    Friction and Shear Interventions: Minimize layers                Problem: Breathing Pattern - Ineffective  Goal: *Absence of hypoxia  Outcome: Progressing Towards Goal  Note: Pt on 6L NC. O2 sats maintained in 90s. Problem: Pain  Goal: *Control of Pain  Outcome: Progressing Towards Goal  Note: Pt has orders for PRN dilaudid and PRN percocet. Dilaudid given 2x overnight. Problem: Heart Failure: Day 5  Goal: Off Pathway (Use only if patient is Off Pathway)  Outcome: Progressing Towards Goal  Note: Cardiology on board. Pt on 220 mL fluid restriction.

## 2020-11-01 NOTE — PROGRESS NOTES
Cardiology Progress Note         11/1/2020 11:48 AM    Admit Date: 10/21/2020    Admit Diagnosis: Sepsis ([facility] [A41.9];ARF (acute renal failure) ([facility] [N17.9]; Drug abuse ([facility] [F19.10]      Subjective:     Sallie Ortega is seen for Dr Lacey [facility]   He is alert this am and is engaged in coversation  He is on heparin drip   He has ESRD     He denies chest pain or dyspnea but feel weak    10/21/20   ECHO ADULT FOLLOW-UP OR LIMITED 10/29/2020 10/29/2020    Narrative · LV: Estimated LVEF is 15 - 20%. Mildly dilated left ventricle. Severely   global hypokinesis; normal function at apex. · MV: Moderate mitral valve regurgitation is present. · TV: Moderate tricuspid valve regurgitation is present. · PA: Mild to moderate pulmonary hypertension. Pulmonary arterial systolic   pressure is 45 mmHg. · RV: Mildly dilated right ventricle. Borderline low systolic function.         Signed by: Hector Martinez MD       Past Medical History:   Diagnosis Date    Anxiety     Depression      Past Surgical History:   Procedure Laterality Date    HX ORTHOPAEDIC       Social History     Socioeconomic History    Marital status: UNKNOWN     Spouse name: Not on file    Number of children: Not on file    Years of education: Not on file    Highest education level: Not on file   Occupational History    Not on file   Social Needs    Financial resource strain: Not on file    Food insecurity     Worry: Not on file     Inability: Not on file    Transportation needs     Medical: Not on file     Non-medical: Not on file   Tobacco Use    Smoking status: Current Every Day Smoker     Packs/day: 0.50    Smokeless tobacco: Former User   Substance and Sexual Activity    Alcohol use: Not Currently    Drug use: Yes     Comment: Fentanyl    Sexual activity: Not Currently   Lifestyle    Physical activity     Days per week: Not on file     Minutes per session: Not on file    Stress: Not on file   Relationships    Social connections     Talks on phone: Not on file     Gets together: Not on file     Attends Worship service: Not on file     Active member of club or organization: Not on file     Attends meetings of clubs or organizations: Not on file     Relationship status: Not on file    Intimate partner violence     Fear of current or ex partner: Not on file     Emotionally abused: Not on file     Physically abused: Not on file     Forced sexual activity: Not on file   Other Topics Concern    Not on file   Social History Narrative    Not on file         Current Facility-Administered Medications   Medication Dose Route Frequency    carvediloL (COREG) tablet 25 mg  25 mg Oral BID WITH MEALS    albuterol-ipratropium (DUO-NEB) 2.5 MG-0.5 MG/3 ML  3 mL Nebulization BID RT    hydrALAZINE (APRESOLINE) tablet 50 mg  50 mg Oral TID    QUEtiapine (SEROquel) tablet 50 mg  50 mg Oral Q8H    oxyCODONE IR (ROXICODONE) tablet 5 mg  5 mg Oral Q4H PRN    LORazepam (ATIVAN) injection 2 mg  2 mg IntraVENous Q4H PRN    doxycycline (VIBRAMYCIN) 100 mg in 0.9% sodium chloride (MBP/ADV) 100 mL  100 mg IntraVENous Q12H    melatonin tablet 3 mg  3 mg Oral QHS    Vancomycin- pharmacy to dose   Other Rx Dosing/Monitoring    alteplase (CATHFLO) 1 mg in sterile water (preservative free) 1 mL injection  1 mg InterCATHeter PRN    isosorbide dinitrate (ISORDIL) tablet 10 mg  10 mg Oral TID    HYDROmorphone (PF) (DILAUDID) injection 2 mg  2 mg IntraVENous Q4H PRN    heparin 25,000 units in D5W 250 ml infusion  17-36 Units/kg/hr IntraVENous TITRATE    balsam peru-castor oiL (VENELEX) ointment   Topical Q8H    sodium chloride (NS) flush 5-40 mL  5-40 mL IntraVENous Q8H    sodium chloride (NS) flush 5-40 mL  5-40 mL IntraVENous PRN    acetaminophen (TYLENOL) tablet 650 mg  650 mg Oral Q6H PRN    Or    acetaminophen (TYLENOL) suppository 650 mg  650 mg Rectal Q6H PRN    polyethylene glycol (MIRALAX) packet 17 g  17 g Oral DAILY PRN    ondansetron (ZOFRAN) injection 4 mg  4 mg IntraVENous Q6H PRN    albuterol-ipratropium (DUO-NEB) 2.5 MG-0.5 MG/3 ML  3 mL Nebulization Q4H PRN    docusate sodium (COLACE) capsule 100 mg  100 mg Oral DAILY    labetaloL (NORMODYNE;TRANDATE) injection 20 mg  20 mg IntraVENous Q4H PRN    heparin (porcine) 1,000 unit/mL injection 1,400 Units  1,400 Units InterCATHeter DIALYSIS PRN    And    heparin (porcine) 1,000 unit/mL injection 1,100 Units  1,100 Units InterCATHeter DIALYSIS PRN         Objective:      Physical Exam:  Visit Vitals  /76 (BP 1 Location: Right arm, BP Patient Position: At rest;Sitting)   Pulse 90   Temp 97.9 °F (36.6 °C)   Resp 28   Ht 5' 10\" (1.778 m)   Wt 239 lb 14.4 oz (108.8 kg)   SpO2 97%   BMI 34.42 kg/m²     General Appearance:  Well developed, well nourished, individual in no acute distress. Ears/Nose/Mouth/Throat:   unremarkable         Neck: Supple. Chest:   Lungs clear to auscultation bilaterally. Cardiovascular:  Regular rate and rhythm, no murmur. Abdomen:   Soft, non-tender    Extremities: 3+ leg edema bilaterally. Skin: Warm and dry.   Psych no depression                Data Review:   Labs:    Recent Results (from the past 24 hour(s))   PTT    Collection Time: 10/31/20  3:30 PM   Result Value Ref Range    aPTT 63.5 (H) 22.1 - 32.0 sec    aPTT, therapeutic range     58.0 - 77.0 SECS   PTT    Collection Time: 10/31/20 11:15 PM   Result Value Ref Range    aPTT 53.4 (H) 22.1 - 32.0 sec    aPTT, therapeutic range     58.0 - 77.0 SECS   PROTHROMBIN TIME + INR    Collection Time: 11/01/20  4:38 AM   Result Value Ref Range    INR 1.2 (H) 0.9 - 1.1      Prothrombin time 12.5 (H) 9.0 - 11.1 sec   CK    Collection Time: 11/01/20  4:38 AM   Result Value Ref Range     (H) 39 - 770 U/L   METABOLIC PANEL, COMPREHENSIVE    Collection Time: 11/01/20  4:38 AM   Result Value Ref Range    Sodium 134 (L) 136 - 145 mmol/L    Potassium 3.4 (L) 3.5 - 5.1 mmol/L    Chloride 99 97 - 108 mmol/L    CO2 26 21 - 32 mmol/L Anion gap 9 5 - 15 mmol/L    Glucose 128 (H) 65 - 100 mg/dL    BUN 24 (H) 6 - 20 MG/DL    Creatinine 2.99 (H) 0.70 - 1.30 MG/DL    BUN/Creatinine ratio 8 (L) 12 - 20      GFR est AA 31 (L) >60 ml/min/1.73m2    GFR est non-AA 26 (L) >60 ml/min/1.73m2    Calcium 8.5 8.5 - 10.1 MG/DL    Bilirubin, total 0.5 0.2 - 1.0 MG/DL    ALT (SGPT) 152 (H) 12 - 78 U/L    AST (SGOT) 46 (H) 15 - 37 U/L    Alk. phosphatase 62 45 - 117 U/L    Protein, total 5.3 (L) 6.4 - 8.2 g/dL    Albumin 2.1 (L) 3.5 - 5.0 g/dL    Globulin 3.2 2.0 - 4.0 g/dL    A-G Ratio 0.7 (L) 1.1 - 2.2     CBC WITH AUTOMATED DIFF    Collection Time: 11/01/20  4:38 AM   Result Value Ref Range    WBC 14.5 (H) 4.1 - 11.1 K/uL    RBC 2.72 (L) 4.10 - 5.70 M/uL    HGB 7.6 (L) 12.1 - 17.0 g/dL    HCT 23.7 (L) 36.6 - 50.3 %    MCV 87.1 80.0 - 99.0 FL    MCH 27.9 26.0 - 34.0 PG    MCHC 32.1 30.0 - 36.5 g/dL    RDW 17.2 (H) 11.5 - 14.5 %    PLATELET 655 848 - 398 K/uL    MPV 9.3 8.9 - 12.9 FL    NRBC 0.0 0  WBC    ABSOLUTE NRBC 0.00 0.00 - 0.01 K/uL    NEUTROPHILS 74 32 - 75 %    LYMPHOCYTES 12 12 - 49 %    MONOCYTES 12 5 - 13 %    EOSINOPHILS 1 0 - 7 %    BASOPHILS 0 0 - 1 %    IMMATURE GRANULOCYTES 1 (H) 0.0 - 0.5 %    ABS. NEUTROPHILS 10.7 (H) 1.8 - 8.0 K/UL    ABS. LYMPHOCYTES 1.8 0.8 - 3.5 K/UL    ABS. MONOCYTES 1.7 (H) 0.0 - 1.0 K/UL    ABS. EOSINOPHILS 0.2 0.0 - 0.4 K/UL    ABS. BASOPHILS 0.0 0.0 - 0.1 K/UL    ABS. IMM.  GRANS. 0.1 (H) 0.00 - 0.04 K/UL    DF AUTOMATED     PTT    Collection Time: 11/01/20  4:38 AM   Result Value Ref Range    aPTT 39.2 (H) 22.1 - 32.0 sec    aPTT, therapeutic range     58.0 - 77.0 SECS   PTT    Collection Time: 11/01/20  9:30 AM   Result Value Ref Range    aPTT 56.4 (H) 22.1 - 32.0 sec    aPTT, therapeutic range     58.0 - 77.0 SECS       Telemetry: sinus rhythm      Assessment:     Principal Problem:    Sepsis ([facility] (10/22/2020)    Active Problems:    Drug abuse ([facility] (10/22/2020)      Acute renal failure with tubular necrosis ([facility] (10/22/2020)      Community acquired pneumonia of left lower lobe of lung (10/22/2020)      Electrolyte abnormality (10/22/2020)      Toxic encephalopathy (35/18/4713)    systolic CHF and cardiomyopathy    Plan:   He is alert today  He admitted to using cocaine, fentanyl and drink some alcohol  This may have caused his cardiomyopathy  Bp is better now  Continue with hydralazine/isordil coreg  Fluid removal with dialysis  I have discussed with him about drug substance abuse problem and he promised he will stop using them    Jose Antonio Farooq M.D.  [facility] [place] [facility]  Electrophysiology/Cardiology  [facility]  [address] [place] [address]                              [phone]

## 2020-11-01 NOTE — PROCEDURES
[facility]  [phone]    Vitals   Pre   Post   Assessment   Pre   Post     Temp  98.3  99.1 LOC  A&O x4 A&O x4   HR   96 86 Lungs   diminished  diminished   B/P  152/103 150/101 Cardiac   regular  regular   Resp   27 25 Skin   Dry, warm  dry, warm   Pain level  10/10, medicated by primary RN 8/10, primary RN aware Edema  generalized     generalized   Orders:    Duration:   Start:    1920 End:    2220 Total:   3hrs   Dialyzer:   Dialyzer/Set Up Inspection: Revaclear([id number]) (10/31/20 1920)   K Bath:   Dialysate K (mEq/L): 2 (10/31/20 1920)   Ca Bath:   Dialysate CA (mEq/L): 2.5 (10/31/20 1920)   Na/Bicarb:   Dialysate NA (mEq/L): 140 (10/31/20 1920)   Target Fluid Removal:   Goal/Amount of Fluid to Remove (mL): 3000 mL (10/31/20 1920)   Access     Type & Location:   Right CVC: dressing DCI, no S&S of infection, Each catheter limb disinfected per p&p, caps removed, hubs disinfected per p&p.  +aspir/NS flushes   Labs     Obtained/Reviewed   Critical Results Called   Date when labs were drawn-  Hgb-    HGB   Date Value Ref Range Status   10/31/2020 8.1 (L) 12.1 - 17.0 g/dL Final     K-    Potassium   Date Value Ref Range Status   10/31/2020 3.6 3.5 - 5.1 mmol/L Final   10/31/2020 3.6 3.5 - 5.1 mmol/L Final     Ca-   Calcium   Date Value Ref Range Status   10/31/2020 7.9 (L) 8.5 - 10.1 MG/DL Final   10/31/2020 8.0 (L) 8.5 - 10.1 MG/DL Final     Bun-   BUN   Date Value Ref Range Status   10/31/2020 28 (H) 6 - 20 MG/DL Final   10/31/2020 28 (H) 6 - 20 MG/DL Final     Creat-   Creatinine   Date Value Ref Range Status   10/31/2020 3.16 (H) 0.70 - 1.30 MG/DL Final     Comment:     INVESTIGATED PER DELTA CHECK PROTOCOL   10/31/2020 3.27 (H) 0.70 - 1.30 MG/DL Final        Medications/ Blood Products Given     Name   Dose   Route and Time           Heparin 2500 units HD ports 2220        Blood Volume Processed (BVP):    67L Net Fluid   Removed:  3000cc   Comments   Time Out Done: 3749  Primary Nurse Rpt Pre: Jerry Carney RN  Primary Nurse Rpt Post: Сергей Gonzalez RN  Pt Education: access care, procedure  Care Plan: continue HD tx as per MD order  Tx Summary: Tolerated tx well, Each dialysis catheter limb disinfected per p&p, blood returned per p&p, each dialysis hub disinfected per p&p, post dialysis catheter dwell instilled per order, and caps applied. Dressing changed.   Admiting Diagnosis:  Pt's previous clinic-  Consent signed - Informed Consent Verified: Yes (10/31/20 1920)   Hepatitis Status- 10/22/20 negative, susceptible  Machine #- Machine Number: [id number] (10/31/20 1920)  Telemetry status- monitored at the bedside

## 2020-11-01 NOTE — PROGRESS NOTES
[facility] PROGRESS NOTE  Pulmonary, Critical Care, and Sleep Medicine    Name: Radha Diaz MRN: [id number]   : 1995 [facility]   Date: 2020  Admission Date: 10/21/2020     Chart and notes reviewed. Data reviewed. I review the patient's current medications in the medical record at each encounter. I have evaluated and examined the patient. Overnight events reviewed:  Afebrile  Sats 97% on 4L  BP stable  WBC 14.5 - better  Hgb 7.6  Na 134   K 3.4  Creat 2.99 - better  LFTs/CK trending down  10/22 repeat blood cx no growth x 5 days  Strep pneumo neg    ROS: Reports feeling a little bit better today. Denies fever or chills. Still with cough. Denies abd pain. Denies CP. Reports LUE pain/swelling. Does feel fatigued/lethargic. 12 point ROS reviewed and negative except as noted above. Past Medical History:   Diagnosis Date    Anxiety     Depression        Past Surgical History:   Procedure Laterality Date    HX ORTHOPAEDIC         No family history on file. Social History     Tobacco Use    Smoking status: Current Every Day Smoker     Packs/day: 0.50    Smokeless tobacco: Former User   Substance Use Topics    Alcohol use: Not Currently       Allergies   Allergen Reactions    Tramadol Nausea and Vomiting       Current Facility-Administered Medications   Medication Dose Route Frequency    [START ON 2020] Vancomycin Level - Please draw level on  with AM labs (pre-HD level), thanks!    Other ONCE    carvediloL (COREG) tablet 25 mg  25 mg Oral BID WITH MEALS    albuterol-ipratropium (DUO-NEB) 2.5 MG-0.5 MG/3 ML  3 mL Nebulization BID RT    hydrALAZINE (APRESOLINE) tablet 50 mg  50 mg Oral TID    QUEtiapine (SEROquel) tablet 50 mg  50 mg Oral Q8H    oxyCODONE IR (ROXICODONE) tablet 5 mg  5 mg Oral Q4H PRN    LORazepam (ATIVAN) injection 2 mg  2 mg IntraVENous Q4H PRN    doxycycline (VIBRAMYCIN) 100 mg in 0.9% sodium chloride (MBP/ADV) 100 mL  100 mg IntraVENous Q12H    melatonin tablet 3 mg  3 mg Oral QHS    Vancomycin- pharmacy to dose   Other Rx Dosing/Monitoring    alteplase (CATHFLO) 1 mg in sterile water (preservative free) 1 mL injection  1 mg InterCATHeter PRN    isosorbide dinitrate (ISORDIL) tablet 10 mg  10 mg Oral TID    HYDROmorphone (PF) (DILAUDID) injection 2 mg  2 mg IntraVENous Q4H PRN    heparin 25,000 units in D5W 250 ml infusion  17-36 Units/kg/hr IntraVENous TITRATE    balsam peru-castor oiL (VENELEX) ointment   Topical Q8H    sodium chloride (NS) flush 5-40 mL  5-40 mL IntraVENous Q8H    sodium chloride (NS) flush 5-40 mL  5-40 mL IntraVENous PRN    acetaminophen (TYLENOL) tablet 650 mg  650 mg Oral Q6H PRN    Or    acetaminophen (TYLENOL) suppository 650 mg  650 mg Rectal Q6H PRN    polyethylene glycol (MIRALAX) packet 17 g  17 g Oral DAILY PRN    ondansetron (ZOFRAN) injection 4 mg  4 mg IntraVENous Q6H PRN    albuterol-ipratropium (DUO-NEB) 2.5 MG-0.5 MG/3 ML  3 mL Nebulization Q4H PRN    docusate sodium (COLACE) capsule 100 mg  100 mg Oral DAILY    labetaloL (NORMODYNE;TRANDATE) injection 20 mg  20 mg IntraVENous Q4H PRN    heparin (porcine) 1,000 unit/mL injection 1,400 Units  1,400 Units InterCATHeter DIALYSIS PRN    And    heparin (porcine) 1,000 unit/mL injection 1,100 Units  1,100 Units InterCATHeter DIALYSIS PRN         Vital Signs:  Visit Vitals  /76 (BP 1 Location: Right arm, BP Patient Position: At rest;Sitting)   Pulse 90   Temp 97.9 °F (36.6 °C)   Resp 28   Ht 5' 10\" (1.778 m)   Wt 108.8 kg (239 lb 14.4 oz)   SpO2 97%   BMI 34.42 kg/m²       O2 Device: Nasal cannula   O2 Flow Rate (L/min): 4 l/min   Temp (24hrs), Av.7 °F (37.1 °C), Min:97.9 °F (36.6 °C), Max:99.6 °F (37.6 °C)       Intake/Output:   Last shift:      701 - 1900  In: -   Out: 525 [Urine:525]  Last 3 shifts: 10/30 1901 - 700  In: 2488.9 [P.O.:1478;  I.V.:1010.9]  Out: 0405 [Urine:975]    Intake/Output Summary (Last 24 hours) at 11/1/2020 1319  Last data filed at 11/1/2020 0805  Gross per 24 hour   Intake 1258 ml   Output 4025 ml   Net -2767 ml          Physical Exam:   General:  Alert, drowsy, cooperative, no distress, appears stated age. Head:  Normocephalic, without obvious abnormality, atraumatic. Eyes:  Conjunctivae/corneas clear. Nose: Nares normal. Septum midline. Mucosa normal.   Throat: Lips, mucosa, and tongue normal.    Neck: Supple, symmetrical, trachea midline, no adenopathy. Lungs:   Occasional rhonchi, otherwise clear to auscultation bilaterally. Resp nonlabored. Chest wall:  No tenderness or deformity. Heart:  Regular rate and rhythm, S1, S2 normal, no murmur, click, rub or gallop. Abdomen:   Soft, non-tender. Bowel sounds normal. No masses,  No organomegaly. Extremities: Extremities normal, atraumatic, no cyanosis, + peripheral edema. Pulses: 2+ and symmetric all extremities. Skin: Skin color, texture, turgor normal. No rashes. Lymph nodes: Cervical, supraclavicular nodes normal.   Neurologic: Grossly nonfocal     DATA:  MAR reviewed and pertinent medications noted or modified as needed    Labs:  Recent Labs     11/01/20  0438 10/31/20  0040 10/30/20  0310   WBC 14.5* 17.0* 13.8*   HGB 7.6* 8.1* 7.9*   HCT 23.7* 25.0* 24.0*    276 289     Recent Labs     11/01/20  0438 10/31/20  0045 10/31/20  0040 10/30/20  0310   *  --  136  136  --    K 3.4*  --  3.6  3.6  --    CL 99  --  100  101  --    CO2 26  --  27  26  --    *  --  98  97  --    BUN 24*  --  28*  28*  --    CREA 2.99*  --  3.27*  3.16*  --    CA 8.5  --  8.0*  7.9*  --    ALB 2.1*  --  2.2*  2.2*  --    TBILI 0.5  --  0.6  0.6  --    *  --  205*  206*  --    INR 1.2* NO SAMPLE RECEIVED  --  1.3*       Imaging:  I have personally reviewed the patients radiographs and reports.     10/31 CXR: Slightly increased interstitial and parenchymal opacities compared to the 10/20/2020 exam.    IMPRESSION:   · Acute hypoxic respiratory failure  · Abnormal CXR: pulmonary infiltrates/pneumonia  · Cardiomyopathy and pulm HTN: EF 15-20%; PASP 45  · Bacteremia: staph hominis  · JEAN PIERRE due to rhabdomyolysis: severe and now oligoanuric    · Acute LUE DVT  · Metabolic encephalopathy  · H/o polysubstance abuse      PLAN:   · Continue O2 and wean as able to keep sats > 90%  · Scheduled Duonebs  · Continue abx: Doxy, Vanc  · Cardiology following: on hydralazine, isordil , and Coreg  · Renal following: MWF HD to start tomorrow  · Trend LFTs, CK  · Heparin drip         Bairon Brand

## 2020-11-01 NOTE — PROGRESS NOTES
[facility]   [address] [facility], [address] [place] [address] Rd [address], [facility]  Phone: [phone]   [phone]       Nephrology Progress Note  Hola Espitia     1995     [id number]  Date of Admission : 10/21/2020  11/01/20    CC: Follow up for ARF, Rhabdomyolysis        Assessment and Plan   JEAN PIERRE  - Severe ATN from Rhabdomyolysis. Oligoanuric   - HD MWF starting tomorrow    Severe Rhabdomyolysis   - 2/2 Substance use  - LFTs and CPK trending down    Left Lung PNA w/ sepsis     LUE DVT:  - on heparin drip    CMP w/ EF 15-20%:  - likely 2/2 cocaine  - per cardiology    Resp failure:  - per pulmonary    Metabolic acidosis:  - improving    Hypocalcemia:  - stable  - replete PRN    Polysubstance abuse      Interval History:  Seen and examined. Feeling better today. Tolerated HD yesterday. Labs stable. Oxygenation stable. No cp, n/v reported    Review of Systems: Review of systems not obtained due to patient factors. Current Medications:   Current Facility-Administered Medications   Medication Dose Route Frequency    [START ON 11/2/2020] Vancomycin Level - Please draw level on 11/2 with AM labs (pre-HD level), thanks!    Other ONCE    carvediloL (COREG) tablet 25 mg  25 mg Oral BID WITH MEALS    albuterol-ipratropium (DUO-NEB) 2.5 MG-0.5 MG/3 ML  3 mL Nebulization BID RT    hydrALAZINE (APRESOLINE) tablet 50 mg  50 mg Oral TID    QUEtiapine (SEROquel) tablet 50 mg  50 mg Oral Q8H    oxyCODONE IR (ROXICODONE) tablet 5 mg  5 mg Oral Q4H PRN    LORazepam (ATIVAN) injection 2 mg  2 mg IntraVENous Q4H PRN    doxycycline (VIBRAMYCIN) 100 mg in 0.9% sodium chloride (MBP/ADV) 100 mL  100 mg IntraVENous Q12H    melatonin tablet 3 mg  3 mg Oral QHS    Vancomycin- pharmacy to dose   Other Rx Dosing/Monitoring    alteplase (CATHFLO) 1 mg in sterile water (preservative free) 1 mL injection  1 mg InterCATHeter PRN    isosorbide dinitrate (ISORDIL) tablet 10 mg  10 mg Oral TID    HYDROmorphone (PF) (DILAUDID) injection 2 mg  2 mg IntraVENous Q4H PRN    heparin 25,000 units in D5W 250 ml infusion  17-36 Units/kg/hr IntraVENous TITRATE    balsam peru-castor oiL (VENELEX) ointment   Topical Q8H    sodium chloride (NS) flush 5-40 mL  5-40 mL IntraVENous Q8H    sodium chloride (NS) flush 5-40 mL  5-40 mL IntraVENous PRN    acetaminophen (TYLENOL) tablet 650 mg  650 mg Oral Q6H PRN    Or    acetaminophen (TYLENOL) suppository 650 mg  650 mg Rectal Q6H PRN    polyethylene glycol (MIRALAX) packet 17 g  17 g Oral DAILY PRN    ondansetron (ZOFRAN) injection 4 mg  4 mg IntraVENous Q6H PRN    albuterol-ipratropium (DUO-NEB) 2.5 MG-0.5 MG/3 ML  3 mL Nebulization Q4H PRN    docusate sodium (COLACE) capsule 100 mg  100 mg Oral DAILY    labetaloL (NORMODYNE;TRANDATE) injection 20 mg  20 mg IntraVENous Q4H PRN    heparin (porcine) 1,000 unit/mL injection 1,400 Units  1,400 Units InterCATHeter DIALYSIS PRN    And    heparin (porcine) 1,000 unit/mL injection 1,100 Units  1,100 Units InterCATHeter DIALYSIS PRN      Allergies   Allergen Reactions    Tramadol Nausea and Vomiting       Objective:  Vitals:    Vitals:    11/01/20 0545 11/01/20 0800 11/01/20 0828 11/01/20 1058   BP: 117/64  (!) 154/95 138/76   Pulse:   92 90   Resp:   23 28   Temp:   98.3 °F (36.8 °C) 97.9 °F (36.6 °C)   TempSrc:       SpO2:  100% 100% 97%   Weight: 108.8 kg (239 lb 14.4 oz)      Height:         Intake and Output:  11/01 0701 - 11/01 1900  In: -   Out: 525 [Urine:525]  10/30 1901 - 11/01 0700  In: 2488.9 [P.O.:1478;  I.V.:1010.9]  Out: 3975 [Urine:975]    Physical Examination:    General: Lethargic on BiPAP  Neck:  Supple, no mass  Resp:  Decreased breath sounds  CV:  RRR,  no murmur or rub, no LE edema  GI:  Soft, NT, + Bowel sounds, no hepatosplenomegaly  Neurologic:  Lethargic   Psych:             Unable to assess  Skin:  Pressures rash on Left foot, R post Knee and neck   :  Iniguez     []    High complexity decision making was performed  []    Patient is at high-risk of decompensation with multiple organ involvement    Lab Data Personally Reviewed: I have reviewed all the pertinent labs, microbiology data and radiology studies during assessment. Recent Labs     11/01/20  0438 10/31/20  0045 10/31/20  0040 10/30/20  0310   *  --  136  136  --    K 3.4*  --  3.6  3.6  --    CL 99  --  100  101  --    CO2 26  --  27  26  --    *  --  98  97  --    BUN 24*  --  28*  28*  --    CREA 2.99*  --  3.27*  3.16*  --    CA 8.5  --  8.0*  7.9*  --    ALB 2.1*  --  2.2*  2.2*  --    *  --  205*  206*  --    INR 1.2* NO SAMPLE RECEIVED  --  1.3*     Recent Labs     11/01/20  0438 10/31/20  0040 10/30/20  0310   WBC 14.5* 17.0* 13.8*   HGB 7.6* 8.1* 7.9*   HCT 23.7* 25.0* 24.0*    276 289     No results found for: SDES  Lab Results   Component Value Date/Time    Culture result: NO GROWTH 5 DAYS 10/22/2020 06:19 AM    Culture result: NO GROWTH 5 DAYS 10/22/2020 02:37 AM    Culture result: (A) 10/21/2020 08:15 PM     Staphylococcus hominis (Oxacillin resistant) GROWING IN THE AEROBIC BOTTLE (SITE = R HAND)    Culture result:  10/21/2020 08:15 PM     Gram Positive Cocci CALLED TO AND READ BACK BY Nadira Mirza RN AT 2012 BY [facility]    Culture result: (A) 10/21/2020 08:10 PM     Staphylococcus hominis (Oxacillin resistant) GROWING IN THE AEROBIC BOTTLE (SITE = L HAND)    Culture result:  10/21/2020 08:10 PM     preliminary report of Gram Positive Cocci in clusters growing in 1 of 2 bottles drawn CALLED TO AND READ BACK BY FERN DAMON RN [facility] AT  [facility] ON 10/23/20.  Bettye [id number]     Recent Results (from the past 24 hour(s))   PTT    Collection Time: 10/31/20  3:30 PM   Result Value Ref Range    aPTT 63.5 (H) 22.1 - 32.0 sec    aPTT, therapeutic range     58.0 - 77.0 SECS   PTT    Collection Time: 10/31/20 11:15 PM   Result Value Ref Range    aPTT 53.4 (H) 22.1 - 32.0 sec    aPTT, therapeutic range     58.0 - 77.0 SECS   PROTHROMBIN TIME + INR    Collection Time: 11/01/20  4:38 AM   Result Value Ref Range    INR 1.2 (H) 0.9 - 1.1      Prothrombin time 12.5 (H) 9.0 - 11.1 sec   CK    Collection Time: 11/01/20  4:38 AM   Result Value Ref Range     (H) 39 - 457 U/L   METABOLIC PANEL, COMPREHENSIVE    Collection Time: 11/01/20  4:38 AM   Result Value Ref Range    Sodium 134 (L) 136 - 145 mmol/L    Potassium 3.4 (L) 3.5 - 5.1 mmol/L    Chloride 99 97 - 108 mmol/L    CO2 26 21 - 32 mmol/L    Anion gap 9 5 - 15 mmol/L    Glucose 128 (H) 65 - 100 mg/dL    BUN 24 (H) 6 - 20 MG/DL    Creatinine 2.99 (H) 0.70 - 1.30 MG/DL    BUN/Creatinine ratio 8 (L) 12 - 20      GFR est AA 31 (L) >60 ml/min/1.73m2    GFR est non-AA 26 (L) >60 ml/min/1.73m2    Calcium 8.5 8.5 - 10.1 MG/DL    Bilirubin, total 0.5 0.2 - 1.0 MG/DL    ALT (SGPT) 152 (H) 12 - 78 U/L    AST (SGOT) 46 (H) 15 - 37 U/L    Alk. phosphatase 62 45 - 117 U/L    Protein, total 5.3 (L) 6.4 - 8.2 g/dL    Albumin 2.1 (L) 3.5 - 5.0 g/dL    Globulin 3.2 2.0 - 4.0 g/dL    A-G Ratio 0.7 (L) 1.1 - 2.2     CBC WITH AUTOMATED DIFF    Collection Time: 11/01/20  4:38 AM   Result Value Ref Range    WBC 14.5 (H) 4.1 - 11.1 K/uL    RBC 2.72 (L) 4.10 - 5.70 M/uL    HGB 7.6 (L) 12.1 - 17.0 g/dL    HCT 23.7 (L) 36.6 - 50.3 %    MCV 87.1 80.0 - 99.0 FL    MCH 27.9 26.0 - 34.0 PG    MCHC 32.1 30.0 - 36.5 g/dL    RDW 17.2 (H) 11.5 - 14.5 %    PLATELET 587 370 - 089 K/uL    MPV 9.3 8.9 - 12.9 FL    NRBC 0.0 0  WBC    ABSOLUTE NRBC 0.00 0.00 - 0.01 K/uL    NEUTROPHILS 74 32 - 75 %    LYMPHOCYTES 12 12 - 49 %    MONOCYTES 12 5 - 13 %    EOSINOPHILS 1 0 - 7 %    BASOPHILS 0 0 - 1 %    IMMATURE GRANULOCYTES 1 (H) 0.0 - 0.5 %    ABS. NEUTROPHILS 10.7 (H) 1.8 - 8.0 K/UL    ABS. LYMPHOCYTES 1.8 0.8 - 3.5 K/UL    ABS. MONOCYTES 1.7 (H) 0.0 - 1.0 K/UL    ABS. EOSINOPHILS 0.2 0.0 - 0.4 K/UL    ABS. BASOPHILS 0.0 0.0 - 0.1 K/UL    ABS. IMM.  GRANS. 0.1 (H) 0.00 - 0.04 K/UL    DF AUTOMATED     PTT    Collection Time: 11/01/20 4:38 AM   Result Value Ref Range    aPTT 39.2 (H) 22.1 - 32.0 sec    aPTT, therapeutic range     58.0 - 77.0 SECS   PTT    Collection Time: 11/01/20  9:30 AM   Result Value Ref Range    aPTT 56.4 (H) 22.1 - 32.0 sec    aPTT, therapeutic range     58.0 - 77.0 SECS           Total time spent with patient:  xxx   min. Care Plan discussed with:  Patient     Family      RN      Consulting Physician [address] [place] [address]        I have reviewed the flowsheets. Chart and Pertinent Notes have been reviewed. No change in PMH ,family and social history from Consult note.       India Castro MD

## 2020-11-01 NOTE — PROGRESS NOTES
1038 Pt 02 SATs 88-90, Resp 30-33. Pt refused BIPAP. 90% on 6 L NC. Educated on deep breathing. Problem: Falls - Risk of  Goal: *Absence of Falls  Description: Document Anya Paul Fall Risk and appropriate interventions in the flowsheet. Outcome: Progressing Towards Goal  Note: Fall Risk Interventions:  Mobility Interventions: Communicate number of staff needed for ambulation/transfer, Patient to call before getting OOB, PT Consult for assist device competence, PT Consult for mobility concerns    Mentation Interventions: Door open when patient unattended, Evaluate medications/consider consulting pharmacy, Increase mobility, Reorient patient, More frequent rounding    Medication Interventions: Evaluate medications/consider consulting pharmacy, Patient to call before getting OOB, Teach patient to arise slowly    Elimination Interventions: Call light in reach, Patient to call for help with toileting needs, Toileting schedule/hourly rounds    History of Falls Interventions: Door open when patient unattended, Evaluate medications/consider consulting pharmacy, Investigate reason for fall, Room close to nurse's station         Problem: Heart Failure: Day 11  Goal: Treatments/Interventions/Procedures  Outcome: Progressing Towards Goal    Pt on stric I&O's, daily weights, low Na diet. Last 3 Recorded Weights in this Encounter    10/30/20 0918 10/31/20 0522 11/01/20 0545   Weight: 110.4 kg (243 lb 6.4 oz) 107.7 kg (237 lb 6.4 oz) 108.8 kg (239 lb 14.4 oz)             Bedside shift change report given to Jannie (oncoming nurse) by Hyacinth Crawford (offgoing nurse). Report included the following information SBAR, Kardex, Intake/Output, Accordion and Recent Results.

## 2020-11-01 NOTE — PROGRESS NOTES
Hospitalist Progress Note  Kb Dey MD  Answering service: [phone] from in house phone      Date of Service:  2020  NAME:  Laure Brerios  :  1995  MRN:  [id number]    Admission Summary:   25M p/w multiple drug use, resp failure/ARF  Interval history / Subjective:   Patient seen and examined at bedside. Feels better, in positive mood. Alert, oriented, says he got up yesterday, walked round the room. Came off bipap most his day, didn't require it overnight either. Assessment & Plan:     #. Polysubstance abuse: UDS +ve for cocaine, THC, benzo, opioids, amphetamines  #. Pneumonia- likely aspiration- 2nd to above  #. Acute metabolic Encephalopathy: 2nd to above. Re-orient frequently,- seroquel prn. monitor. #. Cardiomyopathy: ef 15%- likely 2nd to drug use- Cardio following  #. Acute hypoxic respiratory failure: 2nd to above- Bipap to keep sats >90%  #. Rhabdomyolysis: improving with hydration- monitor ck daily- coming down nicely  #. ARF: 2nd to above. Nephrology following- started on RRT  #. Acute LUE DVT: heparin gtt. Switch to NOAC prior to dc. #. Acute Anemia: check hemoccult, iron, B12, folate. Monitor CBC, transfuse if HB<7.  #. Bacteremia: Staph hominis on 10/21. On vanc/doxy- occasional low grade fevers. Monitor  - repeat TTE 10/29: still ef 15-20%. Repeat CXR 10/31: slightly increased opacities b/l.  - Pulmonary team following.     Code status: Full  DVT prophylaxis: Heparin  Care Plan discussed with: Patient/Family and Nurse  Disposition: TBD     Hospital Problems  Never Reviewed          Codes Class Noted POA    Toxic encephalopathy ICD-10-CM: G92  ICD-9-CM: 349.82  10/24/2020 Unknown        Drug abuse ([facility] [address] ICD-10-CM: F19.10  ICD-9-CM: 305.90  10/22/2020 Unknown        Acute renal failure with tubular necrosis ([facility] ICD-10-CM: N17.0  ICD-9-CM: 584.5  10/22/2020 Unknown        * (Principal) Sepsis ([facility] ICD-10-CM: A41.9  ICD-9-CM: 038.9, 995.91  10/22/2020 Unknown        Community acquired pneumonia of left lower lobe of lung ICD-10-CM: J18.9  ICD-9-CM: 769  10/22/2020 Yes        Electrolyte abnormality ICD-10-CM: E87.8  ICD-9-CM: 276.9  10/22/2020 Yes            Review of Systems:   Pertinent items are mentioned in interval history. Vital Signs:    Last 24hrs VS reviewed since prior progress note. Most recent are:  Visit Vitals  BP (!) 154/95 (BP 1 Location: Right arm, BP Patient Position: At rest)   Pulse 92   Temp 98.3 °F (36.8 °C)   Resp 23   Ht 5' 10\" (1.778 m)   Wt 108.8 kg (239 lb 14.4 oz)   SpO2 100%   BMI 34.42 kg/m²         Intake/Output Summary (Last 24 hours) at 11/1/2020 0847  Last data filed at 11/1/2020 0805  Gross per 24 hour   Intake 1258 ml   Output 4025 ml   Net -2767 ml        Physical Examination:   General:  Alert, drowsy, No acute distress  Resp:  No accessory muscle use, no wheezes, few rhonchi  Abd:  Soft, non-tender, non-distended  Extremities:  No cyanosis or clubbing, no significant edema  Neuro:  drowsy, no focal neuro deficits, follows commands   Psych:  not agitated.     Data Review:    Review and/or order of clinical lab test  Review and/or order of tests in the radiology section of CPT  Review and/or order of tests in the medicine section of CPT  Labs:     Recent Labs     11/01/20  0438 10/31/20  0040   WBC 14.5* 17.0*   HGB 7.6* 8.1*   HCT 23.7* 25.0*    276     Recent Labs     11/01/20  0438 10/31/20  0040   * 136  136   K 3.4* 3.6  3.6   CL 99 100  101   CO2 26 27  26   BUN 24* 28*  28*   CREA 2.99* 3.27*  3.16*   * 98  97   CA 8.5 8.0*  7.9*     Recent Labs     11/01/20  0438 10/31/20  0040   * 205*  206*   AP 62 67  66   TBILI 0.5 0.6  0.6   TP 5.3* 5.4*  5.4*   ALB 2.1* 2.2*  2.2*   GLOB 3.2 3.2  3.2     Recent Labs     11/01/20  0438 10/31/20  2315 10/31/20  1530  10/31/20  0045  10/30/20  0310   INR 1.2*  --   --   --  NO SAMPLE RECEIVED  --  1.3*   PTP 12.5*  --   --   --  NO SAMPLE RECEIVED  --  13.0*   APTT 39.2* 53.4* 63.5*   < >  --    < >  --     < > = values in this interval not displayed. Recent Labs     10/31/20  0040   TIBC 238*   PSAT 8*   FERR 100      Lab Results   Component Value Date/Time    Folate 9.7 10/31/2020 12:40 AM      No results for input(s): PH, PCO2, PO2 in the last 72 hours.   Recent Labs     11/01/20  0438 10/31/20  0040 10/30/20  0310   * 1,204* 1,656*     No results found for: CHOL, CHOLX, CHLST, CHOLV, HDL, HDLP, LDL, LDLC, DLDLP, TGLX, TRIGL, TRIGP, CHHD, CHHDX  Lab Results   Component Value Date/Time    Glucose (POC) 129 (H) 10/23/2020 04:27 PM     Lab Results   Component Value Date/Time    Color Yellow/Straw 10/21/2020 06:05 PM    Appearance Clear 10/21/2020 06:05 PM    Specific gravity 1.025 10/21/2020 06:05 PM    pH (UA) 5.5 10/21/2020 06:05 PM    Protein 30 (A) 10/21/2020 06:05 PM    Glucose Negative 10/21/2020 06:05 PM    Ketone Negative 10/21/2020 06:05 PM    Urobilinogen 1.0 10/21/2020 06:05 PM    Nitrites Negative 10/21/2020 06:05 PM    Leukocyte Esterase Negative 10/21/2020 06:05 PM    Bacteria Negative 10/21/2020 06:05 PM    WBC 0-4 10/21/2020 06:05 PM    RBC 0-5 10/21/2020 06:05 PM     Medications Reviewed:     Current Facility-Administered Medications   Medication Dose Route Frequency    heparin (porcine) 1,000 unit/mL injection 7,080 Units  7,080 Units IntraVENous ONCE    carvediloL (COREG) tablet 25 mg  25 mg Oral BID WITH MEALS    albuterol-ipratropium (DUO-NEB) 2.5 MG-0.5 MG/3 ML  3 mL Nebulization BID RT    hydrALAZINE (APRESOLINE) tablet 50 mg  50 mg Oral TID    QUEtiapine (SEROquel) tablet 50 mg  50 mg Oral Q8H    oxyCODONE IR (ROXICODONE) tablet 5 mg  5 mg Oral Q4H PRN    LORazepam (ATIVAN) injection 2 mg  2 mg IntraVENous Q4H PRN    doxycycline (VIBRAMYCIN) 100 mg in 0.9% sodium chloride (MBP/ADV) 100 mL  100 mg IntraVENous Q12H    melatonin tablet 3 mg  3 mg Oral QHS  Vancomycin- pharmacy to dose   Other Rx Dosing/Monitoring    alteplase (CATHFLO) 1 mg in sterile water (preservative free) 1 mL injection  1 mg InterCATHeter PRN    isosorbide dinitrate (ISORDIL) tablet 10 mg  10 mg Oral TID    HYDROmorphone (PF) (DILAUDID) injection 2 mg  2 mg IntraVENous Q4H PRN    heparin 25,000 units in D5W 250 ml infusion  17-36 Units/kg/hr IntraVENous TITRATE    balsam peru-castor oiL (VENELEX) ointment   Topical Q8H    sodium chloride (NS) flush 5-40 mL  5-40 mL IntraVENous Q8H    sodium chloride (NS) flush 5-40 mL  5-40 mL IntraVENous PRN    acetaminophen (TYLENOL) tablet 650 mg  650 mg Oral Q6H PRN    Or    acetaminophen (TYLENOL) suppository 650 mg  650 mg Rectal Q6H PRN    polyethylene glycol (MIRALAX) packet 17 g  17 g Oral DAILY PRN    ondansetron (ZOFRAN) injection 4 mg  4 mg IntraVENous Q6H PRN    albuterol-ipratropium (DUO-NEB) 2.5 MG-0.5 MG/3 ML  3 mL Nebulization Q4H PRN    docusate sodium (COLACE) capsule 100 mg  100 mg Oral DAILY    labetaloL (NORMODYNE;TRANDATE) injection 20 mg  20 mg IntraVENous Q4H PRN    heparin (porcine) 1,000 unit/mL injection 1,400 Units  1,400 Units InterCATHeter DIALYSIS PRN    And    heparin (porcine) 1,000 unit/mL injection 1,100 Units  1,100 Units InterCATHeter DIALYSIS PRN   ______________________________________________________________________  EXPECTED LENGTH OF STAY: 4d 19h  ACTUAL LENGTH OF STAY:          10               Jamin Thomas MD
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
## 2020-11-02 LAB
ALBUMIN SERPL-MCNC: 2.1 G/DL (ref 3.5–5)
ALBUMIN/GLOB SERPL: 0.7 {RATIO} (ref 1.1–2.2)
ALP SERPL-CCNC: 59 U/L (ref 45–117)
ALT SERPL-CCNC: 122 U/L (ref 12–78)
ANION GAP SERPL CALC-SCNC: 9 MMOL/L (ref 5–15)
APTT PPP: 73.3 SEC (ref 22.1–32)
APTT PPP: 73.5 SEC (ref 22.1–32)
AST SERPL-CCNC: 37 U/L (ref 15–37)
BASOPHILS # BLD: 0.1 K/UL (ref 0–0.1)
BASOPHILS NFR BLD: 0 % (ref 0–1)
BILIRUB SERPL-MCNC: 0.4 MG/DL (ref 0.2–1)
BUN SERPL-MCNC: 37 MG/DL (ref 6–20)
BUN/CREAT SERPL: 10 (ref 12–20)
CALCIUM SERPL-MCNC: 8.1 MG/DL (ref 8.5–10.1)
CHLORIDE SERPL-SCNC: 97 MMOL/L (ref 97–108)
CK SERPL-CCNC: 477 U/L (ref 39–308)
CO2 SERPL-SCNC: 26 MMOL/L (ref 21–32)
CREAT SERPL-MCNC: 3.8 MG/DL (ref 0.7–1.3)
DIFFERENTIAL METHOD BLD: ABNORMAL
EOSINOPHIL # BLD: 0.2 K/UL (ref 0–0.4)
EOSINOPHIL NFR BLD: 2 % (ref 0–7)
ERYTHROCYTE [DISTWIDTH] IN BLOOD BY AUTOMATED COUNT: 17.1 % (ref 11.5–14.5)
GLOBULIN SER CALC-MCNC: 2.9 G/DL (ref 2–4)
GLUCOSE SERPL-MCNC: 91 MG/DL (ref 65–100)
HCT VFR BLD AUTO: 22.6 % (ref 36.6–50.3)
HGB BLD-MCNC: 7.3 G/DL (ref 12.1–17)
IMM GRANULOCYTES # BLD AUTO: 0.1 K/UL (ref 0–0.04)
IMM GRANULOCYTES NFR BLD AUTO: 1 % (ref 0–0.5)
LYMPHOCYTES # BLD: 2.3 K/UL (ref 0.8–3.5)
LYMPHOCYTES NFR BLD: 17 % (ref 12–49)
MCH RBC QN AUTO: 28 PG (ref 26–34)
MCHC RBC AUTO-ENTMCNC: 32.3 G/DL (ref 30–36.5)
MCV RBC AUTO: 86.6 FL (ref 80–99)
MONOCYTES # BLD: 1.5 K/UL (ref 0–1)
MONOCYTES NFR BLD: 11 % (ref 5–13)
NEUTS SEG # BLD: 9.2 K/UL (ref 1.8–8)
NEUTS SEG NFR BLD: 69 % (ref 32–75)
NRBC # BLD: 0 K/UL (ref 0–0.01)
NRBC BLD-RTO: 0 PER 100 WBC
PLATELET # BLD AUTO: 249 K/UL (ref 150–400)
PMV BLD AUTO: 9.3 FL (ref 8.9–12.9)
POTASSIUM SERPL-SCNC: 3.9 MMOL/L (ref 3.5–5.1)
PROT SERPL-MCNC: 5 G/DL (ref 6.4–8.2)
RBC # BLD AUTO: 2.61 M/UL (ref 4.1–5.7)
SODIUM SERPL-SCNC: 132 MMOL/L (ref 136–145)
THERAPEUTIC RANGE,PTTT: ABNORMAL SECS (ref 58–77)
THERAPEUTIC RANGE,PTTT: ABNORMAL SECS (ref 58–77)
VANCOMYCIN SERPL-MCNC: 15.1 UG/ML
WBC # BLD AUTO: 13.3 K/UL (ref 4.1–11.1)

## 2020-11-02 PROCEDURE — 74011000250 HC RX REV CODE- 250: Performed by: INTERNAL MEDICINE

## 2020-11-02 PROCEDURE — 74011250637 HC RX REV CODE- 250/637: Performed by: ANESTHESIOLOGY

## 2020-11-02 PROCEDURE — 80202 ASSAY OF VANCOMYCIN: CPT

## 2020-11-02 PROCEDURE — 74011250637 HC RX REV CODE- 250/637: Performed by: SPECIALIST

## 2020-11-02 PROCEDURE — 36415 COLL VENOUS BLD VENIPUNCTURE: CPT

## 2020-11-02 PROCEDURE — 82550 ASSAY OF CK (CPK): CPT

## 2020-11-02 PROCEDURE — 65660000001 HC RM ICU INTERMED STEPDOWN

## 2020-11-02 PROCEDURE — 74011250636 HC RX REV CODE- 250/636: Performed by: INTERNAL MEDICINE

## 2020-11-02 PROCEDURE — 85730 THROMBOPLASTIN TIME PARTIAL: CPT

## 2020-11-02 PROCEDURE — 74011250637 HC RX REV CODE- 250/637: Performed by: NURSE PRACTITIONER

## 2020-11-02 PROCEDURE — 94640 AIRWAY INHALATION TREATMENT: CPT

## 2020-11-02 PROCEDURE — 99233 SBSQ HOSP IP/OBS HIGH 50: CPT | Performed by: INTERNAL MEDICINE

## 2020-11-02 PROCEDURE — 74011250637 HC RX REV CODE- 250/637: Performed by: INTERNAL MEDICINE

## 2020-11-02 PROCEDURE — 80053 COMPREHEN METABOLIC PANEL: CPT

## 2020-11-02 PROCEDURE — 74011000258 HC RX REV CODE- 258: Performed by: INTERNAL MEDICINE

## 2020-11-02 PROCEDURE — 74011250636 HC RX REV CODE- 250/636: Performed by: EMERGENCY MEDICINE

## 2020-11-02 PROCEDURE — 85025 COMPLETE CBC W/AUTO DIFF WBC: CPT

## 2020-11-02 PROCEDURE — 90935 HEMODIALYSIS ONE EVALUATION: CPT

## 2020-11-02 RX ADMIN — HYDRALAZINE HYDROCHLORIDE 50 MG: 50 TABLET, FILM COATED ORAL at 22:50

## 2020-11-02 RX ADMIN — HYDRALAZINE HYDROCHLORIDE 50 MG: 50 TABLET, FILM COATED ORAL at 15:03

## 2020-11-02 RX ADMIN — Medication 10 ML: at 15:06

## 2020-11-02 RX ADMIN — HEPARIN SODIUM 1400 UNITS: 1000 INJECTION INTRAVENOUS; SUBCUTANEOUS at 23:13

## 2020-11-02 RX ADMIN — Medication 10 ML: at 06:40

## 2020-11-02 RX ADMIN — HYDRALAZINE HYDROCHLORIDE 50 MG: 50 TABLET, FILM COATED ORAL at 09:00

## 2020-11-02 RX ADMIN — HEPARIN SODIUM 32 UNITS/KG/HR: 10000 INJECTION, SOLUTION INTRAVENOUS at 04:06

## 2020-11-02 RX ADMIN — CARVEDILOL 25 MG: 12.5 TABLET, FILM COATED ORAL at 18:25

## 2020-11-02 RX ADMIN — QUETIAPINE FUMARATE 50 MG: 25 TABLET ORAL at 06:39

## 2020-11-02 RX ADMIN — HEPARIN SODIUM 32 UNITS/KG/HR: 10000 INJECTION, SOLUTION INTRAVENOUS at 13:41

## 2020-11-02 RX ADMIN — DOXYCYCLINE 100 MG: 100 INJECTION, POWDER, LYOPHILIZED, FOR SOLUTION INTRAVENOUS at 10:07

## 2020-11-02 RX ADMIN — CARVEDILOL 25 MG: 12.5 TABLET, FILM COATED ORAL at 09:00

## 2020-11-02 RX ADMIN — HEPARIN SODIUM 1100 UNITS: 1000 INJECTION INTRAVENOUS; SUBCUTANEOUS at 23:12

## 2020-11-02 RX ADMIN — Medication 10 ML: at 22:51

## 2020-11-02 RX ADMIN — DOCUSATE SODIUM 100 MG: 100 CAPSULE, LIQUID FILLED ORAL at 08:59

## 2020-11-02 RX ADMIN — HEPARIN SODIUM 32 UNITS/KG/HR: 10000 INJECTION, SOLUTION INTRAVENOUS at 22:59

## 2020-11-02 RX ADMIN — OXYCODONE HYDROCHLORIDE 5 MG: 5 TABLET ORAL at 15:03

## 2020-11-02 RX ADMIN — ISOSORBIDE DINITRATE 10 MG: 10 TABLET ORAL at 09:00

## 2020-11-02 RX ADMIN — ISOSORBIDE DINITRATE 10 MG: 10 TABLET ORAL at 22:51

## 2020-11-02 RX ADMIN — HYDROMORPHONE HYDROCHLORIDE 2 MG: 2 INJECTION, SOLUTION INTRAMUSCULAR; INTRAVENOUS; SUBCUTANEOUS at 22:50

## 2020-11-02 RX ADMIN — IPRATROPIUM BROMIDE AND ALBUTEROL SULFATE 3 ML: .5; 3 SOLUTION RESPIRATORY (INHALATION) at 08:01

## 2020-11-02 RX ADMIN — HYDROMORPHONE HYDROCHLORIDE 2 MG: 2 INJECTION, SOLUTION INTRAMUSCULAR; INTRAVENOUS; SUBCUTANEOUS at 11:45

## 2020-11-02 RX ADMIN — ISOSORBIDE DINITRATE 10 MG: 10 TABLET ORAL at 15:03

## 2020-11-02 RX ADMIN — DOXYCYCLINE 100 MG: 100 INJECTION, POWDER, LYOPHILIZED, FOR SOLUTION INTRAVENOUS at 22:56

## 2020-11-02 RX ADMIN — HYDROMORPHONE HYDROCHLORIDE 2 MG: 2 INJECTION, SOLUTION INTRAMUSCULAR; INTRAVENOUS; SUBCUTANEOUS at 18:25

## 2020-11-02 RX ADMIN — HYDROMORPHONE HYDROCHLORIDE 2 MG: 2 INJECTION, SOLUTION INTRAMUSCULAR; INTRAVENOUS; SUBCUTANEOUS at 01:59

## 2020-11-02 RX ADMIN — HYDROMORPHONE HYDROCHLORIDE 2 MG: 2 INJECTION, SOLUTION INTRAMUSCULAR; INTRAVENOUS; SUBCUTANEOUS at 06:39

## 2020-11-02 RX ADMIN — OXYCODONE HYDROCHLORIDE 5 MG: 5 TABLET ORAL at 09:00

## 2020-11-02 RX ADMIN — Medication 3 MG: at 22:55

## 2020-11-02 RX ADMIN — Medication: at 06:42

## 2020-11-02 RX ADMIN — QUETIAPINE FUMARATE 50 MG: 25 TABLET ORAL at 19:45

## 2020-11-02 RX ADMIN — QUETIAPINE FUMARATE 50 MG: 25 TABLET ORAL at 15:03

## 2020-11-02 RX ADMIN — Medication: at 15:06

## 2020-11-02 NOTE — PROGRESS NOTES
Pulmonary, Critical Care, and Sleep Medicine~Progress Note    Name: Laure Berrios MRN: [id number]   : 1995 Hospital: [facility]   Date: 2020 10:29 AM Admission: 10/21/2020     Impression Plan   1. Acute hypoxic resp failure secondary to below  2. Pulmonary infiltrates: Mycoplasma PNA + volume overload   3. HFrEF: EF 15-20%, PASP 45mmHg  4. JEAN PIERRE   5. Rhabdomyolysis   6. Staph hominis bacteremia   7. Acute Left upper Extrem DVT  8. Polysubstance abuse: + EtOH, Cocaine, benzos, amphetamines, THC  1. On heparin gtt  2. Doxy/vanc   3. O2 titration above 90%   4. NIV qhs for now      Daily Progression:    Unlabored breathing     I have reviewed the labs and previous days notes. Pertinent items are noted in HPI.     OBJECTIVE:     Vital Signs:       Visit Vitals  BP (!) 141/93   Pulse 98   Temp 99 °F (37.2 °C)   Resp 28   Ht 5' 10\" (1.778 m)   Wt 108.9 kg (240 lb)   SpO2 99%   BMI 34.44 kg/m²      Temp (24hrs), Av.4 °F (36.9 °C), Min:97.9 °F (36.6 °C), Max:99 °F (37.2 °C)     Intake/Output:     Last shift:  07 -  1900  In: 208 [I.V.:208]  Out: 150 [Urine:150]    Last 3 shifts: 10/31 190 -  0700  In: 1554.6 [I.V.:1554.6]  Out: 3079 [Urine:1375]          Intake/Output Summary (Last 24 hours) at 2020 1029  Last data filed at 2020 5565  Gross per 24 hour   Intake 1762.6 ml   Output 1000 ml   Net 762.6 ml       Physical Exam:                                        Exam Findings Other   General: No resp distress noted, appears stated age    HEENT:  No ulcers, JVD not elevated, no cervical LAD    Chest: No pectus deformity, normal chest rise b/l    HEART:  RRR, no murmurs/rubs/gallops    Lungs:  CTA b/l, no rhonchi/crackles/wheeze, diminished BS at bases    ABD: Soft/NT, non rigid mildly distended    EXT: No cyanosis/clubbing/edema, normal peripheral pulses    Skin: No rashes or ulcers, no mottling Neuro: Sleeping in bed         Medications:  Current Facility-Administered Medications   Medication Dose Route Frequency    carvediloL (COREG) tablet 25 mg  25 mg Oral BID WITH MEALS    albuterol-ipratropium (DUO-NEB) 2.5 MG-0.5 MG/3 ML  3 mL Nebulization BID RT    hydrALAZINE (APRESOLINE) tablet 50 mg  50 mg Oral TID    QUEtiapine (SEROquel) tablet 50 mg  50 mg Oral Q8H    oxyCODONE IR (ROXICODONE) tablet 5 mg  5 mg Oral Q4H PRN    LORazepam (ATIVAN) injection 2 mg  2 mg IntraVENous Q4H PRN    doxycycline (VIBRAMYCIN) 100 mg in 0.9% sodium chloride (MBP/ADV) 100 mL  100 mg IntraVENous Q12H    melatonin tablet 3 mg  3 mg Oral QHS    Vancomycin- pharmacy to dose   Other Rx Dosing/Monitoring    alteplase (CATHFLO) 1 mg in sterile water (preservative free) 1 mL injection  1 mg InterCATHeter PRN    isosorbide dinitrate (ISORDIL) tablet 10 mg  10 mg Oral TID    HYDROmorphone (PF) (DILAUDID) injection 2 mg  2 mg IntraVENous Q4H PRN    heparin 25,000 units in D5W 250 ml infusion  17-36 Units/kg/hr IntraVENous TITRATE    balsam peru-castor oiL (VENELEX) ointment   Topical Q8H    sodium chloride (NS) flush 5-40 mL  5-40 mL IntraVENous Q8H    sodium chloride (NS) flush 5-40 mL  5-40 mL IntraVENous PRN    acetaminophen (TYLENOL) tablet 650 mg  650 mg Oral Q6H PRN    Or    acetaminophen (TYLENOL) suppository 650 mg  650 mg Rectal Q6H PRN    polyethylene glycol (MIRALAX) packet 17 g  17 g Oral DAILY PRN    ondansetron (ZOFRAN) injection 4 mg  4 mg IntraVENous Q6H PRN    albuterol-ipratropium (DUO-NEB) 2.5 MG-0.5 MG/3 ML  3 mL Nebulization Q4H PRN    docusate sodium (COLACE) capsule 100 mg  100 mg Oral DAILY    labetaloL (NORMODYNE;TRANDATE) injection 20 mg  20 mg IntraVENous Q4H PRN    heparin (porcine) 1,000 unit/mL injection 1,400 Units  1,400 Units InterCATHeter DIALYSIS PRN    And    heparin (porcine) 1,000 unit/mL injection 1,100 Units  1,100 Units InterCATHeter DIALYSIS PRN       Labs:  ABG No results for input(s): PHI, PCO2I, PO2I, HCO3I, SO2I, FIO2I in the last 72 hours.      CBC Recent Labs     11/02/20  0533 11/01/20  0438 10/31/20  0040   WBC 13.3* 14.5* 17.0*   HGB 7.3* 7.6* 8.1*   HCT 22.6* 23.7* 25.0*    245 276   MCV 86.6 87.1 85.6   MCH 28.0 27.9 78.4        Metabolic  Panel Recent Labs     11/02/20  0533 11/01/20  0438 10/31/20  0045 10/31/20  0040   * 134*  --  136  136   K 3.9 3.4*  --  3.6  3.6   CL 97 99  --  100  101   CO2 26 26  --  27  26   GLU 91 128*  --  98  97   BUN 37* 24*  --  28*  28*   CREA 3.80* 2.99*  --  3.27*  3.16*   CA 8.1* 8.5  --  8.0*  7.9*   ALB 2.1* 2.1*  --  2.2*  2.2*   * 152*  --  205*  206*   INR  --  1.2* NO SAMPLE RECEIVED  --         Pertinent Labs                Barrett Quincy Buerger, PA-C  11/2/2020

## 2020-11-02 NOTE — PROGRESS NOTES
Occupational Therapy    Completed chart review and nursing cleared for therapy. Attempted OT session, received patient sleeping soundly. Sitter PCT who is familiar with patient recommended allowing patient to sleep at this time and follow up later today for increased participation. 13:15: Followed up with PT for therapy session. Patient somnolent following pain medications. Will continue to follow for OT services. Will continue to follow for OT services.    Thank you,  Pacheco Monahan, OT

## 2020-11-02 NOTE — PROGRESS NOTES
[facility]   [address] [facility], [address] [place] [address], [facility]  Phone: [phone]   [phone]       Nephrology Progress Note  Edmar Fitzpatrick     1995     [id number]  Date of Admission : 10/21/2020  11/02/20    CC: Follow up for ARF, Rhabdomyolysis        Assessment and Plan   JEAN PIERRE  - Severe ATN from Rhabdomyolysis. Oligoanuric   - HD MWF for now    Severe Rhabdomyolysis   - 2/2 Substance use  - LFTs and CPK trending down    Left Lung PNA w/ sepsis     LUE DVT:  - on heparin drip    CMP w/ EF 15-20%:  - likely 2/2 cocaine  - per cardiology    Resp failure:  - per pulmonary    Metabolic acidosis:  - improving    Hypocalcemia:  - stable  - replete PRN    Polysubstance abuse      Interval History:  Seen and examined. Feeling better today. Resting in NAD. Oxygenation stable. No cp, n/v reported. For HD later today    Review of Systems: Review of systems not obtained due to patient factors.     Current Medications:   Current Facility-Administered Medications   Medication Dose Route Frequency    carvediloL (COREG) tablet 25 mg  25 mg Oral BID WITH MEALS    albuterol-ipratropium (DUO-NEB) 2.5 MG-0.5 MG/3 ML  3 mL Nebulization BID RT    hydrALAZINE (APRESOLINE) tablet 50 mg  50 mg Oral TID    QUEtiapine (SEROquel) tablet 50 mg  50 mg Oral Q8H    oxyCODONE IR (ROXICODONE) tablet 5 mg  5 mg Oral Q4H PRN    LORazepam (ATIVAN) injection 2 mg  2 mg IntraVENous Q4H PRN    doxycycline (VIBRAMYCIN) 100 mg in 0.9% sodium chloride (MBP/ADV) 100 mL  100 mg IntraVENous Q12H    melatonin tablet 3 mg  3 mg Oral QHS    Vancomycin- pharmacy to dose   Other Rx Dosing/Monitoring    alteplase (CATHFLO) 1 mg in sterile water (preservative free) 1 mL injection  1 mg InterCATHeter PRN    isosorbide dinitrate (ISORDIL) tablet 10 mg  10 mg Oral TID    HYDROmorphone (PF) (DILAUDID) injection 2 mg  2 mg IntraVENous Q4H PRN    heparin 25,000 units in D5W 250 ml infusion  17-36 Units/kg/hr IntraVENous TITRATE    balsam peru-castor oiL (VENELEX) ointment   Topical Q8H    sodium chloride (NS) flush 5-40 mL  5-40 mL IntraVENous Q8H    sodium chloride (NS) flush 5-40 mL  5-40 mL IntraVENous PRN    acetaminophen (TYLENOL) tablet 650 mg  650 mg Oral Q6H PRN    Or    acetaminophen (TYLENOL) suppository 650 mg  650 mg Rectal Q6H PRN    polyethylene glycol (MIRALAX) packet 17 g  17 g Oral DAILY PRN    ondansetron (ZOFRAN) injection 4 mg  4 mg IntraVENous Q6H PRN    albuterol-ipratropium (DUO-NEB) 2.5 MG-0.5 MG/3 ML  3 mL Nebulization Q4H PRN    docusate sodium (COLACE) capsule 100 mg  100 mg Oral DAILY    labetaloL (NORMODYNE;TRANDATE) injection 20 mg  20 mg IntraVENous Q4H PRN    heparin (porcine) 1,000 unit/mL injection 1,400 Units  1,400 Units InterCATHeter DIALYSIS PRN    And    heparin (porcine) 1,000 unit/mL injection 1,100 Units  1,100 Units InterCATHeter DIALYSIS PRN      Allergies   Allergen Reactions    Tramadol Nausea and Vomiting       Objective:  Vitals:    Vitals:    11/02/20 0710 11/02/20 0801 11/02/20 0900 11/02/20 1108   BP: (!) 161/95  (!) 141/93 (!) 142/82   Pulse: 99  98 92   Resp: 28   16   Temp: 99 °F (37.2 °C)   97.6 °F (36.4 °C)   TempSrc:       SpO2: 98% 99%  98%   Weight:       Height:         Intake and Output:  11/02 0701 - 11/02 1900  In: 208 [I.V.:208]  Out: 150 [Urine:150]  10/31 1901 - 11/02 0700  In: 1554.6 [I.V.:1554.6]  Out: 4375 [Urine:1375]    Physical Examination:    General: Lethargic on BiPAP  Neck:  Supple, no mass  Resp:  Decreased breath sounds  CV:  RRR,  no murmur or rub, no LE edema  GI:  Soft, NT, + Bowel sounds, no hepatosplenomegaly  Neurologic:  Lethargic   Psych:             Unable to assess  Skin:  Pressures rash on Left foot, R post Knee and neck   :  Hira     []    High complexity decision making was performed  []    Patient is at high-risk of decompensation with multiple organ involvement    Lab Data Personally Reviewed: I have reviewed all the pertinent labs, microbiology data and radiology studies during assessment. Recent Labs     11/02/20  0533 11/01/20  0438 10/31/20  0045 10/31/20  0040   * 134*  --  136  136   K 3.9 3.4*  --  3.6  3.6   CL 97 99  --  100  101   CO2 26 26  --  27  26   GLU 91 128*  --  98  97   BUN 37* 24*  --  28*  28*   CREA 3.80* 2.99*  --  3.27*  3.16*   CA 8.1* 8.5  --  8.0*  7.9*   ALB 2.1* 2.1*  --  2.2*  2.2*   * 152*  --  205*  206*   INR  --  1.2* NO SAMPLE RECEIVED  --      Recent Labs     11/02/20  0533 11/01/20  0438 10/31/20  0040   WBC 13.3* 14.5* 17.0*   HGB 7.3* 7.6* 8.1*   HCT 22.6* 23.7* 25.0*    245 276     No results found for: [facility]  Lab Results   Component Value Date/Time    Culture result: NO GROWTH 5 DAYS 10/22/2020 06:19 AM    Culture result: NO GROWTH 5 DAYS 10/22/2020 02:37 AM    Culture result: (A) 10/21/2020 08:15 PM     Staphylococcus hominis (Oxacillin resistant) GROWING IN THE AEROBIC BOTTLE (SITE = R HAND)    Culture result:  10/21/2020 08:15 PM     Gram Positive Cocci CALLED TO AND READ BACK BY NEGRA Dickerson RN AT 2012 BY [facility]    Culture result: (A) 10/21/2020 08:10 PM     Staphylococcus hominis (Oxacillin resistant) GROWING IN THE AEROBIC BOTTLE (SITE = L HAND)    Culture result:  10/21/2020 08:10 PM     preliminary report of Gram Positive Cocci in clusters growing in 1 of 2 bottles drawn [address] [facility] RN SCAV AT  [facility] ON 10/23/20.  Bettye 0656     Recent Results (from the past 24 hour(s))   PTT    Collection Time: 11/01/20  4:22 PM   Result Value Ref Range    aPTT 55.7 (H) 22.1 - 32.0 sec    aPTT, therapeutic range     58.0 - 77.0 SECS   PTT    Collection Time: 11/01/20 10:55 PM   Result Value Ref Range    aPTT 50.2 (H) 22.1 - 32.0 sec    aPTT, therapeutic range     58.0 - 77.0 SECS   CK    Collection Time: 11/02/20  5:33 AM   Result Value Ref Range     (H) 39 - 699 U/L   METABOLIC PANEL, COMPREHENSIVE    Collection Time: 11/02/20  5:33 AM Result Value Ref Range    Sodium 132 (L) 136 - 145 mmol/L    Potassium 3.9 3.5 - 5.1 mmol/L    Chloride 97 97 - 108 mmol/L    CO2 26 21 - 32 mmol/L    Anion gap 9 5 - 15 mmol/L    Glucose 91 65 - 100 mg/dL    BUN 37 (H) 6 - 20 MG/DL    Creatinine 3.80 (H) 0.70 - 1.30 MG/DL    BUN/Creatinine ratio 10 (L) 12 - 20      GFR est AA 24 (L) >60 ml/min/1.73m2    GFR est non-AA 20 (L) >60 ml/min/1.73m2    Calcium 8.1 (L) 8.5 - 10.1 MG/DL    Bilirubin, total 0.4 0.2 - 1.0 MG/DL    ALT (SGPT) 122 (H) 12 - 78 U/L    AST (SGOT) 37 15 - 37 U/L    Alk. phosphatase 59 45 - 117 U/L    Protein, total 5.0 (L) 6.4 - 8.2 g/dL    Albumin 2.1 (L) 3.5 - 5.0 g/dL    Globulin 2.9 2.0 - 4.0 g/dL    A-G Ratio 0.7 (L) 1.1 - 2.2     CBC WITH AUTOMATED DIFF    Collection Time: 11/02/20  5:33 AM   Result Value Ref Range    WBC 13.3 (H) 4.1 - 11.1 K/uL    RBC 2.61 (L) 4.10 - 5.70 M/uL    HGB 7.3 (L) 12.1 - 17.0 g/dL    HCT 22.6 (L) 36.6 - 50.3 %    MCV 86.6 80.0 - 99.0 FL    MCH 28.0 26.0 - 34.0 PG    MCHC 32.3 30.0 - 36.5 g/dL    RDW 17.1 (H) 11.5 - 14.5 %    PLATELET 760 085 - 397 K/uL    MPV 9.3 8.9 - 12.9 FL    NRBC 0.0 0  WBC    ABSOLUTE NRBC 0.00 0.00 - 0.01 K/uL    NEUTROPHILS 69 32 - 75 %    LYMPHOCYTES 17 12 - 49 %    MONOCYTES 11 5 - 13 %    EOSINOPHILS 2 0 - 7 %    BASOPHILS 0 0 - 1 %    IMMATURE GRANULOCYTES 1 (H) 0.0 - 0.5 %    ABS. NEUTROPHILS 9.2 (H) 1.8 - 8.0 K/UL    ABS. LYMPHOCYTES 2.3 0.8 - 3.5 K/UL    ABS. MONOCYTES 1.5 (H) 0.0 - 1.0 K/UL    ABS. EOSINOPHILS 0.2 0.0 - 0.4 K/UL    ABS. BASOPHILS 0.1 0.0 - 0.1 K/UL    ABS. IMM.  GRANS. 0.1 (H) 0.00 - 0.04 K/UL    DF AUTOMATED     PTT    Collection Time: 11/02/20  5:33 AM   Result Value Ref Range    aPTT 73.3 (H) 22.1 - 32.0 sec    aPTT, therapeutic range     58.0 - 77.0 SECS   VANCOMYCIN, RANDOM    Collection Time: 11/02/20  5:33 AM   Result Value Ref Range    Vancomycin, random 15.1 UG/ML   PTT    Collection Time: 11/02/20 11:17 AM   Result Value Ref Range    aPTT 73.5 (H) 22.1 - 32.0 sec    aPTT, therapeutic range     58.0 - 77.0 SECS           Total time spent with patient:  xxx   min. Care Plan discussed with:  Patient     Family      RN      Consulting Physician [facility],         I have reviewed the flowsheets. Chart and Pertinent Notes have been reviewed. No change in PMH ,family and social history from Consult note.       Murphy Wahl MD

## 2020-11-02 NOTE — PROGRESS NOTES
Hospitalist Progress Note  Viky Pimentel MD  Answering service: [phone] from in house phone      Date of Service:  2020  NAME:  Edmar Fitzpatrick  :  1995  MRN:  [id number]    Admission Summary:   25M p/w multiple drug use, resp failure/ARF  Interval history / Subjective:   Patient seen and examined at bedside. Feels ok, stable, oxygen requirement slowly coming down, I just put it down to 4L NC keeping sats mid/high 90s. Assessment & Plan:     #. Polysubstance abuse: UDS +ve for cocaine, THC, benzo, opioids, amphetamines  #. Pneumonia- likely aspiration- 2nd to above  #. Acute metabolic Encephalopathy: 2nd to above. Re-orient frequently,- seroquel prn. monitor. #. Cardiomyopathy: ef 15%- likely 2nd to drug use- Cardio following  #. Acute hypoxic respiratory failure: 2nd to above- Bipap to keep sats >90%  #. Rhabdomyolysis: improving with hydration- monitor ck daily- coming down nicely  #. ARF: 2nd to above. Nephrology following- started on RRT  #. Acute LUE DVT: heparin gtt. Switch to NOAC prior to dc. #. Acute Anemia: check hemoccult, iron, B12, folate. Monitor CBC, transfuse if HB<7.  #. Bacteremia: Staph hominis on 10/21. On vanc/doxy- occasional low grade fevers. Monitor  - repeat TTE 10/29: still ef 15-20%. Repeat CXR 10/31: slightly increased opacities b/l.  - Pulmonary team following. #. Bipolar Disorder:  Mother raised concerns about subOptimal psych care in past. Will consult Psychiatry for evaluation once more alert and fully capable to contribute to evaluation    Code status: Full  DVT prophylaxis: Heparin  Care Plan discussed with: Patient/Family and Nurse  Disposition: TBD >2days     Hospital Problems  Never Reviewed          Codes Class Noted POA    Toxic encephalopathy ICD-10-CM: G92  ICD-9-CM: 349.82  10/24/2020 Unknown        Drug abuse ([facility] [address] ICD-10-CM: F19.10  ICD-9-CM: 305.90  10/22/2020 Unknown Acute renal failure with tubular necrosis (HCC) ICD-10-CM: N17.0  ICD-9-CM: 584.5  10/22/2020 Unknown        * (Principal) Sepsis ([facility] [address] ICD-10-CM: A41.9  ICD-9-CM: 038.9, 995.91  10/22/2020 Unknown        Community acquired pneumonia of left lower lobe of lung ICD-10-CM: J18.9  ICD-9-CM: 305  10/22/2020 Yes        Electrolyte abnormality ICD-10-CM: E87.8  ICD-9-CM: 276.9  10/22/2020 Yes            Review of Systems:   Pertinent items are mentioned in interval history. Vital Signs:    Last 24hrs VS reviewed since prior progress note. Most recent are:  Visit Vitals  BP (!) 141/93   Pulse 98   Temp 99 °F (37.2 °C)   Resp 28   Ht 5' 10\" (1.778 m)   Wt 108.9 kg (240 lb)   SpO2 99%   BMI 34.44 kg/m²         Intake/Output Summary (Last 24 hours) at 11/2/2020 0933  Last data filed at 11/2/2020 7046  Gross per 24 hour   Intake 1762.6 ml   Output 1000 ml   Net 762.6 ml        Physical Examination:   General:  Alert, drowsy, No acute distress  Resp:  No accessory muscle use, no wheezes, few rhonchi  Abd:  Soft, non-tender, non-distended  Extremities:  No cyanosis or clubbing, no significant edema  Neuro:  drowsy, no focal neuro deficits, follows commands   Psych:  not agitated.     Data Review:    Review and/or order of clinical lab test  Review and/or order of tests in the radiology section of CPT  Review and/or order of tests in the medicine section of CPT  Labs:     Recent Labs     11/02/20 0533 11/01/20 0438   WBC 13.3* 14.5*   HGB 7.3* 7.6*   HCT 22.6* 23.7*    245     Recent Labs     11/02/20  0533 11/01/20  0438 10/31/20  0040   * 134* 136  136   K 3.9 3.4* 3.6  3.6   CL 97 99 100  101   CO2 26 26 27  26   BUN 37* 24* 28*  28*   CREA 3.80* 2.99* 3.27*  3.16*   GLU 91 128* 98  97   CA 8.1* 8.5 8.0*  7.9*     Recent Labs     11/02/20  0533 11/01/20  0438 10/31/20  0040   * 152* 205*  206*   AP 59 62 67  66   TBILI 0.4 0.5 0.6  0.6   TP 5.0* 5.3* 5.4*  5.4*   ALB 2.1* 2.1* 2.2*  2.2* GLOB 2.9 3.2 3.2  3.2     Recent Labs     11/02/20  0533 11/01/20  2255 11/01/20  1622  11/01/20  0438  10/31/20  0045   INR  --   --   --   --  1.2*  --  NO SAMPLE RECEIVED   PTP  --   --   --   --  12.5*  --  NO SAMPLE RECEIVED   APTT 73.3* 50.2* 55.7*   < > 39.2*   < >  --     < > = values in this interval not displayed. Recent Labs     10/31/20  0040   TIBC 238*   PSAT 8*   FERR 100      Lab Results   Component Value Date/Time    Folate 9.7 10/31/2020 12:40 AM      No results for input(s): PH, PCO2, PO2 in the last 72 hours.   Recent Labs     11/02/20  0533 11/01/20  0438 10/31/20  0040   * 667* 1,204*     No results found for: CHOL, CHOLX, CHLST, CHOLV, HDL, HDLP, LDL, LDLC, DLDLP, TGLX, TRIGL, TRIGP, CHHD, CHHDX  Lab Results   Component Value Date/Time    Glucose (POC) 129 (H) 10/23/2020 04:27 PM     Lab Results   Component Value Date/Time    Color Yellow/Straw 10/21/2020 06:05 PM    Appearance Clear 10/21/2020 06:05 PM    Specific gravity 1.025 10/21/2020 06:05 PM    pH (UA) 5.5 10/21/2020 06:05 PM    Protein 30 (A) 10/21/2020 06:05 PM    Glucose Negative 10/21/2020 06:05 PM    Ketone Negative 10/21/2020 06:05 PM    Urobilinogen 1.0 10/21/2020 06:05 PM    Nitrites Negative 10/21/2020 06:05 PM    Leukocyte Esterase Negative 10/21/2020 06:05 PM    Bacteria Negative 10/21/2020 06:05 PM    WBC 0-4 10/21/2020 06:05 PM    RBC 0-5 10/21/2020 06:05 PM     Medications Reviewed:     Current Facility-Administered Medications   Medication Dose Route Frequency    carvediloL (COREG) tablet 25 mg  25 mg Oral BID WITH MEALS    albuterol-ipratropium (DUO-NEB) 2.5 MG-0.5 MG/3 ML  3 mL Nebulization BID RT    hydrALAZINE (APRESOLINE) tablet 50 mg  50 mg Oral TID    QUEtiapine (SEROquel) tablet 50 mg  50 mg Oral Q8H    oxyCODONE IR (ROXICODONE) tablet 5 mg  5 mg Oral Q4H PRN    LORazepam (ATIVAN) injection 2 mg  2 mg IntraVENous Q4H PRN    doxycycline (VIBRAMYCIN) 100 mg in 0.9% sodium chloride (MBP/ADV) 100 mL  100 mg IntraVENous Q12H    melatonin tablet 3 mg  3 mg Oral QHS    Vancomycin- pharmacy to dose   Other Rx Dosing/Monitoring    alteplase (CATHFLO) 1 mg in sterile water (preservative free) 1 mL injection  1 mg InterCATHeter PRN    isosorbide dinitrate (ISORDIL) tablet 10 mg  10 mg Oral TID    HYDROmorphone (PF) (DILAUDID) injection 2 mg  2 mg IntraVENous Q4H PRN    heparin 25,000 units in D5W 250 ml infusion  17-36 Units/kg/hr IntraVENous TITRATE    balsam peru-castor oiL (VENELEX) ointment   Topical Q8H    sodium chloride (NS) flush 5-40 mL  5-40 mL IntraVENous Q8H    sodium chloride (NS) flush 5-40 mL  5-40 mL IntraVENous PRN    acetaminophen (TYLENOL) tablet 650 mg  650 mg Oral Q6H PRN    Or    acetaminophen (TYLENOL) suppository 650 mg  650 mg Rectal Q6H PRN    polyethylene glycol (MIRALAX) packet 17 g  17 g Oral DAILY PRN    ondansetron (ZOFRAN) injection 4 mg  4 mg IntraVENous Q6H PRN    albuterol-ipratropium (DUO-NEB) 2.5 MG-0.5 MG/3 ML  3 mL Nebulization Q4H PRN    docusate sodium (COLACE) capsule 100 mg  100 mg Oral DAILY    labetaloL (NORMODYNE;TRANDATE) injection 20 mg  20 mg IntraVENous Q4H PRN    heparin (porcine) 1,000 unit/mL injection 1,400 Units  1,400 Units InterCATHeter DIALYSIS PRN    And    heparin (porcine) 1,000 unit/mL injection 1,100 Units  1,100 Units InterCATHeter DIALYSIS PRN   ______________________________________________________________________  EXPECTED LENGTH OF STAY: 4d 19h  ACTUAL LENGTH OF STAY:          Shakir Pino MD

## 2020-11-02 NOTE — PROGRESS NOTES
Bedside shift change report given to 1710 Ryan Spaulding (oncoming nurse) by Jaspreet Moura RN (offgoing nurse). Report included the following information SBAR, Kardex, ED Summary, Intake/Output, MAR, Recent Results, Med Rec Status and Cardiac Rhythm NSR/ST. Patient Vitals for the past 12 hrs:   Temp Pulse Resp BP SpO2   11/02/20 0801     99 %   11/02/20 0710 99 °F (37.2 °C) 99 28 (!) 161/95 98 %   11/02/20 0408 98.4 °F (36.9 °C) 92 22 138/81 99 %   11/02/20 0004 98.2 °F (36.8 °C) 95 28 (!) 142/74 98 %   11/01/20 2112  (!) 101  (!) 156/91        Last 3 Recorded Weights in this Encounter    10/31/20 0522 11/01/20 0545 11/02/20 0408   Weight: 107.7 kg (237 lb 6.4 oz) 108.8 kg (239 lb 14.4 oz) 108.9 kg (240 lb)       Problem: Falls - Risk of  Goal: *Absence of Falls  Description: Document Lester Fall Risk and appropriate interventions in the flowsheet. Outcome: Progressing Towards Goal  Note: Fall Risk Interventions:  Mobility Interventions: Communicate number of staff needed for ambulation/transfer, Patient to call before getting OOB    Mentation Interventions: Family/sitter at bedside    Medication Interventions: Evaluate medications/consider consulting pharmacy, Patient to call before getting OOB, Teach patient to arise slowly    Elimination Interventions: Call light in reach, Patient to call for help with toileting needs, Stay With Me (per policy), Toileting schedule/hourly rounds    History of Falls Interventions: Door open when patient unattended, Evaluate medications/consider consulting pharmacy, Investigate reason for fall, Room close to nurse's station, Utilize gait belt for transfer/ambulation         Problem: Pressure Injury - Risk of  Goal: *Prevention of pressure injury  Description: Document Abdirashid Scale and appropriate interventions in the flowsheet.   Outcome: Progressing Towards Goal  Note: Pressure Injury Interventions:  Sensory Interventions: Assess changes in LOC, Assess need for specialty bed, Float heels, Maintain/enhance activity level, Keep linens dry and wrinkle-free, Minimize linen layers, Turn and reposition approx. every two hours (pillows and wedges if needed)    Moisture Interventions: Absorbent underpads    Activity Interventions: Increase time out of bed, Pressure redistribution bed/mattress(bed type)    Mobility Interventions: Assess need for specialty bed, Float heels, Pressure redistribution bed/mattress (bed type), Turn and reposition approx. every two hours(pillow and wedges)    Nutrition Interventions: Document food/fluid/supplement intake    Friction and Shear Interventions: Apply protective barrier, creams and emollients, Feet elevated on foot rest, Minimize layers, Lift team/patient mobility team, Transferring/repositioning devices                Problem: Impaired Skin Integrity/Pressure Injury Treatment  Goal: *Improvement of Existing Pressure Injury  Outcome: Progressing Towards Goal  Goal: *Prevention of pressure injury  Description: Document Abdirashid Scale and appropriate interventions in the flowsheet. Outcome: Progressing Towards Goal  Note: Pressure Injury Interventions:  Sensory Interventions: Assess changes in LOC, Assess need for specialty bed, Float heels, Maintain/enhance activity level, Keep linens dry and wrinkle-free, Minimize linen layers, Turn and reposition approx. every two hours (pillows and wedges if needed)    Moisture Interventions: Absorbent underpads    Activity Interventions: Increase time out of bed, Pressure redistribution bed/mattress(bed type)    Mobility Interventions: Assess need for specialty bed, Float heels, Pressure redistribution bed/mattress (bed type), Turn and reposition approx.  every two hours(pillow and wedges)    Nutrition Interventions: Document food/fluid/supplement intake    Friction and Shear Interventions: Apply protective barrier, creams and emollients, Feet elevated on foot rest, Minimize layers, Lift team/patient mobility team, Transferring/repositioning devices                Problem: Breathing Pattern - Ineffective  Goal: *Absence of hypoxia  Outcome: Progressing Towards Goal  Goal: *Use of effective breathing techniques  Outcome: Progressing Towards Goal     Problem: Pain  Goal: *Control of Pain  Outcome: Progressing Towards Goal     Problem: Heart Failure: Day 1  Goal: Off Pathway (Use only if patient is Off Pathway)  Outcome: Progressing Towards Goal  Goal: Activity/Safety  Outcome: Progressing Towards Goal

## 2020-11-02 NOTE — PROGRESS NOTES
Patient sleeping soundly. Sitter PCT who is familiar with patient recommended allowing patient to sleep at this time and follow up later today for increased participation. Will follow up as appropriate and as time allows. Thanks! Chelsea Webster PT, DPT  Geriatric Clinical Specialist     4658 Patient still sleeping. Just received pain medication which may add to somnolence. Will follow up tomorrow as appropriate. Thanks!

## 2020-11-02 NOTE — PROGRESS NOTES
Progress Note    Patient: Long Myers MRN: [id number]  SSN: [id number]    YOB: 1995  Age: 26 y.o. Sex: male      Admit Date: 10/21/2020    LOS: 11 days    Cardiologist: Becky Guevara MD    Subjective:     Mr. Gera Garnica is a 25 yo  male admitted 10/21/2020 to [facility] in [address] with altered mental status for >24 hours, lethargy, found down by family. He had substance overdose with rhabdomyelysis, elevated troponin, severe LV systolic dysfunction, shock liver, acute renal failure, LUE DVT and sepsis. No prior reports of chest pain or shortness of breath. He was transferred to [facility] for further care. Currently he denies chest pain or shortness of breath. Complains of pain in legs and arms. Requests pain medication. Objective:     Vitals:    20 0710 20 0801 20 0900 20 1108   BP: (!) 161/95  (!) 141/93 (!) 142/82   Pulse: 99  98 92   Resp: 28   16   Temp: 99 °F (37.2 °C)   97.6 °F (36.4 °C)   TempSrc:       SpO2: 98% 99%  98%   Weight:       Height:          Temp (24hrs), Av.4 °F (36.9 °C), Min:97.6 °F (36.4 °C), Max:99 °F (37.2 °C)      Intake and Output:  Current Shift: 701 - 1900  In: 208 [I.V.:208]  Out: 150 [Urine:150]  Last three shifts: 10/31 1901 - 700  In: 1554.6 [I.V.:1554.6]  Out: 5327 [Urine:1375]    Physical Exam:  General:  Alert, cooperative, well nourished, well developed, appears stated age   Eyes:  Conjunctivae/corneas clear    Nose: Nares normal. Septum midline. Mucosa normal. No drainage or sinus tenderness. Neck: Supple, symmetrical, trachea midline, no JVD   Lungs:   Clear to auscultation bilaterally, good effort   Heart:  Regular rate and rhythm, no murmur, click, rub, or gallop   Abdomen:   Soft, non-tender, bowel sounds normal, non-distended   Extremities: Normal, atraumatic, no cyanosis or edema   Skin: Skin color, texture, turgor normal. No rash or lesions. Neurologic: Non focal       Current Facility-Administered Medications:     carvediloL (COREG) tablet 25 mg, 25 mg, Oral, BID WITH MEALS, Suha Squires MD, 25 mg at 11/02/20 0900    albuterol-ipratropium (DUO-NEB) 2.5 MG-0.5 MG/3 ML, 3 mL, Nebulization, BID RT, Ulysses Long MD, 3 mL at 11/02/20 0801    hydrALAZINE (APRESOLINE) tablet 50 mg, 50 mg, Oral, TID, Silvia Oviedo III, MD, 50 mg at 11/02/20 0900    QUEtiapine (SEROquel) tablet 50 mg, 50 mg, Oral, Q8H, Luis Miguel Crespo DO, 50 mg at 11/02/20 1822    oxyCODONE IR (ROXICODONE) tablet 5 mg, 5 mg, Oral, Q4H PRN, Luis Miguel Crespo DO, 5 mg at 11/02/20 0900    LORazepam (ATIVAN) injection 2 mg, 2 mg, IntraVENous, Q4H PRN, Alexandra Alfonso NP, 2 mg at 11/01/20 1253    doxycycline (VIBRAMYCIN) 100 mg in 0.9% sodium chloride (MBP/ADV) 100 mL, 100 mg, IntraVENous, Q12H, Alverto Serrano MD, Last Rate: 100 mL/hr at 11/02/20 1007, 100 mg at 11/02/20 1007    melatonin tablet 3 mg, 3 mg, Oral, QHS, Luis Miguel Crespo DO, 3 mg at 11/01/20 2120    Vancomycin- pharmacy to dose, , Other, Rx Dosing/Monitoring, PRUDENCIO Guerra MD    alteplase (CATHFLO) 1 mg in sterile water (preservative free) 1 mL injection, 1 mg, InterCATHeter, PRN, Zev Gasca MD, 1 mg at 10/31/20 0656    isosorbide dinitrate (ISORDIL) tablet 10 mg, 10 mg, Oral, TID, Loi Batista MD, 10 mg at 11/02/20 0900    HYDROmorphone (PF) (DILAUDID) injection 2 mg, 2 mg, IntraVENous, Q4H PRN, Robles Restrepo MD, 2 mg at 11/02/20 1145    heparin 25,000 units in D5W 250 ml infusion, 17-36 Units/kg/hr, IntraVENous, TITRATE, Robles Restrepo MD, Last Rate: 28.3 mL/hr at 11/02/20 0803, 32 Units/kg/hr at 11/02/20 0803    balsam peru-castor oiL (VENELEX) ointment, , Topical, Q8H, Chica Guerra MD    sodium chloride (NS) flush 5-40 mL, 5-40 mL, IntraVENous, Q8H, Zac Rosales NP, 10 mL at 11/02/20 0640    sodium chloride (NS) flush 5-40 mL, 5-40 mL, IntraVENous, PRN, Lawernce Keepers, NP, 10 mL at 10/31/20 2302    acetaminophen (TYLENOL) tablet 650 mg, 650 mg, Oral, Q6H PRN, 650 mg at 10/30/20 0430 **OR** acetaminophen (TYLENOL) suppository 650 mg, 650 mg, Rectal, Q6H PRN, ernce Daniel, NP    polyethylene glycol (MIRALAX) packet 17 g, 17 g, Oral, DAILY PRN, ernce Keepers, NP    ondansetron (ZOFRAN) injection 4 mg, 4 mg, IntraVENous, Q6H PRN, ernce Daniel, NP    albuterol-ipratropium (DUO-NEB) 2.5 MG-0.5 MG/3 ML, 3 mL, Nebulization, Q4H PRN, ernce Daniel, NP, 3 mL at 10/23/20 2223    docusate sodium (COLACE) capsule 100 mg, 100 mg, Oral, DAILY, Zac Rosales NP, 100 mg at 11/02/20 0859    labetaloL (NORMODYNE;TRANDATE) injection 20 mg, 20 mg, IntraVENous, Q4H PRN, Lee Sanchez, NP, 20 mg at 11/01/20 0431    heparin (porcine) 1,000 unit/mL injection 1,400 Units, 1,400 Units, InterCATHeter, DIALYSIS PRN, 1,400 Units at 10/31/20 2231 **AND** heparin (porcine) 1,000 unit/mL injection 1,100 Units, 1,100 Units, InterCATHeter, DIALYSIS PRN, Myrtle Brizuela MD, 1,100 Units at 10/31/20 2231    Lab/Data Review:  Labs Latest Ref Rng & Units 11/2/2020 11/1/2020 10/31/2020 10/31/2020 10/30/2020 10/29/2020 10/28/2020   WBC 4.1 - 11.1 K/uL 13. 3(H) 14. 5(H) - 17. 0(H) 13. 8(H) 13. 1(H) -   RBC 4.10 - 5.70 M/uL 2.61(L) 2.72(L) - 2.92(L) 2.83(L) 2.92(L) -   Hemoglobin 12.1 - 17.0 g/dL 7. 3(L) 7. 6(L) - 8. 1(L) 7. 9(L) 8. 3(L) -   Hematocrit 36.6 - 50.3 % 22. 6(L) 23. 7(L) - 25. 0(L) 24. 0(L) 24. 8(L) -   MCV 80.0 - 99.0 FL 86.6 87.1 - 85.6 84.8 84.9 -   Platelets 915 - 489 K/uL 249 245 - 276 289 284 -   Lymphocytes 12 - 49 % 17 12 - 12 11(L) 15 -   Monocytes 5 - 13 % 11 12 - 12 12 16(H) -   Eosinophils 0 - 7 % 2 1 - 1 0 1 -   Basophils 0 - 1 % 0 0 - 0 0 0 -   Bands 0 - 6 % - - - - - - -   Albumin 3.5 - 5.0 g/dL 2. 1(L) 2. 1(L) 2. 2(L) 2. 2(L) - 2. 0(L) -   Calcium 8.5 - 10.1 MG/DL 8. 1(L) 8.5 8. 0(L) 7. 9(L) - 7. 8(L) 8. 1(L)   Glucose 65 - 100 mg/dL 91 128(H) 98 97 - 92 87   BUN 6 - 20 MG/DL 37(H) 24(H) 28(H) 28(H) - 38(H) 27(H)   Creatinine 0.70 - 1.30 MG/DL 3.80(H) 2.99(H) 3.27(H) 3.16(H) - 4.09(H) 3.08(H)   Sodium 136 - 145 mmol/L 132(L) 134(L) 136 136 - 134(L) 133(L)   Potassium 3.5 - 5.1 mmol/L 3.9 3.4(L) 3.6 3.6 - 3.6 3.4(L)   Some recent data might be hidden       10/21/20   ECHO ADULT FOLLOW-UP OR LIMITED 10/29/2020 10/29/2020    Narrative · LV: Estimated LVEF is 15 - 20%. Mildly dilated left ventricle. Severely   global hypokinesis; normal function at apex. · MV: Moderate mitral valve regurgitation is present. · TV: Moderate tricuspid valve regurgitation is present. · PA: Mild to moderate pulmonary hypertension. Pulmonary arterial systolic   pressure is 45 mmHg. · RV: Mildly dilated right ventricle. Borderline low systolic function. Signed by: Phil Quiñonez MD          Assessment:     Principal Problem:    Sepsis [facility]) (10/22/2020)    Active Problems:    Drug abuse ([facility] [address] (10/22/2020)      Acute renal failure with tubular necrosis ([facility] [address] (10/22/2020)      Community acquired pneumonia of left lower lobe of lung (10/22/2020)      Electrolyte abnormality (10/22/2020)      Toxic encephalopathy (10/24/2020)        Plan:     Mr. Tanika Alford is a 25 yo WM with polysubstance abuse found unresponsive with multi-organ failure. Severe LV dysfunction likely due to substance abuse, sepsis, PNA. No chest pain or shortness of breath to suggest an acute coronary syndrome. 1. Troponin elevation              - 55.89 --> 55.30 --> 57.9              - Related to Rhabdo, most likely. CK 69,000+, CKMB 132   - Likely non-ischemic cardiomyopathy. - continue GDMT for heart failure/LV dysfunction    2. Altered Mental Status              - Metabolic encephalopathy              - UDS + for cocaine, THC, Amphetamines, Ectasy, benzos              - Head CT with indication for toxic edema in basal ganglia    3.  Septic shock- staph hominis              - L sided PNA              - IV Abx              - IVF    4. Pneumonia              - Left sided              - IV Abx   - Mycoplasma IgM positive    5. Transaminitis              - significantly elevated LFTs - Acute liver injury              - INR elevation to 1.5    6. Acute renal failure due to rhabdomeylitis              - Nephrology following              - HD M-W-F   - Avoiding ACE-I/ARB at this time    7. Poly substance abuse              - counseling    8.  Left upper extremity DVT   - Heparin drip   - transition to DOAC when able      Signed By: Zenaida Galdamez MD     November 2, 2020      Interventional Cardiology  [facility], [address] [place] [address],[address]  [address]  P: [phone]  F: [phone]

## 2020-11-03 LAB
ALBUMIN SERPL-MCNC: 2.1 G/DL (ref 3.5–5)
ALBUMIN/GLOB SERPL: 0.6 {RATIO} (ref 1.1–2.2)
ALP SERPL-CCNC: 61 U/L (ref 45–117)
ALT SERPL-CCNC: 101 U/L (ref 12–78)
ANION GAP SERPL CALC-SCNC: 8 MMOL/L (ref 5–15)
APTT PPP: 61.2 SEC (ref 22.1–32)
APTT PPP: 72.9 SEC (ref 22.1–32)
AST SERPL-CCNC: 31 U/L (ref 15–37)
BASOPHILS # BLD: 0 K/UL (ref 0–0.1)
BASOPHILS NFR BLD: 0 % (ref 0–1)
BILIRUB SERPL-MCNC: 0.5 MG/DL (ref 0.2–1)
BUN SERPL-MCNC: 26 MG/DL (ref 6–20)
BUN/CREAT SERPL: 9 (ref 12–20)
CALCIUM SERPL-MCNC: 8.5 MG/DL (ref 8.5–10.1)
CHLORIDE SERPL-SCNC: 99 MMOL/L (ref 97–108)
CK SERPL-CCNC: 394 U/L (ref 39–308)
CO2 SERPL-SCNC: 28 MMOL/L (ref 21–32)
CREAT SERPL-MCNC: 2.87 MG/DL (ref 0.7–1.3)
DIFFERENTIAL METHOD BLD: ABNORMAL
EOSINOPHIL # BLD: 0.2 K/UL (ref 0–0.4)
EOSINOPHIL NFR BLD: 2 % (ref 0–7)
ERYTHROCYTE [DISTWIDTH] IN BLOOD BY AUTOMATED COUNT: 17.1 % (ref 11.5–14.5)
GLOBULIN SER CALC-MCNC: 3.4 G/DL (ref 2–4)
GLUCOSE SERPL-MCNC: 90 MG/DL (ref 65–100)
HCT VFR BLD AUTO: 22.5 % (ref 36.6–50.3)
HGB BLD-MCNC: 7.2 G/DL (ref 12.1–17)
IMM GRANULOCYTES # BLD AUTO: 0.1 K/UL (ref 0–0.04)
IMM GRANULOCYTES NFR BLD AUTO: 1 % (ref 0–0.5)
INR PPP: 1.2 (ref 0.9–1.1)
LYMPHOCYTES # BLD: 1.9 K/UL (ref 0.8–3.5)
LYMPHOCYTES NFR BLD: 16 % (ref 12–49)
M PNEUMO IGG SER IA-ACNC: 445 U/ML (ref 0–99)
M PNEUMO IGM SER IA-ACNC: <770 U/ML (ref 0–769)
MCH RBC QN AUTO: 27.6 PG (ref 26–34)
MCHC RBC AUTO-ENTMCNC: 32 G/DL (ref 30–36.5)
MCV RBC AUTO: 86.2 FL (ref 80–99)
MONOCYTES # BLD: 1.4 K/UL (ref 0–1)
MONOCYTES NFR BLD: 12 % (ref 5–13)
NEUTS SEG # BLD: 8.5 K/UL (ref 1.8–8)
NEUTS SEG NFR BLD: 69 % (ref 32–75)
NRBC # BLD: 0 K/UL (ref 0–0.01)
NRBC BLD-RTO: 0 PER 100 WBC
PLATELET # BLD AUTO: 244 K/UL (ref 150–400)
PMV BLD AUTO: 9.5 FL (ref 8.9–12.9)
POTASSIUM SERPL-SCNC: 3.6 MMOL/L (ref 3.5–5.1)
PROT SERPL-MCNC: 5.5 G/DL (ref 6.4–8.2)
PROTHROMBIN TIME: 12.9 SEC (ref 9–11.1)
RBC # BLD AUTO: 2.61 M/UL (ref 4.1–5.7)
SODIUM SERPL-SCNC: 135 MMOL/L (ref 136–145)
THERAPEUTIC RANGE,PTTT: ABNORMAL SECS (ref 58–77)
THERAPEUTIC RANGE,PTTT: ABNORMAL SECS (ref 58–77)
WBC # BLD AUTO: 12.1 K/UL (ref 4.1–11.1)

## 2020-11-03 PROCEDURE — 85730 THROMBOPLASTIN TIME PARTIAL: CPT

## 2020-11-03 PROCEDURE — 85610 PROTHROMBIN TIME: CPT

## 2020-11-03 PROCEDURE — 80053 COMPREHEN METABOLIC PANEL: CPT

## 2020-11-03 PROCEDURE — 74011250637 HC RX REV CODE- 250/637: Performed by: SPECIALIST

## 2020-11-03 PROCEDURE — 74011000250 HC RX REV CODE- 250: Performed by: NURSE PRACTITIONER

## 2020-11-03 PROCEDURE — 74011250637 HC RX REV CODE- 250/637: Performed by: HOSPITALIST

## 2020-11-03 PROCEDURE — 97116 GAIT TRAINING THERAPY: CPT

## 2020-11-03 PROCEDURE — 97530 THERAPEUTIC ACTIVITIES: CPT

## 2020-11-03 PROCEDURE — 85025 COMPLETE CBC W/AUTO DIFF WBC: CPT

## 2020-11-03 PROCEDURE — 74011250637 HC RX REV CODE- 250/637: Performed by: ANESTHESIOLOGY

## 2020-11-03 PROCEDURE — 82550 ASSAY OF CK (CPK): CPT

## 2020-11-03 PROCEDURE — 77010033678 HC OXYGEN DAILY

## 2020-11-03 PROCEDURE — 74011000258 HC RX REV CODE- 258: Performed by: INTERNAL MEDICINE

## 2020-11-03 PROCEDURE — 74011250636 HC RX REV CODE- 250/636: Performed by: HOSPITALIST

## 2020-11-03 PROCEDURE — 94640 AIRWAY INHALATION TREATMENT: CPT

## 2020-11-03 PROCEDURE — 74011000250 HC RX REV CODE- 250: Performed by: INTERNAL MEDICINE

## 2020-11-03 PROCEDURE — 74011250637 HC RX REV CODE- 250/637: Performed by: INTERNAL MEDICINE

## 2020-11-03 PROCEDURE — 74011250637 HC RX REV CODE- 250/637: Performed by: NURSE PRACTITIONER

## 2020-11-03 PROCEDURE — 74011250636 HC RX REV CODE- 250/636: Performed by: INTERNAL MEDICINE

## 2020-11-03 PROCEDURE — 99233 SBSQ HOSP IP/OBS HIGH 50: CPT | Performed by: INTERNAL MEDICINE

## 2020-11-03 PROCEDURE — 87635 SARS-COV-2 COVID-19 AMP PRB: CPT

## 2020-11-03 PROCEDURE — 65660000001 HC RM ICU INTERMED STEPDOWN

## 2020-11-03 PROCEDURE — 36415 COLL VENOUS BLD VENIPUNCTURE: CPT

## 2020-11-03 PROCEDURE — 74011250636 HC RX REV CODE- 250/636: Performed by: EMERGENCY MEDICINE

## 2020-11-03 RX ORDER — METOPROLOL TARTRATE 50 MG/1
50 TABLET ORAL 2 TIMES DAILY
Status: DISCONTINUED | OUTPATIENT
Start: 2020-11-03 | End: 2020-11-03

## 2020-11-03 RX ORDER — METOPROLOL TARTRATE 50 MG/1
100 TABLET ORAL 2 TIMES DAILY
Status: DISCONTINUED | OUTPATIENT
Start: 2020-11-03 | End: 2020-11-06

## 2020-11-03 RX ORDER — VANCOMYCIN HYDROCHLORIDE
1250 ONCE
Status: COMPLETED | OUTPATIENT
Start: 2020-11-03 | End: 2020-11-03

## 2020-11-03 RX ORDER — HYDRALAZINE HYDROCHLORIDE 25 MG/1
100 TABLET, FILM COATED ORAL 3 TIMES DAILY
Status: DISCONTINUED | OUTPATIENT
Start: 2020-11-03 | End: 2020-11-11 | Stop reason: HOSPADM

## 2020-11-03 RX ADMIN — HYDRALAZINE HYDROCHLORIDE 100 MG: 50 TABLET, FILM COATED ORAL at 21:00

## 2020-11-03 RX ADMIN — HYDROMORPHONE HYDROCHLORIDE 2 MG: 2 INJECTION, SOLUTION INTRAMUSCULAR; INTRAVENOUS; SUBCUTANEOUS at 19:01

## 2020-11-03 RX ADMIN — IPRATROPIUM BROMIDE AND ALBUTEROL SULFATE 3 ML: .5; 3 SOLUTION RESPIRATORY (INHALATION) at 20:17

## 2020-11-03 RX ADMIN — ISOSORBIDE DINITRATE 10 MG: 10 TABLET ORAL at 09:30

## 2020-11-03 RX ADMIN — Medication 10 ML: at 07:07

## 2020-11-03 RX ADMIN — Medication 10 ML: at 21:03

## 2020-11-03 RX ADMIN — HEPARIN SODIUM 32 UNITS/KG/HR: 10000 INJECTION, SOLUTION INTRAVENOUS at 17:57

## 2020-11-03 RX ADMIN — HYDRALAZINE HYDROCHLORIDE 100 MG: 50 TABLET, FILM COATED ORAL at 09:30

## 2020-11-03 RX ADMIN — Medication: at 13:58

## 2020-11-03 RX ADMIN — METOPROLOL TARTRATE 50 MG: 50 TABLET, FILM COATED ORAL at 09:30

## 2020-11-03 RX ADMIN — HYDRALAZINE HYDROCHLORIDE 100 MG: 50 TABLET, FILM COATED ORAL at 16:33

## 2020-11-03 RX ADMIN — IPRATROPIUM BROMIDE AND ALBUTEROL SULFATE 3 ML: .5; 3 SOLUTION RESPIRATORY (INHALATION) at 09:34

## 2020-11-03 RX ADMIN — VANCOMYCIN HYDROCHLORIDE 1250 MG: 100 INJECTION, POWDER, LYOPHILIZED, FOR SOLUTION INTRAVENOUS at 02:55

## 2020-11-03 RX ADMIN — LABETALOL HYDROCHLORIDE 20 MG: 5 INJECTION INTRAVENOUS at 07:22

## 2020-11-03 RX ADMIN — DOCUSATE SODIUM 100 MG: 100 CAPSULE, LIQUID FILLED ORAL at 09:30

## 2020-11-03 RX ADMIN — DOXYCYCLINE 100 MG: 100 INJECTION, POWDER, LYOPHILIZED, FOR SOLUTION INTRAVENOUS at 20:58

## 2020-11-03 RX ADMIN — ISOSORBIDE DINITRATE 10 MG: 10 TABLET ORAL at 21:00

## 2020-11-03 RX ADMIN — OXYCODONE HYDROCHLORIDE 5 MG: 5 TABLET ORAL at 12:07

## 2020-11-03 RX ADMIN — DOXYCYCLINE 100 MG: 100 INJECTION, POWDER, LYOPHILIZED, FOR SOLUTION INTRAVENOUS at 09:31

## 2020-11-03 RX ADMIN — OXYCODONE HYDROCHLORIDE 5 MG: 5 TABLET ORAL at 16:34

## 2020-11-03 RX ADMIN — HYDROMORPHONE HYDROCHLORIDE 2 MG: 2 INJECTION, SOLUTION INTRAMUSCULAR; INTRAVENOUS; SUBCUTANEOUS at 02:59

## 2020-11-03 RX ADMIN — HYDROMORPHONE HYDROCHLORIDE 2 MG: 2 INJECTION, SOLUTION INTRAMUSCULAR; INTRAVENOUS; SUBCUTANEOUS at 13:58

## 2020-11-03 RX ADMIN — Medication: at 03:17

## 2020-11-03 RX ADMIN — QUETIAPINE FUMARATE 50 MG: 25 TABLET ORAL at 20:58

## 2020-11-03 RX ADMIN — HEPARIN SODIUM 32 UNITS/KG/HR: 10000 INJECTION, SOLUTION INTRAVENOUS at 07:15

## 2020-11-03 RX ADMIN — QUETIAPINE FUMARATE 50 MG: 25 TABLET ORAL at 13:58

## 2020-11-03 RX ADMIN — METOPROLOL TARTRATE 100 MG: 50 TABLET, FILM COATED ORAL at 19:01

## 2020-11-03 RX ADMIN — OXYCODONE HYDROCHLORIDE 5 MG: 5 TABLET ORAL at 07:07

## 2020-11-03 RX ADMIN — Medication: at 21:03

## 2020-11-03 RX ADMIN — ISOSORBIDE DINITRATE 10 MG: 10 TABLET ORAL at 16:33

## 2020-11-03 RX ADMIN — Medication 10 ML: at 13:58

## 2020-11-03 RX ADMIN — Medication 3 MG: at 20:58

## 2020-11-03 RX ADMIN — HYDROMORPHONE HYDROCHLORIDE 2 MG: 2 INJECTION, SOLUTION INTRAMUSCULAR; INTRAVENOUS; SUBCUTANEOUS at 09:30

## 2020-11-03 RX ADMIN — QUETIAPINE FUMARATE 50 MG: 25 TABLET ORAL at 07:07

## 2020-11-03 NOTE — PROGRESS NOTES
ID Progress Note  11/3/2020    Subjective:     Low grade fever earlier. He says he is breathing all right. He does not have any abdominal pain, headache or sore throat. ROS: No anaphylaxis, seizures, syncope, hematemesis, hematochezia     Objective:     Vitals:   Visit Vitals  BP (!) 158/85   Pulse 90   Temp 98.4 °F (36.9 °C)   Resp 26   Ht 5' 10\" (1.778 m)   Wt 107.4 kg (236 lb 12.8 oz)   SpO2 96%   BMI 33.98 kg/m²        Tmax:  Temp (24hrs), Av.4 °F (36.9 °C), Min:97.5 °F (36.4 °C), Max:99.6 °F (37.6 °C)      Exam:    Not in distress, he is drowsy  Pink conjunctivae, anicteric sclerae  No cervical lymphdenopathy   Lung clear, no rales, wheezes or rhonchi   Heart: s1, s2, RRR, no murmurs rubs or clicks  Abdomen: soft nontender, no guarding or rebound  Knees not warm or tender  Speech fluent     Labs:   Lab Results   Component Value Date/Time    WBC 12.1 (H) 2020 03:30 AM    HGB 7.2 (L) 2020 03:30 AM    HCT 22.5 (L) 2020 03:30 AM    PLATELET 197  03:30 AM    MCV 86.2 2020 03:30 AM     Lab Results   Component Value Date/Time    Sodium 135 (L) 2020 03:30 AM    Potassium 3.6 2020 03:30 AM    Chloride 99 2020 03:30 AM    CO2 28 2020 03:30 AM    Anion gap 8 2020 03:30 AM    Glucose 90 2020 03:30 AM    BUN 26 (H) 2020 03:30 AM    Creatinine 2.87 (H) 2020 03:30 AM    BUN/Creatinine ratio 9 (L) 2020 03:30 AM    GFR est AA 33 (L) 2020 03:30 AM    GFR est non-AA 27 (L) 2020 03:30 AM    Calcium 8.5 2020 03:30 AM    Bilirubin, total 0.5 2020 03:30 AM    Alk.  phosphatase 61 2020 03:30 AM    Protein, total 5.5 (L) 2020 03:30 AM    Albumin 2.1 (L) 2020 03:30 AM    Globulin 3.4 2020 03:30 AM    A-G Ratio 0.6 (L) 2020 03:30 AM    ALT (SGPT) 101 (H) 2020 03:30 AM           Assessment:     Staph hominis bacteremia     Left lung pneumonia with positive mycoplasma IgM     Renal failure on dialysis     Rhabdomyolysis     cardiomyopathy with EF of 15 to 20%     Left upper extremity DVT                 Recommendations: The Staph hominis bacteremia came from 2 sites. We cannot consider this contaminant especially in light of his polysubstance abuse. His echo shows moderate tricuspid and mitral regurgitation. He will need a LUCIANO ideally.   I would continue vancomycin and doxycycline at this time    Ara Segovia MD

## 2020-11-03 NOTE — PROGRESS NOTES
Problem: Falls - Risk of  Goal: *Absence of Falls  Description: Document Justin Riveraon Fall Risk and appropriate interventions in the flowsheet. Outcome: Progressing Towards Goal  Note: Fall Risk Interventions:  Mobility Interventions: Patient to call before getting OOB, Communicate number of staff needed for ambulation/transfer    Mentation Interventions: Adequate sleep, hydration, pain control, Family/sitter at bedside, Reorient patient    Medication Interventions: Evaluate medications/consider consulting pharmacy, Patient to call before getting OOB, Teach patient to arise slowly    Elimination Interventions: Call light in reach, Patient to call for help with toileting needs    History of Falls Interventions: Room close to nurse's station, Evaluate medications/consider consulting pharmacy, Investigate reason for fall         Problem: Pressure Injury - Risk of  Goal: *Prevention of pressure injury  Description: Document Abdirashid Scale and appropriate interventions in the flowsheet. Outcome: Progressing Towards Goal  Note: Pressure Injury Interventions:  Sensory Interventions: Assess changes in LOC, Check visual cues for pain, Float heels, Keep linens dry and wrinkle-free, Maintain/enhance activity level, Minimize linen layers, Pressure redistribution bed/mattress (bed type), Turn and reposition approx. every two hours (pillows and wedges if needed)    Moisture Interventions: Absorbent underpads, Minimize layers, Internal/External urinary devices    Activity Interventions: Pressure redistribution bed/mattress(bed type)    Mobility Interventions: Float heels, Pressure redistribution bed/mattress (bed type), Turn and reposition approx.  every two hours(pillow and wedges)    Nutrition Interventions: Document food/fluid/supplement intake    Friction and Shear Interventions: Apply protective barrier, creams and emollients, Lift sheet, Minimize layers                Problem: Impaired Skin Integrity/Pressure Injury Treatment  Goal: *Improvement of Existing Pressure Injury  Outcome: Progressing Towards Goal     Problem: Breathing Pattern - Ineffective  Goal: *Absence of hypoxia  Outcome: Progressing Towards Goal     Problem: Heart Failure: Day 1  Goal: *Anxiety reduced or absent  Outcome: Progressing Towards Goal

## 2020-11-03 NOTE — PROGRESS NOTES
Progress Note    Patient: Kaley Swift MRN: [id number]  SSN: [id number]    YOB: 1995  Age: 26 y.o. Sex: male      Admit Date: 10/21/2020    LOS: 12 days    Cardiologist: Yadira Osorio MD    Subjective:     Mr. Threesa Sher is a 25 yo  male admitted 10/21/2020 to [facility] in [address] [place] [address] Road with altered mental status for >24 hours, lethargy, found down by family. He had substance overdose with rhabdomyelysis, elevated troponin, severe LV systolic dysfunction, shock liver, acute renal failure, LUE DVT and sepsis. No prior reports of chest pain or shortness of breath. He was transferred to [facility] for further care. Sleeping this morning. Objective:     Vitals:    20 0711 20 0930 20 0934 20 1207   BP: (!) 178/98 (!) 158/85  (!) 152/83   Pulse: 99 90  87   Resp: 26   18   Temp: 98.4 °F (36.9 °C)   98.3 °F (36.8 °C)   TempSrc:       SpO2: 96%  96% 99%   Weight:       Height:          Temp (24hrs), Av.4 °F (36.9 °C), Min:97.5 °F (36.4 °C), Max:99.6 °F (37.6 °C)      Intake and Output:  Current Shift: 701 - 1900  In: -   Out: 550 [Urine:550]  Last three shifts: 1901 - 700  In: 1762.6 [I.V.:1762.6]  Out: 5350 [Urine:2350]    Physical Exam:  General:  Resting comfortably, well nourished, well developed, appears stated age   Eyes:  Conjunctivae/corneas clear    Nose: Nares normal. Septum midline. Mucosa normal. No drainage or sinus tenderness. Neck: Supple, symmetrical, trachea midline, no JVD   Lungs:   Clear to auscultation bilaterally, good effort   Heart:  Regular rate and rhythm, no murmur, click, rub, or gallop   Abdomen:   Soft, non-tender, bowel sounds normal, non-distended   Extremities: Normal, atraumatic, no cyanosis or edema   Skin: Skin color, texture, turgor normal. No rash or lesions.    Neurologic: Non focal       Current Facility-Administered Medications:     hydrALAZINE (APRESOLINE) tablet 100 mg, 100 mg, Oral, TID, AlmJavier luna MD, 100 mg at 11/03/20 0930    metoprolol tartrate (LOPRESSOR) tablet 100 mg, 100 mg, Oral, BID, Jane Adair MD    albuterol-ipratropium (DUO-NEB) 2.5 MG-0.5 MG/3 ML, 3 mL, Nebulization, BID RT, Ulysses Long MD, 3 mL at 11/03/20 0934    QUEtiapine (SEROquel) tablet 50 mg, 50 mg, Oral, Q8H, Luis Miguel Crespo DO, 50 mg at 11/03/20 0707    oxyCODONE IR (ROXICODONE) tablet 5 mg, 5 mg, Oral, Q4H PRN, Luis Miguel Crespo DO, 5 mg at 11/03/20 1207    LORazepam (ATIVAN) injection 2 mg, 2 mg, IntraVENous, Q4H PRN, Neha Unger NP, 2 mg at 11/01/20 1253    doxycycline (VIBRAMYCIN) 100 mg in 0.9% sodium chloride (MBP/ADV) 100 mL, 100 mg, IntraVENous, Q12H, Ольга Schmid MD, Last Rate: 100 mL/hr at 11/03/20 0931, 100 mg at 11/03/20 0931    melatonin tablet 3 mg, 3 mg, Oral, QHS, Luis Miguel Crespo DO, 3 mg at 11/02/20 2255    Vancomycin- pharmacy to dose, , Other, Rx Dosing/Monitoring, NADIA Guerra MD    alteplase (CATHFLO) 1 mg in sterile water (preservative free) 1 mL injection, 1 mg, InterCATHeter, PRN, Merrill Tyson MD, 1 mg at 10/31/20 0656    isosorbide dinitrate (ISORDIL) tablet 10 mg, 10 mg, Oral, TID, Loi Batista MD, 10 mg at 11/03/20 0930    HYDROmorphone (PF) (DILAUDID) injection 2 mg, 2 mg, IntraVENous, Q4H PRN, Robles Restrepo MD, 2 mg at 11/03/20 0930    heparin 25,000 units in D5W 250 ml infusion, 17-36 Units/kg/hr, IntraVENous, TITRATE, Robles Restrepo MD, Last Rate: 28.3 mL/hr at 11/03/20 0715, 32 Units/kg/hr at 11/03/20 0715    balsam peru-castor oiL (VENELEX) ointment, , Topical, Q8H, Chica Guerra MD    sodium chloride (NS) flush 5-40 mL, 5-40 mL, IntraVENous, Q8H, Zac Rosales NP, 10 mL at 11/03/20 0707    sodium chloride (NS) flush 5-40 mL, 5-40 mL, IntraVENous, PRN, Darrick Alvarado NP, 10 mL at 10/31/20 2302    acetaminophen (TYLENOL) tablet 650 mg, 650 mg, Oral, Q6H PRN, 650 mg at 10/30/20 0430 **OR** acetaminophen (TYLENOL) suppository 650 mg, 650 mg, Rectal, Q6H PRN, Ericka Parker, NP    polyethylene glycol (MIRALAX) packet 17 g, 17 g, Oral, DAILY PRN, Ericka Parker, NP    ondansetron (ZOFRAN) injection 4 mg, 4 mg, IntraVENous, Q6H PRN, Ericka Parker, NP    albuterol-ipratropium (DUO-NEB) 2.5 MG-0.5 MG/3 ML, 3 mL, Nebulization, Q4H PRN, Ericka Parker, NP, 3 mL at 10/23/20 2223    docusate sodium (COLACE) capsule 100 mg, 100 mg, Oral, DAILY, Zac Rosales, NP, 100 mg at 11/03/20 0930    labetaloL (NORMODYNE;TRANDATE) injection 20 mg, 20 mg, IntraVENous, Q4H PRN, Ericka Parker, NP, 20 mg at 11/03/20 0722    heparin (porcine) 1,000 unit/mL injection 1,400 Units, 1,400 Units, InterCATHeter, DIALYSIS PRN, 1,400 Units at 11/02/20 2313 **AND** heparin (porcine) 1,000 unit/mL injection 1,100 Units, 1,100 Units, InterCATHeter, DIALYSIS PRN, Earnestine Ganser, MD, 1,100 Units at 11/02/20 2312    Lab/Data Review:  Labs Latest Ref Rng & Units 11/3/2020 11/2/2020 11/1/2020 10/31/2020 10/31/2020 10/30/2020 10/29/2020   WBC 4.1 - 11.1 K/uL 12. 1(H) 13. 3(H) 14. 5(H) - 17. 0(H) 13. 8(H) 13. 1(H)   RBC 4.10 - 5.70 M/uL 2.61(L) 2.61(L) 2.72(L) - 2.92(L) 2.83(L) 2.92(L)   Hemoglobin 12.1 - 17.0 g/dL 7. 2(L) 7. 3(L) 7. 6(L) - 8. 1(L) 7. 9(L) 8. 3(L)   Hematocrit 36.6 - 50.3 % 22. 5(L) 22. 6(L) 23. 7(L) - 25. 0(L) 24. 0(L) 24. 8(L)   MCV 80.0 - 99.0 FL 86.2 86.6 87.1 - 85.6 84.8 84.9   Platelets 747 - 859 K/uL 244 249 245 - 276 289 284   Lymphocytes 12 - 49 % 16 17 12 - 12 11(L) 15   Monocytes 5 - 13 % 12 11 12 - 12 12 16(H)   Eosinophils 0 - 7 % 2 2 1 - 1 0 1   Basophils 0 - 1 % 0 0 0 - 0 0 0   Bands 0 - 6 % - - - - - - -   Albumin 3.5 - 5.0 g/dL 2. 1(L) 2. 1(L) 2. 1(L) 2. 2(L) 2. 2(L) - 2. 0(L)   Calcium 8.5 - 10.1 MG/DL 8.5 8.1(L) 8.5 8. 0(L) 7. 9(L) - 7. 8(L)   Glucose 65 - 100 mg/dL 90 91 128(H) 98 97 - 92   BUN 6 - 20 MG/DL 26(H) 37(H) 24(H) 28(H) 28(H) - 38(H)   Creatinine 0.70 - 1.30 MG/DL 2.87(H) 3.80(H) 2.99(H) 3.27(H) 3.16(H) - 4.09(H)   Sodium 136 - 145 mmol/L 135(L) 132(L) 134(L) 136 136 - 134(L)   Potassium 3.5 - 5.1 mmol/L 3.6 3.9 3.4(L) 3.6 3.6 - 3.6   Some recent data might be hidden       10/21/20   ECHO ADULT FOLLOW-UP OR LIMITED 10/29/2020 10/29/2020    Narrative · LV: Estimated LVEF is 15 - 20%. Mildly dilated left ventricle. Severely   global hypokinesis; normal function at apex. · MV: Moderate mitral valve regurgitation is present. · TV: Moderate tricuspid valve regurgitation is present. · PA: Mild to moderate pulmonary hypertension. Pulmonary arterial systolic   pressure is 45 mmHg. · RV: Mildly dilated right ventricle. Borderline low systolic function. Signed by: Kannan Aaron MD          Assessment:     Principal Problem:    Sepsis [facility]) (10/22/2020)    Active Problems:    Drug abuse ([facility] [address] (10/22/2020)      Acute renal failure with tubular necrosis ([facility] [address] (10/22/2020)      Community acquired pneumonia of left lower lobe of lung (10/22/2020)      Electrolyte abnormality (10/22/2020)      Toxic encephalopathy (10/24/2020)        Plan:     Mr. Matheus Colbert is a 25 yo WM with polysubstance abuse found unresponsive with multi-organ failure. Severe LV dysfunction likely due to substance abuse, sepsis, PNA. No chest pain or shortness of breath to suggest an acute coronary syndrome. 1. Troponin elevation              - 55.89 --> 55.30 --> 57.9              - Related to Rhabdo, most likely. CK 69,000+, CKMB 132   - Likely non-ischemic cardiomyopathy. - continue GDMT for heart failure/LV dysfunction   - Advance beta-blocker    2. Altered Mental Status              - Metabolic encephalopathy              - UDS + for cocaine, THC, Amphetamines, Ectasy, benzos              - Head CT with indication for toxic edema in basal ganglia    3. Septic shock- staph hominis              - L sided PNA              - IV Abx              - IVF   - Will arrange LUCIANO to assess for endocarditis    4. Pneumonia              - Left sided              - IV Abx   - Mycoplasma IgM positive    5. Transaminitis              - significantly elevated LFTs - Acute liver injury              - INR elevation to 1.5    6. Acute renal failure due to rhabdomeylitis              - Nephrology following              - HD M-W-F   - Avoiding ACE-I/ARB at this time    7. Poly substance abuse              - counseling    8.  Left upper extremity DVT   - Heparin drip   - transition to DOAC when able      Signed By: Uriah Rene MD     November 3, 2020      Interventional Cardiology  [facility], [address] [place] [address],[address]  [address]  P: [phone]  F: [phone]

## 2020-11-03 NOTE — PROGRESS NOTES
Problem: Falls - Risk of  Goal: *Absence of Falls  Description: Document Samy Peralta Fall Risk and appropriate interventions in the flowsheet. Outcome: Progressing Towards Goal  Note: Fall Risk Interventions:  Pt remains free of falls during admission. Call bell and frequently used items within reach. Bedside table within reach. Pt provided nonskid socks and instructed to call out for nurse when needing assistance. Problem: Impaired Skin Integrity/Pressure Injury Treatment  Goal: *Improvement of Existing Pressure Injury  Outcome: Progressing Towards Goal     Problem: Breathing Pattern - Ineffective  Goal: *Absence of hypoxia  Outcome: Progressing Towards Goal       Bedside shift change report given to Jamal Fernandez RN (oncoming nurse) by Savanah Nuñez RN (offgoing nurse). Report included the following information SBAR, Kardex, and Cardiac Rhythm NSR .

## 2020-11-03 NOTE — DIALYSIS
[facility] [place]  [phone]    Vitals   Pre   Post   Assessment   Pre   Post     Temp  Temp: 99.6 °F (37.6 °C) (11/02/20 2000)  98.5 LOC  Alert and oriented x4 but alittle nervous Alert and oriented   HR   Pulse (Heart Rate): 98 (11/02/20 2000) 91 Lungs   Clear on 3lnc  clear on 3lnc   B/P   BP: (!) 158/90 (11/02/20 2000) 169/66 Cardiac   B/p wnl, NSR  b/p wnl   Resp   Resp Rate: 30 (11/02/20 2000) 28 Skin   intact  intact   Pain level  0 9 back pain, primary nurse aware Edema  +2 in upper and lower ext's     +2 in upper and lower ext's   Orders:    Duration:   Start:    2000 End:    2302 Total:   3hrs   Dialyzer:   Dialyzer/Set Up Inspection: Viky Brizuela (11/02/20 2000)   K Bath:   Dialysate K (mEq/L): 3 (11/02/20 2000)   Ca Bath:   Dialysate CA (mEq/L): 2.5 (11/02/20 2000)   Na/Bicarb:   Dialysate NA (mEq/L): 140 (11/02/20 2000)   Target Fluid Removal:   Goal/Amount of Fluid to Remove (mL): 3000 mL (11/02/20 2000)   Access     Type & Location:   RT IJ [place]. Each catheter limb disinfected per p&p, caps removed, hubs disinfected per p&p. Blood aspirated and lines flushed without any issues. Good blood flow noted.  No signs of infection present       Labs     Obtained/Reviewed   Critical Results Called   Date when labs were drawn-  Hgb-    HGB   Date Value Ref Range Status   11/02/2020 7.3 (L) 12.1 - 17.0 g/dL Final     K-    Potassium   Date Value Ref Range Status   11/02/2020 3.9 3.5 - 5.1 mmol/L Final     Ca-   Calcium   Date Value Ref Range Status   11/02/2020 8.1 (L) 8.5 - 10.1 MG/DL Final     Bun-   BUN   Date Value Ref Range Status   11/02/2020 37 (H) 6 - 20 MG/DL Final     Creat-   Creatinine   Date Value Ref Range Status   11/02/2020 3.80 (H) 0.70 - 1.30 MG/DL Final     Comment:     INVESTIGATED PER DELTA CHECK PROTOCOL        Medications/ Blood Products Given     Name   Dose   Route and Time     heparin 2500 units 1100 units in arterial, 1400 units in venous Blood Volume Processed (BVP):    68.4 Net Fluid   Removed:  3kg   Comments   Time Out Done: 1930  Primary Nurse Rpt Faith Beltrán RN  Primary Nurse Rpt Post:Yaima Powers RN  Pt Education:ARF  Care Plan:ARF  Tx Summary:  2000 Dialysis started  2302 Dialysis completed. Each dialysis catheter limb disinfected per p&p, blood returned per p&p, each dialysis hub disinfected per p&p, post dialysis catheter dwell instilled with heparin per order, and caps applied. Admiting Diagnosis:Sepsis  Pt's previous clinic-Not yet assigned  Consent signed - Informed Consent Verified: Yes (11/02/20 2000)  Hepatitis Status- NEG AG 10/22/20  Machine #- Machine Number: [id number] (11/02/20 2000)  Telemetry status-NSR  Pre-dialysis wt. -

## 2020-11-03 NOTE — PROGRESS NOTES
2250 - Dilaudid 2 mg IV given (scanned and documented on MAR) for c/o bilateral arm and leg p ain - rated 8 on pain scale - relief obtained. 0259 - Dilaudid 2 mg IV given (scanned and documented on MAR) for c/o bilateral arm and leg pain -rated 8 on pain scale - relief obtained.   9328 - Oxycodone 5mg po given (scanned and documented on MAR) for c/o bilateral arm and leg pain - relief obtained

## 2020-11-03 NOTE — PROGRESS NOTES
Vitals:     11/03/20 1332 11/03/20 1342 11/03/20 1346   BP:  (!) 140/81 (!) 153/87 (!) 144/95   BP 1 Location:  Right arm Right arm Right arm   BP Patient Position:  Supine; At rest Sitting Sitting;Post activity   Pulse:  86 87 92   Resp:   29 (!) 31   Temp:       TempSrc:       SpO2: on 3 liters of 02  99% 96% 98%                     Full note to follow.  Christina Villeda, PT

## 2020-11-03 NOTE — PROGRESS NOTES
Hospitalist Progress Note  Sachi Lutz MD  Answering service: [phone] from in house phone      Date of Service:  11/3/2020  NAME:  Pheobe Lundborg  :  1995  MRN:  [id number]    Admission Summary:   25M p/w multiple drug use, resp failure/ARF  Interval history / Subjective:   Patient seen and examined at bedside. Feels ok, continues to make progress with breathing, down to 3L NC, keeping sats high 90s. Placed consult to psych     Assessment & Plan:     #. Polysubstance abuse: UDS +ve for cocaine, THC, benzo, opioids, amphetamines  #. Pneumonia- likely aspiration- 2nd to above  #. Acute metabolic Encephalopathy: 2nd to above. Re-orient frequently,- seroquel prn. monitor. #. Cardiomyopathy: ef 15%- likely 2nd to drug use- Cardio following  #. Acute hypoxic respiratory failure: 2nd to above- Bipap to keep sats >90%  #. Rhabdomyolysis: improving with hydration- monitor ck daily- coming down nicely  #. ARF: 2nd to above. Nephrology following- started on RRT  #. Acute LUE DVT: heparin gtt. Switch to NOAC prior to dc. #. Acute Anemia: check hemoccult, iron, B12, folate. Monitor CBC, transfuse if HB<7.  #. Bacteremia: Staph hominis on 10/21. On vanc/doxy- occasional low grade fevers. Monitor  - repeat TTE 10/29: still ef 15-20%. Repeat CXR 10/31: slightly increased opacities b/l.  - Pulmonary team following. #. Bipolar Disorder:  Mother raised concerns about subOptimal psych care in past.   - Psych consult pending     Code status: Full  DVT prophylaxis: Heparin  Care Plan discussed with: Patient/Family and Nurse  Disposition: TBD >2days     Hospital Problems  Never Reviewed          Codes Class Noted POA    Toxic encephalopathy ICD-10-CM: G92  ICD-9-CM: 349.82  10/24/2020 Unknown        Drug abuse ([facility] [address] ICD-10-CM: F19.10  ICD-9-CM: 305.90  10/22/2020 Unknown        Acute renal failure with tubular necrosis ([facility] [address] ICD-10-CM: N17.0  ICD-9-CM: 584.5  10/22/2020 Unknown        * (Principal) Sepsis ([facility] [address] ICD-10-CM: A41.9  ICD-9-CM: 038.9, 995.91  10/22/2020 Unknown        Community acquired pneumonia of left lower lobe of lung ICD-10-CM: J18.9  ICD-9-CM: 289  10/22/2020 Yes        Electrolyte abnormality ICD-10-CM: E87.8  ICD-9-CM: 276.9  10/22/2020 Yes            Review of Systems:   Pertinent items are mentioned in interval history. Vital Signs:    Last 24hrs VS reviewed since prior progress note. Most recent are:  Visit Vitals  BP (!) 178/98 (BP 1 Location: Right arm, BP Patient Position: At rest)   Pulse 99   Temp 98.4 °F (36.9 °C)   Resp 26   Ht 5' 10\" (1.778 m)   Wt 107.4 kg (236 lb 12.8 oz)   SpO2 96%   BMI 33.98 kg/m²         Intake/Output Summary (Last 24 hours) at 11/3/2020 0907  Last data filed at 11/3/2020 0750  Gross per 24 hour   Intake    Output 4900 ml   Net -4900 ml        Physical Examination:   General:  Alert, drowsy, No acute distress  Resp:  No accessory muscle use, no wheezes, few rhonchi  Abd:  Soft, non-tender, non-distended  Extremities:  No cyanosis or clubbing, no significant edema  Neuro:  drowsy, no focal neuro deficits, follows commands   Psych:  not agitated.     Data Review:    Review and/or order of clinical lab test  Review and/or order of tests in the radiology section of CPT  Review and/or order of tests in the medicine section of CPT  Labs:     Recent Labs     11/03/20 0330 11/02/20  0533   WBC 12.1* 13.3*   HGB 7.2* 7.3*   HCT 22.5* 22.6*    249     Recent Labs     11/03/20 0330 11/02/20  0533 11/01/20  0438   * 132* 134*   K 3.6 3.9 3.4*   CL 99 97 99   CO2 28 26 26   BUN 26* 37* 24*   CREA 2.87* 3.80* 2.99*   GLU 90 91 128*   CA 8.5 8.1* 8.5     Recent Labs     11/03/20  0330 11/02/20  0533 11/01/20  0438   * 122* 152*   AP 61 59 62   TBILI 0.5 0.4 0.5   TP 5.5* 5.0* 5.3*   ALB 2.1* 2.1* 2.1*   GLOB 3.4 2.9 3.2     Recent Labs     11/03/20  0330 11/02/20  1117 11/02/20  0533 11/01/20 0438   INR 1.2*  --   --   --  1.2*   PTP 12.9*  --   --   --  12.5*   APTT 61.2* 73.5* 73.3*   < > 39.2*    < > = values in this interval not displayed. No results for input(s): FE, TIBC, PSAT, FERR in the last 72 hours. Lab Results   Component Value Date/Time    Folate 9.7 10/31/2020 12:40 AM      No results for input(s): PH, PCO2, PO2 in the last 72 hours.   Recent Labs     11/03/20  0330 11/02/20  0533 11/01/20 0438   * 477* 667*     No results found for: CHOL, CHOLX, CHLST, CHOLV, HDL, HDLP, LDL, LDLC, DLDLP, TGLX, TRIGL, TRIGP, CHHD, CHHDX  Lab Results   Component Value Date/Time    Glucose (POC) 129 (H) 10/23/2020 04:27 PM     Lab Results   Component Value Date/Time    Color Yellow/Straw 10/21/2020 06:05 PM    Appearance Clear 10/21/2020 06:05 PM    Specific gravity 1.025 10/21/2020 06:05 PM    pH (UA) 5.5 10/21/2020 06:05 PM    Protein 30 (A) 10/21/2020 06:05 PM    Glucose Negative 10/21/2020 06:05 PM    Ketone Negative 10/21/2020 06:05 PM    Urobilinogen 1.0 10/21/2020 06:05 PM    Nitrites Negative 10/21/2020 06:05 PM    Leukocyte Esterase Negative 10/21/2020 06:05 PM    Bacteria Negative 10/21/2020 06:05 PM    WBC 0-4 10/21/2020 06:05 PM    RBC 0-5 10/21/2020 06:05 PM     Medications Reviewed:     Current Facility-Administered Medications   Medication Dose Route Frequency    metoprolol tartrate (LOPRESSOR) tablet 50 mg  50 mg Oral BID    hydrALAZINE (APRESOLINE) tablet 100 mg  100 mg Oral TID    albuterol-ipratropium (DUO-NEB) 2.5 MG-0.5 MG/3 ML  3 mL Nebulization BID RT    QUEtiapine (SEROquel) tablet 50 mg  50 mg Oral Q8H    oxyCODONE IR (ROXICODONE) tablet 5 mg  5 mg Oral Q4H PRN    LORazepam (ATIVAN) injection 2 mg  2 mg IntraVENous Q4H PRN    doxycycline (VIBRAMYCIN) 100 mg in 0.9% sodium chloride (MBP/ADV) 100 mL  100 mg IntraVENous Q12H    melatonin tablet 3 mg  3 mg Oral QHS    Vancomycin- pharmacy to dose   Other Rx Dosing/Monitoring    alteplase (CATHFLO) 1 mg in sterile water (preservative free) 1 mL injection  1 mg InterCATHeter PRN    isosorbide dinitrate (ISORDIL) tablet 10 mg  10 mg Oral TID    HYDROmorphone (PF) (DILAUDID) injection 2 mg  2 mg IntraVENous Q4H PRN    heparin 25,000 units in D5W 250 ml infusion  17-36 Units/kg/hr IntraVENous TITRATE    balsam peru-castor oiL (VENELEX) ointment   Topical Q8H    sodium chloride (NS) flush 5-40 mL  5-40 mL IntraVENous Q8H    sodium chloride (NS) flush 5-40 mL  5-40 mL IntraVENous PRN    acetaminophen (TYLENOL) tablet 650 mg  650 mg Oral Q6H PRN    Or    acetaminophen (TYLENOL) suppository 650 mg  650 mg Rectal Q6H PRN    polyethylene glycol (MIRALAX) packet 17 g  17 g Oral DAILY PRN    ondansetron (ZOFRAN) injection 4 mg  4 mg IntraVENous Q6H PRN    albuterol-ipratropium (DUO-NEB) 2.5 MG-0.5 MG/3 ML  3 mL Nebulization Q4H PRN    docusate sodium (COLACE) capsule 100 mg  100 mg Oral DAILY    labetaloL (NORMODYNE;TRANDATE) injection 20 mg  20 mg IntraVENous Q4H PRN    heparin (porcine) 1,000 unit/mL injection 1,400 Units  1,400 Units InterCATHeter DIALYSIS PRN    And    heparin (porcine) 1,000 unit/mL injection 1,100 Units  1,100 Units InterCATHeter DIALYSIS PRN   ______________________________________________________________________  EXPECTED LENGTH OF STAY: 4d 19h  ACTUAL LENGTH OF STAY:          12               Luis Daniel Sharp MD

## 2020-11-03 NOTE — PROGRESS NOTES
Pulmonary, Critical Care, and Sleep Medicine~Progress Note    Name: Denny Shipley MRN: [id number]   : 1995 Hospital: [facility]   Date: 11/3/2020 10:29 AM Admission: 10/21/2020     Impression Plan   1. Acute hypoxic resp failure secondary to below  2. Pulmonary infiltrates: Mycoplasma PNA + volume overload   3. HFrEF: EF 15-20%, PASP 45mmHg  4. JEAN PIERRE   5. Rhabdomyolysis   6. Staph hominis bacteremia   7. Acute Left upper Extrem DVT  8. Polysubstance abuse: + EtOH, Cocaine, benzos, amphetamines, THC  1. On heparin gtt  2. Doxy/vanc   3. O2 titration above 90%   4. NIV qhs for now -- reports that he didn't want to wear last night, seemed to do ok     Daily Progression:    Awake and interactive, eating breakfast. LUE weakness. Reports that he feels ok and denies dyspnea or cough. I have reviewed the labs and previous days notes. Pertinent items are noted in HPI.     OBJECTIVE:     Vital Signs:       Visit Vitals  BP (!) 178/98 (BP 1 Location: Right arm, BP Patient Position: At rest)   Pulse 99   Temp 98.4 °F (36.9 °C)   Resp 26   Ht 5' 10\" (1.778 m)   Wt 107.4 kg (236 lb 12.8 oz)   SpO2 96%   BMI 33.98 kg/m²      Temp (24hrs), Av.3 °F (36.8 °C), Min:97.5 °F (36.4 °C), Max:99.6 °F (37.6 °C)     Intake/Output:     Last shift: 701 - 1900  In: -   Out: 550 [Urine:550]    Last 3 shifts: 1901 -  07  In: 1762.6 [I.V.:1762.6]  Out: 5350 [Urine:2350]          Intake/Output Summary (Last 24 hours) at 11/3/2020 0919  Last data filed at 11/3/2020 0750  Gross per 24 hour   Intake    Output 4900 ml   Net -4900 ml       Physical Exam:                                        Exam Findings Other   General: No resp distress noted, appears stated age    HEENT:  No ulcers, JVD not elevated, no cervical LAD    Chest: No pectus deformity, normal chest rise b/l    HEART:  RRR, no murmurs/rubs/gallops    Lungs:  CTA b/l, no rhonchi/crackles/wheeze, diminished BS at bases    ABD: Soft/NT, non rigid mildly distended    EXT: No cyanosis/clubbing/edema, normal peripheral pulses    Skin: No rashes or ulcers, no mottling    Neuro: Alert, speech intelligible        Medications:  Current Facility-Administered Medications   Medication Dose Route Frequency    metoprolol tartrate (LOPRESSOR) tablet 50 mg  50 mg Oral BID    hydrALAZINE (APRESOLINE) tablet 100 mg  100 mg Oral TID    albuterol-ipratropium (DUO-NEB) 2.5 MG-0.5 MG/3 ML  3 mL Nebulization BID RT    QUEtiapine (SEROquel) tablet 50 mg  50 mg Oral Q8H    oxyCODONE IR (ROXICODONE) tablet 5 mg  5 mg Oral Q4H PRN    LORazepam (ATIVAN) injection 2 mg  2 mg IntraVENous Q4H PRN    doxycycline (VIBRAMYCIN) 100 mg in 0.9% sodium chloride (MBP/ADV) 100 mL  100 mg IntraVENous Q12H    melatonin tablet 3 mg  3 mg Oral QHS    Vancomycin- pharmacy to dose   Other Rx Dosing/Monitoring    alteplase (CATHFLO) 1 mg in sterile water (preservative free) 1 mL injection  1 mg InterCATHeter PRN    isosorbide dinitrate (ISORDIL) tablet 10 mg  10 mg Oral TID    HYDROmorphone (PF) (DILAUDID) injection 2 mg  2 mg IntraVENous Q4H PRN    heparin 25,000 units in D5W 250 ml infusion  17-36 Units/kg/hr IntraVENous TITRATE    balsam peru-castor oiL (VENELEX) ointment   Topical Q8H    sodium chloride (NS) flush 5-40 mL  5-40 mL IntraVENous Q8H    sodium chloride (NS) flush 5-40 mL  5-40 mL IntraVENous PRN    acetaminophen (TYLENOL) tablet 650 mg  650 mg Oral Q6H PRN    Or    acetaminophen (TYLENOL) suppository 650 mg  650 mg Rectal Q6H PRN    polyethylene glycol (MIRALAX) packet 17 g  17 g Oral DAILY PRN    ondansetron (ZOFRAN) injection 4 mg  4 mg IntraVENous Q6H PRN    albuterol-ipratropium (DUO-NEB) 2.5 MG-0.5 MG/3 ML  3 mL Nebulization Q4H PRN    docusate sodium (COLACE) capsule 100 mg  100 mg Oral DAILY    labetaloL (NORMODYNE;TRANDATE) injection 20 mg  20 mg IntraVENous Q4H PRN    heparin (porcine) 1,000 unit/mL injection 1,400 Units  1,400 Units InterCATHeter DIALYSIS PRN    And    heparin (porcine) 1,000 unit/mL injection 1,100 Units  1,100 Units InterCATHeter DIALYSIS PRN       Labs:  ABG No results for input(s): PHI, PCO2I, PO2I, HCO3I, SO2I, FIO2I in the last 72 hours.      CBC Recent Labs     11/03/20  0330 11/02/20  0533 11/01/20  0438   WBC 12.1* 13.3* 14.5*   HGB 7.2* 7.3* 7.6*   HCT 22.5* 22.6* 23.7*    249 245   MCV 86.2 86.6 87.1   MCH 27.6 28.0 16.5        Metabolic  Panel Recent Labs     11/03/20 0330 11/02/20 0533 11/01/20  0438   * 132* 134*   K 3.6 3.9 3.4*   CL 99 97 99   CO2 28 26 26   GLU 90 91 128*   BUN 26* 37* 24*   CREA 2.87* 3.80* 2.99*   CA 8.5 8.1* 8.5   ALB 2.1* 2.1* 2.1*   * 122* 152*   INR 1.2*  --  1.2*        Pertinent Labs                [facility] Debra Arrington  11/3/2020

## 2020-11-03 NOTE — PROGRESS NOTES
Comprehensive Nutrition Assessment    Reason for Visit: Reassess    Nutrition Recommendations/Plan:    1. Continue current diet with ONS BID  2. If PO intake remains improved, consider Na+ restriction  3. Fluid restriction per MD      Nutrition Assessment:     26 y.o. male who has a past medical history of Anxiety and Depression. Admitted with Sepsis ([facility] [address] [A41.9]  ARF (acute renal failure) ([facility] [address] [N17.9]  Drug abuse ([facility] [address] [F19.10]. Noted acute hypoxic respiratory failure, initially admitted to ICU with unresponsiveness. Improving, on 3 L O2. Severe rhabdomyolysis 2° substance abuse. LFTs and CPK trending down. At risk for compartment syndrome, has severe edema of all muscle compartments. JEAN PIERRE d/t ATN from rhabdomyolysis. S/p emergent HD on 10/22 + daily x 3 days. Now on HD MWF. Nephology following, noted pt making more urine. Acute metabolic encephalopathy/with toxicity edema in the basal ganglia. A&OX4 since 10/24; Acute cardiomyopathy/myocarditis due to toxic/metabolic causes; Left Lung PNA w/ sepsis; LUE DVT. Psych following. Plan for LUCIANO ? tomorrow. Pt rescreened for LOS. Eating much better this week. Prefers chocolate Ensure, so not drinking other flavors if they come. Wt trending back down with HD ( was up 64 lb since admission last week). Still with 1-3+ edema as listed below. RD will continue current diet. Now on 2 L FR. Will continue Ensure Enlive, but flavor preference updated. If PO intake remains improved, may want to consider Na+ restriction as well. Would avoid restriction if PO intake poor. Malnutrition Assessment:  Malnutrition Status:   At risk for malnutrition (specify)(increased needs with HD)    Context:  Acute illness         Nutritionally Significant Medications:   Colace, Vibramycin, heparin drip, dilaudid      Estimated Daily Nutrient Needs:  Energy (kcal):  3417-8040(MSJ x 1.3-1.5)  Protein (g):  135(1.5 gm/kg)       Fluid (ml/day):  2000(or per nephology/ cardiology)    Nutrition Related Findings:    Edema:   Generalized: 2+  Genital: 3+  LUE: 3+ pitting  LLE: 3+ pitting  RUE: 1+ NP  RLE: 3+ pitting    Last BM: 10/31 - loose    Wounds:    Multiple(sacrum with evolving pressure injury)       Current Nutrition Therapies:   DIET REGULAR  DIET NUTRITIONAL SUPPLEMENTS  DIET NUTRITIONAL SUPPLEMENTS  DIET NPO    Meal intake:   Patient Vitals for the past 168 hrs:   % Diet Eaten   11/01/20 1657 100 %   10/29/20 1433 100 %   10/29/20 0924 80 %   10/28/20 2200 80 %   10/28/20 0800 0 %         Anthropometric Measures:  · Height:  5' 10\" (177.8 cm)  · Current Body Wt:  107.1 kg (236 lb 1.8 oz)   · Admission Body Wt:  198 lb 13.7 oz(90.2 kg - bed)    · Usual Body Wt:     unknown  · Ideal Body Wt:    lb:  157.9 %   · BMI Categories:  Overweight (BMI 25.0-29.9)(using admission/ dry weight)       Wt Readings from Last 20 Encounters:   11/04/20 107.1 kg (236 lb 3.2 oz)   10/26/20 118.9 kg (262 lb 1.6 oz)   10/22/20 88.5 kg (195 lb)   10/21/20 83.9 kg (185 lb)   09/15/20 84.9 kg (187 lb 1.6 oz)       Nutrition Diagnosis:   · Inadequate oral intake(resolved) related to altered GI function as evidenced by (PO >/=80% of meals)    · Increased nutrient needs related to renal dysfunction as evidenced by dialysis          Nutrition Interventions:   Food and/or Nutrient Delivery: Continue current diet, Modify oral nutrition supplement  Nutrition Education and Counseling: No recommendations at this time  Coordination of Nutrition Care: Continue to monitor while inpatient    Goals:  continued PO >/=75% of most meals with 1-2 ONS daily over next 7 days       Nutrition Monitoring and Evaluation:   Behavioral-Environmental Outcomes: Beliefs and attitudes  Food/Nutrient Intake Outcomes: Food and nutrient intake, Supplement intake  Physical Signs/Symptoms Outcomes: Biochemical data, GI status, Skin, Meal time behavior, Fluid status or edema, Weight    Discharge Planning:     Too soon to determine Recent Labs     11/04/20 0216 11/03/20 0330 11/02/20  0533   GLU 97 90 91   BUN 32* 26* 37*   CREA 3.32* 2.87* 3.80*    135* 132*   K 3.9 3.6 3.9   CL 99 99 97   CO2 27 28 26   CA 8.8 8.5 8.1*       Recent Labs     11/04/20 0216 11/03/20 0330 11/02/20  0533   ALT 78 101* 122*   AP 58 61 59   TBILI 0.4 0.5 0.4   TP 5.3* 5.5* 5.0*   ALB 2.1* 2.1* 2.1*   GLOB 3.2 3.4 2.9       No results for input(s): LAC in the last 72 hours.     Recent Labs     11/04/20 0216 11/03/20 0330   WBC 12.4* 12.1*   HGB 7.2* 7.2*   HCT 22.8* 22.5*    244                 Judith Menendez RD  Available via [employer]  Or Pager# [phone]

## 2020-11-03 NOTE — PROGRESS NOTES
LUCIANO requested by ID. Per hospital policy, pt will need COVID test to proceed.   Will order COVID test.  LUCIANO scheduled for A.M> Friday 11/6/20 with Dr. Nayeli Gresham.

## 2020-11-03 NOTE — PROGRESS NOTES
Problem: Mobility Impaired (Adult and Pediatric)  Goal: *Acute Goals and Plan of Care (Insert Text)  Description: FUNCTIONAL STATUS PRIOR TO ADMISSION: Patient was independent and active without use of DME.    HOME SUPPORT PRIOR TO ADMISSION: The patient lived with family but did not require assist.      Physical Therapy Goals  Revised 11/3/2020  1. Patient will move from supine to sit and sit to supine , scoot up and down, and roll side to side in bed with supervision/set-up within 7 day(s). 2.  Patient will transfer from bed to chair and chair to bed with minimal assistance/contact guard assist X 1 using the least restrictive device within 7 day(s). 3.  Patient will perform sit to stand with minimal assistance/contact guard assist X 1 within 7 day(s). 4.  Patient will ambulate with minimal assistance/contact guard assist X 1 for 50 feet with the least restrictive device within 7 day(s). Physical Therapy Goals  Initiated 10/27/2020  1. Patient will move from supine to sit and sit to supine  and roll side to side in bed with minimal assistance/contact guard assist within 7 day(s). 2.  Patient will transfer from bed to chair and chair to bed with moderate assistance  using the least restrictive device within 7 day(s). 3.  Patient will perform sit to stand with moderate assistance  within 7 day(s). Outcome: Progressing Towards Goal   PHYSICAL THERAPY TREATMENT: WEEKLY REASSESSMENT  Patient: Chemo Ramachandran (26 y.o. male)  Date: 11/3/2020  Primary Diagnosis: Sepsis ([facility] [address] [A41.9]  ARF (acute renal failure) ([facility] [address] [N17.9]  Drug abuse ([facility] [address] [F19.10]        Precautions:   Fall, Skin(no BP LUE (DVT) and bed alarm if no sitter)      ASSESSMENT  Patient continues with skilled PT services and is progressing towards goals. Today pt was more oriented, demonstrated improved sitting and standing balance, was able to amb increased distance and his 02 sats were stable on 3 liters of 02 (but was BOYER).  He continues with global edema especially LUE. Today when pt first took some steps his legs were flexed but no overt buckling noted. The more he amb the better he did. Post session he was able to remain up to the chair. Anticipate slow steady gains and need for rehab upon discharge. .     Patient's progression toward goals since last assessment: gains made in all areas, goals upgraded. Current Level of Function Impacting Discharge (mobility/balance): still assist X 2 required, up to mod assist with impaired sitting and standing balance. Functional Outcome Measure: The patient scored 0 on the TUG outcome measure which is indicative of high fall risk. .        Vitals:       11/03/20 1332 11/03/20 1342 11/03/20 1346   BP:   (!) 140/81 (!) 153/87 (!) 144/95   BP 1 Location:   Right arm Right arm Right arm   BP Patient Position:   Supine; At rest Sitting Sitting;Post activity   Pulse:   86 87 92   Resp:     29 (!) 31   Temp:           TempSrc:           SpO2: on 3 liters of 02   99% 96% 98%                     Other factors to consider for discharge: Medically complex: Polysubstance abuse. Pneumonia. Acute metabolic Encephalopathy. Cardiomyopathy: EF 15%- Acute hypoxic respiratory failure (currently on supplemental 02 of 3 liters). Rhabdomyolysis. ARF. Acute LUE DVT. Acute Anemia. Bacteremia. PLAN :  Goals have been updated based on progression since last assessment. Patient continues to benefit from skilled intervention to address the above impairments. Recommendations and Planned Interventions: bed mobility training, transfer training, gait training, therapeutic exercises, edema management/control, patient and family training/education, and therapeutic activities      Frequency/Duration: Patient will be followed by physical therapy:  5 times a week to address goals.     Recommendation for discharge: (in order for the patient to meet his/her long term goals)  Therapy 3 hours per day 5-7 days per week    This discharge recommendation:  A follow-up discussion with the attending provider and/or case management is planned    IF patient discharges home will need the following DME: to be determined (TBD), none if rehab         SUBJECTIVE:   Patient stated Let's try.     OBJECTIVE DATA SUMMARY:   Chart checked, pt cleared by nursing   HISTORY:    Past Medical History:   Diagnosis Date    Anxiety     Depression      Past Surgical History:   Procedure Laterality Date    HX ORTHOPAEDIC         Personal factors and/or comorbidities impacting plan of care: Medically complex: Polysubstance abuse. Pneumonia. Acute metabolic Encephalopathy. Cardiomyopathy: EF 15%- Acute hypoxic respiratory failure. Rhabdomyolysis. ARF. Acute LUE DVT. Acute Anemia. Bacteremia. Home Situation  Home Environment: Private residence  One/Two Story Residence: One story  Living Alone: No  Support Systems: Family member(s), Friends \ neighbors  Patient Expects to be Discharged to[de-identified] Private residence  Current DME Used/Available at Home: None    EXAMINATION/PRESENTATION/DECISION MAKING:   Critical Behavior:  Neurologic State: Alert  Orientation Level: Oriented X4  Cognition: Follows commands  Safety/Judgement: Decreased awareness of need for safety, Decreased insight into deficits  Hearing: Auditory  Auditory Impairment: None  Skin:  wound noted right lower leg, bloody drainage on the sheet in his bed. Notified his nurse who dressed the wound. See MD and nursing notes for additional information (his nurse reported a wound on left heel).   Edema: remains edematous globally, especially LUE  Range Of Motion:  AROM: Generally decreased, functional                       Strength:    Strength: Generally decreased, functional                    Tone & Sensation:                  Sensation: Intact               Coordination:     Vision:      Functional Mobility:  Bed Mobility:     Supine to Sit: Stand-by assistance        Transfers:  Sit to Stand: Minimum assistance;Assist x2  Stand to Sit: Minimum assistance;Assist x2                       Balance:   Sitting: Impaired  Sitting - Static: Good (unsupported)  Sitting - Dynamic: Fair (occasional)  Standing: Impaired; With support  Standing - Static: Constant support; Fair  Standing - Dynamic : Constant support; Fair  Ambulation/Gait Training:  Distance (ft): 10 Feet (ft)(with a seated rest break)     Ambulation - Level of Assistance: Minimal assistance;Assist x2        Gait Abnormalities: Decreased step clearance;Trunk sway increased(flexion of knees but not full on buckling)        Base of Support: Center of gravity altered     Speed/Tran: Slow  Step Length: Left shortened;Right shortened                     Stairs: Therapeutic Exercises: In standing shoulder shrugs and shoulder girdle protraction and retraction     Functional Measure:  Timed up and go:    Timed Get Up And Go Test: 0(unable without assist)       < than 10 seconds=Normal  Greater then 13.5 seconds (in elderly)=Increased fall risk   Lindsay SPICER, Josselyn Clark. Predicting the probability for falls in community dwelling older adults using the Timed Up and Go Test. Phys Ther. 2000;80:896-903. Pain Rating:  None rated    Activity Tolerance:   Fair, requires rest breaks, and observed SOB with activity, but 02 sats stable    After treatment patient left in no apparent distress:   Sitting in chair, Call bell within reach, and sitter present     COMMUNICATION/EDUCATION:   The patients plan of care was discussed with: Registered nurse. Fall prevention education was provided and the patient/caregiver indicated understanding., Patient/family have participated as able in goal setting and plan of care. , and Patient/family agree to work toward stated goals and plan of care.     Thank you for this referral.  Hellen Martinez   Time Calculation: 25 mins

## 2020-11-03 NOTE — PROGRESS NOTES
[facility]   [address] [address], [facility], [facility]  Phone: [phone]   [phone]       Nephrology Progress Note  Israel Redd     1995     [id number]  Date of Admission : 10/21/2020  11/03/20    CC: Follow up for ARF, Rhabdomyolysis        Assessment and Plan   JEAN PIERRE  - Severe ATN from Rhabdomyolysis. Oligoanuric   - making more urine  - follow daily labs and UOP  - plan for HD MWF for now  - next HD tomorrow if needed    Severe Rhabdomyolysis   - 2/2 Substance use  - LFTs and CPK trending down    Left Lung PNA w/ sepsis:  - improving    LUE DVT:  - on heparin drip    CMP w/ EF 15-20%:  - likely 2/2 cocaine  - per cardiology    Staph bacteremia:  - w/u per ID    Encephalopathy    Polysubstance abuse      Interval History:  Seen and examined. Resting this AM.  Confused per sitter. Making more urine. Dialyzed yesterday. Review of Systems: Review of systems not obtained due to patient factors.     Current Medications:   Current Facility-Administered Medications   Medication Dose Route Frequency    metoprolol tartrate (LOPRESSOR) tablet 50 mg  50 mg Oral BID    hydrALAZINE (APRESOLINE) tablet 100 mg  100 mg Oral TID    albuterol-ipratropium (DUO-NEB) 2.5 MG-0.5 MG/3 ML  3 mL Nebulization BID RT    QUEtiapine (SEROquel) tablet 50 mg  50 mg Oral Q8H    oxyCODONE IR (ROXICODONE) tablet 5 mg  5 mg Oral Q4H PRN    LORazepam (ATIVAN) injection 2 mg  2 mg IntraVENous Q4H PRN    doxycycline (VIBRAMYCIN) 100 mg in 0.9% sodium chloride (MBP/ADV) 100 mL  100 mg IntraVENous Q12H    melatonin tablet 3 mg  3 mg Oral QHS    Vancomycin- pharmacy to dose   Other Rx Dosing/Monitoring    alteplase (CATHFLO) 1 mg in sterile water (preservative free) 1 mL injection  1 mg InterCATHeter PRN    isosorbide dinitrate (ISORDIL) tablet 10 mg  10 mg Oral TID    HYDROmorphone (PF) (DILAUDID) injection 2 mg  2 mg IntraVENous Q4H PRN    heparin 25,000 units in D5W 250 ml infusion  17-36 Units/kg/hr IntraVENous TITRATE    balsam peru-castor oiL (VENELEX) ointment   Topical Q8H    sodium chloride (NS) flush 5-40 mL  5-40 mL IntraVENous Q8H    sodium chloride (NS) flush 5-40 mL  5-40 mL IntraVENous PRN    acetaminophen (TYLENOL) tablet 650 mg  650 mg Oral Q6H PRN    Or    acetaminophen (TYLENOL) suppository 650 mg  650 mg Rectal Q6H PRN    polyethylene glycol (MIRALAX) packet 17 g  17 g Oral DAILY PRN    ondansetron (ZOFRAN) injection 4 mg  4 mg IntraVENous Q6H PRN    albuterol-ipratropium (DUO-NEB) 2.5 MG-0.5 MG/3 ML  3 mL Nebulization Q4H PRN    docusate sodium (COLACE) capsule 100 mg  100 mg Oral DAILY    labetaloL (NORMODYNE;TRANDATE) injection 20 mg  20 mg IntraVENous Q4H PRN    heparin (porcine) 1,000 unit/mL injection 1,400 Units  1,400 Units InterCATHeter DIALYSIS PRN    And    heparin (porcine) 1,000 unit/mL injection 1,100 Units  1,100 Units InterCATHeter DIALYSIS PRN      Allergies   Allergen Reactions    Tramadol Nausea and Vomiting       Objective:  Vitals:    Vitals:    11/03/20 0711 11/03/20 0930 11/03/20 0934 11/03/20 1207   BP: (!) 178/98 (!) 158/85  (!) 152/83   Pulse: 99 90  87   Resp: 26   18   Temp: 98.4 °F (36.9 °C)   98.3 °F (36.8 °C)   TempSrc:       SpO2: 96%  96% 99%   Weight:       Height:         Intake and Output:  11/03 0701 - 11/03 1900  In: -   Out: 550 [Urine:550]  11/01 1901 - 11/03 0700  In: 1762.6 [I.V.:1762.6]  Out: 5350 [Urine:2350]    Physical Examination:    General: Confused, resting on NC  Neck:  Supple, no mass  Resp:  Decreased breath sounds  CV:  RRR,  no murmur or rub, no LE edema  GI:  Soft, NT, + Bowel sounds, no hepatosplenomegaly  Neurologic:  Lethargic   Psych:             Unable to assess  :  Iniguez     []    High complexity decision making was performed  []    Patient is at high-risk of decompensation with multiple organ involvement    Lab Data Personally Reviewed: I have reviewed all the pertinent labs, microbiology data and radiology studies during assessment. Recent Labs     11/03/20  0330 11/02/20  0533 11/01/20  0438   * 132* 134*   K 3.6 3.9 3.4*   CL 99 97 99   CO2 28 26 26   GLU 90 91 128*   BUN 26* 37* 24*   CREA 2.87* 3.80* 2.99*   CA 8.5 8.1* 8.5   ALB 2.1* 2.1* 2.1*   * 122* 152*   INR 1.2*  --  1.2*     Recent Labs     11/03/20  0330 11/02/20  0533 11/01/20  0438   WBC 12.1* 13.3* 14.5*   HGB 7.2* 7.3* 7.6*   HCT 22.5* 22.6* 23.7*    249 245     No results found for: SDES  Lab Results   Component Value Date/Time    Culture result: NO GROWTH 5 DAYS 10/22/2020 06:19 AM    Culture result: NO GROWTH 5 DAYS 10/22/2020 02:37 AM    Culture result: (A) 10/21/2020 08:15 PM     Staphylococcus hominis (Oxacillin resistant) GROWING IN THE AEROBIC BOTTLE (SITE = R HAND)    Culture result:  10/21/2020 08:15 PM     Gram Positive Cocci CALLED TO AND READ BACK BY Rossana Perez RN AT 2012 BY [facility]    Culture result: (A) 10/21/2020 08:10 PM     Staphylococcus hominis (Oxacillin resistant) GROWING IN THE AEROBIC BOTTLE (SITE = L HAND)    Culture result:  10/21/2020 08:10 PM     preliminary report of Gram Positive Cocci in clusters growing in 1 of 2 bottles drawn [address] [facility] AT [facility] ON 10/23/20.  Bettye 1850     Recent Results (from the past 24 hour(s))   PROTHROMBIN TIME + INR    Collection Time: 11/03/20  3:30 AM   Result Value Ref Range    INR 1.2 (H) 0.9 - 1.1      Prothrombin time 12.9 (H) 9.0 - 11.1 sec   CK    Collection Time: 11/03/20  3:30 AM   Result Value Ref Range     (H) 39 - 072 U/L   METABOLIC PANEL, COMPREHENSIVE    Collection Time: 11/03/20  3:30 AM   Result Value Ref Range    Sodium 135 (L) 136 - 145 mmol/L    Potassium 3.6 3.5 - 5.1 mmol/L    Chloride 99 97 - 108 mmol/L    CO2 28 21 - 32 mmol/L    Anion gap 8 5 - 15 mmol/L    Glucose 90 65 - 100 mg/dL    BUN 26 (H) 6 - 20 MG/DL    Creatinine 2.87 (H) 0.70 - 1.30 MG/DL    BUN/Creatinine ratio 9 (L) 12 - 20      GFR est AA 33 (L) >60 ml/min/1.73m2    GFR est non-AA 27 (L) >60 ml/min/1.73m2    Calcium 8.5 8.5 - 10.1 MG/DL    Bilirubin, total 0.5 0.2 - 1.0 MG/DL    ALT (SGPT) 101 (H) 12 - 78 U/L    AST (SGOT) 31 15 - 37 U/L    Alk. phosphatase 61 45 - 117 U/L    Protein, total 5.5 (L) 6.4 - 8.2 g/dL    Albumin 2.1 (L) 3.5 - 5.0 g/dL    Globulin 3.4 2.0 - 4.0 g/dL    A-G Ratio 0.6 (L) 1.1 - 2.2     CBC WITH AUTOMATED DIFF    Collection Time: 11/03/20  3:30 AM   Result Value Ref Range    WBC 12.1 (H) 4.1 - 11.1 K/uL    RBC 2.61 (L) 4.10 - 5.70 M/uL    HGB 7.2 (L) 12.1 - 17.0 g/dL    HCT 22.5 (L) 36.6 - 50.3 %    MCV 86.2 80.0 - 99.0 FL    MCH 27.6 26.0 - 34.0 PG    MCHC 32.0 30.0 - 36.5 g/dL    RDW 17.1 (H) 11.5 - 14.5 %    PLATELET 331 512 - 126 K/uL    MPV 9.5 8.9 - 12.9 FL    NRBC 0.0 0  WBC    ABSOLUTE NRBC 0.00 0.00 - 0.01 K/uL    NEUTROPHILS 69 32 - 75 %    LYMPHOCYTES 16 12 - 49 %    MONOCYTES 12 5 - 13 %    EOSINOPHILS 2 0 - 7 %    BASOPHILS 0 0 - 1 %    IMMATURE GRANULOCYTES 1 (H) 0.0 - 0.5 %    ABS. NEUTROPHILS 8.5 (H) 1.8 - 8.0 K/UL    ABS. LYMPHOCYTES 1.9 0.8 - 3.5 K/UL    ABS. MONOCYTES 1.4 (H) 0.0 - 1.0 K/UL    ABS. EOSINOPHILS 0.2 0.0 - 0.4 K/UL    ABS. BASOPHILS 0.0 0.0 - 0.1 K/UL    ABS. IMM. GRANS. 0.1 (H) 0.00 - 0.04 K/UL    DF AUTOMATED     PTT    Collection Time: 11/03/20  3:30 AM   Result Value Ref Range    aPTT 61.2 (H) 22.1 - 32.0 sec    aPTT, therapeutic range     58.0 - 77.0 SECS   PTT    Collection Time: 11/03/20 12:10 PM   Result Value Ref Range    aPTT 72.9 (H) 22.1 - 32.0 sec    aPTT, therapeutic range     58.0 - 77.0 SECS           Total time spent with patient:  xxx   min. Care Plan discussed with:  Patient     Family      RN      Consulting Physician [address] [place] [address]        I have reviewed the flowsheets. Chart and Pertinent Notes have been reviewed. No change in PMH ,family and social history from Consult note.       Sean Cardona MD

## 2020-11-04 PROBLEM — F32.9 CURRENT EPISODE OF MAJOR DEPRESSIVE DISORDER WITHOUT PRIOR EPISODE: Status: ACTIVE | Noted: 2020-11-04

## 2020-11-04 PROBLEM — F31.9 BIPOLAR 1 DISORDER (HCC): Status: ACTIVE | Noted: 2020-11-04

## 2020-11-04 PROBLEM — M62.82 NON-TRAUMATIC RHABDOMYOLYSIS: Status: ACTIVE | Noted: 2020-11-04

## 2020-11-04 PROBLEM — I51.9 LV DYSFUNCTION: Status: ACTIVE | Noted: 2020-11-04

## 2020-11-04 LAB
ALBUMIN SERPL-MCNC: 2.1 G/DL (ref 3.5–5)
ALBUMIN/GLOB SERPL: 0.7 {RATIO} (ref 1.1–2.2)
ALP SERPL-CCNC: 58 U/L (ref 45–117)
ALT SERPL-CCNC: 78 U/L (ref 12–78)
ANION GAP SERPL CALC-SCNC: 10 MMOL/L (ref 5–15)
APTT PPP: 60 SEC (ref 22.1–32)
APTT PPP: 79.1 SEC (ref 22.1–32)
AST SERPL-CCNC: 22 U/L (ref 15–37)
BASOPHILS # BLD: 0 K/UL (ref 0–0.1)
BASOPHILS NFR BLD: 0 % (ref 0–1)
BILIRUB SERPL-MCNC: 0.4 MG/DL (ref 0.2–1)
BUN SERPL-MCNC: 32 MG/DL (ref 6–20)
BUN/CREAT SERPL: 10 (ref 12–20)
CALCIUM SERPL-MCNC: 8.8 MG/DL (ref 8.5–10.1)
CHLORIDE SERPL-SCNC: 99 MMOL/L (ref 97–108)
CK SERPL-CCNC: 306 U/L (ref 39–308)
CO2 SERPL-SCNC: 27 MMOL/L (ref 21–32)
CREAT SERPL-MCNC: 3.32 MG/DL (ref 0.7–1.3)
DIFFERENTIAL METHOD BLD: ABNORMAL
EOSINOPHIL # BLD: 0.3 K/UL (ref 0–0.4)
EOSINOPHIL NFR BLD: 2 % (ref 0–7)
ERYTHROCYTE [DISTWIDTH] IN BLOOD BY AUTOMATED COUNT: 17.2 % (ref 11.5–14.5)
GLOBULIN SER CALC-MCNC: 3.2 G/DL (ref 2–4)
GLUCOSE SERPL-MCNC: 97 MG/DL (ref 65–100)
HCT VFR BLD AUTO: 22.8 % (ref 36.6–50.3)
HEALTH STATUS, XMCV2T: ABNORMAL
HGB BLD-MCNC: 7.2 G/DL (ref 12.1–17)
IMM GRANULOCYTES # BLD AUTO: 0.1 K/UL (ref 0–0.04)
IMM GRANULOCYTES NFR BLD AUTO: 1 % (ref 0–0.5)
INR PPP: 1.3 (ref 0.9–1.1)
LYMPHOCYTES # BLD: 1.4 K/UL (ref 0.8–3.5)
LYMPHOCYTES NFR BLD: 11 % (ref 12–49)
MCH RBC QN AUTO: 27.5 PG (ref 26–34)
MCHC RBC AUTO-ENTMCNC: 31.6 G/DL (ref 30–36.5)
MCV RBC AUTO: 87 FL (ref 80–99)
MONOCYTES # BLD: 1.3 K/UL (ref 0–1)
MONOCYTES NFR BLD: 10 % (ref 5–13)
NEUTS SEG # BLD: 9.4 K/UL (ref 1.8–8)
NEUTS SEG NFR BLD: 76 % (ref 32–75)
NRBC # BLD: 0 K/UL (ref 0–0.01)
NRBC BLD-RTO: 0 PER 100 WBC
PLATELET # BLD AUTO: 266 K/UL (ref 150–400)
PMV BLD AUTO: 9.2 FL (ref 8.9–12.9)
POTASSIUM SERPL-SCNC: 3.9 MMOL/L (ref 3.5–5.1)
PROT SERPL-MCNC: 5.3 G/DL (ref 6.4–8.2)
PROTHROMBIN TIME: 13.7 SEC (ref 9–11.1)
RBC # BLD AUTO: 2.62 M/UL (ref 4.1–5.7)
SARS-COV-2, COV2: ABNORMAL
SODIUM SERPL-SCNC: 136 MMOL/L (ref 136–145)
SOURCE, COVRS: ABNORMAL
SPECIMEN SOURCE, FCOV2M: ABNORMAL
SPECIMEN TYPE, XMCV1T: ABNORMAL
THERAPEUTIC RANGE,PTTT: ABNORMAL SECS (ref 58–77)
THERAPEUTIC RANGE,PTTT: ABNORMAL SECS (ref 58–77)
WBC # BLD AUTO: 12.4 K/UL (ref 4.1–11.1)

## 2020-11-04 PROCEDURE — 94640 AIRWAY INHALATION TREATMENT: CPT

## 2020-11-04 PROCEDURE — 90935 HEMODIALYSIS ONE EVALUATION: CPT

## 2020-11-04 PROCEDURE — 74011000258 HC RX REV CODE- 258: Performed by: INTERNAL MEDICINE

## 2020-11-04 PROCEDURE — 94664 DEMO&/EVAL PT USE INHALER: CPT

## 2020-11-04 PROCEDURE — 74011250637 HC RX REV CODE- 250/637: Performed by: INTERNAL MEDICINE

## 2020-11-04 PROCEDURE — 74011000250 HC RX REV CODE- 250: Performed by: INTERNAL MEDICINE

## 2020-11-04 PROCEDURE — 82550 ASSAY OF CK (CPK): CPT

## 2020-11-04 PROCEDURE — 85610 PROTHROMBIN TIME: CPT

## 2020-11-04 PROCEDURE — 85730 THROMBOPLASTIN TIME PARTIAL: CPT

## 2020-11-04 PROCEDURE — 80053 COMPREHEN METABOLIC PANEL: CPT

## 2020-11-04 PROCEDURE — 85025 COMPLETE CBC W/AUTO DIFF WBC: CPT

## 2020-11-04 PROCEDURE — 77010033678 HC OXYGEN DAILY

## 2020-11-04 PROCEDURE — 74011250636 HC RX REV CODE- 250/636: Performed by: EMERGENCY MEDICINE

## 2020-11-04 PROCEDURE — 65660000001 HC RM ICU INTERMED STEPDOWN

## 2020-11-04 PROCEDURE — 74011250637 HC RX REV CODE- 250/637: Performed by: ANESTHESIOLOGY

## 2020-11-04 PROCEDURE — 74011250637 HC RX REV CODE- 250/637: Performed by: SPECIALIST

## 2020-11-04 PROCEDURE — 74011250636 HC RX REV CODE- 250/636: Performed by: INTERNAL MEDICINE

## 2020-11-04 PROCEDURE — 87635 SARS-COV-2 COVID-19 AMP PRB: CPT

## 2020-11-04 PROCEDURE — 74011250636 HC RX REV CODE- 250/636: Performed by: HOSPITALIST

## 2020-11-04 PROCEDURE — 36415 COLL VENOUS BLD VENIPUNCTURE: CPT

## 2020-11-04 PROCEDURE — 74011250637 HC RX REV CODE- 250/637: Performed by: NURSE PRACTITIONER

## 2020-11-04 PROCEDURE — 99233 SBSQ HOSP IP/OBS HIGH 50: CPT | Performed by: INTERNAL MEDICINE

## 2020-11-04 RX ORDER — QUETIAPINE FUMARATE 25 MG/1
50 TABLET, FILM COATED ORAL
Status: DISCONTINUED | OUTPATIENT
Start: 2020-11-04 | End: 2020-11-10

## 2020-11-04 RX ADMIN — METOPROLOL TARTRATE 100 MG: 50 TABLET, FILM COATED ORAL at 09:14

## 2020-11-04 RX ADMIN — HYDROMORPHONE HYDROCHLORIDE 2 MG: 2 INJECTION, SOLUTION INTRAMUSCULAR; INTRAVENOUS; SUBCUTANEOUS at 20:52

## 2020-11-04 RX ADMIN — HYDRALAZINE HYDROCHLORIDE 100 MG: 50 TABLET, FILM COATED ORAL at 09:14

## 2020-11-04 RX ADMIN — METOPROLOL TARTRATE 100 MG: 50 TABLET, FILM COATED ORAL at 18:28

## 2020-11-04 RX ADMIN — HYDROMORPHONE HYDROCHLORIDE 2 MG: 2 INJECTION, SOLUTION INTRAMUSCULAR; INTRAVENOUS; SUBCUTANEOUS at 00:19

## 2020-11-04 RX ADMIN — VANCOMYCIN HYDROCHLORIDE 1000 MG: 1 INJECTION, POWDER, LYOPHILIZED, FOR SOLUTION INTRAVENOUS at 18:28

## 2020-11-04 RX ADMIN — Medication: at 16:51

## 2020-11-04 RX ADMIN — Medication: at 05:46

## 2020-11-04 RX ADMIN — HYDROMORPHONE HYDROCHLORIDE 2 MG: 2 INJECTION, SOLUTION INTRAMUSCULAR; INTRAVENOUS; SUBCUTANEOUS at 11:46

## 2020-11-04 RX ADMIN — QUETIAPINE FUMARATE 50 MG: 25 TABLET ORAL at 05:46

## 2020-11-04 RX ADMIN — DOXYCYCLINE 100 MG: 100 INJECTION, POWDER, LYOPHILIZED, FOR SOLUTION INTRAVENOUS at 09:14

## 2020-11-04 RX ADMIN — IPRATROPIUM BROMIDE AND ALBUTEROL SULFATE 3 ML: .5; 3 SOLUTION RESPIRATORY (INHALATION) at 11:36

## 2020-11-04 RX ADMIN — HEPARIN SODIUM 32 UNITS/KG/HR: 10000 INJECTION, SOLUTION INTRAVENOUS at 09:44

## 2020-11-04 RX ADMIN — ISOSORBIDE DINITRATE 10 MG: 10 TABLET ORAL at 21:01

## 2020-11-04 RX ADMIN — DOXYCYCLINE 100 MG: 100 INJECTION, POWDER, LYOPHILIZED, FOR SOLUTION INTRAVENOUS at 21:04

## 2020-11-04 RX ADMIN — HEPARIN SODIUM 30 UNITS/KG/HR: 10000 INJECTION, SOLUTION INTRAVENOUS at 18:28

## 2020-11-04 RX ADMIN — OXYCODONE HYDROCHLORIDE 5 MG: 5 TABLET ORAL at 09:14

## 2020-11-04 RX ADMIN — Medication 10 ML: at 05:46

## 2020-11-04 RX ADMIN — Medication 3 MG: at 21:01

## 2020-11-04 RX ADMIN — HYDROMORPHONE HYDROCHLORIDE 2 MG: 2 INJECTION, SOLUTION INTRAMUSCULAR; INTRAVENOUS; SUBCUTANEOUS at 16:46

## 2020-11-04 RX ADMIN — HYDRALAZINE HYDROCHLORIDE 100 MG: 50 TABLET, FILM COATED ORAL at 16:00

## 2020-11-04 RX ADMIN — ISOSORBIDE DINITRATE 10 MG: 10 TABLET ORAL at 09:13

## 2020-11-04 RX ADMIN — Medication: at 21:01

## 2020-11-04 RX ADMIN — HYDRALAZINE HYDROCHLORIDE 100 MG: 50 TABLET, FILM COATED ORAL at 21:01

## 2020-11-04 RX ADMIN — ISOSORBIDE DINITRATE 10 MG: 10 TABLET ORAL at 16:00

## 2020-11-04 RX ADMIN — Medication 10 ML: at 21:02

## 2020-11-04 RX ADMIN — DOCUSATE SODIUM 100 MG: 100 CAPSULE, LIQUID FILLED ORAL at 09:13

## 2020-11-04 RX ADMIN — HEPARIN SODIUM 32 UNITS/KG/HR: 10000 INJECTION, SOLUTION INTRAVENOUS at 02:13

## 2020-11-04 RX ADMIN — OXYCODONE HYDROCHLORIDE 5 MG: 5 TABLET ORAL at 18:28

## 2020-11-04 RX ADMIN — HYDROMORPHONE HYDROCHLORIDE 2 MG: 2 INJECTION, SOLUTION INTRAMUSCULAR; INTRAVENOUS; SUBCUTANEOUS at 05:46

## 2020-11-04 RX ADMIN — IPRATROPIUM BROMIDE AND ALBUTEROL SULFATE 3 ML: .5; 3 SOLUTION RESPIRATORY (INHALATION) at 21:35

## 2020-11-04 NOTE — PROGRESS NOTES
Bedside shift change report given to Zuhair Frank, RN by Walter Livingston RN . Report included the following information SBAR, Kardex, ED Summary, Intake/Output, MAR, and Recent Results. Pt oriented to time, place, person and situation, Normal Sinus Rhythm , 3 liters/min via nasal prongs, Pain present - adequately treated. No acute events overnight. Last 3 Recorded Weights in this Encounter    11/02/20 0408 11/03/20 0437 11/04/20 0330   Weight: 108.9 kg (240 lb) 107.4 kg (236 lb 12.8 oz) 107.1 kg (236 lb 3.2 oz)       Problem: Falls - Risk of  Goal: *Absence of Falls  Description: Document Lester Fall Risk and appropriate interventions in the flowsheet. Outcome: Progressing Towards Goal  Note: Fall Risk Interventions:  Mobility Interventions: Communicate number of staff needed for ambulation/transfer, Patient to call before getting OOB    Mentation Interventions: Adequate sleep, hydration, pain control, Door open when patient unattended, Family/sitter at bedside, Update white board    Medication Interventions: Evaluate medications/consider consulting pharmacy, Patient to call before getting OOB    Elimination Interventions: Call light in reach    History of Falls Interventions: Door open when patient unattended, Vital signs minimum Q4HRs X 24 hrs (comment for end date)         Problem: Pressure Injury - Risk of  Goal: *Prevention of pressure injury  Description: Document Abdirashid Scale and appropriate interventions in the flowsheet.   Outcome: Progressing Towards Goal  Note: Pressure Injury Interventions:  Sensory Interventions: Check visual cues for pain, Float heels, Keep linens dry and wrinkle-free, Minimize linen layers    Moisture Interventions: Absorbent underpads, Apply protective barrier, creams and emollients, Check for incontinence Q2 hours and as needed, Internal/External urinary devices    Activity Interventions: Increase time out of bed, PT/OT evaluation    Mobility Interventions: Float heels, HOB 30 degrees or less    Nutrition Interventions: Document food/fluid/supplement intake    Friction and Shear Interventions: Apply protective barrier, creams and emollients, HOB 30 degrees or less         Problem: Heart Failure: Day 2  Goal: Activity/Safety  Outcome: Progressing Towards Goal  Note: Pt up with assistance to chair.     Goal: Respiratory  Outcome: Progressing Towards Goal  Note: Pt on 3L o2, sats remain above 90%

## 2020-11-04 NOTE — PROGRESS NOTES
Pulmonary, Critical Care, and Sleep Medicine~Progress Note    Name: Julianna Stockton MRN: [id number]   : 1995 Hospital: [address]   Date: 2020 10:29 AM Admission: 10/21/2020     Impression Plan   1. Acute hypoxic resp failure secondary to below  2. Pulmonary infiltrates: Mycoplasma PNA + volume overload   3. HFrEF: EF 15-20%, PASP 45mmHg  4. JEAN PIERRE   5. Rhabdomyolysis   6. Staph hominis bacteremia   7. Acute Left upper Extrem DVT  8. Polysubstance abuse: + EtOH, Cocaine, benzos, amphetamines, THC  1. On heparin gtt  2. LUCIANO today   3. Doxy/vanc   4. Judicious sedative medications  5. O2 titration above 90%, on NC    6. NIV qhs if possible      Daily Progression:    Sleeping     I have reviewed the labs and previous days notes. Pertinent items are noted in HPI.     OBJECTIVE:     Vital Signs:       Visit Vitals  /77 (BP 1 Location: Right arm, BP Patient Position: At rest)   Pulse 91   Temp 97.7 °F (36.5 °C)   Resp 19   Ht 5' 10\" (1.778 m)   Wt 107.1 kg (236 lb 3.2 oz)   SpO2 98%   BMI 33.89 kg/m²      Temp (24hrs), Av.2 °F (36.8 °C), Min:97.7 °F (36.5 °C), Max:98.7 °F (37.1 °C)     Intake/Output:     Last shift:  07 - 1900  In: -   Out: 500 [Urine:500]    Last 3 shifts: 1901 -  0700  In: 480 [P.O.:480]  Out: 4874 [Urine:3675]          Intake/Output Summary (Last 24 hours) at 2020 1050  Last data filed at 2020 0716  Gross per 24 hour   Intake 480 ml   Output 2275 ml   Net -1795 ml       Physical Exam:                                        Exam Findings Other   General: No resp distress noted, appears stated age    HEENT:  No ulcers, JVD not elevated, no cervical LAD    Chest: No pectus deformity, normal chest rise b/l    HEART:  RRR, no murmurs/rubs/gallops    Lungs:  CTA b/l, no rhonchi/crackles/wheeze, diminished BS at bases    ABD: Soft/NT, non rigid mildly distended    EXT: No cyanosis/clubbing/edema, normal peripheral pulses    Skin: No rashes or ulcers, no mottling    Neuro: Alert, speech intelligible        Medications:  Current Facility-Administered Medications   Medication Dose Route Frequency    hydrALAZINE (APRESOLINE) tablet 100 mg  100 mg Oral TID    metoprolol tartrate (LOPRESSOR) tablet 100 mg  100 mg Oral BID    albuterol-ipratropium (DUO-NEB) 2.5 MG-0.5 MG/3 ML  3 mL Nebulization BID RT    QUEtiapine (SEROquel) tablet 50 mg  50 mg Oral Q8H    oxyCODONE IR (ROXICODONE) tablet 5 mg  5 mg Oral Q4H PRN    LORazepam (ATIVAN) injection 2 mg  2 mg IntraVENous Q4H PRN    doxycycline (VIBRAMYCIN) 100 mg in 0.9% sodium chloride (MBP/ADV) 100 mL  100 mg IntraVENous Q12H    melatonin tablet 3 mg  3 mg Oral QHS    Vancomycin- pharmacy to dose   Other Rx Dosing/Monitoring    alteplase (CATHFLO) 1 mg in sterile water (preservative free) 1 mL injection  1 mg InterCATHeter PRN    isosorbide dinitrate (ISORDIL) tablet 10 mg  10 mg Oral TID    HYDROmorphone (PF) (DILAUDID) injection 2 mg  2 mg IntraVENous Q4H PRN    heparin 25,000 units in D5W 250 ml infusion  17-36 Units/kg/hr IntraVENous TITRATE    balsam peru-castor oiL (VENELEX) ointment   Topical Q8H    sodium chloride (NS) flush 5-40 mL  5-40 mL IntraVENous Q8H    sodium chloride (NS) flush 5-40 mL  5-40 mL IntraVENous PRN    acetaminophen (TYLENOL) tablet 650 mg  650 mg Oral Q6H PRN    Or    acetaminophen (TYLENOL) suppository 650 mg  650 mg Rectal Q6H PRN    polyethylene glycol (MIRALAX) packet 17 g  17 g Oral DAILY PRN    ondansetron (ZOFRAN) injection 4 mg  4 mg IntraVENous Q6H PRN    albuterol-ipratropium (DUO-NEB) 2.5 MG-0.5 MG/3 ML  3 mL Nebulization Q4H PRN    docusate sodium (COLACE) capsule 100 mg  100 mg Oral DAILY    labetaloL (NORMODYNE;TRANDATE) injection 20 mg  20 mg IntraVENous Q4H PRN    heparin (porcine) 1,000 unit/mL injection 1,400 Units  1,400 Units InterCATHeter DIALYSIS PRN    And    heparin (porcine) 1,000 unit/mL injection 1,100 Units  1,100 Units InterCATHeter DIALYSIS PRN       Labs:  ABG No results for input(s): PHI, PCO2I, PO2I, HCO3I, SO2I, FIO2I in the last 72 hours.      CBC Recent Labs     11/04/20 0216 11/03/20 0330 11/02/20  0533   WBC 12.4* 12.1* 13.3*   HGB 7.2* 7.2* 7.3*   HCT 22.8* 22.5* 22.6*    244 249   MCV 87.0 86.2 86.6   MCH 27.5 27.6 14.3        Metabolic  Panel Recent Labs     11/04/20 0216 11/03/20  0330 11/02/20  0533    135* 132*   K 3.9 3.6 3.9   CL 99 99 97   CO2 27 28 26   GLU 97 90 91   BUN 32* 26* 37*   CREA 3.32* 2.87* 3.80*   CA 8.8 8.5 8.1*   ALB 2.1* 2.1* 2.1*   ALT 78 101* 122*   INR 1.3* 1.2*  --         Pertinent Labs                Barrett Claudean Saunders, PA-C  11/4/2020

## 2020-11-04 NOTE — PROGRESS NOTES
Progress Note    Patient: Sallie Ortega MRN: [id number]  SSN: [id number]    YOB: 1995  Age: 26 y.o. Sex: male      Admit Date: 10/21/2020    LOS: 13 days    Cardiologist: eSan Marie MD    Subjective:     Mr. Sean Gamboa is a 27 yo  male admitted 10/21/2020 to [facility] [place] in [facility] with altered mental status for >24 hours, lethargy, found down by family. He had substance overdose with rhabdomyelysis, elevated troponin, severe LV systolic dysfunction, shock liver, acute renal failure, LUE DVT and sepsis. No prior reports of chest pain or shortness of breath. He was transferred to [facility] for further care. Sleeping this morning. But arousable. Agreeable to LUCIANO. Denies discomfort. Affect flat. Objective:     Vitals:    20 2100 20 0015 20 0330 20 0715   BP: (!) 144/72 139/87 (!) 137/91 134/77   Pulse: 81 88 84 91   Resp:     Temp:  97.7 °F (36.5 °C) 98.2 °F (36.8 °C) 97.7 °F (36.5 °C)   TempSrc:       SpO2:  96% 95% 98%   Weight:   107.1 kg (236 lb 3.2 oz)    Height:          Temp (24hrs), Av.2 °F (36.8 °C), Min:97.7 °F (36.5 °C), Max:98.7 °F (37.1 °C)      Intake and Output:  Current Shift: 701 - 1900  In: -   Out: 500 [Urine:500]  Last three shifts: 1901 - 700  In: 480 [P.O.:480]  Out: 6675 [Urine:3675]    Physical Exam:  General:  Resting comfortably, well nourished, well developed, appears stated age   Eyes:  Conjunctivae/corneas clear    Nose: Nares normal. Septum midline. Mucosa normal. No drainage or sinus tenderness. Neck: Supple, symmetrical, trachea midline, no JVD   Lungs:   Clear to auscultation bilaterally, good effort   Heart:  Regular rate and rhythm, no murmur, click, rub, or gallop   Abdomen:   Soft, non-tender, bowel sounds normal, non-distended   Extremities: Normal, atraumatic, no cyanosis or edema   Skin: Skin color, texture, turgor normal. No rash or lesions. Neurologic: Non focal       Current Facility-Administered Medications:     hydrALAZINE (APRESOLINE) tablet 100 mg, 100 mg, Oral, TID, AlmsallamJavier MD, 100 mg at 11/04/20 0914    metoprolol tartrate (LOPRESSOR) tablet 100 mg, 100 mg, Oral, BID, Elzbieta Mccann MD, 100 mg at 11/04/20 0914    albuterol-ipratropium (DUO-NEB) 2.5 MG-0.5 MG/3 ML, 3 mL, Nebulization, BID RT, Ulysses Long MD, 3 mL at 11/03/20 2017    QUEtiapine (SEROquel) tablet 50 mg, 50 mg, Oral, Q8H, Luis Miguel Crespo DO, 50 mg at 11/04/20 0546    oxyCODONE IR (ROXICODONE) tablet 5 mg, 5 mg, Oral, Q4H PRN, Luis Miguel Crespo DO, 5 mg at 11/04/20 0914    LORazepam (ATIVAN) injection 2 mg, 2 mg, IntraVENous, Q4H PRN, Tu Martinez NP, 2 mg at 11/01/20 1253    doxycycline (VIBRAMYCIN) 100 mg in 0.9% sodium chloride (MBP/ADV) 100 mL, 100 mg, IntraVENous, Q12H, Naheed Dumont MD, Last Rate: 100 mL/hr at 11/04/20 0914, 100 mg at 11/04/20 0914    melatonin tablet 3 mg, 3 mg, Oral, QHS, Luis Miguel Crespo DO, 3 mg at 11/03/20 2058    Vancomycin- pharmacy to dose, , Other, Rx Dosing/Monitoring, ANURADHA Guerra MD    alteplase (CATHFLO) 1 mg in sterile water (preservative free) 1 mL injection, 1 mg, InterCATHeter, PRN, Jelena Gan MD, 1 mg at 10/31/20 0656    isosorbide dinitrate (ISORDIL) tablet 10 mg, 10 mg, Oral, TID, Loi Batista MD, 10 mg at 11/04/20 0913    HYDROmorphone (PF) (DILAUDID) injection 2 mg, 2 mg, IntraVENous, Q4H PRN, Robles Restrepo MD, 2 mg at 11/04/20 0546    heparin 25,000 units in D5W 250 ml infusion, 17-36 Units/kg/hr, IntraVENous, TITRATE, Robles Restrepo MD, Last Rate: 28.3 mL/hr at 11/04/20 0944, 32 Units/kg/hr at 11/04/20 0944    balsam peru-castor oiL (VENELEX) ointment, , Topical, Q8H, Chica Guerra MD    sodium chloride (NS) flush 5-40 mL, 5-40 mL, IntraVENous, Q8H, Zac Rosales NP, 10 mL at 11/04/20 0546    sodium chloride (NS) flush 5-40 mL, 5-40 mL, IntraVENous, PRN, Nura Messer NP, 10 mL at 10/31/20 2302    acetaminophen (TYLENOL) tablet 650 mg, 650 mg, Oral, Q6H PRN, 650 mg at 10/30/20 0430 **OR** acetaminophen (TYLENOL) suppository 650 mg, 650 mg, Rectal, Q6H PRN, Nura Messer NP    polyethylene glycol (MIRALAX) packet 17 g, 17 g, Oral, DAILY PRN, Nura Messer NP    ondansetron (ZOFRAN) injection 4 mg, 4 mg, IntraVENous, Q6H PRN, Nura Messer NP    albuterol-ipratropium (DUO-NEB) 2.5 MG-0.5 MG/3 ML, 3 mL, Nebulization, Q4H PRN, Nura Messer NP, 3 mL at 10/23/20 2223    docusate sodium (COLACE) capsule 100 mg, 100 mg, Oral, DAILY, Zac Rosales NP, 100 mg at 11/04/20 0913    labetaloL (NORMODYNE;TRANDATE) injection 20 mg, 20 mg, IntraVENous, Q4H PRN, Nura Messer NP, 20 mg at 11/03/20 9620    heparin (porcine) 1,000 unit/mL injection 1,400 Units, 1,400 Units, InterCATHeter, DIALYSIS PRN, 1,400 Units at 11/02/20 2313 **AND** heparin (porcine) 1,000 unit/mL injection 1,100 Units, 1,100 Units, InterCATHeter, DIALYSIS PRN, Aniya Weaver MD, 1,100 Units at 11/02/20 2312    Lab/Data Review:  Labs Latest Ref Rng & Units 11/4/2020 11/3/2020 11/2/2020 11/1/2020 10/31/2020 10/31/2020 10/30/2020   WBC 4.1 - 11.1 K/uL 12. 4(H) 12. 1(H) 13. 3(H) 14. 5(H) - 17. 0(H) 13. 8(H)   RBC 4.10 - 5.70 M/uL 2.62(L) 2.61(L) 2.61(L) 2.72(L) - 2.92(L) 2.83(L)   Hemoglobin 12.1 - 17.0 g/dL 7. 2(L) 7. 2(L) 7. 3(L) 7. 6(L) - 8. 1(L) 7. 9(L)   Hematocrit 36.6 - 50.3 % 22. 8(L) 22. 5(L) 22. 6(L) 23. 7(L) - 25. 0(L) 24. 0(L)   MCV 80.0 - 99.0 FL 87.0 86.2 86.6 87.1 - 85.6 84.8   Platelets 727 - 566 K/uL 266 244 249 245 - 276 289   Lymphocytes 12 - 49 % 11(L) 16 17 12 - 12 11(L)   Monocytes 5 - 13 % 10 12 11 12 - 12 12   Eosinophils 0 - 7 % 2 2 2 1 - 1 0   Basophils 0 - 1 % 0 0 0 0 - 0 0   Bands 0 - 6 % - - - - - - -   Albumin 3.5 - 5.0 g/dL 2. 1(L) 2. 1(L) 2. 1(L) 2. 1(L) 2. 2(L) 2. 2(L) -   Calcium 8.5 - 10.1 MG/DL 8.8 8.5 8.1(L) 8.5 8. 0(L) 7. 9(L) -   Glucose 65 - 100 mg/dL 97 90 91 128(H) 98 97 -   BUN 6 - 20 MG/DL 32(H) 26(H) 37(H) 24(H) 28(H) 28(H) -   Creatinine 0.70 - 1.30 MG/DL 3.32(H) 2.87(H) 3.80(H) 2.99(H) 3.27(H) 3.16(H) -   Sodium 136 - 145 mmol/L 136 135(L) 132(L) 134(L) 136 136 -   Potassium 3.5 - 5.1 mmol/L 3.9 3.6 3.9 3.4(L) 3.6 3.6 -   Some recent data might be hidden       10/21/20   ECHO ADULT FOLLOW-UP OR LIMITED 10/29/2020 10/29/2020    Narrative · LV: Estimated LVEF is 15 - 20%. Mildly dilated left ventricle. Severely   global hypokinesis; normal function at apex. · MV: Moderate mitral valve regurgitation is present. · TV: Moderate tricuspid valve regurgitation is present. · PA: Mild to moderate pulmonary hypertension. Pulmonary arterial systolic   pressure is 45 mmHg. · RV: Mildly dilated right ventricle. Borderline low systolic function. Signed by: Elizabeth Hidalgo MD          Assessment:     Principal Problem:    Sepsis [facility]) (10/22/2020)    Active Problems:    Drug abuse ([facility] [address] (10/22/2020)      Acute renal failure with tubular necrosis ([facility] [address] (10/22/2020)      Community acquired pneumonia of left lower lobe of lung (10/22/2020)      Electrolyte abnormality (10/22/2020)      Toxic encephalopathy (10/24/2020)      LV dysfunction (11/4/2020)      Current episode of major depressive disorder without prior episode (11/4/2020)      Non-traumatic rhabdomyolysis (11/4/2020)        Plan:     Mr. Nitesh Albarran is a 25 yo WM with polysubstance abuse found unresponsive with multi-organ failure. Severe LV dysfunction likely due to substance abuse, sepsis, PNA. No chest pain or shortness of breath to suggest an acute coronary syndrome. 1. Troponin elevation              - 55.89 --> 55.30 --> 57.9              - Related to Rhabdo, most likely. CK 69,000+, CKMB 132   - Likely non-ischemic cardiomyopathy. - continue GDMT for heart failure/LV dysfunction     2.  Altered Mental Status              - Metabolic encephalopathy              - UDS + for cocaine, THC, Amphetamines, Ectasy, benzos              - Head CT with indication for toxic edema in basal ganglia    3. Septic shock- staph hominis              - L sided PNA              - IV Abx              - IVF   - Will arrange LUCIANO to assess for endocarditis per schedule   - will need Covid PCR prior to LUCIANO   - I discussed LUCIANO with patient. He understands and agrees to proceed. 4. Pneumonia              - Left sided              - IV Abx   - Mycoplasma IgM positive    5. Transaminitis              - significantly elevated LFTs - Acute liver injury              - INR elevation to 1.5    6. Acute renal failure due to rhabdomeylitis              - Nephrology following              - HD M-W-F   - Avoiding ACE-I/ARB at this time    7. Poly substance abuse              - counseling    8.  Left upper extremity DVT   - Heparin drip   - transition to DOAC when able      Signed By: Zenaida Galdamez MD     November 4, 2020      Interventional Cardiology  [facility] [place], [address] [place] [address],[address]  [address]  P: [phone]  F: [phone]

## 2020-11-04 NOTE — PROGRESS NOTES
[facility]   [address] [facility], [address] [place] [address], [facility]  Phone: [phone]   [phone]       Nephrology Progress Note  Chemo Ramachandran     1995     [id number]  Date of Admission : 10/21/2020  11/04/20    CC: Follow up for ARF, Rhabdomyolysis        Assessment and Plan   JEAN PIERRE  - Severe ATN from Rhabdomyolysis. Oligoanuric   - making more urine  - follow daily labs and UOP  - plan for HD MWF for now  - HD later today    Severe Rhabdomyolysis   - 2/2 Substance use  - LFTs and CPK trending down    Left Lung PNA w/ sepsis:  - improving    LUE DVT:  - on heparin drip    CMP w/ EF 15-20%:  - likely 2/2 cocaine  - per cardiology    Staph bacteremia:  - LUCIANO planned    Encephalopathy    Polysubstance abuse      Interval History:  Seen and examined. Resting this AM.  Making more urine. Dialysis for later today. Review of Systems: Review of systems not obtained due to patient factors.     Current Medications:   Current Facility-Administered Medications   Medication Dose Route Frequency    hydrALAZINE (APRESOLINE) tablet 100 mg  100 mg Oral TID    metoprolol tartrate (LOPRESSOR) tablet 100 mg  100 mg Oral BID    albuterol-ipratropium (DUO-NEB) 2.5 MG-0.5 MG/3 ML  3 mL Nebulization BID RT    QUEtiapine (SEROquel) tablet 50 mg  50 mg Oral Q8H    oxyCODONE IR (ROXICODONE) tablet 5 mg  5 mg Oral Q4H PRN    LORazepam (ATIVAN) injection 2 mg  2 mg IntraVENous Q4H PRN    doxycycline (VIBRAMYCIN) 100 mg in 0.9% sodium chloride (MBP/ADV) 100 mL  100 mg IntraVENous Q12H    melatonin tablet 3 mg  3 mg Oral QHS    Vancomycin- pharmacy to dose   Other Rx Dosing/Monitoring    alteplase (CATHFLO) 1 mg in sterile water (preservative free) 1 mL injection  1 mg InterCATHeter PRN    isosorbide dinitrate (ISORDIL) tablet 10 mg  10 mg Oral TID    HYDROmorphone (PF) (DILAUDID) injection 2 mg  2 mg IntraVENous Q4H PRN    heparin 25,000 units in D5W 250 ml infusion  17-36 Units/kg/hr IntraVENous TITRATE    balsam peru-castor oiL (VENELEX) ointment   Topical Q8H    sodium chloride (NS) flush 5-40 mL  5-40 mL IntraVENous Q8H    sodium chloride (NS) flush 5-40 mL  5-40 mL IntraVENous PRN    acetaminophen (TYLENOL) tablet 650 mg  650 mg Oral Q6H PRN    Or    acetaminophen (TYLENOL) suppository 650 mg  650 mg Rectal Q6H PRN    polyethylene glycol (MIRALAX) packet 17 g  17 g Oral DAILY PRN    ondansetron (ZOFRAN) injection 4 mg  4 mg IntraVENous Q6H PRN    albuterol-ipratropium (DUO-NEB) 2.5 MG-0.5 MG/3 ML  3 mL Nebulization Q4H PRN    docusate sodium (COLACE) capsule 100 mg  100 mg Oral DAILY    labetaloL (NORMODYNE;TRANDATE) injection 20 mg  20 mg IntraVENous Q4H PRN    heparin (porcine) 1,000 unit/mL injection 1,400 Units  1,400 Units InterCATHeter DIALYSIS PRN    And    heparin (porcine) 1,000 unit/mL injection 1,100 Units  1,100 Units InterCATHeter DIALYSIS PRN      Allergies   Allergen Reactions    Tramadol Nausea and Vomiting       Objective:  Vitals:    Vitals:    11/04/20 0330 11/04/20 0715 11/04/20 1131 11/04/20 1136   BP: (!) 137/91 134/77 (!) 148/96    Pulse: 84 91 82    Resp: 22 19 29    Temp: 98.2 °F (36.8 °C) 97.7 °F (36.5 °C) 98.2 °F (36.8 °C)    TempSrc:       SpO2: 95% 98% 100% 100%   Weight: 107.1 kg (236 lb 3.2 oz)      Height:         Intake and Output:  11/04 0701 - 11/04 1900  In: -   Out: 500 [Urine:500]  11/02 1901 - 11/04 0700  In: 480 [P.O.:480]  Out: 6675 [Urine:3675]    Physical Examination:    General: Confused, resting on NC  Neck:  Supple, no mass  Resp:  Decreased breath sounds  CV:  RRR,  no murmur or rub, no LE edema  GI:  Soft, NT, + Bowel sounds, no hepatosplenomegaly  Neurologic:  Lethargic   Psych:             Unable to assess  :  Iniguez     []    High complexity decision making was performed  []    Patient is at high-risk of decompensation with multiple organ involvement    Lab Data Personally Reviewed: I have reviewed all the pertinent labs, microbiology data and radiology studies during assessment. Recent Labs     11/04/20 0216 11/03/20  0330 11/02/20  0533    135* 132*   K 3.9 3.6 3.9   CL 99 99 97   CO2 27 28 26   GLU 97 90 91   BUN 32* 26* 37*   CREA 3.32* 2.87* 3.80*   CA 8.8 8.5 8.1*   ALB 2.1* 2.1* 2.1*   ALT 78 101* 122*   INR 1.3* 1.2*  --      Recent Labs     11/04/20 0216 11/03/20  0330 11/02/20  0533   WBC 12.4* 12.1* 13.3*   HGB 7.2* 7.2* 7.3*   HCT 22.8* 22.5* 22.6*    244 249     No results found for: SDES  Lab Results   Component Value Date/Time    Culture result: NO GROWTH 5 DAYS 10/22/2020 06:19 AM    Culture result: NO GROWTH 5 DAYS 10/22/2020 02:37 AM    Culture result: (A) 10/21/2020 08:15 PM     Staphylococcus hominis (Oxacillin resistant) GROWING IN THE AEROBIC BOTTLE (SITE = R HAND)    Culture result:  10/21/2020 08:15 PM     Gram Positive Cocci CALLED TO AND READ BACK BY Harriet Vick RN AT 2012 BY D    Culture result: (A) 10/21/2020 08:10 PM     Staphylococcus hominis (Oxacillin resistant) GROWING IN THE AEROBIC BOTTLE (SITE = L HAND)    Culture result:  10/21/2020 08:10 PM     preliminary report of Gram Positive Cocci in clusters growing in 1 of 2 bottles drawn [facility] RN SCAV AT  [facility] ON 10/23/20.  Bettye 6919     Recent Results (from the past 24 hour(s))   PTT    Collection Time: 11/03/20 12:10 PM   Result Value Ref Range    aPTT 72.9 (H) 22.1 - 32.0 sec    aPTT, therapeutic range     58.0 - 77.0 SECS   SARS-COV-2    Collection Time: 11/03/20  4:31 PM   Result Value Ref Range    Specimen source Nasopharyngeal      SARS-CoV-2 PENDING     Specimen source NP SWAB     Specimen type NP Swab      Health status Symptomatic Testing     PROTHROMBIN TIME + INR    Collection Time: 11/04/20  2:16 AM   Result Value Ref Range    INR 1.3 (H) 0.9 - 1.1      Prothrombin time 13.7 (H) 9.0 - 11.1 sec   CK    Collection Time: 11/04/20  2:16 AM   Result Value Ref Range     39 - 597 U/L   METABOLIC PANEL, COMPREHENSIVE Collection Time: 11/04/20  2:16 AM   Result Value Ref Range    Sodium 136 136 - 145 mmol/L    Potassium 3.9 3.5 - 5.1 mmol/L    Chloride 99 97 - 108 mmol/L    CO2 27 21 - 32 mmol/L    Anion gap 10 5 - 15 mmol/L    Glucose 97 65 - 100 mg/dL    BUN 32 (H) 6 - 20 MG/DL    Creatinine 3.32 (H) 0.70 - 1.30 MG/DL    BUN/Creatinine ratio 10 (L) 12 - 20      GFR est AA 28 (L) >60 ml/min/1.73m2    GFR est non-AA 23 (L) >60 ml/min/1.73m2    Calcium 8.8 8.5 - 10.1 MG/DL    Bilirubin, total 0.4 0.2 - 1.0 MG/DL    ALT (SGPT) 78 12 - 78 U/L    AST (SGOT) 22 15 - 37 U/L    Alk. phosphatase 58 45 - 117 U/L    Protein, total 5.3 (L) 6.4 - 8.2 g/dL    Albumin 2.1 (L) 3.5 - 5.0 g/dL    Globulin 3.2 2.0 - 4.0 g/dL    A-G Ratio 0.7 (L) 1.1 - 2.2     CBC WITH AUTOMATED DIFF    Collection Time: 11/04/20  2:16 AM   Result Value Ref Range    WBC 12.4 (H) 4.1 - 11.1 K/uL    RBC 2.62 (L) 4.10 - 5.70 M/uL    HGB 7.2 (L) 12.1 - 17.0 g/dL    HCT 22.8 (L) 36.6 - 50.3 %    MCV 87.0 80.0 - 99.0 FL    MCH 27.5 26.0 - 34.0 PG    MCHC 31.6 30.0 - 36.5 g/dL    RDW 17.2 (H) 11.5 - 14.5 %    PLATELET 621 191 - 017 K/uL    MPV 9.2 8.9 - 12.9 FL    NRBC 0.0 0  WBC    ABSOLUTE NRBC 0.00 0.00 - 0.01 K/uL    NEUTROPHILS 76 (H) 32 - 75 %    LYMPHOCYTES 11 (L) 12 - 49 %    MONOCYTES 10 5 - 13 %    EOSINOPHILS 2 0 - 7 %    BASOPHILS 0 0 - 1 %    IMMATURE GRANULOCYTES 1 (H) 0.0 - 0.5 %    ABS. NEUTROPHILS 9.4 (H) 1.8 - 8.0 K/UL    ABS. LYMPHOCYTES 1.4 0.8 - 3.5 K/UL    ABS. MONOCYTES 1.3 (H) 0.0 - 1.0 K/UL    ABS. EOSINOPHILS 0.3 0.0 - 0.4 K/UL    ABS. BASOPHILS 0.0 0.0 - 0.1 K/UL    ABS. IMM. GRANS. 0.1 (H) 0.00 - 0.04 K/UL    DF AUTOMATED             Total time spent with patient:  xxx   min. Care Plan discussed with:  Patient     Family      RN      Consulting Physician [facility],         I have reviewed the flowsheets. Chart and Pertinent Notes have been reviewed.    No change in PMH ,family and social history from Consult note.       Moise Hayes MD

## 2020-11-04 NOTE — PROGRESS NOTES
Physical Therapy    Attempted to see patient for follow up PT session, but he repeatedly refused, stating that it is too early in the morning to get up. Educated about importance of mobility and participation with therapy to be able to ultimately return home. He still refuses. We will follow up tomorrow.     Dori Ford, PT

## 2020-11-04 NOTE — ADVANCED PRACTICE NURSE
[facility]  [phone]    Vitals   Pre   Post   Assessment   Pre   Post     Temp  Temp: 97.7 °F (36.5 °C) (11/04/20 1502)  97.7 LOC  A&O x 3 A&O x 3   HR   Pulse (Heart Rate): 86(resting, eyes closed) (11/04/20 1615) 87 Lungs   clear  clear   B/P   BP: (!) 167/94 (11/04/20 1615) 145/99 Cardiac   regular  regular   Resp   Resp Rate: 16 (11/04/20 1615) 19 Skin   warm  warm   Pain level  Pain Intensity 1: 7 (11/04/20 0540) 5/10 medicaided Edema    generalized   generalized   Orders:    Duration:   Start:    1315 End:    1645 Total:   3.5 hr   Dialyzer:   Dialyzer/Set Up Inspection: Edil([id number]) (11/04/20 1310)   K Bath:   Dialysate K (mEq/L): 3 (11/04/20 1310)   Ca Bath:   Dialysate CA (mEq/L): 2.5 (11/04/20 1310)   Na/Bicarb:   Dialysate NA (mEq/L): 140 (11/04/20 1310)   Target Fluid Removal:   Goal/Amount of Fluid to Remove (mL): 3000 mL (11/04/20 1310)   Access     Type & Location:   RIJ cath in place, patent, dressing dry and intact, no S/S of infection.    Labs     Obtained/Reviewed   Critical Results Called   Date when labs were drawn-  Hgb-    HGB   Date Value Ref Range Status   11/04/2020 7.2 (L) 12.1 - 17.0 g/dL Final     K-    Potassium   Date Value Ref Range Status   11/04/2020 3.9 3.5 - 5.1 mmol/L Final     Ca-   Calcium   Date Value Ref Range Status   11/04/2020 8.8 8.5 - 10.1 MG/DL Final     Bun-   BUN   Date Value Ref Range Status   11/04/2020 32 (H) 6 - 20 MG/DL Final     Creat-   Creatinine   Date Value Ref Range Status   11/04/2020 3.32 (H) 0.70 - 1.30 MG/DL Final        Medications/ Blood Products Given     Name   Dose   Route and Time                     Blood Volume Processed (BVP):    76 L Net Fluid   Removed:  3000 ml   Comments   Time Out Done:yes, 1310  Primary Nurse Rpt Pre:Jaqui BAEZ  Primary Nurse Rpt Post: Bj Cano RN  Pt Education:yers, r/t dialysis process  Care Plan: continue HD as ordered  Tx Summary: tolerted tx well, ate nd, all blood in circuit returned with 300 ml NS, RIJ cath in place, patent, dressing changed, no S/S of infection. Admiting Diagnosis: renal failure  Pt's previous clinic-  Consent signed - Informed Consent Verified: Yes (11/02/20 2000)  [facility] Consent -   Hepatitis Status- 10/22/20 Negative  Machine #- Machine Number: [id number] (11/04/20 1310)  Telemetry status-bedside  Pre-dialysis wt. - Pre-Dialysis Weight: 108.6 kg (239 lb 6.7 oz) (11/04/20 1310)

## 2020-11-04 NOTE — CONSULTS
Psychiatry Consult Follow Up Note    Reason for consult: bipolar disorder/ substance use  Please see full consult note written on 10/23/2020    IMPRESSION:   Mr. Evette Cranker is being seen at bedside, appears heavily sedated. He didn't even look at me. He tells me that seroquel was restarted because he was feeling agitated, he would like to keep taking it. Per nursing, he gets his pain meds round the clock, he knows the time when to get it next. Denies si hi or avh. Limited self disclosure. \"I'd rather sleep. \"      RECCS:    Change Seroquel to prn given that he's heavily sedated. I recommend to continue treating all his medical issues first and his underlying psychiatric issues can be dealt with once he is better physically. Admit to [facility] once medically cleared.     Thank you for this kind consult.     -------------------------------------------------------------------------------------------------------------------  Clinical summary of events since last encounter:    Meds:  Current Facility-Administered Medications   Medication Dose Route Frequency    hydrALAZINE (APRESOLINE) tablet 100 mg  100 mg Oral TID    metoprolol tartrate (LOPRESSOR) tablet 100 mg  100 mg Oral BID    albuterol-ipratropium (DUO-NEB) 2.5 MG-0.5 MG/3 ML  3 mL Nebulization BID RT    QUEtiapine (SEROquel) tablet 50 mg  50 mg Oral Q8H    oxyCODONE IR (ROXICODONE) tablet 5 mg  5 mg Oral Q4H PRN    LORazepam (ATIVAN) injection 2 mg  2 mg IntraVENous Q4H PRN    doxycycline (VIBRAMYCIN) 100 mg in 0.9% sodium chloride (MBP/ADV) 100 mL  100 mg IntraVENous Q12H    melatonin tablet 3 mg  3 mg Oral QHS    Vancomycin- pharmacy to dose   Other Rx Dosing/Monitoring    alteplase (CATHFLO) 1 mg in sterile water (preservative free) 1 mL injection  1 mg InterCATHeter PRN    isosorbide dinitrate (ISORDIL) tablet 10 mg  10 mg Oral TID    HYDROmorphone (PF) (DILAUDID) injection 2 mg  2 mg IntraVENous Q4H PRN    heparin 25,000 units in D5W 250 ml infusion 17-36 Units/kg/hr IntraVENous TITRATE    balsam peru-castor oiL (VENELEX) ointment   Topical Q8H    sodium chloride (NS) flush 5-40 mL  5-40 mL IntraVENous Q8H    sodium chloride (NS) flush 5-40 mL  5-40 mL IntraVENous PRN    acetaminophen (TYLENOL) tablet 650 mg  650 mg Oral Q6H PRN    Or    acetaminophen (TYLENOL) suppository 650 mg  650 mg Rectal Q6H PRN    polyethylene glycol (MIRALAX) packet 17 g  17 g Oral DAILY PRN    ondansetron (ZOFRAN) injection 4 mg  4 mg IntraVENous Q6H PRN    albuterol-ipratropium (DUO-NEB) 2.5 MG-0.5 MG/3 ML  3 mL Nebulization Q4H PRN    docusate sodium (COLACE) capsule 100 mg  100 mg Oral DAILY    labetaloL (NORMODYNE;TRANDATE) injection 20 mg  20 mg IntraVENous Q4H PRN    heparin (porcine) 1,000 unit/mL injection 1,400 Units  1,400 Units InterCATHeter DIALYSIS PRN    And    heparin (porcine) 1,000 unit/mL injection 1,100 Units  1,100 Units InterCATHeter DIALYSIS PRN         Vital Signs:  Blood pressure 134/77, pulse 91, temperature 97.7 °F (36.5 °C), resp. rate 19, height 5' 10\" (1.778 m), weight 107.1 kg (236 lb 3.2 oz), SpO2 98 %.     Labs: (reviewed/updated 11/4/2020)  Recent Results (from the past 24 hour(s))   PTT    Collection Time: 11/03/20 12:10 PM   Result Value Ref Range    aPTT 72.9 (H) 22.1 - 32.0 sec    aPTT, therapeutic range     58.0 - 77.0 SECS   SARS-COV-2    Collection Time: 11/03/20  4:31 PM   Result Value Ref Range    Specimen source Nasopharyngeal      SARS-CoV-2 PENDING     Specimen source NP SWAB     Specimen type NP Swab      Health status Symptomatic Testing     PROTHROMBIN TIME + INR    Collection Time: 11/04/20  2:16 AM   Result Value Ref Range    INR 1.3 (H) 0.9 - 1.1      Prothrombin time 13.7 (H) 9.0 - 11.1 sec   CK    Collection Time: 11/04/20  2:16 AM   Result Value Ref Range     39 - 520 U/L   METABOLIC PANEL, COMPREHENSIVE    Collection Time: 11/04/20  2:16 AM   Result Value Ref Range    Sodium 136 136 - 145 mmol/L    Potassium 3.9 3.5 - 5.1 mmol/L    Chloride 99 97 - 108 mmol/L    CO2 27 21 - 32 mmol/L    Anion gap 10 5 - 15 mmol/L    Glucose 97 65 - 100 mg/dL    BUN 32 (H) 6 - 20 MG/DL    Creatinine 3.32 (H) 0.70 - 1.30 MG/DL    BUN/Creatinine ratio 10 (L) 12 - 20      GFR est AA 28 (L) >60 ml/min/1.73m2    GFR est non-AA 23 (L) >60 ml/min/1.73m2    Calcium 8.8 8.5 - 10.1 MG/DL    Bilirubin, total 0.4 0.2 - 1.0 MG/DL    ALT (SGPT) 78 12 - 78 U/L    AST (SGOT) 22 15 - 37 U/L    Alk. phosphatase 58 45 - 117 U/L    Protein, total 5.3 (L) 6.4 - 8.2 g/dL    Albumin 2.1 (L) 3.5 - 5.0 g/dL    Globulin 3.2 2.0 - 4.0 g/dL    A-G Ratio 0.7 (L) 1.1 - 2.2     CBC WITH AUTOMATED DIFF    Collection Time: 11/04/20  2:16 AM   Result Value Ref Range    WBC 12.4 (H) 4.1 - 11.1 K/uL    RBC 2.62 (L) 4.10 - 5.70 M/uL    HGB 7.2 (L) 12.1 - 17.0 g/dL    HCT 22.8 (L) 36.6 - 50.3 %    MCV 87.0 80.0 - 99.0 FL    MCH 27.5 26.0 - 34.0 PG    MCHC 31.6 30.0 - 36.5 g/dL    RDW 17.2 (H) 11.5 - 14.5 %    PLATELET 571 324 - 096 K/uL    MPV 9.2 8.9 - 12.9 FL    NRBC 0.0 0  WBC    ABSOLUTE NRBC 0.00 0.00 - 0.01 K/uL    NEUTROPHILS 76 (H) 32 - 75 %    LYMPHOCYTES 11 (L) 12 - 49 %    MONOCYTES 10 5 - 13 %    EOSINOPHILS 2 0 - 7 %    BASOPHILS 0 0 - 1 %    IMMATURE GRANULOCYTES 1 (H) 0.0 - 0.5 %    ABS. NEUTROPHILS 9.4 (H) 1.8 - 8.0 K/UL    ABS. LYMPHOCYTES 1.4 0.8 - 3.5 K/UL    ABS. MONOCYTES 1.3 (H) 0.0 - 1.0 K/UL    ABS. EOSINOPHILS 0.3 0.0 - 0.4 K/UL    ABS. BASOPHILS 0.0 0.0 - 0.1 K/UL    ABS. IMM. GRANS. 0.1 (H) 0.00 - 0.04 K/UL    DF AUTOMATED       No results found for: VALF2, VALAC, VALP, VALPR, DS6, CRBAM, CRBAMP, CARB2, XCRBAM  No results found for: [place] [facility] [place]    Radiology (reviewed/updated 11/4/2020)  Mra Brain Wo Cont    Result Date: 10/23/2020  INDICATION: hypoxia COMPARISON:  None TECHNIQUE:  3-D time-of-flight MRA of the brain was performed. Multiplanar reconstructions were obtained. FINDINGS: Posterior Circulation:  No flow limiting stenosis or occlusion. Anterior Circulation:  No flow limiting stenosis or occlusion. Additional Comments:  No evidence of aneurysm or vascular malformation. IMPRESSION: No flow limiting stenosis or intracranial aneurysm. Mri Brain Wo Cont    Result Date: 10/23/2020  INDICATION:   Anxiety, depression, probable drug overdose, abnormal head CT AMS EXAMINATION:  MRI BRAIN WO CONTRAST COMPARISON:  None TECHNIQUE:  Multiplanar multisequence acquisition without contrast of the brain. FINDINGS:  Ventricles:  Midline, no hydrocephalus. Brain Parenchyma/Brainstem:  Small symmetric areas of diffusion restriction bilateral globus pallidus. Otherwise no parenchymal signal abnormality. Intracranial Hemorrhage:  None. Basal Cisterns:  Normal. Flow Voids:  Normal. Additional Comments:  N/A. IMPRESSION: Bilateral globus pallidus diffusion restriction, may be seen in heroin leukoencephalopathy, carbon monoxide poisoning, acute infarctions, and toxic/metabolic etiologies. Ct Head Wo Cont    Result Date: 10/21/2020  CT dose reduction was achieved through use of a standardized protocol tailored for this examination and automatic exposure control for dose modulation. Noncontrast study shows symmetric hypodensity in each basal ganglia, centered on the globus pallidus, left slightly larger than right, not present on the study of 3 months ago. Each region measures between 10 and 12 mm. No similar finding anywhere else. No other abnormality in gray or white matter. No mass effect or hemorrhage. Ventricles are symmetric and normal in size. No significant bone finding     IMPRESSION: Findings suggesting hypoxic or toxic edema in the basal ganglia    Ct Maxillofacial W Cont    Result Date: 9/15/2020  Contrast study shows extensive subcutaneous fat edema throughout the right maxillary and perimandibular region, with free fluid infiltrating throughout the fat in the preseptal periorbital right-sided location.  Fluid infiltration continues back, along the outer margin of the masseter muscle, to the margin of the parotid gland. Salivary glands are normal and symmetric. There is no stranding or edema of the postseptal fat. Extraocular muscles and optic nerves are normal symmetric. No intraocular abnormality. Very mild membrane thickening of the maxillary sinus and slightly greater membrane thickening in the anterior ethmoids. Thickening of the contiguous facial fashion. No bone destruction or periosteal reaction. Dental caries noted in a right mandibular molar and right maxillary incisor. Numerous small lymph nodes scattered throughout the field     IMPRESSION: Extensive right-sided facial edema/cellulitis, without intraorbital extension or abscess. If cause is unknown, possibilities would include both sinusitis and dental infection. Ct Chest Wo Cont    Result Date: 10/21/2020  CT dose reduction was achieved through use of a standardized protocol tailored for this examination and automatic exposure control for dose modulation. Noncontrast study shows moderate severity patchy consolidation and lesser degree of edema throughout much of the left lung, sparing the apex, central and peripheral. There is no large region of dense consolidation with the greatest severity at the elevated left base. Right lung is clear and central airways are open. No mediastinal or hilar adenopathy. Normal caliber aorta. No pleural pericardial effusion. No significant upper abdominal findings     IMPRESSION: Moderate severity pneumonia throughout the left lung    Us Abd Comp    Result Date: 10/22/2020  INDICATION: Acute renal failure. Rhabdomyolysis. COMPARISON: None TECHNIQUE: Routine ultrasound images of the abdomen were obtained. FINDINGS: GALLBLADDER: Contains biliary sludge. No cholecystolithiasis, gallbladder wall thickening, or pericholecystic fluid. COMMON BILE DUCT: 6 mm in diameter. No evidence of choledocholithiasis. LIVER: Normal in size. Normal echogenicity.  No focal hepatic mass or intrahepatic biliary dilatation. MAIN PORTAL VEIN: Patent. Hepatopetal flow direction. PANCREAS: No evidence of mass or ductal dilatation. RIGHT KIDNEY: 10.4 cm in length. No hydronephrosis. No evidence of mass or calculus. Perinephric edema is noted. LEFT KIDNEY: 11.9 cm in length. No hydronephrosis. No evidence of mass or calculus. Perinephric edema is noted. SPLEEN: 10.1 cm in length. No mass. AORTA: Visualized portions are normal in caliber. Distal abdominal aorta is obscured by overlying bowel gas. INFERIOR VENA CAVA: Patent. OTHER: Urinary bladder is decompressed with a Iniguez catheter. IMPRESSION: Normal renal size and cortical echogenicity. Bilateral perinephric edema. Biliary sludge in the gallbladder. Xr Chest Port    Result Date: 10/31/2020  Clinical history: Increased work of breathing with leukocytosis, concern for infectious process vs volume overload INDICATION:   Increased work of breathing with leukocytosis, concern for infectious process vs volume overload COMPARISON: 10/20/2020 FINDINGS: AP portable upright view of the chest demonstrates a stable  cardiopericardial silhouette. Increased bilateral parenchymal opacities compared to the prior study. Dialysis catheter and central venous access catheter are stable. .No large effusion. .Patient is on a cardiac monitor. IMPRESSION: Slightly increased interstitial and parenchymal opacities compared to the 10/20/2020 exam.     Xr Chest Port    Result Date: 10/28/2020  Clinical history: tachypnea, respiratory failure, pneumonia INDICATION:   tachypnea, respiratory failure, pneumonia COMPARISON: 10/27/2020 FINDINGS: AP portable upright view of the chest demonstrates a stable  cardiopericardial silhouette. Persistent interstitial and parenchymal opacities not significantly changed. Dialysis catheter on the right. Central venous access catheter extends from the left. No change. .Patient is on a cardiac monitor.      IMPRESSION: Stable bilateral parenchymal opacities. Graciela Arteaga Chest Port    Result Date: 10/27/2020  EXAM: XR CHEST PORT INDICATION: Tachypnea COMPARISON: October 25 FINDINGS: A portable AP radiograph of the chest was obtained at 1741 hours. The lines are stable. The patient is on a cardiac monitor. There is persistent opacification in the right lower lobe and throughout the lung, without significant change. Cardia mediastinal contours are stable. The bones and soft tissues are grossly within normal limits. IMPRESSION: No significant change in bilateral airspace opacification, left greater than right. Xr Chest Port    Result Date: 10/25/2020  EXAM:  XR CHEST PORT INDICATION:  sob COMPARISON:  10/17/2020 FINDINGS: A portable AP radiograph of the chest was obtained at 1003 hours. Left subclavian venous catheter tip overlies SVC. Right IJ catheter tip overlies SVC right atrial junction. .  There is increasing opacity in the mid to lower lung zones bilaterally left greater than right. .  Cardiomegaly is stable. Bony structures are unchanged. IMPRESSION: There is increasing opacity in the lungs bilaterally left greater than right which may represent edema although superimposed pneumonia is not excluded. Xr Chest Port    Result Date: 10/17/2020  Indication: Altered mental status. AP portable chest radiograph 17 October 2020 1848 hours. Comparison 21 July 2020. Clear lungs. Patient rotated to the left. Normal heart size exaggerated by patient rotation. No pneumothorax or pleural effusion. Normal bones. IMPRESSION: Negative. Mental Status Exam:  General appearance: Guille Lees is a 26 y.o. WHITE OR  male groomed, psychomotor activity is decreased  Eye contact: Avoids eye contact  Speech: Soft, decreased output. Affect : blunted  Mood: \"ok\"  Thought Process: Logical, goal directed  Perception: Denies AH or VH.    Thought Content: denies SI or Plan  Insight: Poor  Judgement: poor  Cognition: Intact grossly. Length of psychotherapy session: 35 minutes     Total Patient Care Time Spent: 35 minutes : (Coordinated treatment team rounds conducted with patient present; discussions with nurses, , pharmacist,  held; counseling time with patient, individual psychotherapy with patient; and discussions with family members; chart reviewed in full including consultant notes, ancillary staff notes, vitals and labs reviewed in full).     Greater than 50% of total patient care time included counseling    Signed:  Abundio Beckman NP  11/4/2020

## 2020-11-04 NOTE — PROGRESS NOTES
Occupational Therapy    Patient chart reviewed in prep for OT treatment session. Attempted to see patient - shortly after arriving, RN arriving for dialysis. Aborted session, however provided resistance sponges and educated on exercises for facilitating grasp strengthening with patient verbalizing understanding. Will follow-up as able and appropriate. Thank you.   Celi Shirley, OTR/L

## 2020-11-04 NOTE — PROGRESS NOTES
Problem: Falls - Risk of  Goal: *Absence of Falls  Description: Document Robert Mathew Fall Risk and appropriate interventions in the flowsheet. Outcome: Progressing Towards Goal  Pt remains free of falls during admission. Call bell and frequently used items within reach. Bedside table within reach. Pt provided nonskid socks and instructed to call out for nurse when needing assistance. Problem: Pressure Injury - Risk of  Goal: *Prevention of pressure injury  Description: Document Abdirashid Scale and appropriate interventions in the flowsheet. Note: Pressure Injury Interventions:  Pt turned ever two hours, moisture barrier applied, heels elevated, pillows and blankets used, pressure redistribution mattress, linens dry. Problem: Breathing Pattern - Ineffective  Goal: *Absence of hypoxia  Outcome: Progressing Towards Goal       Bedside shift change report given to Rachael Walker RN (oncoming nurse) by John North RN (offgoing nurse). Report included the following information SBAR, Kardex, Intake/Output, and Cardiac Rhythm NSR .

## 2020-11-04 NOTE — PROGRESS NOTES
Hospitalist Progress Note  Jose Antonio Orellana MD  Answering service: [phone] from in house phone      Date of Service:  2020  NAME:  Sallie Ortega  :  1995  MRN:  [id number]    Admission Summary:   25M p/w multiple drug use, resp failure/ARF  Interval history / Subjective:   Patient seen and examined at bedside. Feels ok, sleepy this morning, oxygenation continues to improve, down to 3L sats high 90s. Continue to wean down. Assessment & Plan:     #. Polysubstance abuse: UDS +ve for cocaine, THC, benzo, opioids, amphetamines  #. Pneumonia- likely aspiration- 2nd to above  #. Acute metabolic Encephalopathy: 2nd to above. Re-orient frequently,- seroquel prn. monitor. #. Cardiomyopathy: ef 15%- likely 2nd to drug use- Cardio following  #. Acute hypoxic respiratory failure: 2nd to above- Bipap to keep sats >90%  #. Rhabdomyolysis: improving with hydration- monitor ck daily- coming down nicely  #. ARF: 2nd to above. Nephrology following- started on RRT  #. Acute LUE DVT: heparin gtt. Switch to NOAC prior to dc. #. Acute Anemia: check hemoccult, iron, B12, folate. Monitor CBC, transfuse if HB<7.  #. Bacteremia: Staph hominis on 10/21. On vanc/doxy- occasional low grade fevers. Monitor  - repeat TTE 10/29: still ef 15-20%. Repeat CXR 10/31: slightly increased opacities b/l.  - Pulmonary team following. ID following, plans for [facility] ? ' COVID test pending'    #. Bipolar Disorder: Mother raised concerns about subOptimal psych care in past.   - Psych following, they will take him to IP psych unit once medically cleared.     Code status: Full  DVT prophylaxis: Heparin  Care Plan discussed with: Patient/Family and Nurse  Disposition: IP psych >2days     Hospital Problems  Never Reviewed          Codes Class Noted POA    Toxic encephalopathy ICD-10-CM: G92  ICD-9-CM: 349.82  10/24/2020 Unknown        Drug abuse ([facility] [address] ICD-10-CM: F19.10  ICD-9-CM: 305.90  10/22/2020 Unknown        Acute renal failure with tubular necrosis ([facility] ICD-10-CM: N17.0  ICD-9-CM: 584.5  10/22/2020 Unknown        * (Principal) Sepsis ([facility] ICD-10-CM: A41.9  ICD-9-CM: 038.9, 995.91  10/22/2020 Unknown        Community acquired pneumonia of left lower lobe of lung ICD-10-CM: J18.9  ICD-9-CM: 580  10/22/2020 Yes        Electrolyte abnormality ICD-10-CM: E87.8  ICD-9-CM: 276.9  10/22/2020 Yes            Review of Systems:   Pertinent items are mentioned in interval history. Vital Signs:    Last 24hrs VS reviewed since prior progress note. Most recent are:  Visit Vitals  /77 (BP 1 Location: Right arm, BP Patient Position: At rest)   Pulse 91   Temp 97.7 °F (36.5 °C)   Resp 19   Ht 5' 10\" (1.778 m)   Wt 107.1 kg (236 lb 3.2 oz)   SpO2 98%   BMI 33.89 kg/m²         Intake/Output Summary (Last 24 hours) at 11/4/2020 0946  Last data filed at 11/4/2020 0716  Gross per 24 hour   Intake 480 ml   Output 2275 ml   Net -1795 ml        Physical Examination:   General:  Alert, drowsy, No acute distress  Resp:  No accessory muscle use, no wheezes, few rhonchi  Abd:  Soft, non-tender, non-distended  Extremities:  No cyanosis or clubbing, no significant edema  Neuro:  drowsy, no focal neuro deficits, follows commands   Psych:  not agitated.     Data Review:    Review and/or order of clinical lab test  Review and/or order of tests in the radiology section of CPT  Review and/or order of tests in the medicine section of CPT  Labs:     Recent Labs     11/04/20 0216 11/03/20  0330   WBC 12.4* 12.1*   HGB 7.2* 7.2*   HCT 22.8* 22.5*    244     Recent Labs     11/04/20 0216 11/03/20  0330 11/02/20  0533    135* 132*   K 3.9 3.6 3.9   CL 99 99 97   CO2 27 28 26   BUN 32* 26* 37*   CREA 3.32* 2.87* 3.80*   GLU 97 90 91   CA 8.8 8.5 8.1*     Recent Labs     11/04/20  0216 11/03/20  0330 11/02/20  0533   ALT 78 101* 122*   AP 58 61 59   TBILI 0.4 0.5 0.4   TP 5.3* 5.5* 5.0* ALB 2.1* 2.1* 2.1*   GLOB 3.2 3.4 2.9     Recent Labs     11/04/20  0216 11/03/20  1210 11/03/20  0330 11/02/20  1117   INR 1.3*  --  1.2*  --    PTP 13.7*  --  12.9*  --    APTT  --  72.9* 61.2* 73.5*      No results for input(s): FE, TIBC, PSAT, FERR in the last 72 hours. Lab Results   Component Value Date/Time    Folate 9.7 10/31/2020 12:40 AM      No results for input(s): PH, PCO2, PO2 in the last 72 hours.   Recent Labs     11/04/20  0216 11/03/20  0330 11/02/20  0533    394* 477*     No results found for: CHOL, CHOLX, CHLST, CHOLV, HDL, HDLP, LDL, LDLC, DLDLP, TGLX, TRIGL, TRIGP, CHHD, CHHDX  Lab Results   Component Value Date/Time    Glucose (POC) 129 (H) 10/23/2020 04:27 PM     Lab Results   Component Value Date/Time    Color Yellow/Straw 10/21/2020 06:05 PM    Appearance Clear 10/21/2020 06:05 PM    Specific gravity 1.025 10/21/2020 06:05 PM    pH (UA) 5.5 10/21/2020 06:05 PM    Protein 30 (A) 10/21/2020 06:05 PM    Glucose Negative 10/21/2020 06:05 PM    Ketone Negative 10/21/2020 06:05 PM    Urobilinogen 1.0 10/21/2020 06:05 PM    Nitrites Negative 10/21/2020 06:05 PM    Leukocyte Esterase Negative 10/21/2020 06:05 PM    Bacteria Negative 10/21/2020 06:05 PM    WBC 0-4 10/21/2020 06:05 PM    RBC 0-5 10/21/2020 06:05 PM     Medications Reviewed:     Current Facility-Administered Medications   Medication Dose Route Frequency    hydrALAZINE (APRESOLINE) tablet 100 mg  100 mg Oral TID    metoprolol tartrate (LOPRESSOR) tablet 100 mg  100 mg Oral BID    albuterol-ipratropium (DUO-NEB) 2.5 MG-0.5 MG/3 ML  3 mL Nebulization BID RT    QUEtiapine (SEROquel) tablet 50 mg  50 mg Oral Q8H    oxyCODONE IR (ROXICODONE) tablet 5 mg  5 mg Oral Q4H PRN    LORazepam (ATIVAN) injection 2 mg  2 mg IntraVENous Q4H PRN    doxycycline (VIBRAMYCIN) 100 mg in 0.9% sodium chloride (MBP/ADV) 100 mL  100 mg IntraVENous Q12H    melatonin tablet 3 mg  3 mg Oral QHS    Vancomycin- pharmacy to dose   Other Rx Dosing/Monitoring    alteplase (CATHFLO) 1 mg in sterile water (preservative free) 1 mL injection  1 mg InterCATHeter PRN    isosorbide dinitrate (ISORDIL) tablet 10 mg  10 mg Oral TID    HYDROmorphone (PF) (DILAUDID) injection 2 mg  2 mg IntraVENous Q4H PRN    heparin 25,000 units in D5W 250 ml infusion  17-36 Units/kg/hr IntraVENous TITRATE    balsam peru-castor oiL (VENELEX) ointment   Topical Q8H    sodium chloride (NS) flush 5-40 mL  5-40 mL IntraVENous Q8H    sodium chloride (NS) flush 5-40 mL  5-40 mL IntraVENous PRN    acetaminophen (TYLENOL) tablet 650 mg  650 mg Oral Q6H PRN    Or    acetaminophen (TYLENOL) suppository 650 mg  650 mg Rectal Q6H PRN    polyethylene glycol (MIRALAX) packet 17 g  17 g Oral DAILY PRN    ondansetron (ZOFRAN) injection 4 mg  4 mg IntraVENous Q6H PRN    albuterol-ipratropium (DUO-NEB) 2.5 MG-0.5 MG/3 ML  3 mL Nebulization Q4H PRN    docusate sodium (COLACE) capsule 100 mg  100 mg Oral DAILY    labetaloL (NORMODYNE;TRANDATE) injection 20 mg  20 mg IntraVENous Q4H PRN    heparin (porcine) 1,000 unit/mL injection 1,400 Units  1,400 Units InterCATHeter DIALYSIS PRN    And    heparin (porcine) 1,000 unit/mL injection 1,100 Units  1,100 Units InterCATHeter DIALYSIS PRN   ______________________________________________________________________  EXPECTED LENGTH OF STAY: 4d 19h  ACTUAL LENGTH OF STAY:          Naya Vázquez MD

## 2020-11-04 NOTE — WOUND CARE
WOCN Note:  
 
New consult placed by RN for: right leg wound. Chart shows: sepsis Drug abuse, renal failure, tubular necrosis, rhabdo, cardiomyopathy, LLE DVT. PMH: community acquired pneumonia, toxic encephalopathy. Was here recently for the same. Psyche and vascular on consult. WBC = 12.4 On = 20 Admitted from home. Assessment:  
Patient is A&O x 3, communicative, continent and mobile in bed Assists in repositioning, able to turn independently for assessment. Patient continent. Bed: Versa Care Bed Diet: regular with nutritional supplements Patient reports no pain. -left media heel blister: secondary to rhabdo.: intact: 5.0cm x 4.3cm x 0.0cm 
-top of left swollen foot: 3.0cm x 10.0cm x 0.0cm / 100% light pink and not opened. -right lateral le.0cm x 2.5cm x 0.2cm / 25% slough/ 50% eschar / 25% scattered pinkness. Wound is red, raised ( appears like cauliflower)draining ser/sang drainage , generous amount on dressing removed. Recommendations:   
- left medial heel blister: intact= foam bordered dressing. Opens up: venelex ointment with foam bordered dressing. 
-top of left swollen foot: venelex ointment twice daily. - right lateal leg: daily clean with NS, apply santyl ointment, mois NS, 2x2 and foam bordered dressing. Minimize layers of linen/pads under patient to optimize support surface. Turn/reposition approximately every 2 hours and offload heels. Continent. Versa Care Bed. Discussed above plan with patient and RN Vera Jefferson. Transition of Care: Plan to follow weekly and as needed while admitted to hospital.   
 
Franky WOLFE RN Wound Care Department Office: [phone] Pager: [phone]

## 2020-11-05 ENCOUNTER — APPOINTMENT (OUTPATIENT)
Dept: GENERAL RADIOLOGY | Age: 25
DRG: 812 | End: 2020-11-05
Attending: PHYSICIAN ASSISTANT
Payer: MEDICAID

## 2020-11-05 LAB
ALBUMIN SERPL-MCNC: 2.3 G/DL (ref 3.5–5)
ALBUMIN/GLOB SERPL: 0.7 {RATIO} (ref 1.1–2.2)
ALP SERPL-CCNC: 58 U/L (ref 45–117)
ALT SERPL-CCNC: 61 U/L (ref 12–78)
ANION GAP SERPL CALC-SCNC: 7 MMOL/L (ref 5–15)
APTT PPP: 77.5 SEC (ref 22.1–32)
AST SERPL-CCNC: 20 U/L (ref 15–37)
BASOPHILS # BLD: 0.1 K/UL (ref 0–0.1)
BASOPHILS NFR BLD: 1 % (ref 0–1)
BILIRUB SERPL-MCNC: 0.4 MG/DL (ref 0.2–1)
BUN SERPL-MCNC: 23 MG/DL (ref 6–20)
BUN/CREAT SERPL: 9 (ref 12–20)
CALCIUM SERPL-MCNC: 9 MG/DL (ref 8.5–10.1)
CHLORIDE SERPL-SCNC: 101 MMOL/L (ref 97–108)
CK SERPL-CCNC: 283 U/L (ref 39–308)
CO2 SERPL-SCNC: 28 MMOL/L (ref 21–32)
CREAT SERPL-MCNC: 2.59 MG/DL (ref 0.7–1.3)
DIFFERENTIAL METHOD BLD: ABNORMAL
EOSINOPHIL # BLD: 0.2 K/UL (ref 0–0.4)
EOSINOPHIL NFR BLD: 2 % (ref 0–7)
ERYTHROCYTE [DISTWIDTH] IN BLOOD BY AUTOMATED COUNT: 17.2 % (ref 11.5–14.5)
GLOBULIN SER CALC-MCNC: 3.1 G/DL (ref 2–4)
GLUCOSE SERPL-MCNC: 101 MG/DL (ref 65–100)
HCT VFR BLD AUTO: 23.7 % (ref 36.6–50.3)
HEALTH STATUS, XMCV2T: NORMAL
HGB BLD-MCNC: 7.5 G/DL (ref 12.1–17)
IMM GRANULOCYTES # BLD AUTO: 0 K/UL (ref 0–0.04)
IMM GRANULOCYTES NFR BLD AUTO: 0 % (ref 0–0.5)
INR PPP: 1.3 (ref 0.9–1.1)
LYMPHOCYTES # BLD: 1.2 K/UL (ref 0.8–3.5)
LYMPHOCYTES NFR BLD: 11 % (ref 12–49)
MCH RBC QN AUTO: 27.7 PG (ref 26–34)
MCHC RBC AUTO-ENTMCNC: 31.6 G/DL (ref 30–36.5)
MCV RBC AUTO: 87.5 FL (ref 80–99)
MONOCYTES # BLD: 1 K/UL (ref 0–1)
MONOCYTES NFR BLD: 9 % (ref 5–13)
NEUTS SEG # BLD: 8.2 K/UL (ref 1.8–8)
NEUTS SEG NFR BLD: 77 % (ref 32–75)
NRBC # BLD: 0 K/UL (ref 0–0.01)
NRBC BLD-RTO: 0 PER 100 WBC
PLATELET # BLD AUTO: 279 K/UL (ref 150–400)
PMV BLD AUTO: 9.7 FL (ref 8.9–12.9)
POTASSIUM SERPL-SCNC: 3.7 MMOL/L (ref 3.5–5.1)
PROT SERPL-MCNC: 5.4 G/DL (ref 6.4–8.2)
PROTHROMBIN TIME: 13.5 SEC (ref 9–11.1)
RBC # BLD AUTO: 2.71 M/UL (ref 4.1–5.7)
SARS-COV-2, COV2: NOT DETECTED
SODIUM SERPL-SCNC: 136 MMOL/L (ref 136–145)
SOURCE, COVRS: NORMAL
SPECIMEN SOURCE, FCOV2M: NORMAL
SPECIMEN TYPE, XMCV1T: NORMAL
THERAPEUTIC RANGE,PTTT: ABNORMAL SECS (ref 58–77)
WBC # BLD AUTO: 10.7 K/UL (ref 4.1–11.1)

## 2020-11-05 PROCEDURE — 77010033678 HC OXYGEN DAILY

## 2020-11-05 PROCEDURE — 85610 PROTHROMBIN TIME: CPT

## 2020-11-05 PROCEDURE — 74011250637 HC RX REV CODE- 250/637: Performed by: NURSE PRACTITIONER

## 2020-11-05 PROCEDURE — 71045 X-RAY EXAM CHEST 1 VIEW: CPT

## 2020-11-05 PROCEDURE — 97530 THERAPEUTIC ACTIVITIES: CPT

## 2020-11-05 PROCEDURE — 97110 THERAPEUTIC EXERCISES: CPT

## 2020-11-05 PROCEDURE — 80053 COMPREHEN METABOLIC PANEL: CPT

## 2020-11-05 PROCEDURE — 74011250637 HC RX REV CODE- 250/637: Performed by: ANESTHESIOLOGY

## 2020-11-05 PROCEDURE — 97116 GAIT TRAINING THERAPY: CPT

## 2020-11-05 PROCEDURE — 65660000001 HC RM ICU INTERMED STEPDOWN

## 2020-11-05 PROCEDURE — 74011250636 HC RX REV CODE- 250/636: Performed by: EMERGENCY MEDICINE

## 2020-11-05 PROCEDURE — 82550 ASSAY OF CK (CPK): CPT

## 2020-11-05 PROCEDURE — 74011000258 HC RX REV CODE- 258: Performed by: INTERNAL MEDICINE

## 2020-11-05 PROCEDURE — 85730 THROMBOPLASTIN TIME PARTIAL: CPT

## 2020-11-05 PROCEDURE — 36415 COLL VENOUS BLD VENIPUNCTURE: CPT

## 2020-11-05 PROCEDURE — 74011250636 HC RX REV CODE- 250/636: Performed by: INTERNAL MEDICINE

## 2020-11-05 PROCEDURE — 74011250637 HC RX REV CODE- 250/637: Performed by: SPECIALIST

## 2020-11-05 PROCEDURE — 94640 AIRWAY INHALATION TREATMENT: CPT

## 2020-11-05 PROCEDURE — 74011000250 HC RX REV CODE- 250: Performed by: INTERNAL MEDICINE

## 2020-11-05 PROCEDURE — 74011250637 HC RX REV CODE- 250/637: Performed by: INTERNAL MEDICINE

## 2020-11-05 PROCEDURE — 99233 SBSQ HOSP IP/OBS HIGH 50: CPT | Performed by: INTERNAL MEDICINE

## 2020-11-05 PROCEDURE — 85025 COMPLETE CBC W/AUTO DIFF WBC: CPT

## 2020-11-05 RX ORDER — ISOSORBIDE DINITRATE 20 MG/1
20 TABLET ORAL 3 TIMES DAILY
Status: DISCONTINUED | OUTPATIENT
Start: 2020-11-05 | End: 2020-11-11 | Stop reason: HOSPADM

## 2020-11-05 RX ORDER — IPRATROPIUM BROMIDE AND ALBUTEROL SULFATE 2.5; .5 MG/3ML; MG/3ML
3 SOLUTION RESPIRATORY (INHALATION)
Status: DISCONTINUED | OUTPATIENT
Start: 2020-11-05 | End: 2020-11-11 | Stop reason: HOSPADM

## 2020-11-05 RX ADMIN — HYDROMORPHONE HYDROCHLORIDE 2 MG: 2 INJECTION, SOLUTION INTRAMUSCULAR; INTRAVENOUS; SUBCUTANEOUS at 09:00

## 2020-11-05 RX ADMIN — DOXYCYCLINE 100 MG: 100 INJECTION, POWDER, LYOPHILIZED, FOR SOLUTION INTRAVENOUS at 21:24

## 2020-11-05 RX ADMIN — HYDRALAZINE HYDROCHLORIDE 100 MG: 50 TABLET, FILM COATED ORAL at 15:58

## 2020-11-05 RX ADMIN — DOXYCYCLINE 100 MG: 100 INJECTION, POWDER, LYOPHILIZED, FOR SOLUTION INTRAVENOUS at 09:00

## 2020-11-05 RX ADMIN — Medication: at 07:42

## 2020-11-05 RX ADMIN — HYDROMORPHONE HYDROCHLORIDE 2 MG: 2 INJECTION, SOLUTION INTRAMUSCULAR; INTRAVENOUS; SUBCUTANEOUS at 03:35

## 2020-11-05 RX ADMIN — METOPROLOL TARTRATE 100 MG: 50 TABLET, FILM COATED ORAL at 09:00

## 2020-11-05 RX ADMIN — HEPARIN SODIUM 30 UNITS/KG/HR: 10000 INJECTION, SOLUTION INTRAVENOUS at 12:00

## 2020-11-05 RX ADMIN — Medication 10 ML: at 21:22

## 2020-11-05 RX ADMIN — OXYCODONE HYDROCHLORIDE 5 MG: 5 TABLET ORAL at 12:01

## 2020-11-05 RX ADMIN — HEPARIN SODIUM 30 UNITS/KG/HR: 10000 INJECTION, SOLUTION INTRAVENOUS at 03:36

## 2020-11-05 RX ADMIN — ISOSORBIDE DINITRATE 10 MG: 10 TABLET ORAL at 09:00

## 2020-11-05 RX ADMIN — METOPROLOL TARTRATE 100 MG: 50 TABLET, FILM COATED ORAL at 19:52

## 2020-11-05 RX ADMIN — ISOSORBIDE DINITRATE 20 MG: 20 TABLET ORAL at 21:22

## 2020-11-05 RX ADMIN — Medication 3 MG: at 19:52

## 2020-11-05 RX ADMIN — HEPARIN SODIUM 30 UNITS/KG/HR: 10000 INJECTION, SOLUTION INTRAVENOUS at 21:22

## 2020-11-05 RX ADMIN — DOCUSATE SODIUM 100 MG: 100 CAPSULE, LIQUID FILLED ORAL at 09:00

## 2020-11-05 RX ADMIN — HYDRALAZINE HYDROCHLORIDE 100 MG: 50 TABLET, FILM COATED ORAL at 21:22

## 2020-11-05 RX ADMIN — IPRATROPIUM BROMIDE AND ALBUTEROL SULFATE 3 ML: .5; 3 SOLUTION RESPIRATORY (INHALATION) at 08:44

## 2020-11-05 RX ADMIN — Medication: at 21:22

## 2020-11-05 RX ADMIN — Medication 10 ML: at 07:42

## 2020-11-05 RX ADMIN — Medication: at 15:59

## 2020-11-05 RX ADMIN — HYDROMORPHONE HYDROCHLORIDE 2 MG: 2 INJECTION, SOLUTION INTRAMUSCULAR; INTRAVENOUS; SUBCUTANEOUS at 15:58

## 2020-11-05 RX ADMIN — HYDRALAZINE HYDROCHLORIDE 100 MG: 50 TABLET, FILM COATED ORAL at 09:00

## 2020-11-05 RX ADMIN — ISOSORBIDE DINITRATE 20 MG: 20 TABLET ORAL at 15:58

## 2020-11-05 RX ADMIN — HYDROMORPHONE HYDROCHLORIDE 2 MG: 2 INJECTION, SOLUTION INTRAMUSCULAR; INTRAVENOUS; SUBCUTANEOUS at 19:52

## 2020-11-05 NOTE — PROGRESS NOTES
Pulmonary, Critical Care, and Sleep Medicine~Progress Note    Name: Fernando Camarena MRN: [id number]   : 1995 Hospital: [facility]    Date: 2020 10:29 AM Admission: 10/21/2020     Impression Plan   1. Acute hypoxic resp failure secondary to below  2. Pulmonary infiltrates: Mycoplasma PNA + volume overload   3. HFrEF: EF 15-20%, PASP 45mmHg  4. JEAN PIERRE   5. Rhabdomyolysis   6. Staph hominis bacteremia   7. Acute Left upper Extrem DVT  8. Polysubstance abuse: + EtOH, Cocaine, benzos, amphetamines, THC  1. On heparin gtt  2. LUCIANO pending  3. Doxy/vanc   4. Judicious sedative medications  5. O2 titration above 90%, on NC wean off  6. Chest film today      Daily Progression:    Breathing ok     I have reviewed the labs and previous days notes. Pertinent items are noted in HPI. OBJECTIVE:     Vital Signs:       Visit Vitals  BP (!) 172/98   Pulse 93   Temp 97.7 °F (36.5 °C)   Resp 21   Ht 5' 10\" (1.778 m)   Wt 105.9 kg (233 lb 8 oz)   SpO2 98%   BMI 33.50 kg/m²      Temp (24hrs), Av.8 °F (36.6 °C), Min:97 °F (36.1 °C), Max:98.3 °F (36.8 °C)     Intake/Output:     Last shift: No intake/output data recorded.     Last 3 shifts:  1901 -  0700  In: 1920 [P.O.:1920]  Out: 6300 [Urine:3300]          Intake/Output Summary (Last 24 hours) at 2020 1015  Last data filed at 2020 0304  Gross per 24 hour   Intake 960 ml   Output 4700 ml   Net -3740 ml       Physical Exam:                                        Exam Findings Other   General: No resp distress noted, appears stated age    [de-identified]:  No ulcers, JVD not elevated, no cervical LAD    Chest: No pectus deformity, normal chest rise b/l    HEART:  RRR, no murmurs/rubs/gallops    Lungs:  CTA b/l, no rhonchi/crackles/wheeze, diminished BS at bases    ABD: Soft/NT, non rigid mildly distended    EXT: No cyanosis/clubbing/edema, normal peripheral pulses    Skin: No rashes or ulcers, no mottling    Neuro: Alert, speech intelligible        Medications:  Current Facility-Administered Medications   Medication Dose Route Frequency    QUEtiapine (SEROquel) tablet 50 mg  50 mg Oral Q8H PRN    hydrALAZINE (APRESOLINE) tablet 100 mg  100 mg Oral TID    metoprolol tartrate (LOPRESSOR) tablet 100 mg  100 mg Oral BID    albuterol-ipratropium (DUO-NEB) 2.5 MG-0.5 MG/3 ML  3 mL Nebulization BID RT    oxyCODONE IR (ROXICODONE) tablet 5 mg  5 mg Oral Q4H PRN    LORazepam (ATIVAN) injection 2 mg  2 mg IntraVENous Q4H PRN    doxycycline (VIBRAMYCIN) 100 mg in 0.9% sodium chloride (MBP/ADV) 100 mL  100 mg IntraVENous Q12H    melatonin tablet 3 mg  3 mg Oral QHS    Vancomycin- pharmacy to dose   Other Rx Dosing/Monitoring    alteplase (CATHFLO) 1 mg in sterile water (preservative free) 1 mL injection  1 mg InterCATHeter PRN    isosorbide dinitrate (ISORDIL) tablet 10 mg  10 mg Oral TID    HYDROmorphone (PF) (DILAUDID) injection 2 mg  2 mg IntraVENous Q4H PRN    heparin 25,000 units in D5W 250 ml infusion  17-36 Units/kg/hr IntraVENous TITRATE    balsam peru-castor oiL (VENELEX) ointment   Topical Q8H    sodium chloride (NS) flush 5-40 mL  5-40 mL IntraVENous Q8H    sodium chloride (NS) flush 5-40 mL  5-40 mL IntraVENous PRN    acetaminophen (TYLENOL) tablet 650 mg  650 mg Oral Q6H PRN    Or    acetaminophen (TYLENOL) suppository 650 mg  650 mg Rectal Q6H PRN    polyethylene glycol (MIRALAX) packet 17 g  17 g Oral DAILY PRN    ondansetron (ZOFRAN) injection 4 mg  4 mg IntraVENous Q6H PRN    albuterol-ipratropium (DUO-NEB) 2.5 MG-0.5 MG/3 ML  3 mL Nebulization Q4H PRN    docusate sodium (COLACE) capsule 100 mg  100 mg Oral DAILY    labetaloL (NORMODYNE;TRANDATE) injection 20 mg  20 mg IntraVENous Q4H PRN    heparin (porcine) 1,000 unit/mL injection 1,400 Units  1,400 Units InterCATHeter DIALYSIS PRN    And    heparin (porcine) 1,000 unit/mL injection 1,100 Units  1,100 Units InterCATHeter DIALYSIS PRN       Labs:  ABG No results for input(s): PHI, PCO2I, PO2I, HCO3I, SO2I, FIO2I in the last 72 hours.      CBC Recent Labs     11/05/20 0432 11/04/20 0216 11/03/20  0330   WBC 10.7 12.4* 12.1*   HGB 7.5* 7.2* 7.2*   HCT 23.7* 22.8* 22.5*    266 244   MCV 87.5 87.0 86.2   MCH 27.7 27.5 87.7        Metabolic  Panel Recent Labs     11/05/20 0432 11/04/20 0216 11/03/20  0330    136 135*   K 3.7 3.9 3.6    99 99   CO2 28 27 28   * 97 90   BUN 23* 32* 26*   CREA 2.59* 3.32* 2.87*   CA 9.0 8.8 8.5   ALB 2.3* 2.1* 2.1*   ALT 61 78 101*   INR 1.3* 1.3* 1.2*        Pertinent Labs                Willie Delvalle PA-C  11/5/2020

## 2020-11-05 NOTE — PROGRESS NOTES
OCCUPATIONAL THERAPY TREATMENT/WEEKLY RE-ASSESSMENT  Patient: Christopher Velazquez (26 y.o. male)  Date: 11/5/2020  Diagnosis: Sepsis ([facility] [address] [A41.9]  ARF (acute renal failure) ([facility] [address] [N17.9]  Drug abuse ([facility] [address] [F19.10] Sepsis ([facility] [address]      Precautions: Fall, Skin(no BP LUE (DVT) and bed alarm if no sitter)  Chart, occupational therapy assessment, plan of care, and goals were reviewed. ASSESSMENT  Patient continues with skilled OT services and is slowly progressing towards goals. Pt progressing with functional mobility/balance, activity tolerance and PLB technique, functionally evidenced by Min A sit to stand transfer and Mod A bed to chair transfer at [facility]. Pt receptive to thera ex education regarding posterior shoulder rolls, shoulder shrugs, self ROM and gravity eliminated shoulder flexion with horizontal ab/adduction. Pt also progressing with awareness to diaphragmatic breathing, with good understanding of QL stretches and use of incentive spirometer. Pt also with good awareness to safe mental health techniques of implementation of meaningful occupations, as well as, guided meditation. All goals continued at their current levels as they remain appropriate, with continued decreased strength/endurance, decreased higher level mobility/balance, decreased activity tolerance, SOB with exertion and edema limiting safe ADL/IADL independence. Pt continues to benefit from skilled OT to address functional deficits in an attempt at maximizing pt's highest level of safe functional independence, with reporting therapist believing pt would benefit from inpatient rehab upon discharge. Current Level of Function Impacting Discharge (ADLs): Mod A    Other factors to consider for discharge: Decreased activity tolerance, 02 needs         PLAN :  Goals have been updated based on progression since last assessment. Patient continues to benefit from skilled intervention to address the above impairments.  Continue to follow patient 5 times a week to address goals. Recommend with staff: OOB meals, Active ADL engagement, assist x1 to/from bathroom/bsc    Recommend next OT session: POC progression    Recommendation for discharge: (in order for the patient to meet his/her long term goals)  Therapy 3 hours per day 5-7 days per week    This discharge recommendation:  Has not yet been discussed the attending provider and/or case management    IF patient discharges home will need the following DME: TBD       SUBJECTIVE:   Patient stated my mind was racing some during dialysis yesterday.     OBJECTIVE DATA SUMMARY:   Cognitive/Behavioral Status:  Neurologic State: Alert  Orientation Level: Oriented X4  Cognition: Appropriate decision making; Appropriate for age attention/concentration; Appropriate safety awareness  Perception: Appears intact  Perseveration: No perseveration noted  Safety/Judgement: Awareness of environment    Functional Mobility and Transfers for ADLs:  Bed Mobility:  Supine to Sit: Stand-by assistance; Additional time  Sit to Supine: (up in chair after)    Transfers:  Sit to Stand: Minimum assistance; Adaptive equipment; Additional time(with either elevated bed or arms of chair to press)  Bed to Chair: Moderate assistance; Adaptive equipment; Additional time    Balance:  Sitting: Intact; Without support  Standing: Impaired; With support(with hands on the walker)  Standing - Static: Fair;Constant support  Standing - Dynamic : Fair;Constant support    ADL Intervention:  Cognitive Retraining  Safety/Judgement: Awareness of environment    Therapeutic Exercises:   Pt educated on shoulder shrugs, posterior shoulder rolls, gravity eliminated shoulder flexion with horizontal ab/adduction and QL stretches. Therapeutic Activities:  Pt educated on use of guided meditation to assist with reports of \"racing thoughts\" during yesterday's dialysis; good understanding verbalized.     Pain:  No c/o pain    Activity Tolerance:   Fair, desaturates with exertion and requires oxygen, requires frequent rest breaks, and observed SOB with activity    After treatment patient left in no apparent distress:   Sitting in chair, Call bell within reach, and sitter present    COMMUNICATION/COLLABORATION:   The patients plan of care was discussed with: Physical therapist and Registered nurse.      Oli Oconnell OT  Time Calculation: 30 mins

## 2020-11-05 NOTE — PROGRESS NOTES
Problem: Mobility Impaired (Adult and Pediatric)  Goal: *Acute Goals and Plan of Care (Insert Text)  Description: FUNCTIONAL STATUS PRIOR TO ADMISSION: Patient was independent and active without use of DME.    HOME SUPPORT PRIOR TO ADMISSION: The patient lived with family but did not require assist.      Physical Therapy Goals  Revised 11/3/2020  1. Patient will move from supine to sit and sit to supine , scoot up and down, and roll side to side in bed with supervision/set-up within 7 day(s). 2.  Patient will transfer from bed to chair and chair to bed with minimal assistance/contact guard assist X 1 using the least restrictive device within 7 day(s). 3.  Patient will perform sit to stand with minimal assistance/contact guard assist X 1 within 7 day(s). 4.  Patient will ambulate with minimal assistance/contact guard assist X 1 for 50 feet with the least restrictive device within 7 day(s). Physical Therapy Goals  Initiated 10/27/2020  1. Patient will move from supine to sit and sit to supine  and roll side to side in bed with minimal assistance/contact guard assist within 7 day(s). 2.  Patient will transfer from bed to chair and chair to bed with moderate assistance  using the least restrictive device within 7 day(s). 3.  Patient will perform sit to stand with moderate assistance  within 7 day(s). Outcome: Progressing Towards Goal   PHYSICAL THERAPY TREATMENT  Patient: Edmar Fitzpatrick (26 y.o. male)  Date: 11/5/2020  Diagnosis: Sepsis ([facility] [address] [A41.9]  ARF (acute renal failure) ([address] [N17.9]  Drug abuse ([address] [F19.10]   Sepsis ([address]       Precautions: Fall, Skin(no BP LUE (DVT) and bed alarm if no sitter)  Chart, physical therapy assessment, plan of care and goals were reviewed. ASSESSMENT  Patient continues with skilled PT services and is progressing towards goals. He was eager to mobilize today and maintained a very positive attitude throughout session.   He was able to stand and walk to the chair with RW with min to mod assist and slow pace. HR max in upper 90s and SpO2 maintained at 99%. He does become quite tachypneic, however, with RR in the upper 20s with minimal activity. Worked on standing exercises for balance and strengthening and breathing exercises to emphasize deeper, more controlled breathing utilizing positioning, activity and incentive spirometer. Continue to recommend inpatient rehab once he is medically ready in order to maximize his functional recovery. Current Level of Function Impacting Discharge (mobility/balance): min to mod assist with minimal activity    Other factors to consider for discharge:none         PLAN :  Patient continues to benefit from skilled intervention to address the above impairments. Continue treatment per established plan of care. to address goals. Recommendation for discharge: (in order for the patient to meet his/her long term goals)  Therapy 3 hours per day 5-7 days per week    This discharge recommendation:  A follow-up discussion with the attending provider and/or case management is planned    IF patient discharges home will need the following DME: to be determined (TBD)       SUBJECTIVE:   Patient stated It's a beautiful morning.     OBJECTIVE DATA SUMMARY:   Critical Behavior:  Neurologic State: Alert  Orientation Level: Oriented X4  Cognition: Appropriate decision making, Appropriate for age attention/concentration, Appropriate safety awareness, Follows commands  Safety/Judgement: Decreased awareness of need for safety, Decreased insight into deficits  Functional Mobility Training:  Bed Mobility:     Supine to Sit: Stand-by assistance; Additional time  Sit to Supine: (up in chair after)           Transfers:  Sit to Stand: Minimum assistance; Adaptive equipment; Additional time(with either elevated bed or arms of chair to press)  Stand to Sit: Minimum assistance; Adaptive equipment; Additional time        Bed to Chair: Moderate assistance; Adaptive equipment; Additional time                    Balance:  Sitting: Intact; Without support  Standing: Impaired; With support(with hands on the walker)  Standing - Static: Fair;Constant support  Standing - Dynamic : Fair;Constant support  Ambulation/Gait Training:  Distance (ft): 7 Feet (ft)(then standing in place with walker for standing exercises)  Assistive Device: Gait belt;Walker, rolling  Ambulation - Level of Assistance: Minimal assistance; Adaptive equipment; Additional time;Assist x2(2 people for safety, but no buckling of LEs noted)        Gait Abnormalities: Decreased step clearance;Trunk sway increased        Base of Support: Widened(very wide ZHEN)     Speed/Tran: Slow  Step Length: Right shortened;Left shortened                    Stairs: Therapeutic Exercises:   Deep breathing, incentive spirometry, standing marching, up on toes,  standing without hands to support  Pain Rating:  No specific pain complaints    Activity Tolerance:   Fair, requires rest breaks, observed SOB with activity, and limited moreso by BOYER than by muscle fatigue    After treatment patient left in no apparent distress:   Sitting in chair, Call bell within reach, and Caregiver / family present    COMMUNICATION/COLLABORATION:   The patients plan of care was discussed with: Occupational therapist and Registered nurse.      Marifer Zhang, PT   Time Calculation: 27 mins

## 2020-11-05 NOTE — PROGRESS NOTES
Progress Note    Patient: Georgette Luna MRN: [id number]  SSN: [id number]    YOB: 1995  Age: 26 y.o. Sex: male      Admit Date: 10/21/2020    LOS: 14 days    Cardiologist: Emanuel Michaels MD    Subjective:     Mr. Nitesh Albarran is a 27 yo  male admitted 10/21/2020 to [facility] in [address] with altered mental status for >24 hours, lethargy, found down by family. He had substance overdose with rhabdomyelysis, elevated troponin, severe LV systolic dysfunction, shock liver, acute renal failure, LUE DVT and sepsis. No prior reports of chest pain or shortness of breath. He was transferred to [facility] for further care. Sleeping this morning. But arousable. Agreeable to LUCIANO. Denies discomfort. Affect flat. Objective:     Vitals:    20 0737 20 0845 20 0900 20 1101   BP: (!) 169/95  (!) 172/98 (!) 167/92   Pulse: 93  93 90   Resp: 21   24   Temp: 97.7 °F (36.5 °C)   97.9 °F (36.6 °C)   TempSrc:       SpO2: 98% 98%  100%   Weight:       Height:          Temp (24hrs), Av.8 °F (36.6 °C), Min:97 °F (36.1 °C), Max:98.3 °F (36.8 °C)      Intake and Output:  Current Shift: No intake/output data recorded. Last three shifts: 1901 -  07  In: 1920 [P.O.:1920]  Out: 6300 [Urine:3300]    Physical Exam:  General:  Resting comfortably, well nourished, well developed, appears stated age   Eyes:  Conjunctivae/corneas clear    Nose: Nares normal. Septum midline. Mucosa normal. No drainage or sinus tenderness. Neck: Supple, symmetrical, trachea midline, no JVD   Lungs:   Clear to auscultation bilaterally, good effort   Heart:  Tachycardic rate and rhythm, no murmur, click, rub, or gallop   Abdomen:   Soft, non-tender, bowel sounds normal, non-distended   Extremities: Normal, atraumatic, no cyanosis or edema   Skin: Skin color, texture, turgor normal. No rash or lesions.    Neurologic: Non focal       Current Facility-Administered Medications:     influenza vaccine 2020-21 (6 mos+)(PF) (FLUARIX/FLULAVAL/FLUZONE QUAD) injection 0.5 mL, 0.5 mL, IntraMUSCular, PRIOR TO DISCHARGE, Lulu Dunham DO    [START ON 11/6/2020] Vancomycin random level- please draw at 0400 on 11/6.  Thanks!, , Other, ONCE, JULIAN Dunham MDO    isosorbide dinitrate (ISORDIL) tablet 20 mg, 20 mg, Oral, TID, Hilda Hidalgo MD    QUEtiapine (SEROquel) tablet 50 mg, 50 mg, Oral, Q8H PRN, Iram Pretty NP    hydrALAZINE (APRESOLINE) tablet 100 mg, 100 mg, Oral, TID, AlmsallamJavier MD, 100 mg at 11/05/20 0900    metoprolol tartrate (LOPRESSOR) tablet 100 mg, 100 mg, Oral, BID, Hilda Hidalgo MD, 100 mg at 11/05/20 0900    albuterol-ipratropium (DUO-NEB) 2.5 MG-0.5 MG/3 ML, 3 mL, Nebulization, BID RT, Ulysses Long MD, 3 mL at 11/05/20 0844    oxyCODONE IR (ROXICODONE) tablet 5 mg, 5 mg, Oral, Q4H PRN, Luis Miguel Crespo DO, 5 mg at 11/05/20 1201    LORazepam (ATIVAN) injection 2 mg, 2 mg, IntraVENous, Q4H PRN, Noman Aguillon NP, 2 mg at 11/01/20 1253    doxycycline (VIBRAMYCIN) 100 mg in 0.9% sodium chloride (MBP/ADV) 100 mL, 100 mg, IntraVENous, Q12H, Marilyn Eddy MD, Last Rate: 100 mL/hr at 11/05/20 0900, 100 mg at 11/05/20 0900    melatonin tablet 3 mg, 3 mg, Oral, QHS, Luis Miguel Crespo DO, 3 mg at 11/04/20 2101    Vancomycin- pharmacy to dose, , Other, Rx Dosing/Monitoring, RINA Guerra MD    alteplase (CATHFLO) 1 mg in sterile water (preservative free) 1 mL injection, 1 mg, InterCATHeter, PRN, Prashant Waller MD, 1 mg at 10/31/20 0656    HYDROmorphone (PF) (DILAUDID) injection 2 mg, 2 mg, IntraVENous, Q4H PRN, Robles Restrepo MD, 2 mg at 11/05/20 0900    heparin 25,000 units in D5W 250 ml infusion, 17-36 Units/kg/hr, IntraVENous, TITRATE, Robles Restrepo MD, Last Rate: 26.6 mL/hr at 11/05/20 1200, 30 Units/kg/hr at 11/05/20 1200    balsam peru-castor oiL (VENELEX) ointment, , Topical, Q8H, Debeb, Sharath Nation MD    sodium chloride (NS) flush 5-40 mL, 5-40 mL, IntraVENous, Q8H, Zac Rosales NP, 10 mL at 11/05/20 0742    sodium chloride (NS) flush 5-40 mL, 5-40 mL, IntraVENous, PRN, Darinel Dumont NP, 10 mL at 10/31/20 2302    acetaminophen (TYLENOL) tablet 650 mg, 650 mg, Oral, Q6H PRN, 650 mg at 10/30/20 0430 **OR** acetaminophen (TYLENOL) suppository 650 mg, 650 mg, Rectal, Q6H PRN, Darinel Dumont NP    polyethylene glycol (MIRALAX) packet 17 g, 17 g, Oral, DAILY PRN, Darinel Dumont NP    ondansetron (ZOFRAN) injection 4 mg, 4 mg, IntraVENous, Q6H PRN, Darinel Dumont NP    albuterol-ipratropium (DUO-NEB) 2.5 MG-0.5 MG/3 ML, 3 mL, Nebulization, Q4H PRN, Darinel Dumont NP, 3 mL at 10/23/20 2223    docusate sodium (COLACE) capsule 100 mg, 100 mg, Oral, DAILY, Zac Rosales NP, 100 mg at 11/05/20 0900    labetaloL (NORMODYNE;TRANDATE) injection 20 mg, 20 mg, IntraVENous, Q4H PRN, Darinel Dumont NP, 20 mg at 11/03/20 0722    heparin (porcine) 1,000 unit/mL injection 1,400 Units, 1,400 Units, InterCATHeter, DIALYSIS PRN, 1,400 Units at 11/02/20 2313 **AND** heparin (porcine) 1,000 unit/mL injection 1,100 Units, 1,100 Units, InterCATHeter, DIALYSIS PRN, Hawa Spangler MD, 1,100 Units at 11/02/20 2312    Lab/Data Review:  Labs Latest Ref Rng & Units 11/5/2020 11/4/2020 11/3/2020 11/2/2020 11/1/2020 10/31/2020 10/31/2020   WBC 4.1 - 11.1 K/uL 10.7 12. 4(H) 12. 1(H) 13. 3(H) 14. 5(H) - 17. 0(H)   RBC 4.10 - 5.70 M/uL 2.71(L) 2.62(L) 2.61(L) 2.61(L) 2.72(L) - 2.92(L)   Hemoglobin 12.1 - 17.0 g/dL 7. 5(L) 7. 2(L) 7. 2(L) 7. 3(L) 7. 6(L) - 8. 1(L)   Hematocrit 36.6 - 50.3 % 23. 7(L) 22. 8(L) 22. 5(L) 22. 6(L) 23. 7(L) - 25. 0(L)   MCV 80.0 - 99.0 FL 87.5 87.0 86.2 86.6 87.1 - 85.6   Platelets 667 - 317 K/uL 279 266 244 249 245 - 276   Lymphocytes 12 - 49 % 11(L) 11(L) 16 17 12 - 12   Monocytes 5 - 13 % 9 10 12 11 12 - 12   Eosinophils 0 - 7 % 2 2 2 2 1 - 1   Basophils 0 - 1 % 1 0 0 0 0 - 0 Bands 0 - 6 % - - - - - - -   Albumin 3.5 - 5.0 g/dL 2. 3(L) 2. 1(L) 2. 1(L) 2. 1(L) 2. 1(L) 2. 2(L) 2. 2(L)   Calcium 8.5 - 10.1 MG/DL 9.0 8.8 8.5 8.1(L) 8.5 8. 0(L) 7. 9(L)   Glucose 65 - 100 mg/dL 101(H) 97 90 91 128(H) 98 97   BUN 6 - 20 MG/DL 23(H) 32(H) 26(H) 37(H) 24(H) 28(H) 28(H)   Creatinine 0.70 - 1.30 MG/DL 2.59(H) 3.32(H) 2.87(H) 3.80(H) 2.99(H) 3.27(H) 3.16(H)   Sodium 136 - 145 mmol/L 136 136 135(L) 132(L) 134(L) 136 136   Potassium 3.5 - 5.1 mmol/L 3.7 3.9 3.6 3.9 3.4(L) 3.6 3.6   Some recent data might be hidden     Lab Results   Component Value Date/Time    Sodium 136 11/05/2020 04:32 AM    Potassium 3.7 11/05/2020 04:32 AM    Chloride 101 11/05/2020 04:32 AM    CO2 28 11/05/2020 04:32 AM    Anion gap 7 11/05/2020 04:32 AM    Glucose 101 (H) 11/05/2020 04:32 AM    BUN 23 (H) 11/05/2020 04:32 AM    Creatinine 2.59 (H) 11/05/2020 04:32 AM    BUN/Creatinine ratio 9 (L) 11/05/2020 04:32 AM    GFR est AA 37 (L) 11/05/2020 04:32 AM    GFR est non-AA 31 (L) 11/05/2020 04:32 AM    Calcium 9.0 11/05/2020 04:32 AM    Bilirubin, total 0.4 11/05/2020 04:32 AM    Alk. phosphatase 58 11/05/2020 04:32 AM    Protein, total 5.4 (L) 11/05/2020 04:32 AM    Albumin 2.3 (L) 11/05/2020 04:32 AM    Globulin 3.1 11/05/2020 04:32 AM    A-G Ratio 0.7 (L) 11/05/2020 04:32 AM    ALT (SGPT) 61 11/05/2020 04:32 AM    AST (SGOT) 20 11/05/2020 04:32 AM           10/21/20   ECHO ADULT FOLLOW-UP OR LIMITED 10/29/2020 10/29/2020    Narrative · LV: Estimated LVEF is 15 - 20%. Mildly dilated left ventricle. Severely   global hypokinesis; normal function at apex. · MV: Moderate mitral valve regurgitation is present. · TV: Moderate tricuspid valve regurgitation is present. · PA: Mild to moderate pulmonary hypertension. Pulmonary arterial systolic   pressure is 45 mmHg. · RV: Mildly dilated right ventricle. Borderline low systolic function.         Signed by: Torsten Mclaughlin MD          Assessment:     Principal Problem:    [facility]eps[facility] (10/22/2020)    Active Problems:    Drug abuse ([facility] (10/22/2020)      Acute renal failure with tubular necrosis ([facility] (10/22/2020)      Community acquired pneumonia of left lower lobe of lung (10/22/2020)      Electrolyte abnormality (10/22/2020)      Toxic encephalopathy (10/24/2020)      LV dysfunction (11/4/2020)      Current episode of major depressive disorder without prior episode (11/4/2020)      Non-traumatic rhabdomyolysis (11/4/2020)        Plan:     Mr. Sandra Ochoa is a 27 yo WM with polysubstance abuse found unresponsive with multi-organ failure. Severe LV dysfunction likely due to substance abuse, sepsis, PNA. No chest pain or shortness of breath to suggest an acute coronary syndrome. 1. Troponin elevation              - 55.89 --> 55.30 --> 57.9              - Related to Rhabdo, most likely. CK 69,000+, CKMB 132    2. Acute systolic heart failure   - Likely non-ischemic cardiomyopathy.   - Will need right and left heart cath when renal function stable   - continue GDMT for heart failure/LV dysfunction   - Start ACE-I/ARB when renal function allows     3. Altered Mental Status              - Metabolic encephalopathy              - UDS + for cocaine, THC, Amphetamines, Ectasy, benzos              - Head CT with indication for toxic edema in basal ganglia    4. Septic shock- staph hominis              - L sided PNA              - IV Abx              - IVF   - Will arrange LUCIANO to assess for endocarditis per schedule   - will need Covid PCR prior to LUCIANO   - I discussed LUCIANO with patient. He understands and agrees to proceed. 5. Pneumonia              - Left sided              - IV Abx   - Mycoplasma IgM positive    6. Transaminitis              - significantly elevated LFTs - Acute liver injury              - INR elevation to 1.5, now 1.3   - Slow improvement    7.  Acute renal failure due to rhabdomeylitis              - Nephrology following              - HD M-W-F   - Avoiding ACE-I/ARB at this time    8. Poly substance abuse              - counseling    9.  Left upper extremity DVT   - Heparin drip   - transition to DOAC when able      Signed By: Aleah Mack MD     November 5, 2020      Interventional Cardiology  [facility], [address] [place] [address],[address]  [address]  P: [phone]  F: [phone]

## 2020-11-05 NOTE — PROGRESS NOTES
[facility]   [address] [facility], [address] [place] [address], [facility] [place]  Phone: [phone]   [phone]       Nephrology Progress Note  Elsa Denney     1995     [id number]  Date of Admission : 10/21/2020  11/05/20    CC: Follow up for ARF, Rhabdomyolysis        Assessment and Plan   JEAN PIERRE  - Severe ATN from Rhabdomyolysis. Oligoanuric   - making more urine  - follow daily labs and UOP  - will assess HD needs daily  - daily labs and I/os    Severe Rhabdomyolysis   - 2/2 Substance use  - LFTs and CPK trending down    Left Lung PNA w/ sepsis:  - improving    LUE DVT:  - on heparin drip    CMP w/ EF 15-20%:  - likely 2/2 cocaine  - per cardiology    Staph bacteremia:  - LUCIANO pending    Encephalopathy    Polysubstance abuse      Interval History:  Seen and examined. More alert today. UOP 2.2 L. Dialyzed yesterday. No cp, sob, n/v/d reported. Review of Systems: Review of systems not obtained due to patient factors. Current Medications:   Current Facility-Administered Medications   Medication Dose Route Frequency    influenza vaccine 2020-21 (6 mos+)(PF) (FLUARIX/FLULAVAL/FLUZONE QUAD) injection 0.5 mL  0.5 mL IntraMUSCular PRIOR TO DISCHARGE    [START ON 11/6/2020] Vancomycin random level- please draw at 0400 on 11/6. Thanks!    Other ONCE    QUEtiapine (SEROquel) tablet 50 mg  50 mg Oral Q8H PRN    hydrALAZINE (APRESOLINE) tablet 100 mg  100 mg Oral TID    metoprolol tartrate (LOPRESSOR) tablet 100 mg  100 mg Oral BID    albuterol-ipratropium (DUO-NEB) 2.5 MG-0.5 MG/3 ML  3 mL Nebulization BID RT    oxyCODONE IR (ROXICODONE) tablet 5 mg  5 mg Oral Q4H PRN    LORazepam (ATIVAN) injection 2 mg  2 mg IntraVENous Q4H PRN    doxycycline (VIBRAMYCIN) 100 mg in 0.9% sodium chloride (MBP/ADV) 100 mL  100 mg IntraVENous Q12H    melatonin tablet 3 mg  3 mg Oral QHS    Vancomycin- pharmacy to dose   Other Rx Dosing/Monitoring    alteplase (CATHFLO) 1 mg in sterile water (preservative free) 1 mL injection  1 mg InterCATHeter PRN    isosorbide dinitrate (ISORDIL) tablet 10 mg  10 mg Oral TID    HYDROmorphone (PF) (DILAUDID) injection 2 mg  2 mg IntraVENous Q4H PRN    heparin 25,000 units in D5W 250 ml infusion  17-36 Units/kg/hr IntraVENous TITRATE    balsam peru-castor oiL (VENELEX) ointment   Topical Q8H    sodium chloride (NS) flush 5-40 mL  5-40 mL IntraVENous Q8H    sodium chloride (NS) flush 5-40 mL  5-40 mL IntraVENous PRN    acetaminophen (TYLENOL) tablet 650 mg  650 mg Oral Q6H PRN    Or    acetaminophen (TYLENOL) suppository 650 mg  650 mg Rectal Q6H PRN    polyethylene glycol (MIRALAX) packet 17 g  17 g Oral DAILY PRN    ondansetron (ZOFRAN) injection 4 mg  4 mg IntraVENous Q6H PRN    albuterol-ipratropium (DUO-NEB) 2.5 MG-0.5 MG/3 ML  3 mL Nebulization Q4H PRN    docusate sodium (COLACE) capsule 100 mg  100 mg Oral DAILY    labetaloL (NORMODYNE;TRANDATE) injection 20 mg  20 mg IntraVENous Q4H PRN    heparin (porcine) 1,000 unit/mL injection 1,400 Units  1,400 Units InterCATHeter DIALYSIS PRN    And    heparin (porcine) 1,000 unit/mL injection 1,100 Units  1,100 Units InterCATHeter DIALYSIS PRN      Allergies   Allergen Reactions    Tramadol Nausea and Vomiting       Objective:  Vitals:    Vitals:    11/05/20 0737 11/05/20 0845 11/05/20 0900 11/05/20 1101   BP: (!) 169/95  (!) 172/98 (!) 167/92   Pulse: 93  93 90   Resp: 21   24   Temp: 97.7 °F (36.5 °C)   97.9 °F (36.6 °C)   TempSrc:       SpO2: 98% 98%  100%   Weight:       Height:         Intake and Output:  No intake/output data recorded.   11/03 1901 - 11/05 0700  In: 1920 [P.O.:1920]  Out: 6300 [Urine:3300]    Physical Examination:    General: Confused, resting on NC  Neck:  Supple, no mass  Resp:  Decreased breath sounds  CV:  RRR,  no murmur or rub, no LE edema  GI:  Soft, NT, + Bowel sounds, no hepatosplenomegaly  Neurologic:  Lethargic   Psych:             Unable to assess  :  Iniguez     []    High complexity decision making was performed  []    Patient is at high-risk of decompensation with multiple organ involvement    Lab Data Personally Reviewed: I have reviewed all the pertinent labs, microbiology data and radiology studies during assessment. Recent Labs     11/05/20 0432 11/04/20 0216 11/03/20  0330    136 135*   K 3.7 3.9 3.6    99 99   CO2 28 27 28   * 97 90   BUN 23* 32* 26*   CREA 2.59* 3.32* 2.87*   CA 9.0 8.8 8.5   ALB 2.3* 2.1* 2.1*   ALT 61 78 101*   INR 1.3* 1.3* 1.2*     Recent Labs     11/05/20 0432 11/04/20 0216 11/03/20  0330   WBC 10.7 12.4* 12.1*   HGB 7.5* 7.2* 7.2*   HCT 23.7* 22.8* 22.5*    266 244     No results found for: SDES  Lab Results   Component Value Date/Time    Culture result: NO GROWTH 5 DAYS 10/22/2020 06:19 AM    Culture result: NO GROWTH 5 DAYS 10/22/2020 02:37 AM    Culture result: (A) 10/21/2020 08:15 PM     Staphylococcus hominis (Oxacillin resistant) GROWING IN THE AEROBIC BOTTLE (SITE = R HAND)    Culture result:  10/21/2020 08:15 PM     Gram Positive Cocci CALLED TO AND READ BACK BY Jay Melo RN AT 2012 BY D    Culture result: (A) 10/21/2020 08:10 PM     Staphylococcus hominis (Oxacillin resistant) GROWING IN THE AEROBIC BOTTLE (SITE = L HAND)    Culture result:  10/21/2020 08:10 PM     preliminary report of Gram Positive Cocci in clusters growing in 1 of 2 bottles drawn [address] [facility] [place] RN SCAV AT  [facility] ON 10/23/20.  Bettye 8513     Recent Results (from the past 24 hour(s))   PTT    Collection Time: 11/04/20  8:51 PM   Result Value Ref Range    aPTT 60.0 (H) 22.1 - 32.0 sec    aPTT, therapeutic range     58.0 - 77.0 SECS   SARS-COV-2    Collection Time: 11/04/20  8:51 PM   Result Value Ref Range    Specimen source Nasopharyngeal      SARS-CoV-2 PENDING     SARS-CoV-2 PENDING     Specimen source NP SWAB     COVID-19 rapid test PENDING     Specimen type NP Swab      Health status PENDING     COVID-19 PENDING    PTT    Collection Time: 11/05/20  4:32 AM   Result Value Ref Range    aPTT 77.5 (H) 22.1 - 32.0 sec    aPTT, therapeutic range     58.0 - 77.0 SECS   PROTHROMBIN TIME + INR    Collection Time: 11/05/20  4:32 AM   Result Value Ref Range    INR 1.3 (H) 0.9 - 1.1      Prothrombin time 13.5 (H) 9.0 - 11.1 sec   CK    Collection Time: 11/05/20  4:32 AM   Result Value Ref Range     39 - 955 U/L   METABOLIC PANEL, COMPREHENSIVE    Collection Time: 11/05/20  4:32 AM   Result Value Ref Range    Sodium 136 136 - 145 mmol/L    Potassium 3.7 3.5 - 5.1 mmol/L    Chloride 101 97 - 108 mmol/L    CO2 28 21 - 32 mmol/L    Anion gap 7 5 - 15 mmol/L    Glucose 101 (H) 65 - 100 mg/dL    BUN 23 (H) 6 - 20 MG/DL    Creatinine 2.59 (H) 0.70 - 1.30 MG/DL    BUN/Creatinine ratio 9 (L) 12 - 20      GFR est AA 37 (L) >60 ml/min/1.73m2    GFR est non-AA 31 (L) >60 ml/min/1.73m2    Calcium 9.0 8.5 - 10.1 MG/DL    Bilirubin, total 0.4 0.2 - 1.0 MG/DL    ALT (SGPT) 61 12 - 78 U/L    AST (SGOT) 20 15 - 37 U/L    Alk. phosphatase 58 45 - 117 U/L    Protein, total 5.4 (L) 6.4 - 8.2 g/dL    Albumin 2.3 (L) 3.5 - 5.0 g/dL    Globulin 3.1 2.0 - 4.0 g/dL    A-G Ratio 0.7 (L) 1.1 - 2.2     CBC WITH AUTOMATED DIFF    Collection Time: 11/05/20  4:32 AM   Result Value Ref Range    WBC 10.7 4.1 - 11.1 K/uL    RBC 2.71 (L) 4.10 - 5.70 M/uL    HGB 7.5 (L) 12.1 - 17.0 g/dL    HCT 23.7 (L) 36.6 - 50.3 %    MCV 87.5 80.0 - 99.0 FL    MCH 27.7 26.0 - 34.0 PG    MCHC 31.6 30.0 - 36.5 g/dL    RDW 17.2 (H) 11.5 - 14.5 %    PLATELET 484 418 - 788 K/uL    MPV 9.7 8.9 - 12.9 FL    NRBC 0.0 0  WBC    ABSOLUTE NRBC 0.00 0.00 - 0.01 K/uL    NEUTROPHILS 77 (H) 32 - 75 %    LYMPHOCYTES 11 (L) 12 - 49 %    MONOCYTES 9 5 - 13 %    EOSINOPHILS 2 0 - 7 %    BASOPHILS 1 0 - 1 %    IMMATURE GRANULOCYTES 0 0.0 - 0.5 %    ABS. NEUTROPHILS 8.2 (H) 1.8 - 8.0 K/UL    ABS. LYMPHOCYTES 1.2 0.8 - 3.5 K/UL    ABS. MONOCYTES 1.0 0.0 - 1.0 K/UL    ABS. EOSINOPHILS 0.2 0.0 - 0.4 K/UL    ABS. BASOPHILS 0.1 0.0 - 0.1 K/UL    ABS. IMM. GRANS. 0.0 0.00 - 0.04 K/UL    DF AUTOMATED             Total time spent with patient:  xxx   min. Care Plan discussed with:  Patient     Family      RN      Consulting Physician [address] [facility]        I have reviewed the flowsheets. Chart and Pertinent Notes have been reviewed. No change in PMH ,family and social history from Consult note.       Vincent Van MD

## 2020-11-05 NOTE — PROGRESS NOTES
Bedside shift change report given to Marshall Frias RN by Amanda Khanna RN . Report included the following information SBAR, Kardex, ED Summary, Intake/Output, MAR, and Recent Results. Pt oriented to time, place, person and situation, Normal Sinus Rhythm , 3 liters/min via nasal prongs, Pain present - adequately treated. No acute events overnight. Last 3 Recorded Weights in this Encounter    11/03/20 0437 11/04/20 0330 11/05/20 0335   Weight: 107.4 kg (236 lb 12.8 oz) 107.1 kg (236 lb 3.2 oz) 105.9 kg (233 lb 8 oz)   weight variance noted--pt received dialysis 11/4    Problem: Falls - Risk of  Goal: *Absence of Falls  Description: Document Lester Fall Risk and appropriate interventions in the flowsheet. Outcome: Progressing Towards Goal  Note: Fall Risk Interventions:  Mobility Interventions: Communicate number of staff needed for ambulation/transfer, Patient to call before getting OOB    Mentation Interventions: Adequate sleep, hydration, pain control, Door open when patient unattended, Evaluate medications/consider consulting pharmacy, Family/sitter at bedside    Medication Interventions: Evaluate medications/consider consulting pharmacy, Patient to call before getting OOB    Elimination Interventions: Call light in reach    History of Falls Interventions: Vital signs minimum Q4HRs X 24 hrs (comment for end date)         Problem: Pressure Injury - Risk of  Goal: *Prevention of pressure injury  Description: Document Abdirashid Scale and appropriate interventions in the flowsheet.   Outcome: Progressing Towards Goal  Note: Pressure Injury Interventions:  Sensory Interventions: Check visual cues for pain, Float heels, Keep linens dry and wrinkle-free, Minimize linen layers    Moisture Interventions: Absorbent underpads, Apply protective barrier, creams and emollients, Check for incontinence Q2 hours and as needed, Internal/External urinary devices    Activity Interventions: Increase time out of bed    Mobility Interventions: Float heels, HOB 30 degrees or less    Nutrition Interventions: Document food/fluid/supplement intake    Friction and Shear Interventions: Apply protective barrier, creams and emollients, HOB 30 degrees or less          Problem: Pain  Goal: *Control of Pain  Outcome: Progressing Towards Goal  Note: Pt receiving IV dilaudid for pain control     Problem: Heart Failure: Day 2  Goal: Respiratory  Outcome: Progressing Towards Goal  Note: Pt o2 sats remain above 95% on 3L

## 2020-11-05 NOTE — PROGRESS NOTES
Hospitalist Progress Note  DO Lois Harper service: [phone] from in house phone      Date of Service:  2020  NAME:  Carlos Cabrera  :  1995  MRN:  [id number]    Admission Summary:   25M p/w multiple drug use, resp failure/ARF  Interval history / Subjective: Follow up overdose. Patient seen and examined. Denies shortness of breath. Weaned supplemental oxygen while in the room. Assessment & Plan:     Polysubstance abuse: UDS +ve for cocaine, THC, benzo, opioids, amphetamines  Pneumonia- likely aspiration- 2nd to above  Acute metabolic Encephalopathy: improved, d/c seroquel  Cardiomyopathy: ef 15%- likely 2nd to drug use- Cardio following  Acute hypoxic respiratory failure: resolved, 2nd to above- Bipap to keep sats >90%  Rhabdomyolysis: resolved with IVFs, stop CK checks   ARF: 2nd to above. Nephrology following- started on RRT  Acute LUE DVT: heparin gtt. Switch to NOAC post LUCIANO   Acute Anemia: stable, transfuse if HB<7.  Bacteremia: Staph hominis on 10/21. On vanc/doxy- occasional low grade fevers. Monitor  - repeat TTE 10/29: still ef 15-20%. Repeat CXR 10/31: slightly increased opacities b/l.  - Pulmonary team following. ID following, plans for LUCIANO      Bipolar Disorder:  Mother raised concerns about subOptimal psych care in past.   -Psych following, they will take him to IP psych unit once medically cleared    Code status: Full  DVT prophylaxis: Heparin  Care Plan discussed with: Patient/Family and Nurse  Disposition: IP psych >2days     Hospital Problems  Never Reviewed          Codes Class Noted POA    LV dysfunction ICD-10-CM: I51.9  ICD-9-CM: 429.9  2020 Yes        Current episode of major depressive disorder without prior episode ICD-10-CM: F32.9  ICD-9-CM: 296.20  2020 Yes        Non-traumatic rhabdomyolysis ICD-10-CM: M62.82  ICD-9-CM: 728.88  2020 Unknown        Toxic encephalopathy ICD-10-CM: G92  ICD-9-CM: 349.82  10/24/2020 Yes        Drug abuse ([facility] ICD-10-CM: F19.10  ICD-9-CM: 305.90  10/22/2020 Yes        Acute renal failure with tubular necrosis (HCC) ICD-10-CM: N17.0  ICD-9-CM: 584.5  10/22/2020 Yes        * (Principal) Sepsis ([facility].) ICD-10-CM: A41.9  ICD-9-CM: 038.9, 995.91  10/22/2020 Yes        Community acquired pneumonia of left lower lobe of lung ICD-10-CM: J18.9  ICD-9-CM: 802  10/22/2020 Yes        Electrolyte abnormality ICD-10-CM: E87.8  ICD-9-CM: 276.9  10/22/2020 Yes            Review of Systems:   Pertinent items are mentioned in interval history. Vital Signs:    Last 24hrs VS reviewed since prior progress note. Most recent are:  Visit Vitals  BP (!) 152/71 (BP 1 Location: Right arm, BP Patient Position: At rest)   Pulse 97   Temp 98.2 °F (36.8 °C)   Resp 23   Ht 5' 10\" (1.778 m)   Wt 105.9 kg (233 lb 8 oz)   SpO2 95%   BMI 33.50 kg/m²         Intake/Output Summary (Last 24 hours) at 11/5/2020 1548  Last data filed at 11/5/2020 0304  Gross per 24 hour   Intake 960 ml   Output 3950 ml   Net -2990 ml        Physical Examination:   General:  Alert, resting in bed, No acute distress  Resp:  No accessory muscle use, no wheezes, no rhonci   Abd:  Soft, non-tender, non-distended  Extremities:  No cyanosis or clubbing, 2+ edema bilaterally   Neuro:  no focal neuro deficits, follows commands   Psych:  not agitated.     Data Review:    Review and/or order of clinical lab test  Review and/or order of tests in the radiology section of CPT  Review and/or order of tests in the medicine section of CPT  Labs:     Recent Labs     11/05/20  0432 11/04/20  0216   WBC 10.7 12.4*   HGB 7.5* 7.2*   HCT 23.7* 22.8*    266     Recent Labs     11/05/20 0432 11/04/20 0216 11/03/20  0330    136 135*   K 3.7 3.9 3.6    99 99   CO2 28 27 28   BUN 23* 32* 26*   CREA 2.59* 3.32* 2.87*   * 97 90   CA 9.0 8.8 8.5     Recent Labs     11/05/20 0432 11/04/20 0216 11/03/20  0330   ALT 61 78 101*   AP 58 58 61   TBILI 0.4 0.4 0.5   TP 5.4* 5.3* 5.5*   ALB 2.3* 2.1* 2.1*   GLOB 3.1 3.2 3.4     Recent Labs     11/05/20 0432 11/04/20 2051 11/04/20  1145 11/04/20 0216 11/03/20  0330   INR 1.3*  --   --  1.3*  --  1.2*   PTP 13.5*  --   --  13.7*  --  12.9*   APTT 77.5* 60.0* 79.1*  --    < > 61.2*    < > = values in this interval not displayed. No results for input(s): FE, TIBC, PSAT, FERR in the last 72 hours. Lab Results   Component Value Date/Time    Folate 9.7 10/31/2020 12:40 AM      No results for input(s): PH, PCO2, PO2 in the last 72 hours. Recent Labs     11/05/20 0432 11/04/20 0216 11/03/20  0330    306 394*     No results found for: CHOL, CHOLX, CHLST, CHOLV, HDL, HDLP, LDL, LDLC, DLDLP, TGLX, TRIGL, TRIGP, CHHD, CHHDX  Lab Results   Component Value Date/Time    Glucose (POC) 129 (H) 10/23/2020 04:27 PM     Lab Results   Component Value Date/Time    Color Yellow/Straw 10/21/2020 06:05 PM    Appearance Clear 10/21/2020 06:05 PM    Specific gravity 1.025 10/21/2020 06:05 PM    pH (UA) 5.5 10/21/2020 06:05 PM    Protein 30 (A) 10/21/2020 06:05 PM    Glucose Negative 10/21/2020 06:05 PM    Ketone Negative 10/21/2020 06:05 PM    Urobilinogen 1.0 10/21/2020 06:05 PM    Nitrites Negative 10/21/2020 06:05 PM    Leukocyte Esterase Negative 10/21/2020 06:05 PM    Bacteria Negative 10/21/2020 06:05 PM    WBC 0-4 10/21/2020 06:05 PM    RBC 0-5 10/21/2020 06:05 PM     Medications Reviewed:     Current Facility-Administered Medications   Medication Dose Route Frequency    influenza vaccine 2020-21 (6 mos+)(PF) (FLUARIX/FLULAVAL/FLUZONE QUAD) injection 0.5 mL  0.5 mL IntraMUSCular PRIOR TO DISCHARGE    [START ON 11/6/2020] Vancomycin random level- please draw at 0400 on 11/6. Thanks!    Other ONCE    isosorbide dinitrate (ISORDIL) tablet 20 mg  20 mg Oral TID    QUEtiapine (SEROquel) tablet 50 mg  50 mg Oral Q8H PRN    hydrALAZINE (APRESOLINE) tablet 100 mg 100 mg Oral TID    metoprolol tartrate (LOPRESSOR) tablet 100 mg  100 mg Oral BID    albuterol-ipratropium (DUO-NEB) 2.5 MG-0.5 MG/3 ML  3 mL Nebulization BID RT    oxyCODONE IR (ROXICODONE) tablet 5 mg  5 mg Oral Q4H PRN    LORazepam (ATIVAN) injection 2 mg  2 mg IntraVENous Q4H PRN    doxycycline (VIBRAMYCIN) 100 mg in 0.9% sodium chloride (MBP/ADV) 100 mL  100 mg IntraVENous Q12H    melatonin tablet 3 mg  3 mg Oral QHS    Vancomycin- pharmacy to dose   Other Rx Dosing/Monitoring    alteplase (CATHFLO) 1 mg in sterile water (preservative free) 1 mL injection  1 mg InterCATHeter PRN    HYDROmorphone (PF) (DILAUDID) injection 2 mg  2 mg IntraVENous Q4H PRN    heparin 25,000 units in D5W 250 ml infusion  17-36 Units/kg/hr IntraVENous TITRATE    balsam peru-castor oiL (VENELEX) ointment   Topical Q8H    sodium chloride (NS) flush 5-40 mL  5-40 mL IntraVENous Q8H    sodium chloride (NS) flush 5-40 mL  5-40 mL IntraVENous PRN    acetaminophen (TYLENOL) tablet 650 mg  650 mg Oral Q6H PRN    Or    acetaminophen (TYLENOL) suppository 650 mg  650 mg Rectal Q6H PRN    polyethylene glycol (MIRALAX) packet 17 g  17 g Oral DAILY PRN    ondansetron (ZOFRAN) injection 4 mg  4 mg IntraVENous Q6H PRN    albuterol-ipratropium (DUO-NEB) 2.5 MG-0.5 MG/3 ML  3 mL Nebulization Q4H PRN    docusate sodium (COLACE) capsule 100 mg  100 mg Oral DAILY    labetaloL (NORMODYNE;TRANDATE) injection 20 mg  20 mg IntraVENous Q4H PRN    heparin (porcine) 1,000 unit/mL injection 1,400 Units  1,400 Units InterCATHeter DIALYSIS PRN    And    heparin (porcine) 1,000 unit/mL injection 1,100 Units  1,100 Units InterCATHeter DIALYSIS PRN   ______________________________________________________________________  EXPECTED LENGTH OF STAY: 4d 19h  ACTUAL LENGTH OF STAY:          Mignon Murphy, DO

## 2020-11-06 ENCOUNTER — TELEPHONE (OUTPATIENT)
Dept: CARDIOLOGY CLINIC | Age: 25
End: 2020-11-06

## 2020-11-06 ENCOUNTER — ANESTHESIA EVENT (OUTPATIENT)
Dept: NON INVASIVE DIAGNOSTICS | Age: 25
DRG: 812 | End: 2020-11-06
Payer: MEDICAID

## 2020-11-06 ENCOUNTER — ANESTHESIA (OUTPATIENT)
Dept: NON INVASIVE DIAGNOSTICS | Age: 25
DRG: 812 | End: 2020-11-06
Payer: MEDICAID

## 2020-11-06 ENCOUNTER — HOSPITAL ENCOUNTER (OUTPATIENT)
Dept: NON INVASIVE DIAGNOSTICS | Age: 25
Discharge: HOME | End: 2020-11-06
Attending: SPECIALIST
Payer: MEDICAID

## 2020-11-06 VITALS
OXYGEN SATURATION: 98 % | SYSTOLIC BLOOD PRESSURE: 162 MMHG | DIASTOLIC BLOOD PRESSURE: 92 MMHG | RESPIRATION RATE: 29 BRPM | HEART RATE: 89 BPM | TEMPERATURE: 97.6 F

## 2020-11-06 LAB
ALBUMIN SERPL-MCNC: 2.2 G/DL (ref 3.5–5)
ALBUMIN/GLOB SERPL: 0.8 {RATIO} (ref 1.1–2.2)
ALP SERPL-CCNC: 54 U/L (ref 45–117)
ALT SERPL-CCNC: 52 U/L (ref 12–78)
ANION GAP SERPL CALC-SCNC: 8 MMOL/L (ref 5–15)
APTT PPP: 65.2 SEC (ref 22.1–32)
AST SERPL-CCNC: 23 U/L (ref 15–37)
BASOPHILS # BLD: 0.1 K/UL (ref 0–0.1)
BASOPHILS NFR BLD: 1 % (ref 0–1)
BILIRUB SERPL-MCNC: 0.4 MG/DL (ref 0.2–1)
BUN SERPL-MCNC: 30 MG/DL (ref 6–20)
BUN/CREAT SERPL: 10 (ref 12–20)
CALCIUM SERPL-MCNC: 8.8 MG/DL (ref 8.5–10.1)
CHLORIDE SERPL-SCNC: 101 MMOL/L (ref 97–108)
CK SERPL-CCNC: 254 U/L (ref 39–308)
CO2 SERPL-SCNC: 28 MMOL/L (ref 21–32)
CREAT SERPL-MCNC: 2.94 MG/DL (ref 0.7–1.3)
DIFFERENTIAL METHOD BLD: ABNORMAL
EOSINOPHIL # BLD: 0.1 K/UL (ref 0–0.4)
EOSINOPHIL NFR BLD: 1 % (ref 0–7)
ERYTHROCYTE [DISTWIDTH] IN BLOOD BY AUTOMATED COUNT: 17.2 % (ref 11.5–14.5)
GLOBULIN SER CALC-MCNC: 2.9 G/DL (ref 2–4)
GLUCOSE SERPL-MCNC: 98 MG/DL (ref 65–100)
HCT VFR BLD AUTO: 24.4 % (ref 36.6–50.3)
HGB BLD-MCNC: 7.8 G/DL (ref 12.1–17)
IMM GRANULOCYTES # BLD AUTO: 0 K/UL (ref 0–0.04)
IMM GRANULOCYTES NFR BLD AUTO: 0 % (ref 0–0.5)
INR PPP: 1.3 (ref 0.9–1.1)
LYMPHOCYTES # BLD: 1.4 K/UL (ref 0.8–3.5)
LYMPHOCYTES NFR BLD: 14 % (ref 12–49)
MCH RBC QN AUTO: 27.9 PG (ref 26–34)
MCHC RBC AUTO-ENTMCNC: 32 G/DL (ref 30–36.5)
MCV RBC AUTO: 87.1 FL (ref 80–99)
MONOCYTES # BLD: 0.8 K/UL (ref 0–1)
MONOCYTES NFR BLD: 8 % (ref 5–13)
NEUTS SEG # BLD: 7.6 K/UL (ref 1.8–8)
NEUTS SEG NFR BLD: 76 % (ref 32–75)
NRBC # BLD: 0 K/UL (ref 0–0.01)
NRBC BLD-RTO: 0 PER 100 WBC
PLATELET # BLD AUTO: 301 K/UL (ref 150–400)
PMV BLD AUTO: 9.4 FL (ref 8.9–12.9)
POTASSIUM SERPL-SCNC: 3.9 MMOL/L (ref 3.5–5.1)
PROT SERPL-MCNC: 5.1 G/DL (ref 6.4–8.2)
PROTHROMBIN TIME: 13.2 SEC (ref 9–11.1)
RBC # BLD AUTO: 2.8 M/UL (ref 4.1–5.7)
SODIUM SERPL-SCNC: 137 MMOL/L (ref 136–145)
THERAPEUTIC RANGE,PTTT: ABNORMAL SECS (ref 58–77)
VANCOMYCIN SERPL-MCNC: 12.4 UG/ML
WBC # BLD AUTO: 10 K/UL (ref 4.1–11.1)

## 2020-11-06 PROCEDURE — 93312 ECHO TRANSESOPHAGEAL: CPT | Performed by: SPECIALIST

## 2020-11-06 PROCEDURE — 80053 COMPREHEN METABOLIC PANEL: CPT

## 2020-11-06 PROCEDURE — 85610 PROTHROMBIN TIME: CPT

## 2020-11-06 PROCEDURE — 74011250636 HC RX REV CODE- 250/636: Performed by: INTERNAL MEDICINE

## 2020-11-06 PROCEDURE — 90935 HEMODIALYSIS ONE EVALUATION: CPT

## 2020-11-06 PROCEDURE — 74011250636 HC RX REV CODE- 250/636: Performed by: STUDENT IN AN ORGANIZED HEALTH CARE EDUCATION/TRAINING PROGRAM

## 2020-11-06 PROCEDURE — 74011250636 HC RX REV CODE- 250/636: Performed by: EMERGENCY MEDICINE

## 2020-11-06 PROCEDURE — 74011250637 HC RX REV CODE- 250/637: Performed by: INTERNAL MEDICINE

## 2020-11-06 PROCEDURE — 74011250636 HC RX REV CODE- 250/636: Performed by: NURSE PRACTITIONER

## 2020-11-06 PROCEDURE — 85025 COMPLETE CBC W/AUTO DIFF WBC: CPT

## 2020-11-06 PROCEDURE — 74011000258 HC RX REV CODE- 258: Performed by: INTERNAL MEDICINE

## 2020-11-06 PROCEDURE — 65270000029 HC RM PRIVATE

## 2020-11-06 PROCEDURE — 36415 COLL VENOUS BLD VENIPUNCTURE: CPT

## 2020-11-06 PROCEDURE — 85730 THROMBOPLASTIN TIME PARTIAL: CPT

## 2020-11-06 PROCEDURE — 82550 ASSAY OF CK (CPK): CPT

## 2020-11-06 PROCEDURE — 99233 SBSQ HOSP IP/OBS HIGH 50: CPT | Performed by: INTERNAL MEDICINE

## 2020-11-06 PROCEDURE — 96374 THER/PROPH/DIAG INJ IV PUSH: CPT

## 2020-11-06 PROCEDURE — 76060000031 HC ANESTHESIA FIRST 0.5 HR

## 2020-11-06 PROCEDURE — 93320 DOPPLER ECHO COMPLETE: CPT | Performed by: SPECIALIST

## 2020-11-06 PROCEDURE — 74011250637 HC RX REV CODE- 250/637: Performed by: NURSE PRACTITIONER

## 2020-11-06 PROCEDURE — 74011000250 HC RX REV CODE- 250: Performed by: STUDENT IN AN ORGANIZED HEALTH CARE EDUCATION/TRAINING PROGRAM

## 2020-11-06 PROCEDURE — 74011000250 HC RX REV CODE- 250: Performed by: HOSPITALIST

## 2020-11-06 PROCEDURE — 80202 ASSAY OF VANCOMYCIN: CPT

## 2020-11-06 PROCEDURE — 93325 DOPPLER ECHO COLOR FLOW MAPG: CPT | Performed by: SPECIALIST

## 2020-11-06 PROCEDURE — 74011250636 HC RX REV CODE- 250/636: Performed by: HOSPITALIST

## 2020-11-06 PROCEDURE — B246ZZ4 ULTRASONOGRAPHY OF RIGHT AND LEFT HEART, TRANSESOPHAGEAL: ICD-10-PCS | Performed by: ANESTHESIOLOGY

## 2020-11-06 RX ORDER — METOPROLOL TARTRATE 50 MG/1
150 TABLET ORAL 2 TIMES DAILY
Status: DISPENSED | OUTPATIENT
Start: 2020-11-06 | End: 2020-11-10

## 2020-11-06 RX ORDER — LIDOCAINE HYDROCHLORIDE 20 MG/ML
INJECTION, SOLUTION EPIDURAL; INFILTRATION; INTRACAUDAL; PERINEURAL AS NEEDED
Status: DISCONTINUED | OUTPATIENT
Start: 2020-11-06 | End: 2020-11-06 | Stop reason: HOSPADM

## 2020-11-06 RX ORDER — VANCOMYCIN HYDROCHLORIDE
1250 ONCE
Status: COMPLETED | OUTPATIENT
Start: 2020-11-06 | End: 2020-11-06

## 2020-11-06 RX ORDER — PROPOFOL 10 MG/ML
INJECTION, EMULSION INTRAVENOUS AS NEEDED
Status: DISCONTINUED | OUTPATIENT
Start: 2020-11-06 | End: 2020-11-06 | Stop reason: HOSPADM

## 2020-11-06 RX ORDER — PROPOFOL 10 MG/ML
INJECTION, EMULSION INTRAVENOUS AS NEEDED
Status: DISCONTINUED | OUTPATIENT
Start: 2020-11-06 | End: 2020-11-06

## 2020-11-06 RX ORDER — SODIUM CHLORIDE 9 MG/ML
INJECTION, SOLUTION INTRAVENOUS
Status: DISCONTINUED | OUTPATIENT
Start: 2020-11-06 | End: 2020-11-06 | Stop reason: HOSPADM

## 2020-11-06 RX ADMIN — DOXYCYCLINE 100 MG: 100 INJECTION, POWDER, LYOPHILIZED, FOR SOLUTION INTRAVENOUS at 09:59

## 2020-11-06 RX ADMIN — HYDRALAZINE HYDROCHLORIDE 100 MG: 50 TABLET, FILM COATED ORAL at 23:03

## 2020-11-06 RX ADMIN — PROPOFOL 50 MG: 10 INJECTION, EMULSION INTRAVENOUS at 13:58

## 2020-11-06 RX ADMIN — VANCOMYCIN HYDROCHLORIDE 1250 MG: 100 INJECTION, POWDER, LYOPHILIZED, FOR SOLUTION INTRAVENOUS at 15:11

## 2020-11-06 RX ADMIN — SODIUM CHLORIDE: 900 INJECTION, SOLUTION INTRAVENOUS at 13:56

## 2020-11-06 RX ADMIN — ISOSORBIDE DINITRATE 20 MG: 20 TABLET ORAL at 08:44

## 2020-11-06 RX ADMIN — HYDRALAZINE HYDROCHLORIDE 100 MG: 50 TABLET, FILM COATED ORAL at 17:13

## 2020-11-06 RX ADMIN — Medication 10 ML: at 23:15

## 2020-11-06 RX ADMIN — HEPARIN SODIUM 1100 UNITS: 1000 INJECTION INTRAVENOUS; SUBCUTANEOUS at 11:25

## 2020-11-06 RX ADMIN — METOPROLOL TARTRATE 150 MG: 50 TABLET, FILM COATED ORAL at 17:13

## 2020-11-06 RX ADMIN — PROPOFOL 50 MG: 10 INJECTION, EMULSION INTRAVENOUS at 14:02

## 2020-11-06 RX ADMIN — HEPARIN SODIUM 30 UNITS/KG/HR: 10000 INJECTION, SOLUTION INTRAVENOUS at 07:53

## 2020-11-06 RX ADMIN — Medication 10 ML: at 15:17

## 2020-11-06 RX ADMIN — LIDOCAINE HYDROCHLORIDE 60 MG: 20 INJECTION, SOLUTION EPIDURAL; INFILTRATION; INTRACAUDAL; PERINEURAL at 13:57

## 2020-11-06 RX ADMIN — PROPOFOL 50 MG: 10 INJECTION, EMULSION INTRAVENOUS at 14:08

## 2020-11-06 RX ADMIN — APIXABAN 10 MG: 5 TABLET, FILM COATED ORAL at 19:02

## 2020-11-06 RX ADMIN — HYDROMORPHONE HYDROCHLORIDE 2 MG: 2 INJECTION, SOLUTION INTRAMUSCULAR; INTRAVENOUS; SUBCUTANEOUS at 01:27

## 2020-11-06 RX ADMIN — HYDROMORPHONE HYDROCHLORIDE 2 MG: 2 INJECTION, SOLUTION INTRAMUSCULAR; INTRAVENOUS; SUBCUTANEOUS at 15:06

## 2020-11-06 RX ADMIN — HYDRALAZINE HYDROCHLORIDE 100 MG: 50 TABLET, FILM COATED ORAL at 08:44

## 2020-11-06 RX ADMIN — ONDANSETRON 4 MG: 2 INJECTION INTRAMUSCULAR; INTRAVENOUS at 01:31

## 2020-11-06 RX ADMIN — Medication 10 ML: at 05:22

## 2020-11-06 RX ADMIN — ISOSORBIDE DINITRATE 20 MG: 20 TABLET ORAL at 17:13

## 2020-11-06 RX ADMIN — PROPOFOL 50 MG: 10 INJECTION, EMULSION INTRAVENOUS at 14:04

## 2020-11-06 RX ADMIN — HEPARIN SODIUM 1400 UNITS: 1000 INJECTION INTRAVENOUS; SUBCUTANEOUS at 11:26

## 2020-11-06 RX ADMIN — PROPOFOL 50 MG: 10 INJECTION, EMULSION INTRAVENOUS at 14:00

## 2020-11-06 RX ADMIN — DOXYCYCLINE 100 MG: 100 INJECTION, POWDER, LYOPHILIZED, FOR SOLUTION INTRAVENOUS at 15:14

## 2020-11-06 RX ADMIN — Medication: at 23:05

## 2020-11-06 RX ADMIN — ISOSORBIDE DINITRATE 20 MG: 20 TABLET ORAL at 23:14

## 2020-11-06 RX ADMIN — PROPOFOL 50 MG: 10 INJECTION, EMULSION INTRAVENOUS at 13:59

## 2020-11-06 RX ADMIN — HYDROMORPHONE HYDROCHLORIDE 2 MG: 2 INJECTION, SOLUTION INTRAMUSCULAR; INTRAVENOUS; SUBCUTANEOUS at 23:04

## 2020-11-06 RX ADMIN — Medication: at 05:22

## 2020-11-06 RX ADMIN — HYDROMORPHONE HYDROCHLORIDE 2 MG: 2 INJECTION, SOLUTION INTRAMUSCULAR; INTRAVENOUS; SUBCUTANEOUS at 19:02

## 2020-11-06 RX ADMIN — PROPOFOL 50 MG: 10 INJECTION, EMULSION INTRAVENOUS at 14:06

## 2020-11-06 RX ADMIN — DOCUSATE SODIUM 100 MG: 100 CAPSULE, LIQUID FILLED ORAL at 08:44

## 2020-11-06 RX ADMIN — HYDROMORPHONE HYDROCHLORIDE 2 MG: 2 INJECTION, SOLUTION INTRAMUSCULAR; INTRAVENOUS; SUBCUTANEOUS at 08:44

## 2020-11-06 RX ADMIN — PROPOFOL 50 MG: 10 INJECTION, EMULSION INTRAVENOUS at 14:10

## 2020-11-06 RX ADMIN — HEPARIN SODIUM 30 UNITS/KG/HR: 10000 INJECTION, SOLUTION INTRAVENOUS at 17:37

## 2020-11-06 RX ADMIN — ALTEPLASE 1 MG: 2.2 INJECTION, POWDER, LYOPHILIZED, FOR SOLUTION INTRAVENOUS at 09:59

## 2020-11-06 RX ADMIN — Medication: at 15:17

## 2020-11-06 RX ADMIN — PROPOFOL 50 MG: 10 INJECTION, EMULSION INTRAVENOUS at 13:57

## 2020-11-06 NOTE — TELEPHONE ENCOUNTER
Received call from [address] [place] [address]. States that patient is scheduled for LUCIANO @ 1:30 pm today with Dr. Rickey Xavier. Patient currently on heparin drip. Would like to know if it needs to be stopped prior to procedure. She can be reached at [phone].

## 2020-11-06 NOTE — PROGRESS NOTES
[facility]   [address] [facility], [address] [place] [address], [facility]  Phone: [phone]   [phone]       Nephrology Progress Note  Christopher Velazquez     1995     [id number]  Date of Admission : 10/21/2020  11/06/20    CC: Follow up for ARF, Rhabdomyolysis        Assessment and Plan   JEAN PIERRE  - Severe ATN from Rhabdomyolysis. Oligoanuric   - making more urine  - follow daily labs and UOP  - HD today then again Monday if needed    Severe Rhabdomyolysis   - 2/2 Substance use  - LFTs and CPK trending down    Left Lung PNA w/ sepsis:  - improving    LUE DVT:  - on heparin drip    CMP w/ EF 15-20%:  - likely 2/2 cocaine  - per cardiology    Staph bacteremia:  - LUCIANO for today    Encephalopathy    Polysubstance abuse      Interval History:  Seen and examined on dialysis. Stable UOP. Resting, no complaints. For LUCIANO today. No cp, sob, n/v/d reported. Review of Systems: Review of systems not obtained due to patient factors.     Current Medications:   Current Facility-Administered Medications   Medication Dose Route Frequency    metoprolol tartrate (LOPRESSOR) tablet 150 mg  150 mg Oral BID    influenza vaccine 2020-21 (6 mos+)(PF) (FLUARIX/FLULAVAL/FLUZONE QUAD) injection 0.5 mL  0.5 mL IntraMUSCular PRIOR TO DISCHARGE    isosorbide dinitrate (ISORDIL) tablet 20 mg  20 mg Oral TID    albuterol-ipratropium (DUO-NEB) 2.5 MG-0.5 MG/3 ML  3 mL Nebulization Q6H PRN    QUEtiapine (SEROquel) tablet 50 mg  50 mg Oral Q8H PRN    hydrALAZINE (APRESOLINE) tablet 100 mg  100 mg Oral TID    oxyCODONE IR (ROXICODONE) tablet 5 mg  5 mg Oral Q4H PRN    LORazepam (ATIVAN) injection 2 mg  2 mg IntraVENous Q4H PRN    melatonin tablet 3 mg  3 mg Oral QHS    Vancomycin- pharmacy to dose   Other Rx Dosing/Monitoring    alteplase (CATHFLO) 1 mg in sterile water (preservative free) 1 mL injection  1 mg InterCATHeter PRN    HYDROmorphone (PF) (DILAUDID) injection 2 mg  2 mg IntraVENous Q4H PRN    heparin 25,000 units in D5W 250 ml infusion  17-36 Units/kg/hr IntraVENous TITRATE    balsam peru-castor oiL (VENELEX) ointment   Topical Q8H    sodium chloride (NS) flush 5-40 mL  5-40 mL IntraVENous Q8H    sodium chloride (NS) flush 5-40 mL  5-40 mL IntraVENous PRN    acetaminophen (TYLENOL) tablet 650 mg  650 mg Oral Q6H PRN    Or    acetaminophen (TYLENOL) suppository 650 mg  650 mg Rectal Q6H PRN    polyethylene glycol (MIRALAX) packet 17 g  17 g Oral DAILY PRN    ondansetron (ZOFRAN) injection 4 mg  4 mg IntraVENous Q6H PRN    docusate sodium (COLACE) capsule 100 mg  100 mg Oral DAILY    labetaloL (NORMODYNE;TRANDATE) injection 20 mg  20 mg IntraVENous Q4H PRN    heparin (porcine) 1,000 unit/mL injection 1,400 Units  1,400 Units InterCATHeter DIALYSIS PRN    And    heparin (porcine) 1,000 unit/mL injection 1,100 Units  1,100 Units InterCATHeter DIALYSIS PRN      Allergies   Allergen Reactions    Tramadol Nausea and Vomiting       Objective:  Vitals:    Vitals:    11/06/20 1030 11/06/20 1045 11/06/20 1100 11/06/20 1115   BP: (!) 141/55 (!) 144/68 (!) 142/80 (!) 145/76   Pulse: 84 84 83 83   Resp: 21 21 21 22   Temp:       TempSrc:       SpO2:       Weight:       Height:         Intake and Output:  11/06 0701 - 11/06 1900  In: 240 [P.O.:240]  Out: 900 [Urine:900]  11/04 1901 - 11/06 0700  In: 1680 [P.O.:1680]  Out: 3250 [Urine:3250]    Physical Examination:    General: Confused, resting on NC  Neck:  Supple, no mass  Resp:  Decreased breath sounds  CV:  RRR,  no murmur or rub, no LE edema  GI:  Soft, NT, + Bowel sounds, no hepatosplenomegaly  Neurologic:  Lethargic   Psych:             Unable to assess  :  Iniguez     []    High complexity decision making was performed  []    Patient is at high-risk of decompensation with multiple organ involvement    Lab Data Personally Reviewed: I have reviewed all the pertinent labs, microbiology data and radiology studies during assessment.     Recent Labs     11/06/20  3630 11/05/20 0432 11/04/20 0216    136 136   K 3.9 3.7 3.9    101 99   CO2 28 28 27   GLU 98 101* 97   BUN 30* 23* 32*   CREA 2.94* 2.59* 3.32*   CA 8.8 9.0 8.8   ALB 2.2* 2.3* 2.1*   ALT 52 61 78   INR 1.3* 1.3* 1.3*     Recent Labs     11/06/20 0444 11/05/20 0432 11/04/20 0216   WBC 10.0 10.7 12.4*   HGB 7.8* 7.5* 7.2*   HCT 24.4* 23.7* 22.8*    279 266     No results found for: SDES  Lab Results   Component Value Date/Time    Culture result: NO GROWTH 5 DAYS 10/22/2020 06:19 AM    Culture result: NO GROWTH 5 DAYS 10/22/2020 02:37 AM    Culture result: (A) 10/21/2020 08:15 PM     Staphylococcus hominis (Oxacillin resistant) GROWING IN THE AEROBIC BOTTLE (SITE = R HAND)    Culture result:  10/21/2020 08:15 PM     Gram Positive Cocci CALLED TO AND READ BACK BY Tanvi Johnson RN AT 2012 BY D    Culture result: (A) 10/21/2020 08:10 PM     Staphylococcus hominis (Oxacillin resistant) GROWING IN THE AEROBIC BOTTLE (SITE = L HAND)    Culture result:  10/21/2020 08:10 PM     preliminary report of Gram Positive Cocci in clusters growing in 1 of 2 bottles drawn [address] RN SCAV AT  [facility] ON 10/23/20.  Bettye 1850     Recent Results (from the past 24 hour(s))   PTT    Collection Time: 11/06/20  4:44 AM   Result Value Ref Range    aPTT 65.2 (H) 22.1 - 32.0 sec    aPTT, therapeutic range     58.0 - 77.0 SECS   PROTHROMBIN TIME + INR    Collection Time: 11/06/20  4:44 AM   Result Value Ref Range    INR 1.3 (H) 0.9 - 1.1      Prothrombin time 13.2 (H) 9.0 - 11.1 sec   CK    Collection Time: 11/06/20  4:44 AM   Result Value Ref Range     39 - 248 U/L   METABOLIC PANEL, COMPREHENSIVE    Collection Time: 11/06/20  4:44 AM   Result Value Ref Range    Sodium 137 136 - 145 mmol/L    Potassium 3.9 3.5 - 5.1 mmol/L    Chloride 101 97 - 108 mmol/L    CO2 28 21 - 32 mmol/L    Anion gap 8 5 - 15 mmol/L    Glucose 98 65 - 100 mg/dL    BUN 30 (H) 6 - 20 MG/DL    Creatinine 2.94 (H) 0.70 - 1.30 MG/DL    BUN/Creatinine ratio 10 (L) 12 - 20      GFR est AA 32 (L) >60 ml/min/1.73m2    GFR est non-AA 26 (L) >60 ml/min/1.73m2    Calcium 8.8 8.5 - 10.1 MG/DL    Bilirubin, total 0.4 0.2 - 1.0 MG/DL    ALT (SGPT) 52 12 - 78 U/L    AST (SGOT) 23 15 - 37 U/L    Alk. phosphatase 54 45 - 117 U/L    Protein, total 5.1 (L) 6.4 - 8.2 g/dL    Albumin 2.2 (L) 3.5 - 5.0 g/dL    Globulin 2.9 2.0 - 4.0 g/dL    A-G Ratio 0.8 (L) 1.1 - 2.2     CBC WITH AUTOMATED DIFF    Collection Time: 11/06/20  4:44 AM   Result Value Ref Range    WBC 10.0 4.1 - 11.1 K/uL    RBC 2.80 (L) 4.10 - 5.70 M/uL    HGB 7.8 (L) 12.1 - 17.0 g/dL    HCT 24.4 (L) 36.6 - 50.3 %    MCV 87.1 80.0 - 99.0 FL    MCH 27.9 26.0 - 34.0 PG    MCHC 32.0 30.0 - 36.5 g/dL    RDW 17.2 (H) 11.5 - 14.5 %    PLATELET 948 531 - 431 K/uL    MPV 9.4 8.9 - 12.9 FL    NRBC 0.0 0  WBC    ABSOLUTE NRBC 0.00 0.00 - 0.01 K/uL    NEUTROPHILS 76 (H) 32 - 75 %    LYMPHOCYTES 14 12 - 49 %    MONOCYTES 8 5 - 13 %    EOSINOPHILS 1 0 - 7 %    BASOPHILS 1 0 - 1 %    IMMATURE GRANULOCYTES 0 0.0 - 0.5 %    ABS. NEUTROPHILS 7.6 1.8 - 8.0 K/UL    ABS. LYMPHOCYTES 1.4 0.8 - 3.5 K/UL    ABS. MONOCYTES 0.8 0.0 - 1.0 K/UL    ABS. EOSINOPHILS 0.1 0.0 - 0.4 K/UL    ABS. BASOPHILS 0.1 0.0 - 0.1 K/UL    ABS. IMM. GRANS. 0.0 0.00 - 0.04 K/UL    DF AUTOMATED     VANCOMYCIN, RANDOM    Collection Time: 11/06/20  4:44 AM   Result Value Ref Range    Vancomycin, random 12.4 UG/ML           Total time spent with patient:  xxx   min. Care Plan discussed with:  Patient     Family      RN      Consulting Physician [facility]        I have reviewed the flowsheets. Chart and Pertinent Notes have been reviewed. No change in PMH ,family and social history from Consult note.       Rina Sams MD

## 2020-11-06 NOTE — PROGRESS NOTES
Hospitalist Progress Note  [address] [facility],   Answering service: [phone] from in house phone      Date of Service:  2020  NAME:  Sincere Dai  :  1995  MRN:  [id number]    Admission Summary:   25M p/w multiple drug use, resp failure/ARF  Interval history / Subjective: Follow up overdose. Patient seen and examined. Overnight, hypoxic and required supplemental oxygen. Currently on HD during encounter. Denies shortness of breath. Plans for LUCIANO today. Assessment & Plan:     Polysubstance abuse: UDS +ve for cocaine, THC, benzo, opioids, amphetamines  ARF: 2nd to above. Nephrology following- started on RRT  Pneumonia- likely aspiration- 2nd to above  Acute metabolic Encephalopathy: resolved, remain off seroquel   Cardiomyopathy: ef 15%- likely 2nd to drug use- Cardio following, LUCIANO   Acute hypoxic respiratory failure: improving, 2nd to above, wean oxygen as able  Rhabdomyolysis: resolved with IVFs, stop CK checks   Acute LUE DVT: heparin gtt. Switch to Eliquis post LUCIANO . Okay with nephrology   Acute Anemia: stable, transfuse if HB<7.  Bacteremia: Staph hominis on 10/21. On vanc/doxy  - repeat TTE 10/29: still ef 15-20%. Repeat CXR 10/31: slightly increased opacities b/l.  - Pulmonary team following. ID following, plans for LUCIANO      Bipolar Disorder:  Mother raised concerns about subOptimal psych care in past.   -Psych following, they will take him to IP psych unit once medically cleared    Code status: Full  DVT prophylaxis: Heparin  Care Plan discussed with: Patient/Family and Nurse  Disposition: IP psych >2days     Hospital Problems  Never Reviewed          Codes Class Noted POA    LV dysfunction ICD-10-CM: I51.9  ICD-9-CM: 429.9  2020 Yes        Current episode of major depressive disorder without prior episode ICD-10-CM: F32.9  ICD-9-CM: 296.20  2020 Yes        Non-traumatic rhabdomyolysis ICD-10-CM: M99.54  ICD-9-CM: 728.88  11/4/2020 Unknown        Toxic encephalopathy ICD-10-CM: G92  ICD-9-CM: 349.82  10/24/2020 Yes        Drug abuse ([facility] ICD-10-CM: F19.10  ICD-9-CM: 305.90  10/22/2020 Yes        Acute renal failure with tubular necrosis ([facility] ICD-10-CM: N17.0  ICD-9-CM: 584.5  10/22/2020 Yes        * (Principal) Sepsis ([facility] ICD-10-CM: A41.9  ICD-9-CM: 038.9, 995.91  10/22/2020 Yes        Community acquired pneumonia of left lower lobe of lung ICD-10-CM: J18.9  ICD-9-CM: 459  10/22/2020 Yes        Electrolyte abnormality ICD-10-CM: E87.8  ICD-9-CM: 276.9  10/22/2020 Yes            Review of Systems:   Pertinent items are mentioned in interval history. Vital Signs:    Last 24hrs VS reviewed since prior progress note. Most recent are:  Visit Vitals  /81   Pulse 87   Temp 98.3 °F (36.8 °C)   Resp 22   Ht 5' 10\" (1.778 m)   Wt 104.5 kg (230 lb 6.4 oz)   SpO2 98%   BMI 33.06 kg/m²         Intake/Output Summary (Last 24 hours) at 11/6/2020 1229  Last data filed at 11/6/2020 1120  Gross per 24 hour   Intake 720 ml   Output 6700 ml   Net -5980 ml        Physical Examination:   General:  Alert, resting in bed, No acute distress  Resp:  No accessory muscle use, no wheezes, no rhonci   Abd:  Soft, non-tender, non-distended  Extremities:  No cyanosis or clubbing, 2+ edema bilaterally   Neuro:  no focal neuro deficits, follows commands   Psych:  not agitated.  Flat affect     Data Review:    Review and/or order of clinical lab test  Review and/or order of tests in the radiology section of CPT  Review and/or order of tests in the medicine section of CPT  Labs:     Recent Labs     11/06/20  0444 11/05/20  0432   WBC 10.0 10.7   HGB 7.8* 7.5*   HCT 24.4* 23.7*    279     Recent Labs     11/06/20  0444 11/05/20  0432 11/04/20  0216    136 136   K 3.9 3.7 3.9    101 99   CO2 28 28 27   BUN 30* 23* 32*   CREA 2.94* 2.59* 3.32*   GLU 98 101* 97   CA 8.8 9.0 8.8     Recent Labs 11/06/20 0444 11/05/20 0432 11/04/20 0216   ALT 52 61 78   AP 54 58 58   TBILI 0.4 0.4 0.4   TP 5.1* 5.4* 5.3*   ALB 2.2* 2.3* 2.1*   GLOB 2.9 3.1 3.2     Recent Labs     11/06/20 0444 11/05/20 0432 11/04/20 2051 11/04/20 0216   INR 1.3* 1.3*  --   --  1.3*   PTP 13.2* 13.5*  --   --  13.7*   APTT 65.2* 77.5* 60.0*   < >  --     < > = values in this interval not displayed. No results for input(s): FE, TIBC, PSAT, FERR in the last 72 hours. Lab Results   Component Value Date/Time    Folate 9.7 10/31/2020 12:40 AM      No results for input(s): PH, PCO2, PO2 in the last 72 hours.   Recent Labs     11/06/20 0444 11/05/20 0432 11/04/20 0216    283 306     No results found for: CHOL, CHOLX, CHLST, CHOLV, HDL, HDLP, LDL, LDLC, DLDLP, TGLX, TRIGL, TRIGP, CHHD, CHHDX  Lab Results   Component Value Date/Time    Glucose (POC) 129 (H) 10/23/2020 04:27 PM     Lab Results   Component Value Date/Time    Color Yellow/Straw 10/21/2020 06:05 PM    Appearance Clear 10/21/2020 06:05 PM    Specific gravity 1.025 10/21/2020 06:05 PM    pH (UA) 5.5 10/21/2020 06:05 PM    Protein 30 (A) 10/21/2020 06:05 PM    Glucose Negative 10/21/2020 06:05 PM    Ketone Negative 10/21/2020 06:05 PM    Urobilinogen 1.0 10/21/2020 06:05 PM    Nitrites Negative 10/21/2020 06:05 PM    Leukocyte Esterase Negative 10/21/2020 06:05 PM    Bacteria Negative 10/21/2020 06:05 PM    WBC 0-4 10/21/2020 06:05 PM    RBC 0-5 10/21/2020 06:05 PM     Medications Reviewed:     Current Facility-Administered Medications   Medication Dose Route Frequency    metoprolol tartrate (LOPRESSOR) tablet 150 mg  150 mg Oral BID    vancomycin (VANCOCIN) 1250 mg in  ml infusion  1,250 mg IntraVENous ONCE    influenza vaccine 2020-21 (6 mos+)(PF) (FLUARIX/FLULAVAL/FLUZONE QUAD) injection 0.5 mL  0.5 mL IntraMUSCular PRIOR TO DISCHARGE    isosorbide dinitrate (ISORDIL) tablet 20 mg  20 mg Oral TID    albuterol-ipratropium (DUO-NEB) 2.5 MG-0.5 MG/3 ML  3 mL Nebulization Q6H PRN    QUEtiapine (SEROquel) tablet 50 mg  50 mg Oral Q8H PRN    hydrALAZINE (APRESOLINE) tablet 100 mg  100 mg Oral TID    oxyCODONE IR (ROXICODONE) tablet 5 mg  5 mg Oral Q4H PRN    LORazepam (ATIVAN) injection 2 mg  2 mg IntraVENous Q4H PRN    melatonin tablet 3 mg  3 mg Oral QHS    Vancomycin- pharmacy to dose   Other Rx Dosing/Monitoring    alteplase (CATHFLO) 1 mg in sterile water (preservative free) 1 mL injection  1 mg InterCATHeter PRN    HYDROmorphone (PF) (DILAUDID) injection 2 mg  2 mg IntraVENous Q4H PRN    heparin 25,000 units in D5W 250 ml infusion  17-36 Units/kg/hr IntraVENous TITRATE    balsam peru-castor oiL (VENELEX) ointment   Topical Q8H    sodium chloride (NS) flush 5-40 mL  5-40 mL IntraVENous Q8H    sodium chloride (NS) flush 5-40 mL  5-40 mL IntraVENous PRN    acetaminophen (TYLENOL) tablet 650 mg  650 mg Oral Q6H PRN    Or    acetaminophen (TYLENOL) suppository 650 mg  650 mg Rectal Q6H PRN    polyethylene glycol (MIRALAX) packet 17 g  17 g Oral DAILY PRN    ondansetron (ZOFRAN) injection 4 mg  4 mg IntraVENous Q6H PRN    docusate sodium (COLACE) capsule 100 mg  100 mg Oral DAILY    labetaloL (NORMODYNE;TRANDATE) injection 20 mg  20 mg IntraVENous Q4H PRN    heparin (porcine) 1,000 unit/mL injection 1,400 Units  1,400 Units InterCATHeter DIALYSIS PRN    And    heparin (porcine) 1,000 unit/mL injection 1,100 Units  1,100 Units InterCATHeter DIALYSIS PRN   ______________________________________________________________________  EXPECTED LENGTH OF STAY: 4d 19h  ACTUAL LENGTH OF STAY:          [facility],

## 2020-11-06 NOTE — PROGRESS NOTES
Pulmonary, Critical Care, and Sleep Medicine~Progress Note    Name: Fernando Camarena MRN: [id number]   : 1995 Hospital: [facility]    Date: 2020 10:29 AM Admission: 10/21/2020     Impression Plan   1. Acute hypoxic resp failure secondary to below  2. Pulmonary infiltrates: Mycoplasma PNA + volume overload   3. HFrEF: EF 15-20%, PASP 45mmHg  4. JEAN PIERRE   5. Rhabdomyolysis   6. Staph hominis bacteremia   7. Acute Left upper Extrem DVT  8. Polysubstance abuse: + EtOH, Cocaine, benzos, amphetamines, THC  1. On heparin gtt  2. LUCIANO pending  3. Doxy/vanc   4. Judicious sedative medications  5. O2 titration above 90%, on NC wean off  6. Chest film looks better   7. PRN over the weekend       Daily Progression:    On HD currently   No acute complaints     I have reviewed the labs and previous days notes. Pertinent items are noted in HPI.     OBJECTIVE:     Vital Signs:       Visit Vitals  BP (!) 144/68   Pulse 84   Temp 98.2 °F (36.8 °C)   Resp 21   Ht 5' 10\" (1.778 m)   Wt 104.5 kg (230 lb 6.4 oz)   SpO2 98%   BMI 33.06 kg/m²      Temp (24hrs), Av.3 °F (36.8 °C), Min:97.9 °F (36.6 °C), Max:98.6 °F (37 °C)     Intake/Output:     Last shift:  07 -  190  In: 240 [P.O.:240]  Out: 900 [Urine:900]    Last 3 shifts:  1901 -  0700  In: 1680 [P.O.:1680]  Out: 1406 [Urine:3250]          Intake/Output Summary (Last 24 hours) at 2020 1057  Last data filed at 2020 0900  Gross per 24 hour   Intake 1200 ml   Output 3200 ml   Net -2000 ml       Physical Exam:                                        Exam Findings Other   General: No resp distress noted, appears stated age    HEENT:  No ulcers, JVD not elevated, no cervical LAD    Chest: No pectus deformity, normal chest rise b/l    HEART:  RRR, no murmurs/rubs/gallops    Lungs:  CTA b/l, no rhonchi/crackles/wheeze, diminished BS at bases    ABD: Soft/NT, non rigid mildly distended    EXT: No cyanosis/clubbing/edema, normal peripheral pulses    Skin: No rashes or ulcers, no mottling    Neuro: Alert, speech intelligible        Medications:  Current Facility-Administered Medications   Medication Dose Route Frequency    metoprolol tartrate (LOPRESSOR) tablet 150 mg  150 mg Oral BID    influenza vaccine 2020-21 (6 mos+)(PF) (FLUARIX/FLULAVAL/FLUZONE QUAD) injection 0.5 mL  0.5 mL IntraMUSCular PRIOR TO DISCHARGE    isosorbide dinitrate (ISORDIL) tablet 20 mg  20 mg Oral TID    albuterol-ipratropium (DUO-NEB) 2.5 MG-0.5 MG/3 ML  3 mL Nebulization Q6H PRN    QUEtiapine (SEROquel) tablet 50 mg  50 mg Oral Q8H PRN    hydrALAZINE (APRESOLINE) tablet 100 mg  100 mg Oral TID    oxyCODONE IR (ROXICODONE) tablet 5 mg  5 mg Oral Q4H PRN    LORazepam (ATIVAN) injection 2 mg  2 mg IntraVENous Q4H PRN    doxycycline (VIBRAMYCIN) 100 mg in 0.9% sodium chloride (MBP/ADV) 100 mL  100 mg IntraVENous Q12H    melatonin tablet 3 mg  3 mg Oral QHS    Vancomycin- pharmacy to dose   Other Rx Dosing/Monitoring    alteplase (CATHFLO) 1 mg in sterile water (preservative free) 1 mL injection  1 mg InterCATHeter PRN    HYDROmorphone (PF) (DILAUDID) injection 2 mg  2 mg IntraVENous Q4H PRN    heparin 25,000 units in D5W 250 ml infusion  17-36 Units/kg/hr IntraVENous TITRATE    balsam peru-castor oiL (VENELEX) ointment   Topical Q8H    sodium chloride (NS) flush 5-40 mL  5-40 mL IntraVENous Q8H    sodium chloride (NS) flush 5-40 mL  5-40 mL IntraVENous PRN    acetaminophen (TYLENOL) tablet 650 mg  650 mg Oral Q6H PRN    Or    acetaminophen (TYLENOL) suppository 650 mg  650 mg Rectal Q6H PRN    polyethylene glycol (MIRALAX) packet 17 g  17 g Oral DAILY PRN    ondansetron (ZOFRAN) injection 4 mg  4 mg IntraVENous Q6H PRN    docusate sodium (COLACE) capsule 100 mg  100 mg Oral DAILY    labetaloL (NORMODYNE;TRANDATE) injection 20 mg  20 mg IntraVENous Q4H PRN    heparin (porcine) 1,000 unit/mL injection 1,400 Units  1,400 Units InterCATHeter DIALYSIS PRN    And    heparin (porcine) 1,000 unit/mL injection 1,100 Units  1,100 Units InterCATHeter DIALYSIS PRN       Labs:  ABG No results for input(s): PHI, PCO2I, PO2I, HCO3I, SO2I, FIO2I in the last 72 hours.      CBC Recent Labs     11/06/20 0444 11/05/20 0432 11/04/20 0216   WBC 10.0 10.7 12.4*   HGB 7.8* 7.5* 7.2*   HCT 24.4* 23.7* 22.8*    279 266   MCV 87.1 87.5 87.0   MCH 27.9 27.7 69.3        Metabolic  Panel Recent Labs     11/06/20 0444 11/05/20 0432 11/04/20 0216    136 136   K 3.9 3.7 3.9    101 99   CO2 28 28 27   GLU 98 101* 97   BUN 30* 23* 32*   CREA 2.94* 2.59* 3.32*   CA 8.8 9.0 8.8   ALB 2.2* 2.3* 2.1*   ALT 52 61 78   INR 1.3* 1.3* 1.3*        Pertinent Labs                Willie Enriquez PA-C  11/6/2020

## 2020-11-06 NOTE — PROGRESS NOTES
Pharmacist Note - Vancomycin Dosing  Therapy day 11  Indication: HAP/Staph hominis bacteremia  Current regimen: 1000 mg with HD    A Random Level resulted at 12.4 mcg/mL which was obtained pre HD. Goal trough: 20-25 pre HD to yield 15-20 mcg/mL post HD     Plan: Change to 1250 mg with HD . Pharmacy will continue to monitor this patient daily for changes in clinical status and renal function.

## 2020-11-06 NOTE — ANESTHESIA PREPROCEDURE EVALUATION
Relevant Problems   No relevant active problems       Anesthetic History   No history of anesthetic complications            Review of Systems / Medical History  Patient summary reviewed, nursing notes reviewed and pertinent labs reviewed    Pulmonary  Within defined limits                 Neuro/Psych   Within defined limits           Cardiovascular  Within defined limits                     GI/Hepatic/Renal         Renal disease: ARF       Endo/Other  Within defined limits           Other Findings   Comments: EF 25-30  Drug history           Physical Exam    Airway  Mallampati: II  TM Distance: > 6 cm  Neck ROM: normal range of motion   Mouth opening: Normal     Cardiovascular  Regular rate and rhythm,  S1 and S2 normal,  no murmur, click, rub, or gallop             Dental  No notable dental hx       Pulmonary  Breath sounds clear to auscultation               Abdominal  GI exam deferred       Other Findings            Anesthetic Plan    ASA: 3  Anesthesia type: MAC            Anesthetic plan and risks discussed with: Patient

## 2020-11-06 NOTE — PROGRESS NOTES
Anesthesia name:  Keaton Roberson    Anesthesia is present for case. Refer to anesthesia log for vitals.

## 2020-11-06 NOTE — PROGRESS NOTES
LUCIANO done and is normal  No vegetations  Valves and EF are normal  Tolerating procedure fine  Anesthesia used propofol  Full PPE and prep as normal with consent

## 2020-11-06 NOTE — PROGRESS NOTES
Progress Note    Patient: Pheobe Lundborg MRN: [id number]  SSN: [id number]    YOB: 1995  Age: 26 y.o. Sex: male      Admit Date: 10/21/2020    LOS: 15 days    Cardiologist: Pia Eric MD    Subjective:     Mr. Tanika Alford is a 27 yo  male admitted 10/21/2020 to [facility] in [address] with altered mental status for >24 hours, lethargy, found down by family. He had substance overdose with rhabdomyelysis, elevated troponin, severe LV systolic dysfunction, shock liver, acute renal failure, LUE DVT and sepsis. No prior reports of chest pain or shortness of breath. He was transferred to [facility] for further care. LUCIANO today. Denies discomfort. Affect flat. Objective:     Vitals:    20 0820 20 0830 20 0845 20 0900   BP: (!) 164/96 (!) 168/108 (!) 157/96 (!) 171/105   Pulse: 88 88 83 83   Resp:  26   Temp: 98.2 °F (36.8 °C)      TempSrc:       SpO2: 98%      Weight:       Height:          Temp (24hrs), Av.3 °F (36.8 °C), Min:97.9 °F (36.6 °C), Max:98.6 °F (37 °C)      Intake and Output:  Current Shift: 701 - 1900  In: -   Out: 900 [Urine:900]  Last three shifts: 1901 -  07  In: 1680 [P.O.:1680]  Out: 3250 [Urine:3250]    Physical Exam:  General:  Resting comfortably, well nourished, well developed, appears stated age   Eyes:  Conjunctivae/corneas clear    Nose: Nares normal. Septum midline. Mucosa normal. No drainage or sinus tenderness. Neck: Supple, symmetrical, trachea midline, no JVD   Lungs:   Clear to auscultation bilaterally, good effort   Heart:  Tachycardic rate and rhythm, no murmur, click, rub, or gallop   Abdomen:   Soft, non-tender, bowel sounds normal, non-distended   Extremities: Normal, atraumatic, no cyanosis or edema   Skin: Skin color, texture, turgor normal. No rash or lesions.    Neurologic: Non focal       Current Facility-Administered Medications:     metoprolol tartrate (LOPRESSOR) tablet 150 mg, 150 mg, Oral, BID, Francine Valdivia MD    influenza vaccine 2020-21 (6 mos+)(PF) (FLUARIX/FLULAVAL/FLUZONE QUAD) injection 0.5 mL, 0.5 mL, IntraMUSCular, PRIOR TO DISCHARGE, Lulu Dunham DO    isosorbide dinitrate (ISORDIL) tablet 20 mg, 20 mg, Oral, TID, Francine Valdivia MD, 20 mg at 11/06/20 0844    albuterol-ipratropium (DUO-NEB) 2.5 MG-0.5 MG/3 ML, 3 mL, Nebulization, Q6H PRN, Lulu Gandara DO    QUEtiapine (SEROquel) tablet 50 mg, 50 mg, Oral, Q8H PRN, Iram Pretty NP    hydrALAZINE (APRESOLINE) tablet 100 mg, 100 mg, Oral, TID, Javier Gibson MD, 100 mg at 11/06/20 0844    oxyCODONE IR (ROXICODONE) tablet 5 mg, 5 mg, Oral, Q4H PRN, Luis Miguel Crespo DO, 5 mg at 11/05/20 1201    LORazepam (ATIVAN) injection 2 mg, 2 mg, IntraVENous, Q4H PRN, Dimitry Pena NP, 2 mg at 11/01/20 1253    doxycycline (VIBRAMYCIN) 100 mg in 0.9% sodium chloride (MBP/ADV) 100 mL, 100 mg, IntraVENous, Q12H, Mary Herron MD, Last Rate: 100 mL/hr at 11/05/20 2124, 100 mg at 11/05/20 2124    melatonin tablet 3 mg, 3 mg, Oral, QHS, Luis Miguel Crespo DO, 3 mg at 11/05/20 1952    Vancomycin- pharmacy to dose, , Other, Rx Dosing/Monitoring, ISAMAR Guerra MD    alteplase (CATHFLO) 1 mg in sterile water (preservative free) 1 mL injection, 1 mg, InterCATHeter, PRN, Chelsie Talley MD, 1 mg at 10/31/20 0656    HYDROmorphone (PF) (DILAUDID) injection 2 mg, 2 mg, IntraVENous, Q4H PRN, Robles Restrepo MD, 2 mg at 11/06/20 0844    heparin 25,000 units in D5W 250 ml infusion, 17-36 Units/kg/hr, IntraVENous, TITRATE, Robles Restrepo MD, Last Rate: 26.6 mL/hr at 11/06/20 0753, 30 Units/kg/hr at 11/06/20 0753    balsam peru-castor oiL (VENELEX) ointment, , Topical, Q8H, Chica Guerra MD    sodium chloride (NS) flush 5-40 mL, 5-40 mL, IntraVENous, Q8H, Zac Rosales NP, 10 mL at 11/06/20 0522    sodium chloride (NS) flush 5-40 mL, 5-40 mL, IntraVENous, PRN, Sofia Talbot NP, 10 mL at 10/31/20 2302    acetaminophen (TYLENOL) tablet 650 mg, 650 mg, Oral, Q6H PRN, 650 mg at 10/30/20 0430 **OR** acetaminophen (TYLENOL) suppository 650 mg, 650 mg, Rectal, Q6H PRN, Geofm Crosser, NP    polyethylene glycol (MIRALAX) packet 17 g, 17 g, Oral, DAILY PRN, Sofia Talbot NP    [facility]ron [facility]) injection 4 mg, 4 mg, IntraVENous, Q6H PRN, Sofia Talbot NP, 4 mg at 11/06/20 0131    docusate sodium (COLACE) capsule 100 mg, 100 mg, Oral, DAILY, Zac Rosales NP, 100 mg at 11/06/20 0844    labetaloL (NORMODYNE;TRANDATE) injection 20 mg, 20 mg, IntraVENous, Q4H PRN, Sofia Talbot NP, 20 mg at 11/03/20 2898    heparin (porcine) 1,000 unit/mL injection 1,400 Units, 1,400 Units, InterCATHeter, DIALYSIS PRN, 1,400 Units at 11/02/20 2313 **AND** heparin (porcine) 1,000 unit/mL injection 1,100 Units, 1,100 Units, InterCATHeter, DIALYSIS PRN, Sigrid Patel MD, 1,100 Units at 11/02/20 2312    Lab/Data Review:  Labs Latest Ref Rng & Units 11/6/2020 11/5/2020 11/4/2020 11/3/2020 11/2/2020 11/1/2020 10/31/2020   WBC 4.1 - 11.1 K/uL 10.0 10.7 12. 4(H) 12. 1(H) 13. 3(H) 14. 5(H) -   RBC 4.10 - 5.70 M/uL 2.80(L) 2.71(L) 2.62(L) 2.61(L) 2.61(L) 2.72(L) -   Hemoglobin 12.1 - 17.0 g/dL 7. 8(L) 7. 5(L) 7. 2(L) 7. 2(L) 7. 3(L) 7. 6(L) -   Hematocrit 36.6 - 50.3 % 24. 4(L) 23. 7(L) 22. 8(L) 22. 5(L) 22. 6(L) 23. 7(L) -   MCV 80.0 - 99.0 FL 87.1 87.5 87.0 86.2 86.6 87.1 -   Platelets 733 - 232 K/uL 301 279 266 244 249 245 -   Lymphocytes 12 - 49 % 14 11(L) 11(L) 16 17 12 -   Monocytes 5 - 13 % 8 9 10 12 11 12 -   Eosinophils 0 - 7 % 1 2 2 2 2 1 -   Basophils 0 - 1 % 1 1 0 0 0 0 -   Bands 0 - 6 % - - - - - - -   Albumin 3.5 - 5.0 g/dL 2. 2(L) 2. 3(L) 2. 1(L) 2. 1(L) 2. 1(L) 2. 1(L) 2. 2(L)   Calcium 8.5 - 10.1 MG/DL 8.8 9.0 8.8 8.5 8.1(L) 8.5 8. 0(L)   Glucose 65 - 100 mg/dL 98 101(H) 97 90 91 128(H) 98   BUN 6 - 20 MG/DL 30(H) 23(H) 32(H) 26(H) 37(H) 24(H) 28(H)   Creatinine 0.70 - 1.30 MG/DL 2.94(H) 2.59(H) 3.32(H) 2.87(H) 3.80(H) 2.99(H) 3.27(H)   Sodium 136 - 145 mmol/L 137 136 136 135(L) 132(L) 134(L) 136   Potassium 3.5 - 5.1 mmol/L 3.9 3.7 3.9 3.6 3.9 3.4(L) 3.6   Some recent data might be hidden     Lab Results   Component Value Date/Time    Sodium 137 11/06/2020 04:44 AM    Potassium 3.9 11/06/2020 04:44 AM    Chloride 101 11/06/2020 04:44 AM    CO2 28 11/06/2020 04:44 AM    Anion gap 8 11/06/2020 04:44 AM    Glucose 98 11/06/2020 04:44 AM    BUN 30 (H) 11/06/2020 04:44 AM    Creatinine 2.94 (H) 11/06/2020 04:44 AM    BUN/Creatinine ratio 10 (L) 11/06/2020 04:44 AM    GFR est AA 32 (L) 11/06/2020 04:44 AM    GFR est non-AA 26 (L) 11/06/2020 04:44 AM    Calcium 8.8 11/06/2020 04:44 AM    Bilirubin, total 0.4 11/06/2020 04:44 AM    Alk. phosphatase 54 11/06/2020 04:44 AM    Protein, total 5.1 (L) 11/06/2020 04:44 AM    Albumin 2.2 (L) 11/06/2020 04:44 AM    Globulin 2.9 11/06/2020 04:44 AM    A-G Ratio 0.8 (L) 11/06/2020 04:44 AM    ALT (SGPT) 52 11/06/2020 04:44 AM    AST (SGOT) 23 11/06/2020 04:44 AM           10/21/20   ECHO ADULT FOLLOW-UP OR LIMITED 10/29/2020 10/29/2020    Narrative · LV: Estimated LVEF is 15 - 20%. Mildly dilated left ventricle. Severely   global hypokinesis; normal function at apex. · MV: Moderate mitral valve regurgitation is present. · TV: Moderate tricuspid valve regurgitation is present. · PA: Mild to moderate pulmonary hypertension. Pulmonary arterial systolic   pressure is 45 mmHg. · RV: Mildly dilated right ventricle. Borderline low systolic function.         Signed by: Radha Garcia MD     11/4/2020 Covid Yessica Kim [[id number]] (Order [id number])   Lab   Date: 11/4/2020  Department: [facility]  Released By/Authorizing: Krissy Montelongo NP (auto-released)    Component  Value  Flag  Ref Range  Units  Status    Specimen source  Nasopharyngeal       Final    SARS-CoV-2  Not detected   NOTD     Final    Comment:         The specimen is NEGATIVE for SARS-CoV2, the novel coronavirus associated with COVID-19. A negative result does not rule out COVID-19.         This test has been authorized by the FDA under an Emergency Use Authorization (EUA) for use by authorized laboratories.         Fact sheet for Healthcare Providers: ConventionUpdate.co.nz   Fact sheet for Patients: https://fda.gov/media/126739/download         Methodology: RT-PCR    Specimen source  NP SWAB     Final    Specimen type  NP Swab       Final    Health status        Final    Symptomatic Testing           Assessment:     Principal Problem:    Sepsis ([facility] [address] (10/22/2020)    Active Problems:    Drug abuse ([facility] [address] (10/22/2020)      Acute renal failure with tubular necrosis ([facility] [address] (10/22/2020)      Community acquired pneumonia of left lower lobe of lung (10/22/2020)      Electrolyte abnormality (10/22/2020)      Toxic encephalopathy (10/24/2020)      LV dysfunction (11/4/2020)      Current episode of major depressive disorder without prior episode (11/4/2020)      Non-traumatic rhabdomyolysis (11/4/2020)        Plan:     Mr. Matheus Colbert is a 27 yo WM with polysubstance abuse found unresponsive with multi-organ failure. Severe LV dysfunction likely due to substance abuse, sepsis, PNA. No chest pain or shortness of breath to suggest an acute coronary syndrome. 1. Troponin elevation              - 55.89 --> 55.30 --> 57.9              - Related to Rhabdo, most likely. CK 69,000+, CKMB 132    2. Acute systolic heart failure   - Likely non-ischemic cardiomyopathy.   - Will need right and left heart cath when renal function stable   - continue GDMT for heart failure/LV dysfunction   - Start ACE-I/ARB when renal function allows     3. Altered Mental Status              - Metabolic encephalopathy              - UDS + for cocaine, THC, Amphetamines, Ectasy, benzos              - Head CT with indication for toxic edema in basal ganglia    4.  Septic shock- staph hominis              - L sided PNA              - IV Abx              - IVF   - Will arrange LUCIANO to assess for endocarditis per schedule   - Covid PCR negative   - I discussed LUCIANO with patient. He understands and agrees to proceed. 5. Pneumonia              - Left sided              - IV Abx   - Mycoplasma IgM positive    6. Transaminitis              - significantly elevated LFTs - Acute liver injury              - INR elevation to 1.5, now 1.3   - Slow improvement    7. Acute renal failure due to rhabdomeylitis              - Nephrology following              - HD M-W-F   - Avoiding ACE-I/ARB at this time    8. Poly substance abuse              - counseling    9.  Left upper extremity DVT   - Heparin drip   - transition to DOAC when able      Signed By: Melquiades Yates MD     November 6, 2020      Interventional Cardiology  [facility], [address] [place] [address],[address] [place] [address]  P: [phone]  F: [phone]

## 2020-11-06 NOTE — ANESTHESIA POSTPROCEDURE EVALUATION
Post-Anesthesia Evaluation and Assessment    Patient: Lynn Rosario MRN: [id number]  SSN: [id number]    YOB: 1995  Age: 26 y.o. Sex: male      I have evaluated the patient and they are stable and ready for discharge from the PACU. Cardiovascular Function/Vital Signs  Visit Vitals  BP (!) 162/92 (BP 1 Location: Right arm, BP Patient Position: At rest)   Pulse 89   Temp 36.4 °C (97.6 °F)   Resp 29   SpO2 98%       Patient is status post * No anesthesia type entered * anesthesia for * No procedures listed *. Nausea/Vomiting: None    Postoperative hydration reviewed and adequate. Pain:  Pain Scale 1: Numeric (0 - 10) (11/06/20 1310)  Pain Intensity 1: 9 (11/06/20 1310)   Managed    Neurological Status: At baseline    Mental Status, Level of Consciousness: Alert and  oriented to person, place, and time    Pulmonary Status:   O2 Device: CO2 nasal cannula (11/06/20 1437)   Adequate oxygenation and airway patent    Complications related to anesthesia: None    Post-anesthesia assessment completed. No concerns    Signed By: Kaity Graham MD     November 6, 2020              * No procedures listed *. MAC    <[email or web address]>    INITIAL Post-op Vital signs: No vitals data found for the desired time range.

## 2020-11-06 NOTE — PROGRESS NOTES
TRANSFER - OUT REPORT:    Verbal report given to [employer] on Lora Martínez being transferred to [phone] for routine post - op       Report consisted of patient's Situation, Background, Assessment and   Recommendations(SBAR). Information from the following report(s) SBAR was reviewed with the receiving nurse. Opportunity for questions and clarification was provided.       Patient transported with:   [employer]

## 2020-11-06 NOTE — PROGRESS NOTES
Problem: Pain  Goal: *Control of Pain  Outcome: Progressing Towards Goal   Pt given IV dilaudid for 9/10 pain in arms and legs. Pt reporting no pain with reassessment. Problem: Heart Failure: Day 5  Goal: Off Pathway (Use only if patient is Off Pathway)  Outcome: Progressing Towards Goal   Pt educated on heart failure medications and plan for LUCIANO today. Pts metoprolol held while receiving HD this AM. Ok per HD nurse to give all other BP meds. 56- Dr. Serrano Cris paged to ask about when to turn off heparin drip before procedure. 1100- Per cardiology heparin drip does not need to be stopped for procedure. Bedside shift change report given to Koko Florence RN (oncoming nurse) by Lucian Santana RN (offgoing nurse). Report included the following information SBAR, Kardex, ED Summary, Procedure Summary, Intake/Output, MAR, Accordion, Recent Results, Med Rec Status, Cardiac Rhythm NSR and Alarm Parameters .

## 2020-11-06 NOTE — PROGRESS NOTES
ZACK[facility]-Inpatient Psych  RUR-36% High    Per attending, the plan is for patient to transition to inpatient psych once medially cleared. Patient may also need outpatient HD set-up but TBD. CM to monitor.      Yordan Durham MS

## 2020-11-06 NOTE — PROGRESS NOTES
1220: Bedside shift change report given to Yumi Young RN (oncoming nurse) by Verna Beckham RN (offgoing nurse). Report included the following information SBAR, Kardex, Intake/Output, MAR, Recent Results, Med Rec Status and Cardiac Rhythm NSR. Transport here to take patient for LUCAINO. Brief assessment performed. Complete assessment to follow when patient returns.

## 2020-11-06 NOTE — PROGRESS NOTES
Physical Therapy    Attempted to see patient for follow up PT session, but he is being dialyzed at this time and plan for LUCIANO early afternoon. We will follow up Monday. He is able to be OOB to chair with assist of nursing and walker using gait belt for safety. Recommend he be OOB to chair for all meals as he is able to tolerate.     Fili Turner, PT

## 2020-11-06 NOTE — PROCEDURES
[facility]  [phone]    Vitals   Pre   Post   Assessment   Pre   Post     Temp  Temp: 98.2 °F (36.8 °C) (11/06/20 0820)  98.3 LOC  Alert and oriented x 4 same   HR   Pulse (Heart Rate): 88 (11/06/20 0820) 87 Lungs   Clear on 2 liters nasal cannula    same   B/P   BP: (!) 164/96 (11/06/20 0820) 130/81 Cardiac   Normal Sinus rhythm  same   Resp   Resp Rate: 22 (11/06/20 0820) 23 Skin   wounds on extremties  same   Pain level  Pain Intensity 1: 0 (11/06/20 0435) 7 Edema  BLE Edema non pitting     same   Orders:    Duration:   Start:    0820 End:    1120 Total:   3 hours   Dialyzer:   Dialyzer/Set Up Inspection: Huy Session (11/06/20 0820)   K Bath:   Dialysate K (mEq/L): 3 (11/06/20 0820)   Ca Bath:   Dialysate CA (mEq/L): 2.5 (11/06/20 0820)   Na/Bicarb:   Dialysate NA (mEq/L): 140 (11/06/20 0820)   Target Fluid Removal:   Goal/Amount of Fluid to Remove (mL): 3000 mL (11/06/20 0820)   Access     Type & Location:   RIJ non tunneled CVC, dressing dry, clean an intact, Each catheter limb disinfected per p&p, caps removed, hubs disinfected per p&p.         Labs     Obtained/Reviewed   Critical Results Called   Date when labs were drawn-  Hgb-    HGB   Date Value Ref Range Status   11/06/2020 7.8 (L) 12.1 - 17.0 g/dL Final     K-    Potassium   Date Value Ref Range Status   11/06/2020 3.9 3.5 - 5.1 mmol/L Final     Ca-   Calcium   Date Value Ref Range Status   11/06/2020 8.8 8.5 - 10.1 MG/DL Final     Bun-   BUN   Date Value Ref Range Status   11/06/2020 30 (H) 6 - 20 MG/DL Final     Creat-   Creatinine   Date Value Ref Range Status   11/06/2020 2.94 (H) 0.70 - 1.30 MG/DL Final        Medications/ Blood Products Given     Name   Dose   Route and Time           Heparin 2500 units Intracatheter        Blood Volume Processed (BVP):    70.1 Net Fluid   Removed:  3000 ml   Comments   Time Out Done: 4672  Primary Nurse Rpt Pre:SASHA Hickman  Primary Nurse Rpt Post:, RN  Pt Education: Procedural  Care Plan:Continue Nephrology plan of care  Tx Summary:  0820-Labs, orders and medications were reviewed. SBAR was given by primary nurse. HD was started using RI cvc without any issues. 1120-HD was completed and all possible blood was returned to the pt. Pt tolerated HD well without any issues. Each dialysis catheter limb disinfected per p&p, blood returned per p&p, each dialysis hub disinfected per p&p, post dialysis catheter dwell instilled per order, and caps applied. Primary nurse was given a report. Admiting Diagnosis:ARF  Pt's previous clinic- TBD  Consent signed - Informed Consent Verified: Yes (11/06/20 0820)  Consent - Verified  Hepatitis Status- Negative Hep B AG /Susceptible Hep B AB 10/22/2020  Machine #- Machine Number: [id number] (11/06/20 0820)  Telemetry status- Monitored at bedside  Pre-dialysis wt. - Pre-Dialysis Weight: 108.6 kg (239 lb 6.7 oz) (11/04/20 1310) n/a

## 2020-11-06 NOTE — TELEPHONE ENCOUNTER
Per Dr. Kulkarni Drivers, heparin drip does not need to be stopped prior to procedure. Spoke to Marina Cordero. Informed of the above. Verbalized understanding.

## 2020-11-06 NOTE — PROGRESS NOTES
Bedside shift change report given to Liliana Og, RN by Patrice Reddy RN . Report included the following information SBAR, Kardex, ED Summary, Intake/Output, MAR, and Recent Results. Pt oriented to time, place, person and situation, Normal Sinus Rhythm , room air, Pain present - adequately treated. No acute events overnight. Last 3 Recorded Weights in this Encounter    11/04/20 0330 11/05/20 0335 11/06/20 0455   Weight: 107.1 kg (236 lb 3.2 oz) 105.9 kg (233 lb 8 oz) 104.5 kg (230 lb 6.4 oz)   weight variance noted      Problem: Falls - Risk of  Goal: *Absence of Falls  Description: Document Lester Fall Risk and appropriate interventions in the flowsheet. Outcome: Progressing Towards Goal  Note: Fall Risk Interventions:  Mobility Interventions: Communicate number of staff needed for ambulation/transfer, Patient to call before getting OOB    Mentation Interventions: Adequate sleep, hydration, pain control, Door open when patient unattended, Family/sitter at bedside    Medication Interventions: Evaluate medications/consider consulting pharmacy, Patient to call before getting OOB    Elimination Interventions: Call light in reach, Urinal in reach    History of Falls Interventions: Door open when patient unattended, Vital signs minimum Q4HRs X 24 hrs (comment for end date)         Problem: Pressure Injury - Risk of  Goal: *Prevention of pressure injury  Description: Document Abdirashid Scale and appropriate interventions in the flowsheet.   Outcome: Progressing Towards Goal  Note: Pressure Injury Interventions:  Sensory Interventions: Float heels, Keep linens dry and wrinkle-free, Minimize linen layers    Moisture Interventions: Absorbent underpads, Apply protective barrier, creams and emollients, Internal/External urinary devices    Activity Interventions: Increase time out of bed, PT/OT evaluation    Mobility Interventions: Float heels, HOB 30 degrees or less    Nutrition Interventions: Document food/fluid/supplement intake    Friction and Shear Interventions: Apply protective barrier, creams and emollients, HOB 30 degrees or less        Problem: Breathing Pattern - Ineffective  Goal: *Absence of hypoxia  Outcome: Progressing Towards Goal  Note: Pt on room air while awake, 2L at night for comfort.   O2 sats remain above 90%     Problem: Pain  Goal: *Control of Pain  Outcome: Progressing Towards Goal  Note: Pt receiving IV dilaudid for pain control     Problem: Heart Failure: Day 2  Goal: Treatments/Interventions/Procedures  Outcome: Progressing Towards Goal  Note: Pt scheduled for LUCIANO 11/6

## 2020-11-07 LAB
ALBUMIN SERPL-MCNC: 2.2 G/DL (ref 3.5–5)
ALBUMIN/GLOB SERPL: 0.8 {RATIO} (ref 1.1–2.2)
ALP SERPL-CCNC: 54 U/L (ref 45–117)
ALT SERPL-CCNC: 48 U/L (ref 12–78)
ANION GAP SERPL CALC-SCNC: 8 MMOL/L (ref 5–15)
APTT PPP: 32.6 SEC (ref 22.1–32)
AST SERPL-CCNC: 20 U/L (ref 15–37)
BASOPHILS # BLD: 0.1 K/UL (ref 0–0.1)
BASOPHILS NFR BLD: 1 % (ref 0–1)
BILIRUB SERPL-MCNC: 0.4 MG/DL (ref 0.2–1)
BUN SERPL-MCNC: 23 MG/DL (ref 6–20)
BUN/CREAT SERPL: 10 (ref 12–20)
CALCIUM SERPL-MCNC: 8.6 MG/DL (ref 8.5–10.1)
CHLORIDE SERPL-SCNC: 101 MMOL/L (ref 97–108)
CK SERPL-CCNC: 214 U/L (ref 39–308)
CO2 SERPL-SCNC: 29 MMOL/L (ref 21–32)
CREAT SERPL-MCNC: 2.3 MG/DL (ref 0.7–1.3)
DIFFERENTIAL METHOD BLD: ABNORMAL
EOSINOPHIL # BLD: 0.2 K/UL (ref 0–0.4)
EOSINOPHIL NFR BLD: 2 % (ref 0–7)
ERYTHROCYTE [DISTWIDTH] IN BLOOD BY AUTOMATED COUNT: 16.9 % (ref 11.5–14.5)
GLOBULIN SER CALC-MCNC: 2.8 G/DL (ref 2–4)
GLUCOSE SERPL-MCNC: 100 MG/DL (ref 65–100)
HCT VFR BLD AUTO: 24.4 % (ref 36.6–50.3)
HGB BLD-MCNC: 7.8 G/DL (ref 12.1–17)
IMM GRANULOCYTES # BLD AUTO: 0 K/UL (ref 0–0.04)
IMM GRANULOCYTES NFR BLD AUTO: 0 % (ref 0–0.5)
INR PPP: 1.3 (ref 0.9–1.1)
LYMPHOCYTES # BLD: 1.5 K/UL (ref 0.8–3.5)
LYMPHOCYTES NFR BLD: 15 % (ref 12–49)
MCH RBC QN AUTO: 27.6 PG (ref 26–34)
MCHC RBC AUTO-ENTMCNC: 32 G/DL (ref 30–36.5)
MCV RBC AUTO: 86.2 FL (ref 80–99)
MONOCYTES # BLD: 1.3 K/UL (ref 0–1)
MONOCYTES NFR BLD: 13 % (ref 5–13)
NEUTS SEG # BLD: 6.7 K/UL (ref 1.8–8)
NEUTS SEG NFR BLD: 69 % (ref 32–75)
NRBC # BLD: 0 K/UL (ref 0–0.01)
NRBC BLD-RTO: 0 PER 100 WBC
PLATELET # BLD AUTO: 241 K/UL (ref 150–400)
PMV BLD AUTO: 9.2 FL (ref 8.9–12.9)
POTASSIUM SERPL-SCNC: 3.5 MMOL/L (ref 3.5–5.1)
PROT SERPL-MCNC: 5 G/DL (ref 6.4–8.2)
PROTHROMBIN TIME: 13.9 SEC (ref 9–11.1)
RBC # BLD AUTO: 2.83 M/UL (ref 4.1–5.7)
SODIUM SERPL-SCNC: 138 MMOL/L (ref 136–145)
THERAPEUTIC RANGE,PTTT: ABNORMAL SECS (ref 58–77)
WBC # BLD AUTO: 9.8 K/UL (ref 4.1–11.1)

## 2020-11-07 PROCEDURE — 36415 COLL VENOUS BLD VENIPUNCTURE: CPT

## 2020-11-07 PROCEDURE — 74011250637 HC RX REV CODE- 250/637: Performed by: INTERNAL MEDICINE

## 2020-11-07 PROCEDURE — 74011250637 HC RX REV CODE- 250/637: Performed by: NURSE PRACTITIONER

## 2020-11-07 PROCEDURE — 82550 ASSAY OF CK (CPK): CPT

## 2020-11-07 PROCEDURE — 74011250636 HC RX REV CODE- 250/636: Performed by: INTERNAL MEDICINE

## 2020-11-07 PROCEDURE — 74011250637 HC RX REV CODE- 250/637: Performed by: HOSPITALIST

## 2020-11-07 PROCEDURE — 85610 PROTHROMBIN TIME: CPT

## 2020-11-07 PROCEDURE — 74011250636 HC RX REV CODE- 250/636: Performed by: NURSE PRACTITIONER

## 2020-11-07 PROCEDURE — 80053 COMPREHEN METABOLIC PANEL: CPT

## 2020-11-07 PROCEDURE — 65270000029 HC RM PRIVATE

## 2020-11-07 PROCEDURE — 94760 N-INVAS EAR/PLS OXIMETRY 1: CPT

## 2020-11-07 PROCEDURE — 74011000258 HC RX REV CODE- 258: Performed by: INTERNAL MEDICINE

## 2020-11-07 PROCEDURE — 74011250636 HC RX REV CODE- 250/636: Performed by: EMERGENCY MEDICINE

## 2020-11-07 PROCEDURE — 74011250637 HC RX REV CODE- 250/637: Performed by: ANESTHESIOLOGY

## 2020-11-07 PROCEDURE — 85730 THROMBOPLASTIN TIME PARTIAL: CPT

## 2020-11-07 PROCEDURE — 85025 COMPLETE CBC W/AUTO DIFF WBC: CPT

## 2020-11-07 RX ORDER — LORAZEPAM 1 MG/1
1 TABLET ORAL
Status: DISCONTINUED | OUTPATIENT
Start: 2020-11-07 | End: 2020-11-10

## 2020-11-07 RX ORDER — HYDROMORPHONE HYDROCHLORIDE 1 MG/ML
1 INJECTION, SOLUTION INTRAMUSCULAR; INTRAVENOUS; SUBCUTANEOUS
Status: DISCONTINUED | OUTPATIENT
Start: 2020-11-07 | End: 2020-11-09

## 2020-11-07 RX ADMIN — ONDANSETRON 4 MG: 2 INJECTION INTRAMUSCULAR; INTRAVENOUS at 17:36

## 2020-11-07 RX ADMIN — ISOSORBIDE DINITRATE 20 MG: 20 TABLET ORAL at 09:40

## 2020-11-07 RX ADMIN — OXYCODONE HYDROCHLORIDE 5 MG: 5 TABLET ORAL at 12:46

## 2020-11-07 RX ADMIN — Medication 10 ML: at 05:45

## 2020-11-07 RX ADMIN — HYDRALAZINE HYDROCHLORIDE 100 MG: 50 TABLET, FILM COATED ORAL at 09:39

## 2020-11-07 RX ADMIN — OXYCODONE HYDROCHLORIDE 5 MG: 5 TABLET ORAL at 17:35

## 2020-11-07 RX ADMIN — ISOSORBIDE DINITRATE 20 MG: 20 TABLET ORAL at 19:17

## 2020-11-07 RX ADMIN — APIXABAN 10 MG: 5 TABLET, FILM COATED ORAL at 09:40

## 2020-11-07 RX ADMIN — METOPROLOL TARTRATE 150 MG: 50 TABLET, FILM COATED ORAL at 17:35

## 2020-11-07 RX ADMIN — DOXYCYCLINE 100 MG: 100 INJECTION, POWDER, LYOPHILIZED, FOR SOLUTION INTRAVENOUS at 19:17

## 2020-11-07 RX ADMIN — Medication: at 21:22

## 2020-11-07 RX ADMIN — Medication: at 05:45

## 2020-11-07 RX ADMIN — Medication 10 ML: at 21:22

## 2020-11-07 RX ADMIN — HYDRALAZINE HYDROCHLORIDE 100 MG: 50 TABLET, FILM COATED ORAL at 21:21

## 2020-11-07 RX ADMIN — Medication 3 MG: at 19:17

## 2020-11-07 RX ADMIN — APIXABAN 10 MG: 5 TABLET, FILM COATED ORAL at 17:35

## 2020-11-07 RX ADMIN — DOXYCYCLINE 100 MG: 100 INJECTION, POWDER, LYOPHILIZED, FOR SOLUTION INTRAVENOUS at 05:44

## 2020-11-07 RX ADMIN — DOCUSATE SODIUM 100 MG: 100 CAPSULE, LIQUID FILLED ORAL at 09:39

## 2020-11-07 RX ADMIN — Medication: at 17:45

## 2020-11-07 RX ADMIN — HYDROMORPHONE HYDROCHLORIDE 2 MG: 2 INJECTION, SOLUTION INTRAMUSCULAR; INTRAVENOUS; SUBCUTANEOUS at 07:52

## 2020-11-07 RX ADMIN — Medication 10 ML: at 19:20

## 2020-11-07 RX ADMIN — HYDRALAZINE HYDROCHLORIDE 100 MG: 50 TABLET, FILM COATED ORAL at 17:35

## 2020-11-07 RX ADMIN — ISOSORBIDE DINITRATE 20 MG: 20 TABLET ORAL at 21:22

## 2020-11-07 RX ADMIN — HYDROMORPHONE HYDROCHLORIDE 1 MG: 1 INJECTION, SOLUTION INTRAMUSCULAR; INTRAVENOUS; SUBCUTANEOUS at 19:20

## 2020-11-07 RX ADMIN — METOPROLOL TARTRATE 150 MG: 50 TABLET, FILM COATED ORAL at 09:39

## 2020-11-07 NOTE — PROGRESS NOTES
Spoke with Dr. Erin Chavez about patient possibly withdrawing from the dilaudid (nauseated, pain not under control). She will place order for 1mg dilaudid that can be given within one hour of the last oxycodone.

## 2020-11-07 NOTE — PROGRESS NOTES
Problem: Falls - Risk of  Goal: *Absence of Falls  Patient has not fallen during shift. Call bell within reach. Tray table within reach. Bed in lowest position. Non-skid socks on patient's feet when ambulating. Appropriate lighting in room. Description: Document Anya Paul Fall Risk and appropriate interventions in the flowsheet. Outcome: Progressing Towards Goal  Note: Fall Risk Interventions:  Mobility Interventions: Assess mobility with egress test, Patient to call before getting OOB, PT Consult for mobility concerns, Utilize walker, cane, or other assistive device, Utilize gait belt for transfers/ambulation, Communicate number of staff needed for ambulation/transfer    Mentation Interventions: Adequate sleep, hydration, pain control, Door open when patient unattended, Evaluate medications/consider consulting pharmacy, Eyeglasses and hearing aids, Gait belt with transfers/ambulation, Increase mobility, More frequent rounding, Reorient patient, Update white board    Medication Interventions: Assess postural VS orthostatic hypotension, Evaluate medications/consider consulting pharmacy, Patient to call before getting OOB, Teach patient to arise slowly    Elimination Interventions: Call light in reach, Patient to call for help with toileting needs, Toileting schedule/hourly rounds, Urinal in reach    History of Falls Interventions: Evaluate medications/consider consulting pharmacy, Room close to nurse's station, Utilize gait belt for transfer/ambulation         Problem: Pressure Injury - Risk of  Goal: *Prevention of pressure injury  Description: Document Abdirashid Scale and appropriate interventions in the flowsheet.   Outcome: Progressing Towards Goal  Note: Pressure Injury Interventions:  Sensory Interventions: Assess need for specialty bed, Avoid rigorous massage over bony prominences, Discuss PT/OT consult with provider, Float heels, Keep linens dry and wrinkle-free, Maintain/enhance activity level, Minimize linen layers, Monitor skin under medical devices, Pressure redistribution bed/mattress (bed type), Turn and reposition approx. every two hours (pillows and wedges if needed)    Moisture Interventions: Apply protective barrier, creams and emollients, Check for incontinence Q2 hours and as needed, Limit adult briefs, Maintain skin hydration (lotion/cream), Minimize layers, Moisture barrier    Activity Interventions: Assess need for specialty bed, Increase time out of bed, Pressure redistribution bed/mattress(bed type)    Mobility Interventions: Assess need for specialty bed, HOB 30 degrees or less, Pressure redistribution bed/mattress (bed type), Turn and reposition approx. every two hours(pillow and wedges)    Nutrition Interventions: Document food/fluid/supplement intake, Discuss nutritional consult with provider    Friction and Shear Interventions: Apply protective barrier, creams and emollients, Foam dressings/transparent film/skin sealants, HOB 30 degrees or less, Lift sheet, Lift team/patient mobility team, Minimize layers                Problem: Impaired Skin Integrity/Pressure Injury Treatment  Goal: *Improvement of Existing Pressure Injury  Outcome: Progressing Towards Goal     Problem: Breathing Pattern - Ineffective  Goal: *Absence of hypoxia  Outcome: Progressing Towards Goal     Problem: Breathing Pattern - Ineffective  Goal: *Absence of hypoxia  Outcome: Progressing Towards Goal     Problem: Pain  Goal: *Control of Pain  Patient still requiring IV dilaudid.   Outcome: Progressing Towards Goal     Problem: Heart Failure: Day 5  Goal: Medications  Outcome: Progressing Towards Goal  Goal: Respiratory  Outcome: Progressing Towards Goal

## 2020-11-07 NOTE — PROGRESS NOTES
Day #12 of Vancomycin  Indication:  HAP + Staph hominis bacteremia   Current regimen:  1.25g post-HD  Abx regimen:  vancomycin + doxycycline   ID Following ?: YES   Inpatient dialysis schedule: MWF   Recent Labs     20  0103 20  0444 20  0432   WBC 9.8 10.0 10.7   CREA 2.30* 2.94* 2.59*   BUN 23* 30* 23*     Est CrCl: JEAN PIERRE on temporary HD  Temp (24hrs), Av °F (36.7 °C), Min:97.6 °F (36.4 °C), Max:98.3 °F (36.8 °C)    Cultures:   10/21 blood - Staph hominis in 2/2 bottles (oxacillin resistant)  10/22 blood x 2 - NG, final      Goal trough = 20 - 25 mcg/mL for therapeutic goal of 15 - 20 mcg/mL (assuming ~35% removal by dialysis)    Date                Dialysis (Yes/No)       Pre-HD Level              Dose  10/27  Y   --   2000 mg   10/28  Y   --   750 mg   10/29  Y   --   750 mg   10/30  Y   18.5 mcg/mL  1000 mg   10/31  Y   --   1000 mg     N   --   --    Y   15.1 mcg/mL  1250 mg                  N                                 --                 Y                                 --                                 1000 mg                 N                                 --                                   --                 Y                                 12.4 mcg/mL               1250 mg                    N                                 --                                   --    Remains on HD. Next session slated for Mon if still requiring RRT. LUCIANO (-)ve. Plan: Continue current regimen. Noted per ID, vanc through . No further dosing anticipated at this time.     Vancomycin Process in Hemodialysis

## 2020-11-07 NOTE — PROGRESS NOTES
Bedside shift change report given to [address] [place] [address] [address] (oncoming nurse) by Adonay Holland (offgoing nurse). Report included the following information Kardex, MAR and Recent Results.

## 2020-11-07 NOTE — PROGRESS NOTES
TRANSFER - OUT REPORT:    Verbal report given to 5S RN(name) on Tushar Hair  being transferred to 5S(unit) for routine progression of care       Report consisted of patients Situation, Background, Assessment and   Recommendations(SBAR). Information from the following report(s) SBAR was reviewed with the receiving nurse. Lines:   Quad Lumen 10/22/20 (Active)   Central Line Being Utilized Yes 11/06/20 1311   Criteria for Appropriate Use Limited/no vessel suitable for conventional peripheral access 11/06/20 1511   Site Assessment Clean, dry, & intact 11/06/20 1511   Infiltration Assessment 0 11/06/20 1511   Affected Extremity/Extremities Color distal to insertion site pink (or appropriate for race) 11/06/20 1511   Date of Last Dressing Change 10/31/20 11/06/20 1511   Dressing Status Clean, dry, & intact 11/06/20 1511   Dressing Type Transparent;Disk with Chlorhexadine gluconate (CHG) 11/06/20 1511   Action Taken Open ports on tubing capped 11/06/20 1511   Proximal Hub Color/Line Status White;Capped 11/06/20 1511   Positive Blood Return (Medial Site) Yes 11/06/20 1511   Medial 1 Hub Color/Line Status Ulysses Maeve; Infusing 11/06/20 1511   Positive Blood Return (Lateral Site) Yes 11/06/20 1511   Medial 2 Hub Color/Line Status Blue;Capped 11/06/20 1511   Positive Blood Return (Site #3) Yes 11/06/20 1511   Distal Hub Color/Line Status Brown;Capped 11/06/20 1511   Positive Blood Return (Site #4) Yes 11/06/20 1511   Alcohol Cap Used Yes 11/06/20 1511       Peripheral IV 11/06/20 (Active)        Opportunity for questions and clarification was provided.       Patient transported with:   O2 @ 2 liters  Tech

## 2020-11-07 NOTE — PROGRESS NOTES
Primary Nurse Larry Schmidt RN and William Bird RN performed a dual skin assessment on this patient Impairment noted- see wound doc flow sheet  Abdirashid score is 16    L heel blister,R shin abrasion, ADA arm redness,behind R knee redness, rita area redness, anasarca

## 2020-11-07 NOTE — PROGRESS NOTES
ID Progress Note  2020     Doxy   10/28 - present  vanc  10/27 - present     Subjective:     Afebrile. He says he is breathing all right. He does not have any abdominal pain, headache or sore throat. LUCIANO neg. ROS: No anaphylaxis, seizures, syncope, hematemesis, hematochezia     Objective:     Vitals:   Visit Vitals  BP (!) 165/83 (BP 1 Location: Right arm, BP Patient Position: At rest)   Pulse 78   Temp 98.3 °F (36.8 °C)   Resp 22   Ht 5' 10\" (1.778 m)   Wt 104.5 kg (230 lb 6.4 oz)   SpO2 100%   BMI 33.06 kg/m²        Tmax:  Temp (24hrs), Av.2 °F (36.8 °C), Min:97.6 °F (36.4 °C), Max:98.6 °F (37 °C)      Exam:    Not in distress, he is drowsy  Pink conjunctivae, anicteric sclerae  No cervical lymphdenopathy   Lung clear, no rales, wheezes or rhonchi   Heart: s1, s2, RRR, no murmurs rubs or clicks  Abdomen: soft nontender, no guarding or rebound  Knees not warm or tender  Speech fluent     Labs:   Lab Results   Component Value Date/Time    WBC 10.0 2020 04:44 AM    HGB 7.8 (L) 2020 04:44 AM    HCT 24.4 (L) 2020 04:44 AM    PLATELET 329  04:44 AM    MCV 87.1 2020 04:44 AM     Lab Results   Component Value Date/Time    Sodium 137 2020 04:44 AM    Potassium 3.9 2020 04:44 AM    Chloride 101 2020 04:44 AM    CO2 28 2020 04:44 AM    Anion gap 8 2020 04:44 AM    Glucose 98 2020 04:44 AM    BUN 30 (H) 2020 04:44 AM    Creatinine 2.94 (H) 2020 04:44 AM    BUN/Creatinine ratio 10 (L) 2020 04:44 AM    GFR est AA 32 (L) 2020 04:44 AM    GFR est non-AA 26 (L) 2020 04:44 AM    Calcium 8.8 2020 04:44 AM    Bilirubin, total 0.4 2020 04:44 AM    Alk.  phosphatase 54 2020 04:44 AM    Protein, total 5.1 (L) 2020 04:44 AM    Albumin 2.2 (L) 2020 04:44 AM    Globulin 2.9 2020 04:44 AM    A-G Ratio 0.8 (L) 2020 04:44 AM    ALT (SGPT) 52 2020 04:44 AM           Assessment: Staph hominis bacteremia - HUMBERTO negative.      Left lung pneumonia with positive mycoplasma IgM     Renal failure on dialysis     Rhabdomyolysis     cardiomyopathy with EF of 15 to 20%     Left upper extremity DVT                 Recommendations:       Humberto negative. Appreciate Dr. Giancarlo Mcmahan efforts     Would give vanco until November 9 and doxy until nov 10. team available over the weekend if needed.      Khushbu Hendrix MD

## 2020-11-07 NOTE — PROGRESS NOTES
Hospitalist Progress Note  Vern Spencer DO  Answering service: [phone] from in house phone      Date of Service:  2020  NAME:  Suzan Garcia  :  1995  MRN:  [id number]    Admission Summary:   25M p/w multiple drug use, resp failure/ARF  Interval history / Subjective: Follow up overdose. Patient seen and examined. No acute complaints. LUCIANO with normal EF. Updated his sister. Assessment & Plan:     Polysubstance abuse: UDS +ve for cocaine, THC, benzo, opioids, amphetamines    ARF, volume overload: 2nd to above. Nephrology following- started on RRT     Pneumonia- likely aspiration- 2nd to above, s/p treatment    Cardiomyopathy: ef 15%- likely 2nd to drug use- Cardio following, LUCIANO  with normal function     Acute hypoxic respiratory failure: improving, 2nd to above, wean oxygen as able    Acute LUE DVT: heparin gtt. Switch to Eliquis post LUCIANO . Okay with nephrology     Acute Anemia: stable, transfuse if HB<7.    Bacteremia: Staph hominis on 10/21. On vanc/doxy  - ID following, LUCIANO negative for vegetation     Acute metabolic Encephalopathy: resolved, remain off seroquel     Bipolar Disorder:  Mother raised concerns about subOptimal psych care in past.   -Psych following, they will take him to IP psych unit once medically cleared    Rhabdomyolysis: resolved with IVFs, stop CK checks     Code status: Full  DVT prophylaxis: Heparin  Care Plan discussed with: Patient/Family and Nurse  Disposition: IP psych >2days     Hospital Problems  Never Reviewed          Codes Class Noted POA    LV dysfunction ICD-10-CM: I51.9  ICD-9-CM: 429.9  2020 Yes        Current episode of major depressive disorder without prior episode ICD-10-CM: F32.9  ICD-9-CM: 296.20  2020 Yes        Non-traumatic rhabdomyolysis ICD-10-CM: M62.82  ICD-9-CM: 728.88  2020 Unknown        Toxic encephalopathy ICD-10-CM: G92  ICD-9-CM: 349.82 10/24/2020 Yes        Drug abuse ([facility] ICD-10-CM: F19.10  ICD-9-CM: 305.90  10/22/2020 Yes        Acute renal failure with tubular necrosis ([facility] ICD-10-CM: N17.0  ICD-9-CM: 584.5  10/22/2020 Yes        * (Principal) Sepsis ([facility] ICD-10-CM: A41.9  ICD-9-CM: 038.9, 995.91  10/22/2020 Yes        Community acquired pneumonia of left lower lobe of lung ICD-10-CM: J18.9  ICD-9-CM: 148  10/22/2020 Yes        Electrolyte abnormality ICD-10-CM: E87.8  ICD-9-CM: 276.9  10/22/2020 Yes            Review of Systems:   Pertinent items are mentioned in interval history. Vital Signs:    Last 24hrs VS reviewed since prior progress note. Most recent are:  Visit Vitals  BP (!) 143/84 (BP 1 Location: Right arm, BP Patient Position: At rest)   Pulse 82   Temp 97.8 °F (36.6 °C)   Resp 16   Ht 5' 10\" (1.778 m)   Wt 104.1 kg (229 lb 6.4 oz)   SpO2 98%   BMI 32.92 kg/m²         Intake/Output Summary (Last 24 hours) at 11/7/2020 1510  Last data filed at 11/7/2020 1145  Gross per 24 hour   Intake    Output 2650 ml   Net -2650 ml        Physical Examination:   General:  Alert, resting in bed, No acute distress  Resp:  No accessory muscle use, no wheezes, no rhonci   Abd:  Soft, non-tender, non-distended  Extremities:  No cyanosis or clubbing, 2+ edema bilaterally   Neuro:  no focal neuro deficits, follows commands   Psych:  not agitated.  Flat affect     Data Review:    Review and/or order of clinical lab test  Review and/or order of tests in the radiology section of CPT  Review and/or order of tests in the medicine section of CPT  Labs:     Recent Labs     11/07/20 0103 11/06/20 0444   WBC 9.8 10.0   HGB 7.8* 7.8*   HCT 24.4* 24.4*    301     Recent Labs     11/07/20 0103 11/06/20 0444 11/05/20 0432    137 136   K 3.5 3.9 3.7    101 101   CO2 29 28 28   BUN 23* 30* 23*   CREA 2.30* 2.94* 2.59*    98 101*   CA 8.6 8.8 9.0     Recent Labs     11/07/20  0103 11/06/20 0444 11/05/20 0432   ALT 48 52 61   AP 54 54 58   TBILI 0.4 0.4 0.4   TP 5.0* 5.1* 5.4*   ALB 2.2* 2.2* 2.3*   GLOB 2.8 2.9 3.1     Recent Labs     11/07/20  0103 11/06/20  0444 11/05/20  0432   INR 1.3* 1.3* 1.3*   PTP 13.9* 13.2* 13.5*   APTT 32.6* 65.2* 77.5*      No results for input(s): FE, TIBC, PSAT, FERR in the last 72 hours. Lab Results   Component Value Date/Time    Folate 9.7 10/31/2020 12:40 AM      No results for input(s): PH, PCO2, PO2 in the last 72 hours.   Recent Labs     11/07/20 0103 11/06/20 0444 11/05/20 0432    254 283     No results found for: CHOL, CHOLX, CHLST, CHOLV, HDL, HDLP, LDL, LDLC, DLDLP, TGLX, TRIGL, TRIGP, CHHD, CHHDX  Lab Results   Component Value Date/Time    Glucose (POC) 129 (H) 10/23/2020 04:27 PM     Lab Results   Component Value Date/Time    Color Yellow/Straw 10/21/2020 06:05 PM    Appearance Clear 10/21/2020 06:05 PM    Specific gravity 1.025 10/21/2020 06:05 PM    pH (UA) 5.5 10/21/2020 06:05 PM    Protein 30 (A) 10/21/2020 06:05 PM    Glucose Negative 10/21/2020 06:05 PM    Ketone Negative 10/21/2020 06:05 PM    Urobilinogen 1.0 10/21/2020 06:05 PM    Nitrites Negative 10/21/2020 06:05 PM    Leukocyte Esterase Negative 10/21/2020 06:05 PM    Bacteria Negative 10/21/2020 06:05 PM    WBC 0-4 10/21/2020 06:05 PM    RBC 0-5 10/21/2020 06:05 PM     Medications Reviewed:     Current Facility-Administered Medications   Medication Dose Route Frequency    LORazepam (ATIVAN) tablet 1 mg  1 mg Oral Q6H PRN    metoprolol tartrate (LOPRESSOR) tablet 150 mg  150 mg Oral BID    doxycycline (VIBRAMYCIN) 100 mg in 0.9% sodium chloride (MBP/ADV) 100 mL  100 mg IntraVENous Q12H    apixaban (ELIQUIS) tablet 10 mg  10 mg Oral BID    Followed by   Walker Harrington ON 11/13/2020] apixaban (ELIQUIS) tablet 5 mg  5 mg Oral BID    influenza vaccine 2020-21 (6 mos+)(PF) (FLUARIX/FLULAVAL/FLUZONE QUAD) injection 0.5 mL  0.5 mL IntraMUSCular PRIOR TO DISCHARGE    isosorbide dinitrate (ISORDIL) tablet 20 mg  20 mg Oral TID   [address] [place] [address] Jerald albuterol-ipratropium (DUO-NEB) 2.5 MG-0.5 MG/3 ML  3 mL Nebulization Q6H PRN    QUEtiapine (SEROquel) tablet 50 mg  50 mg Oral Q8H PRN    hydrALAZINE (APRESOLINE) tablet 100 mg  100 mg Oral TID    oxyCODONE IR (ROXICODONE) tablet 5 mg  5 mg Oral Q4H PRN    melatonin tablet 3 mg  3 mg Oral QHS    Vancomycin- pharmacy to dose   Other Rx Dosing/Monitoring    alteplase (CATHFLO) 1 mg in sterile water (preservative free) 1 mL injection  1 mg InterCATHeter PRN    balsam peru-castor oiL (VENELEX) ointment   Topical Q8H    sodium chloride (NS) flush 5-40 mL  5-40 mL IntraVENous Q8H    sodium chloride (NS) flush 5-40 mL  5-40 mL IntraVENous PRN    acetaminophen (TYLENOL) tablet 650 mg  650 mg Oral Q6H PRN    Or    acetaminophen (TYLENOL) suppository 650 mg  650 mg Rectal Q6H PRN    polyethylene glycol (MIRALAX) packet 17 g  17 g Oral DAILY PRN    ondansetron (ZOFRAN) injection 4 mg  4 mg IntraVENous Q6H PRN    docusate sodium (COLACE) capsule 100 mg  100 mg Oral DAILY    labetaloL (NORMODYNE;TRANDATE) injection 20 mg  20 mg IntraVENous Q4H PRN    heparin (porcine) 1,000 unit/mL injection 1,400 Units  1,400 Units InterCATHeter DIALYSIS PRN    And    heparin (porcine) 1,000 unit/mL injection 1,100 Units  1,100 Units InterCATHeter DIALYSIS PRN   ______________________________________________________________________  EXPECTED LENGTH OF STAY: 4d 19h  ACTUAL LENGTH OF STAY:          [facility]

## 2020-11-07 NOTE — PROGRESS NOTES
TRANSFER - IN REPORT:    Verbal report received from Sandra(dylan) on Edmar Fitzpatrick  being received from (unit) for routine progression of care      Report consisted of patients Situation, Background, Assessment and   Recommendations(SBAR). Information from the following report(s) Kardex, MAR and Recent Results was reviewed with the receiving nurse. Opportunity for questions and clarification was provided. Assessment completed upon patients arrival to unit and care assumed.

## 2020-11-08 LAB
ANION GAP SERPL CALC-SCNC: 9 MMOL/L (ref 5–15)
BUN SERPL-MCNC: 29 MG/DL (ref 6–20)
BUN/CREAT SERPL: 11 (ref 12–20)
CALCIUM SERPL-MCNC: 8.5 MG/DL (ref 8.5–10.1)
CHLORIDE SERPL-SCNC: 100 MMOL/L (ref 97–108)
CO2 SERPL-SCNC: 27 MMOL/L (ref 21–32)
CREAT SERPL-MCNC: 2.6 MG/DL (ref 0.7–1.3)
GLUCOSE SERPL-MCNC: 95 MG/DL (ref 65–100)
POTASSIUM SERPL-SCNC: 3.7 MMOL/L (ref 3.5–5.1)
SODIUM SERPL-SCNC: 136 MMOL/L (ref 136–145)

## 2020-11-08 PROCEDURE — 74011250637 HC RX REV CODE- 250/637: Performed by: INTERNAL MEDICINE

## 2020-11-08 PROCEDURE — 36415 COLL VENOUS BLD VENIPUNCTURE: CPT

## 2020-11-08 PROCEDURE — 74011250636 HC RX REV CODE- 250/636: Performed by: INTERNAL MEDICINE

## 2020-11-08 PROCEDURE — 74011250637 HC RX REV CODE- 250/637: Performed by: NURSE PRACTITIONER

## 2020-11-08 PROCEDURE — 74011000258 HC RX REV CODE- 258: Performed by: INTERNAL MEDICINE

## 2020-11-08 PROCEDURE — 65270000029 HC RM PRIVATE

## 2020-11-08 PROCEDURE — 74011250637 HC RX REV CODE- 250/637: Performed by: ANESTHESIOLOGY

## 2020-11-08 PROCEDURE — 80048 BASIC METABOLIC PNL TOTAL CA: CPT

## 2020-11-08 RX ADMIN — Medication 10 ML: at 14:54

## 2020-11-08 RX ADMIN — HYDRALAZINE HYDROCHLORIDE 100 MG: 50 TABLET, FILM COATED ORAL at 23:08

## 2020-11-08 RX ADMIN — APIXABAN 10 MG: 5 TABLET, FILM COATED ORAL at 17:03

## 2020-11-08 RX ADMIN — OXYCODONE HYDROCHLORIDE 5 MG: 5 TABLET ORAL at 13:14

## 2020-11-08 RX ADMIN — METOPROLOL TARTRATE 150 MG: 50 TABLET, FILM COATED ORAL at 17:02

## 2020-11-08 RX ADMIN — DOXYCYCLINE 100 MG: 100 INJECTION, POWDER, LYOPHILIZED, FOR SOLUTION INTRAVENOUS at 18:29

## 2020-11-08 RX ADMIN — HYDROMORPHONE HYDROCHLORIDE 1 MG: 1 INJECTION, SOLUTION INTRAMUSCULAR; INTRAVENOUS; SUBCUTANEOUS at 23:13

## 2020-11-08 RX ADMIN — Medication: at 14:54

## 2020-11-08 RX ADMIN — HYDROMORPHONE HYDROCHLORIDE 1 MG: 1 INJECTION, SOLUTION INTRAMUSCULAR; INTRAVENOUS; SUBCUTANEOUS at 01:29

## 2020-11-08 RX ADMIN — APIXABAN 10 MG: 5 TABLET, FILM COATED ORAL at 09:41

## 2020-11-08 RX ADMIN — HYDRALAZINE HYDROCHLORIDE 100 MG: 50 TABLET, FILM COATED ORAL at 17:03

## 2020-11-08 RX ADMIN — DOCUSATE SODIUM 100 MG: 100 CAPSULE, LIQUID FILLED ORAL at 09:41

## 2020-11-08 RX ADMIN — HYDROMORPHONE HYDROCHLORIDE 1 MG: 1 INJECTION, SOLUTION INTRAMUSCULAR; INTRAVENOUS; SUBCUTANEOUS at 06:07

## 2020-11-08 RX ADMIN — HYDROMORPHONE HYDROCHLORIDE 1 MG: 1 INJECTION, SOLUTION INTRAMUSCULAR; INTRAVENOUS; SUBCUTANEOUS at 19:11

## 2020-11-08 RX ADMIN — Medication 10 ML: at 23:09

## 2020-11-08 RX ADMIN — METOPROLOL TARTRATE 150 MG: 50 TABLET, FILM COATED ORAL at 09:41

## 2020-11-08 RX ADMIN — HYDRALAZINE HYDROCHLORIDE 100 MG: 50 TABLET, FILM COATED ORAL at 09:42

## 2020-11-08 RX ADMIN — Medication: at 06:14

## 2020-11-08 RX ADMIN — HYDROMORPHONE HYDROCHLORIDE 1 MG: 1 INJECTION, SOLUTION INTRAMUSCULAR; INTRAVENOUS; SUBCUTANEOUS at 14:50

## 2020-11-08 RX ADMIN — HYDROMORPHONE HYDROCHLORIDE 1 MG: 1 INJECTION, SOLUTION INTRAMUSCULAR; INTRAVENOUS; SUBCUTANEOUS at 10:47

## 2020-11-08 RX ADMIN — ISOSORBIDE DINITRATE 20 MG: 20 TABLET ORAL at 23:07

## 2020-11-08 RX ADMIN — ISOSORBIDE DINITRATE 20 MG: 20 TABLET ORAL at 17:03

## 2020-11-08 RX ADMIN — DOXYCYCLINE 100 MG: 100 INJECTION, POWDER, LYOPHILIZED, FOR SOLUTION INTRAVENOUS at 06:02

## 2020-11-08 RX ADMIN — Medication 10 ML: at 06:02

## 2020-11-08 RX ADMIN — ISOSORBIDE DINITRATE 20 MG: 20 TABLET ORAL at 09:41

## 2020-11-08 RX ADMIN — Medication 3 MG: at 19:14

## 2020-11-08 RX ADMIN — Medication: at 23:08

## 2020-11-08 NOTE — PROGRESS NOTES
Hospitalist Progress Note  Nikolai Gregory, DO  Answering service: [phone] from in house phone      Date of Service:  2020  NAME:  Cassius Beltran  :  1995  MRN:  [id number]    Admission Summary:   25M p/w multiple drug use, resp failure/ARF  Interval history / Subjective: Follow up overdose. Patient seen and examined. Yesterday evening, became tremulous. Suspected secondary to withdrawal from dilaudid. Assessment & Plan:     Polysubstance abuse with overdose: UDS +ve for cocaine, THC, benzo, opioids, amphetamines    ARF, volume overload: 2nd to above. Nephrology following- started on RRT. Suspect will have component of CKD if dialysis able to discontinue. Will ask nephrology if able to discontinue chan catheter     Pneumonia- likely aspiration- 2nd to above, s/p treatment    Cardiomyopathy: Resolved. ef 15%- likely 2nd to drug use- Cardio following, LUCIANO  with normal function     Acute hypoxic respiratory failure: improving, 2nd to above, wean oxygen as able    Acute LUE DVT: Continue Eliquis. Okay with nephrology     Acute Anemia: stable, transfuse if HB<7.    Bacteremia: Staph hominis on 10/21. On vanc/doxy  - ID following, LUCIANO negative for vegetation     Acute metabolic Encephalopathy: resolved, remain off seroquel     Bipolar Disorder:  Mother raised concerns about subOptimal psych care in past.   -Psych following, they will take him to IP psych unit once medically cleared    Rhabdomyolysis: resolved with IVFs, stop CK checks     Pain: gradually decrease pain medication to avoid withdrawal     Code status: Full  DVT prophylaxis: Heparin  Care Plan discussed with: Patient/Family and Nurse  Disposition: IP psych >2days     Hospital Problems  Never Reviewed          Codes Class Noted POA    LV dysfunction ICD-10-CM: I51.9  ICD-9-CM: 429.9  2020 Yes        Current episode of major depressive disorder without prior episode ICD-10-CM: F32.9  ICD-9-CM: 296.20  11/4/2020 Yes        Non-traumatic rhabdomyolysis ICD-10-CM: M62.82  ICD-9-CM: 728.88  11/4/2020 Unknown        Toxic encephalopathy ICD-10-CM: G92  ICD-9-CM: 349.82  10/24/2020 Yes        Drug abuse ([facility] ICD-10-CM: F19.10  ICD-9-CM: 305.90  10/22/2020 Yes        Acute renal failure with tubular necrosis ([facility].) ICD-10-CM: N17.0  ICD-9-CM: 584.5  10/22/2020 Yes        * (Principal) Sepsis ([facility].) ICD-10-CM: A41.9  ICD-9-CM: 038.9, 995.91  10/22/2020 Yes        Community acquired pneumonia of left lower lobe of lung ICD-10-CM: J18.9  ICD-9-CM: 210  10/22/2020 Yes        Electrolyte abnormality ICD-10-CM: E87.8  ICD-9-CM: 276.9  10/22/2020 Yes            Review of Systems:   Pertinent items are mentioned in interval history. Vital Signs:    Last 24hrs VS reviewed since prior progress note. Most recent are:  Visit Vitals  BP (!) 145/95   Pulse 85   Temp 97.9 °F (36.6 °C)   Resp 18   Ht 5' 10\" (1.778 m)   Wt 103.7 kg (228 lb 11.2 oz)   SpO2 97%   BMI 32.82 kg/m²         Intake/Output Summary (Last 24 hours) at 11/8/2020 1617  Last data filed at 11/8/2020 1527  Gross per 24 hour   Intake    Output 4910 ml   Net -4910 ml        Physical Examination:   General:  Alert, resting in bed, No acute distress  Resp:  No accessory muscle use, no wheezes, no rhonci   Abd:  Soft, non-tender, non-distended  Extremities:  No cyanosis or clubbing, 2+ edema bilaterally   Neuro:  no focal neuro deficits, follows commands   Psych:  not agitated.  Flat affect     Data Review:    Review and/or order of clinical lab test  Review and/or order of tests in the radiology section of CPT  Review and/or order of tests in the medicine section of CPT  Labs:     Recent Labs     11/07/20 0103 11/06/20 0444   WBC 9.8 10.0   HGB 7.8* 7.8*   HCT 24.4* 24.4*    301     Recent Labs     11/08/20  0136 11/07/20 0103 11/06/20 0444    138 137   K 3.7 3.5 3.9    101 101   CO2 27 29 28   BUN 29* 23* 30*   CREA 2.60* 2.30* 2.94*   GLU 95 100 98   CA 8.5 8.6 8.8     Recent Labs     11/07/20 0103 11/06/20 0444   ALT 48 52   AP 54 54   TBILI 0.4 0.4   TP 5.0* 5.1*   ALB 2.2* 2.2*   GLOB 2.8 2.9     Recent Labs     11/07/20 0103 11/06/20 0444   INR 1.3* 1.3*   PTP 13.9* 13.2*   APTT 32.6* 65.2*      No results for input(s): FE, TIBC, PSAT, FERR in the last 72 hours. Lab Results   Component Value Date/Time    Folate 9.7 10/31/2020 12:40 AM      No results for input(s): PH, PCO2, PO2 in the last 72 hours.   Recent Labs     11/07/20 0103 11/06/20 0444    254     No results found for: CHOL, CHOLX, CHLST, CHOLV, HDL, HDLP, LDL, LDLC, DLDLP, TGLX, TRIGL, TRIGP, CHHD, CHHDX  Lab Results   Component Value Date/Time    Glucose (POC) 129 (H) 10/23/2020 04:27 PM     Lab Results   Component Value Date/Time    Color Yellow/Straw 10/21/2020 06:05 PM    Appearance Clear 10/21/2020 06:05 PM    Specific gravity 1.025 10/21/2020 06:05 PM    pH (UA) 5.5 10/21/2020 06:05 PM    Protein 30 (A) 10/21/2020 06:05 PM    Glucose Negative 10/21/2020 06:05 PM    Ketone Negative 10/21/2020 06:05 PM    Urobilinogen 1.0 10/21/2020 06:05 PM    Nitrites Negative 10/21/2020 06:05 PM    Leukocyte Esterase Negative 10/21/2020 06:05 PM    Bacteria Negative 10/21/2020 06:05 PM    WBC 0-4 10/21/2020 06:05 PM    RBC 0-5 10/21/2020 06:05 PM     Medications Reviewed:     Current Facility-Administered Medications   Medication Dose Route Frequency    LORazepam (ATIVAN) tablet 1 mg  1 mg Oral Q6H PRN    HYDROmorphone (PF) (DILAUDID) injection 1 mg  1 mg IntraVENous Q4H PRN    metoprolol tartrate (LOPRESSOR) tablet 150 mg  150 mg Oral BID    doxycycline (VIBRAMYCIN) 100 mg in 0.9% sodium chloride (MBP/ADV) 100 mL  100 mg IntraVENous Q12H    apixaban (ELIQUIS) tablet 10 mg  10 mg Oral BID    Followed by   Michelle Resendiz ON 11/13/2020] apixaban (ELIQUIS) tablet 5 mg  5 mg Oral BID    influenza vaccine 2020-21 (6 mos+)(PF) (FLUARIX/FLULAVAL/FLUZONE QUAD) injection 0.5 mL  0.5 mL IntraMUSCular PRIOR TO DISCHARGE    isosorbide dinitrate (ISORDIL) tablet 20 mg  20 mg Oral TID    albuterol-ipratropium (DUO-NEB) 2.5 MG-0.5 MG/3 ML  3 mL Nebulization Q6H PRN    QUEtiapine (SEROquel) tablet 50 mg  50 mg Oral Q8H PRN    hydrALAZINE (APRESOLINE) tablet 100 mg  100 mg Oral TID    oxyCODONE IR (ROXICODONE) tablet 5 mg  5 mg Oral Q4H PRN    melatonin tablet 3 mg  3 mg Oral QHS    Vancomycin- pharmacy to dose   Other Rx Dosing/Monitoring    alteplase (CATHFLO) 1 mg in sterile water (preservative free) 1 mL injection  1 mg InterCATHeter PRN    balsam peru-castor oiL (VENELEX) ointment   Topical Q8H    sodium chloride (NS) flush 5-40 mL  5-40 mL IntraVENous Q8H    sodium chloride (NS) flush 5-40 mL  5-40 mL IntraVENous PRN    acetaminophen (TYLENOL) tablet 650 mg  650 mg Oral Q6H PRN    Or    acetaminophen (TYLENOL) suppository 650 mg  650 mg Rectal Q6H PRN    polyethylene glycol (MIRALAX) packet 17 g  17 g Oral DAILY PRN    ondansetron (ZOFRAN) injection 4 mg  4 mg IntraVENous Q6H PRN    docusate sodium (COLACE) capsule 100 mg  100 mg Oral DAILY    labetaloL (NORMODYNE;TRANDATE) injection 20 mg  20 mg IntraVENous Q4H PRN    heparin (porcine) 1,000 unit/mL injection 1,400 Units  1,400 Units InterCATHeter DIALYSIS PRN    And    heparin (porcine) 1,000 unit/mL injection 1,100 Units  1,100 Units InterCATHeter DIALYSIS PRN   ______________________________________________________________________  EXPECTED LENGTH OF STAY: 4d 19h  ACTUAL LENGTH OF STAY:          9 Alma Mcgarry DO

## 2020-11-08 NOTE — PROGRESS NOTES
Problem: Pressure Injury - Risk of  Goal: *Prevention of pressure injury  Description: Document Abdirashid Scale and appropriate interventions in the flowsheet.   Outcome: Progressing Towards Goal  Note: Pressure Injury Interventions:  Sensory Interventions: Assess changes in LOC, Avoid rigorous massage over bony prominences, Check visual cues for pain, Float heels, Keep linens dry and wrinkle-free, Minimize linen layers    Moisture Interventions: Absorbent underpads, Internal/External urinary devices, Limit adult briefs    Activity Interventions: Pressure redistribution bed/mattress(bed type)    Mobility Interventions: HOB 30 degrees or less, Pressure redistribution bed/mattress (bed type), Float heels    Nutrition Interventions: Offer support with meals,snacks and hydration    Friction and Shear Interventions: Apply protective barrier, creams and emollients, HOB 30 degrees or less, Lift sheet, Minimize layers

## 2020-11-08 NOTE — PROGRESS NOTES
Day #13 of Vancomycin  Indication:  HAP + Staph hominis bacteremia   Current regimen:  1.25g post-HD  Abx regimen:  vancomycin + doxycycline   ID Following ?: YES   Inpatient dialysis schedule: MWF   Recent Labs     20  0136 20  0103 20  0444   WBC  --  9.8 10.0   CREA 2.60* 2.30* 2.94*   BUN 29* 23* 30*     Est CrCl: JEAN PIERRE on temporary HD  Temp (24hrs), Av.2 °F (36.8 °C), Min:97.9 °F (36.6 °C), Max:98.5 °F (36.9 °C)    Cultures:   10/21 blood - Staph hominis in 2/2 bottles (oxacillin resistant)  10/22 blood x 2 - NG, final      Goal trough = 20 - 25 mcg/mL for therapeutic goal of 15 - 20 mcg/mL (assuming ~35% removal by dialysis)    Date                Dialysis (Yes/No)       Pre-HD Level              Dose  10/27  Y   --   2000 mg   10/28  Y   --   750 mg   10/29  Y   --   750 mg   10/30  Y   18.5 mcg/mL  1000 mg   10/31  Y   --   1000 mg     N   --   --    Y   15.1 mcg/mL  1250 mg                  N                                 --                 Y                                 --                                 1000 mg                 N                                 --                                   --                 Y                                 12.4 mcg/mL               1250 mg                    N                                 --                                   --                 N                                 --                                   --    Remains on HD. Next session slated for Mon, if still requiring RRT. LUCIANO (-)ve. Plan: Continue current regimen. No further dosing anticipated until next HD or until HD is discontinued. ID notes indicate vanc through  and therefore no plans for additional levels.      Vancomycin Process in Hemodialysis

## 2020-11-08 NOTE — PROGRESS NOTES
Bedside and Verbal shift change report given to Mat Platt RN (oncoming nurse) by Susanna Beltran RN (offgoing nurse). Report included the following information SBAR, ED Summary, Intake/Output, MAR and Recent Results.

## 2020-11-09 PROCEDURE — 74011250637 HC RX REV CODE- 250/637: Performed by: INTERNAL MEDICINE

## 2020-11-09 PROCEDURE — 99233 SBSQ HOSP IP/OBS HIGH 50: CPT | Performed by: PHYSICIAN ASSISTANT

## 2020-11-09 PROCEDURE — 74011250636 HC RX REV CODE- 250/636: Performed by: INTERNAL MEDICINE

## 2020-11-09 PROCEDURE — 65270000029 HC RM PRIVATE

## 2020-11-09 PROCEDURE — 74011250637 HC RX REV CODE- 250/637: Performed by: ANESTHESIOLOGY

## 2020-11-09 PROCEDURE — 74011250637 HC RX REV CODE- 250/637: Performed by: PHYSICIAN ASSISTANT

## 2020-11-09 PROCEDURE — 74011250637 HC RX REV CODE- 250/637: Performed by: NURSE PRACTITIONER

## 2020-11-09 PROCEDURE — 74011000258 HC RX REV CODE- 258: Performed by: INTERNAL MEDICINE

## 2020-11-09 RX ORDER — OXYCODONE HYDROCHLORIDE 5 MG/1
5 TABLET ORAL EVERY 6 HOURS
Status: DISCONTINUED | OUTPATIENT
Start: 2020-11-09 | End: 2020-11-11 | Stop reason: HOSPADM

## 2020-11-09 RX ORDER — METOPROLOL SUCCINATE 50 MG/1
150 TABLET, EXTENDED RELEASE ORAL DAILY
Status: DISCONTINUED | OUTPATIENT
Start: 2020-11-10 | End: 2020-11-11 | Stop reason: HOSPADM

## 2020-11-09 RX ORDER — HYDROMORPHONE HYDROCHLORIDE 1 MG/ML
0.5 INJECTION, SOLUTION INTRAMUSCULAR; INTRAVENOUS; SUBCUTANEOUS
Status: DISCONTINUED | OUTPATIENT
Start: 2020-11-09 | End: 2020-11-10

## 2020-11-09 RX ADMIN — APIXABAN 10 MG: 5 TABLET, FILM COATED ORAL at 17:50

## 2020-11-09 RX ADMIN — Medication: at 21:58

## 2020-11-09 RX ADMIN — APIXABAN 10 MG: 5 TABLET, FILM COATED ORAL at 08:43

## 2020-11-09 RX ADMIN — Medication 10 ML: at 06:16

## 2020-11-09 RX ADMIN — HYDROMORPHONE HYDROCHLORIDE 1 MG: 1 INJECTION, SOLUTION INTRAMUSCULAR; INTRAVENOUS; SUBCUTANEOUS at 03:38

## 2020-11-09 RX ADMIN — OXYCODONE 5 MG: 5 TABLET ORAL at 17:50

## 2020-11-09 RX ADMIN — HYDRALAZINE HYDROCHLORIDE 100 MG: 50 TABLET, FILM COATED ORAL at 08:42

## 2020-11-09 RX ADMIN — ISOSORBIDE DINITRATE 20 MG: 20 TABLET ORAL at 08:43

## 2020-11-09 RX ADMIN — DOXYCYCLINE 100 MG: 100 INJECTION, POWDER, LYOPHILIZED, FOR SOLUTION INTRAVENOUS at 06:12

## 2020-11-09 RX ADMIN — Medication: at 15:35

## 2020-11-09 RX ADMIN — DOCUSATE SODIUM 100 MG: 100 CAPSULE, LIQUID FILLED ORAL at 08:43

## 2020-11-09 RX ADMIN — METOPROLOL TARTRATE 150 MG: 50 TABLET, FILM COATED ORAL at 17:50

## 2020-11-09 RX ADMIN — METOPROLOL TARTRATE 150 MG: 50 TABLET, FILM COATED ORAL at 08:42

## 2020-11-09 RX ADMIN — HYDRALAZINE HYDROCHLORIDE 100 MG: 50 TABLET, FILM COATED ORAL at 21:57

## 2020-11-09 RX ADMIN — Medication 3 MG: at 19:29

## 2020-11-09 RX ADMIN — DOXYCYCLINE 100 MG: 100 INJECTION, POWDER, LYOPHILIZED, FOR SOLUTION INTRAVENOUS at 18:33

## 2020-11-09 RX ADMIN — HYDROMORPHONE HYDROCHLORIDE 0.5 MG: 1 INJECTION, SOLUTION INTRAMUSCULAR; INTRAVENOUS; SUBCUTANEOUS at 10:32

## 2020-11-09 RX ADMIN — OXYCODONE 5 MG: 5 TABLET ORAL at 12:36

## 2020-11-09 RX ADMIN — ISOSORBIDE DINITRATE 20 MG: 20 TABLET ORAL at 15:35

## 2020-11-09 RX ADMIN — HYDROMORPHONE HYDROCHLORIDE 0.5 MG: 1 INJECTION, SOLUTION INTRAMUSCULAR; INTRAVENOUS; SUBCUTANEOUS at 22:40

## 2020-11-09 RX ADMIN — Medication: at 06:13

## 2020-11-09 RX ADMIN — HYDROMORPHONE HYDROCHLORIDE 0.5 MG: 1 INJECTION, SOLUTION INTRAMUSCULAR; INTRAVENOUS; SUBCUTANEOUS at 14:37

## 2020-11-09 RX ADMIN — OXYCODONE HYDROCHLORIDE 5 MG: 5 TABLET ORAL at 08:07

## 2020-11-09 RX ADMIN — HYDROMORPHONE HYDROCHLORIDE 0.5 MG: 1 INJECTION, SOLUTION INTRAMUSCULAR; INTRAVENOUS; SUBCUTANEOUS at 18:43

## 2020-11-09 RX ADMIN — ISOSORBIDE DINITRATE 20 MG: 20 TABLET ORAL at 21:57

## 2020-11-09 RX ADMIN — HYDRALAZINE HYDROCHLORIDE 100 MG: 50 TABLET, FILM COATED ORAL at 15:35

## 2020-11-09 NOTE — PROGRESS NOTES
Pulmonary, Critical Care, and Sleep Medicine~Progress Note    Name: Thor Roberson MRN: [id number]   : 1995 [facility]   Date: 2020 10:29 AM Admission: 10/21/2020     Impression Plan   1. Acute hypoxic resp failure secondary to below  2. Pulmonary infiltrates: Mycoplasma PNA + volume overload   3. HFrEF: EF 15-20%, PASP 45mmHg  4. JEAN PIERRE   5. Rhabdomyolysis   6. Staph hominis bacteremia   7. Acute Left upper Extrem DVT  8. Polysubstance abuse: + EtOH, Cocaine, benzos, amphetamines, THC  1. eliquis   2. LUCIANO noted, no veggies   3. Doxy/vanc   4. Judicious sedative medications  5. O2 titration above 90%, on NC wean off  6. Chest film looks better   7. Follow up in 2 wks with chest film  8. We will be available again to see if needed      Daily Progression:    On HD currently   No acute complaints     I have reviewed the labs and previous days notes. Pertinent items are noted in HPI. OBJECTIVE:     Vital Signs:       Visit Vitals  BP (!) 163/112   Pulse 95   Temp 98.1 °F (36.7 °C)   Resp 18   Ht 5' 10\" (1.778 m)   Wt 103.3 kg (227 lb 11.2 oz)   SpO2 95%   BMI 32.67 kg/m²      Temp (24hrs), Av °F (36.7 °C), Min:97.9 °F (36.6 °C), Max:98.1 °F (36.7 °C)     Intake/Output:     Last shift: No intake/output data recorded.     Last 3 shifts:  1901 -  0700  In: -   Out: 8110 [Urine:8110]          Intake/Output Summary (Last 24 hours) at 2020 0956  Last data filed at 2020 5310  Gross per 24 hour   Intake    Output 5310 ml   Net -5310 ml       Physical Exam:                                        Exam Findings Other   General: No resp distress noted, appears stated age    HEENT:  No ulcers, JVD not elevated, no cervical LAD    Chest: No pectus deformity, normal chest rise b/l    HEART:  RRR, no murmurs/rubs/gallops    Lungs:  CTA b/l, no rhonchi/crackles/wheeze, diminished BS at bases    ABD: Soft/NT, non rigid mildly distended    EXT: No cyanosis/clubbing/edema, normal peripheral pulses    Skin: No rashes or ulcers, no mottling    Neuro: Alert, speech intelligible        Medications:  Current Facility-Administered Medications   Medication Dose Route Frequency    HYDROmorphone (PF) (DILAUDID) injection 0.5 mg  0.5 mg IntraVENous Q4H PRN    oxyCODONE IR (ROXICODONE) tablet 5 mg  5 mg Oral Q6H    LORazepam (ATIVAN) tablet 1 mg  1 mg Oral Q6H PRN    metoprolol tartrate (LOPRESSOR) tablet 150 mg  150 mg Oral BID    doxycycline (VIBRAMYCIN) 100 mg in 0.9% sodium chloride (MBP/ADV) 100 mL  100 mg IntraVENous Q12H    apixaban (ELIQUIS) tablet 10 mg  10 mg Oral BID    Followed by   Christi Horton ON 11/13/2020] apixaban (ELIQUIS) tablet 5 mg  5 mg Oral BID    influenza vaccine 2020-21 (6 mos+)(PF) (FLUARIX/FLULAVAL/FLUZONE QUAD) injection 0.5 mL  0.5 mL IntraMUSCular PRIOR TO DISCHARGE    isosorbide dinitrate (ISORDIL) tablet 20 mg  20 mg Oral TID    albuterol-ipratropium (DUO-NEB) 2.5 MG-0.5 MG/3 ML  3 mL Nebulization Q6H PRN    QUEtiapine (SEROquel) tablet 50 mg  50 mg Oral Q8H PRN    hydrALAZINE (APRESOLINE) tablet 100 mg  100 mg Oral TID    oxyCODONE IR (ROXICODONE) tablet 5 mg  5 mg Oral Q4H PRN    melatonin tablet 3 mg  3 mg Oral QHS    Vancomycin- pharmacy to dose   Other Rx Dosing/Monitoring    alteplase (CATHFLO) 1 mg in sterile water (preservative free) 1 mL injection  1 mg InterCATHeter PRN    balsam peru-castor oiL (VENELEX) ointment   Topical Q8H    sodium chloride (NS) flush 5-40 mL  5-40 mL IntraVENous Q8H    sodium chloride (NS) flush 5-40 mL  5-40 mL IntraVENous PRN    acetaminophen (TYLENOL) tablet 650 mg  650 mg Oral Q6H PRN    Or    acetaminophen (TYLENOL) suppository 650 mg  650 mg Rectal Q6H PRN    polyethylene glycol (MIRALAX) packet 17 g  17 g Oral DAILY PRN    ondansetron (ZOFRAN) injection 4 mg  4 mg IntraVENous Q6H PRN    docusate sodium (COLACE) capsule 100 mg  100 mg Oral DAILY    labetaloL (NORMODYNE;TRANDATE) injection 20 mg  20 mg IntraVENous Q4H PRN    heparin (porcine) 1,000 unit/mL injection 1,400 Units  1,400 Units InterCATHeter DIALYSIS PRN    And    heparin (porcine) 1,000 unit/mL injection 1,100 Units  1,100 Units InterCATHeter DIALYSIS PRN       Labs:  ABG No results for input(s): PHI, PCO2I, PO2I, HCO3I, SO2I, FIO2I in the last 72 hours.      CBC Recent Labs     11/07/20  0103   WBC 9.8   HGB 7.8*   HCT 24.4*      MCV 86.2   MCH 53.5        Metabolic  Panel Recent Labs     11/08/20  0136 11/07/20  0103    138   K 3.7 3.5    101   CO2 27 29   GLU 95 100   BUN 29* 23*   CREA 2.60* 2.30*   CA 8.5 8.6   ALB  --  2.2*   ALT  --  48   INR  --  1.3*        Pertinent Labs                Barrett Quincy Buerger, PA-C  11/9/2020

## 2020-11-09 NOTE — PROGRESS NOTES
Bedside and Verbal shift change report given to Fior Saldaña RN (oncoming nurse) by SASHA Elmore (offgoing nurse). Report included the following information SBAR, Kardex and MAR.

## 2020-11-09 NOTE — PROGRESS NOTES
Problem: Pressure Injury - Risk of  Goal: *Prevention of pressure injury  Description: Document Abdirashid Scale and appropriate interventions in the flowsheet.   Outcome: Progressing Towards Goal  Note: Pressure Injury Interventions:  Sensory Interventions: Assess changes in LOC, Float heels, Keep linens dry and wrinkle-free    Moisture Interventions: Absorbent underpads, Internal/External urinary devices, Maintain skin hydration (lotion/cream)    Activity Interventions: Increase time out of bed, PT/OT evaluation    Mobility Interventions: HOB 30 degrees or less    Nutrition Interventions: Offer support with meals,snacks and hydration    Friction and Shear Interventions: Apply protective barrier, creams and emollients, Lift sheet                Problem: Pain  Goal: *Control of Pain  Outcome: Progressing Towards Goal

## 2020-11-09 NOTE — PROGRESS NOTES
Hospitalist Progress Note  Tomas Monique DO  Answering service: [phone] from in house phone      Date of Service:  2020  NAME:  Salty Ray  :  1995  MRN:  [id number]    Admission Summary:   25M p/w multiple drug use, resp failure/ARF  Interval history / Subjective: Follow up overdose. Patient seen and examined. No acute complaints. Discussed weaning of pain medication, starting with scheduled oxycodone and dilaudid only as needed. Patient hesitant. Will attempt out of bed with meals today. Assessment & Plan:     Polysubstance abuse with overdose: UDS +ve for cocaine, THC, benzo, opioids, amphetamines    ARF, volume overload: presistnent  -secondary to above. Nephrology following- started on RRT. Suspect will have component of CKD if dialysis able to discontinue. Will discuss chan discontinuation with nephrology and need for possible permacath    Bacteremia: Staph hominis on 10/21. On vanc/doxy  - ID following, LUCIANO negative for vegetation     Pain:   -received high doses pain medication/dilaudid during hospitalization  -gradually decrease pain medication to avoid withdrawal. Goal to decrease prior to discharge    Bipolar Disorder: Mother raised concerns about subOptimal psych care in past.   -Psych following, they will take him to IP psych unit once medically cleared    Pneumonia- likely aspiration- 2nd to above, s/p treatment    Cardiomyopathy: Resolved.    -TTE with EF 15%- likely 2nd to drug use- Cardio following  -LUCIANO  with normal function     Acute hypoxic respiratory failure: improving, 2nd to above, wean oxygen as able    Acute LUE DVT: Continue Eliquis    Acute Anemia: stable, transfuse if HB<7.    Acute metabolic Encephalopathy: resolved, remain off seroquel       Rhabdomyolysis: resolved with IVFs, stop CK checks       Code status: Full  DVT prophylaxis: Heparin  Care Plan discussed with: Patient/Family and Nurse  Disposition: IP psych >2days     Hospital Problems  Never Reviewed          Codes Class Noted POA    LV dysfunction ICD-10-CM: I51.9  ICD-9-CM: 429.9  11/4/2020 Yes        Current episode of major depressive disorder without prior episode ICD-10-CM: F32.9  ICD-9-CM: 296.20  11/4/2020 Yes        Non-traumatic rhabdomyolysis ICD-10-CM: M62.82  ICD-9-CM: 728.88  11/4/2020 Unknown        Toxic encephalopathy ICD-10-CM: G92  ICD-9-CM: 349.82  10/24/2020 Yes        Drug abuse ([facility] ICD-10-CM: F19.10  ICD-9-CM: 305.90  10/22/2020 Yes        Acute renal failure with tubular necrosis ([facility] ICD-10-CM: N17.0  ICD-9-CM: 584.5  10/22/2020 Yes        * (Principal) Sepsis ([facility] ICD-10-CM: A41.9  ICD-9-CM: 038.9, 995.91  10/22/2020 Yes        Community acquired pneumonia of left lower lobe of lung ICD-10-CM: J18.9  ICD-9-CM: 792  10/22/2020 Yes        Electrolyte abnormality ICD-10-CM: E87.8  ICD-9-CM: 276.9  10/22/2020 Yes            Review of Systems:   Pertinent items are mentioned in interval history. Vital Signs:    Last 24hrs VS reviewed since prior progress note. Most recent are:  Visit Vitals  BP (!) 163/112   Pulse 95   Temp 98.1 °F (36.7 °C)   Resp 18   Ht 5' 10\" (1.778 m)   Wt 103.3 kg (227 lb 11.2 oz)   SpO2 95%   BMI 32.67 kg/m²         Intake/Output Summary (Last 24 hours) at 11/9/2020 0946  Last data filed at 11/9/2020 6866  Gross per 24 hour   Intake    Output 5310 ml   Net -5310 ml        Physical Examination:   General:  Alert, resting in bed, No acute distress  Resp:  No accessory muscle use, no wheezes, no rhonci   Abd:  Soft, non-tender, non-distended  Extremities:  No cyanosis or clubbing, 2+ edema bilaterally   Neuro:  no focal neuro deficits, follows commands   Psych:  not agitated.  Flat affect     Data Review:    Review and/or order of clinical lab test  Review and/or order of tests in the radiology section of CPT  Review and/or order of tests in the medicine section of CPT  Labs:     Recent Labs     11/07/20 0103   WBC 9.8   HGB 7.8*   HCT 24.4*        Recent Labs     11/08/20  0136 11/07/20 0103    138   K 3.7 3.5    101   CO2 27 29   BUN 29* 23*   CREA 2.60* 2.30*   GLU 95 100   CA 8.5 8.6     Recent Labs     11/07/20 0103   ALT 48   AP 54   TBILI 0.4   TP 5.0*   ALB 2.2*   GLOB 2.8     Recent Labs     11/07/20 0103   INR 1.3*   PTP 13.9*   APTT 32.6*      No results for input(s): FE, TIBC, PSAT, FERR in the last 72 hours. Lab Results   Component Value Date/Time    Folate 9.7 10/31/2020 12:40 AM      No results for input(s): PH, PCO2, PO2 in the last 72 hours.   Recent Labs     11/07/20 0103        No results found for: CHOL, CHOLX, CHLST, CHOLV, HDL, HDLP, LDL, LDLC, DLDLP, TGLX, TRIGL, TRIGP, CHHD, CHHDX  Lab Results   Component Value Date/Time    Glucose (POC) 129 (H) 10/23/2020 04:27 PM     Lab Results   Component Value Date/Time    Color Yellow/Straw 10/21/2020 06:05 PM    Appearance Clear 10/21/2020 06:05 PM    Specific gravity 1.025 10/21/2020 06:05 PM    pH (UA) 5.5 10/21/2020 06:05 PM    Protein 30 (A) 10/21/2020 06:05 PM    Glucose Negative 10/21/2020 06:05 PM    Ketone Negative 10/21/2020 06:05 PM    Urobilinogen 1.0 10/21/2020 06:05 PM    Nitrites Negative 10/21/2020 06:05 PM    Leukocyte Esterase Negative 10/21/2020 06:05 PM    Bacteria Negative 10/21/2020 06:05 PM    WBC 0-4 10/21/2020 06:05 PM    RBC 0-5 10/21/2020 06:05 PM     Medications Reviewed:     Current Facility-Administered Medications   Medication Dose Route Frequency    HYDROmorphone (PF) (DILAUDID) injection 0.5 mg  0.5 mg IntraVENous Q4H PRN    oxyCODONE IR (ROXICODONE) tablet 5 mg  5 mg Oral Q6H    LORazepam (ATIVAN) tablet 1 mg  1 mg Oral Q6H PRN    metoprolol tartrate (LOPRESSOR) tablet 150 mg  150 mg Oral BID    doxycycline (VIBRAMYCIN) 100 mg in 0.9% sodium chloride (MBP/ADV) 100 mL  100 mg IntraVENous Q12H    apixaban (ELIQUIS) tablet 10 mg  10 mg Oral BID Followed by   Emile Rivers ON 11/13/2020] apixaban (ELIQUIS) tablet 5 mg  5 mg Oral BID    influenza vaccine 2020-21 (6 mos+)(PF) (FLUARIX/FLULAVAL/FLUZONE QUAD) injection 0.5 mL  0.5 mL IntraMUSCular PRIOR TO DISCHARGE    isosorbide dinitrate (ISORDIL) tablet 20 mg  20 mg Oral TID    albuterol-ipratropium (DUO-NEB) 2.5 MG-0.5 MG/3 ML  3 mL Nebulization Q6H PRN    QUEtiapine (SEROquel) tablet 50 mg  50 mg Oral Q8H PRN    hydrALAZINE (APRESOLINE) tablet 100 mg  100 mg Oral TID    oxyCODONE IR (ROXICODONE) tablet 5 mg  5 mg Oral Q4H PRN    melatonin tablet 3 mg  3 mg Oral QHS    Vancomycin- pharmacy to dose   Other Rx Dosing/Monitoring    alteplase (CATHFLO) 1 mg in sterile water (preservative free) 1 mL injection  1 mg InterCATHeter PRN    balsam peru-castor oiL (VENELEX) ointment   Topical Q8H    sodium chloride (NS) flush 5-40 mL  5-40 mL IntraVENous Q8H    sodium chloride (NS) flush 5-40 mL  5-40 mL IntraVENous PRN    acetaminophen (TYLENOL) tablet 650 mg  650 mg Oral Q6H PRN    Or    acetaminophen (TYLENOL) suppository 650 mg  650 mg Rectal Q6H PRN    polyethylene glycol (MIRALAX) packet 17 g  17 g Oral DAILY PRN    ondansetron (ZOFRAN) injection 4 mg  4 mg IntraVENous Q6H PRN    docusate sodium (COLACE) capsule 100 mg  100 mg Oral DAILY    labetaloL (NORMODYNE;TRANDATE) injection 20 mg  20 mg IntraVENous Q4H PRN    heparin (porcine) 1,000 unit/mL injection 1,400 Units  1,400 Units InterCATHeter DIALYSIS PRN    And    heparin (porcine) 1,000 unit/mL injection 1,100 Units  1,100 Units InterCATHeter DIALYSIS PRN   ______________________________________________________________________  EXPECTED LENGTH OF STAY: 4d 19h  ACTUAL LENGTH OF STAY:          800 Omari Arrington DO

## 2020-11-09 NOTE — PROGRESS NOTES
ID Progress Note  2020     Doxy   10/28 - present  vanc  10/27 -      Subjective:     Afebrile. He says he is breathing all right. He does not have any abdominal pain,         Objective:     Vitals:   Visit Vitals  BP (!) 143/78 (BP 1 Location: Right arm, BP Patient Position: At rest)   Pulse 79   Temp 97.7 °F (36.5 °C)   Resp 18   Ht 5' 10\" (1.778 m)   Wt 103.3 kg (227 lb 11.2 oz)   SpO2 97%   BMI 32.67 kg/m²        Tmax:  Temp (24hrs), Av °F (36.7 °C), Min:97.7 °F (36.5 °C), Max:98.1 °F (36.7 °C)      Exam:    Not in distress  Lung clear, no rales, wheezes or rhonchi   Heart: s1, s2, RRR, no murmurs rubs or clicks  Abdomen: soft nontender, no guarding or rebound  Speech fluent     Labs:   Lab Results   Component Value Date/Time    WBC 9.8 2020 01:03 AM    HGB 7.8 (L) 2020 01:03 AM    HCT 24.4 (L) 2020 01:03 AM    PLATELET 578  01:03 AM    MCV 86.2 2020 01:03 AM     Lab Results   Component Value Date/Time    Sodium 136 2020 01:36 AM    Potassium 3.7 2020 01:36 AM    Chloride 100 2020 01:36 AM    CO2 27 2020 01:36 AM    Anion gap 9 2020 01:36 AM    Glucose 95 2020 01:36 AM    BUN 29 (H) 2020 01:36 AM    Creatinine 2.60 (H) 2020 01:36 AM    BUN/Creatinine ratio 11 (L) 2020 01:36 AM    GFR est AA 37 (L) 2020 01:36 AM    GFR est non-AA 30 (L) 2020 01:36 AM    Calcium 8.5 2020 01:36 AM    Bilirubin, total 0.4 2020 01:03 AM    Alk.  phosphatase 54 2020 01:03 AM    Protein, total 5.0 (L) 2020 01:03 AM    Albumin 2.2 (L) 2020 01:03 AM    Globulin 2.8 2020 01:03 AM    A-G Ratio 0.8 (L) 2020 01:03 AM    ALT (SGPT) 48 2020 01:03 AM           Assessment:     Staph hominis bacteremia - LUCIANO negative.      Left lung pneumonia with positive mycoplasma IgM     Renal failure on dialysis     Rhabdomyolysis     cardiomyopathy with EF of 15 to 20%     Left upper extremity DVT                 Recommendations:       Humberto negative. Off vanc. doxy until nov 10. I will arrange for this to be discontinued. I will sign off. Please call with questions.          Aurelio Siddqii MD

## 2020-11-09 NOTE — PROGRESS NOTES
[facility]   [address] [address] [facility], [facility], [facility]  Phone: [phone]   [phone]       Nephrology Progress Note  Suzan Garcia     1995     [id number]  Date of Admission : 10/21/2020  11/09/20    CC: Follow up for ARF, Rhabdomyolysis        Assessment and Plan   JEAN PIERRE  - Severe ATN from Rhabdomyolysis   - making more urine, Cr stable  - hold HD today and monitor  - hopefully starting to recover  - daily labs and I/Os      Severe Rhabdomyolysis   - 2/2 Substance use  - LFTs and CPK trending down    Left Lung PNA w/ sepsis:  - improving    LUE DVT:  - on heparin drip    CMP w/ EF 15-20%:  - likely 2/2 cocaine  - per cardiology    Staph bacteremia:  - LUCIANO neg  - abx per ID    Encephalopathy:  - improving slowly    Polysubstance abuse      Interval History:  Seen and examined. Cr stable. UOP 5.5 L in the past 24 hrs. Sitter at bedside. He is feeling better. No cp, sob, n/v/d reported. Review of Systems: Review of systems not obtained due to patient factors.     Current Medications:   Current Facility-Administered Medications   Medication Dose Route Frequency    HYDROmorphone (PF) (DILAUDID) injection 0.5 mg  0.5 mg IntraVENous Q4H PRN    oxyCODONE IR (ROXICODONE) tablet 5 mg  5 mg Oral Q6H    [START ON 11/10/2020] metoprolol succinate (TOPROL-XL) XL tablet 150 mg  150 mg Oral DAILY    LORazepam (ATIVAN) tablet 1 mg  1 mg Oral Q6H PRN    metoprolol tartrate (LOPRESSOR) tablet 150 mg  150 mg Oral BID    doxycycline (VIBRAMYCIN) 100 mg in 0.9% sodium chloride (MBP/ADV) 100 mL  100 mg IntraVENous Q12H    apixaban (ELIQUIS) tablet 10 mg  10 mg Oral BID    Followed by   Dada Dumont ON 11/13/2020] apixaban (ELIQUIS) tablet 5 mg  5 mg Oral BID    influenza vaccine 2020-21 (6 mos+)(PF) (FLUARIX/FLULAVAL/FLUZONE QUAD) injection 0.5 mL  0.5 mL IntraMUSCular PRIOR TO DISCHARGE    isosorbide dinitrate (ISORDIL) tablet 20 mg  20 mg Oral TID    albuterol-ipratropium (DUO-NEB) 2.5 MG-0.5 MG/3 ML  3 mL Nebulization Q6H PRN    QUEtiapine (SEROquel) tablet 50 mg  50 mg Oral Q8H PRN    hydrALAZINE (APRESOLINE) tablet 100 mg  100 mg Oral TID    oxyCODONE IR (ROXICODONE) tablet 5 mg  5 mg Oral Q4H PRN    melatonin tablet 3 mg  3 mg Oral QHS    Vancomycin- pharmacy to dose   Other Rx Dosing/Monitoring    alteplase (CATHFLO) 1 mg in sterile water (preservative free) 1 mL injection  1 mg InterCATHeter PRN    balsam peru-castor oiL (VENELEX) ointment   Topical Q8H    sodium chloride (NS) flush 5-40 mL  5-40 mL IntraVENous Q8H    sodium chloride (NS) flush 5-40 mL  5-40 mL IntraVENous PRN    acetaminophen (TYLENOL) tablet 650 mg  650 mg Oral Q6H PRN    Or    acetaminophen (TYLENOL) suppository 650 mg  650 mg Rectal Q6H PRN    polyethylene glycol (MIRALAX) packet 17 g  17 g Oral DAILY PRN    ondansetron (ZOFRAN) injection 4 mg  4 mg IntraVENous Q6H PRN    docusate sodium (COLACE) capsule 100 mg  100 mg Oral DAILY    labetaloL (NORMODYNE;TRANDATE) injection 20 mg  20 mg IntraVENous Q4H PRN    heparin (porcine) 1,000 unit/mL injection 1,400 Units  1,400 Units InterCATHeter DIALYSIS PRN    And    heparin (porcine) 1,000 unit/mL injection 1,100 Units  1,100 Units InterCATHeter DIALYSIS PRN      Allergies   Allergen Reactions    Tramadol Nausea and Vomiting       Objective:  Vitals:    Vitals:    11/09/20 0338 11/09/20 0553 11/09/20 0842 11/09/20 1030   BP: (!) 163/94  (!) 163/112 125/73   Pulse: 76  95    Resp: 18      Temp: 98.1 °F (36.7 °C)      TempSrc:       SpO2: 95%      Weight:  103.3 kg (227 lb 11.2 oz)     Height:         Intake and Output:  No intake/output data recorded.   11/07 1901 - 11/09 0700  In: -   Out: 8110 [Urine:8110]    Physical Examination:    General: Confused, resting on NC  Neck:  Supple, no mass  Resp:  Decreased breath sounds  CV:  RRR,  no murmur or rub, no LE edema  GI:  Soft, NT, + Bowel sounds, no hepatosplenomegaly  Neurologic:  Lethargic   Psych: Unable to assess  :  Iniguez     []    High complexity decision making was performed  []    Patient is at high-risk of decompensation with multiple organ involvement    Lab Data Personally Reviewed: I have reviewed all the pertinent labs, microbiology data and radiology studies during assessment. Recent Labs     11/08/20  0136 11/07/20  0103    138   K 3.7 3.5    101   CO2 27 29   GLU 95 100   BUN 29* 23*   CREA 2.60* 2.30*   CA 8.5 8.6   ALB  --  2.2*   ALT  --  48   INR  --  1.3*     Recent Labs     11/07/20  0103   WBC 9.8   HGB 7.8*   HCT 24.4*        No results found for: SDES  Lab Results   Component Value Date/Time    Culture result: NO GROWTH 5 DAYS 10/22/2020 06:19 AM    Culture result: NO GROWTH 5 DAYS 10/22/2020 02:37 AM    Culture result: (A) 10/21/2020 08:15 PM     Staphylococcus hominis (Oxacillin resistant) GROWING IN THE AEROBIC BOTTLE (SITE = R HAND)    Culture result:  10/21/2020 08:15 PM     Gram Positive Cocci CALLED TO AND READ BACK BY Sandip Miller RN AT 2012 BY D    Culture result: (A) 10/21/2020 08:10 PM     Staphylococcus hominis (Oxacillin resistant) GROWING IN THE AEROBIC BOTTLE (SITE = L HAND)    Culture result:  10/21/2020 08:10 PM     preliminary report of Gram Positive Cocci in clusters growing in 1 of 2 bottles drawn [facility] RN SCAV AT [address] ON 10/23/20. [facility]     No results found for this or any previous visit (from the past 24 hour(s)). Total time spent with patient:  xxx   min. Care Plan discussed with:  Patient     Family      RN      Consulting Physician [address] [facility],         I have reviewed the flowsheets. Chart and Pertinent Notes have been reviewed. No change in PMH ,family and social history from Consult note.       Wanda Prado MD

## 2020-11-09 NOTE — PROGRESS NOTES
Progress Note    Patient: Kb Rivers MRN: [id number]  SSN: [id number]    YOB: 1995  Age: 26 y.o. Sex: male      Admit Date: 10/21/2020    LOS: 18 days    Cardiologist: Dexter Flower MD    Subjective:     Mr. Sameera Portillo is a 25 yo  male admitted 10/21/2020 to [facility] [place] [facility] with altered mental status for >24 hours, lethargy, found down by family. He had substance overdose with rhabdomyelysis, elevated troponin, severe LV systolic dysfunction, shock liver, acute renal failure, LUE DVT and sepsis. No prior reports of chest pain or shortness of breath. He was transferred to [facility] for further care. Awake this morning. Still with feeling of pain all over. Ambulating little to the bathroom and back to bed. Feels weak. Objective:     Vitals:    20 2303 20 0338 20 0553 20 0842   BP: (!) 152/91 (!) 163/94  (!) 163/112   Pulse: 67 76  95   Resp: 18 18     Temp: 98.1 °F (36.7 °C) 98.1 °F (36.7 °C)     TempSrc:       SpO2: 98% 95%     Weight:   227 lb 11.2 oz (103.3 kg)    Height:          Temp (24hrs), Av °F (36.7 °C), Min:97.9 °F (36.6 °C), Max:98.1 °F (36.7 °C)      Intake and Output:  Current Shift: No intake/output data recorded. Last three shifts: 1901 -  07  In: -   Out: 8110 [Urine:8110]    Physical Exam:  General:  Resting comfortably, well nourished, well developed, appears stated age   Eyes:  Conjunctivae/corneas clear    Nose: Nares normal. Septum midline. Mucosa normal. No drainage or sinus tenderness. Neck: Supple, symmetrical, trachea midline, no JVD   Lungs:   Clear to auscultation bilaterally, good effort   Heart:  kaylee rate and rhythm, no murmur, click, rub, or gallop   Abdomen:   Soft, non-tender, bowel sounds normal, non-distended   Extremities: Normal, atraumatic, no cyanosis or edema   Skin: Skin color, texture, turgor normal. No rash or lesions.    Neurologic: Non focal Current Facility-Administered Medications:     HYDROmorphone (PF) (DILAUDID) injection 0.5 mg, 0.5 mg, IntraVENous, Q4H PRN, Jacqueline Barrios [employer] M, DO    oxyCODONE IR (ROXICODONE) tablet 5 mg, 5 mg, Oral, Q6H, Lulu Dunham DO, Stopped at 11/09/20 0800    LORazepam (ATIVAN) tablet 1 mg, 1 mg, Oral, Q6H PRN, Charline CORRAL DO    metoprolol tartrate (LOPRESSOR) tablet 150 mg, 150 mg, Oral, BID, Leslee Sanchez MD, 150 mg at 11/09/20 0842    doxycycline (VIBRAMYCIN) 100 mg in 0.9% sodium chloride (MBP/ADV) 100 mL, 100 mg, IntraVENous, Q12H, Lulu Dunham DO, Last Rate: 100 mL/hr at 11/09/20 0612, 100 mg at 11/09/20 0612    apixaban (ELIQUIS) tablet 10 mg, 10 mg, Oral, BID, 10 mg at 11/09/20 0843 **FOLLOWED BY** [START ON 11/13/2020] apixaban (ELIQUIS) tablet 5 mg, 5 mg, Oral, BID, Lulu Dunham DO    influenza vaccine 2020-21 (6 mos+)(PF) (FLUARIX/FLULAVAL/FLUZONE QUAD) injection 0.5 mL, 0.5 mL, IntraMUSCular, PRIOR TO DISCHARGE, Lulu Dunham DO    isosorbide dinitrate (ISORDIL) tablet 20 mg, 20 mg, Oral, TID, Leslee Sanchez MD, 20 mg at 11/09/20 0843    albuterol-ipratropium (DUO-NEB) 2.5 MG-0.5 MG/3 ML, 3 mL, Nebulization, Q6H PRN, Jacqueline Barrios [employer] M, DO    QUEtiapine (SEROquel) tablet 50 mg, 50 mg, Oral, Q8H PRN, Iram Pretty NP    hydrALAZINE (APRESOLINE) tablet 100 mg, 100 mg, Oral, TID, Javier Gibson MD, 100 mg at 11/09/20 0842    oxyCODONE IR (ROXICODONE) tablet 5 mg, 5 mg, Oral, Q4H PRN, Luis Miguel Crespo DO, 5 mg at 11/09/20 0807    melatonin tablet 3 mg, 3 mg, Oral, QHS, Luis Miguel Crespo DO, 3 mg at 11/08/20 1914    Vancomycin- pharmacy to dose, , Other, Rx Dosing/Monitoring, ASMITA Guerra MD    alteplase (CATHFLO) 1 mg in sterile water (preservative free) 1 mL injection, 1 mg, InterCATHeter, PRN, Wilian Guerra MD, 1 mg at 11/06/20 0959    balsam peru-castor oiL (VENELEX) ointment, , Topical, Q8H, SABRINA Guerra MD    sodium chloride (NS) flush 5-40 mL, 5-40 mL, IntraVENous, Q8H, Zac Rosales NP, 10 mL at 11/09/20 0616    sodium chloride (NS) flush 5-40 mL, 5-40 mL, IntraVENous, PRN, Zac Molina NP, 10 mL at 10/31/20 2302    acetaminophen (TYLENOL) tablet 650 mg, 650 mg, Oral, Q6H PRN, 650 mg at 10/30/20 0430 **OR** acetaminophen (TYLENOL) suppository 650 mg, 650 mg, Rectal, Q6H PRN, Robyne Number, NP    polyethylene glycol (MIRALAX) packet 17 g, 17 g, Oral, DAILY PRN, Zac Molina, NP    ondansetron [facility]) injection 4 mg, 4 mg, IntraVENous, Q6H PRN, Zac Molina NP, 4 mg at 11/07/20 1736    docusate sodium (COLACE) capsule 100 mg, 100 mg, Oral, DAILY, Zac Rosales NP, 100 mg at 11/09/20 0843    labetaloL (NORMODYNE;TRANDATE) injection 20 mg, 20 mg, IntraVENous, Q4H PRN, Zac Molina NP, 20 mg at 11/03/20 0722    heparin (porcine) 1,000 unit/mL injection 1,400 Units, 1,400 Units, InterCATHeter, DIALYSIS PRN, 1,400 Units at 11/06/20 1126 **AND** heparin (porcine) 1,000 unit/mL injection 1,100 Units, 1,100 Units, InterCATHeter, DIALYSIS PRN, Darrick Livingston MD, 1,100 Units at 11/06/20 1125    Lab/Data Review:  Labs Latest Ref Rng & Units 11/8/2020 11/7/2020 11/6/2020 11/5/2020 11/4/2020 11/3/2020 11/2/2020   WBC 4.1 - 11.1 K/uL - 9.8 10.0 10.7 12. 4(H) 12. 1(H) 13. 3(H)   RBC 4.10 - 5.70 M/uL - 2.83(L) 2.80(L) 2.71(L) 2.62(L) 2.61(L) 2.61(L)   Hemoglobin 12.1 - 17.0 g/dL - 7. 8(L) 7. 8(L) 7. 5(L) 7. 2(L) 7. 2(L) 7. 3(L)   Hematocrit 36.6 - 50.3 % - 24. 4(L) 24. 4(L) 23. 7(L) 22. 8(L) 22. 5(L) 22. 6(L)   MCV 80.0 - 99.0 FL - 86.2 87.1 87.5 87.0 86.2 86.6   Platelets 591 - 980 K/uL - 241 301 279 266 244 249   Lymphocytes 12 - 49 % - 15 14 11(L) 11(L) 16 17   Monocytes 5 - 13 % - 13 8 9 10 12 11   Eosinophils 0 - 7 % - 2 1 2 2 2 2   Basophils 0 - 1 % - 1 1 1 0 0 0   Bands 0 - 6 % - - - - - - -   Albumin 3.5 - 5.0 g/dL - 2. 2(L) 2. 2(L) 2. 3(L) 2. 1(L) 2. 1(L) 2. 1(L)   Calcium 8.5 - 10.1 MG/DL 8.5 8.6 8.8 9.0 8.8 8.5 8.1(L)   Glucose 65 - 100 mg/dL 95 100 98 101(H) 97 90 91   BUN 6 - 20 MG/DL 29(H) 23(H) 30(H) 23(H) 32(H) 26(H) 37(H)   Creatinine 0.70 - 1.30 MG/DL 2.60(H) 2.30(H) 2.94(H) 2.59(H) 3.32(H) 2.87(H) 3.80(H)   Sodium 136 - 145 mmol/L 136 138 137 136 136 135(L) 132(L)   Potassium 3.5 - 5.1 mmol/L 3.7 3.5 3.9 3.7 3.9 3.6 3.9   Some recent data might be hidden     Lab Results   Component Value Date/Time    Sodium 136 11/08/2020 01:36 AM    Potassium 3.7 11/08/2020 01:36 AM    Chloride 100 11/08/2020 01:36 AM    CO2 27 11/08/2020 01:36 AM    Anion gap 9 11/08/2020 01:36 AM    Glucose 95 11/08/2020 01:36 AM    BUN 29 (H) 11/08/2020 01:36 AM    Creatinine 2.60 (H) 11/08/2020 01:36 AM    BUN/Creatinine ratio 11 (L) 11/08/2020 01:36 AM    GFR est AA 37 (L) 11/08/2020 01:36 AM    GFR est non-AA 30 (L) 11/08/2020 01:36 AM    Calcium 8.5 11/08/2020 01:36 AM    Bilirubin, total 0.4 11/07/2020 01:03 AM    Alk. phosphatase 54 11/07/2020 01:03 AM    Protein, total 5.0 (L) 11/07/2020 01:03 AM    Albumin 2.2 (L) 11/07/2020 01:03 AM    Globulin 2.8 11/07/2020 01:03 AM    A-G Ratio 0.8 (L) 11/07/2020 01:03 AM    ALT (SGPT) 48 11/07/2020 01:03 AM    AST (SGOT) 20 11/07/2020 01:03 AM           10/21/20   ECHO ADULT FOLLOW-UP OR LIMITED 10/29/2020 10/29/2020    Narrative · LV: Estimated LVEF is 15 - 20%. Mildly dilated left ventricle. Severely   global hypokinesis; normal function at apex. · MV: Moderate mitral valve regurgitation is present. · TV: Moderate tricuspid valve regurgitation is present. · PA: Mild to moderate pulmonary hypertension. Pulmonary arterial systolic   pressure is 45 mmHg. · RV: Mildly dilated right ventricle. Borderline low systolic function.         Signed by: Traci Martinez MD          Assessment:     Principal Problem:    Sepsis [facility]) (10/22/2020)    Active Problems:    Drug abuse ([facility] [address] (10/22/2020)      Acute renal failure with tubular necrosis ([facility] [address] (10/22/2020)      Community acquired pneumonia of left lower lobe of lung (10/22/2020)      Electrolyte abnormality (10/22/2020)      Toxic encephalopathy (10/24/2020)      LV dysfunction (11/4/2020)      Current episode of major depressive disorder without prior episode (11/4/2020)      Non-traumatic rhabdomyolysis (11/4/2020)        Plan:     Mr. Tanika Alford is a 25 yo WM with polysubstance abuse found unresponsive with multi-organ failure. Severe LV dysfunction likely due to substance abuse, sepsis, PNA. No chest pain or shortness of breath to suggest an acute coronary syndrome. 1. Troponin elevation              - 55.89 --> 55.30 --> 57.9              - Related to Rhabdo, most likely. CK 69,000+, CKMB 132    2. Acute systolic heart failure   - Likely non-ischemic cardiomyopathy.   - Will need right and left heart cath when renal function stable   - continue GDMT for heart failure/LV dysfunction - Changing Lopressor to Toprol XL today. - Start ACE-I/ARB when renal function allows     3. Altered Mental Status              - Metabolic encephalopathy              - UDS + for cocaine, THC, Amphetamines, Ectasy, benzos              - Head CT with indication for toxic edema in basal ganglia    4. Septic shock- staph hominis              - L sided PNA              - IV Abx              - IVF  10/21/20   ECHO LUCIANO W OR WO CONTRAST 11/07/2020 11/7/2020    Narrative · LV: Estimated LVEF is 25 - 30%. Visually measured ejection fraction. Normal wall thickness. Dilated left ventricle. Moderate-to-severely   reduced systolic function. Left ventricular diastolic dysfunction. · RV: Mildly reduced systolic function. · MV: Mild mitral valve regurgitation is present. · LA: Mildly dilated left atrium. · RA: Mildly dilated right atrium. · IAS: Agitated saline contrast study was performed. There was no shunting   at baseline. No vegetations seen. Signed by: Arabella Maynard MD   5. Pneumonia              - Left sided              - IV Abx   - Mycoplasma IgM positive    6.  Transaminitis - significantly elevated LFTs - Acute liver injury              - INR elevation to 1.5, now 1.3   - Slow improvement    7. Acute renal failure due to rhabdomeylitis              - Nephrology following              - HD M-W-F   - Avoiding ACE-I/ARB at this time    8. Poly substance abuse              - counseling    9.  Left upper extremity DVT   - Heparin drip   - transition to DOAC when able      Signed By: VERNA Baig MD     November 9, 2020      Interventional Cardiology  [facility] [address], [address]  [address]  P: [phone]  F: [phone]

## 2020-11-09 NOTE — PROGRESS NOTES
Bedside and Verbal shift change report given to Nancy Bowden RN (oncoming nurse) by Saroj Castaneda RN (offgoing nurse). Report included the following information SBAR, ED Summary, Intake/Output, MAR and Recent Results.

## 2020-11-10 LAB
ANION GAP SERPL CALC-SCNC: 6 MMOL/L (ref 5–15)
BUN SERPL-MCNC: 36 MG/DL (ref 6–20)
BUN/CREAT SERPL: 15 (ref 12–20)
CALCIUM SERPL-MCNC: 8.3 MG/DL (ref 8.5–10.1)
CHLORIDE SERPL-SCNC: 104 MMOL/L (ref 97–108)
CO2 SERPL-SCNC: 26 MMOL/L (ref 21–32)
CREAT SERPL-MCNC: 2.43 MG/DL (ref 0.7–1.3)
GLUCOSE SERPL-MCNC: 87 MG/DL (ref 65–100)
POTASSIUM SERPL-SCNC: 3.7 MMOL/L (ref 3.5–5.1)
SODIUM SERPL-SCNC: 136 MMOL/L (ref 136–145)

## 2020-11-10 PROCEDURE — 74011250637 HC RX REV CODE- 250/637: Performed by: INTERNAL MEDICINE

## 2020-11-10 PROCEDURE — 74011000258 HC RX REV CODE- 258: Performed by: INTERNAL MEDICINE

## 2020-11-10 PROCEDURE — 74011250636 HC RX REV CODE- 250/636: Performed by: INTERNAL MEDICINE

## 2020-11-10 PROCEDURE — 74011250637 HC RX REV CODE- 250/637: Performed by: NURSE PRACTITIONER

## 2020-11-10 PROCEDURE — 65270000029 HC RM PRIVATE

## 2020-11-10 PROCEDURE — 74011000250 HC RX REV CODE- 250

## 2020-11-10 PROCEDURE — 80048 BASIC METABOLIC PNL TOTAL CA: CPT

## 2020-11-10 PROCEDURE — 74011250636 HC RX REV CODE- 250/636: Performed by: NURSE PRACTITIONER

## 2020-11-10 PROCEDURE — 74011250637 HC RX REV CODE- 250/637: Performed by: PHYSICIAN ASSISTANT

## 2020-11-10 PROCEDURE — 97535 SELF CARE MNGMENT TRAINING: CPT

## 2020-11-10 PROCEDURE — 99233 SBSQ HOSP IP/OBS HIGH 50: CPT | Performed by: PHYSICIAN ASSISTANT

## 2020-11-10 PROCEDURE — 36415 COLL VENOUS BLD VENIPUNCTURE: CPT

## 2020-11-10 PROCEDURE — 74011250637 HC RX REV CODE- 250/637: Performed by: ANESTHESIOLOGY

## 2020-11-10 RX ORDER — BACITRACIN 500 UNIT/G
PACKET (EA) TOPICAL
Status: COMPLETED
Start: 2020-11-10 | End: 2020-11-10

## 2020-11-10 RX ORDER — ACETAMINOPHEN 325 MG/1
650 TABLET ORAL
Status: DISCONTINUED | OUTPATIENT
Start: 2020-11-10 | End: 2020-11-11 | Stop reason: HOSPADM

## 2020-11-10 RX ORDER — HYDROMORPHONE HYDROCHLORIDE 1 MG/ML
0.5 INJECTION, SOLUTION INTRAMUSCULAR; INTRAVENOUS; SUBCUTANEOUS
Status: DISCONTINUED | OUTPATIENT
Start: 2020-11-10 | End: 2020-11-10

## 2020-11-10 RX ADMIN — OXYCODONE 5 MG: 5 TABLET ORAL at 11:04

## 2020-11-10 RX ADMIN — HYDRALAZINE HYDROCHLORIDE 100 MG: 50 TABLET, FILM COATED ORAL at 09:18

## 2020-11-10 RX ADMIN — HYDROMORPHONE HYDROCHLORIDE 0.5 MG: 1 INJECTION, SOLUTION INTRAMUSCULAR; INTRAVENOUS; SUBCUTANEOUS at 02:57

## 2020-11-10 RX ADMIN — HYDROMORPHONE HYDROCHLORIDE 0.5 MG: 1 INJECTION, SOLUTION INTRAMUSCULAR; INTRAVENOUS; SUBCUTANEOUS at 06:46

## 2020-11-10 RX ADMIN — Medication 3 MG: at 20:17

## 2020-11-10 RX ADMIN — DOCUSATE SODIUM 100 MG: 100 CAPSULE, LIQUID FILLED ORAL at 09:18

## 2020-11-10 RX ADMIN — ISOSORBIDE DINITRATE 20 MG: 20 TABLET ORAL at 16:31

## 2020-11-10 RX ADMIN — DOXYCYCLINE 100 MG: 100 INJECTION, POWDER, LYOPHILIZED, FOR SOLUTION INTRAVENOUS at 18:37

## 2020-11-10 RX ADMIN — ONDANSETRON 4 MG: 2 INJECTION INTRAMUSCULAR; INTRAVENOUS at 17:21

## 2020-11-10 RX ADMIN — APIXABAN 10 MG: 5 TABLET, FILM COATED ORAL at 18:34

## 2020-11-10 RX ADMIN — ISOSORBIDE DINITRATE 20 MG: 20 TABLET ORAL at 21:31

## 2020-11-10 RX ADMIN — ACETAMINOPHEN 650 MG: 325 TABLET ORAL at 22:35

## 2020-11-10 RX ADMIN — OXYCODONE 5 MG: 5 TABLET ORAL at 22:35

## 2020-11-10 RX ADMIN — Medication: at 22:35

## 2020-11-10 RX ADMIN — OXYCODONE 5 MG: 5 TABLET ORAL at 17:18

## 2020-11-10 RX ADMIN — APIXABAN 10 MG: 5 TABLET, FILM COATED ORAL at 09:18

## 2020-11-10 RX ADMIN — Medication: at 06:47

## 2020-11-10 RX ADMIN — BACITRACIN: 500 OINTMENT TOPICAL at 19:00

## 2020-11-10 RX ADMIN — ONDANSETRON 4 MG: 2 INJECTION INTRAMUSCULAR; INTRAVENOUS at 02:57

## 2020-11-10 RX ADMIN — DOXYCYCLINE 100 MG: 100 INJECTION, POWDER, LYOPHILIZED, FOR SOLUTION INTRAVENOUS at 06:46

## 2020-11-10 RX ADMIN — HYDRALAZINE HYDROCHLORIDE 100 MG: 50 TABLET, FILM COATED ORAL at 16:31

## 2020-11-10 RX ADMIN — ISOSORBIDE DINITRATE 20 MG: 20 TABLET ORAL at 09:18

## 2020-11-10 RX ADMIN — METOPROLOL SUCCINATE 150 MG: 50 TABLET, EXTENDED RELEASE ORAL at 09:18

## 2020-11-10 RX ADMIN — Medication: at 16:32

## 2020-11-10 RX ADMIN — OXYCODONE HYDROCHLORIDE 5 MG: 5 TABLET ORAL at 14:18

## 2020-11-10 RX ADMIN — HYDRALAZINE HYDROCHLORIDE 100 MG: 50 TABLET, FILM COATED ORAL at 21:31

## 2020-11-10 NOTE — PROGRESS NOTES
Occupational Therapy    Chart reviewed in prep for skilled OT treatment; however, pt declines participation at this time 2/2 fatigue and requests for OT to return after lunch, will defer and follow up later as able and appropriate.     Thank you,  Rock Gamboa, OT

## 2020-11-10 NOTE — PROGRESS NOTES
Bedside shift change report given to Mackenzie Shin (oncoming nurse) by Valdemar Marie (offgoing nurse). Report included the following information SBAR, Kardex, Intake/Output, MAR and Recent Results.

## 2020-11-10 NOTE — PROGRESS NOTES
PHYSICAL THERAPY  Attempted to see patient for PT mobility seesion, but he was sleepy and requested \"PT in the afternoon\". Will see in pm if schedule permits.   Jesse Interiano

## 2020-11-10 NOTE — PROGRESS NOTES
Hospitalist Progress Note  [address] [facility], [place]  Answering service: [phone] from in house phone      Date of Service:  11/10/2020  NAME:  Carlos Cabrera  :  1995  MRN:  [id number]    Admission Summary:   25M p/w multiple drug use, resp failure/ARF  Interval history / Subjective: Follow up overdose. Patient seen and examined. Patient expressed concern regarding pain medication. Vital stable. Has ongoing left upper extremity swelling. Updated his mother via phone     Assessment & Plan:     Polysubstance abuse with overdose: UDS +ve for cocaine, THC, benzo, opioids, amphetamines    ARF, volume overload: improving   -secondary to above. Nephrology following- started on RRT. Suspect will have component of CKD if dialysis able to discontinue. D/c chan and hold HD. Will need outpatient follow up    Bacteremia: Staph hominis on 10/21. On vanc/doxy. Finishes 11/10  - ID following, LUCIANO negative for vegetation     Pain:   -received high doses pain medication/dilaudid during hospitalization  -gradually decrease pain medication to avoid withdrawal. Currently on scheduled oxycodone. Has history of opoid addiction    Bipolar Disorder: Mother raised concerns about subOptimal psych care in past.   -Psych following, transition to inpatient psych once medically stable    Pneumonia- likely aspiration- 2nd to above, s/p treatment    Cardiomyopathy: Resolved.    -TTE with EF 15%- likely 2nd to drug use- Cardio following  -LUCIANO  with normal function   -continue metoprolol, isosorbide dinitrate, hydralazine    HTN: continue metoprolol, isosorbide dinitrate, hydralazine    Acute hypoxic respiratory failure: improving, 2nd to above, wean oxygen as able    Acute LUE DVT: Continue Eliquis at least 3 months    Acute Anemia: stable, transfuse if HB<7.    Acute metabolic Encephalopathy: resolved, remain off seroquel   Rhabdomyolysis: resolved with IVFs, stop CK checks       Code status: Full  DVT prophylaxis: Heparin  Care Plan discussed with: Patient/Family and Nurse  Disposition: Plans to d/c to IP psych 11/11     Hospital Problems  Never Reviewed          Codes Class Noted POA    LV dysfunction ICD-10-CM: I51.9  ICD-9-CM: 429.9  11/4/2020 Yes        Current episode of major depressive disorder without prior episode ICD-10-CM: F32.9  ICD-9-CM: 296.20  11/4/2020 Yes        Non-traumatic rhabdomyolysis ICD-10-CM: M62.82  ICD-9-CM: 728.88  11/4/2020 Unknown        Toxic encephalopathy ICD-10-CM: G92  ICD-9-CM: 349.82  10/24/2020 Yes        Drug abuse ([facility] ICD-10-CM: F19.10  ICD-9-CM: 305.90  10/22/2020 Yes        Acute renal failure with tubular necrosis ([facility] ICD-10-CM: N17.0  ICD-9-CM: 584.5  10/22/2020 Yes        * (Principal) Sepsis ([facility] ICD-10-CM: A41.9  ICD-9-CM: 038.9, 995.91  10/22/2020 Yes        Community acquired pneumonia of left lower lobe of lung ICD-10-CM: J18.9  ICD-9-CM: 545  10/22/2020 Yes        Electrolyte abnormality ICD-10-CM: E87.8  ICD-9-CM: 276.9  10/22/2020 Yes            Review of Systems:   Pertinent items are mentioned in interval history. Vital Signs:    Last 24hrs VS reviewed since prior progress note.  Most recent are:  Visit Vitals  BP (!) 157/96 (BP 1 Location: Right arm, BP Patient Position: At rest)   Pulse 77   Temp 98.2 °F (36.8 °C)   Resp 16   Ht 5' 10\" (1.778 m)   Wt 103.5 kg (228 lb 2.8 oz)   SpO2 96%   BMI 32.74 kg/m²         Intake/Output Summary (Last 24 hours) at 11/10/2020 1726  Last data filed at 11/10/2020 1045  Gross per 24 hour   Intake 320 ml   Output 5075 ml   Net -4755 ml        Physical Examination:   General:  Alert, resting in bed, No acute distress  Resp:  No accessory muscle use, no wheezes, no rhonci   Abd:  Soft, non-tender, non-distended  Extremities:  No cyanosis or clubbing, Left upper extremity swelling, 1+ pitting edema bilaterally   Neuro:  no focal neuro deficits, follows commands   Psych:  not agitated. Flat affect     Data Review:    Review and/or order of clinical lab test  Review and/or order of tests in the radiology section of CPT  Review and/or order of tests in the medicine section of CPT  Labs:     No results for input(s): WBC, HGB, HCT, PLT, HGBEXT, HCTEXT, PLTEXT, HGBEXT, HCTEXT, PLTEXT in the last 72 hours. Recent Labs     11/10/20  0650 11/08/20  0136    136   K 3.7 3.7    100   CO2 26 27   BUN 36* 29*   CREA 2.43* 2.60*   GLU 87 95   CA 8.3* 8.5     No results for input(s): ALT, AP, TBIL, TBILI, TP, ALB, GLOB, GGT, AML, LPSE in the last 72 hours. No lab exists for component: SGOT, GPT, AMYP, HLPSE  No results for input(s): INR, PTP, APTT, INREXT, INREXT in the last 72 hours. No results for input(s): FE, TIBC, PSAT, FERR in the last 72 hours. Lab Results   Component Value Date/Time    Folate 9.7 10/31/2020 12:40 AM      No results for input(s): PH, PCO2, PO2 in the last 72 hours. No results for input(s): CPK, CKNDX, TROIQ in the last 72 hours.     No lab exists for component: CPKMB  No results found for: CHOL, CHOLX, CHLST, CHOLV, HDL, HDLP, LDL, LDLC, DLDLP, TGLX, TRIGL, TRIGP, CHHD, CHHDX  Lab Results   Component Value Date/Time    Glucose (POC) 129 (H) 10/23/2020 04:27 PM     Lab Results   Component Value Date/Time    Color Yellow/Straw 10/21/2020 06:05 PM    Appearance Clear 10/21/2020 06:05 PM    Specific gravity 1.025 10/21/2020 06:05 PM    pH (UA) 5.5 10/21/2020 06:05 PM    Protein 30 (A) 10/21/2020 06:05 PM    Glucose Negative 10/21/2020 06:05 PM    Ketone Negative 10/21/2020 06:05 PM    Urobilinogen 1.0 10/21/2020 06:05 PM    Nitrites Negative 10/21/2020 06:05 PM    Leukocyte Esterase Negative 10/21/2020 06:05 PM    Bacteria Negative 10/21/2020 06:05 PM    WBC 0-4 10/21/2020 06:05 PM    RBC 0-5 10/21/2020 06:05 PM     Medications Reviewed:     Current Facility-Administered Medications   Medication Dose Route Frequency    acetaminophen (TYLENOL) tablet 650 mg  650 mg Oral Q6H PRN    doxycycline (VIBRAMYCIN) 100 mg in 0.9% sodium chloride (MBP/ADV) 100 mL MBP  100 mg IntraVENous ONCE    oxyCODONE IR (ROXICODONE) tablet 5 mg  5 mg Oral Q6H    metoprolol succinate (TOPROL-XL) XL tablet 150 mg  150 mg Oral DAILY    LORazepam (ATIVAN) tablet 1 mg  1 mg Oral Q6H PRN    apixaban (ELIQUIS) tablet 10 mg  10 mg Oral BID    Followed by   Lum Null ON 11/13/2020] apixaban (ELIQUIS) tablet 5 mg  5 mg Oral BID    influenza vaccine 2020-21 (6 mos+)(PF) (FLUARIX/FLULAVAL/FLUZONE QUAD) injection 0.5 mL  0.5 mL IntraMUSCular PRIOR TO DISCHARGE    isosorbide dinitrate (ISORDIL) tablet 20 mg  20 mg Oral TID    albuterol-ipratropium (DUO-NEB) 2.5 MG-0.5 MG/3 ML  3 mL Nebulization Q6H PRN    QUEtiapine (SEROquel) tablet 50 mg  50 mg Oral Q8H PRN    hydrALAZINE (APRESOLINE) tablet 100 mg  100 mg Oral TID    oxyCODONE IR (ROXICODONE) tablet 5 mg  5 mg Oral Q4H PRN    melatonin tablet 3 mg  3 mg Oral QHS    alteplase (CATHFLO) 1 mg in sterile water (preservative free) 1 mL injection  1 mg InterCATHeter PRN    balsam peru-castor oiL (VENELEX) ointment   Topical Q8H    sodium chloride (NS) flush 5-40 mL  5-40 mL IntraVENous Q8H    sodium chloride (NS) flush 5-40 mL  5-40 mL IntraVENous PRN    polyethylene glycol (MIRALAX) packet 17 g  17 g Oral DAILY PRN    ondansetron (ZOFRAN) injection 4 mg  4 mg IntraVENous Q6H PRN    docusate sodium (COLACE) capsule 100 mg  100 mg Oral DAILY    labetaloL (NORMODYNE;TRANDATE) injection 20 mg  20 mg IntraVENous Q4H PRN    heparin (porcine) 1,000 unit/mL injection 1,400 Units  1,400 Units InterCATHeter DIALYSIS PRN    And    heparin (porcine) 1,000 unit/mL injection 1,100 Units  1,100 Units InterCATHeter DIALYSIS PRN   ______________________________________________________________________  EXPECTED LENGTH OF STAY: 4d 19h  ACTUAL LENGTH OF STAY:          DO Conrad

## 2020-11-10 NOTE — PROGRESS NOTES
TRANSITION OF CARE PLAN   RUR 35% HIGH RISK    Chart reviewed. Ongoing medical management. Cardiology/ID and Nephrology following. Need for possible permacath. Psych following; Patient will transfer to IP psych unit (7W) once medically cleared. Therapy team recommending IPR (No choice given at this time)      CM to follow.      RAY Rivas,CRM [id number] [facility]

## 2020-11-10 NOTE — PROGRESS NOTES
PHYSICAL THERAPY  Attempted to see patient this afternoon, but he stated that he has already walked to the bathroom several times and in the room with his aide. Refused walking in the hallway.   Nila Garibay

## 2020-11-10 NOTE — PROGRESS NOTES
[facility]   [address] [facility], [address] [place] [address], [facility] [place]  Phone: [phone]   [phone]       Nephrology Progress Note  Laure Berrios     1995     [id number]  Date of Admission : 10/21/2020  11/10/20    CC: Follow up for ARF, Rhabdomyolysis        Assessment and Plan   JEAN PIERRE  - Severe ATN from Rhabdomyolysis   - resolving   - No more HD needs -- remove delmar catheter   - daily labs     Severe Rhabdomyolysis     Left Lung PNA w/ sepsis: resolved     LUE DVT:  - on eliquis    CMP w/ EF 15-20%:  - likely 2/2 cocaine  - per cardiology    Staph bacteremia:  - LUCIANO neg  - abx per ID    Encephalopathy:  - improving slowly    Polysubstance abuse      Interval History:  Seen and examined. 5L UOP. Cr trending down. Denies any edema. Iniguez removed today. Sitter at bedside. He is feeling better. No cp, sob, n/v/d reported. Review of Systems: A comprehensive review of systems was negative.     Current Medications:   Current Facility-Administered Medications   Medication Dose Route Frequency    acetaminophen (TYLENOL) tablet 650 mg  650 mg Oral Q6H PRN    doxycycline (VIBRAMYCIN) 100 mg in 0.9% sodium chloride (MBP/ADV) 100 mL MBP  100 mg IntraVENous ONCE    oxyCODONE IR (ROXICODONE) tablet 5 mg  5 mg Oral Q6H    metoprolol succinate (TOPROL-XL) XL tablet 150 mg  150 mg Oral DAILY    LORazepam (ATIVAN) tablet 1 mg  1 mg Oral Q6H PRN    apixaban (ELIQUIS) tablet 10 mg  10 mg Oral BID    Followed by   Paris Yanez ON 11/13/2020] apixaban (ELIQUIS) tablet 5 mg  5 mg Oral BID    influenza vaccine 2020-21 (6 mos+)(PF) (FLUARIX/FLULAVAL/FLUZONE QUAD) injection 0.5 mL  0.5 mL IntraMUSCular PRIOR TO DISCHARGE    isosorbide dinitrate (ISORDIL) tablet 20 mg  20 mg Oral TID    albuterol-ipratropium (DUO-NEB) 2.5 MG-0.5 MG/3 ML  3 mL Nebulization Q6H PRN    QUEtiapine (SEROquel) tablet 50 mg  50 mg Oral Q8H PRN    hydrALAZINE (APRESOLINE) tablet 100 mg  100 mg Oral TID    oxyCODONE IR (ROXICODONE) tablet 5 mg  5 mg Oral Q4H PRN    melatonin tablet 3 mg  3 mg Oral QHS    alteplase (CATHFLO) 1 mg in sterile water (preservative free) 1 mL injection  1 mg InterCATHeter PRN    balsam peru-castor oiL (VENELEX) ointment   Topical Q8H    sodium chloride (NS) flush 5-40 mL  5-40 mL IntraVENous Q8H    sodium chloride (NS) flush 5-40 mL  5-40 mL IntraVENous PRN    polyethylene glycol (MIRALAX) packet 17 g  17 g Oral DAILY PRN    ondansetron (ZOFRAN) injection 4 mg  4 mg IntraVENous Q6H PRN    docusate sodium (COLACE) capsule 100 mg  100 mg Oral DAILY    labetaloL (NORMODYNE;TRANDATE) injection 20 mg  20 mg IntraVENous Q4H PRN    heparin (porcine) 1,000 unit/mL injection 1,400 Units  1,400 Units InterCATHeter DIALYSIS PRN    And    heparin (porcine) 1,000 unit/mL injection 1,100 Units  1,100 Units InterCATHeter DIALYSIS PRN      Allergies   Allergen Reactions    Tramadol Nausea and Vomiting       Objective:  Vitals:    Vitals:    11/09/20 2156 11/10/20 0301 11/10/20 0600 11/10/20 0905   BP: (!) 141/93 138/88  (!) 161/100   Pulse: 73 71  73   Resp: 16 14  16   Temp: 97.9 °F (36.6 °C) 97.5 °F (36.4 °C)  98.4 °F (36.9 °C)   TempSrc:       SpO2: 95% 95%  99%   Weight:   103.5 kg (228 lb 2.8 oz)    Height:         Intake and Output:  11/10 0701 - 11/10 1900  In: 320 [P.O.:320]  Out: 1350 [Urine:1350]  11/08 1901 - 11/10 0700  In: 320 [P.O.:320]  Out: 9000 [Urine:8100]    Physical Examination:    General: Confused, resting on NC  Neck:  Supple, no mass  Resp:  Decreased breath sounds  CV:  RRR,  no murmur or rub, no LE edema  GI:  Soft, NT, + Bowel sounds, no hepatosplenomegaly  Neurologic:  Lethargic   Psych:             Unable to assess  :  Iniguez     []    High complexity decision making was performed  []    Patient is at high-risk of decompensation with multiple organ involvement    Lab Data Personally Reviewed: I have reviewed all the pertinent labs, microbiology data and radiology studies during assessment. Recent Labs     11/10/20  0650 11/08/20  0136    136   K 3.7 3.7    100   CO2 26 27   GLU 87 95   BUN 36* 29*   CREA 2.43* 2.60*   CA 8.3* 8.5     No results for input(s): WBC, HGB, HCT, PLT, HGBEXT, HCTEXT, PLTEXT, HGBEXT, HCTEXT, PLTEXT in the last 72 hours. No results found for: SDES  Lab Results   Component Value Date/Time    Culture result: NO GROWTH 5 DAYS 10/22/2020 06:19 AM    Culture result: NO GROWTH 5 DAYS 10/22/2020 02:37 AM    Culture result: (A) 10/21/2020 08:15 PM     Staphylococcus hominis (Oxacillin resistant) GROWING IN THE AEROBIC BOTTLE (SITE = R HAND)    Culture result:  10/21/2020 08:15 PM     Gram Positive Cocci CALLED TO AND READ BACK BY Eva Underwood RN AT 2012 BY D    Culture result: (A) 10/21/2020 08:10 PM     Staphylococcus hominis (Oxacillin resistant) GROWING IN THE AEROBIC BOTTLE (SITE = L HAND)    Culture result:  10/21/2020 08:10 PM     preliminary report of Gram Positive Cocci in clusters growing in 1 of 2 bottles drawn [address] [facility] RN SCAV AT [phone] [place] [address] ON 10/23/20. [address]     Recent Results (from the past 24 hour(s))   METABOLIC PANEL, BASIC    Collection Time: 11/10/20  6:50 AM   Result Value Ref Range    Sodium 136 136 - 145 mmol/L    Potassium 3.7 3.5 - 5.1 mmol/L    Chloride 104 97 - 108 mmol/L    CO2 26 21 - 32 mmol/L    Anion gap 6 5 - 15 mmol/L    Glucose 87 65 - 100 mg/dL    BUN 36 (H) 6 - 20 MG/DL    Creatinine 2.43 (H) 0.70 - 1.30 MG/DL    BUN/Creatinine ratio 15 12 - 20      GFR est AA 40 (L) >60 ml/min/1.73m2    GFR est non-AA 33 (L) >60 ml/min/1.73m2    Calcium 8.3 (L) 8.5 - 10.1 MG/DL           Total time spent with patient:  xxx   min. Care Plan discussed with:  Patient     Family      RN      Consulting Physician [address] [facility],         I have reviewed the flowsheets. Chart and Pertinent Notes have been reviewed. No change in PMH ,family and social history from Consult note.       Ulysses RAMON Robbin Tellez MD

## 2020-11-10 NOTE — PROGRESS NOTES
Progress Note    Patient: Marty Obando MRN: [id number]  SSN: [id number]    YOB: 1995  Age: 26 y.o. Sex: male      Admit Date: 10/21/2020    LOS: 19 days    Cardiologist: Jesus Mak MD    Subjective:     Mr. Goldne Breaux is a 25 yo  male admitted 10/21/2020 to [facility] in [address] with altered mental status for >24 hours, lethargy, found down by family. He had substance overdose with rhabdomyelysis, elevated troponin, severe LV systolic dysfunction, shock liver, acute renal failure, LUE DVT and sepsis. No prior reports of chest pain or shortness of breath. He was transferred to [facility] for further care. Awake this morning. No complaints this am.    Objective:     Vitals:    20 1608 20 2156 11/10/20 0301 11/10/20 0600   BP: (!) 143/78 (!) 141/93 138/88    Pulse: 79 73 71    Resp: 18 16 14    Temp: 97.7 °F (36.5 °C) 97.9 °F (36.6 °C) 97.5 °F (36.4 °C)    TempSrc:       SpO2: 97% 95% 95%    Weight:    228 lb 2.8 oz (103.5 kg)   Height:          Temp (24hrs), Av.7 °F (36.5 °C), Min:97.5 °F (36.4 °C), Max:97.9 °F (36.6 °C)      Intake and Output:  Current Shift: 11/10 0701 - 11/10 1900  In: -   Out: 800 [Urine:800]  Last three shifts: 1901 - 11/10 0700  In: 320 [P.O.:320]  Out: 8600 [Urine:7700]    Physical Exam:  General:  Resting comfortably, well nourished, well developed, appears stated age   Eyes:  Conjunctivae/corneas clear    Nose: Nares normal. Septum midline. Mucosa normal. No drainage or sinus tenderness. Neck: Supple, symmetrical, trachea midline, no JVD   Lungs:   Clear to auscultation bilaterally, good effort   Heart:  normal rate and rhythm, no murmur, click, rub, or gallop   Abdomen:   Soft, non-tender, bowel sounds normal, non-distended   Extremities: Normal, atraumatic, no cyanosis or edema   Skin: Skin color, texture, turgor normal. No rash or lesions.    Neurologic: Non focal       Current Facility-Administered Medications:     acetaminophen (TYLENOL) tablet 650 mg, 650 mg, Oral, Q6H PRN, J[facility],     oxyCODONE IR (ROXICODONE) tablet 5 mg, 5 mg, Oral, Q6H, Lulu Dunham DO, Stopped at 11/10/20 0000    metoprolol succinate (TOPROL-XL) XL tablet 150 mg, 150 mg, Oral, DAILY, Antonia Aaron PA-C    LORazepam (ATIVAN) tablet 1 mg, 1 mg, Oral, Q6H PRN, Jacque CORRAL DO    metoprolol tartrate (LOPRESSOR) tablet 150 mg, 150 mg, Oral, BID, Antonia Aaron PA-C, 150 mg at 11/09/20 1750    doxycycline (VIBRAMYCIN) 100 mg in 0.9% sodium chloride (MBP/ADV) 100 mL, 100 mg, IntraVENous, Q12H, Xuan Mirza MD, Last Rate: 100 mL/hr at 11/10/20 0646, 100 mg at 11/10/20 0646    apixaban (ELIQUIS) tablet 10 mg, 10 mg, Oral, BID, 10 mg at 11/09/20 1750 **FOLLOWED BY** [START ON 11/13/2020] apixaban (ELIQUIS) tablet 5 mg, 5 mg, Oral, BID, Lulu Dunham DO    influenza vaccine 2020-21 (6 mos+)(PF) (FLUARIX/FLULAVAL/FLUZONE QUAD) injection 0.5 mL, 0.5 mL, IntraMUSCular, PRIOR TO DISCHARGE, Lulu Dunham DO    isosorbide dinitrate (ISORDIL) tablet 20 mg, 20 mg, Oral, TID, Nitesh Ashley MD, 20 mg at 11/09/20 2157    albuterol-ipratropium (DUO-NEB) 2.5 MG-0.5 MG/3 ML, 3 mL, Nebulization, Q6H PRN, JULIAN Vazquez M, DO    QUEtiapine (SEROquel) tablet 50 mg, 50 mg, Oral, Q8H PRN, Iram Pretty NP    hydrALAZINE (APRESOLINE) tablet 100 mg, 100 mg, Oral, TID, Javier Gibson MD, 100 mg at 11/09/20 2157    oxyCODONE IR (ROXICODONE) tablet 5 mg, 5 mg, Oral, Q4H PRN, Luis Miguel Crespo DO, 5 mg at 11/09/20 0807    melatonin tablet 3 mg, 3 mg, Oral, QHS, Luis Miguel Crespo DO, 3 mg at 11/09/20 1929    alteplase (CATHFLO) 1 mg in sterile water (preservative free) 1 mL injection, 1 mg, InterCATHeter, PRN, Jarek Guerra MD, 1 mg at 11/06/20 0959    balsam peru-castor oiL (VENELEX) ointment, , Topical, Q8H, VERENICE Guerra MD    sodium chloride (NS) flush 5-40 mL, 5-40 mL, IntraVENous, Q8H, Antonia Ni NP, Stopped at 11/09/20 1400    sodium chloride (NS) flush 5-40 mL, 5-40 mL, IntraVENous, PRN, Antonia Ni NP, 10 mL at 10/31/20 2302    polyethylene glycol (MIRALAX) packet 17 g, 17 g, Oral, DAILY PRN, Antonia Ni NP    ondansetron [facility]) injection 4 mg, 4 mg, IntraVENous, Q6H PRN, Antonia Ni NP, 4 mg at 11/10/20 0257    docusate sodium (COLACE) capsule 100 mg, 100 mg, Oral, DAILY, [place] waite, NP, 100 mg at 11/09/20 0843    labetaloL (NORMODYNE;TRANDATE) injection 20 mg, 20 mg, IntraVENous, Q4H PRN, Antonia Ni NP, 20 mg at 11/03/20 8737    heparin (porcine) 1,000 unit/mL injection 1,400 Units, 1,400 Units, InterCATHeter, DIALYSIS PRN, 1,400 Units at 11/06/20 1126 **AND** heparin (porcine) 1,000 unit/mL injection 1,100 Units, 1,100 Units, InterCATHeter, DIALYSIS PRN, Trey Valverde MD, 1,100 Units at 11/06/20 1125    Lab/Data Review:  Labs Latest Ref Rng & Units 11/10/2020 11/8/2020 11/7/2020 11/6/2020 11/5/2020 11/4/2020 11/3/2020   WBC 4.1 - 11.1 K/uL - - 9.8 10.0 10.7 12. 4(H) 12. 1(H)   RBC 4.10 - 5.70 M/uL - - 2.83(L) 2.80(L) 2.71(L) 2.62(L) 2.61(L)   Hemoglobin 12.1 - 17.0 g/dL - - 7. 8(L) 7. 8(L) 7. 5(L) 7. 2(L) 7. 2(L)   Hematocrit 36.6 - 50.3 % - - 24. 4(L) 24. 4(L) 23. 7(L) 22. 8(L) 22. 5(L)   MCV 80.0 - 99.0 FL - - 86.2 87.1 87.5 87.0 86.2   Platelets 531 - 940 K/uL - - 241 301 279 266 244   Lymphocytes 12 - 49 % - - 15 14 11(L) 11(L) 16   Monocytes 5 - 13 % - - 13 8 9 10 12   Eosinophils 0 - 7 % - - 2 1 2 2 2   Basophils 0 - 1 % - - 1 1 1 0 0   Bands 0 - 6 % - - - - - - -   Albumin 3.5 - 5.0 g/dL - - 2. 2(L) 2. 2(L) 2. 3(L) 2. 1(L) 2. 1(L)   Calcium 8.5 - 10.1 MG/DL 8. 3(L) 8.5 8.6 8.8 9.0 8.8 8.5   Glucose 65 - 100 mg/dL 87 95 100 98 101(H) 97 90   BUN 6 - 20 MG/DL 36(H) 29(H) 23(H) 30(H) 23(H) 32(H) 26(H)   Creatinine 0.70 - 1.30 MG/DL 2.43(H) 2.60(H) 2.30(H) 2.94(H) 2.59(H) 3.32(H) 2.87(H)   Sodium 136 - 145 mmol/L 136 136 138 137 136 136 135(L) Potassium 3.5 - 5.1 mmol/L 3.7 3.7 3.5 3.9 3.7 3.9 3.6   Some recent data might be hidden     Lab Results   Component Value Date/Time    Sodium 136 11/10/2020 06:50 AM    Potassium 3.7 11/10/2020 06:50 AM    Chloride 104 11/10/2020 06:50 AM    CO2 26 11/10/2020 06:50 AM    Anion gap 6 11/10/2020 06:50 AM    Glucose 87 11/10/2020 06:50 AM    BUN 36 (H) 11/10/2020 06:50 AM    Creatinine 2.43 (H) 11/10/2020 06:50 AM    BUN/Creatinine ratio 15 11/10/2020 06:50 AM    GFR est AA 40 (L) 11/10/2020 06:50 AM    GFR est non-AA 33 (L) 11/10/2020 06:50 AM    Calcium 8.3 (L) 11/10/2020 06:50 AM    Bilirubin, total 0.4 11/07/2020 01:03 AM    Alk. phosphatase 54 11/07/2020 01:03 AM    Protein, total 5.0 (L) 11/07/2020 01:03 AM    Albumin 2.2 (L) 11/07/2020 01:03 AM    Globulin 2.8 11/07/2020 01:03 AM    A-G Ratio 0.8 (L) 11/07/2020 01:03 AM    ALT (SGPT) 48 11/07/2020 01:03 AM    AST (SGOT) 20 11/07/2020 01:03 AM           10/21/20   ECHO ADULT FOLLOW-UP OR LIMITED 10/29/2020 10/29/2020    Narrative · LV: Estimated LVEF is 15 - 20%. Mildly dilated left ventricle. Severely   global hypokinesis; normal function at apex. · MV: Moderate mitral valve regurgitation is present. · TV: Moderate tricuspid valve regurgitation is present. · PA: Mild to moderate pulmonary hypertension. Pulmonary arterial systolic   pressure is 45 mmHg. · RV: Mildly dilated right ventricle. Borderline low systolic function.         Signed by: Elodia Granados MD          Assessment:     Principal Problem:    Sepsis [facility]) (10/22/2020)    Active Problems:    Drug abuse ([facility] (10/22/2020)      Acute renal failure with tubular necrosis ([facility] (10/22/2020)      Community acquired pneumonia of left lower lobe of lung (10/22/2020)      Electrolyte abnormality (10/22/2020)      Toxic encephalopathy (10/24/2020)      LV dysfunction (11/4/2020)      Current episode of major depressive disorder without prior episode (11/4/2020)      Non-traumatic rhabdomyolysis (11/4/2020)        Plan:     Mr. Golden Breaux is a 27 yo WM with polysubstance abuse found unresponsive with multi-organ failure. Severe LV dysfunction likely due to substance abuse, sepsis, PNA. No chest pain or shortness of breath to suggest an acute coronary syndrome. 1. Troponin elevation              - 55.89 --> 55.30 --> 57.9              - Related to Rhabdo, most likely. CK 69,000+, CKMB 132    2. Acute systolic heart failure   - Likely non-ischemic cardiomyopathy.   - NYHA class I  10/21/20   ECHO LUCIANO W OR WO CONTRAST 11/07/2020 11/7/2020    Narrative · LV: Estimated LVEF is 25 - 30%. Visually measured ejection fraction. Normal wall thickness. Dilated left ventricle. Moderate-to-severely   reduced systolic function. Left ventricular diastolic dysfunction. · RV: Mildly reduced systolic function. · MV: Mild mitral valve regurgitation is present. · LA: Mildly dilated left atrium. · RA: Mildly dilated right atrium. · IAS: Agitated saline contrast study was performed. There was no shunting   at baseline. No vegetations seen. Signed by: Nia Oneil MD    - Cath deferred. Anterior wall motion improving.   - continue GDMT for heart failure/LV dysfunction - Toprol XL today, moving forward   - Start ACE-I/ARB when renal function allows     3. Altered Mental Status              - Metabolic encephalopathy              - UDS + for cocaine, THC, Amphetamines, Ectasy, benzos              - Head CT with indication for toxic edema in basal ganglia    4. Septic shock- staph hominis              - L sided PNA              - IV Abx              - IVF   - No vegetations on LUCIANO   - ID signed off. Completed ABx course  5. Pneumonia              - Left sided              - IV Abx   - Mycoplasma IgM positive  6. Transaminitis              - significantly elevated LFTs - Acute liver injury              - INR elevation to 1.5, now 1.3   - Slow improvement  7.  Acute renal failure due to rhabdomeylitis - Nephrology following              - HD M-W-F   - Avoiding ACE-I/ARB at this time    8. Poly substance abuse              - counseling   - plan for IP Psych.     9. Left upper extremity DVT   - Heparin drip   - transition to DOAC when able      Signed By: Tori Hitt, PA-C Ferdinand Lundborg, MD     November 10, 2020      Interventional Cardiology  [facility] [address], [facility]  [place], [address] [place] [address] Avenue  P: [phone]  F: [phone]

## 2020-11-10 NOTE — PROGRESS NOTES
Problem: Self Care Deficits Care Plan (Adult)  Goal: *Acute Goals and Plan of Care (Insert Text)  Description: Description: FUNCTIONAL STATUS PRIOR TO ADMISSION: Patient was independent and active without use of DME. Working in [address]: The patient lived with family but did not require assist.    Occupational Therapy Goals  Initiated 10/27/2020  1. Patient will perform grooming seated EOB with supervision/set-up within 7 day(s). - Continued below  2. Patient will perform bathing with minimal assistance within 7 day(s). -Continued below  3. Patient will perform lower body dressing with moderate assistance  within 7 day(s). -Continued below  4. Patient will perform toilet transfers with moderate assistance  within 7 day(s). -Continued below  5. Patient will perform all aspects of toileting with moderate assistance  within 7 day(s). -Continued below  6. Patient will cylindrical grasp to hold cup in L hand with supervision within 7 day(s). -Continued below  7. Patient will participate in upper extremity therapeutic exercise/activities with supervision/set-up for 10 minutes within 7 day(s). -Continued below      Weekly Re-assessment 11/5/2020  1. Patient will perform grooming seated EOB with supervision/set-up within 7 day(s). 2.  Patient will perform bathing with minimal assistance within 7 day(s). 3.  Patient will perform lower body dressing with moderate assistance  within 7 day(s). 4.  Patient will perform toilet transfers with moderate assistance  within 7 day(s). 5.  Patient will perform all aspects of toileting with moderate assistance  within 7 day(s). 6.  Patient will cylindrical grasp to hold cup in L hand with supervision within 7 day(s). 7.  Patient will participate in upper extremity therapeutic exercise/activities with supervision/set-up for 10 minutes within 7 day(s).       Outcome: Progressing Towards Goal    OCCUPATIONAL THERAPY TREATMENT  Patient: [facility] Ray Herman (26 y.o. male)  Date: 11/10/2020  Diagnosis: Sepsis ([facility] [address] [A41.9]  ARF (acute renal failure) ([facility] [address] [N17.9]  Drug abuse ([facility] [F19.10]   Sepsis ([facility] [address]       Precautions: Fall, Skin(no BP LUE (DVT) and bed alarm if no sitter)  Chart, occupational therapy assessment, plan of care, and goals were reviewed. ASSESSMENT  Patient continues with skilled OT services and is progressing towards goals. Pt progressing with functional mobility/balance as evidenced by successful completion of toileting, without use of DME, at [facility] with no LOB noted and Min verbal cues to relax BUE and allow for natural arm swing. Pt's face, L arm and abdomen noted with edema, with nursing aware and following. Pt progressing with understanding of LUE gravity eliminated shoulder flexion, with use of bedside table, as well as, LUE self ROM. Pt continues to benefit from skilled OT to address functional deficits in an attempt at maximizing pt's highest level of safe functional independence prior to discharge, with reporting therapist believing pt would benefit from inpatient rehab upon discharge. Current Level of Function Impacting Discharge (ADLs): Min-Mod A    Other factors to consider for discharge: below reported baseline         PLAN :  Patient continues to benefit from skilled intervention to address the above impairments. Continue treatment per established plan of care. to address goals. Recommend with staff: OOB meals, Active ADL engagement, Assist x1 to/from bathroom    Recommend next OT session: POC progression    Recommendation for discharge: (in order for the patient to meet his/her long term goals)  Therapy 3 hours per day 5-7 days per week    This discharge recommendation:  Has not yet been discussed the attending provider and/or case management    IF patient discharges home will need the following DME: TBD       SUBJECTIVE:   Patient stated i'm feeling better today.     OBJECTIVE DATA SUMMARY:   Cognitive/Behavioral Status:  Neurologic State: Alert  Orientation Level: Oriented X4  Cognition: Appropriate decision making; Appropriate for age attention/concentration; Appropriate safety awareness  Perception: Appears intact  Perseveration: No perseveration noted  Safety/Judgement: Awareness of environment; Insight into deficits    Functional Mobility and Transfers for ADLs:  Bed Mobility:  Supine to Sit: Supervision;Bed Modified(head of bed elevated)    Transfers:  Sit to Stand: Contact guard assistance  Functional Transfers  Bathroom Mobility: Contact guard assistance  Bed to Chair: Contact guard assistance    Balance:  Sitting: Intact  Sitting - Static: Good (unsupported)  Sitting - Dynamic: Good (unsupported)  Standing: Impaired(without use of DME)  Standing - Static: Fair(CGA without use of DME)  Standing - Dynamic : Fair(CGA without use of DME)    ADL Intervention:  Toileting  Toileting Assistance: Contact guard assistance  Bladder Hygiene: Modified independent  Bowel Hygiene: Modified indpendent  Clothing Management: Contact guard assistance    Cognitive Retraining  Safety/Judgement: Awareness of environment; Insight into deficits    Therapeutic Activity:   Pt educated on continuing gravity eliminated L shoulder flexion with horizontal ab/adduction, with use of bedside table, as well as, LUE self-ROM; good understanding verbalized/demonstrated. Pain:  No c/o pain    Activity Tolerance:   Fair and requires rest breaks    After treatment patient left in no apparent distress:   Sitting in chair, Call bell within reach, and Caregiver / family present    COMMUNICATION/COLLABORATION:   The patients plan of care was discussed with: Registered nurse.      Hipolito Seip, OT  Time Calculation: 16 mins

## 2020-11-11 ENCOUNTER — HOSPITAL ENCOUNTER (INPATIENT)
Age: 25
LOS: 5 days | Discharge: LEFT BEFORE DISCHARGE | DRG: 812 | End: 2020-11-16
Attending: PSYCHIATRY & NEUROLOGY | Admitting: PSYCHIATRY & NEUROLOGY
Payer: MEDICAID

## 2020-11-11 VITALS
OXYGEN SATURATION: 95 % | WEIGHT: 228.18 LBS | HEART RATE: 78 BPM | DIASTOLIC BLOOD PRESSURE: 84 MMHG | TEMPERATURE: 98.7 F | HEIGHT: 70 IN | BODY MASS INDEX: 32.67 KG/M2 | SYSTOLIC BLOOD PRESSURE: 150 MMHG | RESPIRATION RATE: 16 BRPM

## 2020-11-11 PROBLEM — F32.A DEPRESSION: Status: ACTIVE | Noted: 2020-11-11

## 2020-11-11 LAB
ANION GAP SERPL CALC-SCNC: 8 MMOL/L (ref 5–15)
BUN SERPL-MCNC: 35 MG/DL (ref 6–20)
BUN/CREAT SERPL: 15 (ref 12–20)
CALCIUM SERPL-MCNC: 8.5 MG/DL (ref 8.5–10.1)
CHLORIDE SERPL-SCNC: 105 MMOL/L (ref 97–108)
CO2 SERPL-SCNC: 25 MMOL/L (ref 21–32)
CREAT SERPL-MCNC: 2.31 MG/DL (ref 0.7–1.3)
GLUCOSE SERPL-MCNC: 93 MG/DL (ref 65–100)
POTASSIUM SERPL-SCNC: 3.6 MMOL/L (ref 3.5–5.1)
SODIUM SERPL-SCNC: 138 MMOL/L (ref 136–145)

## 2020-11-11 PROCEDURE — 74011250637 HC RX REV CODE- 250/637: Performed by: INTERNAL MEDICINE

## 2020-11-11 PROCEDURE — 36415 COLL VENOUS BLD VENIPUNCTURE: CPT

## 2020-11-11 PROCEDURE — 99233 SBSQ HOSP IP/OBS HIGH 50: CPT | Performed by: PHYSICIAN ASSISTANT

## 2020-11-11 PROCEDURE — 74011000250 HC RX REV CODE- 250

## 2020-11-11 PROCEDURE — 65220000003 HC RM SEMIPRIVATE PSYCH

## 2020-11-11 PROCEDURE — 74011250637 HC RX REV CODE- 250/637: Performed by: PHYSICIAN ASSISTANT

## 2020-11-11 PROCEDURE — 80048 BASIC METABOLIC PNL TOTAL CA: CPT

## 2020-11-11 RX ORDER — ISOSORBIDE DINITRATE 20 MG/1
20 TABLET ORAL 3 TIMES DAILY
Status: DISCONTINUED | OUTPATIENT
Start: 2020-11-11 | End: 2020-11-13

## 2020-11-11 RX ORDER — ADHESIVE BANDAGE
30 BANDAGE TOPICAL DAILY PRN
Status: DISCONTINUED | OUTPATIENT
Start: 2020-11-11 | End: 2020-11-16 | Stop reason: HOSPADM

## 2020-11-11 RX ORDER — HYDROXYZINE 50 MG/1
50 TABLET, FILM COATED ORAL
Status: DISCONTINUED | OUTPATIENT
Start: 2020-11-11 | End: 2020-11-16 | Stop reason: HOSPADM

## 2020-11-11 RX ORDER — LANOLIN ALCOHOL/MO/W.PET/CERES
3 CREAM (GRAM) TOPICAL
Status: CANCELLED | OUTPATIENT
Start: 2020-11-11

## 2020-11-11 RX ORDER — LORAZEPAM 2 MG/ML
1 INJECTION INTRAMUSCULAR
Status: DISCONTINUED | OUTPATIENT
Start: 2020-11-11 | End: 2020-11-16 | Stop reason: HOSPADM

## 2020-11-11 RX ORDER — METOPROLOL SUCCINATE 50 MG/1
150 TABLET, EXTENDED RELEASE ORAL DAILY
Status: CANCELLED | OUTPATIENT
Start: 2020-11-12

## 2020-11-11 RX ORDER — POLYETHYLENE GLYCOL 3350 17 G/17G
17 POWDER, FOR SOLUTION ORAL DAILY PRN
Status: CANCELLED | OUTPATIENT
Start: 2020-11-11

## 2020-11-11 RX ORDER — ACETAMINOPHEN 325 MG/1
650 TABLET ORAL
Status: DISCONTINUED | OUTPATIENT
Start: 2020-11-11 | End: 2020-11-16 | Stop reason: HOSPADM

## 2020-11-11 RX ORDER — METOPROLOL SUCCINATE 50 MG/1
150 TABLET, EXTENDED RELEASE ORAL DAILY
Status: DISCONTINUED | OUTPATIENT
Start: 2020-11-12 | End: 2020-11-16

## 2020-11-11 RX ORDER — OXYCODONE HYDROCHLORIDE 5 MG/1
5 TABLET ORAL EVERY 6 HOURS
Status: CANCELLED | OUTPATIENT
Start: 2020-11-11

## 2020-11-11 RX ORDER — BENZTROPINE MESYLATE 1 MG/1
1 TABLET ORAL
Status: DISCONTINUED | OUTPATIENT
Start: 2020-11-11 | End: 2020-11-16 | Stop reason: HOSPADM

## 2020-11-11 RX ORDER — DIPHENHYDRAMINE HYDROCHLORIDE 50 MG/ML
50 INJECTION, SOLUTION INTRAMUSCULAR; INTRAVENOUS
Status: DISCONTINUED | OUTPATIENT
Start: 2020-11-11 | End: 2020-11-16 | Stop reason: HOSPADM

## 2020-11-11 RX ORDER — HYDRALAZINE HYDROCHLORIDE 25 MG/1
100 TABLET, FILM COATED ORAL 3 TIMES DAILY
Status: CANCELLED | OUTPATIENT
Start: 2020-11-11

## 2020-11-11 RX ORDER — ACETAMINOPHEN 325 MG/1
650 TABLET ORAL
Status: CANCELLED | OUTPATIENT
Start: 2020-11-11

## 2020-11-11 RX ORDER — IPRATROPIUM BROMIDE AND ALBUTEROL SULFATE 2.5; .5 MG/3ML; MG/3ML
3 SOLUTION RESPIRATORY (INHALATION)
Status: CANCELLED | OUTPATIENT
Start: 2020-11-11

## 2020-11-11 RX ORDER — DOCUSATE SODIUM 100 MG/1
100 CAPSULE, LIQUID FILLED ORAL DAILY
Status: CANCELLED | OUTPATIENT
Start: 2020-11-12

## 2020-11-11 RX ORDER — HYDRALAZINE HYDROCHLORIDE 25 MG/1
100 TABLET, FILM COATED ORAL 3 TIMES DAILY
Status: DISCONTINUED | OUTPATIENT
Start: 2020-11-11 | End: 2020-11-15

## 2020-11-11 RX ORDER — DOCUSATE SODIUM 100 MG/1
100 CAPSULE, LIQUID FILLED ORAL DAILY
Status: DISCONTINUED | OUTPATIENT
Start: 2020-11-12 | End: 2020-11-16 | Stop reason: HOSPADM

## 2020-11-11 RX ORDER — LANOLIN ALCOHOL/MO/W.PET/CERES
3 CREAM (GRAM) TOPICAL
Status: DISCONTINUED | OUTPATIENT
Start: 2020-11-11 | End: 2020-11-11

## 2020-11-11 RX ORDER — BACITRACIN 500 UNIT/G
PACKET (EA) TOPICAL
Status: COMPLETED
Start: 2020-11-11 | End: 2020-11-11

## 2020-11-11 RX ORDER — ACETAMINOPHEN 325 MG/1
650 TABLET ORAL
Status: DISCONTINUED | OUTPATIENT
Start: 2020-11-11 | End: 2020-11-11

## 2020-11-11 RX ORDER — ISOSORBIDE DINITRATE 20 MG/1
20 TABLET ORAL 3 TIMES DAILY
Status: CANCELLED | OUTPATIENT
Start: 2020-11-11

## 2020-11-11 RX ORDER — IPRATROPIUM BROMIDE AND ALBUTEROL SULFATE 2.5; .5 MG/3ML; MG/3ML
3 SOLUTION RESPIRATORY (INHALATION)
Status: DISCONTINUED | OUTPATIENT
Start: 2020-11-11 | End: 2020-11-16 | Stop reason: HOSPADM

## 2020-11-11 RX ORDER — OLANZAPINE 5 MG/1
5 TABLET ORAL
Status: DISCONTINUED | OUTPATIENT
Start: 2020-11-11 | End: 2020-11-16 | Stop reason: HOSPADM

## 2020-11-11 RX ORDER — TRAZODONE HYDROCHLORIDE 50 MG/1
50 TABLET ORAL
Status: DISCONTINUED | OUTPATIENT
Start: 2020-11-11 | End: 2020-11-16 | Stop reason: HOSPADM

## 2020-11-11 RX ORDER — OXYCODONE HYDROCHLORIDE 5 MG/1
5 TABLET ORAL EVERY 6 HOURS
Status: DISCONTINUED | OUTPATIENT
Start: 2020-11-12 | End: 2020-11-12

## 2020-11-11 RX ORDER — HALOPERIDOL 5 MG/ML
5 INJECTION INTRAMUSCULAR
Status: DISCONTINUED | OUTPATIENT
Start: 2020-11-11 | End: 2020-11-16 | Stop reason: HOSPADM

## 2020-11-11 RX ADMIN — BACITRACIN: 500 OINTMENT TOPICAL at 15:00

## 2020-11-11 RX ADMIN — OXYCODONE 5 MG: 5 TABLET ORAL at 16:04

## 2020-11-11 RX ADMIN — OXYCODONE 5 MG: 5 TABLET ORAL at 10:41

## 2020-11-11 RX ADMIN — ACETAMINOPHEN 650 MG: 325 TABLET ORAL at 09:10

## 2020-11-11 RX ADMIN — HYDRALAZINE HYDROCHLORIDE 100 MG: 50 TABLET, FILM COATED ORAL at 16:04

## 2020-11-11 RX ADMIN — HYDRALAZINE HYDROCHLORIDE 100 MG: 25 TABLET, FILM COATED ORAL at 21:08

## 2020-11-11 RX ADMIN — Medication: at 06:00

## 2020-11-11 RX ADMIN — ISOSORBIDE DINITRATE 20 MG: 20 TABLET ORAL at 21:07

## 2020-11-11 RX ADMIN — Medication: at 14:00

## 2020-11-11 RX ADMIN — OXYCODONE 5 MG: 5 TABLET ORAL at 23:17

## 2020-11-11 RX ADMIN — METOPROLOL SUCCINATE 150 MG: 50 TABLET, EXTENDED RELEASE ORAL at 09:07

## 2020-11-11 RX ADMIN — APIXABAN 10 MG: 5 TABLET, FILM COATED ORAL at 21:07

## 2020-11-11 RX ADMIN — OXYCODONE 5 MG: 5 TABLET ORAL at 04:47

## 2020-11-11 RX ADMIN — HYDRALAZINE HYDROCHLORIDE 100 MG: 50 TABLET, FILM COATED ORAL at 09:06

## 2020-11-11 RX ADMIN — ISOSORBIDE DINITRATE 20 MG: 20 TABLET ORAL at 09:07

## 2020-11-11 RX ADMIN — APIXABAN 10 MG: 5 TABLET, FILM COATED ORAL at 09:07

## 2020-11-11 NOTE — PROGRESS NOTES
Bedside shift change report given to Elie Aragon (oncoming nurse) by Ghislaine Bennett (offgoing nurse). Report included the following information SBAR, Kardex, Intake/Output, MAR and Recent Results.

## 2020-11-11 NOTE — DISCHARGE SUMMARY
Discharge Summary       PATIENT ID: Edmar Fitzpatrick  MRN: [id number]   YOB: 1995    DATE OF ADMISSION: 10/21/2020 11:53 PM    DATE OF DISCHARGE: 11/11/2020  PRIMARY CARE PROVIDER: Tripp Kaur MD     ATTENDING PHYSICIAN: Josiah Gilliam DO   DISCHARGING PROVIDER: Josiah Gilliam DO    To contact this individual call [phone] and ask the  to page. If unavailable ask to be transferred the Adult Hospitalist Department. CONSULTATIONS: IP CONSULT TO CARDIOLOGY  IP CONSULT TO NEPHROLOGY  IP CONSULT TO NEUROLOGY  IP CONSULT TO PSYCHIATRY  IP CONSULT TO NEUROLOGY  IP CONSULT TO PSYCHIATRY  IP CONSULT TO INTENSIVIST  IP CONSULT TO INFECTIOUS DISEASES  IP CONSULT TO PSYCHIATRY  IP CONSULT TO VASCULAR SURGERY    PROCEDURES/SURGERIES: * No surgery found *    ADMITTING DIAGNOSES & HOSPITAL COURSE:   80-year-old male presenting to [facility] on 10/21/2020 for polysubstance abuse and overdose. Per report, EMS was notified due to patient being found unresponsive and hypoxic. On admission, patient with multiorgan failure including renal failure, elevated troponin, respiratory failure requiring intubation, shock liver. He was admitted to the ICU. Initially found to have EF <15% with eventual recovery. He was initiated on CRRT, transitioned to HD, then stopped. Also found to be bacteremic, s/p treatment. He was extubated and remained stable on medical floor for several days. Further details below. Psychiatry consulted and recommended inpatient psychiatric transfer when medically stable. Patient has completed IV antibiotics and no further inpatient treatment planned. He has a component of CKD and will need nephrology consultation while remains in inpatient psychiatric unit. He received opiates throughout his hospitalization and will need to be weaned. He is deconditioned but able to independently ambulate.            DISCHARGE DIAGNOSES / PLAN:      Polysubstance abuse with overdose: UDS +ve for cocaine, THC, benzo, opioids, amphetamines     ARF, volume overload, now with CKD Stage III: improving   -secondary to above. Nephrology following- started on RRT. Suspect will have component of CKD. D/c chan and hold HD. Will need outpatient follow up     Bacteremia: Staph hominis on 10/21. On vanc/doxy. Finishes antibiotics 11/10  - ID following, LUCIANO negative for vegetation      Pain:   -received high doses pain medication/dilaudid during hospitalization  -gradually decrease pain medication to avoid withdrawal. Currently on scheduled oxycodone. Has history of opoid addiction     Bipolar Disorder: Mother raised concerns about subOptimal psych care in past.   -Psych following, transition to inpatient psych once medically stable     Pneumonia- likely aspiration- 2nd to above, s/p treatment      Cardiomyopathy: Resolved.    -TTE with EF 15%- likely 2nd to drug use- Cardio consulted  -LUCIANO 11/6 with normal function   -continue metoprolol, isosorbide dinitrate, hydralazine  -outpatient follow up      HTN: continue metoprolol, isosorbide dinitrate, hydralazine     Acute hypoxic respiratory failure: resolved, becomes short of breath with ambulation      Acute LUE DVT: Continue Eliquis at least 3 months     Acute Anemia: stable, transfuse if HB<7.     Acute metabolic Encephalopathy: resolved, remain off seroquel     Rhabdomyolysis: resolved with IVFs     PENDING TEST RESULTS:   At the time of discharge the following test results are still pending: none    FOLLOW UP APPOINTMENTS:    Follow-up Information     Follow up With Specialties Details Why Contact Info    Verona Syed MD [place] [address]  [phone]      Earnestine Ganser, MD Nephrology  Please scheduled follow up appointment  [facility]  [phone]      Tamara Fermin PA-C Physician Assistant  Please schedule follow up appointment with cardiology [address] [facility] [place]               DISCHARGE MEDICATIONS:  Current Discharge Medication List            NOTIFY YOUR PHYSICIAN FOR ANY OF THE FOLLOWING:   Fever over 101 degrees for 24 hours. Chest pain, shortness of breath, fever, chills, nausea, vomiting, diarrhea, change in mentation, falling, weakness, bleeding. Severe pain or pain not relieved by medications. Or, any other signs or symptoms that you may have questions about. DISPOSITION:    Home With:   OT  PT  HH  RN       Long term SNF/Inpatient Rehab    Independent/assisted living    Hospice   x Other: inpatient psych        PATIENT CONDITION AT DISCHARGE:     Functional status    Poor    x Deconditioned     Independent      Cognition   x  Lucid     Forgetful     Dementia      Catheters/lines (plus indication)    Iniguez     PICC     PEG    x None      Code status    x Full code     DNR      PHYSICAL EXAMINATION AT DISCHARGE:  General:  Alert, resting in bed, No acute distress  Resp:  No accessory muscle use, no wheezes, no rhonci   Abd:  Soft, non-tender, non-distended  Extremities:  No cyanosis or clubbing, Left upper extremity swelling, 1+ pitting edema bilaterally   Neuro:  no focal neuro deficits, follows commands   Psych:  not agitated.  Flat affect       CHRONIC MEDICAL DIAGNOSES:  Problem List as of 11/11/2020 Never Reviewed          Codes Class Noted - Resolved    LV dysfunction ICD-10-CM: I51.9  ICD-9-CM: 429.9  11/4/2020 - Present        Current episode of major depressive disorder without prior episode ICD-10-CM: F32.9  ICD-9-CM: 296.20  11/4/2020 - Present        Non-traumatic rhabdomyolysis ICD-10-CM: M62.82  ICD-9-CM: 728.88  11/4/2020 - Present        Toxic encephalopathy ICD-10-CM: G92  ICD-9-CM: 349.82  10/24/2020 - Present        Drug abuse ([facility] [address] ICD-10-CM: F19.10  ICD-9-CM: 305.90  10/22/2020 - Present        Acute renal failure with tubular necrosis ([facility] [address] ICD-10-CM: N17.0  ICD-9-CM: 584.5 10/22/2020 - Present        * (Principal) Sepsis ([facility] [address] ICD-10-CM: A41.9  ICD-9-CM: 038.9, 995.91  10/22/2020 - Present        Community acquired pneumonia of left lower lobe of lung ICD-10-CM: J18.9  ICD-9-CM: 877  10/22/2020 - Present        [address] acidosis [address]: E87.2  ICD-9-CM: 276.2  10/22/2020 - Present        Lymphocytosis ICD-10-CM: D72.820  ICD-9-CM: 288.61  10/22/2020 - Present        Electrolyte abnormality ICD-10-CM: E87.8  ICD-9-CM: 276.9  10/22/2020 - Present        Troponin level elevated ICD-10-CM: R77.8  ICD-9-CM: 790.6  10/22/2020 - Present              Greater than 30 minutes were spent with the patient on counseling and coordination of care    Signed:   [address] [facility],   11/11/2020  9:25 AM

## 2020-11-11 NOTE — BH NOTES
TRANSFER - IN REPORT:    Verbal report received from [place](name) on Sammy Guillaume  being received from [place](unit) for routine progression of care      Report consisted of patients Situation, Background, Assessment and   Recommendations(SBAR). Information from the following report(s) SBAR was reviewed with the receiving nurse. Opportunity for questions and clarification was provided. Assessment completed upon patients arrival to unit and care assumed.

## 2020-11-11 NOTE — BH NOTES
At 1620:  Patient arrived via W/C from [facility] 5th floor under the treatment of Dr. Franklin Cummins. Admission status voluntary. Patient UDS+ for Amph, Benzos, Amira, ectasy, THC, and O/D on fentanyl. PTA medication(s) verified by: patient not taking medications prior to admission. Patient chief complaint: recent O/D on fentanyl. Patient has non-pitting edema over all extremities except R arm. Patient oriented to unit and room, signed consent to treat form, belongings searched and stored, and patient placed in gown. Safety: Q 15 min safety checks and fall precautions    Skin and Pressure Documentation  Primary Nurse, Nakul Ny RN and Nora Farfan RN performed a dual skin assessment on this patient SEE AVATAR  Abdirashid score is 18     Pressure Injury Documentation  (COMPLETE ONE LABEL PER PRESSURE INJURY)  For further information, please review corresponding Wound Care flowsheet. Hola Espitia has:  see AVATAR  Pressure injury risk and/or treatment plan of care. Location Number:  Approximately 10 wounds. Wound consult ordered.      1) R heel   2) L heel  3) L Knee posterior/medial  4) LL arm posterior  5) Upper left neck  6) R calf  7) L calf  8) L gluteal fold/sacral   9) Upper right neck  10) RLE (tibial)      Bertrum Lips

## 2020-11-11 NOTE — PROGRESS NOTES
Progress Note    Patient: Radha Diaz MRN: [id number]  SSN: [id number]    YOB: 1995  Age: 26 y.o. Sex: male      Admit Date: 10/21/2020    LOS: 20 days    Cardiologist: Delta Bond MD    Subjective:     Mr. Nasima Carmichael is a 25 yo  male admitted 10/21/2020 to [facility] in [address] with altered mental status for >24 hours, lethargy, found down by family. He had substance overdose with rhabdomyelysis, elevated troponin, severe LV systolic dysfunction, shock liver, acute renal failure, LUE DVT and sepsis. No prior reports of chest pain or shortness of breath. He was transferred to [facility] for further care. Awake this morning. No complaints this am.  Discharging to [facility] today. Objective:     Vitals:    11/10/20 2130 20 0446 20 0716 20 0840   BP: (!) 144/92 (!) 141/85  (!) 149/85   Pulse: (!) 103 98  76   Resp: 16 16  16   Temp: 98.2 °F (36.8 °C) 98.3 °F (36.8 °C)  97.4 °F (36.3 °C)   TempSrc:       SpO2: 97% 96%  91%   Weight:   228 lb 2.8 oz (103.5 kg)    Height:          Temp (24hrs), Av °F (36.7 °C), Min:97.4 °F (36.3 °C), Max:98.3 °F (36.8 °C)      Intake and Output:  Current Shift: No intake/output data recorded. Last three shifts:  1901 -  0700  In: 320 [P.O.:320]  Out: 7925 [Urine:7025]    Physical Exam:  General:  Resting comfortably, well nourished, well developed, appears stated age   Eyes:  Conjunctivae/corneas clear    Nose: Nares normal. Septum midline. Mucosa normal. No drainage or sinus tenderness. Neck: Supple, symmetrical, trachea midline, no JVD   Lungs:   Clear to auscultation bilaterally, good effort   Heart:  normal rate and rhythm, no murmur, click, rub, or gallop   Abdomen:   Soft, non-tender, bowel sounds normal, non-distended   Extremities: Normal, atraumatic, no cyanosis or edema   Skin: Skin color, texture, turgor normal. No rash or lesions.    Neurologic: Non focal       Current Facility-Administered Medications:     acetaminophen (TYLENOL) tablet 650 mg, 650 mg, Oral, Q6H PRN, Nba CORRAL DO, 650 mg at 11/11/20 0910    oxyCODONE IR (ROXICODONE) tablet 5 mg, 5 mg, Oral, Q6H, Lulu Dunham DO, 5 mg at 11/11/20 1041    metoprolol succinate (TOPROL-XL) XL tablet 150 mg, 150 mg, Oral, DAILY, Jeremiah Aquino PA-C, 150 mg at 11/11/20 6314    apixaban (ELIQUIS) tablet 10 mg, 10 mg, Oral, BID, 10 mg at 11/11/20 0907 **FOLLOWED BY** [START ON 11/13/2020] apixaban (ELIQUIS) tablet 5 mg, 5 mg, Oral, BID, Lulu Dunham DO    influenza vaccine 2020-21 (6 mos+)(PF) (FLUARIX/FLULAVAL/FLUZONE QUAD) injection 0.5 mL, 0.5 mL, IntraMUSCular, PRIOR TO DISCHARGE, Lulu Dunham DO    isosorbide dinitrate (ISORDIL) tablet 20 mg, 20 mg, Oral, TID, Jane Adair MD, 20 mg at 11/11/20 0907    albuterol-ipratropium (DUO-NEB) 2.5 MG-0.5 MG/3 ML, 3 mL, Nebulization, Q6H PRN, Nba CORRAL DO    hydrALAZINE (APRESOLINE) tablet 100 mg, 100 mg, Oral, TID, Javier Gibson MD, 100 mg at 11/11/20 0906    melatonin tablet 3 mg, 3 mg, Oral, QHS, Luis Miguel Crespo, DO, 3 mg at 11/10/20 2017    alteplase (CATHFLO) 1 mg in sterile water (preservative free) 1 mL injection, 1 mg, InterCATHeter, PRN, YAKELIN Guerra MD, 1 mg at 11/06/20 0959    balsam peru-castor oiL (VENELEX) ointment, , Topical, Q8H, Chica Guerra MD    sodium chloride (NS) flush 5-40 mL, 5-40 mL, IntraVENous, Q8H, Zac Rosales NP, Stopped at 11/09/20 1400    sodium chloride (NS) flush 5-40 mL, 5-40 mL, IntraVENous, PRN, Darrick Alvarado NP, 10 mL at 10/31/20 2302    polyethylene glycol (MIRALAX) packet 17 g, 17 g, Oral, DAILY PRN, Darrick Alvarado NP    docusate sodium (COLACE) capsule 100 mg, 100 mg, Oral, DAILY, Zac Rosales NP, 100 mg at 11/10/20 0918    labetaloL (NORMODYNE;TRANDATE) injection 20 mg, 20 mg, IntraVENous, Q4H PRN, Darrick Alvarado NP, 20 mg at 11/03/20 0722    heparin (porcine) 1,000 unit/mL injection 1,400 Units, 1,400 Units, InterCATHeter, DIALYSIS PRN, 1,400 Units at 11/06/20 1126 **AND** heparin (porcine) 1,000 unit/mL injection 1,100 Units, 1,100 Units, InterCATHeter, DIALYSIS PRN, Tino RAMON MD, 1,100 Units at 11/06/20 1125    Lab/Data Review:  Labs Latest Ref Rng & Units 11/11/2020 11/10/2020 11/8/2020 11/7/2020 11/6/2020 11/5/2020 11/4/2020   WBC 4.1 - 11.1 K/uL - - - 9.8 10.0 10.7 12. 4(H)   RBC 4.10 - 5.70 M/uL - - - 2.83(L) 2.80(L) 2.71(L) 2.62(L)   Hemoglobin 12.1 - 17.0 g/dL - - - 7. 8(L) 7. 8(L) 7. 5(L) 7. 2(L)   Hematocrit 36.6 - 50.3 % - - - 24. 4(L) 24. 4(L) 23. 7(L) 22. 8(L)   MCV 80.0 - 99.0 FL - - - 86.2 87.1 87.5 87.0   Platelets 485 - 085 K/uL - - - 241 301 279 266   Lymphocytes 12 - 49 % - - - 15 14 11(L) 11(L)   Monocytes 5 - 13 % - - - 13 8 9 10   Eosinophils 0 - 7 % - - - 2 1 2 2   Basophils 0 - 1 % - - - 1 1 1 0   Bands 0 - 6 % - - - - - - -   Albumin 3.5 - 5.0 g/dL - - - 2. 2(L) 2. 2(L) 2. 3(L) 2. 1(L)   Calcium 8.5 - 10.1 MG/DL 8.5 8.3(L) 8.5 8.6 8.8 9.0 8.8   Glucose 65 - 100 mg/dL 93 87 95 100 98 101(H) 97   BUN 6 - 20 MG/DL 35(H) 36(H) 29(H) 23(H) 30(H) 23(H) 32(H)   Creatinine 0.70 - 1.30 MG/DL 2.31(H) 2.43(H) 2.60(H) 2.30(H) 2.94(H) 2.59(H) 3.32(H)   Sodium 136 - 145 mmol/L 138 136 136 138 137 136 136   Potassium 3.5 - 5.1 mmol/L 3.6 3.7 3.7 3.5 3.9 3.7 3.9   Some recent data might be hidden     Lab Results   Component Value Date/Time    Sodium 138 11/11/2020 04:48 AM    Potassium 3.6 11/11/2020 04:48 AM    Chloride 105 11/11/2020 04:48 AM    CO2 25 11/11/2020 04:48 AM    Anion gap 8 11/11/2020 04:48 AM    Glucose 93 11/11/2020 04:48 AM    BUN 35 (H) 11/11/2020 04:48 AM    Creatinine 2.31 (H) 11/11/2020 04:48 AM    BUN/Creatinine ratio 15 11/11/2020 04:48 AM    GFR est AA 42 (L) 11/11/2020 04:48 AM    GFR est non-AA 35 (L) 11/11/2020 04:48 AM    Calcium 8.5 11/11/2020 04:48 AM    Bilirubin, total 0.4 11/07/2020 01:03 AM    Alk.  phosphatase 54 11/07/2020 01:03 AM Protein, total 5.0 (L) 11/07/2020 01:03 AM    Albumin 2.2 (L) 11/07/2020 01:03 AM    Globulin 2.8 11/07/2020 01:03 AM    A-G Ratio 0.8 (L) 11/07/2020 01:03 AM    ALT (SGPT) 48 11/07/2020 01:03 AM    AST (SGOT) 20 11/07/2020 01:03 AM           10/21/20   ECHO ADULT FOLLOW-UP OR LIMITED 10/29/2020 10/29/2020    Narrative · LV: Estimated LVEF is 15 - 20%. Mildly dilated left ventricle. Severely   global hypokinesis; normal function at apex. · MV: Moderate mitral valve regurgitation is present. · TV: Moderate tricuspid valve regurgitation is present. · PA: Mild to moderate pulmonary hypertension. Pulmonary arterial systolic   pressure is 45 mmHg. · RV: Mildly dilated right ventricle. Borderline low systolic function. Signed by: Daisha Shipley MD          Assessment:     Principal Problem:    Sepsis [facility]) (10/22/2020)    Active Problems:    Drug abuse ([facility] [address] (10/22/2020)      Acute renal failure with tubular necrosis ([facility] (10/22/2020)      Community acquired pneumonia of left lower lobe of lung (10/22/2020)      Electrolyte abnormality (10/22/2020)      Toxic encephalopathy (10/24/2020)      LV dysfunction (11/4/2020)      Current episode of major depressive disorder without prior episode (11/4/2020)      Non-traumatic rhabdomyolysis (11/4/2020)        Plan:     Mr. Alphonse Alvares is a 25 yo WM with polysubstance abuse found unresponsive with multi-organ failure. Severe LV dysfunction likely due to substance abuse, sepsis, PNA. No chest pain or shortness of breath to suggest an acute coronary syndrome. 1. Troponin elevation              - 55.89 --> 55.30 --> 57.9              - Related to Rhabdo, most likely. CK 69,000+, CKMB 132    2. Acute systolic heart failure   - Likely non-ischemic cardiomyopathy.   - NYHA class I  10/21/20   ECHO LUCIANO W OR WO CONTRAST 11/07/2020 11/7/2020    Narrative · LV: Estimated LVEF is 25 - 30%. Visually measured ejection fraction. Normal wall thickness. Dilated left ventricle. Moderate-to-severely   reduced systolic function. Left ventricular diastolic dysfunction. · RV: Mildly reduced systolic function. · MV: Mild mitral valve regurgitation is present. · LA: Mildly dilated left atrium. · RA: Mildly dilated right atrium. · IAS: Agitated saline contrast study was performed. There was no shunting   at baseline. No vegetations seen. Signed by: Yuri Ramirez MD    - Cath deferred. Anterior wall motion improving.   - continue GDMT for heart failure/LV dysfunction - Toprol XL today, moving forward   - Start ACE-I/ARB when renal function allows   - D/w Dr. Lyle Palmer today. Continue on current medications and OK to discharge to IP Psych today. 3. Altered Mental Status              - Metabolic encephalopathy              - UDS + for cocaine, THC, Amphetamines, Ectasy, benzos              - Head CT with indication for toxic edema in basal ganglia    4. Septic shock- staph hominis              - L sided PNA              - IV Abx              - IVF   - No vegetations on LUCIANO   - ID signed off. Completed ABx course  5. Pneumonia              - Left sided              - IV Abx   - Mycoplasma IgM positive  6. Transaminitis              - significantly elevated LFTs - Acute liver injury              - INR elevation to 1.5, now 1.3   - Slow improvement  7. Acute renal failure due to rhabdomeylitis              - Nephrology following              - HD M-W-F   - Avoiding ACE-I/ARB at this time    8. Poly substance abuse              - counseling   - plan for IP Psych.     9. Left upper extremity DVT   - Heparin drip   - transition to DOAC when able      Signed By: VERNA Alanis MD     November 11, 2020      Interventional Cardiology  [facility] [address] [facility], [address] [place] [address] [address]  [address]  P: [phone]  F: [phone]

## 2020-11-11 NOTE — PROGRESS NOTES
Order for delmar removal verified. Explained procedure to patient,verbalized understanding. Catheter removed without difficulty, tip intact. no bleeding noted,pressure apllied for 5 minutes. Dressing applied . Instruction for bedrest x 1 hour given. Tolerated procedure well.     Raul Banks RN/Vascular Access Team

## 2020-11-11 NOTE — PROGRESS NOTES
Physical Therapy    Attempted to see patient for follow up PT visit, but he is currently being assessed by wound healing. Discussed with patient and he reports that he has been getting up to walk to and from the bathroom without the RW with standby assist from sitter. He denies any SOB with walking short distances. Note that he will be transferred to inpatient psych later today. Expect that he will continue to improve his mobility, especially with more freedom and encouragement to mobilize. If he has difficulty or is not progressing, we can certainly follow up while he is in [facility].      Mortimer Fraise, PT

## 2020-11-11 NOTE — PROGRESS NOTES
Re-Assessment Nutrition Assessment    Reason for Visit: Reassess    Nutrition Recommendations/Plan:    1. Continue current diet with ONS BID: Ensure Enlive (carlos)  2. RD discontinue Magic Cup (dislikes)  3. Obtain standing wt    Nutrition Assessment:     Admit sepsis, ARF, Drug abuse, acute hypoxic respiratory failure, initially admitted to ICU with unresponsiveness. Improved on 3 L O2. Severe rhabdomyolysis 2° substance abuse. PMH Bipolar,anxiety and depression. Had severe edema of all muscle compartments. JEAN PIERRE d/t ATN from rhabdomyolysis. S/P emergent HD on 10/22; Last HD 11/6 (3 Liters removed); Adebayo out 11/10/20. Has been voiding. Acute metabolic encephalopathy/with toxicity edema in the basal ganglia. A&OX4 since 10/24; Acute cardiomyopathy/myocarditis due to toxic/metabolic causes; Left Lung PNA w/ sepsis; LUE DVT. LUCIANO negative. Plan discharge to [place] [address]. Pt reports appetite as \"fair, about 50% meals\". Dislikes Magic Cup and RD to discontinue today. Prefers chocolate Ensure Enlive and claims \"drinking some\". Ensure Enlive started BID B+D on 10/29, but unsure amount actually consumed. PO I&O not consistently documented. Claims \"#\"; ? ast time in this range. No prior encounter wts to trend. Wt today 103.5 kg (~228#). Still with +2-3 edema, listed below. Admit wt 90.2 kg (10/22/20) ~198# on bedscale  Plan to obtain standing wt when admit 7W. Malnutrition Assessment:  Malnutrition Status:   At risk for malnutrition; fair appetite after acute illness, hx substance abuse     Context:  Acute illness       Nutritionally Significant Medications:   Colace, Vibramycin    Estimated Daily Nutrient Needs:  Energy (kcal):  1860-1558(MSJ x 1.3-1.5)  Protein (g):  135(1.5 gm/kg)       Fluid (ml/day):  ~1 mL/kcal (2000 mL FR discontinued 10/29/20)    Nutrition Related Findings:    Edema: 11/11/20  Genital: 3+  B-LE: 3+   LUE: 3+   RUE: 2+     Last BM: 11/11 - loose (Refused Colace today)    Wounds:    Multiple abrasions        Current Nutrition Therapies:   DIET REGULAR  Ensure Enlive (carlos) B+D  Magic cup discontinued 11/11/20    Meal intake:   Patient Vitals for the past 168 hrs:   % Diet Eaten   11/01/20 1657 100 %   10/29/20 1433 100 %   10/29/20 0924 80 %   10/28/20 2200 80 %   10/28/20 0800 0 %     Anthropometric Measures:  · Height:  5' 10\" (177.8 cm)  · Current Body Wt:  103.5 kg (228# 2.8 Oz)  · Admission Body Wt:  198 lb 13.7 oz(90.2 kg - bed)    · Usual Body Wt:  180#  · Ideal Body Wt: 166#:  137%   · BMI Categories:  Overweight (BMI 25.0-29.9) admit wt       Wt Readings from Last 20 Encounters:   11/04/20 107.1 kg (236 lb 3.2 oz)  EDEMA   10/26/20 118.9 kg (262 lb 1.6 oz)  EDEMA   10/22/20 88.5 kg (195 lb)   10/21/20 83.9 kg (185 lb)   09/15/20 84.9 kg (187 lb 1.6 oz)       Nutrition Diagnosis:   · Inadequate oral intake related to altered GI function as evidenced by patient reports fair intake, need for ONS. · Increased nutrient needs related to acute illness as evidenced by acute admission sepsis, acute HD (resolved) and hx substance abuse. Nutrition Interventions:   Food and/or Nutrient Delivery: Continue current diet, Modify oral nutrition supplement  Nutrition Education and Counseling: No recommendations at this time  Coordination of Nutrition Care: Continue to monitor while inpatient    Goals:  continued PO >/=75% of most meals with 1-2 ONS daily over next 7 days       Nutrition Monitoring and Evaluation:   Behavioral-Environmental Outcomes: Beliefs and attitudes  Food/Nutrient Intake Outcomes: Food and nutrient intake, Supplement intake  Physical Signs/Symptoms Outcomes: Biochemical data, GI status, Skin, Meal time behavior, Fluid status or edema, Weight    Discharge Planning:     Too soon to determine       Recent Labs     11/11/20  0448 11/10/20  0650   GLU 93 87   BUN 35* 36*   CREA 2.31* 2.43*    136   K 3.6 3.7    104   CO2 25 26   CA 8.5 8.3* Lalo Billy, CRISTOBAL  PerfectSerjuliane

## 2020-11-11 NOTE — PROGRESS NOTES
TRANSFER - OUT REPORT:    Verbal report given to Angelina(name) on Christopher Velazquez  being transferred to [facility]  psyche(unit) for routine progression of care       Report consisted of patients Situation, Background, Assessment and   Recommendations(SBAR). Information from the following report(s) SBAR, Kardex, Intake/Output and MAR was reviewed with the receiving nurse. Lines:   Quad Lumen 10/22/20 (Active)   Central Line Being Utilized Yes 11/10/20 1954   Criteria for Appropriate Use Limited/no vessel suitable for conventional peripheral access 11/10/20 1954   Site Assessment Clean, dry, & intact 11/11/20 0830   Infiltration Assessment 0 11/11/20 0830   Affected Extremity/Extremities Color distal to insertion site pink (or appropriate for race) 11/10/20 0905   Date of Last Dressing Change 11/10/20 11/10/20 1954   Dressing Status Clean, dry, & intact 11/11/20 0830   Dressing Type Transparent 11/11/20 0830   Action Taken Open ports on tubing capped 11/10/20 1954   Proximal Hub Color/Line Status White;Capped;Flushed 11/10/20 1954   Positive Blood Return (Medial Site) Yes 11/10/20 1954   Medial 1 Hub Color/Line Status Gray;Capped;Flushed 11/10/20 1954   Positive Blood Return (Lateral Site) Yes 11/10/20 1954   Medial 2 Hub Color/Line Status Blue;Capped;Flushed 11/10/20 1954   Positive Blood Return (Site #3) Yes 11/10/20 1954   Distal Hub Color/Line Status Brown;Capped;Flushed 11/10/20 1954   Positive Blood Return (Site #4) Yes 11/10/20 1954   Alcohol Cap Used Yes 11/11/20 0830        Opportunity for questions and clarification was provided.

## 2020-11-11 NOTE — PROGRESS NOTES
[facility]   [address] [address], [address], [facility]  Phone: [phone]   JOSE [phone]       Nephrology Progress Note  Naya Shine     1995     [id number]  Date of Admission : 10/21/2020  11/11/20    CC: Follow up for ARF, Rhabdomyolysis        Assessment and Plan   JEAN PIERRE  - Severe ATN from Rhabdomyolysis   - resolving   - Adebayo removed 11/10  - daily labs     Severe Rhabdomyolysis     Left Lung PNA w/ sepsis: resolved     LUE DVT:  - on eliquis    CMP w/ EF 15-20%:  - likely 2/2 cocaine  - per cardiology    Staph bacteremia:  - LUCIANO neg  - abx per ID    Encephalopathy:  - improving slowly    Polysubstance abuse      Interval History:  Seen and examined. 5L UOP. Cr trending down. Denies any edema. Iniguez removed yesterday and has been voiding well. Binnie Bread at bedside. He is feeling better. No cp, sob, n/v/d reported. Review of Systems: A comprehensive review of systems was negative.     Current Medications:   Current Facility-Administered Medications   Medication Dose Route Frequency    acetaminophen (TYLENOL) tablet 650 mg  650 mg Oral Q6H PRN    oxyCODONE IR (ROXICODONE) tablet 5 mg  5 mg Oral Q6H    metoprolol succinate (TOPROL-XL) XL tablet 150 mg  150 mg Oral DAILY    apixaban (ELIQUIS) tablet 10 mg  10 mg Oral BID    Followed by   [facility] ON 11/13/2020] apixaban (ELIQUIS) tablet 5 mg  5 mg Oral BID    influenza vaccine 2020-21 (6 mos+)(PF) (FLUARIX/FLULAVAL/FLUZONE QUAD) injection 0.5 mL  0.5 mL IntraMUSCular PRIOR TO DISCHARGE    isosorbide dinitrate (ISORDIL) tablet 20 mg  20 mg Oral TID    albuterol-ipratropium (DUO-NEB) 2.5 MG-0.5 MG/3 ML  3 mL Nebulization Q6H PRN    hydrALAZINE (APRESOLINE) tablet 100 mg  100 mg Oral TID    melatonin tablet 3 mg  3 mg Oral QHS    alteplase (CATHFLO) 1 mg in sterile water (preservative free) 1 mL injection  1 mg InterCATHeter PRN    balsam peru-castor oiL (VENELEX) ointment   Topical Q8H    sodium chloride (NS) flush 5-40 mL  5-40 mL IntraVENous Q8H    sodium chloride (NS) flush 5-40 mL  5-40 mL IntraVENous PRN    polyethylene glycol (MIRALAX) packet 17 g  17 g Oral DAILY PRN    docusate sodium (COLACE) capsule 100 mg  100 mg Oral DAILY    labetaloL (NORMODYNE;TRANDATE) injection 20 mg  20 mg IntraVENous Q4H PRN    heparin (porcine) 1,000 unit/mL injection 1,400 Units  1,400 Units InterCATHeter DIALYSIS PRN    And    heparin (porcine) 1,000 unit/mL injection 1,100 Units  1,100 Units InterCATHeter DIALYSIS PRN      Allergies   Allergen Reactions    Tramadol Nausea and Vomiting       Objective:  Vitals:    Vitals:    11/10/20 2130 11/11/20 0446 11/11/20 0716 11/11/20 0840   BP: (!) 144/92 (!) 141/85  (!) 149/85   Pulse: (!) 103 98  76   Resp: 16 16  16   Temp: 98.2 °F (36.8 °C) 98.3 °F (36.8 °C)  97.4 °F (36.3 °C)   TempSrc:       SpO2: 97% 96%  91%   Weight:   103.5 kg (228 lb 2.8 oz)    Height:         Intake and Output:  No intake/output data recorded. 11/09 1901 - 11/11 0700  In: 320 [P.O.:320]  Out: 7925 [Urine:7025]    Physical Examination:    General: Confused, resting on NC  Neck:  Supple, no mass  Resp:  Decreased breath sounds  CV:  RRR,  no murmur or rub, no LE edema  GI:  Soft, NT, + Bowel sounds, no hepatosplenomegaly  Neurologic:  Lethargic   Psych:             Unable to assess  :  Iniguez     []    High complexity decision making was performed  []    Patient is at high-risk of decompensation with multiple organ involvement    Lab Data Personally Reviewed: I have reviewed all the pertinent labs, microbiology data and radiology studies during assessment. Recent Labs     11/11/20  0448 11/10/20  0650    136   K 3.6 3.7    104   CO2 25 26   GLU 93 87   BUN 35* 36*   CREA 2.31* 2.43*   CA 8.5 8.3*     No results for input(s): WBC, HGB, HCT, PLT, HGBEXT, HCTEXT, PLTEXT, HGBEXT, HCTEXT, PLTEXT in the last 72 hours.   No results found for: SDES  Lab Results   Component Value Date/Time    Culture result: NO GROWTH 5 DAYS 10/22/2020 06:19 AM    Culture result: NO GROWTH 5 DAYS 10/22/2020 02:37 AM    Culture result: (A) 10/21/2020 08:15 PM     Staphylococcus hominis (Oxacillin resistant) GROWING IN THE AEROBIC BOTTLE (SITE = R HAND)    Culture result:  10/21/2020 08:15 PM     Gram Positive Cocci CALLED TO AND READ BACK BY Francisco Roach RN AT 2012 BY D    Culture result: (A) 10/21/2020 08:10 PM     Staphylococcus hominis (Oxacillin resistant) GROWING IN THE AEROBIC BOTTLE (SITE = L HAND)    Culture result:  10/21/2020 08:10 PM     preliminary report of Gram Positive Cocci in clusters growing in 1 of 2 bottles drawn [address] MARISOL RN SCAV AT  [facility] ON 10/23/20. Bettye 1064     Recent Results (from the past 24 hour(s))   METABOLIC PANEL, BASIC    Collection Time: 11/11/20  4:48 AM   Result Value Ref Range    Sodium 138 136 - 145 mmol/L    Potassium 3.6 3.5 - 5.1 mmol/L    Chloride 105 97 - 108 mmol/L    CO2 25 21 - 32 mmol/L    Anion gap 8 5 - 15 mmol/L    Glucose 93 65 - 100 mg/dL    BUN 35 (H) 6 - 20 MG/DL    Creatinine 2.31 (H) 0.70 - 1.30 MG/DL    BUN/Creatinine ratio 15 12 - 20      GFR est AA 42 (L) >60 ml/min/1.73m2    GFR est non-AA 35 (L) >60 ml/min/1.73m2    Calcium 8.5 8.5 - 10.1 MG/DL           Total time spent with patient:  xxx   min. Care Plan discussed with:  Patient     Family      RN      Consulting Physician [address] [facility],         I have reviewed the flowsheets. Chart and Pertinent Notes have been reviewed. No change in PMH ,family and social history from Consult note.       Joann Ball MD

## 2020-11-11 NOTE — PROGRESS NOTES
Occupational Therapy    Discussed patient with nurse who reports patient is preparing to transfer to IP psych. Please re-consult if continued impairments with ADL tasks or LUE while in IP psych.       Thank you,    Stephania Case, OT

## 2020-11-12 PROCEDURE — 74011250637 HC RX REV CODE- 250/637: Performed by: PSYCHIATRY & NEUROLOGY

## 2020-11-12 PROCEDURE — 65220000003 HC RM SEMIPRIVATE PSYCH

## 2020-11-12 PROCEDURE — 74011250637 HC RX REV CODE- 250/637: Performed by: NURSE PRACTITIONER

## 2020-11-12 PROCEDURE — 74011250637 HC RX REV CODE- 250/637: Performed by: INTERNAL MEDICINE

## 2020-11-12 RX ORDER — FLUOXETINE HYDROCHLORIDE 20 MG/1
20 CAPSULE ORAL DAILY
Status: DISCONTINUED | OUTPATIENT
Start: 2020-11-12 | End: 2020-11-16 | Stop reason: HOSPADM

## 2020-11-12 RX ORDER — OXYCODONE HYDROCHLORIDE 5 MG/1
5 TABLET ORAL
Status: DISCONTINUED | OUTPATIENT
Start: 2020-11-12 | End: 2020-11-13

## 2020-11-12 RX ORDER — ONDANSETRON 4 MG/1
4 TABLET, ORALLY DISINTEGRATING ORAL
Status: DISCONTINUED | OUTPATIENT
Start: 2020-11-12 | End: 2020-11-13

## 2020-11-12 RX ADMIN — HYDRALAZINE HYDROCHLORIDE 100 MG: 25 TABLET, FILM COATED ORAL at 16:49

## 2020-11-12 RX ADMIN — APIXABAN 10 MG: 5 TABLET, FILM COATED ORAL at 16:48

## 2020-11-12 RX ADMIN — METOPROLOL SUCCINATE 150 MG: 50 TABLET, EXTENDED RELEASE ORAL at 08:25

## 2020-11-12 RX ADMIN — ISOSORBIDE DINITRATE 20 MG: 20 TABLET ORAL at 16:49

## 2020-11-12 RX ADMIN — HYDRALAZINE HYDROCHLORIDE 100 MG: 25 TABLET, FILM COATED ORAL at 21:15

## 2020-11-12 RX ADMIN — ISOSORBIDE DINITRATE 20 MG: 20 TABLET ORAL at 08:25

## 2020-11-12 RX ADMIN — DOCUSATE SODIUM 100 MG: 100 CAPSULE, LIQUID FILLED ORAL at 08:25

## 2020-11-12 RX ADMIN — LURASIDONE HYDROCHLORIDE 40 MG: 40 TABLET, FILM COATED ORAL at 16:49

## 2020-11-12 RX ADMIN — OXYCODONE 5 MG: 5 TABLET ORAL at 13:31

## 2020-11-12 RX ADMIN — HYDRALAZINE HYDROCHLORIDE 100 MG: 25 TABLET, FILM COATED ORAL at 08:50

## 2020-11-12 RX ADMIN — ONDANSETRON 4 MG: 4 TABLET, ORALLY DISINTEGRATING ORAL at 20:58

## 2020-11-12 RX ADMIN — ISOSORBIDE DINITRATE 20 MG: 20 TABLET ORAL at 21:15

## 2020-11-12 RX ADMIN — OXYCODONE 5 MG: 5 TABLET ORAL at 21:39

## 2020-11-12 RX ADMIN — FLUOXETINE 20 MG: 20 CAPSULE ORAL at 13:31

## 2020-11-12 RX ADMIN — OXYCODONE 5 MG: 5 TABLET ORAL at 06:02

## 2020-11-12 RX ADMIN — APIXABAN 10 MG: 5 TABLET, FILM COATED ORAL at 08:25

## 2020-11-12 NOTE — PROGRESS NOTES
Re-Assessment Nutrition Assessment    Reason for Visit: ReassessFollow-up transfer in-patient [facility] acute care to [address]    Nutrition Recommendations/Plan:    1. Continue current diet with ONS BID: Ensure Enlive (carlos)  2. Obtain standing wt  3. Offer unit snacks    Nutrition Assessment:     S/P [facility] Admit sepsis, ARF, Drug abuse, acute hypoxic respiratory failure, initially admitted to ICU with unresponsiveness. Severe rhabdomyolysis 2° substance abuse. PMH Bipolar,anxiety and depression. Had severe edema of all muscle compartments. JEAN PIERRE d/t ATN from rhabdomyolysis. S/P emergent HD on 10/22; Last HD 11/6 (3 Liters removed); Adebayo out 11/10/20. Has been voiding. Acute metabolic encephalopathy/with toxicity edema in the basal ganglia. Acute cardiomyopathy/myocarditis due to toxic/metabolic causes; Left Lung PNA w/ sepsis; LUE DVT. LUCIANO negative. Pt discharge to [place] [address]. Pt reports appetite as \"fair, about 50% meals\". Dislikes Magic Cup and RD to discontinued while on acute. Prefers chocolate Ensure Enlive and claims \"drinking some\". Ensure Enlive started BID B+D on 10/29, but unsure amount actually consumed. PO I&O not consistently documented. Claims \"#\"; ? ast time in this range. No prior encounter wts to trend. Last wt 103.5 kg (~228#); [facility] acute bed-scale. Still with +2-3 edema, listed below. Admit wt 90.2 kg (10/22/20) ~198# on bed scale. Standing wt today 206#.  ? Accuracy of prior bed scale wts (?bed zero). Wt continues to include edema following acute illness. Malnutrition Assessment:  Malnutrition Status:   At risk for malnutrition; fair appetite after acute illness, hx substance abuse     Context:  Acute Illness    Nutritionally Significant Medications:   Colace    Estimated Daily Nutrient Needs:  Energy (kcal):  2500  Protein (g):  110(~1.2 gm/kg)       Fluid (ml/day):  ~1 mL/kcal (2000 mL FR discontinued 10/29/20)    Nutrition Related Findings:    Edema: 11/11/20  Genital: 3+  B-LE: 3+   LUE: 3+   RUE: 2+     Last BM: 11/11 - loose (Refused Colace today); No BM recorded since General acute hospital admit. Wounds:    Multiple abrasions        Current Nutrition Therapies:   DIET REGULAR  Ensure Enlive (carlos) B+D  Magic cup discontinued 11/11/20    Meal intake:   Patient Vitals for the past 168 hrs:   % Diet Eaten   11/01/20 1657 100 %   10/29/20 1433 100 %   10/29/20 0924 80 %   10/28/20 2200 80 %   10/28/20 0800 0 %     Anthropometric Measures:  · Height:  5' 10\" (177.8 cm)  · Current Body Wt:  103.5 kg (228# 2.8 Oz)  · Admission Body Wt:  206 lb  (Behavioral Health standing scale)  · Usual Body Wt:  180#  · Ideal Body Wt: 166#:  137%   · BMI Categories:  Overweight (BMI 25.0-29.9) admit wt       Wt Readings from Last 20 Encounters:   11/04/20 107.1 kg (236 lb 3.2 oz)  EDEMA   10/26/20 118.9 kg (262 lb 1.6 oz)  EDEMA   10/22/20 88.5 kg (195 lb)   10/21/20 83.9 kg (185 lb)   09/15/20 84.9 kg (187 lb 1.6 oz)       Nutrition Diagnosis:   · Inadequate oral intake related to cognitive or neurological impairment, acute injury/trauma as evidenced by patient reports fair intake, need for ONS, depression/bi-polar, substance abuse, s/p hospitalization: Sepsis, severe rhabdomyolysis with AKF/HD and cardiomyopathy. Nutrition Interventions:   Food and/or Nutrient Delivery: Continue current diet, Re-start oral nutrition supplement  Nutrition Education and Counseling:    Coordination of Nutrition Care: No recommendation at this time    Goals:  Consume 75% or more all meals and minimum 100% one ONS daily in next 5-7 days       Nutrition Monitoring and Evaluation:   Behavioral-Environmental Outcomes:    Food/Nutrient Intake Outcomes: Food and nutrient intake, Supplement intake  Physical Signs/Symptoms Outcomes: GI status, Fluid status or edema, Meal time behavior, Weight    Discharge Planning:     Too soon to determine       Recent Labs     11/11/20  0448 11/10/20  0650   GLU 93 87   BUN 35* 36* CREA 2.31* 2.43*    136   K 3.6 3.7    104   CO2 25 26   CA 8.5 8.3*     Karan Ray, CRISTOBAL Paezve

## 2020-11-12 NOTE — PROGRESS NOTES
Problem: Altered Thought Process (Adult/Pediatric)  Goal: *STG: Participates in treatment plan  Outcome: Progressing Towards Goal  Goal: *STG: Complies with medication therapy  Outcome: Progressing Towards Goal     Problem: Patient Education: Go to Patient Education Activity  Goal: Patient/Family Education  Outcome: Progressing Towards Goal    Patient participated in treatment team, patient denied any suicidal thought past or present. Patient calm and cooperative on unit, compliant with all scheduled medications. Patient has wound dressings on right lateral lower leg and left elbow. Left elbow, change dressing q7days  Right leg, dressing change every other day.      [facility] 60  Master Treatment Plan for Edmar Fitzpatrick    Date Treatment Plan Initiated: 11/12/2020    Treatment Plan Modalities:  Type of Modality Amount  (x minutes) Frequency (x/week) Duration (x days) Name of Responsible Staff   Community & wrap-up meetings to encourage peer interactions 15 7 1 SMITH Goldberg     Group psychotherapy to assist in building coping skills and internal controls 60 7 1 Teddy Cerda   Therapeutic activity groups to build coping skills 60 7 1 Teddy Cerda   Psychoeducation in group setting to address:   Medication education   Adelina Galindo 42. PharmD   Coping skills   30 3 1 Teddy Cerda   Relaxation techniques      SMITH Wheeler   Symptom management      SASHA Samuels   Discharge planning   60 2 255 [facility]    60 2 1 Chaplain KYLE   61 1 1 volunteer   Recovery/AA/NA      volunteer   Physician medication management   15 7 1    Family meeting/discharge planning   15 2 1400 [facility] and [facility]

## 2020-11-12 NOTE — PROGRESS NOTES
Laboratory Monitoring for Antipsychotics: This patient is currently prescribed the following medication(s):   Current Facility-Administered Medications   Medication Dose Route Frequency    FLUoxetine (PROzac) capsule 20 mg  20 mg Oral DAILY    lurasidone (LATUDA) tablet 40 mg  40 mg Oral DAILY WITH DINNER    apixaban (ELIQUIS) tablet 10 mg  10 mg Oral BID    Followed by    Jelena Roberts ON 11/13/2020] apixaban (ELIQUIS) tablet 5 mg  5 mg Oral BID    docusate sodium (COLACE) capsule 100 mg  100 mg Oral DAILY    hydrALAZINE (APRESOLINE) tablet 100 mg  100 mg Oral TID    isosorbide dinitrate (ISORDIL) tablet 20 mg  20 mg Oral TID    metoprolol succinate (TOPROL-XL) XL tablet 150 mg  150 mg Oral DAILY     The following labs have been completed for monitoring of antipsychotics and/or mood stabilizers:    Height, Weight, BMI Estimation  Estimated body mass index is 29.56 kg/m² as calculated from the following:    Height as of this encounter: 177.8 cm (70\"). Weight as of this encounter: 93.4 kg (206 lb). Vital Signs/Blood Pressure  Visit Vitals  BP (!) 150/92 (BP 1 Location: Left arm, BP Patient Position: Sitting)   Pulse 74   Temp 98.8 °F (37.1 °C)   Resp 18   Ht 177.8 cm (70\")   Wt 93.4 kg (206 lb)   SpO2 99%   BMI 29.56 kg/m²     Renal Function, Hepatic Function and Chemistry  Estimated Creatinine Clearance: 56.6 mL/min (A) (based on SCr of 2.31 mg/dL (H)).     Lab Results   Component Value Date/Time    Sodium 138 11/11/2020 04:48 AM    Potassium 3.6 11/11/2020 04:48 AM    Chloride 105 11/11/2020 04:48 AM    CO2 25 11/11/2020 04:48 AM    Anion gap 8 11/11/2020 04:48 AM    BUN 35 (H) 11/11/2020 04:48 AM    Creatinine 2.31 (H) 11/11/2020 04:48 AM    BUN/Creatinine ratio 15 11/11/2020 04:48 AM    Bilirubin, total 0.4 11/07/2020 01:03 AM    Protein, total 5.0 (L) 11/07/2020 01:03 AM    Albumin 2.2 (L) 11/07/2020 01:03 AM    Globulin 2.8 11/07/2020 01:03 AM    A-G Ratio 0.8 (L) 11/07/2020 01:03 AM    ALT (SGPT) 48 11/07/2020 01:03 AM    AST (SGOT) 20 11/07/2020 01:03 AM    Alk. phosphatase 54 11/07/2020 01:03 AM     Lab Results   Component Value Date/Time    Glucose 93 11/11/2020 04:48 AM    Glucose (POC) 129 (H) 10/23/2020 04:27 PM     No results found for: HBA1C, HGBE8, AFC0RQSL    Hematology  Lab Results   Component Value Date/Time    WBC 9.8 11/07/2020 01:03 AM    RBC 2.83 (L) 11/07/2020 01:03 AM    HGB 7.8 (L) 11/07/2020 01:03 AM    HCT 24.4 (L) 11/07/2020 01:03 AM    MCV 86.2 11/07/2020 01:03 AM    MCH 27.6 11/07/2020 01:03 AM    MCHC 32.0 11/07/2020 01:03 AM    RDW 16.9 (H) 11/07/2020 01:03 AM    PLATELET 487 32/85/2320 01:03 AM     Lipids  No results found for: CHOL, CHOLX, CHLST, CHOLV, 162802, HDL, HDLP, LDL, LDLC, DLDLP, TGLX, TRIGL, TRIGP, CHHD, CHHDX    Thyroid Function  No results found for: TSH, TSH2, TSH3, TSHP, TSHELE, TT3, T3U, T3UP, FRT3, FT3, FT4, FT4P, T4, T4P, FT4T, TT7, TSHEXT    Assessment/Plan:  Will order lipid panel and hemoglobin A1c or fasting glucose to complete the recommended baseline laboratory monitoring based on the patient's current medication regimen.         Esperanza Charles, DEEPAKD

## 2020-11-12 NOTE — BH NOTES
PSYCHOSOCIAL ASSESSMENT  :Patient identifying info:  Lilly Ambrocio is a 26 y.o., male admitted 11/11/2020  4:30 PM     Presenting problem and precipitating factors: Pt voluntarily admitted to [facility] for polysubstance abuse and overdose. Pt family found Pt unresponsive and hypoxic and brought him to ED 10/21/20. He was treated on medical unit for multiorgan failure including renal failure, elevated troponin, respiratory failure requiring intubation, shock liver. Per Dr Burton Orf he Pablo Ortiz need nephrology consultation while remains in inpatient psychiatric unit. He received opiates throughout his hospitalization and will need to be weaned. \"  Pt endorses accidentally OD related to pain management. Mental status assessment: alert, oriented, depress, sad, flat affect    Strengths: supportive family, desire for treatment,     Collateral information: Nic verdugo ([phone])    Current psychiatric /substance abuse providers and contact info: TBD    Previous psychiatric/substance abuse providers and response to treatment: yes;   Per Dr Dunn Close raised concerns about subOptimal psych care in past. \"    Family history of mental illness or substance abuse: none indicated    Substance abuse history:  UDS +ve for cocaine, THC, benzo, opioids, amphetamines    cocaine is \"drug of choice\" ($20-30 a day)  Social History     Tobacco Use    Smoking status: Current Every Day Smoker     Packs/day: 0.50    Smokeless tobacco: Former User   Substance Use Topics    Alcohol use: Not Currently       History of biomedical complications associated with substance abuse : organ failure    Patient's current acceptance of treatment or motivation for change: voluntary    Family constellation: mom, sisters    Is significant other involved? Recent break up.      Describe support system: family    Describe living arrangements and home environment: lives with mom, step dad and little brother    Health issues:   Hospital Problems  Never Reviewed          Codes Class Noted POA    Depression ICD-10-CM: F32.9  ICD-9-CM: 665  2020 Unknown              Trauma history: none indicated    Legal issues: none indicated    History of  service: no    Financial status: family    Jehovah's witness/cultural factors: none indicated    Education/work history: unempoyed but worked for [employer] in the past.     Have you been licensed as a health care professional (current or ): no    Leisure and recreation preferences: not assessed    Describe coping skills: limited, ineffective    Zoe Wright  2020

## 2020-11-12 NOTE — INTERDISCIPLINARY ROUNDS
Behavioral Health Interdisciplinary Rounds     Patient Name: Tushar Hair  Age: 26 y.o. Room/Bed:  730/  Primary Diagnosis: <principal problem not specified>   Admission Status: Voluntary     Readmission within 30 days: no  Power of  in place: no  Patient requires a blocked bed: no          Reason for blocked bed:     VTE Prophylaxis: No    Mobility needs/Fall risk: yes  Flu Vaccine : no   Nutritional Plan: no  Consults:          Labs/Testing due today?: no    Sleep hours:  6      Participation in Care/Groups: New Admit  Medication Compliant?: Yes  PRNS (last 24 hours): None    Restraints (last 24 hours):  no     CIWA (range last 24 hours):     COWS (range last 24 hours):      Alcohol screening (AUDIT) completed -         If applicable, date SBIRT discussed in treatment team AND documented:   AUDIT Screen Score: Tobacco - patient is a smoker: Have You Used Tobacco in the Past 30 Days: Yes  Illegal Drugs use: Have You Used Any Illegal Substances Over the Past 12 Months: Yes    24 hour chart check complete: yes     Patient goal(s) for today: meet treatment team, acclimate to unit  Treatment team focus/goals: assess needs for treatment and safe discharge  Progress note : Pt voluntarily admitted to [facility] for depression. He reported accidental OD related to self medicating for pain.      LOS:  1  Expected LOS: TBD    Financial concerns/prescription coverage:  [employer]  Family contact:  momRiver ([phone]) Verbal MORELIA Given  Family requesting physician contact today:  no  Discharge plan: return home with mom  Access to weapons : no    Outpatient provider(s): TBD  Patient's preferred phone number for follow up call : [phone]   Patient's preferred e-mail address :    Participating treatment team members: Dr. Lorne Bray; John Chen, RN; Isaak Bennett, MSW; Marisela Pope, Latonya

## 2020-11-12 NOTE — PROGRESS NOTES
Problem: Discharge Planning  Goal: *Discharge to safe environment  Outcome: Progressing Towards Goal  Note: Patient identifies home as a safe environment and plans to return upon discharge. Patient is connected to outpatient services. Patient has supportive family. Goal: *Knowledge of medication management  Outcome: Progressing Towards Goal  Note: Patient agrees to take medications as prescribed. Problem: Discharge Planning  Goal: *Knowledge of discharge instructions  Outcome: Not Progressing Towards Goal  Note: Patient does not verbalize understanding of goals for treatment and safe discharge at this time.

## 2020-11-12 NOTE — PROGRESS NOTES
Problem: Falls - Risk of  Goal: *Absence of Falls  Description: Document Vin Guerrero Fall Risk and appropriate interventions in the flowsheet. Outcome: Progressing Towards Goal  Note: Fall Risk Interventions:  Mobility Interventions: Communicate number of staff needed for ambulation/transfer         Medication Interventions: Teach patient to arise slowly    Elimination Interventions: Patient to call for help with toileting needs, Stay With Me (per policy), Toilet paper/wipes in reach    [phone]: Patient received awake and alert and present on the milieu. Minimal interaction with staff and peers. Mood and affect; dull, flat, withdrawn, however no behavior issues. Wound care came to see patient today to assess multiple wounds located on patient due to pressure ulcers. Some are bandaged, and others are left open to air to dry. Dressings that are in place are dry and intact. Denies SI/HI. Will continue to monitor q15 minutes for safety checks. 1700: Patient began to profusely vomit in room after dinner. Assisted patient in changing linens and cleaning up room. [phone]: Called [facility] and speak with Castillo Rivera NP to get an order for a consult from the triage hospitalist due to projectile vomiting in addition to elevated blood pressure. 1720: Called to [facility] Triage Hospitalist.     1815: Called to [facility] Triage Hospitalist second time, still have not received return call.

## 2020-11-12 NOTE — PROGRESS NOTES
Problem: Falls - Risk of  Goal: *Absence of Falls  Description: Document Migel Galo Fall Risk and appropriate interventions in the flowsheet.   Outcome: Progressing Towards Goal  Note: Fall Risk Interventions:  Mobility Interventions: Communicate number of staff needed for ambulation/transfer         Medication Interventions: Teach patient to arise slowly    Elimination Interventions: Patient to call for help with toileting needs, Stay With Me (per policy), Toilet paper/wipes in reach       Pt appears asleep in bed, NAD, even respirations, will continue to monitor q15min
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
## 2020-11-13 PROBLEM — I82.A12 ACUTE DEEP VEIN THROMBOSIS (DVT) OF AXILLARY VEIN OF LEFT UPPER EXTREMITY (HCC): Status: ACTIVE | Noted: 2020-11-13

## 2020-11-13 PROBLEM — I10 HTN (HYPERTENSION): Status: ACTIVE | Noted: 2020-11-13

## 2020-11-13 PROBLEM — D64.9 ANEMIA: Status: ACTIVE | Noted: 2020-11-13

## 2020-11-13 PROBLEM — I50.21 ACUTE SYSTOLIC HEART FAILURE (HCC): Status: ACTIVE | Noted: 2020-11-13

## 2020-11-13 LAB
ALBUMIN SERPL-MCNC: 2.8 G/DL (ref 3.5–5)
ALBUMIN/GLOB SERPL: 0.9 {RATIO} (ref 1.1–2.2)
ALP SERPL-CCNC: 68 U/L (ref 45–117)
ALT SERPL-CCNC: 30 U/L (ref 12–78)
ANION GAP SERPL CALC-SCNC: 8 MMOL/L (ref 5–15)
AST SERPL-CCNC: 21 U/L (ref 15–37)
BASOPHILS # BLD: 0.1 K/UL (ref 0–0.1)
BASOPHILS NFR BLD: 1 % (ref 0–1)
BILIRUB SERPL-MCNC: 0.6 MG/DL (ref 0.2–1)
BUN SERPL-MCNC: 25 MG/DL (ref 6–20)
BUN/CREAT SERPL: 14 (ref 12–20)
CALCIUM SERPL-MCNC: 8.8 MG/DL (ref 8.5–10.1)
CHLORIDE SERPL-SCNC: 104 MMOL/L (ref 97–108)
CHOLEST SERPL-MCNC: 148 MG/DL
CO2 SERPL-SCNC: 27 MMOL/L (ref 21–32)
CREAT SERPL-MCNC: 1.82 MG/DL (ref 0.7–1.3)
DIFFERENTIAL METHOD BLD: ABNORMAL
EOSINOPHIL # BLD: 0.2 K/UL (ref 0–0.4)
EOSINOPHIL NFR BLD: 3 % (ref 0–7)
ERYTHROCYTE [DISTWIDTH] IN BLOOD BY AUTOMATED COUNT: 17.2 % (ref 11.5–14.5)
EST. AVERAGE GLUCOSE BLD GHB EST-MCNC: 100 MG/DL
GLOBULIN SER CALC-MCNC: 3.2 G/DL (ref 2–4)
GLUCOSE SERPL-MCNC: 87 MG/DL (ref 65–100)
HBA1C MFR BLD: 5.1 % (ref 4–5.6)
HCT VFR BLD AUTO: 26.7 % (ref 36.6–50.3)
HDLC SERPL-MCNC: 41 MG/DL
HDLC SERPL: 3.6 {RATIO} (ref 0–5)
HGB BLD-MCNC: 8.6 G/DL (ref 12.1–17)
IMM GRANULOCYTES # BLD AUTO: 0 K/UL (ref 0–0.04)
IMM GRANULOCYTES NFR BLD AUTO: 1 % (ref 0–0.5)
LDLC SERPL CALC-MCNC: 81 MG/DL (ref 0–100)
LIPID PROFILE,FLP: NORMAL
LYMPHOCYTES # BLD: 1.5 K/UL (ref 0.8–3.5)
LYMPHOCYTES NFR BLD: 23 % (ref 12–49)
MAGNESIUM SERPL-MCNC: 1.4 MG/DL (ref 1.6–2.4)
MCH RBC QN AUTO: 27.7 PG (ref 26–34)
MCHC RBC AUTO-ENTMCNC: 32.2 G/DL (ref 30–36.5)
MCV RBC AUTO: 85.9 FL (ref 80–99)
MONOCYTES # BLD: 0.6 K/UL (ref 0–1)
MONOCYTES NFR BLD: 9 % (ref 5–13)
NEUTS SEG # BLD: 4.1 K/UL (ref 1.8–8)
NEUTS SEG NFR BLD: 63 % (ref 32–75)
NRBC # BLD: 0 K/UL (ref 0–0.01)
NRBC BLD-RTO: 0 PER 100 WBC
PHOSPHATE SERPL-MCNC: 3.8 MG/DL (ref 2.6–4.7)
PLATELET # BLD AUTO: 520 K/UL (ref 150–400)
PMV BLD AUTO: 9.1 FL (ref 8.9–12.9)
POTASSIUM SERPL-SCNC: 3.4 MMOL/L (ref 3.5–5.1)
PROT SERPL-MCNC: 6 G/DL (ref 6.4–8.2)
RBC # BLD AUTO: 3.11 M/UL (ref 4.1–5.7)
SODIUM SERPL-SCNC: 139 MMOL/L (ref 136–145)
TRIGL SERPL-MCNC: 130 MG/DL (ref ?–150)
VLDLC SERPL CALC-MCNC: 26 MG/DL
WBC # BLD AUTO: 6.5 K/UL (ref 4.1–11.1)

## 2020-11-13 PROCEDURE — 85025 COMPLETE CBC W/AUTO DIFF WBC: CPT

## 2020-11-13 PROCEDURE — 74011250637 HC RX REV CODE- 250/637: Performed by: PSYCHIATRY & NEUROLOGY

## 2020-11-13 PROCEDURE — 83036 HEMOGLOBIN GLYCOSYLATED A1C: CPT

## 2020-11-13 PROCEDURE — 83735 ASSAY OF MAGNESIUM: CPT

## 2020-11-13 PROCEDURE — 80061 LIPID PANEL: CPT

## 2020-11-13 PROCEDURE — 74011250637 HC RX REV CODE- 250/637: Performed by: INTERNAL MEDICINE

## 2020-11-13 PROCEDURE — 74011250637 HC RX REV CODE- 250/637: Performed by: NURSE PRACTITIONER

## 2020-11-13 PROCEDURE — 84100 ASSAY OF PHOSPHORUS: CPT

## 2020-11-13 PROCEDURE — 65220000003 HC RM SEMIPRIVATE PSYCH

## 2020-11-13 PROCEDURE — 36415 COLL VENOUS BLD VENIPUNCTURE: CPT

## 2020-11-13 PROCEDURE — 80053 COMPREHEN METABOLIC PANEL: CPT

## 2020-11-13 RX ORDER — ISOSORBIDE DINITRATE 20 MG/1
30 TABLET ORAL 3 TIMES DAILY
Status: DISCONTINUED | OUTPATIENT
Start: 2020-11-13 | End: 2020-11-14

## 2020-11-13 RX ORDER — OXYCODONE HYDROCHLORIDE 5 MG/1
5 TABLET ORAL
Status: DISCONTINUED | OUTPATIENT
Start: 2020-11-13 | End: 2020-11-14

## 2020-11-13 RX ORDER — ONDANSETRON 4 MG/1
4 TABLET, ORALLY DISINTEGRATING ORAL
Status: DISCONTINUED | OUTPATIENT
Start: 2020-11-13 | End: 2020-11-16 | Stop reason: HOSPADM

## 2020-11-13 RX ORDER — PANTOPRAZOLE SODIUM 40 MG/1
40 TABLET, DELAYED RELEASE ORAL
Status: DISCONTINUED | OUTPATIENT
Start: 2020-11-13 | End: 2020-11-16 | Stop reason: HOSPADM

## 2020-11-13 RX ADMIN — ACETAMINOPHEN 650 MG: 325 TABLET ORAL at 21:23

## 2020-11-13 RX ADMIN — HYDRALAZINE HYDROCHLORIDE 100 MG: 25 TABLET, FILM COATED ORAL at 21:23

## 2020-11-13 RX ADMIN — HYDRALAZINE HYDROCHLORIDE 100 MG: 25 TABLET, FILM COATED ORAL at 08:48

## 2020-11-13 RX ADMIN — OXYCODONE 5 MG: 5 TABLET ORAL at 14:11

## 2020-11-13 RX ADMIN — ISOSORBIDE DINITRATE 30 MG: 20 TABLET ORAL at 21:23

## 2020-11-13 RX ADMIN — APIXABAN 10 MG: 5 TABLET, FILM COATED ORAL at 08:48

## 2020-11-13 RX ADMIN — APIXABAN 5 MG: 5 TABLET, FILM COATED ORAL at 17:28

## 2020-11-13 RX ADMIN — OXYCODONE 5 MG: 5 TABLET ORAL at 06:15

## 2020-11-13 RX ADMIN — DOCUSATE SODIUM 100 MG: 100 CAPSULE, LIQUID FILLED ORAL at 08:49

## 2020-11-13 RX ADMIN — ISOSORBIDE DINITRATE 30 MG: 20 TABLET ORAL at 16:29

## 2020-11-13 RX ADMIN — ISOSORBIDE DINITRATE 20 MG: 20 TABLET ORAL at 08:49

## 2020-11-13 RX ADMIN — LURASIDONE HYDROCHLORIDE 40 MG: 40 TABLET, FILM COATED ORAL at 16:57

## 2020-11-13 RX ADMIN — PANTOPRAZOLE SODIUM 40 MG: 40 TABLET, DELAYED RELEASE ORAL at 13:39

## 2020-11-13 RX ADMIN — HYDRALAZINE HYDROCHLORIDE 100 MG: 25 TABLET, FILM COATED ORAL at 16:29

## 2020-11-13 RX ADMIN — METOPROLOL SUCCINATE 150 MG: 50 TABLET, EXTENDED RELEASE ORAL at 08:49

## 2020-11-13 RX ADMIN — FLUOXETINE 20 MG: 20 CAPSULE ORAL at 08:49

## 2020-11-13 RX ADMIN — ONDANSETRON 4 MG: 4 TABLET, ORALLY DISINTEGRATING ORAL at 14:11

## 2020-11-13 NOTE — BH NOTES
1920: Hospitalist returned page and has been notified of pt.'s elevated blood pressure and vomiting episode.

## 2020-11-13 NOTE — BH NOTES
1415- PRN Medication Documentation    Specific patient behavior that led to need for PRN medication: pt c/o 8/10 left arm pain onset days ago  Staff interventions attempted prior to PRN being given: education   PRN medication given: po 4 mg Zofran, po 5 mg Roxicodone   Patient response/effectiveness of PRN medication: pt is alert and oriented calm cooperative     Pt allows wound care to left arm and right leg. Tolerates well.

## 2020-11-13 NOTE — GROUP NOTE
MARKEL  GROUP DOCUMENTATION INDIVIDUAL                                                                          Group Therapy Note    Date: 11/13/2020    Group Start Time: 1000  Group End Time: 2037  Group Topic: Discharge Planning    [facility] 7W GEN BEHAV [facility]    Perri Quiñones    IP [facility] GROUP DOCUMENTATION GROUP    Group Therapy Note    Attendees: 5         Attendance: Did not attend    Patient's Goal:      Interventions/techniques: Follows Directions:     Interactions:     Mental Status:     Behavior/appearance:     Goals Achieved:        Additional Notes:      Hiram Feng

## 2020-11-13 NOTE — PROGRESS NOTES
Pt received in milieu. Calm, cooperative, NAD noted. Medication and meal compliant. Staff to continue q15 minute checks for safety. Problem: Pressure Injury - Risk of  Goal: *Prevention of pressure injury  Description: Document Abdirashid Scale and appropriate interventions in the flowsheet.   Outcome: Progressing Towards Goal  Note: Pressure Injury Interventions:  Sensory Interventions: Assess changes in LOC, Keep linens dry and wrinkle-free, Minimize linen layers    Activity Interventions: Increase time out of bed    Mobility Interventions: Float heels    Nutrition Interventions: Document food/fluid/supplement intake    Friction and Shear Interventions: Feet elevated on foot rest, Minimize layers       Problem: Altered Thought Process (Adult/Pediatric)  Goal: *STG: Participates in treatment plan  Outcome: Progressing Towards Goal  Goal: *STG: Seeks staff when feelings of anxiety and fear arise  Outcome: Progressing Towards Goal  Goal: *STG: Complies with medication therapy  Outcome: Progressing Towards Goal     Problem: Patient Education: Go to Patient Education Activity  Goal: Patient/Family Education  Outcome: Progressing Towards Goal     Problem: Nutrition Deficit  Goal: *Optimize nutritional status  Outcome: Progressing Towards Goal

## 2020-11-13 NOTE — PROGRESS NOTES
Laboratory Monitoring for Antipsychotics: This patient is currently prescribed the following medication(s):   Current Facility-Administered Medications   Medication Dose Route Frequency    isosorbide dinitrate (ISORDIL) tablet 30 mg  30 mg Oral TID    pantoprazole (PROTONIX) tablet 40 mg  40 mg Oral ACB    FLUoxetine (PROzac) capsule 20 mg  20 mg Oral DAILY    lurasidone (LATUDA) tablet 40 mg  40 mg Oral DAILY WITH DINNER    apixaban (ELIQUIS) tablet 5 mg  5 mg Oral BID    docusate sodium (COLACE) capsule 100 mg  100 mg Oral DAILY    hydrALAZINE (APRESOLINE) tablet 100 mg  100 mg Oral TID    metoprolol succinate (TOPROL-XL) XL tablet 150 mg  150 mg Oral DAILY     The following labs have been completed for monitoring of antipsychotics and/or mood stabilizers:    Height, Weight, BMI Estimation  Estimated body mass index is 29.56 kg/m² as calculated from the following:    Height as of this encounter: 177.8 cm (70\"). Weight as of this encounter: 93.4 kg (206 lb). Vital Signs/Blood Pressure  Visit Vitals  /73   Pulse 80   Temp 98.7 °F (37.1 °C)   Resp 16   Ht 177.8 cm (70\")   Wt 93.4 kg (206 lb)   SpO2 97%   BMI 29.56 kg/m²     Renal Function, Hepatic Function and Chemistry  Estimated Creatinine Clearance: 71.9 mL/min (A) (based on SCr of 1.82 mg/dL (H)). Lab Results   Component Value Date/Time    Sodium 139 11/13/2020 04:57 AM    Potassium 3.4 (L) 11/13/2020 04:57 AM    Chloride 104 11/13/2020 04:57 AM    CO2 27 11/13/2020 04:57 AM    Anion gap 8 11/13/2020 04:57 AM    BUN 25 (H) 11/13/2020 04:57 AM    Creatinine 1.82 (H) 11/13/2020 04:57 AM    BUN/Creatinine ratio 14 11/13/2020 04:57 AM    Bilirubin, total 0.6 11/13/2020 04:57 AM    Protein, total 6.0 (L) 11/13/2020 04:57 AM    Albumin 2.8 (L) 11/13/2020 04:57 AM    Globulin 3.2 11/13/2020 04:57 AM    A-G Ratio 0.9 (L) 11/13/2020 04:57 AM    ALT (SGPT) 30 11/13/2020 04:57 AM    AST (SGOT) 21 11/13/2020 04:57 AM    Alk.  phosphatase 68 11/13/2020 04:57 AM     Lab Results   Component Value Date/Time    Glucose 87 11/13/2020 04:57 AM    Glucose (POC) 129 (H) 10/23/2020 04:27 PM     Lab Results   Component Value Date/Time    Hemoglobin A1c 5.1 11/13/2020 04:57 AM     Hematology  Lab Results   Component Value Date/Time    WBC 6.5 11/13/2020 04:57 AM    RBC 3.11 (L) 11/13/2020 04:57 AM    HGB 8.6 (L) 11/13/2020 04:57 AM    HCT 26.7 (L) 11/13/2020 04:57 AM    MCV 85.9 11/13/2020 04:57 AM    MCH 27.7 11/13/2020 04:57 AM    MCHC 32.2 11/13/2020 04:57 AM    RDW 17.2 (H) 11/13/2020 04:57 AM    PLATELET 170 (H) 91/07/0670 04:57 AM     Lipids  Lab Results   Component Value Date/Time    Cholesterol, total 148 11/13/2020 04:57 AM    HDL Cholesterol 41 11/13/2020 04:57 AM    LDL, calculated 81 11/13/2020 04:57 AM    Triglyceride 130 11/13/2020 04:57 AM    CHOL/HDL Ratio 3.6 11/13/2020 04:57 AM     Thyroid Function  No results found for: TSH, TSH2, TSH3, TSHP, TSHELE, TT3, T3U, T3UP, FRT3, FT3, FT4, FT4P, T4, T4P, FT4T, TT7, TSHEXT    Assessment/Plan:  Recommended baseline laboratory monitoring has been completed based on this patient's current medication regimen. No further interventions are needed at this time. Follow-up metabolic monitoring labs should be completed in 3 months (quarterly for the first year of antipsychotic therapy).      Lorena Harrison, DEEPAKD

## 2020-11-13 NOTE — CONSULTS
[facility]   [address] [facility], [address] [place] [address], [facility]  Phone: [phone]   [phone]       Nephrology Progress Note  Carlos Cabrera     1995     [id number]  Date of Admission : 11/11/2020 11/13/20    CC: Follow up for ARF, Rhabdomyolysis        Assessment and Plan   JEAN PIERRE  - Severe ATN from Rhabdomyolysis   - resolving   - Adebayo removed 11/10  - daily labs, strict I/Os    Severe Rhabdomyolysis     Left Lung PNA w/ sepsis: resolved     LUE DVT:  - on eliquis    CMP w/ EF 15-20%:  - likely 2/2 cocaine  - per cardiology    Staph bacteremia:  - LUCIANO neg  - abx per ID    Encephalopathy:  - improving slowly    Polysubstance abuse      Interval History:  Seen and examined. Moved the the [facility] unit yesterday. Cr down to 1.8. Voiding well, not recorded. No cp, sob, n/v/d. Review of Systems: A comprehensive review of systems was negative.     Current Medications:   Current Facility-Administered Medications   Medication Dose Route Frequency    FLUoxetine (PROzac) capsule 20 mg  20 mg Oral DAILY    lurasidone (LATUDA) tablet 40 mg  40 mg Oral DAILY WITH DINNER    oxyCODONE IR (ROXICODONE) tablet 5 mg  5 mg Oral Q8H PRN    ondansetron (ZOFRAN ODT) tablet 4 mg  4 mg Oral Q8H PRN    OLANZapine (ZyPREXA) tablet 5 mg  5 mg Oral Q6H PRN    haloperidol lactate (HALDOL) injection 5 mg  5 mg IntraMUSCular Q6H PRN    benztropine (COGENTIN) tablet 1 mg  1 mg Oral BID PRN    diphenhydrAMINE (BENADRYL) injection 50 mg  50 mg IntraMUSCular BID PRN    hydrOXYzine HCL (ATARAX) tablet 50 mg  50 mg Oral TID PRN    LORazepam (ATIVAN) injection 1 mg  1 mg IntraMUSCular Q4H PRN    traZODone (DESYREL) tablet 50 mg  50 mg Oral QHS PRN    acetaminophen (TYLENOL) tablet 650 mg  650 mg Oral Q4H PRN    magnesium hydroxide (MILK OF MAGNESIA) 400 mg/5 mL oral suspension 30 mL  30 mL Oral DAILY PRN    albuterol-ipratropium (DUO-NEB) 2.5 MG-0.5 MG/3 ML  3 mL Nebulization Q6H PRN    apixaban (ELIQUIS) tablet 5 mg  5 mg Oral BID    docusate sodium (COLACE) capsule 100 mg  100 mg Oral DAILY    hydrALAZINE (APRESOLINE) tablet 100 mg  100 mg Oral TID    isosorbide dinitrate (ISORDIL) tablet 20 mg  20 mg Oral TID    metoprolol succinate (TOPROL-XL) XL tablet 150 mg  150 mg Oral DAILY      Allergies   Allergen Reactions    Tramadol Nausea and Vomiting       Objective:  Vitals:    Vitals:    11/12/20 1745 11/12/20 1917 11/12/20 2111 11/13/20 0758   BP: (!) 164/105 (!) 163/90 (!) 157/99 (!) 174/99   Pulse: 79 80 79 87   Resp:  18  14   Temp:  98.5 °F (36.9 °C)  97.5 °F (36.4 °C)   SpO2:    99%   Weight:       Height:         Intake and Output:  No intake/output data recorded. No intake/output data recorded. Physical Examination:    General: Awake, alert  Neck:  Supple, no mass  Resp:  Decreased breath sounds  CV:  RRR,  no murmur or rub, no LE edema  GI:  Soft, NT, + Bowel sounds, no hepatosplenomegaly  Neurologic:  Awake, alert  Psych:             Stable mood  :  Iniguez removed    []    High complexity decision making was performed  []    Patient is at high-risk of decompensation with multiple organ involvement    Lab Data Personally Reviewed: I have reviewed all the pertinent labs, microbiology data and radiology studies during assessment.     Recent Labs     11/13/20 0457 11/11/20 0448    138   K 3.4* 3.6    105   CO2 27 25   GLU 87 93   BUN 25* 35*   CREA 1.82* 2.31*   CA 8.8 8.5   MG 1.4*  --    PHOS 3.8  --    ALB 2.8*  --    ALT 30  --      Recent Labs     11/13/20 0457   WBC 6.5   HGB 8.6*   HCT 26.7*   *     No results found for: SDES  Lab Results   Component Value Date/Time    Culture result: NO GROWTH 5 DAYS 10/22/2020 06:19 AM    Culture result: NO GROWTH 5 DAYS 10/22/2020 02:37 AM    Culture result: (A) 10/21/2020 08:15 PM     Staphylococcus hominis (Oxacillin resistant) GROWING IN THE AEROBIC BOTTLE (SITE = R HAND)    Culture result:  10/21/2020 08:15 PM     Gram Positive Cocci CALLED TO AND READ BACK BY Dank Rodriguez RN AT 2012 BY D    Culture result: (A) 10/21/2020 08:10 PM     Staphylococcus hominis (Oxacillin resistant) GROWING IN THE AEROBIC BOTTLE (SITE = L HAND)    Culture result:  10/21/2020 08:10 PM     preliminary report of Gram Positive Cocci in clusters growing in 1 of 2 bottles drawn [facility] RN SCAV AT  [facility] ON 10/23/20. Bettye 1850     Recent Results (from the past 24 hour(s))   LIPID PANEL    Collection Time: 11/13/20  4:57 AM   Result Value Ref Range    LIPID PROFILE          Cholesterol, total 148 <200 MG/DL    Triglyceride 130 <150 MG/DL    HDL Cholesterol 41 MG/DL    LDL, calculated 81 0 - 100 MG/DL    VLDL, calculated 26 MG/DL    CHOL/HDL Ratio 3.6 0.0 - 5.0     HEMOGLOBIN A1C WITH EAG    Collection Time: 11/13/20  4:57 AM   Result Value Ref Range    Hemoglobin A1c 5.1 4.0 - 5.6 %    Est. average glucose 100 mg/dL   CBC WITH AUTOMATED DIFF    Collection Time: 11/13/20  4:57 AM   Result Value Ref Range    WBC 6.5 4.1 - 11.1 K/uL    RBC 3.11 (L) 4.10 - 5.70 M/uL    HGB 8.6 (L) 12.1 - 17.0 g/dL    HCT 26.7 (L) 36.6 - 50.3 %    MCV 85.9 80.0 - 99.0 FL    MCH 27.7 26.0 - 34.0 PG    MCHC 32.2 30.0 - 36.5 g/dL    RDW 17.2 (H) 11.5 - 14.5 %    PLATELET 885 (H) 979 - 400 K/uL    MPV 9.1 8.9 - 12.9 FL    NRBC 0.0 0  WBC    ABSOLUTE NRBC 0.00 0.00 - 0.01 K/uL    NEUTROPHILS 63 32 - 75 %    LYMPHOCYTES 23 12 - 49 %    MONOCYTES 9 5 - 13 %    EOSINOPHILS 3 0 - 7 %    BASOPHILS 1 0 - 1 %    IMMATURE GRANULOCYTES 1 (H) 0.0 - 0.5 %    ABS. NEUTROPHILS 4.1 1.8 - 8.0 K/UL    ABS. LYMPHOCYTES 1.5 0.8 - 3.5 K/UL    ABS. MONOCYTES 0.6 0.0 - 1.0 K/UL    ABS. EOSINOPHILS 0.2 0.0 - 0.4 K/UL    ABS. BASOPHILS 0.1 0.0 - 0.1 K/UL    ABS. IMM.  GRANS. 0.0 0.00 - 0.04 K/UL    DF AUTOMATED     MAGNESIUM    Collection Time: 11/13/20  4:57 AM   Result Value Ref Range    Magnesium 1.4 (L) 1.6 - 2.4 mg/dL   METABOLIC PANEL, COMPREHENSIVE    Collection Time: 11/13/20  4:57 AM   Result Value Ref Range    Sodium 139 136 - 145 mmol/L    Potassium 3.4 (L) 3.5 - 5.1 mmol/L    Chloride 104 97 - 108 mmol/L    CO2 27 21 - 32 mmol/L    Anion gap 8 5 - 15 mmol/L    Glucose 87 65 - 100 mg/dL    BUN 25 (H) 6 - 20 MG/DL    Creatinine 1.82 (H) 0.70 - 1.30 MG/DL    BUN/Creatinine ratio 14 12 - 20      GFR est AA 56 (L) >60 ml/min/1.73m2    GFR est non-AA 46 (L) >60 ml/min/1.73m2    Calcium 8.8 8.5 - 10.1 MG/DL    Bilirubin, total 0.6 0.2 - 1.0 MG/DL    ALT (SGPT) 30 12 - 78 U/L    AST (SGOT) 21 15 - 37 U/L    Alk. phosphatase 68 45 - 117 U/L    Protein, total 6.0 (L) 6.4 - 8.2 g/dL    Albumin 2.8 (L) 3.5 - 5.0 g/dL    Globulin 3.2 2.0 - 4.0 g/dL    A-G Ratio 0.9 (L) 1.1 - 2.2     PHOSPHORUS    Collection Time: 11/13/20  4:57 AM   Result Value Ref Range    Phosphorus 3.8 2.6 - 4.7 MG/DL           Total time spent with patient:  xxx   min. Care Plan discussed with:  Patient     Family      RN      Consulting Physician [facility]        I have reviewed the flowsheets. Chart and Pertinent Notes have been reviewed. No change in PMH ,family and social history from Consult note.       James Rader MD

## 2020-11-13 NOTE — H&P
[address] [facility] HISTORY AND PHYSICAL    Name:  Savi Alberto  MR#:  [id number]  :  1995  ACCOUNT #:  [de-identified]  ADMIT DATE:  2020    INITIAL PSYCHIATRIC INTERVIEW    CHIEF COMPLAINT:  \"I feel okay today. \"    HISTORY OF PRESENT ILLNESS:  The patient is a 78-year-old  man who is currently admitted after he was transferred to us from the medical service. He had been admitted there initially on 10/21/2020 after an apparent fentanyl overdose. The overdose was quite severe and he ended up in the ICU for a prolonged period as well as developing severe acute renal failure, shock liver, multiple pneumonias, with an ALT of 33,496 and an AST of 2000. Currently, he appears to have recovered although with sequelae including multiple sores all over his body. He reports that he did take the overdose of fentanyl, but does not recall how much he used or where he got it from. States that he had been depressed about a breakup with a girlfriend and had also been using other drugs. His urine drug screen was positive for marijuana, amphetamines, cocaine, PCP, and benzodiazepines. States that he uses most of these drugs on an occasional basis but tends to use cocaine almost daily and estimates that he was using about 20-30 dollars worth of cocaine most days. States that he had been started on Prozac by a primary care physician, but had only been partly compliant with it and might have even was stopped it prior to his suicide attempt. He is somewhat of a poor historian and does not remember many of the details. States that his mood is better now and he feels more hopeful about the future. Still has trouble with sleep and his energy and motivation are low. Denies any perceptual abnormalities. Denies any delusions. Since his arrival on the unit, he has been calm and cooperative.     PAST MEDICAL HISTORY:  Reviewed as per the history and physical exam.      Past Medical History:   Diagnosis Date    Anxiety     Depression      Prior to Admission medications    Medication Sig Start Date End Date Taking? Authorizing Provider   naloxone (Narcan) 4 mg/actuation nasal spray Use 1 spray intranasally, then discard. Repeat with new spray every 2 min as needed for opioid overdose symptoms, alternating nostrils. 10/18/20   Diane Soriano MD   FLUoxetine (PROzac) 20 mg capsule Take 20 mg by mouth daily. Kaleb Moncada MD   hydrOXYzine HCL (ATARAX) 25 mg tablet Take 25 mg by mouth two (2) times a day. Kaleb Moncada MD     Vitals:    11/12/20 1745 11/12/20 1917 11/12/20 2111 11/13/20 0758   BP: (!) 164/105 (!) 163/90 (!) 157/99 (!) 174/99   Pulse: 79 80 79 87   Resp:  18  14   Temp:  98.5 °F (36.9 °C)  97.5 °F (36.4 °C)   SpO2:    99%   Weight:       Height:         Lab Results   Component Value Date/Time    WBC 6.5 11/13/2020 04:57 AM    HGB 8.6 (L) 11/13/2020 04:57 AM    HCT 26.7 (L) 11/13/2020 04:57 AM    PLATELET 058 (H) 52/91/8267 04:57 AM    MCV 85.9 11/13/2020 04:57 AM     Lab Results   Component Value Date/Time    Sodium 139 11/13/2020 04:57 AM    Potassium 3.4 (L) 11/13/2020 04:57 AM    Chloride 104 11/13/2020 04:57 AM    CO2 27 11/13/2020 04:57 AM    Anion gap 8 11/13/2020 04:57 AM    Glucose 87 11/13/2020 04:57 AM    BUN 25 (H) 11/13/2020 04:57 AM    Creatinine 1.82 (H) 11/13/2020 04:57 AM    BUN/Creatinine ratio 14 11/13/2020 04:57 AM    GFR est AA 56 (L) 11/13/2020 04:57 AM    GFR est non-AA 46 (L) 11/13/2020 04:57 AM    Calcium 8.8 11/13/2020 04:57 AM    Bilirubin, total 0.6 11/13/2020 04:57 AM    Alk.  phosphatase 68 11/13/2020 04:57 AM    Protein, total 6.0 (L) 11/13/2020 04:57 AM    Albumin 2.8 (L) 11/13/2020 04:57 AM    Globulin 3.2 11/13/2020 04:57 AM    A-G Ratio 0.9 (L) 11/13/2020 04:57 AM    ALT (SGPT) 30 11/13/2020 04:57 AM    AST (SGOT) 21 11/13/2020 04:57 AM     No results found for: VALF2, VALAC, VALP, VALPR, DS6, CRBAM, CRBAMP, CARB2, XCRBAM  No results found for: [facility]  RADIOLOGY REPORTS:(reviewed/updated 11/13/2020)  Mra Brain Wo Cont    Result Date: 10/23/2020  INDICATION: hypoxia COMPARISON:  None TECHNIQUE:  3-D time-of-flight MRA of the brain was performed. Multiplanar reconstructions were obtained. FINDINGS: Posterior Circulation:  No flow limiting stenosis or occlusion. Anterior Circulation:  No flow limiting stenosis or occlusion. Additional Comments:  No evidence of aneurysm or vascular malformation. IMPRESSION: No flow limiting stenosis or intracranial aneurysm. Mri Brain Wo Cont    Result Date: 10/23/2020  INDICATION:   Anxiety, depression, probable drug overdose, abnormal head CT AMS EXAMINATION:  MRI BRAIN WO CONTRAST COMPARISON:  None TECHNIQUE:  Multiplanar multisequence acquisition without contrast of the brain. FINDINGS:  Ventricles:  Midline, no hydrocephalus. Brain Parenchyma/Brainstem:  Small symmetric areas of diffusion restriction bilateral globus pallidus. Otherwise no parenchymal signal abnormality. Intracranial Hemorrhage:  None. Basal Cisterns:  Normal. Flow Voids:  Normal. Additional Comments:  N/A. IMPRESSION: Bilateral globus pallidus diffusion restriction, may be seen in heroin leukoencephalopathy, carbon monoxide poisoning, acute infarctions, and toxic/metabolic etiologies. Ct Head Wo Cont    Result Date: 10/21/2020  CT dose reduction was achieved through use of a standardized protocol tailored for this examination and automatic exposure control for dose modulation. Noncontrast study shows symmetric hypodensity in each basal ganglia, centered on the globus pallidus, left slightly larger than right, not present on the study of 3 months ago. Each region measures between 10 and 12 mm. No similar finding anywhere else. No other abnormality in gray or white matter. No mass effect or hemorrhage. Ventricles are symmetric and normal in size.  No significant bone finding     IMPRESSION: Findings suggesting hypoxic or toxic edema in the basal ganglia    Ct Maxillofacial W Cont    Result Date: 9/15/2020  Contrast study shows extensive subcutaneous fat edema throughout the right maxillary and perimandibular region, with free fluid infiltrating throughout the fat in the preseptal periorbital right-sided location. Fluid infiltration continues back, along the outer margin of the masseter muscle, to the margin of the parotid gland. Salivary glands are normal and symmetric. There is no stranding or edema of the postseptal fat. Extraocular muscles and optic nerves are normal symmetric. No intraocular abnormality. Very mild membrane thickening of the maxillary sinus and slightly greater membrane thickening in the anterior ethmoids. Thickening of the contiguous facial fashion. No bone destruction or periosteal reaction. Dental caries noted in a right mandibular molar and right maxillary incisor. Numerous small lymph nodes scattered throughout the field     IMPRESSION: Extensive right-sided facial edema/cellulitis, without intraorbital extension or abscess. If cause is unknown, possibilities would include both sinusitis and dental infection. Ct Chest Wo Cont    Result Date: 10/21/2020  CT dose reduction was achieved through use of a standardized protocol tailored for this examination and automatic exposure control for dose modulation. Noncontrast study shows moderate severity patchy consolidation and lesser degree of edema throughout much of the left lung, sparing the apex, central and peripheral. There is no large region of dense consolidation with the greatest severity at the elevated left base. Right lung is clear and central airways are open. No mediastinal or hilar adenopathy. Normal caliber aorta. No pleural pericardial effusion. No significant upper abdominal findings     IMPRESSION: Moderate severity pneumonia throughout the left lung    Us Abd Comp    Result Date: 10/22/2020  INDICATION: Acute renal failure. Rhabdomyolysis.  COMPARISON: None TECHNIQUE: Routine ultrasound images of the abdomen were obtained. FINDINGS: GALLBLADDER: Contains biliary sludge. No cholecystolithiasis, gallbladder wall thickening, or pericholecystic fluid. COMMON BILE DUCT: 6 mm in diameter. No evidence of choledocholithiasis. LIVER: Normal in size. Normal echogenicity. No focal hepatic mass or intrahepatic biliary dilatation. MAIN PORTAL VEIN: Patent. Hepatopetal flow direction. PANCREAS: No evidence of mass or ductal dilatation. RIGHT KIDNEY: 10.4 cm in length. No hydronephrosis. No evidence of mass or calculus. Perinephric edema is noted. LEFT KIDNEY: 11.9 cm in length. No hydronephrosis. No evidence of mass or calculus. Perinephric edema is noted. SPLEEN: 10.1 cm in length. No mass. AORTA: Visualized portions are normal in caliber. Distal abdominal aorta is obscured by overlying bowel gas. INFERIOR VENA CAVA: Patent. OTHER: Urinary bladder is decompressed with a Iniguez catheter. IMPRESSION: Normal renal size and cortical echogenicity. Bilateral perinephric edema. Biliary sludge in the gallbladder. Xr Chest Port    Result Date: 11/5/2020  INDICATION:  HF EXAM: CXR Portable. FINDINGS: Portable chest shows support lines/devices show no significant change since October 31. There is no apparent pneumothorax. Lungs show partial improvement of bilateral airspace disease/edema. Heart size is top normal. There is no midline shift or apparent sizable pleural effusion. IMPRESSION: Partial improvement of bilateral airspace disease/edema. Xr Chest Port    Result Date: 10/31/2020  Clinical history: Increased work of breathing with leukocytosis, concern for infectious process vs volume overload INDICATION:   Increased work of breathing with leukocytosis, concern for infectious process vs volume overload COMPARISON: 10/20/2020 FINDINGS: AP portable upright view of the chest demonstrates a stable  cardiopericardial silhouette. Increased bilateral parenchymal opacities compared to the prior study. Dialysis catheter and central venous access catheter are stable. .No large effusion. .Patient is on a cardiac monitor. IMPRESSION: Slightly increased interstitial and parenchymal opacities compared to the 10/20/2020 exam.     Xr Chest Port    Result Date: 10/28/2020  Clinical history: tachypnea, respiratory failure, pneumonia INDICATION:   tachypnea, respiratory failure, pneumonia COMPARISON: 10/27/2020 FINDINGS: AP portable upright view of the chest demonstrates a stable  cardiopericardial silhouette. Persistent interstitial and parenchymal opacities not significantly changed. Dialysis catheter on the right. Central venous access catheter extends from the left. No change. .Patient is on a cardiac monitor. IMPRESSION: Stable bilateral parenchymal opacities. Graciela Jenni Chest Port    Result Date: 10/27/2020  EXAM: XR CHEST PORT INDICATION: Tachypnea COMPARISON: October 25 FINDINGS: A portable AP radiograph of the chest was obtained at 1741 hours. The lines are stable. The patient is on a cardiac monitor. There is persistent opacification in the right lower lobe and throughout the lung, without significant change. Cardia mediastinal contours are stable. The bones and soft tissues are grossly within normal limits. IMPRESSION: No significant change in bilateral airspace opacification, left greater than right. Xr Chest Port    Result Date: 10/25/2020  EXAM:  XR CHEST PORT INDICATION:  sob COMPARISON:  10/17/2020 FINDINGS: A portable AP radiograph of the chest was obtained at 1003 hours. Left subclavian venous catheter tip overlies SVC. Right IJ catheter tip overlies SVC right atrial junction. .  There is increasing opacity in the mid to lower lung zones bilaterally left greater than right. .  Cardiomegaly is stable. Bony structures are unchanged.      IMPRESSION: There is increasing opacity in the lungs bilaterally left greater than right which may represent edema although superimposed pneumonia is not excluded. Xr Chest Port    Result Date: 10/17/2020  Indication: Altered mental status. AP portable chest radiograph 17 October 2020 1848 hours. Comparison 21 July 2020. Clear lungs. Patient rotated to the left. Normal heart size exaggerated by patient rotation. No pneumothorax or pleural effusion. Normal bones. IMPRESSION: Negative. PAST PSYCHIATRIC HISTORY:  The patient reports that he had been hospitalized once at [facility] about a year ago and was treated for depression and anxiety. States that he has been using drugs for several years now, particularly cocaine which is his preferred drug of choice. States that he has detoxed himself off it before, but has never been hospitalized in a detox or admitted to a rehab. Denies any prior history of suicide attempts. PSYCHOSOCIAL HISTORY:  The patient reports that he currently lives in [place], [place], where he lives with his mother, his brother, and stepfather. He is single currently, has never been  and does not have any children. As noted above, he just broke up with a girlfriend two weeks prior to his admission in the hospital.  States that he used to work for [employer], but left his job recently and is now unemployed. He attributes his drug use to his stopping work. Denies any major legal stressors at the present time. MENTAL STATUS EXAMINATION:  The patient is a young  male who is dressed in hospital apparel. He is calm and cooperative and makes good eye contact. Speech is decreased in output, but spontaneous and coherent. Psychomotor activity is mildly decreased. Mood is reported as being down and affect is depressed. Denies any active suicidal ideation or plan. Denies any perceptual abnormalities. Denies any delusions. His thought process is logical and goal directed. Cognitively, he is awake and alert, oriented to time, place, and person. Intelligence is average.   Memory is intact and fund of knowledge is adequate. Insight is poor. Judgment is poor. ASSESSMENT AND PLAN/DIAGNOSES:  Mood disorder unspecified; rule out recurrent major depression; rule out bipolar disorder; cocaine use disorder, severe; marijuana, amphetamines, and ecstasy use disorder, mild. I will continue his inpatient stay. He will be provided with support and attend groups. Estimated length of stay is 2-3 days. His strengths include support from his family and ability to seek treatment.         Kacey Alvarez MD      [id number]  D:  11/12/2020 17:17  T:  11/12/2020 20:52  JOB #:  [id number]

## 2020-11-13 NOTE — PROGRESS NOTES
Occupational Therapy    Noted patient has transferred to inpatient psychiatry and new OT order received. Chart reviewed. Consulted with physical therapist, who met with patient and advised that patient has progressed to independent level with all aspects of self care and mobility. Additional OT is not indicated. Please place a new OT order if patient experiences a functional decline.     Lee Gardner, OTR/L

## 2020-11-13 NOTE — INTERDISCIPLINARY ROUNDS
Behavioral Health Interdisciplinary Rounds     Patient Name: Pheobe Lundborg  Age: 26 y.o. Room/Bed:  730/01  Primary Diagnosis: <principal problem not specified>   Admission Status: Voluntary     Readmission within 30 days: no  Power of  in place: no  Patient requires a blocked bed: no          Reason for blocked bed:     VTE Prophylaxis: Yes    Mobility needs/Fall risk: yes  Flu Vaccine : no   Nutritional Plan: no  Consults: Nephrology         Labs/Testing due today?: yes/completed    Sleep hours:  7      Participation in Care/Groups:  yes  Medication Compliant?: Yes  PRNS (last 24 hours): Pain and Nausea/Vomiting    Restraints (last 24 hours):  no     CIWA (range last 24 hours):     COWS (range last 24 hours):      Alcohol screening (AUDIT) completed -   AUDIT Score: 0     If applicable, date SBIRT discussed in treatment team AND documented:   AUDIT Screen Score: AUDIT Score: 0    Tobacco - patient is a smoker: Have You Used Tobacco in the Past 30 Days: Yes  Illegal Drugs use: Have You Used Any Illegal Substances Over the Past 12 Months: Yes    24 hour chart check complete: yes     Patient goal(s) for today: : attend groups, take medications  Treatment team focus/goals: titrate medication per providers note, coordinate follow up. Progress note: Pt voluntarily admitted to [facility] for depression. He reported accidental OD related to self medicating for pain.  Pt asking to be connected with suboxone clinic upon discharge    LOS:  2  Expected LOS: 5     Financial concerns/prescription coverage:  Sara Amezcua  Family contact:  mom, Josie Cushing ([phone]) Verbal MORELIA Given  Family requesting physician contact today:  no  Discharge plan: return home with mom  Access to weapons : no                        Outpatient provider(s): [facility] - suboxone cliniic  Patient's preferred phone number for follow up call : [phone]   Patient's preferred e-mail address :     Participating treatment team members: Sincere Dai, Dr. Edilson Mares; Lakshmi Magana, RN; Diana Vides MSW; Monica Bullard, EsauD

## 2020-11-13 NOTE — BH NOTES
PRN Medication Documentation    Specific patient behavior that led to need for PRN medication: pain left arm  Staff interventions attempted prior to PRN being given: repositioning  PRN medication given: Roxicodone 5mg PO  Patient response/effectiveness of PRN medication: Pt back to bed.  Will continue to monitor

## 2020-11-13 NOTE — BH NOTES
Chief Complaint:  I am feeling okay. Length of Stay: 2 Days    Interval History:  Awilda Moreno is doing better today. Says he vomited several times yesterday and was seen by the hospitalist. Her input is greatly appreciated. His renal functions are gradually improving. Mood is \"fine\" and denies any SI or plan. He remains socially isolated and was encouraged to attend groups. Denies any AH or VH. Pleasant and calm during the interview. Past Medical History:  Past Medical History:   Diagnosis Date    Anxiety     Depression            Labs:  Lab Results   Component Value Date/Time    WBC 6.5 11/13/2020 04:57 AM    HGB 8.6 (L) 11/13/2020 04:57 AM    HCT 26.7 (L) 11/13/2020 04:57 AM    PLATELET 314 (H) 36/90/1133 04:57 AM    MCV 85.9 11/13/2020 04:57 AM      Lab Results   Component Value Date/Time    Sodium 139 11/13/2020 04:57 AM    Potassium 3.4 (L) 11/13/2020 04:57 AM    Chloride 104 11/13/2020 04:57 AM    CO2 27 11/13/2020 04:57 AM    Anion gap 8 11/13/2020 04:57 AM    Glucose 87 11/13/2020 04:57 AM    BUN 25 (H) 11/13/2020 04:57 AM    Creatinine 1.82 (H) 11/13/2020 04:57 AM    BUN/Creatinine ratio 14 11/13/2020 04:57 AM    GFR est AA 56 (L) 11/13/2020 04:57 AM    GFR est non-AA 46 (L) 11/13/2020 04:57 AM    Calcium 8.8 11/13/2020 04:57 AM    Bilirubin, total 0.6 11/13/2020 04:57 AM    Alk.  phosphatase 68 11/13/2020 04:57 AM    Protein, total 6.0 (L) 11/13/2020 04:57 AM    Albumin 2.8 (L) 11/13/2020 04:57 AM    Globulin 3.2 11/13/2020 04:57 AM    A-G Ratio 0.9 (L) 11/13/2020 04:57 AM    ALT (SGPT) 30 11/13/2020 04:57 AM      Vitals:    11/12/20 1917 11/12/20 2111 11/13/20 0758 11/13/20 1140   BP: (!) 163/90 (!) 157/99 (!) 174/99 123/73   Pulse: 80 79 87 80   Resp: 18  14 16   Temp: 98.5 °F (36.9 °C)  97.5 °F (36.4 °C) 98.7 °F (37.1 °C)   SpO2:   99% 97%   Weight:       Height:             Current Facility-Administered Medications   Medication Dose Route Frequency Provider Last Rate Last Dose    isosorbide dinitrate (ISORDIL) tablet 30 mg  30 mg Oral TID José Luis Pooja, NP        oxyCODONE IR (ROXICODONE) tablet 5 mg  5 mg Oral Q12H PRN José Luis Floor, NP        FLUoxetine (PROzac) capsule 20 mg  20 mg Oral DAILY Severo Maya, MD   20 mg at 11/13/20 0849    lurasidone (LATUDA) tablet 40 mg  40 mg Oral DAILY WITH Jose Jordan MD   40 mg at 11/12/20 1649    ondansetron (ZOFRAN ODT) tablet 4 mg  4 mg Oral Q8H PRN Brant Brady, NP   4 mg at 11/12/20 2058    OLANZapine (ZyPREXA) tablet 5 mg  5 mg Oral Q6H PRN Maryellen-Amber Sidhu, NP        haloperidol lactate (HALDOL) injection 5 mg  5 mg IntraMUSCular Q6H PRN Maryellen-Amber Sidhu, NP        benztropine (COGENTIN) tablet 1 mg  1 mg Oral BID PRN Maryellen-Amber Sidhu, NP        diphenhydrAMINE (BENADRYL) injection 50 mg  50 mg IntraMUSCular BID PRN Maryellen-Amber Sidhu, NP        hydrOXYzine HCL (ATARAX) tablet 50 mg  50 mg Oral TID PRN Maryellen-Amber Sidhu, NP        LORazepam (ATIVAN) injection 1 mg  1 mg IntraMUSCular Q4H PRN Maryellen-Amber Sidhu, NP        traZODone (DESYREL) tablet 50 mg  50 mg Oral QHS PRN Maryellen-Amber Sidhu, RACHELE        acetaminophen (TYLENOL) tablet 650 mg  650 mg Oral Q4H PRN Amber Canada, NP        magnesium hydroxide (MILK OF MAGNESIA) 400 mg/5 mL oral suspension 30 mL  30 mL Oral DAILY PRN Annette Canada NP        albuterol-ipratropium (DUO-NEB) 2.5 MG-0.5 MG/3 ML  3 mL Nebulization Q6H PRN Sherlie Miu, Lulu M, DO        apixaban (ELIQUIS) tablet 5 mg  5 mg Oral BID Lulu Dunham DO        docusate sodium (COLACE) capsule 100 mg  100 mg Oral DAILY Lulu Dunham DO   100 mg at 11/13/20 2582    hydrALAZINE (APRESOLINE) tablet 100 mg  100 mg Oral TID  Jeanmarie CORRAL DO   100 mg at 11/13/20 0848    metoprolol succinate (TOPROL-XL) XL tablet 150 mg  150 mg Oral DAILY Lulu Dunham DO   150 mg at 11/13/20 0849         Mental Status Exam:  Eye contact: limited  Grooming: fair  Psychomotor activity: decreased  Speech is spontaneous  Mood is \"okay\"  Affect: flat  Perception: Denies any Ah or Vh.   Suicidal ideation: Denies   Cognition is grossly intact       Physical Exam:  Body habitus: Body mass index is 29.56 kg/m². Musculoskeletal system: normal gait  Tremor - neg  Cog wheeling - neg      Assessment and Plan:  Gin Hansen meets criteria for a diagnosis of Mood disorder unspecified; rule out recurrent major depression; rule out bipolar disorder; cocaine use disorder, severe; marijuana, amphetamines, and ecstasy use disorder, mild. Continue the medication regimen as prescribed  Disposition planning to continue. I certify that this patients inpatient psychiatric hospital services furnished since the previous certification were, and continue to be, required for treatment that could reasonably be expected to improve the patient's condition, or for diagnostic study, and that the patient continues to need, on a daily basis, active treatment furnished directly by or requiring the supervision of inpatient psychiatric facility personnel. In addition, the hospital records show that services furnished were intensive treatment services, admission or related services, or equivalent services.

## 2020-11-13 NOTE — PROGRESS NOTES
Problem: Falls - Risk of  Goal: *Absence of Falls  Description: Document Rickey Apo Fall Risk and appropriate interventions in the flowsheet.   Outcome: Progressing Towards Goal  Note: Fall Risk Interventions:  Mobility Interventions: Communicate number of staff needed for ambulation/transfer         Medication Interventions: Teach patient to arise slowly    Elimination Interventions: Patient to call for help with toileting needs, Stay With Me (per policy), Toilet paper/wipes in reach            Pt appears asleep in bed, NAD, even respirations, will continue to monitor q15min

## 2020-11-13 NOTE — PROGRESS NOTES
Physical Therapy  11/13/2020    New orders received as pt has transferred from acute care to [facility]. Pt received in day room and reports independence with all aspects of self care and mobility. He has been independent and up ad balbina since transferring upstairs. No additional acute therapy needs. Will sign off at this time.     Thank Jose Luis Foy, PT, DPT

## 2020-11-13 NOTE — CONSULTS
[facility] Adult  Hospitalist Group  History and Physical    Primary Care Provider: Phuc Dave MD  Date of Service:  11/13/2020    Subjective:     Excerpt from hospital discharge summary 11/11/2020:    \"26year-old male presenting to [facility] on 10/21/2020 for polysubstance abuse and overdose. Per report, EMS was notified due to patient being found unresponsive and hypoxic. On admission, patient with multiorgan failure including renal failure, elevated troponin, respiratory failure requiring intubation, shock liver. He was admitted to the ICU. Initially found to have EF <15% with eventual recovery. He was initiated on CRRT, transitioned to HD, then stopped. Also found to be bacteremic, s/p treatment. He was extubated and remained stable on medical floor for several days. Psychiatry consulted and recommended inpatient psychiatric transfer when medically stable. Patient has completed IV antibiotics and no further inpatient treatment planned. He has a component of CKD and will need nephrology consultation while remains in inpatient psychiatric unit. He received opiates throughout his hospitalization and will need to be weaned. He is deconditioned but able to independently ambulate. \"    Transferred to inpatient behavioral health unit yesterday, 11/11/2020, resumed on inpatient blood pressure medications, [address] [place] [address] for LUE DVT, mental health medications. Hospitalist group paged for consultation due to single episode of emesis following dinner associated with elevated blood pressure. Patient states emesis was directly after eating, one episode, with residual faint nausea. Denies abdominal pain, hematemesis, constipation, diarrhea, dysuria, hematuria. Blood pressure around the time of episode was elevated to 169/111. Repeat blood pressures have been downtrending 157/99 at time of evaluation. Blood pressures while inpatient on the medical floor have been 140-150/80-90.  Currently on ISDN, hydralazine, metoprolol for blood pressure/HF management. ACE-I/ARB recommended by cardiology when renal function improves. Review of Systems   Constitutional: Negative for chills, fever and malaise/fatigue. HENT: Negative for congestion and sore throat. Respiratory: Negative for cough, shortness of breath and wheezing. Cardiovascular: Positive for leg swelling. Negative for chest pain and palpitations. Gastrointestinal: Positive for nausea and vomiting. Negative for abdominal pain, blood in stool, constipation, diarrhea and heartburn. Genitourinary: Negative for dysuria, frequency and urgency. Skin:        Various healing ulcers   Neurological: Negative for dizziness, weakness and headaches. Psychiatric/Behavioral: Positive for depression and substance abuse. Past Medical History:   Diagnosis Date    Anxiety     Depression       Past Surgical History:   Procedure Laterality Date    HX ORTHOPAEDIC       Prior to Admission medications    Medication Sig Start Date End Date Taking? Authorizing Provider   naloxone (Narcan) 4 mg/actuation nasal spray Use 1 spray intranasally, then discard. Repeat with new spray every 2 min as needed for opioid overdose symptoms, alternating nostrils. 10/18/20   Leticia Diaz MD   FLUoxetine (PROzac) 20 mg capsule Take 20 mg by mouth daily. Kaleb Moncada MD   hydrOXYzine HCL (ATARAX) 25 mg tablet Take 25 mg by mouth two (2) times a day. Kaleb Moncada MD     Allergies   Allergen Reactions    Tramadol Nausea and Vomiting    Family history noncontributory     SOCIAL HISTORY:  Patient resides at Home. Patient ambulates without assistive device. Smoking history: daily smoker  Alcohol history: not currently        Objective:     Physical Exam  Vitals signs and nursing note reviewed. Exam conducted with a chaperone present. Constitutional:       Appearance: Normal appearance. He is not ill-appearing or diaphoretic.    Eyes:      General: No scleral icterus. Extraocular Movements: Extraocular movements intact. Conjunctiva/sclera: Conjunctivae normal.      Pupils: Pupils are equal, round, and reactive to light. Cardiovascular:      Rate and Rhythm: Normal rate and regular rhythm. Pulses: Normal pulses. Heart sounds: Normal heart sounds. Pulmonary:      Effort: Pulmonary effort is normal. No respiratory distress. Breath sounds: Normal breath sounds. No wheezing. Abdominal:      General: Bowel sounds are normal. There is no distension. Palpations: Abdomen is soft. There is no mass. Tenderness: There is no abdominal tenderness. There is no guarding. Musculoskeletal:         General: Swelling and deformity present. Right lower leg: Edema present. Left lower leg: Edema present. Comments: Bilateral lower extremity +3 edema, upper extremity +1   Skin:     General: Skin is warm and dry. Capillary Refill: Capillary refill takes less than 2 seconds. Coloration: Skin is pale. Comments: Multiple healing ulcers, dressings intact   Neurological:      General: No focal deficit present. Mental Status: He is alert and oriented to person, place, and time. Mental status is at baseline. Cranial Nerves: No cranial nerve deficit. Psychiatric:         Mood and Affect: Mood normal.      Comments: Calm and cooperative, fair health historian       Data Review: All diagnostic labs and studies have been reviewed.     BP (!) 157/99   Pulse 79   Temp 98.5 °F (36.9 °C)   Resp 18   Ht 5' 10\" (1.778 m)   Wt 93.4 kg (206 lb)   SpO2 98%   BMI 29.56 kg/m²     Assessment:     Active Problems:    Drug abuse ([address] (10/22/2020)      Acute renal failure with tubular necrosis ([address] (10/22/2020)      Depression (11/11/2020)      Acute deep vein thrombosis (DVT) of axillary vein of left upper extremity ([address] (11/13/2020)      HTN (hypertension) (11/13/2020)      Acute systolic heart failure ([address] (11/13/2020) Anemia (11/13/2020)      26year old gentleman known to the hospitalist service due to extended hospital stay for complications related to fentanyl overdose resulting in rhabdomyolysis, acute renal failure, sepsis/multiorgan failure, acute respiratory failure/pneumonia, cardiomyopathy. Now transferred to inpatient behavioral health unit for inpatient management of underlying depression, bipolar disorder, drug use disorder. The hospitalist group is consulted to help manage elevated blood pressure, episode of emesis. Plan:     Cardiomyopathy  - TTE with EF 15%, likely secondary to drug use  - LUCIANO 11/6, EF improving  - Cardiology evaluated earlier in visit, cath deferred due to improving anterior wall function. Recommends GDMT for heart failure/LV dysfunction - Metoprolol, isosorbide dinitrate, hydralazine, add ACE-I/ARB when renal function allows  - Advised patient he will need cardiology follow up after discharge    HTN  - Elevated - 160/100 at time of emesis, improving 157/99 now. 140-150/80-90 while inpatient  - Due for evening dose of hydralazine/ISDN  - Continue current plan of above medications, ACE-I/ARB when able  - Monitor blood pressure for now, adjust meds if remains elevated    ARF/Volume overload  - Improving, off RRT  - Request nephrology consultation for continuity of care while in inpatient psych unit  - BMP ordered for AM  - Avoid nephrotoxins, renally dose medications  - Advised patient he will need nephrology follow up after discharge    Acute LUE DVT  - Continue Eliquis at least 3 months    Anemia  - Stable, no active bleeding  - Monitor, CBC ordered for AM    Debility  - PT/OT consulted.  Dietary following    FUNCTIONAL STATUS PRIOR TO HOSPITALIZATION Ambulates Independently   DVT Prophylaxis: On Eliquis    Signed By: Amee Cotter NP     November 13, 2020

## 2020-11-13 NOTE — PROGRESS NOTES
Problem: Altered Thought Process (Adult/Pediatric)  Goal: *STG: Participates in treatment plan  Outcome: Progressing Towards Goal     Problem: Altered Thought Process (Adult/Pediatric)  Goal: *STG: Seeks staff when feelings of anxiety and fear arise  Outcome: Progressing Towards Goal     Problem: Altered Thought Process (Adult/Pediatric)  Goal: *STG: Complies with medication therapy  Outcome: Progressing Towards Goal     Problem: Altered Thought Process (Adult/Pediatric)  Goal: Interventions  Outcome: Progressing Towards Goal  Note: Pt is calm cooperative. Sad depressed anxious. Medication meal compliant. Verbalizes needs to staff. Talking to family on the phone.

## 2020-11-13 NOTE — BH NOTES
2058: PRN Medication Documentation    Specific patient behavior that led to need for PRN medication: Vomiting  PRN medication given: zofran     2139: PRN Medication Documentation  Specific patient behavior that led to need for PRN medication: Left arm pain   PRN medication given: roxicodone

## 2020-11-13 NOTE — PROGRESS NOTES
Hospitalist Progress Note          Neha Stephens NP  Please call  and page for questions. Call physician on-call through the  7pm-7am    Daily Progress Note: 11/13/2020    Primary care provider:Virginia Galicia MD    Date of admission: 11/11/2020  4:30 PM    Admission Summary and Hospital Course:      Excerpt from hospital discharge summary 11/11/2020:    \"26year-old male presenting to [facility] on 10/21/2020 for polysubstance abuse and overdose.  Per report, EMS was notified due to patient being found unresponsive and hypoxic.  On admission, patient with multiorgan failure including renal failure, elevated troponin, respiratory failure requiring intubation, shock liver.  He was admitted to the ICU.  Initially found to have EF <15% with eventual recovery. He was initiated on CRRT, transitioned to HD, then stopped. Also found to be bacteremic, s/p treatment. He was extubated and remained stable on medical floor for several days. Psychiatry consulted and recommended inpatient psychiatric transfer when medically stable. Patient has completed IV antibiotics and no further inpatient treatment planned. He has a component of CKD and will need nephrology consultation while remains in inpatient psychiatric unit. He received opiates throughout his hospitalization and will need to be weaned. He is deconditioned but able to independently ambulate. \"  Transferred to inpatient behavioral health unit yesterday, 11/11/2020, resumed on inpatient blood pressure medications, [address] [place] [address] for LUE DVT, mental health medications. Hospitalist group paged for consultation due to single episode of emesis following dinner associated with elevated blood pressure. Subjective:   Pt seen today in NAD. Denies ha, dizziness, cp, sob. Discussed HTN and adjustment in meds. No questions.        Assessment/Plan:       Cardiomyopathy  -Stable  - TTE with EF 15%, likely secondary to drug use  - LUCIANO , EF improving  - Cardiology evaluated earlier in visit, cath deferred due to improving anterior wall function   -Cards recommends GDMT for heart failure/LV dysfunction - Metoprolol, isosorbide dinitrate, hydralazine, add ACE-I/ARB when renal function allows  - Advised patient he will need cardiology follow up after discharge     HTN  - Elevated prior to receiving AM BP meds today; elevated yest evening as well  - Continue current plan of above medications, ACE-I/ARB when able  - Increase isorbide to 30mg TID; add PRN hydralazine PO  - Monitor blood pressure for now, adjust meds if remains elevated     JEAN PIERRE: severe ATN from rhabdo  - Improving  - Avoid nephrotoxins, renally dose medications  -Strict I/Os per nephro  - Appreciate nephrology input  - Advised patient he will need nephrology follow up after discharge     Acute LUE DVT  - Stable  - Continue Eliquis at least 3 months     Anemia  - Stable, no active bleeding  - Hgb improving; 8.6 today (up from 7.8 yest)  - check HH if s/sx bleeding    Left lung PNA w/ Sepsis: Resolved    Bacteremia: Staph hominis on 10/21  -Resolved  -LUCIANO neg for vegetation  -Completed vanc/doxy 11/10 per ID     Debility  - PT/OT consulted.  Dietary following    Pain  -Does not appear to be in pain; talking on phone when I arrived, smiling  -was on high doses of pain meds/dilaudid while IP  -gradually decrease pain meds to avoid withdrawal  -Decrease oxy 5mg from q8h prn to q12h prn         Review of Systems:     Full ROS complete with pertinent positives and negatives as per HPI, otherwise negative  Objective:   Physical Exam:     Visit Vitals  BP (!) 174/99 (BP 1 Location: Left arm, BP Patient Position: Sitting)   Pulse 87   Temp 97.5 °F (36.4 °C)   Resp 14   Ht 5' 10\" (1.778 m)   Wt 93.4 kg (206 lb)   SpO2 99%   BMI 29.56 kg/m²           Temp (24hrs), Av.4 °F (36.9 °C), Min:97.5 °F (36.4 °C), Max:98.8 °F (37.1 °C)     07 -  1900  In: 1000 [P.O.:1000]  Out: -    No intake/output data recorded. General:  Alert, cooperative, no distress, appears stated age. Lungs:   Clear to auscultation bilaterally. Heart:  Regular rate and rhythm, S1, S2 normal, no murmur, click, rub or gallop. Abdomen:   Soft, non-tender, non-distended. Bowel sounds normal.    Extremities: BLE 3+ edema; LUE 1+ edema   Skin: Pale, multiple healing ulcers, dressings intact   Neurologic: CNII-XII intact, A&Ox3, CARMONA     Data Review:       Recent Days:  Recent Labs     11/13/20  0457   WBC 6.5   HGB 8.6*   HCT 26.7*   *     Recent Labs     11/13/20 0457 11/11/20  0448    138   K 3.4* 3.6    105   CO2 27 25   GLU 87 93   BUN 25* 35*   CREA 1.82* 2.31*   CA 8.8 8.5   MG 1.4*  --    PHOS 3.8  --    ALB 2.8*  --    ALT 30  --      No results for input(s): PH, PCO2, PO2, HCO3, FIO2 in the last 72 hours.     24 Hour Results:  Recent Results (from the past 24 hour(s))   LIPID PANEL    Collection Time: 11/13/20  4:57 AM   Result Value Ref Range    LIPID PROFILE          Cholesterol, total 148 <200 MG/DL    Triglyceride 130 <150 MG/DL    HDL Cholesterol 41 MG/DL    LDL, calculated 81 0 - 100 MG/DL    VLDL, calculated 26 MG/DL    CHOL/HDL Ratio 3.6 0.0 - 5.0     HEMOGLOBIN A1C WITH EAG    Collection Time: 11/13/20  4:57 AM   Result Value Ref Range    Hemoglobin A1c 5.1 4.0 - 5.6 %    Est. average glucose 100 mg/dL   CBC WITH AUTOMATED DIFF    Collection Time: 11/13/20  4:57 AM   Result Value Ref Range    WBC 6.5 4.1 - 11.1 K/uL    RBC 3.11 (L) 4.10 - 5.70 M/uL    HGB 8.6 (L) 12.1 - 17.0 g/dL    HCT 26.7 (L) 36.6 - 50.3 %    MCV 85.9 80.0 - 99.0 FL    MCH 27.7 26.0 - 34.0 PG    MCHC 32.2 30.0 - 36.5 g/dL    RDW 17.2 (H) 11.5 - 14.5 %    PLATELET 146 (H) 996 - 400 K/uL    MPV 9.1 8.9 - 12.9 FL    NRBC 0.0 0  WBC    ABSOLUTE NRBC 0.00 0.00 - 0.01 K/uL    NEUTROPHILS 63 32 - 75 %    LYMPHOCYTES 23 12 - 49 %    MONOCYTES 9 5 - 13 %    EOSINOPHILS 3 0 - 7 %    BASOPHILS 1 0 - 1 %    IMMATURE GRANULOCYTES 1 (H) 0.0 - 0.5 %    ABS. NEUTROPHILS 4.1 1.8 - 8.0 K/UL    ABS. LYMPHOCYTES 1.5 0.8 - 3.5 K/UL    ABS. MONOCYTES 0.6 0.0 - 1.0 K/UL    ABS. EOSINOPHILS 0.2 0.0 - 0.4 K/UL    ABS. BASOPHILS 0.1 0.0 - 0.1 K/UL    ABS. IMM. GRANS. 0.0 0.00 - 0.04 K/UL    DF AUTOMATED     MAGNESIUM    Collection Time: 11/13/20  4:57 AM   Result Value Ref Range    Magnesium 1.4 (L) 1.6 - 2.4 mg/dL   METABOLIC PANEL, COMPREHENSIVE    Collection Time: 11/13/20  4:57 AM   Result Value Ref Range    Sodium 139 136 - 145 mmol/L    Potassium 3.4 (L) 3.5 - 5.1 mmol/L    Chloride 104 97 - 108 mmol/L    CO2 27 21 - 32 mmol/L    Anion gap 8 5 - 15 mmol/L    Glucose 87 65 - 100 mg/dL    BUN 25 (H) 6 - 20 MG/DL    Creatinine 1.82 (H) 0.70 - 1.30 MG/DL    BUN/Creatinine ratio 14 12 - 20      GFR est AA 56 (L) >60 ml/min/1.73m2    GFR est non-AA 46 (L) >60 ml/min/1.73m2    Calcium 8.8 8.5 - 10.1 MG/DL    Bilirubin, total 0.6 0.2 - 1.0 MG/DL    ALT (SGPT) 30 12 - 78 U/L    AST (SGOT) 21 15 - 37 U/L    Alk. phosphatase 68 45 - 117 U/L    Protein, total 6.0 (L) 6.4 - 8.2 g/dL    Albumin 2.8 (L) 3.5 - 5.0 g/dL    Globulin 3.2 2.0 - 4.0 g/dL    A-G Ratio 0.9 (L) 1.1 - 2.2     PHOSPHORUS    Collection Time: 11/13/20  4:57 AM   Result Value Ref Range    Phosphorus 3.8 2.6 - 4.7 MG/DL       Problem List:  Problem List as of 11/13/2020 Date Reviewed: 11/13/2020          Codes Class Noted - Resolved    Acute deep vein thrombosis (DVT) of axillary vein of left upper extremity (HCC) ICD-10-CM: I82. A12  ICD-9-CM: 453.84  11/13/2020 - Present        HTN (hypertension) ICD-10-CM: I10  ICD-9-CM: 401.9  11/13/2020 - Present        Acute systolic heart failure (HCC) ICD-10-CM: I50.21  ICD-9-CM: 428.21  11/13/2020 - Present        Anemia ICD-10-CM: D64.9  ICD-9-CM: 285.9  11/13/2020 - Present        Depression ICD-10-CM: F32.9  ICD-9-CM: 246  11/11/2020 - Present        LV dysfunction ICD-10-CM: I51.9  ICD-9-CM: 429.9  11/4/2020 - Present        Current episode of major depressive disorder without prior episode ICD-10-CM: F32.9  ICD-9-CM: 296.20  11/4/2020 - Present        Non-traumatic rhabdomyolysis ICD-10-CM: M62.82  ICD-9-CM: 728.88  11/4/2020 - Present        Toxic encephalopathy ICD-10-CM: G92  ICD-9-CM: 349.82  10/24/2020 - Present        Drug abuse ([facility] ICD-10-CM: F19.10  ICD-9-CM: 305.90  10/22/2020 - Present        Acute renal failure with tubular necrosis (HCC) ICD-10-CM: N17.0  ICD-9-CM: 584.5  10/22/2020 - Present        Sepsis ([facility] ICD-10-CM: A41.9  ICD-9-CM: 038.9, 995.91  10/22/2020 - Present        Community acquired pneumonia of left lower lobe of lung ICD-10-CM: J18.9  ICD-9-CM: 166  10/22/2020 - Present        Metabolic acidosis URT-17-JI: E87.2  ICD-9-CM: 276.2  10/22/2020 - Present        Lymphocytosis ICD-10-CM: D72.820  ICD-9-CM: 288.61  10/22/2020 - Present        Electrolyte abnormality ICD-10-CM: E87.8  ICD-9-CM: 276.9  10/22/2020 - Present        Troponin level elevated ICD-10-CM: R77.8  ICD-9-CM: 790.6  10/22/2020 - Present              Medications reviewed  Current Facility-Administered Medications   Medication Dose Route Frequency    FLUoxetine (PROzac) capsule 20 mg  20 mg Oral DAILY    lurasidone (LATUDA) tablet 40 mg  40 mg Oral DAILY WITH DINNER    oxyCODONE IR (ROXICODONE) tablet 5 mg  5 mg Oral Q8H PRN    ondansetron (ZOFRAN ODT) tablet 4 mg  4 mg Oral Q8H PRN    OLANZapine (ZyPREXA) tablet 5 mg  5 mg Oral Q6H PRN    haloperidol lactate (HALDOL) injection 5 mg  5 mg IntraMUSCular Q6H PRN    benztropine (COGENTIN) tablet 1 mg  1 mg Oral BID PRN    diphenhydrAMINE (BENADRYL) injection 50 mg  50 mg IntraMUSCular BID PRN    hydrOXYzine HCL (ATARAX) tablet 50 mg  50 mg Oral TID PRN    LORazepam (ATIVAN) injection 1 mg  1 mg IntraMUSCular Q4H PRN    traZODone (DESYREL) tablet 50 mg  50 mg Oral QHS PRN    acetaminophen (TYLENOL) tablet 650 mg  650 mg Oral Q4H PRN    magnesium hydroxide (MILK OF MAGNESIA) 400 mg/5 mL oral suspension 30 mL  30 mL Oral DAILY PRN    albuterol-ipratropium (DUO-NEB) 2.5 MG-0.5 MG/3 ML  3 mL Nebulization Q6H PRN    apixaban (ELIQUIS) tablet 5 mg  5 mg Oral BID    docusate sodium (COLACE) capsule 100 mg  100 mg Oral DAILY    hydrALAZINE (APRESOLINE) tablet 100 mg  100 mg Oral TID    isosorbide dinitrate (ISORDIL) tablet 20 mg  20 mg Oral TID    metoprolol succinate (TOPROL-XL) XL tablet 150 mg  150 mg Oral DAILY       Care Plan discussed with: Patient/family, nurse      Theresa Phlegm, NP  Hospitalist  Providers can reach me on [employer]

## 2020-11-14 LAB
ALBUMIN SERPL-MCNC: 2.6 G/DL (ref 3.5–5)
ANION GAP SERPL CALC-SCNC: 8 MMOL/L (ref 5–15)
BUN SERPL-MCNC: 20 MG/DL (ref 6–20)
BUN/CREAT SERPL: 13 (ref 12–20)
CALCIUM SERPL-MCNC: 8.5 MG/DL (ref 8.5–10.1)
CHLORIDE SERPL-SCNC: 105 MMOL/L (ref 97–108)
CO2 SERPL-SCNC: 27 MMOL/L (ref 21–32)
CREAT SERPL-MCNC: 1.56 MG/DL (ref 0.7–1.3)
GLUCOSE SERPL-MCNC: 88 MG/DL (ref 65–100)
PHOSPHATE SERPL-MCNC: 3.6 MG/DL (ref 2.6–4.7)
POTASSIUM SERPL-SCNC: 3.4 MMOL/L (ref 3.5–5.1)
SODIUM SERPL-SCNC: 140 MMOL/L (ref 136–145)

## 2020-11-14 PROCEDURE — 65220000003 HC RM SEMIPRIVATE PSYCH

## 2020-11-14 PROCEDURE — 80069 RENAL FUNCTION PANEL: CPT

## 2020-11-14 PROCEDURE — 74011250637 HC RX REV CODE- 250/637: Performed by: PSYCHIATRY & NEUROLOGY

## 2020-11-14 PROCEDURE — 74011250637 HC RX REV CODE- 250/637: Performed by: NURSE PRACTITIONER

## 2020-11-14 PROCEDURE — 36415 COLL VENOUS BLD VENIPUNCTURE: CPT

## 2020-11-14 PROCEDURE — 2709999900 HC NON-CHARGEABLE SUPPLY

## 2020-11-14 PROCEDURE — 74011250637 HC RX REV CODE- 250/637: Performed by: INTERNAL MEDICINE

## 2020-11-14 RX ORDER — ISOSORBIDE DINITRATE 20 MG/1
20 TABLET ORAL ONCE
Status: COMPLETED | OUTPATIENT
Start: 2020-11-14 | End: 2020-11-14

## 2020-11-14 RX ORDER — ISOSORBIDE DINITRATE 20 MG/1
50 TABLET ORAL 3 TIMES DAILY
Status: DISCONTINUED | OUTPATIENT
Start: 2020-11-14 | End: 2020-11-15

## 2020-11-14 RX ORDER — POTASSIUM CHLORIDE 750 MG/1
40 TABLET, FILM COATED, EXTENDED RELEASE ORAL
Status: COMPLETED | OUTPATIENT
Start: 2020-11-14 | End: 2020-11-14

## 2020-11-14 RX ADMIN — OXYCODONE 5 MG: 5 TABLET ORAL at 02:36

## 2020-11-14 RX ADMIN — ACETAMINOPHEN 650 MG: 325 TABLET ORAL at 12:28

## 2020-11-14 RX ADMIN — POTASSIUM CHLORIDE 40 MEQ: 750 TABLET, FILM COATED, EXTENDED RELEASE ORAL at 17:54

## 2020-11-14 RX ADMIN — PANTOPRAZOLE SODIUM 40 MG: 40 TABLET, DELAYED RELEASE ORAL at 06:25

## 2020-11-14 RX ADMIN — APIXABAN 5 MG: 5 TABLET, FILM COATED ORAL at 08:15

## 2020-11-14 RX ADMIN — OXYCODONE 5 MG: 5 TABLET ORAL at 14:17

## 2020-11-14 RX ADMIN — ACETAMINOPHEN 650 MG: 325 TABLET ORAL at 08:27

## 2020-11-14 RX ADMIN — ISOSORBIDE DINITRATE 50 MG: 20 TABLET ORAL at 15:55

## 2020-11-14 RX ADMIN — DOCUSATE SODIUM 100 MG: 100 CAPSULE, LIQUID FILLED ORAL at 08:16

## 2020-11-14 RX ADMIN — HYDRALAZINE HYDROCHLORIDE 100 MG: 25 TABLET, FILM COATED ORAL at 21:23

## 2020-11-14 RX ADMIN — HYDRALAZINE HYDROCHLORIDE 100 MG: 25 TABLET, FILM COATED ORAL at 15:55

## 2020-11-14 RX ADMIN — HYDROXYZINE HYDROCHLORIDE 50 MG: 50 TABLET, FILM COATED ORAL at 15:56

## 2020-11-14 RX ADMIN — ISOSORBIDE DINITRATE 20 MG: 20 TABLET ORAL at 12:26

## 2020-11-14 RX ADMIN — ACETAMINOPHEN 650 MG: 325 TABLET ORAL at 17:54

## 2020-11-14 RX ADMIN — LURASIDONE HYDROCHLORIDE 40 MG: 40 TABLET, FILM COATED ORAL at 17:25

## 2020-11-14 RX ADMIN — ISOSORBIDE DINITRATE 30 MG: 20 TABLET ORAL at 08:15

## 2020-11-14 RX ADMIN — HYDRALAZINE HYDROCHLORIDE 100 MG: 25 TABLET, FILM COATED ORAL at 08:15

## 2020-11-14 RX ADMIN — APIXABAN 5 MG: 5 TABLET, FILM COATED ORAL at 17:25

## 2020-11-14 RX ADMIN — METOPROLOL SUCCINATE 150 MG: 50 TABLET, EXTENDED RELEASE ORAL at 08:16

## 2020-11-14 RX ADMIN — ISOSORBIDE DINITRATE 50 MG: 20 TABLET ORAL at 21:23

## 2020-11-14 RX ADMIN — FLUOXETINE 20 MG: 20 CAPSULE ORAL at 08:15

## 2020-11-14 NOTE — PROGRESS NOTES
Problem: Altered Thought Process (Adult/Pediatric)  Goal: *STG: Participates in treatment plan  11/14/2020 1715 by Bob Delgado  Outcome: Progressing Towards Goal     Problem: Altered Thought Process (Adult/Pediatric)  Goal: *STG: Seeks staff when feelings of anxiety and fear arise  11/14/2020 1715 by Bob Delgado  Outcome: Progressing Towards Goal     Problem: Altered Thought Process (Adult/Pediatric)  Goal: *STG: Complies with medication therapy  11/14/2020 1715 by Bob Delgado  Outcome: Progressing Towards Goal     Problem: Altered Thought Process (Adult/Pediatric)  Goal: Interventions  11/14/2020 1715 by Bob Delgado  Outcome: Progressing Towards Goal  Note: Pt is calm cooperative. Visible on the unit. Using coping skills. Medication meal compliant. wound care complete right  lower leg. Tolerates well. PRN Medication Documentation    Specific patient behavior that led to need for PRN medication: 5/10 aching pain to LUE  Staff interventions attempted prior to PRN being given: education   PRN medication given: po 65 mg Tylenol   Patient response/effectiveness of PRN medication: pt alert oriented.

## 2020-11-14 NOTE — BH NOTES
GROUP THERAPY PROGRESS NOTE    Patient is participating in Discharge/Goals Group. Group time: 50 minutes    Personal goal for participation: Process feelings related to discharge and/or feelings/goals for today. Goal orientation: Personal    Group therapy participation: active    Therapeutic interventions reviewed and discussed: Group discussion was focused on discharge plans and anxiety related to this. Group members discussed what they planned to do once discharge and discharge plans. Discussion also related to support and communication issues that arise. Group members verbalized how they are feeling today, their personal goal for today, and goals for the week. Patients were given an opportunity to share any concerns and issues they were having    Impression of participation: Patient was calm and pleasant. Actively engaged and participated in group. Shared his goal is to stay connected to his outpatient providers and take his medications when he leaves.      Teddy Cerda, Supervisee in Social Work

## 2020-11-14 NOTE — PROGRESS NOTES
Problem: Altered Thought Process (Adult/Pediatric)  Goal: *STG: Participates in treatment plan  Outcome: Progressing Towards Goal     Problem: Altered Thought Process (Adult/Pediatric)  Goal: *STG: Seeks staff when feelings of anxiety and fear arise  Outcome: Progressing Towards Goal     Problem: Altered Thought Process (Adult/Pediatric)  Goal: *STG: Complies with medication therapy  Outcome: Progressing Towards Goal     Problem: Altered Thought Process (Adult/Pediatric)  Goal: Interventions  Outcome: Progressing Towards Goal  Note: Pt is alert and oriented. Calm cooperative. Medication meal compliant. I&O monitoring continued. Anxious, sad, depressed. Hopeful. Goal oriented. pt reports poor sleep over night. Discussed prn sleep medication to by used tonight.

## 2020-11-14 NOTE — BH NOTES
1604-PRN Medication Documentation    Specific patient behavior that led to need for PRN medication: pt c/o anxiety not relieved by coping skills   Staff interventions attempted prior to PRN being given: education   PRN medication given: po 50 mg Atarax  Patient response/effectiveness of PRN medication: pt using phone

## 2020-11-14 NOTE — PROGRESS NOTES
Hospitalist Progress Note          Gayle Rush NP  Please call  and page for questions. Call physician on-call through the  7pm-7am    Daily Progress Note: 11/14/2020    Primary care provider:Teresa Galicia MD    Date of admission: 11/11/2020  4:30 PM    Admission Summary and Hospital Course:      Excerpt from hospital discharge summary 11/11/2020:    \"26year-old male presenting to [facility] on 10/21/2020 for polysubstance abuse and overdose.  Per report, EMS was notified due to patient being found unresponsive and hypoxic.  On admission, patient with multiorgan failure including renal failure, elevated troponin, respiratory failure requiring intubation, shock liver.  He was admitted to the ICU.  Initially found to have EF <15% with eventual recovery. He was initiated on CRRT, transitioned to HD, then stopped. Also found to be bacteremic, s/p treatment. He was extubated and remained stable on medical floor for several days. Psychiatry consulted and recommended inpatient psychiatric transfer when medically stable. Patient has completed IV antibiotics and no further inpatient treatment planned. He has a component of CKD and will need nephrology consultation while remains in inpatient psychiatric unit. He received opiates throughout his hospitalization and will need to be weaned. He is deconditioned but able to independently ambulate. \"  Transferred to inpatient behavioral health unit yesterday, 11/11/2020, resumed on inpatient blood pressure medications, [facility] for LUE DVT, mental health medications. Hospitalist group paged for consultation due to single episode of emesis following dinner associated with elevated blood pressure. Subjective:   Pt seen today in NAD. Denies ha, dizziness, cp, sob. Discussed adjustment of BP meds. Patient only c/o pain to LUE where he was found to have DVT. Discussed that this pain should diminish over time.  Will stop narcotics today as patient is not utilizing tylenol and plan per psych nurse is to discharge early next week. He will not be given a prescription for narcotics on discharge. Assessment/Plan:       Cardiomyopathy  -Stable  - TTE with EF 15%, likely secondary to drug use  - LUCIANO 11/6, EF improving  - Cardiology evaluated earlier in visit, cath deferred due to improving anterior wall function   -Cards recommends GDMT for heart failure/LV dysfunction - Metoprolol, isosorbide dinitrate, hydralazine, add ACE-I/ARB when renal function allows  - Advised patient he will need cardiology follow up after discharge     HTN  - Improved but still elevated  - Continue current plan of above medications, ACE-I/ARB when able  - Increase isorbide to 50mg TID  - PRN hydralazine  - Monitor blood pressure for now, adjust meds as needed     JEAN PIERRE: severe ATN from rhabdo  - Improving  - Avoid nephrotoxins, renally dose medications  -Strict I/Os per nephro  - Appreciate nephrology input  - Advised patient he will need nephrology follow up after discharge     Acute LUE DVT  - Stable  - Continue Eliquis at least 3 months  - stop narcotics, PRN tylenol for pain     Anemia  - Stable, no active bleeding  - check HH if s/sx bleeding    Left lung PNA w/ Sepsis: Resolved    Bacteremia: Staph hominis on 10/21  -Resolved  -LUCIANO neg for vegetation  -Completed vanc/doxy 11/10 per ID     Debility  - PT/OT consulted.  Dietary following    Polysubstance Abuse & Overdose: reason for original hospital admission  -Narcotics have been being weaned; today we will stop narcotics  -Pt supposed to discharge early next week; will not get Rx for narcotics at discharge  -PRN tylenol for pain         Review of Systems:     Full ROS complete with pertinent positives and negatives as per HPI, otherwise negative  Objective:   Physical Exam:     Visit Vitals  BP (!) 155/91 (BP 1 Location: Right arm, BP Patient Position: Sitting)   Pulse 77   Temp 98.1 °F (36.7 °C)   Resp 16    5' 10\" (1.778 m)   Wt 93.4 kg (206 lb)   SpO2 99%   BMI 29.56 kg/m²      O2 Device: Room air    Temp (24hrs), Av.9 °F (36.6 °C), Min:97.3 °F (36.3 °C), Max:98.3 °F (36.8 °C)    701 - 1900  In: 0126 [P.O.:3312]  Out: -    1901 - 700  In:  [P.O.:]  Out: -     General:  Alert, cooperative, no distress, appears stated age. Lungs:   Clear to auscultation bilaterally. Heart:  Regular rate and rhythm, S1, S2 normal, no murmur, click, rub or gallop. Abdomen:   Soft, non-tender, non-distended. Bowel sounds normal.    Skin:  Musculoskeletal: Pale, multiple healing ulcers, dressings intact  Ambulatory w/o difficulty; full ROM in all extremities   Neurologic: CNII-XII intact, A&Ox3, CARMONA     Data Review:       Recent Days:  Recent Labs     20   WBC 6.5   HGB 8.6*   HCT 26.7*   *     Recent Labs     20  0622 20  045    139   K 3.4* 3.4*    104   CO2 27 27   GLU 88 87   BUN 20 25*   CREA 1.56* 1.82*   CA 8.5 8.8   MG  --  1.4*   PHOS 3.6 3.8   ALB 2.6* 2.8*   ALT  --  30     No results for input(s): PH, PCO2, PO2, HCO3, FIO2 in the last 72 hours.     24 Hour Results:  Recent Results (from the past 24 hour(s))   RENAL FUNCTION PANEL    Collection Time: 20  6:22 AM   Result Value Ref Range    Sodium 140 136 - 145 mmol/L    Potassium 3.4 (L) 3.5 - 5.1 mmol/L    Chloride 105 97 - 108 mmol/L    CO2 27 21 - 32 mmol/L    Anion gap 8 5 - 15 mmol/L    Glucose 88 65 - 100 mg/dL    BUN 20 6 - 20 MG/DL    Creatinine 1.56 (H) 0.70 - 1.30 MG/DL    BUN/Creatinine ratio 13       GFR est AA >60 >60 ml/min/1.73m2    GFR est non-AA 55 (L) >60 ml/min/1.73m2    Calcium 8.5 8.5 - 10.1 MG/DL    Phosphorus 3.6 2.6 - 4.7 MG/DL    Albumin 2.6 (L) 3.5 - 5.0 g/dL       Problem List:  Problem List as of 2020 Date Reviewed: 2020          Codes Class Noted - Resolved    Acute deep vein thrombosis (DVT) of axillary vein of left upper extremity [facility]) ICD-10-CM: I82. A12  ICD-9-CM: 453.84  11/13/2020 - Present        HTN (hypertension) ICD-10-CM: I10  ICD-9-CM: 401.9  11/13/2020 - Present        Acute systolic heart failure (HCC) ICD-10-CM: I50.21  ICD-9-CM: 428.21  11/13/2020 - Present        Anemia ICD-10-CM: D64.9  ICD-9-CM: 285.9  11/13/2020 - Present        Depression ICD-10-CM: F32.9  ICD-9-CM: 583  11/11/2020 - Present        LV dysfunction ICD-10-CM: I51.9  ICD-9-CM: 429.9  11/4/2020 - Present        Current episode of major depressive disorder without prior episode ICD-10-CM: F32.9  ICD-9-CM: 296.20  11/4/2020 - Present        Non-traumatic rhabdomyolysis ICD-10-CM: M62.82  ICD-9-CM: 728.88  11/4/2020 - Present        Toxic encephalopathy ICD-10-CM: G92  ICD-9-CM: 349.82  10/24/2020 - Present        Drug abuse ([facility] [address] ICD-10-CM: F19.10  ICD-9-CM: 305.90  10/22/2020 - Present        Acute renal failure with tubular necrosis (HCC) ICD-10-CM: N17.0  ICD-9-CM: 584.5  10/22/2020 - Present        Sepsis ([facility] [address] ICD-10-CM: A41.9  ICD-9-CM: 038.9, 995.91  10/22/2020 - Present        Community acquired pneumonia of left lower lobe of lung ICD-10-CM: J18.9  ICD-9-CM: 497  10/22/2020 - Present        Metabolic acidosis TOY-01-CO: E87.2  ICD-9-CM: 276.2  10/22/2020 - Present        Lymphocytosis ICD-10-CM: D72.820  ICD-9-CM: 288.61  10/22/2020 - Present        Electrolyte abnormality ICD-10-CM: E87.8  ICD-9-CM: 276.9  10/22/2020 - Present        Troponin level elevated ICD-10-CM: R77.8  ICD-9-CM: 790.6  10/22/2020 - Present              Medications reviewed  Current Facility-Administered Medications   Medication Dose Route Frequency    isosorbide dinitrate (ISORDIL) tablet 50 mg  50 mg Oral TID    potassium chloride SR (KLOR-CON 10) tablet 40 mEq  40 mEq Oral NOW    oxyCODONE IR (ROXICODONE) tablet 5 mg  5 mg Oral Q12H PRN    pantoprazole (PROTONIX) tablet 40 mg  40 mg Oral ACB    ondansetron (ZOFRAN ODT) tablet 4 mg  4 mg Oral Q6H PRN    FLUoxetine (PROzac) capsule 20 mg  20 mg Oral DAILY    lurasidone (LATUDA) tablet 40 mg  40 mg Oral DAILY WITH DINNER    OLANZapine (ZyPREXA) tablet 5 mg  5 mg Oral Q6H PRN    haloperidol lactate (HALDOL) injection 5 mg  5 mg IntraMUSCular Q6H PRN    benztropine (COGENTIN) tablet 1 mg  1 mg Oral BID PRN    diphenhydrAMINE (BENADRYL) injection 50 mg  50 mg IntraMUSCular BID PRN    hydrOXYzine HCL (ATARAX) tablet 50 mg  50 mg Oral TID PRN    LORazepam (ATIVAN) injection 1 mg  1 mg IntraMUSCular Q4H PRN    traZODone (DESYREL) tablet 50 mg  50 mg Oral QHS PRN    acetaminophen (TYLENOL) tablet 650 mg  650 mg Oral Q4H PRN    magnesium hydroxide (MILK OF MAGNESIA) 400 mg/5 mL oral suspension 30 mL  30 mL Oral DAILY PRN    albuterol-ipratropium (DUO-NEB) 2.5 MG-0.5 MG/3 ML  3 mL Nebulization Q6H PRN    apixaban (ELIQUIS) tablet 5 mg  5 mg Oral BID    docusate sodium (COLACE) capsule 100 mg  100 mg Oral DAILY    hydrALAZINE (APRESOLINE) tablet 100 mg  100 mg Oral TID    metoprolol succinate (TOPROL-XL) XL tablet 150 mg  150 mg Oral DAILY       Care Plan discussed with: Patient/family, nurse      Eladia Reza NP  Hospitalist  Providers can reach me on [employer]

## 2020-11-14 NOTE — BH NOTES
Chief Complaint:  I am feeling okay. Length of Stay: 3 Days    Interval History:  Juan Hdez is doing alright today. He reports that nausea and vomiting has subsided and he is eating well. He reports that he has some difficulty staying asleep and falling asleep. He continues to be followed by hospitalist for kidney function and HTN medications. He remains socially isolated and was encouraged to attend groups. Denies any AH or VH. He has been on the phone, laughing and engaged with family and friends. Pleasant and calm during the interview. Past Medical History:  Past Medical History:   Diagnosis Date    Anxiety     Depression            Labs:  Lab Results   Component Value Date/Time    WBC 6.5 11/13/2020 04:57 AM    HGB 8.6 (L) 11/13/2020 04:57 AM    HCT 26.7 (L) 11/13/2020 04:57 AM    PLATELET 439 (H) 16/06/2954 04:57 AM    MCV 85.9 11/13/2020 04:57 AM      Lab Results   Component Value Date/Time    Sodium 140 11/14/2020 06:22 AM    Potassium 3.4 (L) 11/14/2020 06:22 AM    Chloride 105 11/14/2020 06:22 AM    CO2 27 11/14/2020 06:22 AM    Anion gap 8 11/14/2020 06:22 AM    Glucose 88 11/14/2020 06:22 AM    BUN 20 11/14/2020 06:22 AM    Creatinine 1.56 (H) 11/14/2020 06:22 AM    BUN/Creatinine ratio 13 11/14/2020 06:22 AM    GFR est AA >60 11/14/2020 06:22 AM    GFR est non-AA 55 (L) 11/14/2020 06:22 AM    Calcium 8.5 11/14/2020 06:22 AM    Bilirubin, total 0.6 11/13/2020 04:57 AM    Alk.  phosphatase 68 11/13/2020 04:57 AM    Protein, total 6.0 (L) 11/13/2020 04:57 AM    Albumin 2.6 (L) 11/14/2020 06:22 AM    Globulin 3.2 11/13/2020 04:57 AM    A-G Ratio 0.9 (L) 11/13/2020 04:57 AM    ALT (SGPT) 30 11/13/2020 04:57 AM      Vitals:    11/13/20 1140 11/13/20 1556 11/13/20 2103 11/14/20 0738   BP: 123/73 (!) 162/96 (!) 148/89 (!) 169/96   Pulse: 80 67 83 86   Resp: 16 16 16 16   Temp: 98.7 °F (37.1 °C) 99 °F (37.2 °C) 97.3 °F (36.3 °C) 98.3 °F (36.8 °C)   SpO2: 97% 99% 97% 96%   Weight:       Height: Current Facility-Administered Medications   Medication Dose Route Frequency Provider Last Rate Last Dose    isosorbide dinitrate (ISORDIL) tablet 50 mg  50 mg Oral TID Telma Rebolledo NP        isosorbide dinitrate (ISORDIL) tablet 20 mg  20 mg Oral ONCE Telma Rebolledo NP        oxyCODONE IR (ROXICODONE) tablet 5 mg  5 mg Oral Q12H PRN Telma Rebolledo NP   5 mg at 11/14/20 0236    pantoprazole (PROTONIX) tablet 40 mg  40 mg Oral ACB Chavo Howard MD   40 mg at 11/14/20 0625    ondansetron (ZOFRAN ODT) tablet 4 mg  4 mg Oral Q6H PRN Chavo Howard MD   4 mg at 11/13/20 1411    FLUoxetine (PROzac) capsule 20 mg  20 mg Oral DAILY Chavo Howard MD   20 mg at 11/14/20 0815    lurasidone (LATUDA) tablet 40 mg  40 mg Oral DAILY WITH [place] Feeling, Carlton Sebastian MD   40 mg at 11/13/20 1657    OLANZapine (ZyPREXA) tablet 5 mg  5 mg Oral Q6H PRN [facility], RACHELE        haloperidol lactate (HALDOL) injection 5 mg  5 mg IntraMUSCular Q6H PRN [facility], NP        benztropine (COGENTIN) tablet 1 mg  1 mg Oral BID PRN [facility], RACHELE        diphenhydrAMINE (BENADRYL) injection 50 mg  50 mg IntraMUSCular BID PRN [facility], NP        hydrOXYzine HCL (ATARAX) tablet 50 mg  50 mg Oral TID PRN [facility], NP        LORazepam (ATIVAN) injection 1 mg  1 mg IntraMUSCular Q4H PRN [facility], RACHELE        traZODone (DESYREL) tablet 50 mg  50 mg Oral QHS PRN [facility], RACHELE        acetaminophen (TYLENOL) tablet 650 mg  650 mg Oral Q4H PRN Annette Canada NP   650 mg at 11/14/20 0827    magnesium hydroxide (MILK OF MAGNESIA) 400 mg/5 mL oral suspension 30 mL  30 mL Oral DAILY PRN Annette Canada, RACHELE        albuterol-ipratropium (DUO-NEB) 2.5 MG-0.5 MG/3 ML  3 mL Nebulization Q6H PRN Jose CORRAL,         apixaban (ELIQUIS) tablet 5 mg  5 mg Oral BID Gm CORRAL, DO   5 mg at 11/14/20 0805    docusate sodium (COLACE) capsule 100 mg  100 mg Oral DAILY Gm CORRAL, DO   100 mg at 11/14/20 3861    hydrALAZINE (APRESOLINE) tablet 100 mg  100 mg Oral TID Gm CORRAL, DO   100 mg at 11/14/20 4323    metoprolol succinate (TOPROL-XL) XL tablet 150 mg  150 mg Oral DAILY Gm CORRAL, DO   150 mg at 11/14/20 8045         Mental Status Exam:  Eye contact: Good   Grooming: fair  Psychomotor activity: decreased  Speech is spontaneous  Mood is \"okay\"  Affect: flat  Perception: Denies any Ah or Vh.   Suicidal ideation: Denies   Cognition is grossly intact       Physical Exam:  Body habitus: Body mass index is 29.56 kg/m². Musculoskeletal system: normal gait  Tremor - neg  Cog wheeling - neg      Assessment and Plan:  Pheobe Lundborg meets criteria for a diagnosis of Mood disorder unspecified; rule out recurrent major depression; rule out bipolar disorder; cocaine use disorder, severe; marijuana, amphetamines, and ecstasy use disorder, mild. Continue the medication regimen as prescribed  Disposition planning to continue. I certify that this patients inpatient psychiatric hospital services furnished since the previous certification were, and continue to be, required for treatment that could reasonably be expected to improve the patient's condition, or for diagnostic study, and that the patient continues to need, on a daily basis, active treatment furnished directly by or requiring the supervision of inpatient psychiatric facility personnel. In addition, the hospital records show that services furnished were intensive treatment services, admission or related services, or equivalent services.

## 2020-11-14 NOTE — PROGRESS NOTES
11/14/20 1550   Vital Signs   Temp 98.1 °F (36.7 °C)   Temp Source Oral   Pulse (Heart Rate) 70   Resp Rate 16   O2 Sat (%) 99 %   BP (!) 174/114   MAP (Calculated) 134   BP 1 Method Automatic   BP 1 Location Right arm   BP Patient Position Sitting   see VS reassessment and BP medication given.

## 2020-11-14 NOTE — BH NOTES
PRN Medication Documentation    Specific patient behavior that led to need for PRN medication: c/o pain left arm from DVT  Staff interventions attempted prior to PRN being given: discussion  PRN medication given: Tylenol  Patient response/effectiveness of PRN medication: Will continue to monitor

## 2020-11-14 NOTE — PROGRESS NOTES
2300: Resting quietly in bed with eyes closed. No apparent distress. Respirations are even and unlabored. Staff will continue to monitor q15 throughout the shift. Problem: Falls - Risk of  Goal: *Absence of Falls  Description: Document David Wood Fall Risk and appropriate interventions in the flowsheet.   Outcome: Progressing Towards Goal  Note: Fall Risk Interventions:  Mobility Interventions: Communicate number of staff needed for ambulation/transfer         Medication Interventions: Teach patient to arise slowly    Elimination Interventions: Patient to call for help with toileting needs, Stay With Me (per policy), Toilet paper/wipes in reach

## 2020-11-15 LAB
ALBUMIN SERPL-MCNC: 2.9 G/DL (ref 3.5–5)
ANION GAP SERPL CALC-SCNC: 6 MMOL/L (ref 5–15)
BUN SERPL-MCNC: 18 MG/DL (ref 6–20)
BUN/CREAT SERPL: 12 (ref 12–20)
CALCIUM SERPL-MCNC: 8.6 MG/DL (ref 8.5–10.1)
CHLORIDE SERPL-SCNC: 109 MMOL/L (ref 97–108)
CO2 SERPL-SCNC: 26 MMOL/L (ref 21–32)
CREAT SERPL-MCNC: 1.47 MG/DL (ref 0.7–1.3)
GLUCOSE SERPL-MCNC: 92 MG/DL (ref 65–100)
PHOSPHATE SERPL-MCNC: 3.2 MG/DL (ref 2.6–4.7)
POTASSIUM SERPL-SCNC: 3.8 MMOL/L (ref 3.5–5.1)
SODIUM SERPL-SCNC: 141 MMOL/L (ref 136–145)

## 2020-11-15 PROCEDURE — 80069 RENAL FUNCTION PANEL: CPT

## 2020-11-15 PROCEDURE — 74011250637 HC RX REV CODE- 250/637: Performed by: NURSE PRACTITIONER

## 2020-11-15 PROCEDURE — 82088 ASSAY OF ALDOSTERONE: CPT

## 2020-11-15 PROCEDURE — 83835 ASSAY OF METANEPHRINES: CPT

## 2020-11-15 PROCEDURE — 65220000003 HC RM SEMIPRIVATE PSYCH

## 2020-11-15 PROCEDURE — 74011250637 HC RX REV CODE- 250/637: Performed by: PSYCHIATRY & NEUROLOGY

## 2020-11-15 PROCEDURE — 74011250637 HC RX REV CODE- 250/637: Performed by: INTERNAL MEDICINE

## 2020-11-15 PROCEDURE — 36415 COLL VENOUS BLD VENIPUNCTURE: CPT

## 2020-11-15 RX ORDER — AMLODIPINE BESYLATE 5 MG/1
10 TABLET ORAL DAILY
Status: DISCONTINUED | OUTPATIENT
Start: 2020-11-15 | End: 2020-11-16 | Stop reason: HOSPADM

## 2020-11-15 RX ORDER — FUROSEMIDE 10 MG/ML
20 INJECTION INTRAMUSCULAR; INTRAVENOUS DAILY
Status: DISCONTINUED | OUTPATIENT
Start: 2020-11-15 | End: 2020-11-15

## 2020-11-15 RX ORDER — HYDRALAZINE HYDROCHLORIDE 25 MG/1
50 TABLET, FILM COATED ORAL
Status: DISCONTINUED | OUTPATIENT
Start: 2020-11-15 | End: 2020-11-16 | Stop reason: HOSPADM

## 2020-11-15 RX ORDER — FUROSEMIDE 20 MG/1
20 TABLET ORAL DAILY
Status: DISCONTINUED | OUTPATIENT
Start: 2020-11-15 | End: 2020-11-16 | Stop reason: HOSPADM

## 2020-11-15 RX ORDER — LOSARTAN POTASSIUM 50 MG/1
50 TABLET ORAL 2 TIMES DAILY
Status: DISCONTINUED | OUTPATIENT
Start: 2020-11-15 | End: 2020-11-16 | Stop reason: HOSPADM

## 2020-11-15 RX ADMIN — ACETAMINOPHEN 650 MG: 325 TABLET ORAL at 19:23

## 2020-11-15 RX ADMIN — APIXABAN 5 MG: 5 TABLET, FILM COATED ORAL at 08:09

## 2020-11-15 RX ADMIN — ACETAMINOPHEN 650 MG: 325 TABLET ORAL at 01:38

## 2020-11-15 RX ADMIN — TRAZODONE HYDROCHLORIDE 50 MG: 50 TABLET ORAL at 21:03

## 2020-11-15 RX ADMIN — APIXABAN 5 MG: 5 TABLET, FILM COATED ORAL at 17:38

## 2020-11-15 RX ADMIN — LOSARTAN POTASSIUM 50 MG: 50 TABLET, FILM COATED ORAL at 21:03

## 2020-11-15 RX ADMIN — LOSARTAN POTASSIUM 50 MG: 50 TABLET, FILM COATED ORAL at 11:26

## 2020-11-15 RX ADMIN — HYDRALAZINE HYDROCHLORIDE 100 MG: 25 TABLET, FILM COATED ORAL at 08:09

## 2020-11-15 RX ADMIN — ACETAMINOPHEN 650 MG: 325 TABLET ORAL at 14:43

## 2020-11-15 RX ADMIN — PANTOPRAZOLE SODIUM 40 MG: 40 TABLET, DELAYED RELEASE ORAL at 06:31

## 2020-11-15 RX ADMIN — METOPROLOL SUCCINATE 150 MG: 50 TABLET, EXTENDED RELEASE ORAL at 08:09

## 2020-11-15 RX ADMIN — FUROSEMIDE 20 MG: 20 TABLET ORAL at 13:01

## 2020-11-15 RX ADMIN — ACETAMINOPHEN 650 MG: 325 TABLET ORAL at 07:28

## 2020-11-15 RX ADMIN — OLANZAPINE 5 MG: 5 TABLET, FILM COATED ORAL at 01:42

## 2020-11-15 RX ADMIN — HYDROXYZINE HYDROCHLORIDE 50 MG: 50 TABLET, FILM COATED ORAL at 17:38

## 2020-11-15 RX ADMIN — DOCUSATE SODIUM 100 MG: 100 CAPSULE, LIQUID FILLED ORAL at 08:09

## 2020-11-15 RX ADMIN — LURASIDONE HYDROCHLORIDE 40 MG: 40 TABLET, FILM COATED ORAL at 17:38

## 2020-11-15 RX ADMIN — FLUOXETINE 20 MG: 20 CAPSULE ORAL at 08:09

## 2020-11-15 RX ADMIN — HYDROXYZINE HYDROCHLORIDE 50 MG: 50 TABLET, FILM COATED ORAL at 01:42

## 2020-11-15 RX ADMIN — AMLODIPINE BESYLATE 10 MG: 5 TABLET ORAL at 11:26

## 2020-11-15 RX ADMIN — ISOSORBIDE DINITRATE 50 MG: 20 TABLET ORAL at 08:28

## 2020-11-15 NOTE — PROGRESS NOTES
Problem: Altered Thought Process (Adult/Pediatric)  Goal: *STG: Seeks staff when feelings of anxiety and fear arise  Outcome: Progressing Towards Goal     Problem: Altered Thought Process (Adult/Pediatric)  Goal: *STG: Complies with medication therapy  Outcome: Progressing Towards Goal     Problem: Altered Thought Process (Adult/Pediatric)  Goal: Interventions  Outcome: Progressing Towards Goal  Note: Pt reports feeling tired with poor sleep over night. Appears anxious depressed. Guarded. Less cooperative with I&O monitoring. Medication compliant. Visible on the unit. Education provided to pt related to BP medications and lab work. New orders in place.    1050- 20 minutes recumbent position required prior to metanephrines plasma draw (this lab required to be chilled)

## 2020-11-15 NOTE — BH NOTES
Chief Complaint:  I am alright today. Length of Stay: 4 Days    Interval History:  Virgil Thompson is doing alright today. He appears guarded, minimizing severity of attempted overdose. He reports that he continues to have some difficulty staying asleep and falling asleep. He reports that he does not have difficulty sleeping at home. He Denies any reoccurring or ruminating thoughts. He continues to be followed by hospitalist for kidney function and HTN medication and medications are being adjusted per hospitalist recommendations. He remains socially isolated and was encouraged to attend groups, but discharged focused. Denies any AH or VH. Luis Angel Augustinw Pleasant and calm during the interview. Past Medical History:  Past Medical History:   Diagnosis Date    Anxiety     Depression            Labs:  Lab Results   Component Value Date/Time    WBC 6.5 11/13/2020 04:57 AM    HGB 8.6 (L) 11/13/2020 04:57 AM    HCT 26.7 (L) 11/13/2020 04:57 AM    PLATELET 916 (H) 47/83/6669 04:57 AM    MCV 85.9 11/13/2020 04:57 AM      Lab Results   Component Value Date/Time    Sodium 141 11/15/2020 06:18 AM    Potassium 3.8 11/15/2020 06:18 AM    Chloride 109 (H) 11/15/2020 06:18 AM    CO2 26 11/15/2020 06:18 AM    Anion gap 6 11/15/2020 06:18 AM    Glucose 92 11/15/2020 06:18 AM    BUN 18 11/15/2020 06:18 AM    Creatinine 1.47 (H) 11/15/2020 06:18 AM    BUN/Creatinine ratio 12 11/15/2020 06:18 AM    GFR est AA >60 11/15/2020 06:18 AM    GFR est non-AA 59 (L) 11/15/2020 06:18 AM    Calcium 8.6 11/15/2020 06:18 AM    Bilirubin, total 0.6 11/13/2020 04:57 AM    Alk.  phosphatase 68 11/13/2020 04:57 AM    Protein, total 6.0 (L) 11/13/2020 04:57 AM    Albumin 2.9 (L) 11/15/2020 06:18 AM    Globulin 3.2 11/13/2020 04:57 AM    A-G Ratio 0.9 (L) 11/13/2020 04:57 AM    ALT (SGPT) 30 11/13/2020 04:57 AM      Vitals:    11/14/20 2106 11/15/20 0732 11/15/20 0749 11/15/20 1112   BP: (!) 150/71  (!) 159/92 (!) 161/94   Pulse: 88  86 75   Resp: 14  16 16 Temp: 98.8 °F (37.1 °C)  98.9 °F (37.2 °C) 98.9 °F (37.2 °C)   SpO2:   99% 100%   Weight:  83.6 kg (184 lb 3.2 oz)     Height:             Current Facility-Administered Medications   Medication Dose Route Frequency Provider Last Rate Last Dose    furosemide (LASIX) injection 20 mg  20 mg IntraVENous DAILY [place] Rosa Isela, NP        losartan (COZAAR) tablet 50 mg  50 mg Oral BID Michaelstoney Natarajan NP   50 mg at 11/15/20 1126    amLODIPine (NORVASC) tablet 10 mg  10 mg Oral DAILY Michael Rosa Isela, NP   10 mg at 11/15/20 1126    hydrALAZINE (APRESOLINE) tablet 50 mg  50 mg Oral Q8H PRN Michael Natarajan NP        pantoprazole (PROTONIX) tablet 40 mg  40 mg Oral ACB Devyn Kwon MD   40 mg at 11/15/20 0631    ondansetron (ZOFRAN ODT) tablet 4 mg  4 mg Oral Q6H PRN Devyn Kwon MD   4 mg at 11/13/20 1411    FLUoxetine (PROzac) capsule 20 mg  20 mg Oral DAILY Devyn Kwon MD   20 mg at 11/15/20 0809    lurasidone (LATUDA) tablet 40 mg  40 mg Oral DAILY WITH Monique Lopes MD   40 mg at 11/14/20 1725    OLANZapine (ZyPREXA) tablet 5 mg  5 mg Oral Q6H PRN Annette Canada NP   5 mg at 11/15/20 0142    haloperidol lactate (HALDOL) injection 5 mg  5 mg IntraMUSCular Q6H PRN Ellie Canada NP        benztropine (COGENTIN) tablet 1 mg  1 mg Oral BID PRN Ellie Canada NP        diphenhydrAMINE (BENADRYL) injection 50 mg  50 mg IntraMUSCular BID PRN Ellie Canada NP        hydrOXYzine HCL (ATARAX) tablet 50 mg  50 mg Oral TID PRN Annette Canada NP   50 mg at 11/15/20 0142    LORazepam (ATIVAN) injection 1 mg  1 mg IntraMUSCular Q4H PRN Ellie Canada NP        traZODone (DESYREL) tablet 50 mg  50 mg Oral QHS PRN Ellie Canada NP        acetaminophen (TYLENOL) tablet 650 mg  650 mg Oral Q4H PRN Annette Canada NP   650 mg at 11/15/20 0728    magnesium hydroxide (MILK OF MAGNESIA) 400 mg/5 mL oral suspension 30 mL  30 mL Oral DAILY PRN Vivian Canada NP        albuterol-ipratropium (DUO-NEB) 2.5 MG-0.5 MG/3 ML  3 mL Nebulization Q6H PRN Elizabeth Cazaresthuy Lulu M, DO        apixaban (ELIQUIS) tablet 5 mg  5 mg Oral BID Gearld Creeks M, DO   5 mg at 11/15/20 9449    docusate sodium (COLACE) capsule 100 mg  100 mg Oral DAILY Gearld Creeks M, DO   100 mg at 11/15/20 3075    metoprolol succinate (TOPROL-XL) XL tablet 150 mg  150 mg Oral DAILY Gearld Creeks M, DO   150 mg at 11/15/20 0809         Mental Status Exam:  Eye contact: Good   Grooming: fair  Psychomotor activity: decreased  Speech is spontaneous  Mood is \"okay\"  Affect: flat  Perception: Denies any Ah or Vh.   Suicidal ideation: Denies   Cognition is grossly intact       Physical Exam:  Body habitus: Body mass index is 26.43 kg/m². Musculoskeletal system: normal gait  Tremor - neg  Cog wheeling - neg      Assessment and Plan:  Heidi Colón meets criteria for a diagnosis of Mood disorder unspecified; rule out recurrent major depression; rule out bipolar disorder; cocaine use disorder, severe; marijuana, amphetamines, and ecstasy use disorder, mild. Continue the medication regimen as prescribed  Disposition planning to continue. I certify that this patients inpatient psychiatric hospital services furnished since the previous certification were, and continue to be, required for treatment that could reasonably be expected to improve the patient's condition, or for diagnostic study, and that the patient continues to need, on a daily basis, active treatment furnished directly by or requiring the supervision of inpatient psychiatric facility personnel. In addition, the hospital records show that services furnished were intensive treatment services, admission or related services, or equivalent services.

## 2020-11-15 NOTE — PROGRESS NOTES
Hospitalist Progress Note          Justino Pappas NP  Please call  and page for questions. Call physician on-call through the  7pm-7am    Daily Progress Note: 11/15/2020    Primary care provider:Rivas, Ema Skiff, MD    Date of admission: 11/11/2020  4:30 PM    Admission Summary and Hospital Course:      Excerpt from hospital discharge summary 11/11/2020:    \"26year-old male presenting to [facility] on 10/21/2020 for polysubstance abuse and overdose.  Per report, EMS was notified due to patient being found unresponsive and hypoxic.  On admission, patient with multiorgan failure including renal failure, elevated troponin, respiratory failure requiring intubation, shock liver.  He was admitted to the ICU.  Initially found to have EF <15% with eventual recovery. He was initiated on CRRT, transitioned to HD, then stopped. Also found to be bacteremic, s/p treatment. He was extubated and remained stable on medical floor for several days. Psychiatry consulted and recommended inpatient psychiatric transfer when medically stable. Patient has completed IV antibiotics and no further inpatient treatment planned. He has a component of CKD and will need nephrology consultation while remains in inpatient psychiatric unit. He received opiates throughout his hospitalization and will need to be weaned. He is deconditioned but able to independently ambulate. \"  Transferred to inpatient behavioral health unit yesterday, 11/11/2020, resumed on inpatient blood pressure medications, [facility] for LUE DVT, mental health medications. Hospitalist group paged for consultation due to single episode of emesis following dinner associated with elevated blood pressure. Subjective:   Pt seen today in NAD. Denies ha, dizziness, cp, sob. LUE edema improving; BLE edema stable around 2+. Pt reports tolerable pain to LUE. Has not requested narcotics last 24h.  Discussed medication changes and reason behind them as well as liklihood of increased urination w/ lasix. Pt verbalized understanding. Assessment/Plan:       Cardiomyopathy  -Stable  - TTE with EF 15%, likely secondary to drug use  - LUCIANO 11/6, EF improving  - Cardiology evaluated earlier in visit, cath deferred due to improving anterior wall function   -Cards recommends GDMT for heart failure/LV dysfunction - cont metoprolol, losartan, lasix  - Advised patient he will need cardiology follow up after discharge    BLE extremity edema  -Lasix started today  -Monitor     HTN  - Improved but still elevated  - Creat continues to improve and is stable  -Stop hydralazine and isordil; pt unlikely to be compliant with TID dosing  -Start Lasix 20mg PO daily-monitor K  -Start norvasc 10mg daily  -Start losartan 50mg BID  - PRN hydralazine  - Monitor blood pressure for now, adjust meds as needed     JEAN PIERRE: severe ATN from rhabdo  - Improving, creat stable  - Avoid nephrotoxins, renally dose medications  -Strict I/Os per nephro  - Appreciate nephrology input  - Advised patient he will need nephrology follow up after discharge     Acute LUE DVT  - Stable; edema improving  - Continue Eliquis at least 3 months  - narcotics stopped 11/14, PRN tylenol for pain     Anemia  - Stable, no active bleeding  - check HH if s/sx bleeding    Left lung PNA w/ Sepsis: Resolved    Bacteremia: Staph hominis on 10/21  -Resolved  -LUCIANO neg for vegetation  -Completed vanc/doxy 11/10 per ID     Debility  - PT/OT consulted.  Dietary following    Polysubstance Abuse & Overdose: reason for original hospital admission  -Narcotics stopped 11/14  -Pt supposed to discharge early next week; will not get Rx for narcotics at discharge  -PRN tylenol for pain         Review of Systems:     Full ROS complete with pertinent positives and negatives as per HPI, otherwise negative  Objective:   Physical Exam:     Visit Vitals  BP (!) 161/94 (BP 1 Location: Right arm, BP Patient Position: Sitting) Pulse 75   Temp 98.9 °F (37.2 °C)   Resp 16   Ht 5' 10\" (1.778 m)   Wt 83.6 kg (184 lb 3.2 oz)   SpO2 100%   BMI 26.43 kg/m²      O2 Device: Room air    Temp (24hrs), Av.7 °F (37.1 °C), Min:98.1 °F (36.7 °C), Max:98.9 °F (37.2 °C)    11/15 0701 - 11/15 1900  In: 3049 [P.O.:1604]  Out: -    1901 - 11/15 0700  In: 3312 [P.O.:3312]  Out: -     General:  Alert, cooperative, no distress, appears stated age   Lungs:   Clear to auscultation bilaterally. Heart:  Regular rate and rhythm, S1, S2 normal, no murmur, click, rub or gallop. Abdomen:   Soft, non-tender, non-distended. Bowel sounds normal.    Skin:  Extremities: Pale, multiple healing ulcers, dressings intact  LUE edema d/t clot-improving; BLE edema 2+   Neurologic: CNII-XII intact, A&Ox3, CARMONA     Data Review:       Recent Days:  Recent Labs     20  0457   WBC 6.5   HGB 8.6*   HCT 26.7*   *     Recent Labs     11/15/20  0618 20  0622 20  0457    140 139   K 3.8 3.4* 3.4*   * 105 104   CO2 26 27 27   GLU 92 88 87   BUN 18 20 25*   CREA 1.47* 1.56* 1.82*   CA 8.6 8.5 8.8   MG  --   --  1.4*   PHOS 3.2 3.6 3.8   ALB 2.9* 2.6* 2.8*   ALT  --   --  30     No results for input(s): PH, PCO2, PO2, HCO3, FIO2 in the last 72 hours.     24 Hour Results:  Recent Results (from the past 24 hour(s))   RENAL FUNCTION PANEL    Collection Time: 11/15/20  6:18 AM   Result Value Ref Range    Sodium 141 136 - 145 mmol/L    Potassium 3.8 3.5 - 5.1 mmol/L    Chloride 109 (H) 97 - 108 mmol/L    CO2 26 21 - 32 mmol/L    Anion gap 6 5 - 15 mmol/L    Glucose 92 65 - 100 mg/dL    BUN 18 6 - 20 MG/DL    Creatinine 1.47 (H) 0.70 - 1.30 MG/DL    BUN/Creatinine ratio 12 12 - 20      GFR est AA >60 >60 ml/min/1.73m2    GFR est non-AA 59 (L) >60 ml/min/1.73m2    Calcium 8.6 8.5 - 10.1 MG/DL    Phosphorus 3.2 2.6 - 4.7 MG/DL    Albumin 2.9 (L) 3.5 - 5.0 g/dL       Problem List:  Problem List as of 11/15/2020 Date Reviewed: 2020          Codes Class Noted - Resolved    Acute deep vein thrombosis (DVT) of axillary vein of left upper extremity (HCC) ICD-10-CM: I82. A12  ICD-9-CM: 453.84  11/13/2020 - Present        HTN (hypertension) ICD-10-CM: I10  ICD-9-CM: 401.9  11/13/2020 - Present        Acute systolic heart failure (HCC) ICD-10-CM: I50.21  ICD-9-CM: 428.21  11/13/2020 - Present        Anemia ICD-10-CM: D64.9  ICD-9-CM: 285.9  11/13/2020 - Present        Depression ICD-10-CM: F32.9  ICD-9-CM: 501  11/11/2020 - Present        LV dysfunction ICD-10-CM: I51.9  ICD-9-CM: 429.9  11/4/2020 - Present        Current episode of major depressive disorder without prior episode ICD-10-CM: F32.9  ICD-9-CM: 296.20  11/4/2020 - Present        Non-traumatic rhabdomyolysis ICD-10-CM: M62.82  ICD-9-CM: 728.88  11/4/2020 - Present        Toxic encephalopathy ICD-10-CM: G92  ICD-9-CM: 349.82  10/24/2020 - Present        Drug abuse ([facility] ICD-10-CM: F19.10  ICD-9-CM: 305.90  10/22/2020 - Present        Acute renal failure with tubular necrosis (HCC) ICD-10-CM: N17.0  ICD-9-CM: 584.5  10/22/2020 - Present        Sepsis ([facility] ICD-10-CM: A41.9  ICD-9-CM: 038.9, 995.91  10/22/2020 - Present        Community acquired pneumonia of left lower lobe of lung ICD-10-CM: J18.9  ICD-9-CM: 455  10/22/2020 - Present        Metabolic acidosis RQJ-13-CT: E87.2  ICD-9-CM: 276.2  10/22/2020 - Present        Lymphocytosis ICD-10-CM: D72.820  ICD-9-CM: 288.61  10/22/2020 - Present        Electrolyte abnormality ICD-10-CM: E87.8  ICD-9-CM: 276.9  10/22/2020 - Present        Troponin level elevated ICD-10-CM: R77.8  ICD-9-CM: 790.6  10/22/2020 - Present              Medications reviewed  Current Facility-Administered Medications   Medication Dose Route Frequency    losartan (COZAAR) tablet 50 mg  50 mg Oral BID    amLODIPine (NORVASC) tablet 10 mg  10 mg Oral DAILY    hydrALAZINE (APRESOLINE) tablet 50 mg  50 mg Oral Q8H PRN    furosemide (LASIX) tablet 20 mg  20 mg Oral DAILY    pantoprazole (PROTONIX) tablet 40 mg  40 mg Oral ACB    ondansetron (ZOFRAN ODT) tablet 4 mg  4 mg Oral Q6H PRN    FLUoxetine (PROzac) capsule 20 mg  20 mg Oral DAILY    lurasidone (LATUDA) tablet 40 mg  40 mg Oral DAILY WITH DINNER    OLANZapine (ZyPREXA) tablet 5 mg  5 mg Oral Q6H PRN    haloperidol lactate (HALDOL) injection 5 mg  5 mg IntraMUSCular Q6H PRN    benztropine (COGENTIN) tablet 1 mg  1 mg Oral BID PRN    diphenhydrAMINE (BENADRYL) injection 50 mg  50 mg IntraMUSCular BID PRN    hydrOXYzine HCL (ATARAX) tablet 50 mg  50 mg Oral TID PRN    LORazepam (ATIVAN) injection 1 mg  1 mg IntraMUSCular Q4H PRN    traZODone (DESYREL) tablet 50 mg  50 mg Oral QHS PRN    acetaminophen (TYLENOL) tablet 650 mg  650 mg Oral Q4H PRN    magnesium hydroxide (MILK OF MAGNESIA) 400 mg/5 mL oral suspension 30 mL  30 mL Oral DAILY PRN    albuterol-ipratropium (DUO-NEB) 2.5 MG-0.5 MG/3 ML  3 mL Nebulization Q6H PRN    apixaban (ELIQUIS) tablet 5 mg  5 mg Oral BID    docusate sodium (COLACE) capsule 100 mg  100 mg Oral DAILY    metoprolol succinate (TOPROL-XL) XL tablet 150 mg  150 mg Oral DAILY       Care Plan discussed with: Patient/family, nurse      Dori Gonzalez NP  Hospitalist  Providers can reach me on [employer]

## 2020-11-15 NOTE — PROGRESS NOTES
2300: Resting quietly in bed with eyes closed. No apparent distress. Respirations are even and unlabored. Staff will continue to monitor q15 throughout the shift. Problem: Falls - Risk of  Goal: *Absence of Falls  Description: Document Lemana Ramos Fall Risk and appropriate interventions in the flowsheet.   Outcome: Progressing Towards Goal  Note: Fall Risk Interventions:  Mobility Interventions: Communicate number of staff needed for ambulation/transfer         Medication Interventions: Teach patient to arise slowly    Elimination Interventions: Patient to call for help with toileting needs, Stay With Me (per policy), Toilet paper/wipes in reach

## 2020-11-15 NOTE — PROGRESS NOTES
Problem: Altered Thought Process (Adult/Pediatric)  Goal: *STG: Participates in treatment plan  Outcome: Progressing Towards Goal  Variance Patient slowly responding  Pt's affect is subdued. Facial expression is void of emotion. Pt complies with meds. Pt is not forthcoming regarding events leading up to overdose. Pt states that he is drinking and urinating adequately, but is noncompliant with measuring output.

## 2020-11-15 NOTE — BH NOTES
PRN Medication Documentation    Specific patient behavior that led to need for PRN medication: verbalized feelings of anxiety preventing sleep  Staff interventions attempted prior to PRN being given: Evaluation  PRN medication given: Atarax 50 mg po and zyprexa 5 mg po @ 0145  Patient response/effectiveness of PRN medication:

## 2020-11-15 NOTE — BH NOTES
PRN Medication Documentation    Specific patient behavior that led to need for PRN medication: pt requests anti anxiety medication  Staff interventions attempted prior to PRN being given: education, coping skills   PRN medication given: po 50 mg Atarax   Patient response/effectiveness of PRN medication: pt is visible on the unit watching TV

## 2020-11-15 NOTE — BH NOTES
GROUP THERAPY PROGRESS NOTE    Patient is participating in [employer]. Group time: 50 minutes    Personal goal for participation: Creating a safety plan for patients in crisis or having suicidal ideation      Goal orientation: Personal    Group therapy participation: active    Therapeutic interventions reviewed and discussed: Group discussion was focused answering the safety plan that [facility] is creating for patients when they discharge. Each question was explained to patients, and they had to fill out the answers individually. At the end, group members were encouraged to share parts of their safety plan or identify one takeaway from completing this. Group discussion included different ways patient could utilize this plan when they leave. Impression of participation: Surya Harris was present in group and actively completed the safety plan. He was quiet, and did not engage in much discussion. Patient asked a few questions.      Teddy Cerda, Supervisee in Social Work

## 2020-11-15 NOTE — BH NOTES
PRN Medication Documentation    Specific patient behavior that led to need for PRN medication: c/o left arm pain d/t DVT  Staff interventions attempted prior to PRN being given: discussion  PRN medication given: Tylenol  Patient response/effectiveness of PRN medication: Will continue to monitor      PRN Medication Documentation    Specific patient behavior that led to need for PRN medication: c/o left arm pain d/t DVT  Staff interventions attempted prior to PRN being given: discussion  PRN medication given: Tylenol @ 1450  Patient response/effectiveness of PRN medication: Will continue to monitor

## 2020-11-16 VITALS
BODY MASS INDEX: 26.37 KG/M2 | DIASTOLIC BLOOD PRESSURE: 91 MMHG | RESPIRATION RATE: 16 BRPM | TEMPERATURE: 98.6 F | SYSTOLIC BLOOD PRESSURE: 135 MMHG | HEART RATE: 91 BPM | WEIGHT: 184.2 LBS | OXYGEN SATURATION: 99 % | HEIGHT: 70 IN

## 2020-11-16 LAB
ALBUMIN SERPL-MCNC: 3 G/DL (ref 3.5–5)
ANION GAP SERPL CALC-SCNC: 7 MMOL/L (ref 5–15)
BUN SERPL-MCNC: 19 MG/DL (ref 6–20)
BUN/CREAT SERPL: 14 (ref 12–20)
CALCIUM SERPL-MCNC: 8.6 MG/DL (ref 8.5–10.1)
CHLORIDE SERPL-SCNC: 108 MMOL/L (ref 97–108)
CO2 SERPL-SCNC: 26 MMOL/L (ref 21–32)
CREAT SERPL-MCNC: 1.36 MG/DL (ref 0.7–1.3)
GLUCOSE SERPL-MCNC: 89 MG/DL (ref 65–100)
PHOSPHATE SERPL-MCNC: 3.7 MG/DL (ref 2.6–4.7)
POTASSIUM SERPL-SCNC: 3.7 MMOL/L (ref 3.5–5.1)
SODIUM SERPL-SCNC: 141 MMOL/L (ref 136–145)

## 2020-11-16 PROCEDURE — 74011250637 HC RX REV CODE- 250/637: Performed by: NURSE PRACTITIONER

## 2020-11-16 PROCEDURE — 74011250637 HC RX REV CODE- 250/637: Performed by: PSYCHIATRY & NEUROLOGY

## 2020-11-16 PROCEDURE — 74011250637 HC RX REV CODE- 250/637: Performed by: INTERNAL MEDICINE

## 2020-11-16 PROCEDURE — 80069 RENAL FUNCTION PANEL: CPT

## 2020-11-16 PROCEDURE — 36415 COLL VENOUS BLD VENIPUNCTURE: CPT

## 2020-11-16 RX ORDER — METOPROLOL TARTRATE 25 MG/1
75 TABLET, FILM COATED ORAL 2 TIMES DAILY
Status: DISCONTINUED | OUTPATIENT
Start: 2020-11-16 | End: 2020-11-16 | Stop reason: HOSPADM

## 2020-11-16 RX ORDER — AMLODIPINE BESYLATE 10 MG/1
10 TABLET ORAL DAILY
Qty: 30 TAB | Refills: 0 | Status: SHIPPED | OUTPATIENT
Start: 2020-11-17

## 2020-11-16 RX ORDER — METOPROLOL TARTRATE 75 MG/1
75 TABLET, FILM COATED ORAL 2 TIMES DAILY
Qty: 60 TAB | Refills: 0 | Status: SHIPPED | OUTPATIENT
Start: 2020-11-16 | End: 2020-11-17

## 2020-11-16 RX ORDER — FUROSEMIDE 20 MG/1
20 TABLET ORAL DAILY
Qty: 30 TAB | Refills: 0 | Status: SHIPPED | OUTPATIENT
Start: 2020-11-16

## 2020-11-16 RX ORDER — HYDROXYZINE 50 MG/1
50 TABLET, FILM COATED ORAL
Qty: 90 TAB | Refills: 0 | Status: SHIPPED | OUTPATIENT
Start: 2020-11-16 | End: 2020-11-26

## 2020-11-16 RX ORDER — FLUOXETINE HYDROCHLORIDE 40 MG/1
40 CAPSULE ORAL DAILY
Qty: 30 CAP | Refills: 0 | Status: SHIPPED | OUTPATIENT
Start: 2020-11-16

## 2020-11-16 RX ORDER — NALOXONE HYDROCHLORIDE 4 MG/.1ML
SPRAY NASAL
Qty: 1 EACH | Refills: 0 | Status: SHIPPED | OUTPATIENT
Start: 2020-11-16

## 2020-11-16 RX ORDER — METOPROLOL SUCCINATE 50 MG/1
150 TABLET, EXTENDED RELEASE ORAL DAILY
Qty: 90 TAB | Refills: 0 | Status: SHIPPED | OUTPATIENT
Start: 2020-11-17 | End: 2020-11-16

## 2020-11-16 RX ORDER — IBUPROFEN 200 MG
1 TABLET ORAL DAILY
Status: DISCONTINUED | OUTPATIENT
Start: 2020-11-16 | End: 2020-11-16 | Stop reason: HOSPADM

## 2020-11-16 RX ORDER — LOSARTAN POTASSIUM 50 MG/1
50 TABLET ORAL 2 TIMES DAILY
Qty: 30 TAB | Refills: 0 | Status: SHIPPED | OUTPATIENT
Start: 2020-11-16

## 2020-11-16 RX ORDER — FUROSEMIDE 20 MG/1
TABLET ORAL
Qty: 60 TAB | Refills: 0 | Status: SHIPPED | OUTPATIENT
Start: 2020-11-17 | End: 2020-11-16 | Stop reason: SDUPTHER

## 2020-11-16 RX ADMIN — APIXABAN 5 MG: 5 TABLET, FILM COATED ORAL at 08:05

## 2020-11-16 RX ADMIN — PANTOPRAZOLE SODIUM 40 MG: 40 TABLET, DELAYED RELEASE ORAL at 06:34

## 2020-11-16 RX ADMIN — AMLODIPINE BESYLATE 10 MG: 5 TABLET ORAL at 08:05

## 2020-11-16 RX ADMIN — FUROSEMIDE 20 MG: 20 TABLET ORAL at 08:05

## 2020-11-16 RX ADMIN — LOSARTAN POTASSIUM 50 MG: 50 TABLET, FILM COATED ORAL at 08:05

## 2020-11-16 RX ADMIN — FLUOXETINE 20 MG: 20 CAPSULE ORAL at 08:05

## 2020-11-16 RX ADMIN — METOPROLOL SUCCINATE 150 MG: 50 TABLET, EXTENDED RELEASE ORAL at 08:05

## 2020-11-16 RX ADMIN — ACETAMINOPHEN 650 MG: 325 TABLET ORAL at 13:27

## 2020-11-16 RX ADMIN — HYDROXYZINE HYDROCHLORIDE 50 MG: 50 TABLET, FILM COATED ORAL at 02:35

## 2020-11-16 RX ADMIN — ACETAMINOPHEN 650 MG: 325 TABLET ORAL at 02:35

## 2020-11-16 RX ADMIN — DOCUSATE SODIUM 100 MG: 100 CAPSULE, LIQUID FILLED ORAL at 08:05

## 2020-11-16 NOTE — PROGRESS NOTES
Hospitalist Progress Note          Ally Hobbs NP  Please call  and page for questions. Call physician on-call through the  7pm-7am    Daily Progress Note: 11/16/2020    Primary care provider:Reji Galicia MD    Date of admission: 11/11/2020  4:30 PM    Admission Summary and Hospital Course:      Excerpt from hospital discharge summary 11/11/2020:    \"26year-old male presenting to [facility] on 10/21/2020 for polysubstance abuse and overdose.  Per report, EMS was notified due to patient being found unresponsive and hypoxic.  On admission, patient with multiorgan failure including renal failure, elevated troponin, respiratory failure requiring intubation, shock liver.  He was admitted to the ICU.  Initially found to have EF <15% with eventual recovery. He was initiated on CRRT, transitioned to HD, then stopped. Also found to be bacteremic, s/p treatment. He was extubated and remained stable on medical floor for several days. Psychiatry consulted and recommended inpatient psychiatric transfer when medically stable. Patient has completed IV antibiotics and no further inpatient treatment planned. He has a component of CKD and will need nephrology consultation while remains in inpatient psychiatric unit. He received opiates throughout his hospitalization and will need to be weaned. He is deconditioned but able to independently ambulate. \"  Transferred to inpatient behavioral health unit yesterday, 11/11/2020, resumed on inpatient blood pressure medications, [address] [place] [address] for LUE DVT, mental health medications. Hospitalist group paged for consultation due to single episode of emesis following dinner associated with elevated blood pressure. Subjective:   Pt seen today in NAD. Denies ha, dizziness, cp, sob. LUE edema resolved; BLE edema resolved. Pt reports tolerable pain to LUE. Has not requested narcotics last 24h.  Discussed medication changes and reason behind them as well as liklihood of increased urination w/ lasix. Pt verbalized understanding. He is being discharged at this time.  Discussed in detail the change in metoprolol and nurse wrote down changes on AVS.     Assessment/Plan:       Cardiomyopathy: NYHA Class unable to be assessed on admission d/t mentation/drug overdose  -Stable; NYHA Class I on discharge  - TTE with EF 15%, likely secondary to drug use  - LUCIANO 11/6, EF improving  - Cardiology evaluated earlier in visit, cath deferred due to improving anterior wall function   -Cards recommends GDMT for heart failure/LV dysfunction - cont metoprolol, losartan, lasix  - Advised patient he will need cardiology follow up after discharge    BLE extremity edema  -Resolved  -cont lasix  -Monitor     HTN  - Improved but still elevated early AM  - Creat continues to improve and is stable  -Cont Lasix 20mg PO daily  -Cont norvasc 10mg daily  -Cont losartan 50mg BID  -Stop metoprolol 150mg xl daily and start metoprolol 75mg BID as BP quite good in day/afternoon and more elevated in early AM  -Advised patient to follow up with PCP, nephrology and cardiology     JEAN PIERRE: severe ATN from rhabdo  - Improving, creat stable  - Advised patient he will need nephrology follow up after discharge     Acute LUE DVT  - Resolved  - Continue Eliquis at least 3 months  - narcotics stopped 11/14, PRN tylenol for pain     Anemia  - Stable, improving  -f/u with PCP    Left lung PNA w/ Sepsis: Resolved    Bacteremia: Staph hominis on 10/21  -Resolved  -LUCIANO neg for vegetation  -Completed vanc/doxy 11/10 per ID     Debility  -Resolved    Polysubstance Abuse & Overdose: reason for original hospital admission  -Stable  -PRN tylenol for pain         Review of Systems:     Full ROS complete with pertinent positives and negatives as per HPI, otherwise negative  Objective:   Physical Exam:     Visit Vitals  BP (!) 161/94 (BP 1 Location: Left arm, BP Patient Position: Sitting)   Pulse 91   Temp 98.4 °F (36.9 °C)   Resp 16   Ht 5' 10\" (1.778 m)   Wt 83.6 kg (184 lb 3.2 oz)   SpO2 98%   BMI 26.43 kg/m²      O2 Device: Room air    Temp (24hrs), Av.8 °F (37.1 °C), Min:98.4 °F (36.9 °C), Max:99 °F (37.2 °C)    701 - 1900  In: 500 [P.O.:500]  Out: -    1901 - 700  In: 8816 [P.O.:2454]  Out: -     General:  Alert, cooperative, no distress, appears stated age   Lungs:   Clear to auscultation bilaterally. Heart:  Regular rate and rhythm, S1, S2 normal, no murmur, click, rub or gallop. Abdomen:   Soft, non-tender, non-distended. Bowel sounds normal.    Skin:  Extremities: Pale, multiple healing ulcers, dressings intact  LUE edema d/t clot-improving; BLE edema 2+   Neurologic: CNII-XII intact, A&Ox3, CARMONA     Data Review:       Recent Days:  No results for input(s): WBC, HGB, HCT, PLT, HGBEXT, HCTEXT, PLTEXT, HGBEXT, HCTEXT, PLTEXT in the last 72 hours. Recent Labs     20  0624 11/15/20  0618 20  0622    141 140   K 3.7 3.8 3.4*    109* 105   CO2 26 26 27   GLU 89 92 88   BUN 19 18 20   CREA 1.36* 1.47* 1.56*   CA 8.6 8.6 8.5   PHOS 3.7 3.2 3.6   ALB 3.0* 2.9* 2.6*     No results for input(s): PH, PCO2, PO2, HCO3, FIO2 in the last 72 hours.     24 Hour Results:  Recent Results (from the past 24 hour(s))   RENAL FUNCTION PANEL    Collection Time: 20  6:24 AM   Result Value Ref Range    Sodium 141 136 - 145 mmol/L    Potassium 3.7 3.5 - 5.1 mmol/L    Chloride 108 97 - 108 mmol/L    CO2 26 21 - 32 mmol/L    Anion gap 7 5 - 15 mmol/L    Glucose 89 65 - 100 mg/dL    BUN 19 6 - 20 MG/DL    Creatinine 1.36 (H) 0.70 - 1.30 MG/DL    BUN/Creatinine ratio 14 12 - 20      GFR est AA >60 >60 ml/min/1.73m2    GFR est non-AA >60 >60 ml/min/1.73m2    Calcium 8.6 8.5 - 10.1 MG/DL    Phosphorus 3.7 2.6 - 4.7 MG/DL    Albumin 3.0 (L) 3.5 - 5.0 g/dL       Problem List:  Problem List as of 2020 Date Reviewed: 2020          Codes Class Noted - Resolved    Acute deep vein thrombosis (DVT) of axillary vein of left upper extremity (HCC) ICD-10-CM: I82. A12  ICD-9-CM: 453.84  11/13/2020 - Present        HTN (hypertension) ICD-10-CM: I10  ICD-9-CM: 401.9  11/13/2020 - Present        Acute systolic heart failure (HCC) ICD-10-CM: I50.21  ICD-9-CM: 428.21  11/13/2020 - Present        Anemia ICD-10-CM: D64.9  ICD-9-CM: 285.9  11/13/2020 - Present        Depression ICD-10-CM: F32.9  ICD-9-CM: 498  11/11/2020 - Present        LV dysfunction ICD-10-CM: I51.9  ICD-9-CM: 429.9  11/4/2020 - Present        Current episode of major depressive disorder without prior episode ICD-10-CM: F32.9  ICD-9-CM: 296.20  11/4/2020 - Present        Non-traumatic rhabdomyolysis ICD-10-CM: M62.82  ICD-9-CM: 728.88  11/4/2020 - Present        Toxic encephalopathy ICD-10-CM: G92  ICD-9-CM: 349.82  10/24/2020 - Present        Drug abuse ([facility] ICD-10-CM: F19.10  ICD-9-CM: 305.90  10/22/2020 - Present        Acute renal failure with tubular necrosis (HCC) ICD-10-CM: N17.0  ICD-9-CM: 584.5  10/22/2020 - Present        Sepsis ([facility] 75.) ICD-10-CM: A41.9  ICD-9-CM: 038.9, 995.91  10/22/2020 - Present        Community acquired pneumonia of left lower lobe of lung ICD-10-CM: J18.9  ICD-9-CM: 649  10/22/2020 - Present        Metabolic acidosis MXX-69-NH: E87.2  ICD-9-CM: 276.2  10/22/2020 - Present        Lymphocytosis ICD-10-CM: D72.820  ICD-9-CM: 288.61  10/22/2020 - Present        Electrolyte abnormality ICD-10-CM: E87.8  ICD-9-CM: 276.9  10/22/2020 - Present        Troponin level elevated ICD-10-CM: R77.8  ICD-9-CM: 790.6  10/22/2020 - Present              Medications reviewed  Current Facility-Administered Medications   Medication Dose Route Frequency    metoprolol tartrate (LOPRESSOR) tablet 75 mg  75 mg Oral BID    losartan (COZAAR) tablet 50 mg  50 mg Oral BID    amLODIPine (NORVASC) tablet 10 mg  10 mg Oral DAILY    hydrALAZINE (APRESOLINE) tablet 50 mg  50 mg Oral Q8H PRN    furosemide (LASIX) tablet 20 mg  20 mg Oral DAILY    pantoprazole (PROTONIX) tablet 40 mg  40 mg Oral ACB    ondansetron (ZOFRAN ODT) tablet 4 mg  4 mg Oral Q6H PRN    FLUoxetine (PROzac) capsule 20 mg  20 mg Oral DAILY    lurasidone (LATUDA) tablet 40 mg  40 mg Oral DAILY WITH DINNER    OLANZapine (ZyPREXA) tablet 5 mg  5 mg Oral Q6H PRN    haloperidol lactate (HALDOL) injection 5 mg  5 mg IntraMUSCular Q6H PRN    benztropine (COGENTIN) tablet 1 mg  1 mg Oral BID PRN    diphenhydrAMINE (BENADRYL) injection 50 mg  50 mg IntraMUSCular BID PRN    hydrOXYzine HCL (ATARAX) tablet 50 mg  50 mg Oral TID PRN    LORazepam (ATIVAN) injection 1 mg  1 mg IntraMUSCular Q4H PRN    traZODone (DESYREL) tablet 50 mg  50 mg Oral QHS PRN    acetaminophen (TYLENOL) tablet 650 mg  650 mg Oral Q4H PRN    magnesium hydroxide (MILK OF MAGNESIA) 400 mg/5 mL oral suspension 30 mL  30 mL Oral DAILY PRN    albuterol-ipratropium (DUO-NEB) 2.5 MG-0.5 MG/3 ML  3 mL Nebulization Q6H PRN    apixaban (ELIQUIS) tablet 5 mg  5 mg Oral BID    docusate sodium (COLACE) capsule 100 mg  100 mg Oral DAILY       Care Plan discussed with: Patient/family, nurse      Nida Ha NP  Hospitalist  Providers can reach me on [employer]

## 2020-11-16 NOTE — DISCHARGE SUMMARY
DISCHARGE SUMMARY    Some parts of the discharge summary are from the initial Psychiatric interview that was done on admission by the admitting psychiatrist.      Date of Admission: 11/11/2020    Date of Discharge:11/16/2020     TYPE OF DISCHARGE:   REGULAR -  YES    AMA  RELEASED BY THE [facility] COURT   CHIEF COMPLAINT:  \"I feel okay today. \"     HISTORY OF PRESENT ILLNESS:  The patient is a 13-year-old  man who is currently admitted after he was transferred to us from the medical service. He had been admitted there initially on 10/21/2020 after an apparent fentanyl overdose. The overdose was quite severe and he ended up in the ICU for a prolonged period as well as developing severe acute renal failure, shock liver, multiple pneumonias, with an ALT of 33,496 and an AST of 2000. Currently, he appears to have recovered although with sequelae including multiple sores all over his body. He reports that he did take the overdose of fentanyl, but does not recall how much he used or where he got it from. States that he had been depressed about a breakup with a girlfriend and had also been using other drugs. His urine drug screen was positive for marijuana, amphetamines, cocaine, PCP, and benzodiazepines. States that he uses most of these drugs on an occasional basis but tends to use cocaine almost daily and estimates that he was using about 20-30 dollars worth of cocaine most days. States that he had been started on Prozac by a primary care physician, but had only been partly compliant with it and might have even was stopped it prior to his suicide attempt. He is somewhat of a poor historian and does not remember many of the details. States that his mood is better now and he feels more hopeful about the future. Still has trouble with sleep and his energy and motivation are low. Denies any perceptual abnormalities. Denies any delusions.   Since his arrival on the unit, he has been calm and cooperative.     PAST MEDICAL HISTORY:  Reviewed as per the history and physical exam.             Past Medical History:   Diagnosis Date    Anxiety      Depression                Prior to Admission medications    Medication Sig Start Date End Date Taking? Authorizing Provider   naloxone (Narcan) 4 mg/actuation nasal spray Use 1 spray intranasally, then discard. Repeat with new spray every 2 min as needed for opioid overdose symptoms, alternating nostrils. 10/18/20     Ray Quiroz MD   FLUoxetine (PROzac) 20 mg capsule Take 20 mg by mouth daily.       Kaleb Moncada MD   hydrOXYzine HCL (ATARAX) 25 mg tablet Take 25 mg by mouth two (2) times a day.       Kaleb Moncada MD             Vitals:     11/12/20 1745 11/12/20 1917 11/12/20 2111 11/13/20 0758   BP: (!) 164/105 (!) 163/90 (!) 157/99 (!) 174/99   Pulse: 79 80 79 87   Resp:   18   14   Temp:   98.5 °F (36.9 °C)   97.5 °F (36.4 °C)   SpO2:       99%   Weight:           Height:                    Lab Results   Component Value Date/Time     WBC 6.5 11/13/2020 04:57 AM     HGB 8.6 (L) 11/13/2020 04:57 AM     HCT 26.7 (L) 11/13/2020 04:57 AM     PLATELET 070 (H) 47/43/6630 04:57 AM     MCV 85.9 11/13/2020 04:57 AM            Lab Results   Component Value Date/Time     Sodium 139 11/13/2020 04:57 AM     Potassium 3.4 (L) 11/13/2020 04:57 AM     Chloride 104 11/13/2020 04:57 AM     CO2 27 11/13/2020 04:57 AM     Anion gap 8 11/13/2020 04:57 AM     Glucose 87 11/13/2020 04:57 AM     BUN 25 (H) 11/13/2020 04:57 AM     Creatinine 1.82 (H) 11/13/2020 04:57 AM     BUN/Creatinine ratio 14 11/13/2020 04:57 AM     GFR est AA 56 (L) 11/13/2020 04:57 AM     GFR est non-AA 46 (L) 11/13/2020 04:57 AM     Calcium 8.8 11/13/2020 04:57 AM     Bilirubin, total 0.6 11/13/2020 04:57 AM     Alk.  phosphatase 68 11/13/2020 04:57 AM     Protein, total 6.0 (L) 11/13/2020 04:57 AM     Albumin 2.8 (L) 11/13/2020 04:57 AM     Globulin 3.2 11/13/2020 04:57 AM     A-G Ratio 0.9 (L) 11/13/2020 04:57 AM     ALT (SGPT) 30 11/13/2020 04:57 AM     AST (SGOT) 21 11/13/2020 04:57 AM      No results found for: VALF2, VALAC, VALP, VALPR, DS6, CRBAM, CRBAMP, CARB2, XCRBAM  No results found for: LITHM  RADIOLOGY REPORTS:(reviewed/updated 11/13/2020)  Mra Brain Wo Cont     Result Date: 10/23/2020  INDICATION: hypoxia COMPARISON:  None TECHNIQUE:  3-D time-of-flight MRA of the brain was performed. Multiplanar reconstructions were obtained. FINDINGS: Posterior Circulation:  No flow limiting stenosis or occlusion. Anterior Circulation:  No flow limiting stenosis or occlusion. Additional Comments:  No evidence of aneurysm or vascular malformation.      IMPRESSION: No flow limiting stenosis or intracranial aneurysm.      Mri Brain Wo Cont     Result Date: 10/23/2020  INDICATION:   Anxiety, depression, probable drug overdose, abnormal head CT AMS EXAMINATION:  MRI BRAIN WO CONTRAST COMPARISON:  None TECHNIQUE:  Multiplanar multisequence acquisition without contrast of the brain. FINDINGS:  Ventricles:  Midline, no hydrocephalus. Brain Parenchyma/Brainstem:  Small symmetric areas of diffusion restriction bilateral globus pallidus. Otherwise no parenchymal signal abnormality. Intracranial Hemorrhage:  None. Basal Cisterns:  Normal. Flow Voids:  Normal. Additional Comments:  N/A.      IMPRESSION: Bilateral globus pallidus diffusion restriction, may be seen in heroin leukoencephalopathy, carbon monoxide poisoning, acute infarctions, and toxic/metabolic etiologies.      Ct Head Wo Cont     Result Date: 10/21/2020  CT dose reduction was achieved through use of a standardized protocol tailored for this examination and automatic exposure control for dose modulation. Noncontrast study shows symmetric hypodensity in each basal ganglia, centered on the globus pallidus, left slightly larger than right, not present on the study of 3 months ago. Each region measures between 10 and 12 mm. No similar finding anywhere else.  No other abnormality in gray or white matter. No mass effect or hemorrhage. Ventricles are symmetric and normal in size. No significant bone finding      IMPRESSION: Findings suggesting hypoxic or toxic edema in the basal ganglia     Ct Maxillofacial W Cont     Result Date: 9/15/2020  Contrast study shows extensive subcutaneous fat edema throughout the right maxillary and perimandibular region, with free fluid infiltrating throughout the fat in the preseptal periorbital right-sided location. Fluid infiltration continues back, along the outer margin of the masseter muscle, to the margin of the parotid gland. Salivary glands are normal and symmetric. There is no stranding or edema of the postseptal fat. Extraocular muscles and optic nerves are normal symmetric. No intraocular abnormality. Very mild membrane thickening of the maxillary sinus and slightly greater membrane thickening in the anterior ethmoids. Thickening of the contiguous facial fashion. No bone destruction or periosteal reaction. Dental caries noted in a right mandibular molar and right maxillary incisor. Numerous small lymph nodes scattered throughout the field      IMPRESSION: Extensive right-sided facial edema/cellulitis, without intraorbital extension or abscess. If cause is unknown, possibilities would include both sinusitis and dental infection.     Ct Chest Wo Cont     Result Date: 10/21/2020  CT dose reduction was achieved through use of a standardized protocol tailored for this examination and automatic exposure control for dose modulation. Noncontrast study shows moderate severity patchy consolidation and lesser degree of edema throughout much of the left lung, sparing the apex, central and peripheral. There is no large region of dense consolidation with the greatest severity at the elevated left base. Right lung is clear and central airways are open. No mediastinal or hilar adenopathy. Normal caliber aorta. No pleural pericardial effusion.  No significant upper abdominal findings      IMPRESSION: Moderate severity pneumonia throughout the left lung     Us Abd Comp     Result Date: 10/22/2020  INDICATION: Acute renal failure. Rhabdomyolysis. COMPARISON: None TECHNIQUE: Routine ultrasound images of the abdomen were obtained. FINDINGS: GALLBLADDER: Contains biliary sludge. No cholecystolithiasis, gallbladder wall thickening, or pericholecystic fluid. COMMON BILE DUCT: 6 mm in diameter. No evidence of choledocholithiasis. LIVER: Normal in size. Normal echogenicity. No focal hepatic mass or intrahepatic biliary dilatation. MAIN PORTAL VEIN: Patent. Hepatopetal flow direction. PANCREAS: No evidence of mass or ductal dilatation. RIGHT KIDNEY: 10.4 cm in length. No hydronephrosis. No evidence of mass or calculus. Perinephric edema is noted. LEFT KIDNEY: 11.9 cm in length. No hydronephrosis. No evidence of mass or calculus. Perinephric edema is noted. SPLEEN: 10.1 cm in length. No mass. AORTA: Visualized portions are normal in caliber. Distal abdominal aorta is obscured by overlying bowel gas. INFERIOR VENA CAVA: Patent. OTHER: Urinary bladder is decompressed with a Iniguez catheter.      IMPRESSION: Normal renal size and cortical echogenicity. Bilateral perinephric edema. Biliary sludge in the gallbladder.     Xr Chest Port     Result Date: 11/5/2020  INDICATION:  HF EXAM: CXR Portable. FINDINGS: Portable chest shows support lines/devices show no significant change since October 31. There is no apparent pneumothorax. Lungs show partial improvement of bilateral airspace disease/edema.  Heart size is top normal. There is no midline shift or apparent sizable pleural effusion.      IMPRESSION: Partial improvement of bilateral airspace disease/edema.      Xr Chest Port     Result Date: 10/31/2020  Clinical history: Increased work of breathing with leukocytosis, concern for infectious process vs volume overload INDICATION:   Increased work of breathing with leukocytosis, concern for infectious process vs volume overload COMPARISON: 10/20/2020 FINDINGS: AP portable upright view of the chest demonstrates a stable  cardiopericardial silhouette. Increased bilateral parenchymal opacities compared to the prior study. Dialysis catheter and central venous access catheter are stable. .No large effusion. .Patient is on a cardiac monitor.      IMPRESSION: Slightly increased interstitial and parenchymal opacities compared to the 10/20/2020 exam.      Xr Chest Port     Result Date: 10/28/2020  Clinical history: tachypnea, respiratory failure, pneumonia INDICATION:   tachypnea, respiratory failure, pneumonia COMPARISON: 10/27/2020 FINDINGS: AP portable upright view of the chest demonstrates a stable  cardiopericardial silhouette. Persistent interstitial and parenchymal opacities not significantly changed. Dialysis catheter on the right. Central venous access catheter extends from the left. No change. .Patient is on a cardiac monitor.      IMPRESSION: Stable bilateral parenchymal opacities. .      Xr Chest Port     Result Date: 10/27/2020  EXAM: XR CHEST PORT INDICATION: Tachypnea COMPARISON: October 25 FINDINGS: A portable AP radiograph of the chest was obtained at 1741 hours. The lines are stable. The patient is on a cardiac monitor. There is persistent opacification in the right lower lobe and throughout the lung, without significant change. Cardia mediastinal contours are stable. The bones and soft tissues are grossly within normal limits.      IMPRESSION: No significant change in bilateral airspace opacification, left greater than right.     Xr Chest Port     Result Date: 10/25/2020  EXAM:  XR CHEST PORT INDICATION:  sob COMPARISON:  10/17/2020 FINDINGS: A portable AP radiograph of the chest was obtained at 1003 hours. Left subclavian venous catheter tip overlies SVC. Right IJ catheter tip overlies SVC right atrial junction. .  There is increasing opacity in the mid to lower lung zones bilaterally left greater than right. .  Cardiomegaly is stable. Bony structures are unchanged.      IMPRESSION: There is increasing opacity in the lungs bilaterally left greater than right which may represent edema although superimposed pneumonia is not excluded.     Xr Chest Port     Result Date: 10/17/2020  Indication: Altered mental status. AP portable chest radiograph 17 October 2020 1848 hours. Comparison 21 July 2020. Clear lungs. Patient rotated to the left. Normal heart size exaggerated by patient rotation. No pneumothorax or pleural effusion. Normal bones.      IMPRESSION: Negative.           PAST PSYCHIATRIC HISTORY:  The patient reports that he had been hospitalized once at [facility] about a year ago and was treated for depression and anxiety. States that he has been using drugs for several years now, particularly cocaine which is his preferred drug of choice. States that he has detoxed himself off it before, but has never been hospitalized in a detox or admitted to a rehab. Denies any prior history of suicide attempts.     PSYCHOSOCIAL HISTORY:  The patient reports that he currently lives in [place], [place], where he lives with his mother, his brother, and stepfather. He is single currently, has never been  and does not have any children. As noted above, he just broke up with a girlfriend two weeks prior to his admission in the hospital.  States that he used to work for [employer], but left his job recently and is now unemployed. He attributes his drug use to his stopping work. Denies any major legal stressors at the present time.     MENTAL STATUS EXAMINATION:  The patient is a young  male who is dressed in hospital apparel. He is calm and cooperative and makes good eye contact. Speech is decreased in output, but spontaneous and coherent. Psychomotor activity is mildly decreased. Mood is reported as being down and affect is depressed.   Denies any active suicidal ideation or plan. Denies any perceptual abnormalities. Denies any delusions. His thought process is logical and goal directed. Cognitively, he is awake and alert, oriented to time, place, and person. Intelligence is average. Memory is intact and fund of knowledge is adequate. Insight is poor. Judgment is poor.     ASSESSMENT AND PLAN/DIAGNOSES:  Mood disorder unspecified; rule out recurrent major depression; rule out bipolar disorder; cocaine use disorder, severe; marijuana, amphetamines, and ecstasy use disorder, mild.     I will continue his inpatient stay. He will be provided with support and attend groups. Estimated length of stay is 2-3 days. His strengths include support from his family and ability to seek treatment.     Course in the Hospital:     Patient was admitted to the inpatient psychiatry unit for acute psychiatric stabilization in regards to symptomatology as described in the HPI above and placed on Q15 minute checks and withdrawal precautions. While on the unit Edmar Fitzpatrick was involved in individual, group, occupational and milieu therapy. He was started back on his usual medication regimen as well as PRN medications including Latuda, Prozac and Vistaril for anxiety. He improved gradually and was able to integrate into the milieu with help from the nursing staff. Patients symptoms improved gradually including low moods, SI, poor sleep, poor motivation. He was quite on the unit, appropriate in his interactions, and cooperative with medications and the unit routine. Please see individual progress notes for more specific details regarding patient's hospitalization course. Patient was discharged as per the plan. He had been doing well on the unit as per the report of the nursing staff and my observations. No PRN medication for agitation, seclusion or restraints were required during the last 48 hours of her stay.  Edmar Fitzpatrick had improved progressively to the point of being stable for discharge and outpatient FU. At this time he did not offer any complaints. Patient denied any SI or HI. Denied any AH or VH. He denied any delusions. Was not considered a danger to self or to others and is safe for discharge. Will FU with his appointments and remains motivated to be in treatment. The patient verbalized understanding of his discharge instructions. DISCHARGE DIAGNOSIS:  Mood disorder unspecified; rule out recurrent major depression; rule out bipolar disorder; cocaine use disorder, severe; marijuana, amphetamines, and ecstasy use disorder, mild. MENTAL STATUS EXAM ON DISCHARGE:    General appearance:   Que Hernandez is a 26 y.o. WHITE OR  male who is well groomed, psychomotor activity is WNL  Eye contact: makes good eye contact  Speech: Spontaneous and coherent  Affect : Euthymic  Mood: \"OK\"  Thought Process: Logical, goal directed  Perception: Denies any AH or VH. Thought Content: Denies any SI or Plan  Insight: Partial  Judgement: Fair  Cognition: Intact grossly. Current Discharge Medication List      START taking these medications    Details   amLODIPine (NORVASC) 10 mg tablet Take 1 Tab by mouth daily. Indications: high blood pressure  Qty: 30 Tab, Refills: 0      apixaban (ELIQUIS) 5 mg tablet Take 1 Tab by mouth two (2) times a day. Indications: blood clot in a deep vein of the extremities  Qty: 60 Tab, Refills: 0      furosemide (LASIX) 20 mg tablet . Indications: high blood pressure  Qty: 60 Tab, Refills: 0      losartan (COZAAR) 50 mg tablet Take 1 Tab by mouth two (2) times a day. Indications: high blood pressure  Qty: 30 Tab, Refills: 0      lurasidone (LATUDA) 40 mg tab tablet Take 1 Tab by mouth daily (with dinner). Indications: bipolar depression  Qty: 30 Tab, Refills: 0      metoprolol succinate (TOPROL-XL) 50 mg XL tablet Take 3 Tabs by mouth daily.  Indications: high blood pressure  Qty: 90 Tab, Refills: 0         CONTINUE these medications which have CHANGED    Details   naloxone (Narcan) 4 mg/actuation nasal spray Use 1 spray intranasally, then discard. Repeat with new spray every 2 min as needed for opioid overdose symptoms, alternating nostrils. Indications: decrease in rate & depth of breathing due to opioid drug  Qty: 1 Each, Refills: 0      hydrOXYzine HCL (ATARAX) 50 mg tablet Take 1 Tab by mouth three (3) times daily as needed for Anxiety for up to 10 days. Indications: anxious  Qty: 90 Tab, Refills: 0      FLUoxetine (PROzac) 40 mg capsule Take 1 Cap by mouth daily. Indications: major depressive disorder  Qty: 30 Cap, Refills: 0            No results found for: VALF2, VALAC, VALP, VALPR, DS6, CRBAM, CRBAMP, CARB2, XCRBAM  No results found for: LITHM  Follow-up Information     Follow up With Specialties Details Why Contact Info    Dr Loly Donovan on 11/17/2020 1:30 pm cardiology appointment [address] [facility]  [address] [address]  phone: [phone]    Aniya Weaver MD Nephrology Schedule an appointment as soon as possible for a visit Dr. Siddhartha Gibbs office will be calling you after discharge to provide you with a date and time foryofiliberto nephrology appointment [address] on 11/18/2020 4:00pm hospital follow up appointment with substance abuse counselor to be assessed and connect for subxone treatment  as well as psychatric medicaiton [facility] and [facility] services  [address]  Telephone: [phone]        WOUND CARE: none needed. PROGNOSIS:   Good / Fair based on nature of patient's pathology/ies and treatment compliance issues. Prognosis is greatly dependent upon patient's ability to  follow up on psychiatric/psychotherapy appointments as well as to comply with psychiatric medications as prescribed.

## 2020-11-16 NOTE — BH NOTES
GROUP THERAPY PROGRESS NOTE    Patient is participating in Mind Map Anxiety Group. Group time: 50 minutes    Personal goal for participation: To focus on one pervasive issue causing anxiety     Goal orientation: Personal    Group therapy participation: active    Therapeutic interventions reviewed and discussed: Group members were asked to create a mind map of their anxiety that is focused on their biggest current issue. They were encouraged to label it with the different triggers that initial situation, emotional feelings, physical feelings, negative thoughts, interpersonal interactions, and calming techniques. Each member had the option to use keywords, a color code, branches, visual signifiers, or whatever they felt they needed to add. The members were encouraged to get create and use creative expression. Toward the end group, each member shared their mind map and processed it. Patients were then given a worksheet on anxiety and a tapping technique method to work on their anxiety outside of group. Impression of participation: Edyta Osorio actively participated in group. He completed the activity, but did not share. He appeared to be quiet and reserved. Pt did not engage in conversation or discussion.      Teddy Cerda, Supervisee in Social Work

## 2020-11-16 NOTE — BH NOTES
PRN Medication Documentation    Specific patient behavior that led to need for PRN medication: Arm pain  Staff interventions attempted prior to PRN being given: Evaluation  PRN medication given: tylenol 650 mg po @ 1923  Patient response/effectiveness of PRN medication: TBD    PRN Medication Documentation    Specific patient behavior that led to need for PRN medication: Approached nurses station and verbalized having anxiety and arm pain and it was keeping him from being able to sleep  Staff interventions attempted prior to PRN being given: Evaluation  PRN medication given: Tylenol 650 mg po and Atarax 50 mg po @ [phone]  Patient response/effectiveness of PRN medication:

## 2020-11-16 NOTE — BH NOTES
PRN Medication Documentation    Specific patient behavior that led to need for PRN medication: Acute pain in left hand  Staff interventions attempted prior to PRN being given: Medication needed  PRN medication given: Tylenol 650 mg given at [phone]   Patient response/effectiveness of PRN medication: tbd

## 2020-11-16 NOTE — DISCHARGE INSTRUCTIONS
DISCHARGE SUMMARY    Carlita Auguste  : 1995  MRN: [id number]    The patient Long Myers exhibits the ability to control behavior in a less restrictive environment. Patient's level of functioning is improving. No assaultive/destructive behavior has been observed for the past 24 hours. No suicidal/homicidal threat or behavior has been observed for the past 24 hours. There is no evidence of serious medication side effects. Patient has not been in physical or protective restraints for at least the past 24 hours. If weapons involved, how are they secured? NA    Is patient aware of and in agreement with discharge plan? Yes    Arrangements for medication:  Prescriptions given to patient, sent electronically to [facility] [id number]    Copy of discharge instructions to provider?:  Cristy Dumont ([phone])    Arrangements for transportation home:  Mom to  20 at 2/2:30pm     Keep all follow up appointments as scheduled, continue to take prescribed medications per physician instructions. Mental health crisis number:  947 or your local mental health crisis line number at ([facility]) [phone]. SAFETY PLAN    A suicide Safety Plan is a document that supports someone when they are having thoughts of suicide. Warning Signs that indicate a suicidal crisis may be developing: What (situations, thoughts, feelings, body sensations, behaviors, etc.) do you experience that lets you know you are beginning to think about suicide? 1. Sweaty palms  2. Depression  3. Anxiety    Internal Coping Strategies:  What things can I do (relaxation techniques, hobbies, physical activities, etc.) to take my mind off my problems without contacting another person? 1. Running  2. Drawing  3. Fishing    People and social settings that provide distraction: Who can I call or where can I go to distract me? 1.  Name:Rola Phone: [phone]  2. Name: Beverly Huang                                    Phone: [phone]  3. Place:  Home      4. Place: Brother    People whom I can ask for help: Who can I call when I need help - for example, friends, family, clergy, someone else? 1. Name:  Sharan Shrestha                                             Phone: [phone]  2. Name: Beverly Huang                                       Phone: [phone]  3. Name: Deacon Estrada or Behavioral Health agencies I can contact during a crisis: Who can I call for help - for example, my doctor, my psychiatrist, my psychologist, a mental health provider, a suicide hotline? Clinician Name:  Miesha Lucio  At [facility]               Phone: [phone]     Suicide Prevention Lifeline: 9-766-347-GQMN (7545)    [address] [place] [address] Emergency Services -  for example, [facility] suicide hotline, [address] [place] [address]: [facility] [place][note 2]      Emergency Services Address: [facility] [address] [facility] at [employer]. [place], [address] [facility]      Emergency Services Phone:[phone]    Making the environment safe: How can I make my environment (house/apartment/living space) safer? For example, can I remove guns, medications, and other items? 1. Program you local crisis number at [phone] into your phone. 2. Have your support system program your local crisis number into their phone. 3. Keep medications in a lock box. Only keep a 3 day supply available at a time.

## 2020-11-16 NOTE — PROGRESS NOTES
Problem: Nutrition Deficit  Goal: *Optimize nutritional status  Outcome: Progressing Towards Goal     Problem: Altered Thought Process (Adult/Pediatric)  Goal: *STG: Participates in treatment plan  Outcome: Progressing Towards Goal  Goal: *STG: Complies with medication therapy  Outcome: Progressing Towards Goal       Pt received resting quietly in bed. Respirations even and unlabored, NAD noted. Staff to continue q15 minute checks for safety. Calm, cooperative. Attends groups on the unit, appropriate with staff and peers.

## 2020-11-16 NOTE — PROGRESS NOTES
Pharmacist Discharge Medication Reconciliation    Discharging Provider: Dr. Cody Redd PMH:   Past Medical History:   Diagnosis Date    Anxiety     Depression      Chief Complaint for this Admission: No chief complaint on file. Allergies: Tramadol    Discharge Medications:   Current Discharge Medication List        START taking these medications    Details   amLODIPine (NORVASC) 10 mg tablet Take 1 Tab by mouth daily. Indications: high blood pressure  Qty: 30 Tab, Refills: 0      losartan (COZAAR) 50 mg tablet Take 1 Tab by mouth two (2) times a day. Indications: high blood pressure  Qty: 30 Tab, Refills: 0      lurasidone (LATUDA) 40 mg tab tablet Take 1 Tab by mouth daily (with dinner). Indications: bipolar depression  Qty: 30 Tab, Refills: 0      furosemide (LASIX) 20 mg tablet Take 1 Tab by mouth daily. .  Indications: high blood pressure  Qty: 30 Tab, Refills: 0      metoprolol tartrate 75 mg tab Take 75 mg by mouth two (2) times a day. Indications: Heart failure, HTN  Qty: 60 Tab, Refills: 0      apixaban (ELIQUIS) 5 mg tablet Take 1 Tab by mouth two (2) times a day. Indications: blood clot in a deep vein of the extremities  Qty: 60 Tab, Refills: 2           CONTINUE these medications which have CHANGED    Details   naloxone (Narcan) 4 mg/actuation nasal spray Use 1 spray intranasally, then discard. Repeat with new spray every 2 min as needed for opioid overdose symptoms, alternating nostrils. Indications: decrease in rate & depth of breathing due to opioid drug  Qty: 1 Each, Refills: 0      hydrOXYzine HCL (ATARAX) 50 mg tablet Take 1 Tab by mouth three (3) times daily as needed for Anxiety for up to 10 days. Indications: anxious  Qty: 90 Tab, Refills: 0      FLUoxetine (PROzac) 40 mg capsule Take 1 Cap by mouth daily.  Indications: major depressive disorder  Qty: 30 Cap, Refills: 0           The patient's chart, MAR and AVS were reviewed by Orlando Wharton, PHARMD.

## 2020-11-16 NOTE — BH NOTES
Behavioral Health Transition Record to Provider    Patient Name: Lora Martínez  YOB: 1995  Medical Record Number: [id number]  Date of Admission: 11/11/2020  Date of Discharge: 11/16/2020     Attending Provider: No att. providers found  Discharging Provider: Dr. Dianne Bridges   To contact this individual call [phone] and ask the  to page. If unavailable, ask to be transferred to [facility] Provider on call. Nicolas Teran Provider will be available on call 24/7 and during holidays. Primary Care Provider: Christiano Mendiola MD    Allergies   Allergen Reactions    Tramadol Nausea and Vomiting       Reason for Admission: CHIEF COMPLAINT:  \"I feel okay today. \"     HISTORY OF PRESENT ILLNESS:  The patient is a 52-year-old  man who is currently admitted after he was transferred to us from the medical service. Suzi Jarrett had been admitted there initially on 10/21/2020 after an apparent fentanyl overdose.  The overdose was quite severe and he ended up in the ICU for a prolonged period as well as developing severe acute renal failure, shock liver, multiple pneumonias, with an ALT of 33,496 and an AST of 2000.  Currently, he appears to have recovered although with sequelae including multiple sores all over his body. Suzi Jarrett reports that he did take the overdose of fentanyl, but does not recall how much he used or where he got it from.  States that he had been depressed about a breakup with a girlfriend and had also been using other drugs.  His urine drug screen was positive for marijuana, amphetamines, cocaine, PCP, and benzodiazepines.  States that he uses most of these drugs on an occasional basis but tends to use cocaine almost daily and estimates that he was using about 20-30 dollars worth of cocaine most days.  States that he had been started on Prozac by a primary care physician, but had only been partly compliant with it and might have even was stopped it prior to his suicide attempt. Alex Chairez is somewhat of a poor historian and does not remember many of the details.  States that his mood is better now and he feels more hopeful about the future.  Still has trouble with sleep and his energy and motivation are low.  Denies any perceptual abnormalities.  Denies any delusions.  Since his arrival on the unit, he has been calm and cooperative.       Admission Diagnosis: Depression [F32.9]    * No surgery found *    Results for orders placed or performed during the hospital encounter of 11/11/20   LIPID PANEL   Result Value Ref Range    LIPID PROFILE          Cholesterol, total 148 <200 MG/DL    Triglyceride 130 <150 MG/DL    HDL Cholesterol 41 MG/DL    LDL, calculated 81 0 - 100 MG/DL    VLDL, calculated 26 MG/DL    CHOL/HDL Ratio 3.6 0.0 - 5.0     HEMOGLOBIN A1C WITH EAG   Result Value Ref Range    Hemoglobin A1c 5.1 4.0 - 5.6 %    Est. average glucose 100 mg/dL   CBC WITH AUTOMATED DIFF   Result Value Ref Range    WBC 6.5 4.1 - 11.1 K/uL    RBC 3.11 (L) 4.10 - 5.70 M/uL    HGB 8.6 (L) 12.1 - 17.0 g/dL    HCT 26.7 (L) 36.6 - 50.3 %    MCV 85.9 80.0 - 99.0 FL    MCH 27.7 26.0 - 34.0 PG    MCHC 32.2 30.0 - 36.5 g/dL    RDW 17.2 (H) 11.5 - 14.5 %    PLATELET 108 (H) 983 - 400 K/uL    MPV 9.1 8.9 - 12.9 FL    NRBC 0.0 0  WBC    ABSOLUTE NRBC 0.00 0.00 - 0.01 K/uL    NEUTROPHILS 63 32 - 75 %    LYMPHOCYTES 23 12 - 49 %    MONOCYTES 9 5 - 13 %    EOSINOPHILS 3 0 - 7 %    BASOPHILS 1 0 - 1 %    IMMATURE GRANULOCYTES 1 (H) 0.0 - 0.5 %    ABS. NEUTROPHILS 4.1 1.8 - 8.0 K/UL    ABS. LYMPHOCYTES 1.5 0.8 - 3.5 K/UL    ABS. MONOCYTES 0.6 0.0 - 1.0 K/UL    ABS. EOSINOPHILS 0.2 0.0 - 0.4 K/UL    ABS. BASOPHILS 0.1 0.0 - 0.1 K/UL    ABS. IMM.  GRANS. 0.0 0.00 - 0.04 K/UL    DF AUTOMATED     MAGNESIUM   Result Value Ref Range    Magnesium 1.4 (L) 1.6 - 2.4 mg/dL   METABOLIC PANEL, COMPREHENSIVE   Result Value Ref Range    Sodium 139 136 - 145 mmol/L    Potassium 3.4 (L) 3.5 - 5.1 mmol/L    Chloride 104 97 - 108 mmol/L    CO2 27 21 - 32 mmol/L    Anion gap 8 5 - 15 mmol/L    Glucose 87 65 - 100 mg/dL    BUN 25 (H) 6 - 20 MG/DL    Creatinine 1.82 (H) 0.70 - 1.30 MG/DL    BUN/Creatinine ratio 14 12 - 20      GFR est AA 56 (L) >60 ml/min/1.73m2    GFR est non-AA 46 (L) >60 ml/min/1.73m2    Calcium 8.8 8.5 - 10.1 MG/DL    Bilirubin, total 0.6 0.2 - 1.0 MG/DL    ALT (SGPT) 30 12 - 78 U/L    AST (SGOT) 21 15 - 37 U/L    Alk.  phosphatase 68 45 - 117 U/L    Protein, total 6.0 (L) 6.4 - 8.2 g/dL    Albumin 2.8 (L) 3.5 - 5.0 g/dL    Globulin 3.2 2.0 - 4.0 g/dL    A-G Ratio 0.9 (L) 1.1 - 2.2     PHOSPHORUS   Result Value Ref Range    Phosphorus 3.8 2.6 - 4.7 MG/DL   RENAL FUNCTION PANEL   Result Value Ref Range    Sodium 140 136 - 145 mmol/L    Potassium 3.4 (L) 3.5 - 5.1 mmol/L    Chloride 105 97 - 108 mmol/L    CO2 27 21 - 32 mmol/L    Anion gap 8 5 - 15 mmol/L    Glucose 88 65 - 100 mg/dL    BUN 20 6 - 20 MG/DL    Creatinine 1.56 (H) 0.70 - 1.30 MG/DL    BUN/Creatinine ratio 13 12 - 20      GFR est AA >60 >60 ml/min/1.73m2    GFR est non-AA 55 (L) >60 ml/min/1.73m2    Calcium 8.5 8.5 - 10.1 MG/DL    Phosphorus 3.6 2.6 - 4.7 MG/DL    Albumin 2.6 (L) 3.5 - 5.0 g/dL   RENAL FUNCTION PANEL   Result Value Ref Range    Sodium 141 136 - 145 mmol/L    Potassium 3.8 3.5 - 5.1 mmol/L    Chloride 109 (H) 97 - 108 mmol/L    CO2 26 21 - 32 mmol/L    Anion gap 6 5 - 15 mmol/L    Glucose 92 65 - 100 mg/dL    BUN 18 6 - 20 MG/DL    Creatinine 1.47 (H) 0.70 - 1.30 MG/DL    BUN/Creatinine ratio 12 12 - 20      GFR est AA >60 >60 ml/min/1.73m2    GFR est non-AA 59 (L) >60 ml/min/1.73m2    Calcium 8.6 8.5 - 10.1 MG/DL    Phosphorus 3.2 2.6 - 4.7 MG/DL    Albumin 2.9 (L) 3.5 - 5.0 g/dL   RENAL FUNCTION PANEL   Result Value Ref Range    Sodium 141 136 - 145 mmol/L    Potassium 3.7 3.5 - 5.1 mmol/L    Chloride 108 97 - 108 mmol/L    CO2 26 21 - 32 mmol/L    Anion gap 7 5 - 15 mmol/L    Glucose 89 65 - 100 mg/dL    BUN 19 6 - 20 MG/DL    Creatinine 1.36 (H) 0.70 - 1.30 MG/DL    BUN/Creatinine ratio 14 12 - 20      GFR est AA >60 >60 ml/min/1.73m2    GFR est non-AA >60 >60 ml/min/1.73m2    Calcium 8.6 8.5 - 10.1 MG/DL    Phosphorus 3.7 2.6 - 4.7 MG/DL    Albumin 3.0 (L) 3.5 - 5.0 g/dL       Immunizations administered during this encounter: There is no immunization history for the selected administration types on file for this patient. Screening for Metabolic Disorders for Patients on Antipsychotic Medications  (Data obtained from the EMR)    Estimated Body Mass Index  Estimated body mass index is 26.43 kg/m² as calculated from the following:    Height as of this encounter: 5' 10\" (1.778 m). Weight as of this encounter: 83.6 kg (184 lb 3.2 oz). Vital Signs/Blood Pressure  Visit Vitals  BP (!) 135/91   Pulse 91   Temp 98.6 °F (37 °C)   Resp 16   Ht 5' 10\" (1.778 m)   Wt 83.6 kg (184 lb 3.2 oz)   SpO2 99%   BMI 26.43 kg/m²       Blood Glucose/Hemoglobin A1c  Lab Results   Component Value Date/Time    Glucose 89 11/16/2020 06:24 AM    Glucose (POC) 129 (H) 10/23/2020 04:27 PM       Lab Results   Component Value Date/Time    Hemoglobin A1c 5.1 11/13/2020 04:57 AM        Lipid Panel  Lab Results   Component Value Date/Time    Cholesterol, total 148 11/13/2020 04:57 AM    HDL Cholesterol 41 11/13/2020 04:57 AM    LDL, calculated 81 11/13/2020 04:57 AM    Triglyceride 130 11/13/2020 04:57 AM    CHOL/HDL Ratio 3.6 11/13/2020 04:57 AM        Discharge Diagnosis: Mood disorder unspecified; rule out recurrent major depression; rule out bipolar disorder; cocaine use disorder, severe; marijuana, amphetamines, and ecstasy use disorder, mild.       Discharge Plan: He will be discharge home with his mother. The patient Lilly Ambrocio exhibits the ability to control behavior in a less restrictive environment. Patient's level of functioning is improving. No assaultive/destructive behavior has been observed for the past 24 hours.   No suicidal/homicidal threat or behavior has been observed for the past 24 hours. There is no evidence of serious medication side effects. Patient has not been in physical or protective restraints for at least the past 24 hours. If weapons involved, how are they secured? NA    Is patient aware of and in agreement with discharge plan? Yes    Arrangements for medication:  Prescriptions given to patient, sent electronically to [facility] [address]    Copy of discharge instructions to provider?:  Meadekaron Dumont ([phone])    Arrangements for transportation home:  Mom to  11/16/20 at 2/2:30pm     Keep all follow up appointments as scheduled, continue to take prescribed medications per physician instructions. Mental health crisis number:  486 or your local mental health crisis line number at ([facility]) [phone]. SAFETY PLAN    A suicide Safety Plan is a document that supports someone when they are having thoughts of suicide. Warning Signs that indicate a suicidal crisis may be developing: What (situations, thoughts, feelings, body sensations, behaviors, etc.) do you experience that lets you know you are beginning to think about suicide? 1. Sweaty palms  2. Depression  3. Anxiety    Internal Coping Strategies:  What things can I do (relaxation techniques, hobbies, physical activities, etc.) to take my mind off my problems without contacting another person? 1. Running  2. Drawing  3. Fishing    People and social settings that provide distraction: Who can I call or where can I go to distract me? 1. Name:Rola                                     Phone: [phone]  2. Name: Chelsea Teixeira                                    Phone: [phone]  3. Place:  Home      4. Place: Brother    People whom I can ask for help: Who can I call when I need help - for example, friends, family, clergy, someone else? 1.  Name:  Burton Link Phone: [phone]  2. Name: Tiana Kwon                                       Phone: [phone]  3. Name: Mitzy Hale or Behavioral Health agencies I can contact during a crisis: Who can I call for help - for example, my doctor, my psychiatrist, my psychologist, a mental health provider, a suicide hotline? Clinician Name:  Claudetta Ike  At [facility]               Phone: [phone]     Suicide Prevention Lifeline: 7-162-623-XNHL (6607)    [address] [place] [address] Emergency Services -  for example, [facility] suicide hotline, [address] [place] [address] Avenue: [address]      Emergency Services Address: [facility] [address] center at [facility]. [place], [address]      Emergency Services Phone:[phone]    Making the environment safe: How can I make my environment (house/apartment/living space) safer? For example, can I remove guns, medications, and other items? 1. Program you local crisis number at [phone] into your phone. 2. Have your support system program your local crisis number into their phone. 3. Keep medications in a lock box. Only keep a 3 day supply available at a time. Discharge Medication List and Instructions:   Discharge Medication List as of 11/16/2020  1:00 PM      START taking these medications    Details   amLODIPine (NORVASC) 10 mg tablet Take 1 Tab by mouth daily. Indications: high blood pressure, Normal, Disp-30 Tab,R-0      losartan (COZAAR) 50 mg tablet Take 1 Tab by mouth two (2) times a day. Indications: high blood pressure, Normal, Disp-30 Tab,R-0      lurasidone (LATUDA) 40 mg tab tablet Take 1 Tab by mouth daily (with dinner). Indications: bipolar depression, Normal, Disp-30 Tab,R-0      metoprolol tartrate 75 mg tab Take 75 mg by mouth two (2) times a day.  Indications: Heart failure, HTN, Normal, Disp-60 Tab,R-0         CONTINUE these medications which have CHANGED    Details   naloxone (Narcan) 4 mg/actuation nasal spray Use 1 spray intranasally, then discard. Repeat with new spray every 2 min as needed for opioid overdose symptoms, alternating nostrils. Indications: decrease in rate & depth of breathing due to opioid drug, Normal, Disp-1 Each,R-0      hydrOXYzine HCL (ATARAX) 50 mg tablet Take 1 Tab by mouth three (3) times daily as needed for Anxiety for up to 10 days. Indications: anxious, Normal, Disp-90 Tab,R-0      FLUoxetine (PROzac) 40 mg capsule Take 1 Cap by mouth daily. Indications: major depressive disorder, Normal, Disp-30 Cap,R-0      furosemide (LASIX) 20 mg tablet Take 1 Tab by mouth daily. .  Indications: high blood pressure, Normal, Disp-30 Tab,R-0      apixaban (ELIQUIS) 5 mg tablet Take 1 Tab by mouth two (2) times a day.  Indications: blood clot in a deep vein of the extremities, Normal, Disp-60 Tab,R-2         STOP taking these medications       metoprolol succinate (TOPROL-XL) 50 mg XL tablet Comments:   Reason for Stopping:               Unresulted Labs (24h ago, onward)     Start     Ordered    11/14/20 0400  RENAL FUNCTION PANEL  DAILY,   R     Start Priority Status   11/17/20 0400  Scheduled   11/18/20 0400  Scheduled   11/19/20 0400  Scheduled   11/20/20 0400  Scheduled   11/21/20 0400  Scheduled   11/22/20 0400  Scheduled   11/23/20 0400  Scheduled   11/24/20 0400  Scheduled   11/25/20 0400  Scheduled   11/26/20 0400  Scheduled   11/27/20 0400  Scheduled   11/28/20 0400  Scheduled       11/13/20 1064              To obtain results of studies pending at discharge, please contact [phone]    Follow-up Information     Follow up With Specialties Details Why Contact Info    Dr Aquiles Chow on 11/17/2020 1:30 pm cardiology appointment [address] [facility]  [address] [address]  phone: [phone]    Alda Davalos MD Nephrology Schedule an appointment as soon as possible for a visit Dr. Last Rizo office will be calling you after discharge to provide you with a date and time foryour nephrology appointment [address] [facility] [address]  [address] [facility] on 11/18/2020 4:00pm hospital follow up appointment with substance abuse counselor to be assessed and connect for subxone treatment  as well as psychatric medicaiton [facility] [place] [place] and [facility] services  [address] 11[facility], [address] [place] [address]  Telephone: [phone]    Dr. Fletcher Saucedo   Call    [phone]    [place], [place]   Hours: Open · Closes 5PM    Phone: [phone]    [place], [address]  [address] [facility]  [phone]            Advanced Directive:   Does the patient have an appointed surrogate decision maker? No  Does the patient have a Medical Advance Directive? No  Does the patient have a Psychiatric Advance Directive? No  If the patient does not have a surrogate or Medical Advance Directive AND Psychiatric Advance Directive, the patient was offered information on these advance directives Patient declined to complete    Patient Instructions: Please continue all medications until otherwise directed by physician. Tobacco Cessation Discharge Plan:   Is the patient a smoker and needs referral for smoking cessation? Yes  Patient referred to the following for smoking cessation with an appointment? Refused     Patient was offered medication to assist with smoking cessation at discharge? Refused  Was education for smoking cessation added to the discharge instructions? Yes    Alcohol/Substance Abuse Discharge Plan:   Does the patient have a history of substance/alcohol abuse and requires a referral for treatment? No  Patient referred to the following for substance/alcohol abuse treatment with an appointment?  No  Patient was offered medication to assist with alcohol cessation at discharge? No  Was education for substance/alcohol abuse added to discharge instructions? No    Patient discharged to Home; discussed with patient/caregiver and provided to the patient/caregiver either in hard copy or electronically.

## 2020-11-16 NOTE — PROGRESS NOTES
[facility]   [address] [address] [address] [facility], [facility] [address], [facility]  Phone: [phone]   [phone]       Nephrology Progress Note  Marty Obando     1995     [id number]  Date of Admission : 11/11/2020 11/16/20    CC: Follow up for ARF, Rhabdomyolysis        Assessment and Plan   JEAN PIERRE  - Severe ATN from Rhabdomyolysis   - resolving   - Adebayo removed 11/10  - daily labs for now    Severe Rhabdomyolysis:  - resolved    Left Lung PNA w/ sepsis:  - resolved     HTN:  -cont present meds  - adlo/renin levels sent over weekend - will f/u    LUE DVT:  - on eliquis    CMP w/ EF 15-20%:  - likely 2/2 cocaine  - per cardiology    Staph bacteremia:  - LUCIANO neg  - abx per ID    Encephalopathy:  - improving slowly    Polysubstance abuse      Interval History:  Seen and examined. Eating breakfast.  Cr slowly improving. BP elevated over weekend, adjustments made by hospitalist. Voiding well, not recorded. No cp, sob, n/v/d. Review of Systems: A comprehensive review of systems was negative.     Current Medications:   Current Facility-Administered Medications   Medication Dose Route Frequency    losartan (COZAAR) tablet 50 mg  50 mg Oral BID    amLODIPine (NORVASC) tablet 10 mg  10 mg Oral DAILY    hydrALAZINE (APRESOLINE) tablet 50 mg  50 mg Oral Q8H PRN    furosemide (LASIX) tablet 20 mg  20 mg Oral DAILY    pantoprazole (PROTONIX) tablet 40 mg  40 mg Oral ACB    ondansetron (ZOFRAN ODT) tablet 4 mg  4 mg Oral Q6H PRN    FLUoxetine (PROzac) capsule 20 mg  20 mg Oral DAILY    lurasidone (LATUDA) tablet 40 mg  40 mg Oral DAILY WITH DINNER    OLANZapine (ZyPREXA) tablet 5 mg  5 mg Oral Q6H PRN    haloperidol lactate (HALDOL) injection 5 mg  5 mg IntraMUSCular Q6H PRN    benztropine (COGENTIN) tablet 1 mg  1 mg Oral BID PRN    diphenhydrAMINE (BENADRYL) injection 50 mg  50 mg IntraMUSCular BID PRN    hydrOXYzine HCL (ATARAX) tablet 50 mg  50 mg Oral TID PRN    LORazepam (ATIVAN) injection 1 mg  1 mg IntraMUSCular Q4H PRN    traZODone (DESYREL) tablet 50 mg  50 mg Oral QHS PRN    acetaminophen (TYLENOL) tablet 650 mg  650 mg Oral Q4H PRN    magnesium hydroxide (MILK OF MAGNESIA) 400 mg/5 mL oral suspension 30 mL  30 mL Oral DAILY PRN    albuterol-ipratropium (DUO-NEB) 2.5 MG-0.5 MG/3 ML  3 mL Nebulization Q6H PRN    apixaban (ELIQUIS) tablet 5 mg  5 mg Oral BID    docusate sodium (COLACE) capsule 100 mg  100 mg Oral DAILY    metoprolol succinate (TOPROL-XL) XL tablet 150 mg  150 mg Oral DAILY      Allergies   Allergen Reactions    Tramadol Nausea and Vomiting       Objective:  Vitals:    Vitals:    11/15/20 1112 11/15/20 1544 11/15/20 1920 11/16/20 0744   BP: (!) 161/94 136/85 135/82 (!) 161/94   Pulse: 75 84 85 91   Resp: 16 16 18 16   Temp: 98.9 °F (37.2 °C) 99 °F (37.2 °C) 98.9 °F (37.2 °C) 98.4 °F (36.9 °C)   SpO2: 100% 97% 98% 98%   Weight:       Height:         Intake and Output:  11/16 0701 - 11/16 1900  In: 500 [P.O.:500]  Out: -   11/14 1901 - 11/16 0700  In: 2454 [P.O.:2454]  Out: -     Physical Examination:    General: Awake, alert  Neck:  Supple, no mass  Resp:  Decreased breath sounds  CV:  RRR,  no murmur or rub, no LE edema  GI:  Soft, NT, + Bowel sounds, no hepatosplenomegaly  Neurologic:  Awake, alert  Psych:             Stable mood  :  Iniguez removed    []    High complexity decision making was performed  []    Patient is at high-risk of decompensation with multiple organ involvement    Lab Data Personally Reviewed: I have reviewed all the pertinent labs, microbiology data and radiology studies during assessment. Recent Labs     11/16/20  0624 11/15/20  0618 11/14/20  0622    141 140   K 3.7 3.8 3.4*    109* 105   CO2 26 26 27   GLU 89 92 88   BUN 19 18 20   CREA 1.36* 1.47* 1.56*   CA 8.6 8.6 8.5   PHOS 3.7 3.2 3.6   ALB 3.0* 2.9* 2.6*     No results for input(s): WBC, HGB, HCT, PLT, HGBEXT, HCTEXT, PLTEXT, HGBEXT, HCTEXT, PLTEXT in the last 72 hours.   No results found for: SDES  Lab Results   Component Value Date/Time    Culture result: NO GROWTH 5 DAYS 10/22/2020 06:19 AM    Culture result: NO GROWTH 5 DAYS 10/22/2020 02:37 AM    Culture result: (A) 10/21/2020 08:15 PM     Staphylococcus hominis (Oxacillin resistant) GROWING IN THE AEROBIC BOTTLE (SITE = R HAND)    Culture result:  10/21/2020 08:15 PM     Gram Positive Cocci CALLED TO AND READ BACK BY Judge Donald RN AT 2012 BY WSHAWA    Culture result: (A) 10/21/2020 08:10 PM     Staphylococcus hominis (Oxacillin resistant) GROWING IN THE AEROBIC BOTTLE (SITE = L HAND)    Culture result:  10/21/2020 08:10 PM     preliminary report of Gram Positive Cocci in clusters growing in 1 of 2 bottles drawn [address] [facility] MARISOL RN SCAV AT [id number] [facility] ON 10/23/20. Bettye 8207     Recent Results (from the past 24 hour(s))   RENAL FUNCTION PANEL    Collection Time: 11/16/20  6:24 AM   Result Value Ref Range    Sodium 141 136 - 145 mmol/L    Potassium 3.7 3.5 - 5.1 mmol/L    Chloride 108 97 - 108 mmol/L    CO2 26 21 - 32 mmol/L    Anion gap 7 5 - 15 mmol/L    Glucose 89 65 - 100 mg/dL    BUN 19 6 - 20 MG/DL    Creatinine 1.36 (H) 0.70 - 1.30 MG/DL    BUN/Creatinine ratio 14 12 - 20      GFR est AA >60 >60 ml/min/1.73m2    GFR est non-AA >60 >60 ml/min/1.73m2    Calcium 8.6 8.5 - 10.1 MG/DL    Phosphorus 3.7 2.6 - 4.7 MG/DL    Albumin 3.0 (L) 3.5 - 5.0 g/dL           Total time spent with patient:  xxx   min. Care Plan discussed with:  Patient     Family      RN      Consulting Physician [facility] [place],         I have reviewed the flowsheets. Chart and Pertinent Notes have been reviewed. No change in PMH ,family and social history from Consult note.       Xochilt Arias MD

## 2020-11-16 NOTE — INTERDISCIPLINARY ROUNDS
Behavioral Health Interdisciplinary Rounds     Patient Name: Charity Du  Age: 26 y.o. Room/Bed:  730/01  Primary Diagnosis: <principal problem not specified>   Admission Status: Voluntary     Readmission within 30 days: no  Power of  in place: no  Patient requires a blocked bed: no          Reason for blocked bed:     VTE Prophylaxis: No    Mobility needs/Fall risk: no  Flu Vaccine : no   Nutritional Plan: no  Consults:          Labs/Testing due today?: yes    Sleep hours: 7.5         Participation in Care/Groups:  yes  Medication Compliant?: Yes  PRNS (last 24 hours): Antianxiety, Sleep Aid and Pain    Restraints (last 24 hours):  no     CIWA (range last 24 hours):     COWS (range last 24 hours):      Alcohol screening (AUDIT) completed -   AUDIT Score: 0     If applicable, date SBIRT discussed in treatment team AND documented:   AUDIT Screen Score: AUDIT Score: 0      Document Brief Intervention (corresponds directly with the 5 A's, Ask, Advise, Assess, Assist, and Arrange): At- Risk Patients (Score 7-15 for women; 8-15 for men)  Discuss concern patient is drinking at unhealthy levels known to increase risk of alcohol-related health problems. Is Patient ready to commit to change? If No:   Encourage reflection   Discuss short term and long term health risks of consuming alcohol   Barriers to change   Reaffirm willingness to help / Educational materials provided  If Yes:   Set goal  [facility] [employer] provided    Harmful use or Dependence (Score 16 or greater)   Discuss short term and long term health risks of consuming alcohol   Recommendations   Negotiate drinking goal   Recommend addiction specialist/center   Arrange follow-up appointments.     Tobacco - patient is a smoker: Have You Used Tobacco in the Past 30 Days: Yes  Illegal Drugs use: Have You Used Any Illegal Substances Over the Past 12 Months: Yes    24 hour chart check complete: yes     Patient goal(s) for today:   Treatment team focus/goals: Plan for discharge today   Progress note : he has been doing well on the unit, denies suicidal /homicidal ideations , denies voices     LOS:  5  Expected LOS: plan for discharge today       Financial concerns/prescription [facility] contact:  mom, Miller Weston ([phone]) RICKIY MORELIA Given  Family requesting physician contact today:  no  Discharge plan: return home with mom  Access to weapons : QR                        Outpatient provider(s): TBD - suboxone cliniic  Patient's preferred phone number for follow up call : [phone]   Patient's preferred e-mail address :  Participating treatment team members: Mook Mead Dr. - Kevin Del Rosario, PharmD. - Joe Alberts RN

## 2020-11-17 ENCOUNTER — OFFICE VISIT (OUTPATIENT)
Dept: CARDIOLOGY CLINIC | Age: 25
End: 2020-11-17
Payer: MEDICAID

## 2020-11-17 VITALS
OXYGEN SATURATION: 99 % | DIASTOLIC BLOOD PRESSURE: 88 MMHG | WEIGHT: 170 LBS | BODY MASS INDEX: 24.34 KG/M2 | HEIGHT: 70 IN | SYSTOLIC BLOOD PRESSURE: 128 MMHG | HEART RATE: 81 BPM

## 2020-11-17 DIAGNOSIS — I42.0 DILATED CARDIOMYOPATHY (HCC): Primary | ICD-10-CM

## 2020-11-17 DIAGNOSIS — M62.82 NON-TRAUMATIC RHABDOMYOLYSIS: ICD-10-CM

## 2020-11-17 DIAGNOSIS — I10 BENIGN ESSENTIAL HTN: ICD-10-CM

## 2020-11-17 DIAGNOSIS — Z91.89 HISTORY OF DRUG OVERDOSE: ICD-10-CM

## 2020-11-17 PROCEDURE — 99214 OFFICE O/P EST MOD 30 MIN: CPT | Performed by: INTERNAL MEDICINE

## 2020-11-17 RX ORDER — METOPROLOL SUCCINATE 50 MG/1
3 TABLET, EXTENDED RELEASE ORAL DAILY
COMMUNITY
Start: 2020-11-16

## 2020-11-17 NOTE — LETTER
11/17/2020 9:49 PM 
 
Patient:  Sincere Dai YOB: 1995 Date of Visit: 11/17/2020 Dear Abby Hatch MD 
[place] [address] VIA Facsimile: [phone]: Mr. Marilyn Velázquez is a 22 yo M with long hospitalization for substance abuse overdose, positive for cocaine and amphetamines. His hospitalization was complicated by severe rhabdomyolysis, elevated troponin, found to have severe LV dysfunction with an EF of 15% by echocardiogram initially and 15-20% by transesophageal echocardiogram on 11/04/2020, shock liver, acute renal failure, left upper extremity DVT on Eliquis, sepsis. He was eventually discharged yesterday. No recent issues with chest pain. His breathing has been stable. No significant palpitations, lightheadedness, dizziness or syncope. He is compensated on exam with clear lungs and no lower extremity edema. He has been compliant with his medications. Accompanied by his mother. Assessment and Plan: 1. Cardiomyopathy. Presumed nonischemic and secondary to overdose/respiratory arrest/hypotension. Stressed the importance of continued compliance with Toprol, Losartan and Lasix. Will have him tentatively follow up with a same day echocardiogram in three months. 2. Upper extremity DVT. He is on Eliquis for oral anticoagulation. 3. Essential hypertension. Blood pressure is controlled and no changes made. 4. Substance abuse. Encouraged complete cessation. 5. Staph bacteremia. He did have a transesophageal echocardiogram that was negative for vegetations on 11/04/2020.  
6. Acute renal failure and rhabdomyolysis. Resolving and followed by nephrology, Dr. Henrik Biggs. He was temporarily on CRRT and HD. If you have questions, please do not hesitate to call me. Sincerely, Mayte Obando MD

## 2020-11-17 NOTE — PROGRESS NOTES
[place]: Wendy Fargo  [phone]    History of Present Illness:  Mr. Loida Avery is a 24 yo M with long hospitalization for substance abuse overdose, positive for cocaine and amphetamines. His hospitalization was complicated by severe rhabdomyolysis, elevated troponin, found to have severe LV dysfunction with an EF of 15% by echocardiogram initially and 15-20% by transesophageal echocardiogram on 11/04/2020, shock liver, acute renal failure, left upper extremity DVT on Eliquis, sepsis. He was eventually discharged yesterday. No recent issues with chest pain. His breathing has been stable. No significant palpitations, lightheadedness, dizziness or syncope. He is compensated on exam with clear lungs and no lower extremity edema. He has been compliant with his medications. Accompanied by his mother. Assessment and Plan:    1. Cardiomyopathy. Presumed nonischemic and secondary to overdose/respiratory arrest/hypotension. Stressed the importance of continued compliance with Toprol, Losartan and Lasix. Will have him tentatively follow up with a same day echocardiogram in three months. 2. Upper extremity DVT. He is on Eliquis for oral anticoagulation. 3. Essential hypertension. Blood pressure is controlled and no changes made. 4. Substance abuse. Encouraged complete cessation. 5. Staph bacteremia. He did have a transesophageal echocardiogram that was negative for vegetations on 11/04/2020.   6. Acute renal failure and rhabdomyolysis. Resolving and followed by nephrology, Dr. Bonita Araiza. He was temporarily on CRRT and HD. He  has a past medical history of Anxiety and Depression. All other systems negative except as above. PE  Vitals:    11/17/20 1423   BP: 128/88   Pulse: 81   SpO2: 99%   Weight: 170 lb (77.1 kg)   Height: 5' 10\" (1.778 m)    Body mass index is 24.39 kg/m².    General appearance - alert, well appearing, and in no distress  Mental status - affect appropriate to mood  Eyes - sclera anicteric, moist mucous membranes  Neck - supple, no JVD  Chest - clear to auscultation, no wheezes, rales or rhonchi  Heart - normal rate, regular rhythm, normal S1, S2, no murmurs, rubs, clicks or gallops  Abdomen - soft, nontender, nondistended, no masses or organomegaly  Neurological - no focal deficit  Extremities - peripheral pulses normal, no pedal edema    Recent Labs:  Lab Results   Component Value Date/Time    Cholesterol, total 148 11/13/2020 04:57 AM    HDL Cholesterol 41 11/13/2020 04:57 AM    LDL, calculated 81 11/13/2020 04:57 AM    Triglyceride 130 11/13/2020 04:57 AM    CHOL/HDL Ratio 3.6 11/13/2020 04:57 AM     Lab Results   Component Value Date/Time    Creatinine 1.36 (H) 11/16/2020 06:24 AM     Lab Results   Component Value Date/Time    BUN 19 11/16/2020 06:24 AM     Lab Results   Component Value Date/Time    Potassium 3.7 11/16/2020 06:24 AM     Lab Results   Component Value Date/Time    Hemoglobin A1c 5.1 11/13/2020 04:57 AM     Lab Results   Component Value Date/Time    HGB 8.6 (L) 11/13/2020 04:57 AM     Lab Results   Component Value Date/Time    PLATELET 485 (H) 25/49/3634 04:57 AM       Reviewed:  Past Medical History:   Diagnosis Date    Anxiety     Depression      Social History     Tobacco Use   Smoking Status Current Every Day Smoker    Packs/day: 0.50   Smokeless Tobacco Former User     Social History     Substance and Sexual Activity   Alcohol Use Not Currently     Allergies   Allergen Reactions    Tramadol Nausea and Vomiting       Current Outpatient Medications   Medication Sig    naloxone (Narcan) 4 mg/actuation nasal spray Use 1 spray intranasally, then discard. Repeat with new spray every 2 min as needed for opioid overdose symptoms, alternating nostrils. Indications: decrease in rate & depth of breathing due to opioid drug    hydrOXYzine HCL (ATARAX) 50 mg tablet Take 1 Tab by mouth three (3) times daily as needed for Anxiety for up to 10 days. Indications: anxious    amLODIPine (NORVASC) 10 mg tablet Take 1 Tab by mouth daily. Indications: high blood pressure    losartan (COZAAR) 50 mg tablet Take 1 Tab by mouth two (2) times a day. Indications: high blood pressure    lurasidone (LATUDA) 40 mg tab tablet Take 1 Tab by mouth daily (with dinner). Indications: bipolar depression    FLUoxetine (PROzac) 40 mg capsule Take 1 Cap by mouth daily. Indications: major depressive disorder    furosemide (LASIX) 20 mg tablet Take 1 Tab by mouth daily. .  Indications: high blood pressure    metoprolol tartrate 75 mg tab Take 75 mg by mouth two (2) times a day. Indications: Heart failure, HTN    apixaban (ELIQUIS) 5 mg tablet Take 1 Tab by mouth two (2) times a day. Indications: blood clot in a deep vein of the extremities    metoprolol succinate (TOPROL-XL) 50 mg XL tablet Take 3 Tabs by mouth daily. No current facility-administered medications for this visit.         Mayte Obando MD  [facility] heart and Vascular [place]  [address] [facility] [address]  [address]

## 2020-11-18 NOTE — BSMART NOTE
Reached out to patient at [phone] for follow up. Voicemail came on and no message left due to confidentiality.

## 2020-11-23 LAB
METANEPH FREE SERPL-MCNC: 23.3 PG/ML (ref 0–88)
NORMETANEPHRINE SERPL-MCNC: 309.4 PG/ML (ref 0–107.7)

## 2021-01-07 ENCOUNTER — HOSPITAL ENCOUNTER (EMERGENCY)
Age: 26
Discharge: HOME | End: 2021-01-07
Attending: EMERGENCY MEDICINE
Payer: MEDICAID

## 2021-01-07 VITALS
DIASTOLIC BLOOD PRESSURE: 52 MMHG | HEIGHT: 70 IN | TEMPERATURE: 98.5 F | OXYGEN SATURATION: 96 % | WEIGHT: 175 LBS | HEART RATE: 57 BPM | BODY MASS INDEX: 25.05 KG/M2 | RESPIRATION RATE: 16 BRPM | SYSTOLIC BLOOD PRESSURE: 104 MMHG

## 2021-01-07 DIAGNOSIS — K08.89 DENTALGIA: Primary | ICD-10-CM

## 2021-01-07 PROCEDURE — 74011250637 HC RX REV CODE- 250/637: Performed by: EMERGENCY MEDICINE

## 2021-01-07 PROCEDURE — 99283 EMERGENCY DEPT VISIT LOW MDM: CPT

## 2021-01-07 RX ORDER — HYDROCODONE BITARTRATE AND ACETAMINOPHEN 7.5; 325 MG/1; MG/1
1 TABLET ORAL ONCE
Status: COMPLETED | OUTPATIENT
Start: 2021-01-07 | End: 2021-01-07

## 2021-01-07 RX ORDER — PENICILLIN V POTASSIUM 500 MG/1
500 TABLET, FILM COATED ORAL 4 TIMES DAILY
Qty: 28 TAB | Refills: 0 | Status: SHIPPED | OUTPATIENT
Start: 2021-01-07 | End: 2021-01-14

## 2021-01-07 RX ORDER — PENICILLIN V POTASSIUM 250 MG/1
500 TABLET, FILM COATED ORAL
Status: COMPLETED | OUTPATIENT
Start: 2021-01-07 | End: 2021-01-07

## 2021-01-07 RX ORDER — IBUPROFEN 600 MG/1
600 TABLET ORAL
Qty: 20 TAB | Refills: 0 | Status: SHIPPED | OUTPATIENT
Start: 2021-01-07

## 2021-01-07 RX ADMIN — PENICILLIN V POTASIUM 500 MG: 250 TABLET ORAL at 12:28

## 2021-01-07 RX ADMIN — HYDROCODONE BITARTRATE AND ACETAMINOPHEN 1 TABLET: 7.5; 325 TABLET ORAL at 12:28

## 2021-01-07 NOTE — ED NOTES
Pt given discharge and follow up instructions. Pain management discussed with pt. Pt given education on prescribed medications.

## 2021-01-07 NOTE — ED TRIAGE NOTES
Pt reports back right dental pain for approximately a week. Pt rates pain at 10/10. Left lower side of jaw is slightly swollen compared to right.

## 2021-01-07 NOTE — ED PROVIDER NOTES
EMERGENCY DEPARTMENT HISTORY AND PHYSICAL EXAM      Date: 1/7/2021  Patient Name: Romi Paulson    History of Presenting Illness     Chief Complaint   Patient presents with    Dental Pain       History Provided By: Patient    HPI: Romi Paulson, 26 y.o. male with a past medical history significant No significant past medical history presents to the ED with cc of left lower jaw pain for a week, patient admits to history of \"bad tooth\"    There are no other complaints, changes, or physical findings at this time. PCP: Zulema Lei MD    No current facility-administered medications on file prior to encounter. Current Outpatient Medications on File Prior to Encounter   Medication Sig Dispense Refill    metoprolol succinate (TOPROL-XL) 50 mg XL tablet Take 3 Tabs by mouth daily.  naloxone (Narcan) 4 mg/actuation nasal spray Use 1 spray intranasally, then discard. Repeat with new spray every 2 min as needed for opioid overdose symptoms, alternating nostrils. Indications: decrease in rate & depth of breathing due to opioid drug 1 Each 0    amLODIPine (NORVASC) 10 mg tablet Take 1 Tab by mouth daily. Indications: high blood pressure 30 Tab 0    losartan (COZAAR) 50 mg tablet Take 1 Tab by mouth two (2) times a day. Indications: high blood pressure 30 Tab 0    lurasidone (LATUDA) 40 mg tab tablet Take 1 Tab by mouth daily (with dinner). Indications: bipolar depression 30 Tab 0    FLUoxetine (PROzac) 40 mg capsule Take 1 Cap by mouth daily. Indications: major depressive disorder 30 Cap 0    furosemide (LASIX) 20 mg tablet Take 1 Tab by mouth daily. .  Indications: high blood pressure 30 Tab 0    apixaban (ELIQUIS) 5 mg tablet Take 1 Tab by mouth two (2) times a day.  Indications: blood clot in a deep vein of the extremities 60 Tab 2       Past History     Past Medical History:  Past Medical History:   Diagnosis Date    Anxiety     Depression        Past Surgical History:  Past Surgical History:   Procedure Laterality Date    HX ORTHOPAEDIC         Family History:  History reviewed. No pertinent family history. Social History:  Social History     Tobacco Use    Smoking status: Former Smoker     Packs/day: 0.50    Smokeless tobacco: Former User   Substance Use Topics    Alcohol use: Not Currently    Drug use: Yes     Comment: Fentanyl       Allergies: Allergies   Allergen Reactions    Tramadol Nausea and Vomiting         Review of Systems     Review of Systems   Constitutional: Negative for chills and fever. HENT: Positive for dental problem and facial swelling. Negative for drooling, rhinorrhea and sore throat. Eyes: Negative for discharge and visual disturbance. Respiratory: Negative for cough and shortness of breath. Cardiovascular: Negative for chest pain and leg swelling. Gastrointestinal: Negative for abdominal pain and vomiting. Endocrine: Negative for polydipsia and polyuria. Genitourinary: Negative for dysuria and urgency. Musculoskeletal: Negative for back pain and neck stiffness. Skin: Negative for color change and pallor. Neurological: Negative for weakness and numbness. Psychiatric/Behavioral: Negative. Physical Exam     Physical Exam  Vitals signs and nursing note reviewed. Constitutional:       Appearance: Normal appearance. HENT:      Head: Normocephalic and atraumatic. Jaw: Swelling present. No tenderness. Mouth/Throat:      Mouth: Mucous membranes are moist.      Dentition: Abnormal dentition. Dental tenderness, gingival swelling and dental caries present. No dental abscesses. Pharynx: Oropharynx is clear. Uvula midline. Eyes:      Extraocular Movements: Extraocular movements intact. Conjunctiva/sclera: Conjunctivae normal.      Pupils: Pupils are equal, round, and reactive to light. Neck:      Musculoskeletal: Normal range of motion and neck supple.    Cardiovascular:      Rate and Rhythm: Normal rate and regular rhythm. Pulses: Normal pulses. Heart sounds: Normal heart sounds. Pulmonary:      Effort: Pulmonary effort is normal.      Breath sounds: Normal breath sounds. Abdominal:      General: Bowel sounds are normal.      Palpations: Abdomen is soft. Musculoskeletal: Normal range of motion. General: No tenderness or signs of injury. Skin:     General: Skin is warm and dry. Capillary Refill: Capillary refill takes less than 2 seconds. Neurological:      General: No focal deficit present. Mental Status: He is alert and oriented to person, place, and time. Psychiatric:         Mood and Affect: Mood normal.         Behavior: Behavior normal.         Lab and Diagnostic Study Results     Labs -   No results found for this or any previous visit (from the past 12 hour(s)). Radiologic Studies -   @lastxrresult@  CT Results  (Last 48 hours)    None        CXR Results  (Last 48 hours)    None            Medical Decision Making   - I am the first provider for this patient. - I reviewed the vital signs, available nursing notes, past medical history, past surgical history, family history and social history. - Initial assessment performed. The patients presenting problems have been discussed, and they are in agreement with the care plan formulated and outlined with them. I have encouraged them to ask questions as they arise throughout their visit. Vital Signs-Reviewed the patient's vital signs. Patient Vitals for the past 12 hrs:   Temp Pulse Resp BP SpO2   01/07/21 1145     98 %   01/07/21 1143 98.5 °F (36.9 °C) (!) 57 16 (!) 109/55 98 %       Records Reviewed: Nursing Notes    The patient presents with no pain with a differential diagnosis of fractured tooth, dental abscess, dental caries      ED Course:          Provider Notes (Medical Decision Making): MDM       Procedures   Medical Decision Makingedical Decision Making  Performed by:  Itzel Giordano MD  PROCEDURES:  Procedures       Disposition   Disposition: Condition stable and improved  DC- Adult Discharges: All of the diagnostic tests were reviewed and questions answered. Diagnosis, care plan and treatment options were discussed. The patient understands the instructions and will follow up as directed. The patients results have been reviewed with them. They have been counseled regarding their diagnosis. The patient verbally convey understanding and agreement of the signs, symptoms, diagnosis, treatment and prognosis and additionally agrees to follow up as recommended with their PCP in 24 - 48 hours. They also agree with the care-plan and convey that all of their questions have been answered. I have also put together some discharge instructions for them that include: 1) educational information regarding their diagnosis, 2) how to care for their diagnosis at home, as well a 3) list of reasons why they would want to return to the ED prior to their follow-up appointment, should their condition change. DISCHARGE PLAN:  1. Current Discharge Medication List      START taking these medications    Details   penicillin v potassium (VEETID) 500 mg tablet Take 1 Tab by mouth four (4) times daily for 7 days. Qty: 28 Tab, Refills: 0      ibuprofen (MOTRIN) 600 mg tablet Take 1 Tab by mouth every six (6) hours as needed for Pain. Qty: 20 Tab, Refills: 0         CONTINUE these medications which have NOT CHANGED    Details   metoprolol succinate (TOPROL-XL) 50 mg XL tablet Take 3 Tabs by mouth daily. naloxone (Narcan) 4 mg/actuation nasal spray Use 1 spray intranasally, then discard. Repeat with new spray every 2 min as needed for opioid overdose symptoms, alternating nostrils. Indications: decrease in rate & depth of breathing due to opioid drug  Qty: 1 Each, Refills: 0      amLODIPine (NORVASC) 10 mg tablet Take 1 Tab by mouth daily.  Indications: high blood pressure  Qty: 30 Tab, Refills: 0 losartan (COZAAR) 50 mg tablet Take 1 Tab by mouth two (2) times a day. Indications: high blood pressure  Qty: 30 Tab, Refills: 0      lurasidone (LATUDA) 40 mg tab tablet Take 1 Tab by mouth daily (with dinner). Indications: bipolar depression  Qty: 30 Tab, Refills: 0      FLUoxetine (PROzac) 40 mg capsule Take 1 Cap by mouth daily. Indications: major depressive disorder  Qty: 30 Cap, Refills: 0      furosemide (LASIX) 20 mg tablet Take 1 Tab by mouth daily. .  Indications: high blood pressure  Qty: 30 Tab, Refills: 0      apixaban (ELIQUIS) 5 mg tablet Take 1 Tab by mouth two (2) times a day. Indications: blood clot in a deep vein of the extremities  Qty: 60 Tab, Refills: 2           2. Follow-up Information     Follow up With Specialties Details Why Contact Info      Schedule an appointment as soon as possible for a visit in 3 days  Dr. Hector Andrea and Dr. Oneida Cai  [phone]        3. Return to ED if worse   4. Current Discharge Medication List      START taking these medications    Details   penicillin v potassium (VEETID) 500 mg tablet Take 1 Tab by mouth four (4) times daily for 7 days. Qty: 28 Tab, Refills: 0      ibuprofen (MOTRIN) 600 mg tablet Take 1 Tab by mouth every six (6) hours as needed for Pain. Qty: 20 Tab, Refills: 0               Diagnosis     Clinical Impression:   1. Dentalgia        Attestations:    Mariya Avila MD    Please note that this dictation was completed with [employer], the computer voice recognition software. Quite often unanticipated grammatical, syntax, homophones, and other interpretive errors are inadvertently transcribed by the computer software. Please disregard these errors. Please excuse any errors that have escaped final proofreading. Thank you.

## 2021-03-18 LAB
ALDOST SERPL-MCNC: <1 NG/DL (ref 0–30)
RENIN PLAS-CCNC: 0.55 NG/ML/HR (ref 0.17–5.38)
SPECIMEN SOURCE: NORMAL

## 2021-08-03 PROBLEM — F32.A DEPRESSION: Status: RESOLVED | Noted: 2020-11-11 | Resolved: 2021-08-03

## 2021-08-25 ENCOUNTER — APPOINTMENT (OUTPATIENT)
Dept: CT IMAGING | Age: 26
End: 2021-08-25
Attending: EMERGENCY MEDICINE
Payer: MEDICAID

## 2021-08-25 ENCOUNTER — HOSPITAL ENCOUNTER (EMERGENCY)
Age: 26
Discharge: HOME | End: 2021-08-25
Attending: EMERGENCY MEDICINE
Payer: MEDICAID

## 2021-08-25 VITALS
HEART RATE: 69 BPM | HEIGHT: 70 IN | SYSTOLIC BLOOD PRESSURE: 133 MMHG | WEIGHT: 175 LBS | OXYGEN SATURATION: 98 % | TEMPERATURE: 97.9 F | DIASTOLIC BLOOD PRESSURE: 80 MMHG | BODY MASS INDEX: 25.05 KG/M2 | RESPIRATION RATE: 20 BRPM

## 2021-08-25 DIAGNOSIS — S00.83XA CONTUSION OF JAW, INITIAL ENCOUNTER: Primary | ICD-10-CM

## 2021-08-25 DIAGNOSIS — K02.9 DENTAL CARIES: ICD-10-CM

## 2021-08-25 PROCEDURE — 99283 EMERGENCY DEPT VISIT LOW MDM: CPT

## 2021-08-25 PROCEDURE — 74011250637 HC RX REV CODE- 250/637: Performed by: EMERGENCY MEDICINE

## 2021-08-25 PROCEDURE — 70486 CT MAXILLOFACIAL W/O DYE: CPT

## 2021-08-25 RX ORDER — PENICILLIN V POTASSIUM 500 MG/1
500 TABLET, FILM COATED ORAL 4 TIMES DAILY
Qty: 28 TABLET | Refills: 0 | Status: SHIPPED | OUTPATIENT
Start: 2021-08-25 | End: 2021-09-01

## 2021-08-25 RX ORDER — ACETAMINOPHEN 325 MG/1
650 TABLET ORAL
Qty: 20 TABLET | Refills: 0 | Status: SHIPPED | OUTPATIENT
Start: 2021-08-25 | End: 2021-08-25 | Stop reason: CLARIF

## 2021-08-25 RX ORDER — IBUPROFEN 800 MG/1
800 TABLET ORAL
Qty: 20 TABLET | Refills: 0 | Status: SHIPPED | OUTPATIENT
Start: 2021-08-25 | End: 2021-09-01

## 2021-08-25 RX ORDER — IBUPROFEN 800 MG/1
800 TABLET ORAL
Status: COMPLETED | OUTPATIENT
Start: 2021-08-25 | End: 2021-08-25

## 2021-08-25 RX ADMIN — IBUPROFEN 800 MG: 800 TABLET, FILM COATED ORAL at 12:09

## 2021-08-25 NOTE — ED PROVIDER NOTES
EMERGENCY DEPARTMENT HISTORY AND PHYSICAL EXAM      Date: 8/25/2021  Patient Name: Kobe Quinones    History of Presenting Illness     Chief Complaint   Patient presents with    Dental Pain     pt presents with c/o dental pain left lower jaw, pt states struck in face with ball, chipped teeth, now gums are swelling per pt. Pt taking OTC medications w/o relief at home       History Provided By: Patient    HPI: Kobe Quinones, 23 y.o. male with a past medical history significant No significant past medical history presents to the ED with cc of left lower jaw pain for 2 days status post being struck in the left jaw with high force by a metal bar; pain intensity 7/10 worsened with direct pressure and chewing    There are no other complaints, changes, or physical findings at this time. PCP: Jorden Lamar MD    No current facility-administered medications on file prior to encounter. Current Outpatient Medications on File Prior to Encounter   Medication Sig Dispense Refill    ibuprofen (MOTRIN) 600 mg tablet Take 1 Tab by mouth every six (6) hours as needed for Pain. 20 Tab 0    metoprolol succinate (TOPROL-XL) 50 mg XL tablet Take 3 Tabs by mouth daily.  naloxone (Narcan) 4 mg/actuation nasal spray Use 1 spray intranasally, then discard. Repeat with new spray every 2 min as needed for opioid overdose symptoms, alternating nostrils. Indications: decrease in rate & depth of breathing due to opioid drug 1 Each 0    amLODIPine (NORVASC) 10 mg tablet Take 1 Tab by mouth daily. Indications: high blood pressure 30 Tab 0    losartan (COZAAR) 50 mg tablet Take 1 Tab by mouth two (2) times a day. Indications: high blood pressure 30 Tab 0    lurasidone (LATUDA) 40 mg tab tablet Take 1 Tab by mouth daily (with dinner). Indications: bipolar depression 30 Tab 0    FLUoxetine (PROzac) 40 mg capsule Take 1 Cap by mouth daily.  Indications: major depressive disorder 30 Cap 0    furosemide (LASIX) 20 mg tablet Take 1 Tab by mouth daily. .  Indications: high blood pressure 30 Tab 0    apixaban (ELIQUIS) 5 mg tablet Take 1 Tab by mouth two (2) times a day. Indications: blood clot in a deep vein of the extremities 60 Tab 2       Past History     Past Medical History:  Past Medical History:   Diagnosis Date    Anxiety     Depression        Past Surgical History:  Past Surgical History:   Procedure Laterality Date    HX ORTHOPAEDIC         Family History:  History reviewed. No pertinent family history. Social History:  Social History     Tobacco Use    Smoking status: Former Smoker     Packs/day: 0.50    Smokeless tobacco: Former User   Substance Use Topics    Alcohol use: Not Currently    Drug use: Yes     Comment: Fentanyl       Allergies: Allergies   Allergen Reactions    Tramadol Nausea and Vomiting         Review of Systems     Review of Systems   Constitutional: Negative for chills and fever. HENT: Negative for rhinorrhea and sore throat. Eyes: Negative for pain and visual disturbance. Respiratory: Negative for cough and shortness of breath. Cardiovascular: Negative for chest pain and leg swelling. Gastrointestinal: Negative for abdominal pain and vomiting. Endocrine: Negative for polydipsia and polyuria. Genitourinary: Negative for dysuria and urgency. Musculoskeletal: Negative for back pain and neck pain. Skin: Negative for color change and pallor. Neurological: Negative for weakness and numbness. Psychiatric/Behavioral: Negative. Physical Exam     Physical Exam  Vitals and nursing note reviewed. Constitutional:       General: He is not in acute distress. Appearance: Normal appearance. He is not ill-appearing, toxic-appearing or diaphoretic. HENT:      Head: Normocephalic and atraumatic. Jaw: Tenderness and pain on movement present. No trismus, swelling or malocclusion.         Right Ear: Tympanic membrane and ear canal normal.      Left Ear: Tympanic membrane normal.      Nose: Nose normal.      Mouth/Throat:      Mouth: Mucous membranes are moist.      Dentition: Abnormal dentition. Dental caries present. No gingival swelling or dental abscesses. Pharynx: Oropharynx is clear. Uvula midline. Eyes:      Extraocular Movements: Extraocular movements intact. Conjunctiva/sclera: Conjunctivae normal.      Pupils: Pupils are equal, round, and reactive to light. Cardiovascular:      Rate and Rhythm: Normal rate and regular rhythm. Pulses: Normal pulses. Heart sounds: Normal heart sounds. Pulmonary:      Effort: Pulmonary effort is normal.      Breath sounds: Normal breath sounds. Abdominal:      General: Bowel sounds are normal.      Palpations: Abdomen is soft. Musculoskeletal:         General: No swelling, tenderness or signs of injury. Normal range of motion. Cervical back: Normal range of motion and neck supple. Skin:     General: Skin is warm and dry. Capillary Refill: Capillary refill takes less than 2 seconds. Neurological:      General: No focal deficit present. Mental Status: He is alert and oriented to person, place, and time. Cranial Nerves: No cranial nerve deficit. Sensory: No sensory deficit. Motor: No weakness. Psychiatric:         Mood and Affect: Mood normal.         Behavior: Behavior normal.         Lab and Diagnostic Study Results     Labs -   No results found for this or any previous visit (from the past 12 hour(s)). Radiologic Studies -   @lastxrresult@  CT Results  (Last 48 hours)    None        CXR Results  (Last 48 hours)    None            Medical Decision Making   - I am the first provider for this patient. - I reviewed the vital signs, available nursing notes, past medical history, past surgical history, family history and social history. - Initial assessment performed.  The patients presenting problems have been discussed, and they are in agreement with the care plan formulated and outlined with them. I have encouraged them to ask questions as they arise throughout their visit. Vital Signs-Reviewed the patient's vital signs. Patient Vitals for the past 12 hrs:   Temp Pulse Resp BP SpO2   08/25/21 1200 97.9 °F (36.6 °C) 69 20 133/80 99 %       Records Reviewed: Nursing Notes    The patient presents with jaw pain with a differential diagnosis of dental caries, closed fracture, abscess      ED Course:          Provider Notes (Medical Decision Making): MDM       Procedures   Medical Decision Makingedical Decision Making  Performed by: Otoniel Chamberlain MD  PROCEDURES:  Procedures       Disposition   Disposition: Condition stable and improved  DC- Adult Discharges: All of the diagnostic tests were reviewed and questions answered. Diagnosis, care plan and treatment options were discussed. The patient understands the instructions and will follow up as directed. The patients results have been reviewed with them. They have been counseled regarding their diagnosis. The patient verbally convey understanding and agreement of the signs, symptoms, diagnosis, treatment and prognosis and additionally agrees to follow up as recommended with their PCP in 24 - 48 hours. They also agree with the care-plan and convey that all of their questions have been answered. I have also put together some discharge instructions for them that include: 1) educational information regarding their diagnosis, 2) how to care for their diagnosis at home, as well a 3) list of reasons why they would want to return to the ED prior to their follow-up appointment, should their condition change. DISCHARGE PLAN:  1. Current Discharge Medication List      CONTINUE these medications which have NOT CHANGED    Details   ibuprofen (MOTRIN) 600 mg tablet Take 1 Tab by mouth every six (6) hours as needed for Pain.   Qty: 20 Tab, Refills: 0      metoprolol succinate (TOPROL-XL) 50 mg XL tablet Take 3 Tabs by mouth daily. naloxone (Narcan) 4 mg/actuation nasal spray Use 1 spray intranasally, then discard. Repeat with new spray every 2 min as needed for opioid overdose symptoms, alternating nostrils. Indications: decrease in rate & depth of breathing due to opioid drug  Qty: 1 Each, Refills: 0      amLODIPine (NORVASC) 10 mg tablet Take 1 Tab by mouth daily. Indications: high blood pressure  Qty: 30 Tab, Refills: 0      losartan (COZAAR) 50 mg tablet Take 1 Tab by mouth two (2) times a day. Indications: high blood pressure  Qty: 30 Tab, Refills: 0      lurasidone (LATUDA) 40 mg tab tablet Take 1 Tab by mouth daily (with dinner). Indications: bipolar depression  Qty: 30 Tab, Refills: 0      FLUoxetine (PROzac) 40 mg capsule Take 1 Cap by mouth daily. Indications: major depressive disorder  Qty: 30 Cap, Refills: 0      furosemide (LASIX) 20 mg tablet Take 1 Tab by mouth daily. .  Indications: high blood pressure  Qty: 30 Tab, Refills: 0      apixaban (ELIQUIS) 5 mg tablet Take 1 Tab by mouth two (2) times a day. Indications: blood clot in a deep vein of the extremities  Qty: 60 Tab, Refills: 2           2. Follow-up Information    None       3. Return to ED if worse   4. Current Discharge Medication List            Diagnosis     Clinical Impression: No diagnosis found. Attestations:    Baldo Messer MD    Please note that this dictation was completed with [employer], the computer voice recognition software. Quite often unanticipated grammatical, syntax, homophones, and other interpretive errors are inadvertently transcribed by the computer software. Please disregard these errors. Please excuse any errors that have escaped final proofreading. Thank you.

## 2021-12-09 ENCOUNTER — HOSPITAL ENCOUNTER (EMERGENCY)
Age: 26
Discharge: HOME | End: 2021-12-09
Attending: EMERGENCY MEDICINE
Payer: MEDICAID

## 2021-12-09 VITALS
BODY MASS INDEX: 25.05 KG/M2 | WEIGHT: 175 LBS | HEART RATE: 59 BPM | RESPIRATION RATE: 16 BRPM | HEIGHT: 70 IN | TEMPERATURE: 98.1 F | DIASTOLIC BLOOD PRESSURE: 61 MMHG | SYSTOLIC BLOOD PRESSURE: 99 MMHG | OXYGEN SATURATION: 99 %

## 2021-12-09 DIAGNOSIS — K52.9 GASTROENTERITIS, ACUTE: Primary | ICD-10-CM

## 2021-12-09 LAB
ALBUMIN SERPL-MCNC: 3.8 G/DL (ref 3.5–5)
ALBUMIN/GLOB SERPL: 1.2 {RATIO} (ref 1.1–2.2)
ALP SERPL-CCNC: 76 U/L (ref 45–117)
ALT SERPL-CCNC: 18 U/L (ref 12–78)
ANION GAP SERPL CALC-SCNC: 6 MMOL/L (ref 5–15)
AST SERPL W P-5'-P-CCNC: 14 U/L (ref 15–37)
BASOPHILS # BLD: 0 K/UL (ref 0–0.2)
BASOPHILS NFR BLD: 1 % (ref 0–2.5)
BILIRUB SERPL-MCNC: 0.2 MG/DL (ref 0.2–1)
BUN SERPL-MCNC: 11 MG/DL (ref 6–20)
BUN/CREAT SERPL: 13 (ref 12–20)
CA-I BLD-MCNC: 9.2 MG/DL (ref 8.5–10.1)
CHLORIDE SERPL-SCNC: 101 MMOL/L (ref 97–108)
CO2 SERPL-SCNC: 31 MMOL/L (ref 21–32)
CREAT SERPL-MCNC: 0.87 MG/DL (ref 0.7–1.3)
EOSINOPHIL # BLD: 0.3 K/UL (ref 0–0.7)
EOSINOPHIL NFR BLD: 4 % (ref 0.9–2.9)
ERYTHROCYTE [DISTWIDTH] IN BLOOD BY AUTOMATED COUNT: 13.7 % (ref 11.5–14.5)
GLOBULIN SER CALC-MCNC: 3.1 G/DL (ref 2–4)
GLUCOSE SERPL-MCNC: 97 MG/DL (ref 65–100)
HCT VFR BLD AUTO: 42 % (ref 41–53)
HGB BLD-MCNC: 14.5 G/DL (ref 13.5–17.5)
INR PPP: 1.1 (ref 0.9–1.1)
LIPASE SERPL-CCNC: 27 U/L (ref 73–393)
LYMPHOCYTES # BLD: 2.4 K/UL (ref 1–4.8)
LYMPHOCYTES NFR BLD: 38 % (ref 20.5–51.1)
MAGNESIUM SERPL-MCNC: 1.7 MG/DL (ref 1.6–2.4)
MCH RBC QN AUTO: 31 PG (ref 31–34)
MCHC RBC AUTO-ENTMCNC: 34.6 G/DL (ref 31–36)
MCV RBC AUTO: 89.8 FL (ref 80–100)
MONOCYTES # BLD: 0.3 K/UL (ref 0.2–2.4)
MONOCYTES NFR BLD: 5 % (ref 1.7–9.3)
NEUTS SEG # BLD: 3.3 K/UL (ref 1.8–7.7)
NEUTS SEG NFR BLD: 52 % (ref 42–75)
NRBC # BLD: 0.01 K/UL
NRBC BLD-RTO: 0.2 PER 100 WBC
PLATELET # BLD AUTO: 310 K/UL (ref 150–400)
PMV BLD AUTO: 7 FL (ref 6.5–11.5)
POTASSIUM SERPL-SCNC: 3.7 MMOL/L (ref 3.5–5.1)
PROT SERPL-MCNC: 6.9 G/DL (ref 6.4–8.2)
PROTHROMBIN TIME: 10.4 SEC (ref 9–11.1)
RBC # BLD AUTO: 4.68 M/UL (ref 4.5–5.9)
SODIUM SERPL-SCNC: 138 MMOL/L (ref 136–145)
WBC # BLD AUTO: 6.4 K/UL (ref 4.4–11.3)

## 2021-12-09 PROCEDURE — 74011250636 HC RX REV CODE- 250/636: Performed by: EMERGENCY MEDICINE

## 2021-12-09 PROCEDURE — 96374 THER/PROPH/DIAG INJ IV PUSH: CPT

## 2021-12-09 PROCEDURE — 96375 TX/PRO/DX INJ NEW DRUG ADDON: CPT

## 2021-12-09 PROCEDURE — 74011250637 HC RX REV CODE- 250/637: Performed by: EMERGENCY MEDICINE

## 2021-12-09 PROCEDURE — 36415 COLL VENOUS BLD VENIPUNCTURE: CPT

## 2021-12-09 PROCEDURE — 85610 PROTHROMBIN TIME: CPT

## 2021-12-09 PROCEDURE — 74011000250 HC RX REV CODE- 250: Performed by: EMERGENCY MEDICINE

## 2021-12-09 PROCEDURE — 99283 EMERGENCY DEPT VISIT LOW MDM: CPT

## 2021-12-09 PROCEDURE — 83735 ASSAY OF MAGNESIUM: CPT

## 2021-12-09 PROCEDURE — 83690 ASSAY OF LIPASE: CPT

## 2021-12-09 PROCEDURE — 80053 COMPREHEN METABOLIC PANEL: CPT

## 2021-12-09 PROCEDURE — 85025 COMPLETE CBC W/AUTO DIFF WBC: CPT

## 2021-12-09 RX ORDER — FAMOTIDINE 20 MG/1
20 TABLET, FILM COATED ORAL 2 TIMES DAILY
Qty: 20 TABLET | Refills: 0 | Status: SHIPPED | OUTPATIENT
Start: 2021-12-09 | End: 2021-12-19

## 2021-12-09 RX ORDER — ONDANSETRON 4 MG/1
4 TABLET, FILM COATED ORAL
Status: COMPLETED | OUTPATIENT
Start: 2021-12-09 | End: 2021-12-09

## 2021-12-09 RX ORDER — ONDANSETRON 2 MG/ML
4 INJECTION INTRAMUSCULAR; INTRAVENOUS ONCE
Status: COMPLETED | OUTPATIENT
Start: 2021-12-09 | End: 2021-12-09

## 2021-12-09 RX ADMIN — ONDANSETRON HYDROCHLORIDE 4 MG: 4 TABLET, FILM COATED ORAL at 19:45

## 2021-12-09 RX ADMIN — ONDANSETRON 4 MG: 2 INJECTION INTRAMUSCULAR; INTRAVENOUS at 18:43

## 2021-12-09 RX ADMIN — FAMOTIDINE 20 MG: 10 INJECTION INTRAVENOUS at 18:43

## 2021-12-09 RX ADMIN — SODIUM CHLORIDE 1000 ML: 9 INJECTION, SOLUTION INTRAVENOUS at 18:42

## 2021-12-09 NOTE — ED PROVIDER NOTES
EMERGENCY DEPARTMENT HISTORY AND PHYSICAL EXAM      Date: 12/9/2021  Patient Name: Krissy Fermin    History of Presenting Illness     Chief Complaint   Patient presents with    Vomiting     pt complains of N/V/D x 1 day stating that it started last night, pt denies fever at home afebrile in triage pt VS stable. Pt in NAD    Diarrhea       History Provided By: Patient    HPI: Krissy Fermin, 23 y.o. male with a past medical history significant No significant past medical history presents to the ED with cc of nausea vomiting diarrhea 6 hours after ingesting dinner of chicken and pasta which he ate last night and states he has had more than 5 episodes of diarrhea more than 5 episodes of vomiting patient states he did not have much of an appetite today did not eat today due to the nausea    There are no other complaints, changes, or physical findings at this time. PCP: No primary care provider on file. No current facility-administered medications on file prior to encounter. No current outpatient medications on file prior to encounter. Past History     Past Medical History:  History reviewed. No pertinent past medical history. Past Surgical History:  History reviewed. No pertinent surgical history. Family History:  History reviewed. No pertinent family history. Social History:  Social History     Tobacco Use    Smoking status: Never Smoker    Smokeless tobacco: Never Used   Substance Use Topics    Alcohol use: Not on file    Drug use: Not on file       Allergies: Allergies   Allergen Reactions    Tramadol Other (comments)     Pt states that it makes him sick         Review of Systems     Review of Systems   Constitutional: Negative for chills and fever. HENT: Negative for rhinorrhea and sore throat. Eyes: Negative for pain and visual disturbance. Respiratory: Negative for cough and shortness of breath. Cardiovascular: Negative for chest pain and leg swelling. Gastrointestinal: Positive for abdominal pain, diarrhea, nausea and vomiting. Endocrine: Negative for polydipsia and polyuria. Genitourinary: Negative for dysuria and testicular pain. Musculoskeletal: Negative for back pain and neck pain. Skin: Negative for color change and pallor. Neurological: Negative for weakness and numbness. Psychiatric/Behavioral: Negative. Physical Exam     Physical Exam  Vitals and nursing note reviewed. Constitutional:       General: He is not in acute distress. Appearance: Normal appearance. He is not ill-appearing, toxic-appearing or diaphoretic. HENT:      Head: Normocephalic and atraumatic. Mouth/Throat:      Mouth: Mucous membranes are dry. Pharynx: Oropharynx is clear. Eyes:      Extraocular Movements: Extraocular movements intact. Conjunctiva/sclera: Conjunctivae normal.      Pupils: Pupils are equal, round, and reactive to light. Cardiovascular:      Rate and Rhythm: Normal rate and regular rhythm. Pulses: Normal pulses. Heart sounds: Normal heart sounds. Pulmonary:      Effort: Pulmonary effort is normal.      Breath sounds: Normal breath sounds. Abdominal:      General: Bowel sounds are decreased. Palpations: Abdomen is soft. Tenderness: There is no abdominal tenderness. Musculoskeletal:         General: Normal range of motion. Cervical back: Normal range of motion and neck supple. Skin:     General: Skin is warm and dry. Capillary Refill: Capillary refill takes less than 2 seconds. Neurological:      General: No focal deficit present. Mental Status: He is alert and oriented to person, place, and time. Psychiatric:         Mood and Affect: Mood normal.         Behavior: Behavior normal.         Lab and Diagnostic Study Results     Labs -   No results found for this or any previous visit (from the past 12 hour(s)).     Radiologic Studies -   @lastxrresult@  CT Results  (Last 48 hours) None        CXR Results  (Last 48 hours)    None            Medical Decision Making   - I am the first provider for this patient. - I reviewed the vital signs, available nursing notes, past medical history, past surgical history, family history and social history. - Initial assessment performed. The patients presenting problems have been discussed, and they are in agreement with the care plan formulated and outlined with them. I have encouraged them to ask questions as they arise throughout their visit. Vital Signs-Reviewed the patient's vital signs. Patient Vitals for the past 12 hrs:   Temp Pulse Resp BP SpO2   12/09/21 1759 98.1 °F (36.7 °C) 78 20 112/66 99 %       Records Reviewed: Nursing Notes    The patient presents with abdominal pain with a differential diagnosis of abdominal pain, gastroenteritis, obstruction and pancreatitis      ED Course:     ED Course as of 12/09/21 1934   Thu Dec 09, 2021   1932 Patient states he is feeling better Nehal  [SB]      ED Course User Index  [SB] Anne Ryan MD       Provider Notes (Medical Decision Making): MDM       Procedures   Medical Decision Makingedical Decision Making  Performed by: Ricky White MD  PROCEDURES:  Procedures       Disposition   Disposition: Condition stable and improved  DC- Adult Discharges: All of the diagnostic tests were reviewed and questions answered. Diagnosis, care plan and treatment options were discussed. The patient understands the instructions and will follow up as directed. The patients results have been reviewed with them. They have been counseled regarding their diagnosis. The patient verbally convey understanding and agreement of the signs, symptoms, diagnosis, treatment and prognosis and additionally agrees to follow up as recommended with their PCP in 24 - 48 hours. They also agree with the care-plan and convey that all of their questions have been answered.   I have also put together some discharge instructions for them that include: 1) educational information regarding their diagnosis, 2) how to care for their diagnosis at home, as well a 3) list of reasons why they would want to return to the ED prior to their follow-up appointment, should their condition change. DISCHARGE PLAN:  1. There are no discharge medications for this patient. 2.   Follow-up Information    None       3. Return to ED if worse   4. There are no discharge medications for this patient. Diagnosis     Clinical Impression: No diagnosis found. Attestations:    Angélica Mcgraw MD    Please note that this dictation was completed with [employer], the computer voice recognition software. Quite often unanticipated grammatical, syntax, homophones, and other interpretive errors are inadvertently transcribed by the computer software. Please disregard these errors. Please excuse any errors that have escaped final proofreading. Thank you.

## 2021-12-09 NOTE — Clinical Note
[address]  [facility] EMERGENCY DEPT  [address] [id number]  [phone]    Work/School Note    Date: 12/9/2021    To Whom It May concern:      Andreina Sanders was seen and treated today in the emergency room by the following provider(s):  Attending Provider: Almas Olson MD.      Andreina Sanders is excused from work/school on 12/09/21. He is clear to return to work/school on 12/10/21.         Sincerely,          Prasad Hale MD

## 2021-12-09 NOTE — ED TRIAGE NOTES
.  Chief Complaint   Patient presents with    Vomiting     pt complains of N/V/D x 1 day stating that it started last night, pt denies fever at home afebrile in triage pt VS stable.  Pt in NAD    Diarrhea

## 2022-12-24 NOTE — PROGRESS NOTES
11/14/20 1555   Vital Signs   Pulse (Heart Rate) 72   Resp Rate 16   O2 Sat (%) 100 %   BP (!) 158/101   BP 1 Method Automatic   BP 1 Location Right arm   BP Patient Position Sitting   this is reassessment following notification of BP by [facility]. Scheduled BP medication given (includes increased dosage of ISORDIL new order). show